# Patient Record
Sex: MALE | Race: WHITE | NOT HISPANIC OR LATINO | Employment: OTHER | URBAN - METROPOLITAN AREA
[De-identification: names, ages, dates, MRNs, and addresses within clinical notes are randomized per-mention and may not be internally consistent; named-entity substitution may affect disease eponyms.]

---

## 2017-04-17 ENCOUNTER — TRANSCRIBE ORDERS (OUTPATIENT)
Dept: ADMINISTRATIVE | Facility: HOSPITAL | Age: 82
End: 2017-04-17

## 2017-04-17 ENCOUNTER — APPOINTMENT (OUTPATIENT)
Dept: LAB | Facility: MEDICAL CENTER | Age: 82
End: 2017-04-17
Payer: COMMERCIAL

## 2017-04-17 DIAGNOSIS — D47.2 MONOCLONAL GAMMOPATHY: ICD-10-CM

## 2017-04-17 LAB
ALBUMIN SERPL BCP-MCNC: 3.4 G/DL (ref 3.5–5)
ALP SERPL-CCNC: 59 U/L (ref 46–116)
ALT SERPL W P-5'-P-CCNC: 16 U/L (ref 12–78)
ANION GAP SERPL CALCULATED.3IONS-SCNC: 5 MMOL/L (ref 4–13)
AST SERPL W P-5'-P-CCNC: 18 U/L (ref 5–45)
BASOPHILS # BLD AUTO: 0.01 THOUSANDS/ΜL (ref 0–0.1)
BASOPHILS NFR BLD AUTO: 0 % (ref 0–1)
BILIRUB SERPL-MCNC: 0.8 MG/DL (ref 0.2–1)
BUN SERPL-MCNC: 16 MG/DL (ref 5–25)
CALCIUM SERPL-MCNC: 8.8 MG/DL (ref 8.3–10.1)
CHLORIDE SERPL-SCNC: 100 MMOL/L (ref 100–108)
CO2 SERPL-SCNC: 32 MMOL/L (ref 21–32)
CREAT SERPL-MCNC: 0.79 MG/DL (ref 0.6–1.3)
EOSINOPHIL # BLD AUTO: 0.09 THOUSAND/ΜL (ref 0–0.61)
EOSINOPHIL NFR BLD AUTO: 2 % (ref 0–6)
ERYTHROCYTE [DISTWIDTH] IN BLOOD BY AUTOMATED COUNT: 13.7 % (ref 11.6–15.1)
GFR SERPL CREATININE-BSD FRML MDRD: >60 ML/MIN/1.73SQ M
GLUCOSE P FAST SERPL-MCNC: 102 MG/DL (ref 65–99)
HCT VFR BLD AUTO: 39.5 % (ref 36.5–49.3)
HGB BLD-MCNC: 13.2 G/DL (ref 12–17)
IGA SERPL-MCNC: 122 MG/DL (ref 70–400)
IGG SERPL-MCNC: 779 MG/DL (ref 700–1600)
IGM SERPL-MCNC: 35 MG/DL (ref 40–230)
LYMPHOCYTES # BLD AUTO: 0.75 THOUSANDS/ΜL (ref 0.6–4.47)
LYMPHOCYTES NFR BLD AUTO: 19 % (ref 14–44)
MCH RBC QN AUTO: 31.2 PG (ref 26.8–34.3)
MCHC RBC AUTO-ENTMCNC: 33.4 G/DL (ref 31.4–37.4)
MCV RBC AUTO: 93 FL (ref 82–98)
MONOCYTES # BLD AUTO: 0.42 THOUSAND/ΜL (ref 0.17–1.22)
MONOCYTES NFR BLD AUTO: 11 % (ref 4–12)
NEUTROPHILS # BLD AUTO: 2.7 THOUSANDS/ΜL (ref 1.85–7.62)
NEUTS SEG NFR BLD AUTO: 68 % (ref 43–75)
NRBC BLD AUTO-RTO: 0 /100 WBCS
PLATELET # BLD AUTO: 159 THOUSANDS/UL (ref 149–390)
PMV BLD AUTO: 10.4 FL (ref 8.9–12.7)
POTASSIUM SERPL-SCNC: 3.9 MMOL/L (ref 3.5–5.3)
PROT SERPL-MCNC: 6.5 G/DL (ref 6.4–8.2)
RBC # BLD AUTO: 4.23 MILLION/UL (ref 3.88–5.62)
SODIUM SERPL-SCNC: 137 MMOL/L (ref 136–145)
WBC # BLD AUTO: 3.99 THOUSAND/UL (ref 4.31–10.16)

## 2017-04-17 PROCEDURE — 82784 ASSAY IGA/IGD/IGG/IGM EACH: CPT

## 2017-04-17 PROCEDURE — 80053 COMPREHEN METABOLIC PANEL: CPT

## 2017-04-17 PROCEDURE — 84165 PROTEIN E-PHORESIS SERUM: CPT

## 2017-04-17 PROCEDURE — 88185 FLOWCYTOMETRY/TC ADD-ON: CPT

## 2017-04-17 PROCEDURE — 88184 FLOWCYTOMETRY/ TC 1 MARKER: CPT

## 2017-04-17 PROCEDURE — 83883 ASSAY NEPHELOMETRY NOT SPEC: CPT

## 2017-04-17 PROCEDURE — 85025 COMPLETE CBC W/AUTO DIFF WBC: CPT

## 2017-04-17 PROCEDURE — 36415 COLL VENOUS BLD VENIPUNCTURE: CPT

## 2017-04-18 LAB
KAPPA LC FREE SER-MCNC: 18.04 MG/L (ref 3.3–19.4)
KAPPA LC FREE/LAMBDA FREE SER: 1.28 {RATIO} (ref 0.26–1.65)
LAMBDA LC FREE SERPL-MCNC: 14.07 MG/L (ref 5.71–26.3)

## 2017-04-19 LAB
ALBUMIN SERPL ELPH-MCNC: 3.78 G/DL (ref 3.5–5)
ALBUMIN SERPL ELPH-MCNC: 60.9 % (ref 52–65)
ALPHA1 GLOB SERPL ELPH-MCNC: 0.33 G/DL (ref 0.1–0.4)
ALPHA1 GLOB SERPL ELPH-MCNC: 5.3 % (ref 2.5–5)
ALPHA2 GLOB SERPL ELPH-MCNC: 0.68 G/DL (ref 0.4–1.2)
ALPHA2 GLOB SERPL ELPH-MCNC: 11 % (ref 7–13)
BETA GLOB ABNORMAL SERPL ELPH-MCNC: 0.4 G/DL (ref 0.4–0.8)
BETA1 GLOB SERPL ELPH-MCNC: 6.5 % (ref 5–13)
BETA2 GLOB SERPL ELPH-MCNC: 4.2 % (ref 2–8)
BETA2+GAMMA GLOB SERPL ELPH-MCNC: 0.26 G/DL (ref 0.2–0.5)
GAMMA GLOB ABNORMAL SERPL ELPH-MCNC: 0.75 G/DL (ref 0.5–1.6)
GAMMA GLOB SERPL ELPH-MCNC: 12.1 % (ref 12–22)
IGG/ALB SER: 1.56 {RATIO} (ref 1.1–1.8)
PROT PATTERN SERPL ELPH-IMP: ABNORMAL
PROT SERPL-MCNC: 6.2 G/DL (ref 6.4–8.2)
SCAN RESULT: NORMAL

## 2017-04-27 ENCOUNTER — ALLSCRIPTS OFFICE VISIT (OUTPATIENT)
Dept: OTHER | Facility: OTHER | Age: 82
End: 2017-04-27

## 2017-09-18 ENCOUNTER — TRANSCRIBE ORDERS (OUTPATIENT)
Dept: ADMINISTRATIVE | Facility: HOSPITAL | Age: 82
End: 2017-09-18

## 2017-09-18 ENCOUNTER — APPOINTMENT (OUTPATIENT)
Dept: LAB | Facility: MEDICAL CENTER | Age: 82
End: 2017-09-18
Payer: COMMERCIAL

## 2017-09-18 DIAGNOSIS — I10 ESSENTIAL HYPERTENSION, MALIGNANT: Primary | ICD-10-CM

## 2017-09-18 DIAGNOSIS — I10 ESSENTIAL HYPERTENSION, MALIGNANT: ICD-10-CM

## 2017-09-18 DIAGNOSIS — E78.00 PURE HYPERCHOLESTEROLEMIA: ICD-10-CM

## 2017-09-18 DIAGNOSIS — E78.5 OTHER AND UNSPECIFIED HYPERLIPIDEMIA: Primary | ICD-10-CM

## 2017-09-18 DIAGNOSIS — E78.5 OTHER AND UNSPECIFIED HYPERLIPIDEMIA: ICD-10-CM

## 2017-09-18 DIAGNOSIS — R51.9 FACIAL PAIN: ICD-10-CM

## 2017-09-18 DIAGNOSIS — Z79.899 ENCOUNTER FOR LONG-TERM (CURRENT) USE OF OTHER MEDICATIONS: ICD-10-CM

## 2017-09-18 LAB
ALBUMIN SERPL BCP-MCNC: 3.7 G/DL (ref 3.5–5)
ALP SERPL-CCNC: 61 U/L (ref 46–116)
ALT SERPL W P-5'-P-CCNC: 16 U/L (ref 12–78)
ANION GAP SERPL CALCULATED.3IONS-SCNC: 9 MMOL/L (ref 4–13)
AST SERPL W P-5'-P-CCNC: 19 U/L (ref 5–45)
BASOPHILS # BLD AUTO: 0.02 THOUSANDS/ΜL (ref 0–0.1)
BASOPHILS NFR BLD AUTO: 1 % (ref 0–1)
BILIRUB SERPL-MCNC: 1 MG/DL (ref 0.2–1)
BILIRUB UR QL STRIP: NEGATIVE
BUN SERPL-MCNC: 16 MG/DL (ref 5–25)
CALCIUM SERPL-MCNC: 8.8 MG/DL (ref 8.3–10.1)
CHLORIDE SERPL-SCNC: 101 MMOL/L (ref 100–108)
CHOLEST SERPL-MCNC: 150 MG/DL (ref 50–200)
CLARITY UR: CLEAR
CO2 SERPL-SCNC: 29 MMOL/L (ref 21–32)
COLOR UR: YELLOW
CREAT SERPL-MCNC: 0.78 MG/DL (ref 0.6–1.3)
EOSINOPHIL # BLD AUTO: 0.12 THOUSAND/ΜL (ref 0–0.61)
EOSINOPHIL NFR BLD AUTO: 3 % (ref 0–6)
ERYTHROCYTE [DISTWIDTH] IN BLOOD BY AUTOMATED COUNT: 13.3 % (ref 11.6–15.1)
ERYTHROCYTE [SEDIMENTATION RATE] IN BLOOD: 5 MM/HOUR (ref 0–10)
GFR SERPL CREATININE-BSD FRML MDRD: 81 ML/MIN/1.73SQ M
GLUCOSE P FAST SERPL-MCNC: 93 MG/DL (ref 65–99)
GLUCOSE UR STRIP-MCNC: NEGATIVE MG/DL
HCT VFR BLD AUTO: 42.7 % (ref 36.5–49.3)
HDLC SERPL-MCNC: 78 MG/DL (ref 40–60)
HGB BLD-MCNC: 14.2 G/DL (ref 12–17)
HGB UR QL STRIP.AUTO: NEGATIVE
IRON SERPL-MCNC: 135 UG/DL (ref 65–175)
KETONES UR STRIP-MCNC: NEGATIVE MG/DL
LDH SERPL-CCNC: 221 U/L (ref 81–234)
LDLC SERPL CALC-MCNC: 60 MG/DL (ref 0–100)
LEUKOCYTE ESTERASE UR QL STRIP: NEGATIVE
LYMPHOCYTES # BLD AUTO: 0.61 THOUSANDS/ΜL (ref 0.6–4.47)
LYMPHOCYTES NFR BLD AUTO: 16 % (ref 14–44)
MCH RBC QN AUTO: 30.8 PG (ref 26.8–34.3)
MCHC RBC AUTO-ENTMCNC: 33.3 G/DL (ref 31.4–37.4)
MCV RBC AUTO: 93 FL (ref 82–98)
MONOCYTES # BLD AUTO: 0.37 THOUSAND/ΜL (ref 0.17–1.22)
MONOCYTES NFR BLD AUTO: 9 % (ref 4–12)
NEUTROPHILS # BLD AUTO: 2.79 THOUSANDS/ΜL (ref 1.85–7.62)
NEUTS SEG NFR BLD AUTO: 71 % (ref 43–75)
NITRITE UR QL STRIP: NEGATIVE
NRBC BLD AUTO-RTO: 0 /100 WBCS
PH UR STRIP.AUTO: 7 [PH] (ref 4.5–8)
PLATELET # BLD AUTO: 128 THOUSANDS/UL (ref 149–390)
PMV BLD AUTO: 10.5 FL (ref 8.9–12.7)
POTASSIUM SERPL-SCNC: 4 MMOL/L (ref 3.5–5.3)
PROT SERPL-MCNC: 6.7 G/DL (ref 6.4–8.2)
PROT UR STRIP-MCNC: NEGATIVE MG/DL
PSA SERPL-MCNC: 4.1 NG/ML (ref 0–4)
RBC # BLD AUTO: 4.61 MILLION/UL (ref 3.88–5.62)
SODIUM SERPL-SCNC: 139 MMOL/L (ref 136–145)
SP GR UR STRIP.AUTO: 1.02 (ref 1–1.03)
TRIGL SERPL-MCNC: 61 MG/DL
TSH SERPL DL<=0.05 MIU/L-ACNC: 2.38 UIU/ML (ref 0.36–3.74)
UROBILINOGEN UR QL STRIP.AUTO: 1 E.U./DL
VIT B12 SERPL-MCNC: 310 PG/ML (ref 100–900)
WBC # BLD AUTO: 3.94 THOUSAND/UL (ref 4.31–10.16)

## 2017-09-18 PROCEDURE — 80053 COMPREHEN METABOLIC PANEL: CPT

## 2017-09-18 PROCEDURE — 83615 LACTATE (LD) (LDH) ENZYME: CPT

## 2017-09-18 PROCEDURE — 81003 URINALYSIS AUTO W/O SCOPE: CPT | Performed by: INTERNAL MEDICINE

## 2017-09-18 PROCEDURE — 36415 COLL VENOUS BLD VENIPUNCTURE: CPT

## 2017-09-18 PROCEDURE — 83540 ASSAY OF IRON: CPT

## 2017-09-18 PROCEDURE — 85652 RBC SED RATE AUTOMATED: CPT

## 2017-09-18 PROCEDURE — 84443 ASSAY THYROID STIM HORMONE: CPT

## 2017-09-18 PROCEDURE — G0103 PSA SCREENING: HCPCS

## 2017-09-18 PROCEDURE — 82607 VITAMIN B-12: CPT

## 2017-09-18 PROCEDURE — 85025 COMPLETE CBC W/AUTO DIFF WBC: CPT

## 2017-09-18 PROCEDURE — 80061 LIPID PANEL: CPT

## 2017-09-26 ENCOUNTER — TRANSCRIBE ORDERS (OUTPATIENT)
Dept: ADMINISTRATIVE | Facility: HOSPITAL | Age: 82
End: 2017-09-26

## 2017-09-26 ENCOUNTER — HOSPITAL ENCOUNTER (OUTPATIENT)
Dept: RADIOLOGY | Facility: MEDICAL CENTER | Age: 82
Discharge: HOME | End: 2017-09-26
Payer: COMMERCIAL

## 2017-09-26 ENCOUNTER — APPOINTMENT (OUTPATIENT)
Dept: RADIOLOGY | Facility: MEDICAL CENTER | Age: 82
End: 2017-09-26
Payer: COMMERCIAL

## 2017-09-26 DIAGNOSIS — R51.9 FACIAL PAIN: ICD-10-CM

## 2017-09-26 DIAGNOSIS — M54.2 NECK PAIN: Primary | ICD-10-CM

## 2017-09-26 PROCEDURE — 72050 X-RAY EXAM NECK SPINE 4/5VWS: CPT

## 2017-09-26 PROCEDURE — 70450 CT HEAD/BRAIN W/O DYE: CPT

## 2017-10-26 ENCOUNTER — ALLSCRIPTS OFFICE VISIT (OUTPATIENT)
Dept: OTHER | Facility: OTHER | Age: 82
End: 2017-10-26

## 2017-10-27 DIAGNOSIS — D72.819 DECREASED WHITE BLOOD CELL COUNT: ICD-10-CM

## 2017-10-27 NOTE — PROGRESS NOTES
Assessment  1  MGUS (monoclonal gammopathy of unknown significance) (273 1) (D47 2)   2  Leukopenia (288 50) (D72 819)    Plan  Leukopenia    · (1) CBC/PLT/DIFF; Status:Active; Requested for:78Kjb3806; Perform:[facility]; OMF:[id number];AHDHCQR; For:Leukopenia; Ordered By:Ant Villeda;   · (1) COMPREHENSIVE METABOLIC PANEL; Status:Active; Requested for:66Uvl7480; Perform:[facility]; YTU:[id number];JWQDUIW; For:Leukopenia; Ordered By:Ant Villeda;  MGUS (monoclonal gammopathy of unknown significance)    · (1) FREE LIGHT CHAINS, SERUM; Status:Active; Requested for:52Eof5936; Perform:[facility]; [id number];QXREQDJ; For:MGUS (monoclonal gammopathy of unknown significance); Ordered By:Ant Villeda;   · (1) IgG,IgA,IgM QUANTITATIVE, BLOOD; Status:Active; Requested for:26Bbf5895; Perform:[facility]; HDV:[id number];RKALEUZ; For:MGUS (monoclonal gammopathy of unknown significance); Ordered By:Ant Villeda;   · (1) PROTEIN ELECTRO, SERUM; Status:Active; Requested for:75Gzv3465; Perform:[facility]; WHU:[id number];ZNMAQIR; For:MGUS (monoclonal gammopathy of unknown significance); Ordered By:Ant Villeda;   · (LC) Immunofixation, Serum; Status:Active; Requested for:39Bhc7168; Perform:[facility]; EYF:54GQH3152;UDWEUXS; For:MGUS (monoclonal gammopathy of unknown significance); Ordered By:Ant Villeda;   · Follow-Up visit in 9 months Evaluation and Treatment  Follow-up  Status: Hold For -  Scheduling  Requested for: 21GZD6033   Ordered; For: MGUS (monoclonal gammopathy of unknown significance); Ordered By: Elijah Fernandez Performed:  Due: 13KCC2063    Discussion/Summary  Discussion Summary:   Is a pleasant 41-year-old male with a past medical history of hypertension and hypothyroidism  He was found to have a few immature forms in his blood  His other cell lines were within normal limits  His platelets were also normal   I advised him I would just repeat his blood work   He did not have the blood work I had ordered performed  I do not have any new protein levels her SPEP with immunofixation  White count is slightly low at 3 94  Platelet count is 898 but this has fluctuated  He is at baseline  I'll see him back in 6 months with repeat blood work  He did have abnormal cells on his blood work previously so we will continue to monitor him because of the risk of a myeloproliferative disorder  Again so far as blood work does not show any evidence of this  Counseling Documentation With Imm: The patient was counseled regarding diagnostic results,-- instructions for management,-- prognosis,-- patient and family education  Goals and Barriers: The patient has the current Goals: Monitoring blood work for any abnormalities  The patent has the current Barriers: None  Medication SE Review and Pt Understands Tx: The treatment plan was reviewed with the patient/guardian  The patient/guardian understands and agrees with the treatment plan      Chief Complaint  Chief Complaint Free Text Note Form: When he last saw me he had a Fluctuating platelet count over the last year with a CBC recently which showed 2 metamyelocytes and one myelocyte along with a slightly low protein level      History of Present Illness  Previous Therapy:   Workup       Current Therapy: Workup       Interval History: Patient returns for followup visit  his hemoglobin is normal  White count is slightly low at 3 9 but this has been stable since April  Platelet count is slightly low but this has fluctuated in the past and is in the same range  As far as symptoms are concerned he is at baseline  Denies any nausea denies any vomiting diarrhea  The rest of his 14 point review of systems today was negative  Review of Systems  Complete-Male:  as stated in the history of present illness otherwise his 14 point review of systems today was negative  Active Problems  1  Abnormal blood chemistry (790 6) (R79 9)   2   Leukopenia (288 50) (D72 469)   3  MGUS (monoclonal gammopathy of unknown significance) (273 1) (D47 2)    Past Medical History  1  History of hypertension (V12 59) (Z86 79)   2  History of hypothyroidism (V12 29) (Z86 39)  Past Medical History Reviewed: The past medical history was reviewed and updated today  Positive for hypertension, allergies, hypercholesterolemia, hypothyroidism      Surgical History  1  History of Colon Surgery   2  History of Hip Replacement  Surgical History Reviewed: The surgical history was reviewed and updated today  Positive for hip replacement and a colon resection      Family History  Sister    1  Family history of Diabetes Mellitus (V18 0)  Brother    2  Family history of Lung Cancer (V16 1)  Family History Reviewed: The family history was reviewed and updated today  Other for lung cancer in his brother was a heavy smoker      Social History   · Denied: History of Alcohol Use (History)   · Former smoker (F88 06) (U54 957)  Social History Reviewed: The social history was reviewed and updated today  The patient used to smoke 2 packs a day for 7 years but quit when he was 25 i e  60 years ago  Current Meds   1  Aciphex 20 MG Oral Tablet Delayed Release; TAKE 1 TABLET DAILY; Therapy: (Recorded:03Apr2014) to Recorded   2  Atenolol-Chlorthalidone 50-25 MG Oral Tablet; Take 1 tablet daily; Therapy: (Recorded:03Apr2014) to Recorded   3  Benzonatate 100 MG Oral Capsule; Therapy: (Recorded:26Oct2017) to Recorded   4  Combigan 0 2-0 5 % Ophthalmic Solution; INSTILL 1 DROP Twice daily; Therapy: (Recorded:03Apr2014) to Recorded   5  Latanoprost 0 005 % Ophthalmic Solution; INSTILL 1 DROP  INTO AFFECTED EYE(S)   ONCE DAILY AS DIRECTED; Therapy: (Recorded:03Apr2014) to Recorded   6  Levothyroxine Sodium 50 MCG Oral Tablet; TAKE 1 TABLET DAILY; Therapy: (Recorded:03Apr2014) to Recorded   7  Montelukast Sodium 10 MG Oral Tablet; TAKE 1 TABLET BY MOUTH DAILY;    Therapy: (Recorded:03Apr2014) to Recorded   8  Potassium Chloride Jenny ER 20 MEQ Oral Tablet Extended Release; TAKE 1 TABLET   DAILY; Therapy: (Recorded:03Apr2014) to Recorded   9  Simvastatin 20 MG Oral Tablet; TAKE 1 TABLET DAILY IN THE EVENING; Therapy: (Recorded:03Apr2014) to Recorded   10  Terazosin HCl - 5 MG Oral Capsule; TAKE 1 CAPSULE AT BEDTIME NIGHTLY; Therapy: (Recorded:03Apr2014) to Recorded  Medication List Reviewed: The medication list was reviewed and updated today  Allergies  1  No Known Drug Allergies    Vitals  Vital Signs    Recorded: 26LPP1943 02:18PM   Temperature 95 7 F   Heart Rate 72   Respiration 16   Systolic 082   Diastolic 82   Height 5 ft 6 in   Weight 182 lb    BMI Calculated 29 38   BSA Calculated 1 92   O2 Saturation 94   Pain Scale 0     Physical Exam    Constitutional   General appearance: No acute distress, well appearing and well nourished  Eyes   Conjunctiva and lids: No swelling, erythema, or discharge  Pupils and irises: Equal, round and reactive to light  Ears, Nose, Mouth, and Throat   External inspection of ears and nose: Normal     Nasal mucosa, septum, and turbinates: Normal without edema or erythema  Oropharynx: Normal with no erythema, edema, exudate or lesions  Pulmonary   Respiratory effort: No increased work of breathing or signs of respiratory distress  Auscultation of lungs: Clear to auscultation, equal breath sounds bilaterally, no wheezes, no rales, no rhonci  Cardiovascular   Palpation of heart: Normal PMI, no thrills  Auscultation of heart: Normal rate and rhythm, normal S1 and S2, without murmurs  Examination of extremities for edema and/or varicosities: Normal     Carotid pulses: Normal     Abdomen   Abdomen: Non-tender, no masses  Liver and spleen: No hepatomegaly or splenomegaly  Lymphatic   Palpation of lymph nodes in neck: No lymphadenopathy      Musculoskeletal   Gait and station: Normal     Digits and nails: Normal without clubbing or cyanosis  Inspection/palpation of joints, bones, and muscles: Normal     Skin   Skin and subcutaneous tissue: Normal without rashes or lesions  Neurologic   Cranial nerves: Cranial nerves 2-12 intact  Sensation: No sensory loss  Psychiatric   Orientation to person, place and time: Normal     Mood and affect: Normal           ECOG Zero       Results/Data  * CT HEAD WO CONTRAST 26Sep2017 11:03AM EPIC, Provider   Test ordered by: Celeste Adkins     Test Name Result Flag Reference   CT HEAD WO CONTRAST (Report)     CT BRAIN - WITHOUT CONTRAST     INDICATION: R51: Headache  History taken directly from the electronic ordering system  Facial pain  COMPARISON: None  TECHNIQUE: CT examination of the brain was performed  In addition to axial images, coronal reformatted images were created and submitted for interpretation  Radiation dose length product (DLP) for this visit: 1027 mGy-cm   This examination, like all CT scans performed in the [facility], was performed utilizing techniques to minimize radiation dose exposure, including the use of iterative    reconstruction and automated exposure control  IMAGE QUALITY: Diagnostic  FINDINGS:      PARENCHYMA: No intracranial mass, mass effect or midline shift  No CT signs of acute infarction  There is no parenchymal hemorrhage  Atherosclerotic calcifications of the cavernous segment of the internal carotid artery are mild  VENTRICLES AND EXTRA-AXIAL SPACES: Normal for patient's age  VISUALIZED ORBITS AND PARANASAL SINUSES: Left lens implant noted  CALVARIUM AND EXTRACRANIAL SOFT TISSUES:  Normal        IMPRESSION:     No acute intracranial abnormality  Workstation performed: [id number]     Signed by:    Pierce Maxwell MD   9/26/17     (1) PSA (SCREEN) (Dx V76 44 Screen for Prostate Cancer) [id number] 10:30AM [facility], Provider   Test ordered by: Celeste Adkins     Test Name Result Flag Reference   PROSTATE SPECIFIC ANTIGEN 4 1 ng/mL H 0 0-4 0   American Urological Association Guidelines define biochemical recurrence of prostate cancer as a detectable or rising PSA value post-radical prostatectomy that is greater than or equal to 0 2 ng/mL with a second confirmatory level of greater than or equal to 0 2 ng/mL  This is a patient instruction: This test is non-fasting  Please drink two glasses of water morning of bloodwork  (1) CBC/PLT/DIFF [id number] 10:30AM EPIC, Provider   Test ordered by: Leila Sloan     Test Name Result Flag Reference   WBC COUNT 3 94 Thousand/uL L 4 31-10 16   RBC COUNT 4 61 Million/uL  3 88-5 62   HEMOGLOBIN 14 2 g/dL  12 0-17 0   HEMATOCRIT 42 7 %  36 5-49 3   MCV 93 fL  82-98   MCH 30 8 pg  26 8-34 3   MCHC 33 3 g/dL  31 4-37 4   RDW 13 3 %  11 6-15 1   MPV 10 5 fL  8 9-12 7   PLATELET COUNT 325 Thousands/uL L 149-390   nRBC AUTOMATED 0 /100 WBCs     NEUTROPHILS RELATIVE PERCENT 71 %  43-75   LYMPHOCYTES RELATIVE PERCENT 16 %  14-44   MONOCYTES RELATIVE PERCENT 9 %  4-12   EOSINOPHILS RELATIVE PERCENT 3 %  0-6   BASOPHILS RELATIVE PERCENT 1 %  0-1   NEUTROPHILS ABSOLUTE COUNT 2 79 Thousands/? ??L  1 85-7 62   LYMPHOCYTES ABSOLUTE COUNT 0 61 Thousands/? ??L  0 60-4 47   MONOCYTES ABSOLUTE COUNT 0 37 Thousand/? ??L  0 17-1 22   EOSINOPHILS ABSOLUTE COUNT 0 12 Thousand/? ??L  0 00-0 61   BASOPHILS ABSOLUTE COUNT 0 02 Thousands/? ??L  0 00-0 10   This is a patient instruction: This test is non-fasting  Please drink two glasses of water morning of bloodwork       (1) VITAMIN B12 [id number] 10:30AM EPIC, Provider   Test ordered by: Leila Sloan     Test Name Result Flag Reference   VITAMIN B12 310 pg/mL  100-900     (1) COMPREHENSIVE METABOLIC PANEL [id number] 19:20OP EPIC, Provider   Test ordered by: Leila Sloan     Test Name Result Flag Reference   SODIUM 139 mmol/L  136-145   POTASSIUM 4 0 mmol/L  3 5-5 3   CHLORIDE 101 mmol/L  100-108   CARBON DIOXIDE 29 mmol/L  21-32   ANION GAP (CALC) 9 mmol/L  4-13   BLOOD UREA NITROGEN 16 mg/dL 5-25   CREATININE 0 78 mg/dL  0 60-1 30   Standardized to IDMS reference method   CALCIUM 8 8 mg/dL  8 3-10 1   BILI, TOTAL 1 00 mg/dL  0 20-1 00   ALK PHOSPHATAS 61 U/L     ALT (SGPT) 16 U/L  12-78   Specimen collection should occur prior to Sulfasalazine and/or Sulfapyridine administration due to the potential for falsely depressed results  AST(SGOT) 19 U/L  5-45   Specimen collection should occur prior to Sulfasalazine administration due to the potential for falsely depressed results  ALBUMIN 3 7 g/dL  3 5-5 0   TOTAL PROTEIN 6 7 g/dL  6 4-8 2   eGFR 81 ml/min/1 73sq m     National Kidney Disease Education Program recommendations are as follows:  GFR calculation is accurate only with a steady state creatinine  Chronic Kidney disease less than 60 ml/min/1 73 sq  meters  Kidney failure less than 15 ml/min/1 73 sq  meters  GLUCOSE FASTING 93 mg/dL  65-99   Specimen collection should occur prior to Sulfasalazine administration due to the potential for falsely depressed results  Specimen collection should occur prior to Sulfapyridine administration due to the potential for falsely elevated results       Signatures   Electronically signed by : RYAN Fofana ; Oct 26 2017  2:54PM EST                       (Author)

## 2018-01-13 VITALS
HEART RATE: 63 BPM | RESPIRATION RATE: 16 BRPM | TEMPERATURE: 97.2 F | BODY MASS INDEX: 28.66 KG/M2 | OXYGEN SATURATION: 97 % | DIASTOLIC BLOOD PRESSURE: 78 MMHG | SYSTOLIC BLOOD PRESSURE: 124 MMHG | HEIGHT: 66 IN | WEIGHT: 178.31 LBS

## 2018-01-14 VITALS
HEART RATE: 72 BPM | SYSTOLIC BLOOD PRESSURE: 178 MMHG | BODY MASS INDEX: 29.25 KG/M2 | OXYGEN SATURATION: 94 % | RESPIRATION RATE: 16 BRPM | TEMPERATURE: 95.7 F | HEIGHT: 66 IN | WEIGHT: 182 LBS | DIASTOLIC BLOOD PRESSURE: 82 MMHG

## 2018-07-20 DIAGNOSIS — D72.819 DECREASED WHITE BLOOD CELL COUNT: ICD-10-CM

## 2018-07-20 DIAGNOSIS — D47.2 MONOCLONAL GAMMOPATHY: ICD-10-CM

## 2018-07-25 ENCOUNTER — TELEPHONE (OUTPATIENT)
Dept: HEMATOLOGY ONCOLOGY | Facility: HOSPITAL | Age: 83
End: 2018-07-25

## 2018-08-14 ENCOUNTER — TRANSCRIBE ORDERS (OUTPATIENT)
Dept: ADMINISTRATIVE | Facility: HOSPITAL | Age: 83
End: 2018-08-14

## 2018-08-14 ENCOUNTER — APPOINTMENT (OUTPATIENT)
Dept: LAB | Facility: MEDICAL CENTER | Age: 83
End: 2018-08-14
Payer: COMMERCIAL

## 2018-08-14 DIAGNOSIS — E78.00 PURE HYPERCHOLESTEROLEMIA: ICD-10-CM

## 2018-08-14 DIAGNOSIS — I10 ESSENTIAL HYPERTENSION, BENIGN: ICD-10-CM

## 2018-08-14 DIAGNOSIS — I10 ESSENTIAL HYPERTENSION, BENIGN: Primary | ICD-10-CM

## 2018-08-14 LAB
ALBUMIN SERPL BCP-MCNC: 3.4 G/DL (ref 3.5–5)
ALP SERPL-CCNC: 58 U/L (ref 46–116)
ALT SERPL W P-5'-P-CCNC: 15 U/L (ref 12–78)
ANION GAP SERPL CALCULATED.3IONS-SCNC: 3 MMOL/L (ref 4–13)
AST SERPL W P-5'-P-CCNC: 20 U/L (ref 5–45)
BASOPHILS # BLD AUTO: 0.03 THOUSANDS/ΜL (ref 0–0.1)
BASOPHILS NFR BLD AUTO: 1 % (ref 0–1)
BILIRUB SERPL-MCNC: 0.92 MG/DL (ref 0.2–1)
BILIRUB UR QL STRIP: NEGATIVE
BUN SERPL-MCNC: 19 MG/DL (ref 5–25)
CALCIUM SERPL-MCNC: 8.8 MG/DL (ref 8.3–10.1)
CHLORIDE SERPL-SCNC: 102 MMOL/L (ref 100–108)
CHOLEST SERPL-MCNC: 151 MG/DL (ref 50–200)
CLARITY UR: CLEAR
CO2 SERPL-SCNC: 33 MMOL/L (ref 21–32)
COLOR UR: NORMAL
CREAT SERPL-MCNC: 0.84 MG/DL (ref 0.6–1.3)
EOSINOPHIL # BLD AUTO: 0.07 THOUSAND/ΜL (ref 0–0.61)
EOSINOPHIL NFR BLD AUTO: 1 % (ref 0–6)
ERYTHROCYTE [DISTWIDTH] IN BLOOD BY AUTOMATED COUNT: 13.2 % (ref 11.6–15.1)
ERYTHROCYTE [SEDIMENTATION RATE] IN BLOOD: 7 MM/HOUR (ref 0–10)
GFR SERPL CREATININE-BSD FRML MDRD: 78 ML/MIN/1.73SQ M
GLUCOSE P FAST SERPL-MCNC: 94 MG/DL (ref 65–99)
GLUCOSE UR STRIP-MCNC: NEGATIVE MG/DL
HCT VFR BLD AUTO: 43.5 % (ref 36.5–49.3)
HDLC SERPL-MCNC: 67 MG/DL (ref 40–60)
HGB BLD-MCNC: 13.8 G/DL (ref 12–17)
HGB UR QL STRIP.AUTO: NEGATIVE
IMM GRANULOCYTES # BLD AUTO: 0.03 THOUSAND/UL (ref 0–0.2)
IMM GRANULOCYTES NFR BLD AUTO: 1 % (ref 0–2)
IRON SERPL-MCNC: 92 UG/DL (ref 65–175)
KETONES UR STRIP-MCNC: NEGATIVE MG/DL
LDH SERPL-CCNC: 222 U/L (ref 81–234)
LDLC SERPL CALC-MCNC: 73 MG/DL (ref 0–100)
LEUKOCYTE ESTERASE UR QL STRIP: NEGATIVE
LYMPHOCYTES # BLD AUTO: 0.62 THOUSANDS/ΜL (ref 0.6–4.47)
LYMPHOCYTES NFR BLD AUTO: 13 % (ref 14–44)
MCH RBC QN AUTO: 30.3 PG (ref 26.8–34.3)
MCHC RBC AUTO-ENTMCNC: 31.7 G/DL (ref 31.4–37.4)
MCV RBC AUTO: 96 FL (ref 82–98)
MONOCYTES # BLD AUTO: 0.4 THOUSAND/ΜL (ref 0.17–1.22)
MONOCYTES NFR BLD AUTO: 8 % (ref 4–12)
NEUTROPHILS # BLD AUTO: 3.7 THOUSANDS/ΜL (ref 1.85–7.62)
NEUTS SEG NFR BLD AUTO: 76 % (ref 43–75)
NITRITE UR QL STRIP: NEGATIVE
NONHDLC SERPL-MCNC: 84 MG/DL
NRBC BLD AUTO-RTO: 0 /100 WBCS
PH UR STRIP.AUTO: 6.5 [PH] (ref 4.5–8)
PLATELET # BLD AUTO: 152 THOUSANDS/UL (ref 149–390)
PMV BLD AUTO: 10.7 FL (ref 8.9–12.7)
POTASSIUM SERPL-SCNC: 4.1 MMOL/L (ref 3.5–5.3)
PROT SERPL-MCNC: 6.3 G/DL (ref 6.4–8.2)
PROT UR STRIP-MCNC: NEGATIVE MG/DL
PSA SERPL-MCNC: 3.7 NG/ML (ref 0–4)
RBC # BLD AUTO: 4.55 MILLION/UL (ref 3.88–5.62)
SODIUM SERPL-SCNC: 138 MMOL/L (ref 136–145)
SP GR UR STRIP.AUTO: 1.02 (ref 1–1.03)
TRIGL SERPL-MCNC: 54 MG/DL
TSH SERPL DL<=0.05 MIU/L-ACNC: 2.85 UIU/ML (ref 0.36–3.74)
UROBILINOGEN UR QL STRIP.AUTO: 1 E.U./DL
WBC # BLD AUTO: 4.85 THOUSAND/UL (ref 4.31–10.16)

## 2018-08-14 PROCEDURE — 81003 URINALYSIS AUTO W/O SCOPE: CPT | Performed by: INTERNAL MEDICINE

## 2018-08-14 PROCEDURE — G0103 PSA SCREENING: HCPCS

## 2018-08-14 PROCEDURE — 80053 COMPREHEN METABOLIC PANEL: CPT

## 2018-08-14 PROCEDURE — 84443 ASSAY THYROID STIM HORMONE: CPT

## 2018-08-14 PROCEDURE — 83540 ASSAY OF IRON: CPT

## 2018-08-14 PROCEDURE — 85652 RBC SED RATE AUTOMATED: CPT

## 2018-08-14 PROCEDURE — 83615 LACTATE (LD) (LDH) ENZYME: CPT

## 2018-08-14 PROCEDURE — 85025 COMPLETE CBC W/AUTO DIFF WBC: CPT

## 2018-08-14 PROCEDURE — 36415 COLL VENOUS BLD VENIPUNCTURE: CPT

## 2018-08-14 PROCEDURE — 80061 LIPID PANEL: CPT

## 2019-03-16 ENCOUNTER — APPOINTMENT (EMERGENCY)
Dept: CT IMAGING | Facility: HOSPITAL | Age: 84
DRG: 291 | End: 2019-03-16
Payer: COMMERCIAL

## 2019-03-16 ENCOUNTER — OFFICE VISIT (OUTPATIENT)
Dept: URGENT CARE | Facility: MEDICAL CENTER | Age: 84
End: 2019-03-16
Payer: COMMERCIAL

## 2019-03-16 ENCOUNTER — APPOINTMENT (EMERGENCY)
Dept: RADIOLOGY | Facility: HOSPITAL | Age: 84
DRG: 291 | End: 2019-03-16
Payer: COMMERCIAL

## 2019-03-16 ENCOUNTER — HOSPITAL ENCOUNTER (INPATIENT)
Facility: HOSPITAL | Age: 84
LOS: 3 days | Discharge: HOME HEALTH | DRG: 291 | End: 2019-03-19
Attending: EMERGENCY MEDICINE | Admitting: INTERNAL MEDICINE
Payer: COMMERCIAL

## 2019-03-16 VITALS
BODY MASS INDEX: 30.26 KG/M2 | SYSTOLIC BLOOD PRESSURE: 132 MMHG | WEIGHT: 192.8 LBS | HEART RATE: 66 BPM | DIASTOLIC BLOOD PRESSURE: 70 MMHG | OXYGEN SATURATION: 78 % | TEMPERATURE: 97.7 F | RESPIRATION RATE: 18 BRPM | HEIGHT: 67 IN

## 2019-03-16 DIAGNOSIS — R60.9 INTERSTITIAL EDEMA: ICD-10-CM

## 2019-03-16 DIAGNOSIS — R06.03 ACUTE RESPIRATORY DISTRESS: Primary | ICD-10-CM

## 2019-03-16 DIAGNOSIS — R09.02 HYPOXIA: Primary | ICD-10-CM

## 2019-03-16 DIAGNOSIS — J90 PLEURAL EFFUSION, BILATERAL: ICD-10-CM

## 2019-03-16 DIAGNOSIS — J96.02 ACUTE RESPIRATORY FAILURE WITH HYPOXIA AND HYPERCAPNIA (HCC): ICD-10-CM

## 2019-03-16 DIAGNOSIS — J18.9 PNEUMONIA: ICD-10-CM

## 2019-03-16 DIAGNOSIS — J96.01 ACUTE RESPIRATORY FAILURE WITH HYPOXIA AND HYPERCAPNIA (HCC): ICD-10-CM

## 2019-03-16 PROBLEM — G93.41 ACUTE METABOLIC ENCEPHALOPATHY: Status: ACTIVE | Noted: 2019-03-16

## 2019-03-16 PROBLEM — R60.0 BILATERAL LEG EDEMA: Status: ACTIVE | Noted: 2019-03-16

## 2019-03-16 PROBLEM — E87.1 HYPONATREMIA: Status: ACTIVE | Noted: 2019-03-16

## 2019-03-16 LAB
ALBUMIN SERPL BCP-MCNC: 3.4 G/DL (ref 3.5–5)
ALP SERPL-CCNC: 66 U/L (ref 46–116)
ALT SERPL W P-5'-P-CCNC: 19 U/L (ref 12–78)
ANION GAP SERPL CALCULATED.3IONS-SCNC: 4 MMOL/L (ref 4–13)
APTT PPP: 31 SECONDS (ref 26–38)
ARTERIAL PATENCY WRIST A: ABNORMAL
AST SERPL W P-5'-P-CCNC: 23 U/L (ref 5–45)
BASE EXCESS BLDA CALC-SCNC: 6 MMOL/L (ref -2–3)
BASOPHILS # BLD AUTO: 0.02 THOUSANDS/ΜL (ref 0–0.1)
BASOPHILS NFR BLD AUTO: 0 % (ref 0–1)
BILIRUB SERPL-MCNC: 0.8 MG/DL (ref 0.2–1)
BUN SERPL-MCNC: 22 MG/DL (ref 5–25)
CA-I BLD-SCNC: 1.17 MMOL/L (ref 1.12–1.32)
CALCIUM SERPL-MCNC: 8.5 MG/DL (ref 8.3–10.1)
CHLORIDE SERPL-SCNC: 92 MMOL/L (ref 100–108)
CO2 SERPL-SCNC: 36 MMOL/L (ref 21–32)
CREAT SERPL-MCNC: 0.73 MG/DL (ref 0.6–1.3)
EOSINOPHIL # BLD AUTO: 0.02 THOUSAND/ΜL (ref 0–0.61)
EOSINOPHIL NFR BLD AUTO: 0 % (ref 0–6)
ERYTHROCYTE [DISTWIDTH] IN BLOOD BY AUTOMATED COUNT: 13 % (ref 11.6–15.1)
FIO2 GAS DIL.REBREATH: 30 L
GFR SERPL CREATININE-BSD FRML MDRD: 82 ML/MIN/1.73SQ M
GLUCOSE SERPL-MCNC: 114 MG/DL (ref 65–140)
GLUCOSE SERPL-MCNC: 120 MG/DL (ref 65–140)
HCO3 BLDA-SCNC: 36 MMOL/L (ref 22–28)
HCT VFR BLD AUTO: 40.9 % (ref 36.5–49.3)
HCT VFR BLD CALC: 41 % (ref 36.5–49.3)
HGB BLD-MCNC: 13.4 G/DL (ref 12–17)
HGB BLDA-MCNC: 13.9 G/DL (ref 12–17)
IMM GRANULOCYTES # BLD AUTO: 0.04 THOUSAND/UL (ref 0–0.2)
IMM GRANULOCYTES NFR BLD AUTO: 1 % (ref 0–2)
INR PPP: 1.14 (ref 0.86–1.17)
LACTATE SERPL-SCNC: 0.9 MMOL/L (ref 0.5–2)
LYMPHOCYTES # BLD AUTO: 0.35 THOUSANDS/ΜL (ref 0.6–4.47)
LYMPHOCYTES NFR BLD AUTO: 6 % (ref 14–44)
MCH RBC QN AUTO: 30.7 PG (ref 26.8–34.3)
MCHC RBC AUTO-ENTMCNC: 32.8 G/DL (ref 31.4–37.4)
MCV RBC AUTO: 94 FL (ref 82–98)
MONOCYTES # BLD AUTO: 0.33 THOUSAND/ΜL (ref 0.17–1.22)
MONOCYTES NFR BLD AUTO: 6 % (ref 4–12)
NEUTROPHILS # BLD AUTO: 4.67 THOUSANDS/ΜL (ref 1.85–7.62)
NEUTS SEG NFR BLD AUTO: 87 % (ref 43–75)
NRBC BLD AUTO-RTO: 0 /100 WBCS
NT-PROBNP SERPL-MCNC: 707 PG/ML
PCO2 BLD: 38 MMOL/L (ref 21–32)
PCO2 BLD: 79.8 MM HG (ref 36–44)
PH BLD: 7.26 [PH] (ref 7.35–7.45)
PLATELET # BLD AUTO: 121 THOUSANDS/UL (ref 149–390)
PLATELET # BLD AUTO: 137 THOUSANDS/UL (ref 149–390)
PMV BLD AUTO: 10 FL (ref 8.9–12.7)
PMV BLD AUTO: 10.3 FL (ref 8.9–12.7)
PO2 BLD: 84 MM HG (ref 75–129)
POTASSIUM BLD-SCNC: 4.1 MMOL/L (ref 3.5–5.3)
POTASSIUM SERPL-SCNC: 4.2 MMOL/L (ref 3.5–5.3)
PROCALCITONIN SERPL-MCNC: <0.05 NG/ML
PROT SERPL-MCNC: 6.3 G/DL (ref 6.4–8.2)
PROTHROMBIN TIME: 14.3 SECONDS (ref 11.8–14.2)
RBC # BLD AUTO: 4.37 MILLION/UL (ref 3.88–5.62)
SAMPLE SITE: 4
SAO2 % BLD FROM PO2: 94 % (ref 95–98)
SODIUM BLD-SCNC: 129 MMOL/L (ref 136–145)
SODIUM SERPL-SCNC: 132 MMOL/L (ref 136–145)
SPECIMEN SOURCE: ABNORMAL
TROPONIN I SERPL-MCNC: 0.04 NG/ML
WBC # BLD AUTO: 5.43 THOUSAND/UL (ref 4.31–10.16)

## 2019-03-16 PROCEDURE — 85730 THROMBOPLASTIN TIME PARTIAL: CPT | Performed by: EMERGENCY MEDICINE

## 2019-03-16 PROCEDURE — 94660 CPAP INITIATION&MGMT: CPT

## 2019-03-16 PROCEDURE — 85610 PROTHROMBIN TIME: CPT | Performed by: EMERGENCY MEDICINE

## 2019-03-16 PROCEDURE — 85014 HEMATOCRIT: CPT

## 2019-03-16 PROCEDURE — 80053 COMPREHEN METABOLIC PANEL: CPT | Performed by: EMERGENCY MEDICINE

## 2019-03-16 PROCEDURE — 83605 ASSAY OF LACTIC ACID: CPT | Performed by: EMERGENCY MEDICINE

## 2019-03-16 PROCEDURE — 36600 WITHDRAWAL OF ARTERIAL BLOOD: CPT

## 2019-03-16 PROCEDURE — 85049 AUTOMATED PLATELET COUNT: CPT | Performed by: HOSPITALIST

## 2019-03-16 PROCEDURE — 84132 ASSAY OF SERUM POTASSIUM: CPT

## 2019-03-16 PROCEDURE — 84484 ASSAY OF TROPONIN QUANT: CPT | Performed by: EMERGENCY MEDICINE

## 2019-03-16 PROCEDURE — 84145 PROCALCITONIN (PCT): CPT | Performed by: HOSPITALIST

## 2019-03-16 PROCEDURE — 99213 OFFICE O/P EST LOW 20 MIN: CPT | Performed by: PHYSICIAN ASSISTANT

## 2019-03-16 PROCEDURE — 94760 N-INVAS EAR/PLS OXIMETRY 1: CPT

## 2019-03-16 PROCEDURE — 87040 BLOOD CULTURE FOR BACTERIA: CPT | Performed by: EMERGENCY MEDICINE

## 2019-03-16 PROCEDURE — 71250 CT THORAX DX C-: CPT

## 2019-03-16 PROCEDURE — 36415 COLL VENOUS BLD VENIPUNCTURE: CPT | Performed by: EMERGENCY MEDICINE

## 2019-03-16 PROCEDURE — 99223 1ST HOSP IP/OBS HIGH 75: CPT | Performed by: HOSPITALIST

## 2019-03-16 PROCEDURE — 82330 ASSAY OF CALCIUM: CPT

## 2019-03-16 PROCEDURE — 96374 THER/PROPH/DIAG INJ IV PUSH: CPT

## 2019-03-16 PROCEDURE — 93005 ELECTROCARDIOGRAM TRACING: CPT

## 2019-03-16 PROCEDURE — 71046 X-RAY EXAM CHEST 2 VIEWS: CPT

## 2019-03-16 PROCEDURE — 82803 BLOOD GASES ANY COMBINATION: CPT

## 2019-03-16 PROCEDURE — 82947 ASSAY GLUCOSE BLOOD QUANT: CPT

## 2019-03-16 PROCEDURE — 84295 ASSAY OF SERUM SODIUM: CPT

## 2019-03-16 PROCEDURE — 83880 ASSAY OF NATRIURETIC PEPTIDE: CPT | Performed by: HOSPITALIST

## 2019-03-16 PROCEDURE — 99285 EMERGENCY DEPT VISIT HI MDM: CPT

## 2019-03-16 PROCEDURE — 85025 COMPLETE CBC W/AUTO DIFF WBC: CPT | Performed by: EMERGENCY MEDICINE

## 2019-03-16 RX ORDER — HYDRALAZINE HYDROCHLORIDE 20 MG/ML
5 INJECTION INTRAMUSCULAR; INTRAVENOUS EVERY 6 HOURS PRN
Status: DISCONTINUED | OUTPATIENT
Start: 2019-03-16 | End: 2019-03-19 | Stop reason: HOSPADM

## 2019-03-16 RX ORDER — PANTOPRAZOLE SODIUM 20 MG/1
20 TABLET, DELAYED RELEASE ORAL
Status: DISCONTINUED | OUTPATIENT
Start: 2019-03-17 | End: 2019-03-19 | Stop reason: HOSPADM

## 2019-03-16 RX ORDER — MONTELUKAST SODIUM 10 MG/1
TABLET ORAL DAILY
COMMUNITY
Start: 2018-12-14

## 2019-03-16 RX ORDER — PRAVASTATIN SODIUM 40 MG
40 TABLET ORAL
Status: DISCONTINUED | OUTPATIENT
Start: 2019-03-17 | End: 2019-03-19 | Stop reason: HOSPADM

## 2019-03-16 RX ORDER — METHYLPREDNISOLONE SODIUM SUCCINATE 125 MG/2ML
125 INJECTION, POWDER, LYOPHILIZED, FOR SOLUTION INTRAMUSCULAR; INTRAVENOUS ONCE
Status: COMPLETED | OUTPATIENT
Start: 2019-03-16 | End: 2019-03-16

## 2019-03-16 RX ORDER — RABEPRAZOLE SODIUM 20 MG/1
TABLET, DELAYED RELEASE ORAL DAILY
COMMUNITY
Start: 2019-02-20 | End: 2020-07-29 | Stop reason: ALTCHOICE

## 2019-03-16 RX ORDER — SIMVASTATIN 20 MG
TABLET ORAL DAILY
COMMUNITY
Start: 2019-01-20

## 2019-03-16 RX ORDER — ATENOLOL 50 MG/1
50 TABLET ORAL DAILY
Status: DISCONTINUED | OUTPATIENT
Start: 2019-03-17 | End: 2019-03-19 | Stop reason: HOSPADM

## 2019-03-16 RX ORDER — POTASSIUM CHLORIDE 20 MEQ/1
TABLET, EXTENDED RELEASE ORAL
COMMUNITY
Start: 2019-01-18 | End: 2019-05-08 | Stop reason: SDUPTHER

## 2019-03-16 RX ORDER — ACETAMINOPHEN 325 MG/1
650 TABLET ORAL EVERY 6 HOURS PRN
Status: DISCONTINUED | OUTPATIENT
Start: 2019-03-16 | End: 2019-03-19 | Stop reason: HOSPADM

## 2019-03-16 RX ORDER — FUROSEMIDE 10 MG/ML
20 INJECTION INTRAMUSCULAR; INTRAVENOUS ONCE
Status: COMPLETED | OUTPATIENT
Start: 2019-03-16 | End: 2019-03-16

## 2019-03-16 RX ORDER — MONTELUKAST SODIUM 10 MG/1
10 TABLET ORAL DAILY
Status: DISCONTINUED | OUTPATIENT
Start: 2019-03-17 | End: 2019-03-19 | Stop reason: HOSPADM

## 2019-03-16 RX ORDER — LATANOPROST 50 UG/ML
1 SOLUTION/ DROPS OPHTHALMIC
Status: DISCONTINUED | OUTPATIENT
Start: 2019-03-16 | End: 2019-03-19 | Stop reason: HOSPADM

## 2019-03-16 RX ORDER — TERAZOSIN 5 MG/1
1 CAPSULE ORAL
COMMUNITY

## 2019-03-16 RX ORDER — LEVOTHYROXINE SODIUM 0.05 MG/1
50 TABLET ORAL
Status: DISCONTINUED | OUTPATIENT
Start: 2019-03-17 | End: 2019-03-19 | Stop reason: HOSPADM

## 2019-03-16 RX ORDER — ONDANSETRON 2 MG/ML
4 INJECTION INTRAMUSCULAR; INTRAVENOUS EVERY 6 HOURS PRN
Status: DISCONTINUED | OUTPATIENT
Start: 2019-03-16 | End: 2019-03-19 | Stop reason: HOSPADM

## 2019-03-16 RX ORDER — CHLORTHALIDONE 25 MG/1
25 TABLET ORAL DAILY
COMMUNITY
Start: 2019-02-19 | End: 2019-06-06

## 2019-03-16 RX ORDER — ATENOLOL 50 MG/1
TABLET ORAL DAILY
COMMUNITY
Start: 2019-02-19 | End: 2019-06-06

## 2019-03-16 RX ORDER — TERAZOSIN 5 MG/1
5 CAPSULE ORAL
Status: DISCONTINUED | OUTPATIENT
Start: 2019-03-16 | End: 2019-03-19 | Stop reason: HOSPADM

## 2019-03-16 RX ORDER — BRIMONIDINE TARTRATE, TIMOLOL MALEATE 2; 5 MG/ML; MG/ML
1 SOLUTION/ DROPS OPHTHALMIC 2 TIMES DAILY
COMMUNITY

## 2019-03-16 RX ORDER — LEVOTHYROXINE SODIUM 0.05 MG/1
1 TABLET ORAL DAILY
COMMUNITY

## 2019-03-16 RX ORDER — ALBUTEROL SULFATE 2.5 MG/3ML
5 SOLUTION RESPIRATORY (INHALATION) ONCE
Status: COMPLETED | OUTPATIENT
Start: 2019-03-16 | End: 2019-03-16

## 2019-03-16 RX ORDER — LATANOPROST 50 UG/ML
SOLUTION/ DROPS OPHTHALMIC
COMMUNITY
Start: 2019-02-19

## 2019-03-16 RX ADMIN — CEFTRIAXONE SODIUM 1000 MG: 10 INJECTION, POWDER, FOR SOLUTION INTRAVENOUS at 17:30

## 2019-03-16 RX ADMIN — IPRATROPIUM BROMIDE 0.5 MG: 0.5 SOLUTION RESPIRATORY (INHALATION) at 12:43

## 2019-03-16 RX ADMIN — METHYLPREDNISOLONE SODIUM SUCCINATE 125 MG: 125 INJECTION, POWDER, FOR SOLUTION INTRAMUSCULAR; INTRAVENOUS at 12:43

## 2019-03-16 RX ADMIN — LATANOPROST 1 DROP: 50 SOLUTION/ DROPS OPHTHALMIC at 22:59

## 2019-03-16 RX ADMIN — AZITHROMYCIN MONOHYDRATE 500 MG: 500 INJECTION, POWDER, LYOPHILIZED, FOR SOLUTION INTRAVENOUS at 18:01

## 2019-03-16 RX ADMIN — ALBUTEROL SULFATE 5 MG: 2.5 SOLUTION RESPIRATORY (INHALATION) at 12:43

## 2019-03-16 RX ADMIN — FUROSEMIDE 20 MG: 10 INJECTION, SOLUTION INTRAMUSCULAR; INTRAVENOUS at 22:57

## 2019-03-16 NOTE — PATIENT INSTRUCTIONS
1  Patient transported by EMS to [facility] for further evaluation and treatment

## 2019-03-16 NOTE — H&P
H&P- Mike Gee 3/24/1929, 80 y o  male MRN: [id number]    Unit/Bed#:  32A Encounter: [id number]    Primary Care Provider: Lisa Ma MD   Date and time admitted to hospital: 3/16/2019 11:55 AM      * Acute respiratory failure with hypoxia and hypercapnia (HCC)  Assessment & Plan  · P/w with progressive dyspnea; found to have O2 sats in 70s; improved with supplemental O2  · ABG in the ED with respiratory acidosis, so BiPAP applied   · Etiology is unclear and may be multifactorial, but primarily suspect CHF exacerbation,   · CT chest with possible pna; interstitial edema  · Repeat ABG in 6 hours; wean BIPAP as tolerated   · Check PCT; resume abx if elevated  · Check BNP  · No TTE on file on chart review, will check        Bilateral leg edema  Assessment & Plan  · May support CHF exacerbation as etiology   · Will check TTE  · Give lasix 20mg x1   · Check weights and chart I/Os     Acute metabolic encephalopathy  Assessment & Plan  · E/b increased lethargy and confusion in the ED   · ABG with hypercarbia   · Will monitor improvement with correction of CO2      Hyponatremia  Assessment & Plan  · Suspect hypervolemic   · Monitor with diuresis  · BMP daily      VTE Prophylaxis: Enoxaparin (Lovenox)  / sequential compression device   Code Status: DNR/DNI   POLST: POLST form is not discussed and not completed at this time  Anticipated Length of Stay:  Patient will be admitted on an Inpatient basis with an anticipated length of stay of  > 2 midnights  Justification for Hospital Stay: hypoxia, SOB     Total Time for Visit, including Counseling / Coordination of Care: 1 hour  Greater than 50% of this total time spent on direct patient counseling and coordination of care  Chief Complaint:   shortness of breath and cough     History of Present Illness:    Mike Gee is a 80 y o  male history of hypertension who presents with progressively worsening shortness of breath    History provided mainly by family as patient is lethargic and confused at time of assessment  He reports that over the last few weeks he has been complaining of worsening shortness of breath and of too much phlegm that he cannot clear  Usually pretty independent and still was going out to see friends  They report no fevers or excessive productive cough  They do not the end of February he had a hard time walking a short distance and had to sit down  They thought this was a change in his normal activity but mostly attributed to just old  Today he presented to urgent care with these complaints found to be hypoxic with O2 sats in the 70s  While being assessed in the ED, ED nurse noted a change in mental status and patient was more lethargic and difficult to arouse  ABG checked stat and showed new respiratory acidosis so BiPAP applied  Review of Systems:    Review of Systems   Constitutional: Positive for activity change and fatigue  Negative for chills and fever  HENT: Negative  Respiratory: Positive for cough and shortness of breath  Negative for wheezing  Cardiovascular: Positive for leg swelling  Gastrointestinal: Negative  Genitourinary: Negative  Musculoskeletal: Negative  Skin: Negative  Hematological: Negative  All other systems reviewed and are negative  Past Medical and Surgical History:     Past Medical History:   Diagnosis Date    Allergic rhinitis     Hypercholesteremia     Hypertension     Pneumonia        Past Surgical History:   Procedure Laterality Date    LAPAROSCOPIC COLON RESECTION      TOTAL HIP ARTHROPLASTY         Meds/Allergies:    Prior to Admission medications    Medication Sig Start Date End Date Taking?  Authorizing Provider   atenolol (TENORMIN) 50 mg tablet  2/19/19  Yes Historical Provider, MD   brimonidine-timolol (COMBIGAN) 0 2-0 5 % Apply 1 drop to eye 2 (two) times a day   Yes Historical Provider, MD   chlorthalidone 25 mg tablet  2/19/19  Yes Historical Provider, MD latanoprost (XALATAN) 0 005 % ophthalmic solution  2/19/19  Yes Historical Provider, MD   levothyroxine 50 mcg tablet Take 1 tablet by mouth daily   Yes Historical Provider, MD   montelukast (SINGULAIR) 10 mg tablet  12/14/18  Yes Historical Provider, MD   potassium chloride (K-DUR,KLOR-CON) 20 mEq tablet  1/18/19  Yes Historical Provider, MD   RABEprazole (ACIPHEX) 20 MG tablet  2/20/19  Yes Historical Provider, MD   simvastatin (ZOCOR) 20 mg tablet  1/20/19  Yes Historical Provider, MD   terazosin (HYTRIN) 5 mg capsule Take 1 capsule by mouth   Yes Historical Provider, MD     I have reviewed home medications with patient family member  Allergies: No Known Allergies    Social History:     Marital Status: /Civil Union   Occupation: retired    Patient Pre-hospital Living Situation: independent  Patient Pre-hospital Level of Mobility: independent  Patient Pre-hospital Diet Restrictions: none   Substance Use History:   Social History     Substance and Sexual Activity   Alcohol Use Yes    Frequency: Never    Comment: Socially     Social History     Tobacco Use   Smoking Status Former Smoker   Smokeless Tobacco Never Used     Social History     Substance and Sexual Activity   Drug Use Never       Family History:    History reviewed  No pertinent family history  Physical Exam:     Vitals:   Blood Pressure: (!) 184/87 (03/16/19 1740)  Pulse: 73 (03/16/19 1740)  Temperature: 98 8 °F (37 1 °C) (03/16/19 1219)  Temp Source: Oral (03/16/19 1219)  Respirations: 20 (03/16/19 1740)  Weight - Scale: 86 3 kg (190 lb 4 1 oz) (03/16/19 1219)  SpO2: 98 % (03/16/19 1742)    Physical Exam   Constitutional: No distress  HENT:   Head: Normocephalic and atraumatic  Cardiovascular: Regular rhythm  Bradycardia present  Exam reveals no gallop and no friction rub  No murmur heard  Pulmonary/Chest: Effort normal  No respiratory distress  He has no wheezes  He has rales  Abdominal: Soft   Bowel sounds are normal  He exhibits distension  There is no tenderness  There is no guarding  Musculoskeletal: Normal range of motion  He exhibits edema (2+ LE )  He exhibits no deformity  Neurological: He exhibits normal muscle tone  Lethargic and oriented to person    Skin: Skin is warm and dry  He is not diaphoretic  No erythema  Psychiatric: He has a normal mood and affect  His behavior is normal    Nursing note and vitals reviewed  Additional Data:     Lab Results: I have personally reviewed pertinent reports  Results from last 7 days   Lab Units 03/16/19  1731 03/16/19  1227   WBC Thousand/uL  --  5 43   HEMOGLOBIN g/dL  --  13 4   I STAT HEMOGLOBIN g/dl 13 9  --    HEMATOCRIT %  --  40 9   HEMATOCRIT, ISTAT % 41  --    PLATELETS Thousands/uL  --  137*   NEUTROS PCT %  --  87*   LYMPHS PCT %  --  6*   MONOS PCT %  --  6   EOS PCT %  --  0     Results from last 7 days   Lab Units 03/16/19  1731 03/16/19  1227   POTASSIUM mmol/L  --  4 2   CHLORIDE mmol/L  --  92*   CO2 mmol/L  --  36*   CO2, I-STAT mmol/L 38*  --    BUN mg/dL  --  22   CREATININE mg/dL  --  0 73   CALCIUM mg/dL  --  8 5   ALK PHOS U/L  --  66   ALT U/L  --  19   AST U/L  --  23   GLUCOSE, ISTAT mg/dl 120  --      Results from last 7 days   Lab Units 03/16/19  1227   INR  1 14       Imaging: I have personally reviewed pertinent reports  Xr Chest 2 Views    Result Date: 3/16/2019  Narrative: CHEST INDICATION:   SOB  COMPARISON:  None EXAM PERFORMED/VIEWS:  XR CHEST PA & LATERAL FINDINGS: Heart is enlarged  Small to moderate bilateral pleural effusions noted  Increased prominence of pulmonary interstitial markings noted  Atherosclerotic calcifications noted  Osseous structures appear within normal limits for patient age  Impression: Mild to moderate pulmonary interstitial edema with bibasilar effusions   Workstation performed: [id number]     Ct Chest Without Contrast    Result Date: 3/16/2019  Narrative: CT CHEST WITHOUT IV CONTRAST INDICATION:   Shortness of breath  COMPARISON:  None  TECHNIQUE: CT examination of the chest was performed without intravenous contrast   Axial, sagittal, and coronal 2D reformatted images were created from the source data and submitted for interpretation  Radiation dose length product (DLP) for this visit:  263 mGy-cm   This examination, like all CT scans performed in the [facility], was performed utilizing techniques to minimize radiation dose exposure, including the use of iterative reconstruction and automated exposure control  FINDINGS: LUNGS: Mild interstitial thickening and groundglass attenuation in the posterior aspect of the right lower lobe, right upper lobe and left upper lobe suspicious for mild interstitial edema  There are patchy airspace opacities in the dependent aspect of the lower lobes compatible with atelectasis or consolidation from pneumonia  7 mm ill-defined groundglass nodule in the right lung apex (3/26) and in the right lower lobe (3/58)  Subsegmental atelectasis versus scarring in the right middle lobe, lingular  region, and inferior right upper lobe  There is no tracheal or endobronchial lesion  PLEURA:  Moderate sized bilateral pleural effusions  HEART/GREAT VESSELS:  Atherosclerotic changes are noted in thoracic aorta and coronary arteries  Small pericardial effusion  MEDIASTINUM AND JESSICA:  Unremarkable  CHEST WALL AND LOWER NECK:   Unremarkable  VISUALIZED STRUCTURES IN THE UPPER ABDOMEN:  Unremarkable  OSSEOUS STRUCTURES:  No acute fracture or destructive osseous lesion  Impression: 1  Mild interstitial thickening and groundglass attenuation in the posterior aspect of the right lower lobe, right upper lobe and left upper lobe suspicious for mild interstitial edema  2   Patchy airspace opacities in the dependent lower lobes compatible with atelectasis or consolidation from pneumonia   3   7 mm ill-defined groundglass nodule in the right lung apex and right lower lobe  Based on current Fleischner Society 2017 Guidelines on incidental pulmonary nodule, followup noncontrast CT is recommended at 6-12 months from the initial examination to  confirm persistence; if stable at that time, additional followup CT is recommended for every 2 years until 5 years of stability is demonstrated  4   Small pericardial effusion  Workstation performed: [id number]       Allscripts / Epic Records Reviewed: Yes     ** Please Note: This note has been constructed using a voice recognition system   **

## 2019-03-16 NOTE — ED NOTES
Change in mental status noted, rapidly resolved once bipap initiated        Tsaia Shaw RN  03/16/19 [id number]

## 2019-03-16 NOTE — ASSESSMENT & PLAN NOTE
· E/b increased lethargy and confusion in the ED   · ABG with hypercarbia   · Will monitor improvement with correction of CO2

## 2019-03-16 NOTE — ED PROVIDER NOTES
History  Chief Complaint   Patient presents with    Shortness of Breath     Pt presents to the ED with severe SOB onset x 3 weeks, pt went to urgent care this morning, RA sat at 74%  Improved to 96% via 3L O2 NC via EMS  66-year-old male presents to the emergency department for evaluation of difficulty breathing  Patient is transferred from [facility] urgent care for evaluation  Patient went to the urgent care and was found have an oxygen saturation of 76%  Patient reports a 3 week history of worsening dyspnea with exertion  He denies fevers  He has been coughing and cough is mostly nonproductive  No hemoptysis  He denies chest pain  He is able to ambulate approximately 1/2 block before having to rest   Patient has a history of seasonal allergies and has wheezed in the past but denies a history of asthma or COPD  He is a nonsmoker  History provided by:  Patient, relative and medical records   used: No    Shortness of Breath   Severity:  Severe  Onset quality:  Gradual  Duration:  3 weeks  Timing:  Constant  Progression:  Worsening  Chronicity:  New  Context: activity    Relieved by:  Nothing  Worsened by: Activity, exertion and coughing  Ineffective treatments:  None tried  Associated symptoms: cough, sputum production and wheezing    Associated symptoms: no abdominal pain, no chest pain, no fever, no rash, no sore throat and no vomiting    Risk factors: no prolonged immobilization and no recent surgery        Prior to Admission Medications   Prescriptions Last Dose Informant Patient Reported? Taking?    RABEprazole (ACIPHEX) 20 MG tablet   Yes No   atenolol (TENORMIN) 50 mg tablet   Yes No   brimonidine-timolol (COMBIGAN) 0 2-0 5 %   Yes No   Sig: Apply 1 drop to eye 2 (two) times a day   chlorthalidone 25 mg tablet   Yes No   latanoprost (XALATAN) 0 005 % ophthalmic solution   Yes No   levothyroxine 50 mcg tablet   Yes No   Sig: Take 1 tablet by mouth daily   montelukast (SINGULAIR) 10 mg tablet   Yes No   potassium chloride (K-DUR,KLOR-CON) 20 mEq tablet   Yes No   simvastatin (ZOCOR) 20 mg tablet   Yes No   terazosin (HYTRIN) 5 mg capsule   Yes No   Sig: Take 1 capsule by mouth      Facility-Administered Medications: None       Past Medical History:   Diagnosis Date    Allergic rhinitis     Hypercholesteremia     Hypertension     Pneumonia        Past Surgical History:   Procedure Laterality Date    LAPAROSCOPIC COLON RESECTION      TOTAL HIP ARTHROPLASTY         History reviewed  No pertinent family history  I have reviewed and agree with the history as documented  Social History     Tobacco Use    Smoking status: Former Smoker    Smokeless tobacco: Never Used   Substance Use Topics    Alcohol use: Yes     Frequency: Never     Comment: Socially    Drug use: Never        Review of Systems   Constitutional: Negative for appetite change, fever and unexpected weight change  HENT: Negative for sore throat  Respiratory: Positive for cough, sputum production, shortness of breath and wheezing  Cardiovascular: Positive for leg swelling  Negative for chest pain and palpitations  Gastrointestinal: Negative for abdominal pain and vomiting  Genitourinary: Negative for dysuria  Skin: Negative for rash  Neurological: Negative for weakness  All other systems reviewed and are negative  Physical Exam  Physical Exam   Constitutional: He is oriented to person, place, and time  Vital signs are normal  He appears well-developed and well-nourished  Non-toxic appearance  He appears distressed (mild)  HENT:   Head: Normocephalic and atraumatic  Mouth/Throat: Oropharynx is clear and moist    Eyes: Pupils are equal, round, and reactive to light  Conjunctivae and EOM are normal    Neck: Normal range of motion  Neck supple  No JVD present  Cardiovascular: Normal rate, regular rhythm, normal heart sounds and intact distal pulses     Pulmonary/Chest: Effort normal  No accessory muscle usage or stridor  No tachypnea  No respiratory distress  He has decreased breath sounds in the right lower field and the left lower field  He has wheezes  He has no rales  He exhibits no tenderness  Abdominal: Soft  Normal appearance and bowel sounds are normal  He exhibits no distension  There is no tenderness  There is no rebound and no guarding  Musculoskeletal: Normal range of motion  He exhibits no tenderness or deformity  Right lower leg: He exhibits edema (1+)  Left lower leg: He exhibits edema (1+)  Lymphadenopathy:     He has no cervical adenopathy  Neurological: He is alert and oriented to person, place, and time  He has normal strength and normal reflexes  Coordination normal    Skin: Skin is warm, dry and intact  No rash noted  No cyanosis  Psychiatric: He has a normal mood and affect  His behavior is normal  Judgment and thought content normal    Vitals reviewed        Vital Signs  ED Triage Vitals [03/16/19 1219]   Temperature Pulse Respirations Blood Pressure SpO2   98 8 °F (37 1 °C) 60 (!) 24 145/67 (!) 82 %      Temp Source Heart Rate Source Patient Position - Orthostatic VS BP Location FiO2 (%)   Oral Monitor Sitting Right arm --      Pain Score       No Pain           Vitals:    03/16/19 1219 03/16/19 1335   BP: 145/67 148/67   Pulse: 60 62   Patient Position - Orthostatic VS: Sitting Sitting       qSOFA     Row Name 03/16/19 1335 03/16/19 1219 03/16/19 1047          Altered mental status GCS < 15            Respiratory Rate > / =22  0  1  0      Systolic BP < / =440  0  0  0      Q Sofa Score  0  1  0            Visual Acuity      ED Medications  Medications   ceftriaxone (ROCEPHIN) 1 g/50 mL in dextrose IVPB (has no administration in time range)   azithromycin (ZITHROMAX) 500 mg in sodium chloride 0 9% 250mL IVPB 500 mg (has no administration in time range)   albuterol inhalation solution 5 mg (5 mg Nebulization Given 3/16/19 1243)   ipratropium (ATROVENT) 0 02 % inhalation solution 0 5 mg (0 5 mg Nebulization Given 3/16/19 1243)   methylPREDNISolone sodium succinate (Solu-MEDROL) injection 125 mg (125 mg Intravenous Given 3/16/19 1243)       Diagnostic Studies  Results Reviewed     Procedure Component Value Units Date/Time    Blood culture #2 [[id number]] Collected:  03/16/19 1518    Lab Status: In process Specimen:  Blood from Hand, Left Updated:  03/16/19 1522    Protime-INR [[id number]]  (Abnormal) Collected:  03/16/19 1227    Lab Status:  Final result Specimen:  Blood from Arm, Right Updated:  03/16/19 1308     Protime 14 3 seconds      INR 1 14    APTT [[id number]]  (Normal) Collected:  03/16/19 1227    Lab Status:  Final result Specimen:  Blood from Arm, Right Updated:  03/16/19 1308     PTT 31 seconds     Comprehensive metabolic panel [[id number]]  (Abnormal) Collected:  03/16/19 1227    Lab Status:  Final result Specimen:  Blood from Arm, Right Updated:  03/16/19 1258     Sodium 132 mmol/L      Potassium 4 2 mmol/L      Chloride 92 mmol/L      CO2 36 mmol/L      ANION GAP 4 mmol/L      BUN 22 mg/dL      Creatinine 0 73 mg/dL      Glucose 114 mg/dL      Calcium 8 5 mg/dL      AST 23 U/L      ALT 19 U/L      Alkaline Phosphatase 66 U/L      Total Protein 6 3 g/dL      Albumin 3 4 g/dL      Total Bilirubin 0 80 mg/dL      eGFR 82 ml/min/1 73sq m     Narrative:       National Kidney Disease Education Program recommendations are as follows:  GFR calculation is accurate only with a steady state creatinine  Chronic Kidney disease less than 60 ml/min/1 73 sq  meters  Kidney failure less than 15 ml/min/1 73 sq  meters      Troponin I [[id number]]  (Normal) Collected:  03/16/19 1227    Lab Status:  Final result Specimen:  Blood from Arm, Right Updated:  03/16/19 1257     Troponin I 0 04 ng/mL     Lactic acid, plasma [[id number]]  (Normal) Collected:  03/16/19 1227    Lab Status:  Final result Specimen:  Blood from Arm, Right Updated:  03/16/19 1255     LACTIC ACID 0 9 mmol/L     Narrative:       Result may be elevated if tourniquet was used during collection  CBC and differential [[id number]]  (Abnormal) Collected:  03/16/19 1227    Lab Status:  Final result Specimen:  Blood from Arm, Right Updated:  03/16/19 1251     WBC 5 43 Thousand/uL      RBC 4 37 Million/uL      Hemoglobin 13 4 g/dL      Hematocrit 40 9 %      MCV 94 fL      MCH 30 7 pg      MCHC 32 8 g/dL      RDW 13 0 %      MPV 10 0 fL      Platelets 392 Thousands/uL      nRBC 0 /100 WBCs      Neutrophils Relative 87 %      Immat GRANS % 1 %      Lymphocytes Relative 6 %      Monocytes Relative 6 %      Eosinophils Relative 0 %      Basophils Relative 0 %      Neutrophils Absolute 4 67 Thousands/µL      Immature Grans Absolute 0 04 Thousand/uL      Lymphocytes Absolute 0 35 Thousands/µL      Monocytes Absolute 0 33 Thousand/µL      Eosinophils Absolute 0 02 Thousand/µL      Basophils Absolute 0 02 Thousands/µL     Blood culture #1 [[id number]] Collected:  03/16/19 1227    Lab Status: In process Specimen:  Blood from Arm, Right Updated:  03/16/19 1233                 CT chest without contrast   Final Result by Almaz Lobo MD (03/16 3413)      1  Mild interstitial thickening and groundglass attenuation in the posterior aspect of the right lower lobe, right upper lobe and left upper lobe suspicious for mild interstitial edema  2   Patchy airspace opacities in the dependent lower lobes compatible with atelectasis or consolidation from pneumonia  3   7 mm ill-defined groundglass nodule in the right lung apex and right lower lobe  Based on current Fleischner Society 2017 Guidelines on incidental pulmonary nodule, followup noncontrast CT is recommended at 6-12 months from the initial examination to    confirm persistence; if stable at that time, additional followup CT is recommended for every 2 years until 5 years of stability is demonstrated  4   Small pericardial effusion                 Workstation performed: [id number]         XR chest 2 views    (Results Pending)              Procedures  ECG 12 Lead Documentation  Date/Time: 3/16/2019 4:18 PM  Performed by: Celine Sequeira DO  Authorized by: Celine Sequeira DO     Indications / Diagnosis:  Hypoxia  ECG reviewed by me, the ED Provider: yes    Patient location:  ED  Previous ECG:     Previous ECG:  Unavailable  Interpretation:     Interpretation: abnormal    Quality:     Tracing quality:  Limited by artifact  Rate:     ECG rate:  55    ECG rate assessment: bradycardic    Rhythm:     Rhythm: sinus bradycardia    Ectopy:     Ectopy: none    QRS:     QRS axis:  Left  Conduction:     Conduction: abnormal      Abnormal conduction: incomplete RBBB    ST segments:     ST segments:  Non-specific           Phone Contacts  ED Phone Contact    ED Course                   Initial Sepsis Screening     [address] Name 03/16/19 4811                Is the patient's history suggestive of a new or worsening infection? Yes (Proceed)  (Abnormal)   -AW        Suspected source of infection  pneumonia  -AW        Are two or more of the following signs & symptoms of infection both present and new to the patient? No  -AW        Indicate SIRS criteria          If the answer is yes to both questions, suspicion of sepsis is present          If severe sepsis is present AND tissue hypoperfusion perists in the hour after fluid resuscitation or lactate > 4, the patient meets criteria for SEPTIC SHOCK          Are any of the following organ dysfunction criteria present within 6 hours of suspected infection and SIRS criteria that are NOT considered to be chronic conditions?           Organ dysfunction          Date of presentation of severe sepsis          Time of presentation of severe sepsis          Tissue hypoperfusion persists in the hour after crystalloid fluid administration, evidenced, by either:          Was hypotension present within one hour of the conclusion of crystalloid fluid administration?           Date of presentation of septic shock          Time of presentation of septic shock            User Key  (r) = Recorded By, (t) = Taken By, (c) = Cosigned By    Initials Name Provider Type    ARNIE Sequeira DO Physician                  MDM  Number of Diagnoses or Management Options  Hypoxia: new and requires workup  Interstitial edema: new and requires workup  Pleural effusion, bilateral: new and requires workup  Pneumonia: new and requires workup     Amount and/or Complexity of Data Reviewed  Clinical lab tests: ordered and reviewed  Tests in the radiology section of CPT®: ordered and reviewed  Tests in the medicine section of CPT®: ordered and reviewed  Decide to obtain previous medical records or to obtain history from someone other than the patient: yes  Obtain history from someone other than the patient: yes  Discuss the patient with other providers: yes  Independent visualization of images, tracings, or specimens: yes    Patient Progress  Patient progress: stable      Disposition  Final diagnoses:   Hypoxia   Interstitial edema   Pneumonia   Pleural effusion, bilateral     Time reflects when diagnosis was documented in both MDM as applicable and the Disposition within this note     Time User Action Codes Description Comment    3/16/2019  4:12 PM Ria Medina Add [J90] Chronic bilateral pleural effusions     3/16/2019  4:12 PM Ria Medina Add [R09 02] Hypoxia     3/16/2019  4:13 PM Ria Medina Add [R60 9] Interstitial edema     3/16/2019  4:13 PM Ria Medina Add [J18 9] Pneumonia     3/16/2019  4:13 PM Ria Medina Modify [R09 02] Hypoxia     3/16/2019  4:13 PM Ria Medina Remove [J90] Chronic bilateral pleural effusions     3/16/2019  4:13 PM Ria Medina Add [J90] Chronic bilateral pleural effusions     3/16/2019  4:13 PM Ria Medina Remove [J90] Chronic bilateral pleural effusions     3/16/2019  4:13 PM Ria Medina Add [J90] Pleural effusion, bilateral       ED Disposition     ED Disposition Condition Date/Time Comment    Admit Stable Sat Mar 16, 2019  4:12 PM Case was discussed with Dr Lito Pedroza and the patient's admission status was agreed to be Admission Status: inpatient status to the service of Dr Lito Pedroza   Follow-up Information    None         Patient's Medications   Discharge Prescriptions    No medications on file     No discharge procedures on file      ED Provider  Electronically Signed by           Angeal Stinson DO  03/16/19 1171

## 2019-03-16 NOTE — ASSESSMENT & PLAN NOTE
· P/w with progressive dyspnea; found to have O2 sats in 70s; improved with supplemental O2  · ABG in the ED with respiratory acidosis, so BiPAP applied   · Etiology is unclear and may be multifactorial, but primarily suspect CHF exacerbation,   · CT chest with possible pna; interstitial edema  · Repeat ABG in 6 hours; wean BIPAP as tolerated   · Check PCT; resume abx if elevated  · Check BNP  · No TTE on file on chart review, will check

## 2019-03-16 NOTE — ASSESSMENT & PLAN NOTE
· May support CHF exacerbation as etiology   · Will check TTE  · Give lasix 20mg x1   · Check weights and chart I/Os

## 2019-03-17 PROBLEM — J81.0 ACUTE PULMONARY EDEMA (HCC): Status: ACTIVE | Noted: 2019-03-17

## 2019-03-17 PROBLEM — R93.89 ABNORMAL CHEST CT: Status: ACTIVE | Noted: 2019-03-17

## 2019-03-17 LAB
ANION GAP SERPL CALCULATED.3IONS-SCNC: 6 MMOL/L (ref 4–13)
BASOPHILS # BLD AUTO: 0.01 THOUSANDS/ΜL (ref 0–0.1)
BASOPHILS NFR BLD AUTO: 0 % (ref 0–1)
BUN SERPL-MCNC: 20 MG/DL (ref 5–25)
CALCIUM SERPL-MCNC: 8.4 MG/DL (ref 8.3–10.1)
CHLORIDE SERPL-SCNC: 94 MMOL/L (ref 100–108)
CO2 SERPL-SCNC: 34 MMOL/L (ref 21–32)
CREAT SERPL-MCNC: 0.7 MG/DL (ref 0.6–1.3)
EOSINOPHIL # BLD AUTO: 0 THOUSAND/ΜL (ref 0–0.61)
EOSINOPHIL NFR BLD AUTO: 0 % (ref 0–6)
ERYTHROCYTE [DISTWIDTH] IN BLOOD BY AUTOMATED COUNT: 12.8 % (ref 11.6–15.1)
GFR SERPL CREATININE-BSD FRML MDRD: 84 ML/MIN/1.73SQ M
GLUCOSE SERPL-MCNC: 117 MG/DL (ref 65–140)
HCT VFR BLD AUTO: 40 % (ref 36.5–49.3)
HGB BLD-MCNC: 13.3 G/DL (ref 12–17)
IMM GRANULOCYTES # BLD AUTO: 0.09 THOUSAND/UL (ref 0–0.2)
IMM GRANULOCYTES NFR BLD AUTO: 2 % (ref 0–2)
LYMPHOCYTES # BLD AUTO: 0.26 THOUSANDS/ΜL (ref 0.6–4.47)
LYMPHOCYTES NFR BLD AUTO: 7 % (ref 14–44)
MAGNESIUM SERPL-MCNC: 1.8 MG/DL (ref 1.6–2.6)
MCH RBC QN AUTO: 31.4 PG (ref 26.8–34.3)
MCHC RBC AUTO-ENTMCNC: 33.3 G/DL (ref 31.4–37.4)
MCV RBC AUTO: 95 FL (ref 82–98)
MONOCYTES # BLD AUTO: 0.28 THOUSAND/ΜL (ref 0.17–1.22)
MONOCYTES NFR BLD AUTO: 7 % (ref 4–12)
NEUTROPHILS # BLD AUTO: 3.16 THOUSANDS/ΜL (ref 1.85–7.62)
NEUTS SEG NFR BLD AUTO: 84 % (ref 43–75)
NRBC BLD AUTO-RTO: 0 /100 WBCS
PLATELET # BLD AUTO: 122 THOUSANDS/UL (ref 149–390)
PMV BLD AUTO: 10.2 FL (ref 8.9–12.7)
POTASSIUM SERPL-SCNC: 3.7 MMOL/L (ref 3.5–5.3)
RBC # BLD AUTO: 4.23 MILLION/UL (ref 3.88–5.62)
SODIUM SERPL-SCNC: 134 MMOL/L (ref 136–145)
WBC # BLD AUTO: 3.8 THOUSAND/UL (ref 4.31–10.16)

## 2019-03-17 PROCEDURE — 94760 N-INVAS EAR/PLS OXIMETRY 1: CPT

## 2019-03-17 PROCEDURE — 83735 ASSAY OF MAGNESIUM: CPT | Performed by: HOSPITALIST

## 2019-03-17 PROCEDURE — 80048 BASIC METABOLIC PNL TOTAL CA: CPT | Performed by: HOSPITALIST

## 2019-03-17 PROCEDURE — 94660 CPAP INITIATION&MGMT: CPT

## 2019-03-17 PROCEDURE — 94762 N-INVAS EAR/PLS OXIMTRY CONT: CPT

## 2019-03-17 PROCEDURE — 99223 1ST HOSP IP/OBS HIGH 75: CPT | Performed by: INTERNAL MEDICINE

## 2019-03-17 PROCEDURE — 85025 COMPLETE CBC W/AUTO DIFF WBC: CPT | Performed by: HOSPITALIST

## 2019-03-17 PROCEDURE — 99232 SBSQ HOSP IP/OBS MODERATE 35: CPT | Performed by: INTERNAL MEDICINE

## 2019-03-17 RX ORDER — FUROSEMIDE 10 MG/ML
40 INJECTION INTRAMUSCULAR; INTRAVENOUS DAILY
Status: DISCONTINUED | OUTPATIENT
Start: 2019-03-17 | End: 2019-03-18

## 2019-03-17 RX ORDER — FLUTICASONE PROPIONATE 50 MCG
1 SPRAY, SUSPENSION (ML) NASAL DAILY
Status: DISCONTINUED | OUTPATIENT
Start: 2019-03-17 | End: 2019-03-19 | Stop reason: HOSPADM

## 2019-03-17 RX ORDER — METHYLPREDNISOLONE SODIUM SUCCINATE 40 MG/ML
40 INJECTION, POWDER, LYOPHILIZED, FOR SOLUTION INTRAMUSCULAR; INTRAVENOUS EVERY 12 HOURS SCHEDULED
Status: DISCONTINUED | OUTPATIENT
Start: 2019-03-17 | End: 2019-03-17

## 2019-03-17 RX ADMIN — FLUTICASONE PROPIONATE 1 SPRAY: 50 SPRAY, METERED NASAL at 17:00

## 2019-03-17 RX ADMIN — LEVOTHYROXINE SODIUM 50 MCG: 50 TABLET ORAL at 05:30

## 2019-03-17 RX ADMIN — MONTELUKAST SODIUM 10 MG: 10 TABLET, FILM COATED ORAL at 08:49

## 2019-03-17 RX ADMIN — ENOXAPARIN SODIUM 40 MG: 40 INJECTION SUBCUTANEOUS at 08:49

## 2019-03-17 RX ADMIN — TERAZOSIN HYDROCHLORIDE 5 MG: 5 CAPSULE ORAL at 00:19

## 2019-03-17 RX ADMIN — LATANOPROST 1 DROP: 50 SOLUTION/ DROPS OPHTHALMIC at 22:00

## 2019-03-17 RX ADMIN — METHYLPREDNISOLONE SODIUM SUCCINATE 40 MG: 40 INJECTION, POWDER, FOR SOLUTION INTRAMUSCULAR; INTRAVENOUS at 11:06

## 2019-03-17 RX ADMIN — FUROSEMIDE 40 MG: 10 INJECTION, SOLUTION INTRAMUSCULAR; INTRAVENOUS at 14:54

## 2019-03-17 RX ADMIN — PRAVASTATIN SODIUM 40 MG: 40 TABLET ORAL at 17:00

## 2019-03-17 RX ADMIN — ATENOLOL 50 MG: 50 TABLET ORAL at 08:49

## 2019-03-17 RX ADMIN — PANTOPRAZOLE SODIUM 20 MG: 20 TABLET, DELAYED RELEASE ORAL at 05:30

## 2019-03-17 NOTE — PLAN OF CARE
Problem: Potential for Falls  Goal: Patient will remain free of falls  Description  INTERVENTIONS:  - Assess patient frequently for physical needs  -  Identify cognitive and physical deficits and behaviors that affect risk of falls    -  Elora fall precautions as indicated by assessment   - Educate patient/family on patient safety including physical limitations  - Instruct patient to call for assistance with activity based on assessment  - Modify environment to reduce risk of injury  - Consider OT/PT consult to assist with strengthening/mobility  Outcome: Progressing     Problem: PAIN - ADULT  Goal: Verbalizes/displays adequate comfort level or baseline comfort level  Description  Interventions:  - Encourage patient to monitor pain and request assistance  - Assess pain using appropriate pain scale  - Administer analgesics based on type and severity of pain and evaluate response  - Implement non-pharmacological measures as appropriate and evaluate response  - Consider cultural and social influences on pain and pain management  - Notify physician/advanced practitioner if interventions unsuccessful or patient reports new pain  Outcome: Progressing     Problem: INFECTION - ADULT  Goal: Absence or prevention of progression during hospitalization  Description  INTERVENTIONS:  - Assess and monitor for signs and symptoms of infection  - Monitor lab/diagnostic results  - Monitor all insertion sites, i e  indwelling lines, tubes, and drains  - Monitor endotracheal (as able) and nasal secretions for changes in amount and color  - Elora appropriate cooling/warming therapies per order  - Administer medications as ordered  - Instruct and encourage patient and family to use good hand hygiene technique  - Identify and instruct in appropriate isolation precautions for identified infection/condition  Outcome: Progressing  Goal: Absence of fever/infection during neutropenic period  Description  INTERVENTIONS:  - Monitor WBC Outcome: Progressing     Problem: DISCHARGE PLANNING  Goal: Discharge to home or other facility with appropriate resources  Description  INTERVENTIONS:  - Identify barriers to discharge w/patient and caregiver  - Arrange for needed discharge resources and transportation as appropriate  - Identify discharge learning needs (meds, wound care, etc )  - Refer to Case Management Department for coordinating discharge planning if the patient needs post-hospital services based on physician/advanced practitioner order or complex needs related to functional status, cognitive ability, or social support system   Outcome: Progressing     Problem: RESPIRATORY - ADULT  Goal: Achieves optimal ventilation and oxygenation  Description  INTERVENTIONS:  - Assess for changes in respiratory status  - Assess for changes in mentation and behavior  - Position to facilitate oxygenation and minimize respiratory effort  - Oxygen administration by appropriate delivery method based on oxygen saturation (per order) or ABGs  - Initiate smoking cessation education as indicated  - Encourage broncho-pulmonary hygiene including cough, deep breathe, Incentive Spirometry  - Assess the need for suctioning and aspirate as needed  - Assess and instruct to report SOB or any respiratory difficulty  - Respiratory Therapy support as indicated  Outcome: Progressing

## 2019-03-17 NOTE — PROGRESS NOTES
Progress Note - Avril Halo 3/24/1929, 80 y o  male MRN: [id number]    Unit/Bed#: -01 Encounter: [id number]    Primary Care Provider: Hermelinda Real MD   Date and time admitted to hospital: 3/16/2019 11:55 AM    * Acute respiratory failure with hypoxia and hypercapnia (HCC)  Assessment & Plan  · P/w with progressive dyspnea; found to have O2 sats in 70s; improved with supplemental O2  · ABG in the ED with respiratory acidosis, placed on BiPAP with clinical improvement  · Etiology is unclear and may be multifactorial, but primarily suspect CHF exacerbation,   · CT chest with possible pna; interstitial edema  Procalcitonin levels negative  · Had overall decreased breath sounds on exam  ? Chronic lung disease although no significant history or risk factors  · Remained on BiPAP this morning however overall clinically improved and will wean to nasal cannula today  · Will recommend outpatient PFTs  Appreciate Pulmonary input      Acute pulmonary edema (HCC)  Assessment & Plan  · Presented with shortness of breath and cough  · CT of the chest showed findings compatible with interstitial edema    No known history of CHF  · Also presented with bilateral lower extremity edema  · Continue IV diuretics and follow up on echocardiogram  · Daily weights, I's and O's    Acute metabolic encephalopathy  Assessment & Plan  · Likely secondary to respiratory failure and is currently resolved    Hyponatremia  Assessment & Plan  · Likely secondary to hypervolemia  · Continue to monitor with diuretics  · Oral fluid restriction    Abnormal chest CT  Assessment & Plan  · CT chest results noted with incidental finding of 7 mm ill-defined ground-glass nodule in the right lung apex and right lower lobe  · Patient has no significant smoking history and is recommended for repeat CT chest in 1 year  · Discussed with Pulmonary      VTE Pharmacologic Prophylaxis:   Pharmacologic: Enoxaparin (Lovenox)  Mechanical VTE Prophylaxis in Place: Yes    Patient Centered Rounds: I have performed bedside rounds with nursing staff today  Discussions with Specialists or Other Care Team Provider: Nursing/Pulmonary    Education and Discussions with Family / Patient: Patient    Current Length of Stay: 1 day(s)    Current Patient Status: Inpatient   Certification Statement: The patient will continue to require additional inpatient hospital stay due to Above diagnosis and care plan    Discharge Plan:  Pending clinical improvement    Code Status: Level 3 - DNAR and DNI      Subjective:   Seen and evaluated  He reports overall improvement  He is able to speak in full sentences and is alert  Denies chest pain    Objective:     Vitals:   Temp (24hrs), Av 9 °F (36 6 °C), Min:97 6 °F (36 4 °C), Max:98 1 °F (36 7 °C)    Temp:  [97 6 °F (36 4 °C)-98 1 °F (36 7 °C)] 98 1 °F (36 7 °C)  HR:  [61-77] 66  Resp:  [18-24] 18  BP: (136-213)/(63-91) 153/72  SpO2:  [94 %-99 %] 95 %  Body mass index is 29 63 kg/m²  Input and Output Summary (last 24 hours): Intake/Output Summary (Last 24 hours) at 3/17/2019 1532  Last data filed at 3/17/2019 0330  Gross per 24 hour   Intake 530 ml   Output 1525 ml   Net -995 ml       Physical Exam:  General Appearance:    Alert, cooperative, no distress, appropriately responsive    Head:    Normocephalic, without obvious abnormality, atraumatic, mucous membranes moist    Eyes:    Conjunctiva/corneas clear, EOM's intact   Neck:   Supple   Lungs:      ++Decreased breath sounds bilaterally, few basal rales, no wheezes     Heart:    Regular rate and rhythm, S1 and S2    Abdomen:     Soft, non-tender, nondistended   Extremities:   1+ bilateral lower extremity edema   Neurologic:   Alert and oriented x3, appropriately responsive, follows commands, moves all extremities, no obvious facial droop or asymmetry         Additional Data:     Labs:    Results from last 7 days   Lab Units 19  0535   WBC Thousand/uL 3 80*   HEMOGLOBIN g/dL 13 3 HEMATOCRIT % 40 0   PLATELETS Thousands/uL 122*   NEUTROS PCT % 84*   LYMPHS PCT % 7*   MONOS PCT % 7   EOS PCT % 0     Results from last 7 days   Lab Units 03/17/19  0535 03/16/19  1731 03/16/19  1227   POTASSIUM mmol/L 3 7  --  4 2   CHLORIDE mmol/L 94*  --  92*   CO2 mmol/L 34*  --  36*   CO2, I-STAT mmol/L  --  38*  --    BUN mg/dL 20  --  22   CREATININE mg/dL 0 70  --  0 73   CALCIUM mg/dL 8 4  --  8 5   ALK PHOS U/L  --   --  66   ALT U/L  --   --  19   AST U/L  --   --  23   GLUCOSE, ISTAT mg/dl  --  120  --      Results from last 7 days   Lab Units 03/16/19  1227   INR  1 14       * I Have Reviewed All Lab Data Listed Above  * Additional Pertinent Lab Tests Reviewed: [facility] 66 Admission Reviewed    Cultures:   Blood Culture: No results found for: BLOODCX  Urine Culture: No results found for: URINECX  Sputum Culture: No components found for: SPUTUMCX  Wound Culture: No results found for: WOUNDCULT    Last 24 Hours Medication List:     Current Facility-Administered Medications:  acetaminophen 650 mg Oral Q6H PRN Santo Bamberger, MD   atenolol 50 mg Oral Daily Santo Bamberger, MD   enoxaparin 40 mg Subcutaneous Daily Santo Bamberger, MD   fluticasone 1 spray Each Nare Daily LOUISE Vasquez   furosemide 40 mg Intravenous Daily LOUISE Vasquez   hydrALAZINE 5 mg Intravenous Q6H PRN Santo Bamberger, MD   latanoprost 1 drop Both Eyes HS Santo Bamberger, MD   levothyroxine 50 mcg Oral Early Morning Santo Bamberger, MD   montelukast 10 mg Oral Daily Santo Bamberger, MD   ondansetron 4 mg Intravenous Q6H PRN Santo Bamberger, MD   pantoprazole 20 mg Oral Early Morning Santo Bamberger, MD   pravastatin 40 mg Oral Daily With George Bautista MD   terazosin 5 mg Oral HS Santo Bamberger, MD        Today, Patient Was Seen By: Farheen Fontana MD    ** Please Note: Dragon 360 Dictation voice to text software may have been used in the creation of this document   **

## 2019-03-17 NOTE — PLAN OF CARE
Problem: Potential for Falls  Goal: Patient will remain free of falls  Description  INTERVENTIONS:  - Assess patient frequently for physical needs  -  Identify cognitive and physical deficits and behaviors that affect risk of falls    -  [place] fall precautions as indicated by assessment   - Educate patient/family on patient safety including physical limitations  - Instruct patient to call for assistance with activity based on assessment  - Modify environment to reduce risk of injury  - Consider OT/PT consult to assist with strengthening/mobility  Outcome: Progressing     Problem: PAIN - ADULT  Goal: Verbalizes/displays adequate comfort level or baseline comfort level  Description  Interventions:  - Encourage patient to monitor pain and request assistance  - Assess pain using appropriate pain scale  - Administer analgesics based on type and severity of pain and evaluate response  - Implement non-pharmacological measures as appropriate and evaluate response  - Consider cultural and social influences on pain and pain management  - Notify physician/advanced practitioner if interventions unsuccessful or patient reports new pain  Outcome: Progressing     Problem: INFECTION - ADULT  Goal: Absence or prevention of progression during hospitalization  Description  INTERVENTIONS:  - Assess and monitor for signs and symptoms of infection  - Monitor lab/diagnostic results  - Monitor all insertion sites, i e  indwelling lines, tubes, and drains  - Monitor endotracheal (as able) and nasal secretions for changes in amount and color  - [place] appropriate cooling/warming therapies per order  - Administer medications as ordered  - Instruct and encourage patient and family to use good hand hygiene technique  - Identify and instruct in appropriate isolation precautions for identified infection/condition  Outcome: Progressing  Goal: Absence of fever/infection during neutropenic period  Description  INTERVENTIONS:  - Monitor WBC Outcome: Progressing     Problem: DISCHARGE PLANNING  Goal: Discharge to home or other facility with appropriate resources  Description  INTERVENTIONS:  - Identify barriers to discharge w/patient and caregiver  - Arrange for needed discharge resources and transportation as appropriate  - Identify discharge learning needs (meds, wound care, etc )  - Refer to Case Management Department for coordinating discharge planning if the patient needs post-hospital services based on physician/advanced practitioner order or complex needs related to functional status, cognitive ability, or social support system   Outcome: Progressing     Problem: RESPIRATORY - ADULT  Goal: Achieves optimal ventilation and oxygenation  Description  INTERVENTIONS:  - Assess for changes in respiratory status  - Assess for changes in mentation and behavior  - Position to facilitate oxygenation and minimize respiratory effort  - Oxygen administration by appropriate delivery method based on oxygen saturation (per order) or ABGs  - Initiate smoking cessation education as indicated  - Encourage broncho-pulmonary hygiene including cough, deep breathe, Incentive Spirometry  - Assess the need for suctioning and aspirate as needed  - Assess and instruct to report SOB or any respiratory difficulty  - Respiratory Therapy support as indicated  Outcome: Progressing

## 2019-03-17 NOTE — ASSESSMENT & PLAN NOTE
· Presented with shortness of breath and cough  · CT of the chest showed findings compatible with interstitial edema    No known history of CHF  · Also presented with bilateral lower extremity edema  · Continue IV diuretics and follow up on echocardiogram  · Daily weights, I's and O's

## 2019-03-17 NOTE — ASSESSMENT & PLAN NOTE
· P/w with progressive dyspnea; found to have O2 sats in 70s; improved with supplemental O2  · ABG in the ED with respiratory acidosis, placed on BiPAP with clinical improvement  · Etiology is unclear and may be multifactorial, but primarily suspect CHF exacerbation,   · CT chest with possible pna; interstitial edema  Procalcitonin levels negative  · Had overall decreased breath sounds on exam  ? Chronic lung disease although no significant history or risk factors  · Remained on BiPAP this morning however overall clinically improved and will wean to nasal cannula today  · Will recommend outpatient PFTs    Appreciate Pulmonary input

## 2019-03-17 NOTE — ASSESSMENT & PLAN NOTE
· CT chest results noted with incidental finding of 7 mm ill-defined ground-glass nodule in the right lung apex and right lower lobe  · Patient has no significant smoking history and is recommended for repeat CT chest in 1 year  · Discussed with Pulmonary

## 2019-03-18 ENCOUNTER — APPOINTMENT (INPATIENT)
Dept: NON INVASIVE DIAGNOSTICS | Facility: HOSPITAL | Age: 84
DRG: 291 | End: 2019-03-18
Payer: COMMERCIAL

## 2019-03-18 LAB
ANION GAP SERPL CALCULATED.3IONS-SCNC: 2 MMOL/L (ref 4–13)
ATRIAL RATE: 115 BPM
BASOPHILS # BLD AUTO: 0.01 THOUSANDS/ΜL (ref 0–0.1)
BASOPHILS NFR BLD AUTO: 0 % (ref 0–1)
BUN SERPL-MCNC: 28 MG/DL (ref 5–25)
CALCIUM SERPL-MCNC: 8.3 MG/DL (ref 8.3–10.1)
CHLORIDE SERPL-SCNC: 91 MMOL/L (ref 100–108)
CO2 SERPL-SCNC: 40 MMOL/L (ref 21–32)
CREAT SERPL-MCNC: 0.82 MG/DL (ref 0.6–1.3)
EOSINOPHIL # BLD AUTO: 0.01 THOUSAND/ΜL (ref 0–0.61)
EOSINOPHIL NFR BLD AUTO: 0 % (ref 0–6)
ERYTHROCYTE [DISTWIDTH] IN BLOOD BY AUTOMATED COUNT: 13 % (ref 11.6–15.1)
GFR SERPL CREATININE-BSD FRML MDRD: 78 ML/MIN/1.73SQ M
GLUCOSE SERPL-MCNC: 105 MG/DL (ref 65–140)
HCT VFR BLD AUTO: 40.4 % (ref 36.5–49.3)
HGB BLD-MCNC: 13.5 G/DL (ref 12–17)
IMM GRANULOCYTES # BLD AUTO: 0.05 THOUSAND/UL (ref 0–0.2)
IMM GRANULOCYTES NFR BLD AUTO: 1 % (ref 0–2)
LYMPHOCYTES # BLD AUTO: 0.26 THOUSANDS/ΜL (ref 0.6–4.47)
LYMPHOCYTES NFR BLD AUTO: 4 % (ref 14–44)
MCH RBC QN AUTO: 31.5 PG (ref 26.8–34.3)
MCHC RBC AUTO-ENTMCNC: 33.4 G/DL (ref 31.4–37.4)
MCV RBC AUTO: 94 FL (ref 82–98)
MONOCYTES # BLD AUTO: 0.55 THOUSAND/ΜL (ref 0.17–1.22)
MONOCYTES NFR BLD AUTO: 9 % (ref 4–12)
NEUTROPHILS # BLD AUTO: 5.05 THOUSANDS/ΜL (ref 1.85–7.62)
NEUTS SEG NFR BLD AUTO: 86 % (ref 43–75)
NRBC BLD AUTO-RTO: 0 /100 WBCS
PLATELET # BLD AUTO: 133 THOUSANDS/UL (ref 149–390)
PMV BLD AUTO: 10.1 FL (ref 8.9–12.7)
POTASSIUM SERPL-SCNC: 3.8 MMOL/L (ref 3.5–5.3)
QRS AXIS: -42 DEGREES
QRSD INTERVAL: 92 MS
QT INTERVAL: 432 MS
QTC INTERVAL: 413 MS
RBC # BLD AUTO: 4.29 MILLION/UL (ref 3.88–5.62)
SODIUM SERPL-SCNC: 133 MMOL/L (ref 136–145)
T WAVE AXIS: 34 DEGREES
VENTRICULAR RATE: 55 BPM
WBC # BLD AUTO: 5.93 THOUSAND/UL (ref 4.31–10.16)

## 2019-03-18 PROCEDURE — 94762 N-INVAS EAR/PLS OXIMTRY CONT: CPT

## 2019-03-18 PROCEDURE — 99232 SBSQ HOSP IP/OBS MODERATE 35: CPT | Performed by: NURSE PRACTITIONER

## 2019-03-18 PROCEDURE — 87631 RESP VIRUS 3-5 TARGETS: CPT | Performed by: NURSE PRACTITIONER

## 2019-03-18 PROCEDURE — 93306 TTE W/DOPPLER COMPLETE: CPT

## 2019-03-18 PROCEDURE — 93010 ELECTROCARDIOGRAM REPORT: CPT | Performed by: INTERNAL MEDICINE

## 2019-03-18 PROCEDURE — 94760 N-INVAS EAR/PLS OXIMETRY 1: CPT

## 2019-03-18 PROCEDURE — 93306 TTE W/DOPPLER COMPLETE: CPT | Performed by: INTERNAL MEDICINE

## 2019-03-18 PROCEDURE — 94640 AIRWAY INHALATION TREATMENT: CPT

## 2019-03-18 PROCEDURE — 80048 BASIC METABOLIC PNL TOTAL CA: CPT | Performed by: INTERNAL MEDICINE

## 2019-03-18 PROCEDURE — 85025 COMPLETE CBC W/AUTO DIFF WBC: CPT | Performed by: INTERNAL MEDICINE

## 2019-03-18 RX ORDER — FUROSEMIDE 40 MG/1
40 TABLET ORAL DAILY
Status: DISCONTINUED | OUTPATIENT
Start: 2019-03-19 | End: 2019-03-19 | Stop reason: HOSPADM

## 2019-03-18 RX ORDER — METHYLPREDNISOLONE SODIUM SUCCINATE 40 MG/ML
40 INJECTION, POWDER, LYOPHILIZED, FOR SOLUTION INTRAMUSCULAR; INTRAVENOUS EVERY 12 HOURS SCHEDULED
Status: DISCONTINUED | OUTPATIENT
Start: 2019-03-18 | End: 2019-03-19 | Stop reason: HOSPADM

## 2019-03-18 RX ORDER — GUAIFENESIN 600 MG
600 TABLET, EXTENDED RELEASE 12 HR ORAL EVERY 12 HOURS SCHEDULED
Status: DISCONTINUED | OUTPATIENT
Start: 2019-03-18 | End: 2019-03-19 | Stop reason: HOSPADM

## 2019-03-18 RX ORDER — ALBUTEROL SULFATE 2.5 MG/3ML
2.5 SOLUTION RESPIRATORY (INHALATION) EVERY 6 HOURS PRN
Status: DISCONTINUED | OUTPATIENT
Start: 2019-03-18 | End: 2019-03-18

## 2019-03-18 RX ORDER — LEVALBUTEROL 1.25 MG/.5ML
1.25 SOLUTION, CONCENTRATE RESPIRATORY (INHALATION)
Status: DISCONTINUED | OUTPATIENT
Start: 2019-03-18 | End: 2019-03-19 | Stop reason: HOSPADM

## 2019-03-18 RX ORDER — SODIUM CHLORIDE FOR INHALATION 0.9 %
3 VIAL, NEBULIZER (ML) INHALATION
Status: DISCONTINUED | OUTPATIENT
Start: 2019-03-18 | End: 2019-03-19 | Stop reason: HOSPADM

## 2019-03-18 RX ORDER — GUAIFENESIN/DEXTROMETHORPHAN 100-10MG/5
10 SYRUP ORAL EVERY 4 HOURS PRN
Status: DISCONTINUED | OUTPATIENT
Start: 2019-03-18 | End: 2019-03-19 | Stop reason: HOSPADM

## 2019-03-18 RX ORDER — IPRATROPIUM BROMIDE AND ALBUTEROL SULFATE 2.5; .5 MG/3ML; MG/3ML
3 SOLUTION RESPIRATORY (INHALATION)
Status: DISCONTINUED | OUTPATIENT
Start: 2019-03-18 | End: 2019-03-18

## 2019-03-18 RX ORDER — BENZONATATE 100 MG/1
200 CAPSULE ORAL 3 TIMES DAILY
Status: DISCONTINUED | OUTPATIENT
Start: 2019-03-18 | End: 2019-03-19 | Stop reason: HOSPADM

## 2019-03-18 RX ORDER — ALBUTEROL SULFATE 2.5 MG/3ML
SOLUTION RESPIRATORY (INHALATION)
Status: COMPLETED
Start: 2019-03-18 | End: 2019-03-18

## 2019-03-18 RX ORDER — ALBUTEROL SULFATE 2.5 MG/3ML
2.5 SOLUTION RESPIRATORY (INHALATION) EVERY 4 HOURS PRN
Status: DISCONTINUED | OUTPATIENT
Start: 2019-03-18 | End: 2019-03-19 | Stop reason: HOSPADM

## 2019-03-18 RX ADMIN — LEVALBUTEROL 1.25 MG: 1.25 SOLUTION, CONCENTRATE RESPIRATORY (INHALATION) at 20:38

## 2019-03-18 RX ADMIN — BENZONATATE 200 MG: 100 CAPSULE ORAL at 21:35

## 2019-03-18 RX ADMIN — ALBUTEROL SULFATE 2.5 MG: 2.5 SOLUTION RESPIRATORY (INHALATION) at 10:40

## 2019-03-18 RX ADMIN — GUAIFENESIN 600 MG: 600 TABLET, EXTENDED RELEASE ORAL at 21:25

## 2019-03-18 RX ADMIN — ATENOLOL 50 MG: 50 TABLET ORAL at 09:27

## 2019-03-18 RX ADMIN — ENOXAPARIN SODIUM 40 MG: 40 INJECTION SUBCUTANEOUS at 09:25

## 2019-03-18 RX ADMIN — BENZONATATE 200 MG: 100 CAPSULE ORAL at 12:28

## 2019-03-18 RX ADMIN — ISODIUM CHLORIDE 3 ML: 0.03 SOLUTION RESPIRATORY (INHALATION) at 20:38

## 2019-03-18 RX ADMIN — PRAVASTATIN SODIUM 40 MG: 40 TABLET ORAL at 16:32

## 2019-03-18 RX ADMIN — MONTELUKAST SODIUM 10 MG: 10 TABLET, FILM COATED ORAL at 09:27

## 2019-03-18 RX ADMIN — LEVOTHYROXINE SODIUM 50 MCG: 50 TABLET ORAL at 04:45

## 2019-03-18 RX ADMIN — FLUTICASONE PROPIONATE 1 SPRAY: 50 SPRAY, METERED NASAL at 09:27

## 2019-03-18 RX ADMIN — ALBUTEROL SULFATE 2.5 MG: 2.5 SOLUTION RESPIRATORY (INHALATION) at 18:14

## 2019-03-18 RX ADMIN — ALBUTEROL SULFATE 2.5 MG: 2.5 SOLUTION RESPIRATORY (INHALATION) at 03:49

## 2019-03-18 RX ADMIN — PANTOPRAZOLE SODIUM 20 MG: 20 TABLET, DELAYED RELEASE ORAL at 04:46

## 2019-03-18 RX ADMIN — TERAZOSIN HYDROCHLORIDE 5 MG: 5 CAPSULE ORAL at 21:25

## 2019-03-18 RX ADMIN — BENZONATATE 200 MG: 100 CAPSULE ORAL at 16:32

## 2019-03-18 RX ADMIN — METHYLPREDNISOLONE SODIUM SUCCINATE 40 MG: 40 INJECTION, POWDER, FOR SOLUTION INTRAMUSCULAR; INTRAVENOUS at 21:25

## 2019-03-18 RX ADMIN — FUROSEMIDE 40 MG: 10 INJECTION, SOLUTION INTRAMUSCULAR; INTRAVENOUS at 09:27

## 2019-03-18 RX ADMIN — LATANOPROST 1 DROP: 50 SOLUTION/ DROPS OPHTHALMIC at 21:29

## 2019-03-18 RX ADMIN — GUAIFENESIN 600 MG: 600 TABLET, EXTENDED RELEASE ORAL at 12:28

## 2019-03-18 NOTE — PLAN OF CARE
Problem: Potential for Falls  Goal: Patient will remain free of falls  Description  INTERVENTIONS:  - Assess patient frequently for physical needs  -  Identify cognitive and physical deficits and behaviors that affect risk of falls    -  [place] fall precautions as indicated by assessment   - Educate patient/family on patient safety including physical limitations  - Instruct patient to call for assistance with activity based on assessment  - Modify environment to reduce risk of injury  - Consider OT/PT consult to assist with strengthening/mobility  Outcome: Progressing     Problem: PAIN - ADULT  Goal: Verbalizes/displays adequate comfort level or baseline comfort level  Description  Interventions:  - Encourage patient to monitor pain and request assistance  - Assess pain using appropriate pain scale  - Administer analgesics based on type and severity of pain and evaluate response  - Implement non-pharmacological measures as appropriate and evaluate response  - Consider cultural and social influences on pain and pain management  - Notify physician/advanced practitioner if interventions unsuccessful or patient reports new pain  Outcome: Progressing     Problem: INFECTION - ADULT  Goal: Absence or prevention of progression during hospitalization  Description  INTERVENTIONS:  - Assess and monitor for signs and symptoms of infection  - Monitor lab/diagnostic results  - Monitor all insertion sites, i e  indwelling lines, tubes, and drains  - Monitor endotracheal (as able) and nasal secretions for changes in amount and color  - [place] appropriate cooling/warming therapies per order  - Administer medications as ordered  - Instruct and encourage patient and family to use good hand hygiene technique  - Identify and instruct in appropriate isolation precautions for identified infection/condition  Outcome: Progressing  Goal: Absence of fever/infection during neutropenic period  Description  INTERVENTIONS:  - Monitor WBC Outcome: Progressing     Problem: RESPIRATORY - ADULT  Goal: Achieves optimal ventilation and oxygenation  Description  INTERVENTIONS:  - Assess for changes in respiratory status  - Assess for changes in mentation and behavior  - Position to facilitate oxygenation and minimize respiratory effort  - Oxygen administration by appropriate delivery method based on oxygen saturation (per order) or ABGs  - Initiate smoking cessation education as indicated  - Encourage broncho-pulmonary hygiene including cough, deep breathe, Incentive Spirometry  - Assess the need for suctioning and aspirate as needed  - Assess and instruct to report SOB or any respiratory difficulty  - Respiratory Therapy support as indicated  Outcome: Progressing     Problem: Prexisting or High Potential for Compromised Skin Integrity  Goal: Skin integrity is maintained or improved  Description  INTERVENTIONS:  - Identify patients at risk for skin breakdown  - Assess and monitor skin integrity  - Assess and monitor nutrition and hydration status  - Monitor labs (i e  albumin)  - Assess for incontinence   - Turn and reposition patient  - Assist with mobility/ambulation  - Relieve pressure over bony prominences  - Avoid friction and shearing  - Provide appropriate hygiene as needed including keeping skin clean and dry  - Evaluate need for skin moisturizer/barrier cream  - Collaborate with interdisciplinary team (i e  Nutrition, Rehabilitation, etc )   - Patient/family teaching  Outcome: Progressing

## 2019-03-18 NOTE — PROGRESS NOTES
Progress Note - Leanne Melara 3/24/1929, 80 y o  male MRN: [id number]    Unit/Bed#: -01 Encounter: [id number]    Primary Care Provider: Na Celeste MD   Date and time admitted to hospital: 3/16/2019 11:55 AM        * Acute respiratory failure with hypoxia and hypercapnia (HCC)  Assessment & Plan  · P/w with progressive dyspnea; found to have O2 sats in 70s; improved with supplemental O2  · ABG in the ED with respiratory acidosis, placed on BiPAP with clinical improvement  · Etiology is unclear and may be multifactorial, but primarily suspect CHF exacerbation  · CT chest with possible pna; interstitial edema  Procalcitonin levels negative  · Had overall decreased breath sounds on exam  ? Chronic lung disease although no significant history or risk factors  · Currently on nasal cannula at 3 L  · Will recommend outpatient PFTs  Appreciate Pulmonary input  · Will need a home O2 eval      Acute pulmonary edema (HCC)  Assessment & Plan  · Presented with shortness of breath and cough  · CT of the chest showed findings compatible with interstitial edema    No known history of CHF  · Also presented with bilateral lower extremity edema  · Transition from IV diuretics to PO starting tomorrow and follow up on echocardiogram  · Daily weights, I's and O's    Abnormal chest CT  Assessment & Plan  · CT chest results noted with incidental finding of 7 mm ill-defined ground-glass nodule in the right lung apex and right lower lobe  · Patient has no significant smoking history and is recommended for repeat CT chest in 1 year  · Discussed with Pulmonary    Hyponatremia  Assessment & Plan  · Continue to monitor with diuretics  · Oral fluid restriction    Bilateral leg edema  Assessment & Plan  · May support CHF exacerbation as etiology   · Follow-up echocardiogram  · Transition Lasix 40 mg IV to 40 mg PO starting tomorrow  · Check weights and I/Os     Acute metabolic encephalopathy  Assessment & Plan  · Likely secondary to respiratory failure and is currently resolved        VTE Pharmacologic Prophylaxis:   Pharmacologic: Enoxaparin (Lovenox)  Mechanical VTE Prophylaxis in Place: Yes    Patient Centered Rounds: I have performed bedside rounds with nursing staff today  Discussions with Specialists or Other Care Team Provider:  Nursing, case management, pulmonology    Education and Discussions with Family / Patient:  I have answered all questions to the best of my ability  Time Spent for Care: 20 minutes  More than 50% of total time spent on counseling and coordination of care as described above  Current Length of Stay: 2 day(s)    Current Patient Status: Inpatient   Certification Statement: The patient will continue to require additional inpatient hospital stay due to Acute on chronic respiratory failure with hypoxia require nebulizer treatments and transition to oral diuretics    Discharge Plan:  Patient is not medically stable for discharge today, possibly tomorrow  Will need a home O2 eval     Code Status: Level 3 - DNAR and DNI      Subjective:   Resting comfortably in bed  Reports breathing is slowly improving however he is now wheezing which is new for him  Continues with a moist productive cough  Continues to require supplemental oxygen which she does not use at home  Able to speak full sentences  Limited mobility  Denies chest pain  Appetite is fair  Objective:     Vitals:   Temp (24hrs), Av 8 °F (36 6 °C), Min:97 6 °F (36 4 °C), Max:98 1 °F (36 7 °C)    Temp:  [97 6 °F (36 4 °C)-98 1 °F (36 7 °C)] 97 8 °F (36 6 °C)  HR:  [66-69] 69  Resp:  [18-20] 20  BP: (108-153)/(58-72) 151/68  SpO2:  [90 %-97 %] 90 %  Body mass index is 29 18 kg/m²  Input and Output Summary (last 24 hours):        Intake/Output Summary (Last 24 hours) at 3/18/2019 1450  Last data filed at 3/18/2019 1147  Gross per 24 hour   Intake    Output 2150 ml   Net -2150 ml       Physical Exam:     Physical Exam   Constitutional: He appears well-developed  No distress  HENT:   Head: Normocephalic  Neck: Normal range of motion  Cardiovascular: Normal rate, regular rhythm and intact distal pulses  Murmur heard  Pulmonary/Chest: Effort normal  No accessory muscle usage  Tachypnea noted  No respiratory distress  He has decreased breath sounds in the right upper field, the right lower field, the left upper field and the left lower field  He has wheezes in the right upper field, the right lower field, the left upper field and the left lower field  He has no rhonchi  He has no rales  Abdominal: Soft  Bowel sounds are normal  He exhibits no distension  There is no tenderness  Musculoskeletal: Normal range of motion  He exhibits no edema or tenderness  Neurological: He is alert  He has normal reflexes  Alert to person and place  Disoriented to time  Forgetful  Skin: Skin is warm and dry  No rash noted  He is not diaphoretic  There is pallor  Psychiatric: He has a normal mood and affect  His speech is normal and behavior is normal  Cognition and memory are impaired  Nursing note and vitals reviewed  Additional Data:     Labs:    Results from last 7 days   Lab Units 03/18/19  0446   WBC Thousand/uL 5 93   HEMOGLOBIN g/dL 13 5   HEMATOCRIT % 40 4   PLATELETS Thousands/uL 133*   NEUTROS PCT % 86*   LYMPHS PCT % 4*   MONOS PCT % 9   EOS PCT % 0     Results from last 7 days   Lab Units 03/18/19  0446  03/16/19  1731 03/16/19  1227   POTASSIUM mmol/L 3 8   < >  --  4 2   CHLORIDE mmol/L 91*   < >  --  92*   CO2 mmol/L 40*   < >  --  36*   CO2, I-STAT mmol/L  --   --  38*  --    BUN mg/dL 28*   < >  --  22   CREATININE mg/dL 0 82   < >  --  0 73   CALCIUM mg/dL 8 3   < >  --  8 5   ALK PHOS U/L  --   --   --  66   ALT U/L  --   --   --  19   AST U/L  --   --   --  23   GLUCOSE, ISTAT mg/dl  --   --  120  --     < > = values in this interval not displayed       Results from last 7 days   Lab Units 03/16/19  1227   INR  1 14       * I Have Reviewed All Lab Data Listed Above  * Additional Pertinent Lab Tests Reviewed: All Labs Within Last 24 Hours Reviewed    Imaging:    Imaging Reports Reviewed Today Include:  CT chest, CXR  Imaging Personally Reviewed by Myself Includes:  None    Recent Cultures (last 7 days):     Results from last 7 days   Lab Units 03/16/19  1518 03/16/19  1227   BLOOD CULTURE  No Growth at 24 hrs  No Growth at 24 hrs  Last 24 Hours Medication List:     Current Facility-Administered Medications:  acetaminophen 650 mg Oral Q6H PRN Cam Baeza MD   albuterol 2 5 mg Nebulization Q6H PRN Gely Zelaya MD   atenolol 50 mg Oral Daily Cam Baeza MD   benzonatate 200 mg Oral TID LOUISE Oden   enoxaparin 40 mg Subcutaneous Daily Cam Baeza MD   fluticasone 1 spray Each Nare Daily LOUISE Johnson   [START ON 3/19/2019] furosemide 40 mg Oral Daily LOUISE Oden   guaiFENesin 600 mg Oral Q12H [address] LOUISE Oden   hydrALAZINE 5 mg Intravenous Q6H PRN Cam Baeza MD   latanoprost 1 drop Both Eyes HS Cam Baeza MD   levothyroxine 50 mcg Oral Early Morning Cam Baeza MD   montelukast 10 mg Oral Daily Cam Baeza MD   ondansetron 4 mg Intravenous Q6H PRN Cam Baeza MD   pantoprazole 20 mg Oral Early Morning Cam Baeza MD   pravastatin 40 mg Oral Daily With Robinson Mendez MD   terazosin 5 mg Oral HS Cam Baeza MD        Today, Patient Was Seen By: LOUISE Oden    ** Please Note: Dictation voice to text software may have been used in the creation of this document   **

## 2019-03-18 NOTE — ASSESSMENT & PLAN NOTE
· May support CHF exacerbation as etiology   · Follow-up echocardiogram  · Transition Lasix 40 mg IV to 40 mg PO starting tomorrow  · Check weights and I/Os

## 2019-03-18 NOTE — ASSESSMENT & PLAN NOTE
· Presented with shortness of breath and cough  · CT of the chest showed findings compatible with interstitial edema    No known history of CHF  · Also presented with bilateral lower extremity edema  · Transition from IV diuretics to PO starting tomorrow and follow up on echocardiogram  · Daily weights, I's and O's

## 2019-03-18 NOTE — ASSESSMENT & PLAN NOTE
· P/w with progressive dyspnea; found to have O2 sats in 70s; improved with supplemental O2  · ABG in the ED with respiratory acidosis, placed on BiPAP with clinical improvement  · Etiology is unclear and may be multifactorial, but primarily suspect CHF exacerbation  · CT chest with possible pna; interstitial edema  Procalcitonin levels negative  · Had overall decreased breath sounds on exam  ? Chronic lung disease although no significant history or risk factors  · Currently on nasal cannula at 3 L  · Will recommend outpatient PFTs    Appreciate Pulmonary input  · Will need a home O2 eval

## 2019-03-18 NOTE — UTILIZATION REVIEW
Initial Clinical Review    Admission: Date/Time/Statement: 3/16/19 @ 1614 INPATIENT  Orders Placed This Encounter   Procedures    Inpatient Admission     Standing Status:   Standing     Number of Occurrences:   1     Order Specific Question:   Admitting Physician     Answer:   [place]     Order Specific Question:   Level of Care     Answer:   Med Surg [16]     Order Specific Question:   Estimated length of stay     Answer:   More than 2 Midnights     Order Specific Question:   Certification     Answer:   I certify that inpatient services are medically necessary for this patient for a duration of greater than two midnights  See H&P and MD Progress Notes for additional information about the patient's course of treatment  ED: Date/Time/Mode of Arrival:   ED Arrival Information     Expected Arrival Acuity Means of Arrival Escorted By Service Admission Type    - 3/16/2019 11:55 Emergent Ambulance [facility] [place] [facility]    Arrival Complaint    sob        Chief Complaint:   Chief Complaint   Patient presents with    Shortness of Breath     Pt presents to the ED with severe SOB onset x 3 weeks, pt went to urgent care this morning, RA sat at 74%  Improved to 96% via 3L O2 NC via EMS  Assessment/Plan:      Emmanuel Valadez is a 80 y o  male history of hypertension who presents with progressively worsening shortness of breath  History provided mainly by family as patient is lethargic and confused at time of assessment  He reports that over the last few weeks he has been complaining of worsening shortness of breath and of too much phlegm that he cannot clear  Usually pretty independent and still was going out to see friends  Today he presented to urgent care  found to be hypoxic with O2 sats in the 70s  While being assessed in the ED, ED nurse noted a change in mental status and patient was more lethargic and difficult to arouse    ABG checked stat and showed new respiratory acidosis so BiPAP applied  Physical exam: He has rales; he exhibits edema (2+ LE )  Lethargic and oriented to person  Admitted to [place] [address] with Acute respiratory failure with hypoxia and hypercapnia, etiology unclear, CT chest with possible pneumonia, primarily suspect CHF exacerbation, acute metabolic encephalopathy, hyponatremia, B/L leg edema  Plan: Wean BiPap; wean as tolerated, Lasix 20 mg x 1, continue IV diuretics and follow up on ECHO, weights and I&O, monitor hypercarbia with correction of CO2, monitor sodium, BMP daily, check procalcitonin, BNP        ED Vital Signs:   ED Triage Vitals   Temperature Pulse Respirations Blood Pressure SpO2   03/16/19 1219 03/16/19 1219 03/16/19 1219 03/16/19 1219 03/16/19 1219   98 8 °F (37 1 °C) 60 (!) 24 145/67 (!) 82 % Room Air   No Pain        Wt Readings from Last 1 Encounters:   03/18/19 84 5 kg (186 lb 4 6 oz)     Vital Signs (abnormal):     Date/Time Temp Pulse Resp BP SpO2 O2 Device   03/18/19 0938     90 % Nasal cannula 4L O2   03/18/19 0803     95 % Nasal cannula 3L O2   03/18/19 0700 97 8 °F  69 20 151/68 95 % Nasal cannula 3L O2   03/17/19 1726     97 % Nasal cannula 3L O2   03/17/19 1357      Nasal cannula 4L O2   03/17/19 0850     99 % BiPap   03/17/19 0347     94 % BiPap   03/17/19 0012  61 20 139/64 97 % BiPap   03/16/19 2202 98 1 °F  65 20 140/72 98 % BiPap   03/16/19 2100 97 6 °F 77 20 149/68 97 % BiPap   03/16/19 2043     97 % BiPap     03/16/19  1746      BiPap     Pertinent Labs/Diagnostic Test Results:   03/16/19 1731     pH, Art i-STAT 7 350 - 7 450 7 262         Low     pCO2, Art i-STAT 36 0 - 44 0 mm HG 79 8          High   pO2, ART i-STAT 75 0 - 129 0 mm HG 84 0    BE, i-STAT -2 - 3 mmol/L 6High     HCO3, Art i-STAT 22 0 - 28 0 mmol/L 36 0           High     CO2, i-STAT 21 - 32 mmol/L 38              High     O2 Sat, i-STAT 95 - 98 % 94Low     SODIUM, I-STAT 136 - 145 mmol/l 129Low     POC FIO2 L 30 Specimen Type  ARTERIAL       03/16/19 1227    Sodium 136 - 145 mmol/L 132       Low     Potassium 3 5 - 5 3 mmol/L 4 2    Chloride 100 - 108 mmol/L 92         Low     CO2 21 - 32 mmol/L 36        High     ANION GAP 4 - 13 mmol/L 4    BUN 5 - 25 mg/dL 22    Creatinine 0 60 - 1 30 mg/dL 0 73    Glucose 65 - 140 mg/dL 114    eGFR ml/min/1 73sq m 82       03/16/19 2136    NT-proBNP <450 pg/mL 707High        03/16/19 1227     WBC 4 31 - 10 16 Thousand/uL 5 43    RBC 3 88 - 5 62 Million/uL 4 37    Hemoglobin 12 0 - 17 0 g/dL 13 4    Hematocrit 36 5 - 49 3 % 40 9      03/16/19 1227         Platelets 738 - 640 Thousands/uL 137      Low        Chest x-ray:  Mild to moderate pulmonary interstitial edema with bibasilar effusions  CT chest:      Mild interstitial thickening and groundglass attenuation in the posterior aspect of the right lower lobe, right upper lobe and left upper lobe suspicious for mild interstitial edema  2   Patchy airspace opacities in the dependent lower lobes compatible with atelectasis or consolidation from pneumonia  3   7 mm ill-defined groundglass nodule in the right lung apex and right lower lobe  Based on current Fleischner Society 2017 Guidelines on incidental pulmonary nodule, followup noncontrast CT is recommended at 6-12 months from the initial examination to   confirm persistence; if stable at that time, additional followup CT is recommended for every 2 years until 5 years of stability is demonstrated  4  Small pericardial effusion        ED Treatment:   Medication Administration from 03/16/2019 1155 to 03/16/2019 2047       Date/Time Order Dose Route Action     03/16/2019 1243 albuterol inhalation solution 5 mg 5 mg Nebulization Given     03/16/2019 1243 ipratropium (ATROVENT) 0 02 % inhalation solution 0 5 mg 0 5 mg Nebulization Given     03/16/2019 1243 methylPREDNISolone sodium succinate (Solu-MEDROL) injection 125 mg 125 mg Intravenous Given     03/16/2019 1730 ceftriaxone (ROCEPHIN) 1 g/50 mL in dextrose IVPB 1,000 mg Intravenous New Bag     03/16/2019 1801 azithromycin (ZITHROMAX) 500 mg in sodium chloride 0 9% 250mL IVPB 500 mg 500 mg Intravenous New Bag        Past Medical/Surgical History:     Past Medical History:   Diagnosis Date    Allergic rhinitis     Hypercholesteremia     Hypertension     Pneumonia      Admitting Diagnosis: Pneumonia [J18 9]  Interstitial edema [R60 9]  Hypoxia [R09 02]  Pleural effusion, bilateral [J90]  Low O2 saturation [R79 81]     Age/Sex: 80 y o  male     Admission Orders: Pulmonology consult, ECHO, CBC, BMP and Mag in a m , up with assistance, daily weight and I&O, sequential compression device  Scheduled Meds:   Current Facility-Administered Medications:  acetaminophen 650 mg Oral Q6H PRN   albuterol 2 5 mg Nebulization Q6H PRN   atenolol 50 mg Oral Daily   enoxaparin 40 mg Subcutaneous Daily   fluticasone 1 spray Each Nare Daily   furosemide 40 mg Intravenous Daily   hydrALAZINE 5 mg Intravenous Q6H PRN   latanoprost 1 drop Both Eyes HS   levothyroxine 50 mcg Oral Early Morning   montelukast 10 mg Oral Daily   ondansetron 4 mg Intravenous Q6H PRN   pantoprazole 20 mg Oral Early Morning   pravastatin 40 mg Oral Daily With Dinner   terazosin 5 mg Oral HS           Network Utilization Review Department  Phone: [phone]; Fax [phone]  [email or web address]  ATTENTION: Please call with any questions or concerns to [phone]  and carefully listen to the prompts so that you are directed to the right person  Send all requests for admission clinical reviews, approved or denied determinations and any other requests to fax [phone]   All voicemails are confidential

## 2019-03-19 VITALS
DIASTOLIC BLOOD PRESSURE: 58 MMHG | OXYGEN SATURATION: 91 % | RESPIRATION RATE: 20 BRPM | BODY MASS INDEX: 29.17 KG/M2 | HEIGHT: 67 IN | SYSTOLIC BLOOD PRESSURE: 121 MMHG | HEART RATE: 67 BPM | WEIGHT: 185.85 LBS | TEMPERATURE: 98.4 F

## 2019-03-19 LAB
ANION GAP SERPL CALCULATED.3IONS-SCNC: 3 MMOL/L (ref 4–13)
BUN SERPL-MCNC: 28 MG/DL (ref 5–25)
CALCIUM SERPL-MCNC: 8.3 MG/DL (ref 8.3–10.1)
CHLORIDE SERPL-SCNC: 90 MMOL/L (ref 100–108)
CO2 SERPL-SCNC: 42 MMOL/L (ref 21–32)
CREAT SERPL-MCNC: 0.75 MG/DL (ref 0.6–1.3)
ERYTHROCYTE [DISTWIDTH] IN BLOOD BY AUTOMATED COUNT: 12.8 % (ref 11.6–15.1)
FLUAV AG SPEC QL: NOT DETECTED
FLUBV AG SPEC QL: NOT DETECTED
GFR SERPL CREATININE-BSD FRML MDRD: 81 ML/MIN/1.73SQ M
GLUCOSE SERPL-MCNC: 150 MG/DL (ref 65–140)
HCT VFR BLD AUTO: 41.7 % (ref 36.5–49.3)
HGB BLD-MCNC: 13.8 G/DL (ref 12–17)
MAGNESIUM SERPL-MCNC: 2 MG/DL (ref 1.6–2.6)
MCH RBC QN AUTO: 31 PG (ref 26.8–34.3)
MCHC RBC AUTO-ENTMCNC: 33.1 G/DL (ref 31.4–37.4)
MCV RBC AUTO: 94 FL (ref 82–98)
PHOSPHATE SERPL-MCNC: 3.5 MG/DL (ref 2.3–4.1)
PLATELET # BLD AUTO: 114 THOUSANDS/UL (ref 149–390)
PMV BLD AUTO: 10.4 FL (ref 8.9–12.7)
POTASSIUM SERPL-SCNC: 3.7 MMOL/L (ref 3.5–5.3)
PROCALCITONIN SERPL-MCNC: <0.05 NG/ML
RBC # BLD AUTO: 4.45 MILLION/UL (ref 3.88–5.62)
RSV B RNA SPEC QL NAA+PROBE: NOT DETECTED
SODIUM SERPL-SCNC: 135 MMOL/L (ref 136–145)
WBC # BLD AUTO: 4.99 THOUSAND/UL (ref 4.31–10.16)

## 2019-03-19 PROCEDURE — 84145 PROCALCITONIN (PCT): CPT | Performed by: NURSE PRACTITIONER

## 2019-03-19 PROCEDURE — 94760 N-INVAS EAR/PLS OXIMETRY 1: CPT

## 2019-03-19 PROCEDURE — 83735 ASSAY OF MAGNESIUM: CPT | Performed by: NURSE PRACTITIONER

## 2019-03-19 PROCEDURE — 85027 COMPLETE CBC AUTOMATED: CPT | Performed by: NURSE PRACTITIONER

## 2019-03-19 PROCEDURE — 94761 N-INVAS EAR/PLS OXIMETRY MLT: CPT

## 2019-03-19 PROCEDURE — 99239 HOSP IP/OBS DSCHRG MGMT >30: CPT | Performed by: HOSPITALIST

## 2019-03-19 PROCEDURE — 84100 ASSAY OF PHOSPHORUS: CPT | Performed by: NURSE PRACTITIONER

## 2019-03-19 PROCEDURE — 97530 THERAPEUTIC ACTIVITIES: CPT

## 2019-03-19 PROCEDURE — G8978 MOBILITY CURRENT STATUS: HCPCS

## 2019-03-19 PROCEDURE — 97163 PT EVAL HIGH COMPLEX 45 MIN: CPT

## 2019-03-19 PROCEDURE — G8979 MOBILITY GOAL STATUS: HCPCS

## 2019-03-19 PROCEDURE — 94640 AIRWAY INHALATION TREATMENT: CPT

## 2019-03-19 PROCEDURE — 80048 BASIC METABOLIC PNL TOTAL CA: CPT | Performed by: NURSE PRACTITIONER

## 2019-03-19 RX ORDER — BENZONATATE 200 MG/1
200 CAPSULE ORAL 3 TIMES DAILY
Qty: 20 CAPSULE | Refills: 0 | Status: SHIPPED | OUTPATIENT
Start: 2019-03-19 | End: 2019-03-28

## 2019-03-19 RX ORDER — PREDNISONE 20 MG/1
40 TABLET ORAL DAILY
Qty: 10 TABLET | Refills: 0 | Status: SHIPPED | OUTPATIENT
Start: 2019-03-19 | End: 2019-03-24

## 2019-03-19 RX ADMIN — LEVOTHYROXINE SODIUM 50 MCG: 50 TABLET ORAL at 05:22

## 2019-03-19 RX ADMIN — MONTELUKAST SODIUM 10 MG: 10 TABLET, FILM COATED ORAL at 09:17

## 2019-03-19 RX ADMIN — ISODIUM CHLORIDE 3 ML: 0.03 SOLUTION RESPIRATORY (INHALATION) at 07:50

## 2019-03-19 RX ADMIN — PANTOPRAZOLE SODIUM 20 MG: 20 TABLET, DELAYED RELEASE ORAL at 05:21

## 2019-03-19 RX ADMIN — ENOXAPARIN SODIUM 40 MG: 40 INJECTION SUBCUTANEOUS at 09:17

## 2019-03-19 RX ADMIN — LEVALBUTEROL 1.25 MG: 1.25 SOLUTION, CONCENTRATE RESPIRATORY (INHALATION) at 07:49

## 2019-03-19 RX ADMIN — BENZONATATE 200 MG: 100 CAPSULE ORAL at 16:11

## 2019-03-19 RX ADMIN — ISODIUM CHLORIDE 3 ML: 0.03 SOLUTION RESPIRATORY (INHALATION) at 13:41

## 2019-03-19 RX ADMIN — BENZONATATE 200 MG: 100 CAPSULE ORAL at 09:17

## 2019-03-19 RX ADMIN — GUAIFENESIN 600 MG: 600 TABLET, EXTENDED RELEASE ORAL at 09:16

## 2019-03-19 RX ADMIN — LEVALBUTEROL 1.25 MG: 1.25 SOLUTION, CONCENTRATE RESPIRATORY (INHALATION) at 13:43

## 2019-03-19 RX ADMIN — FLUTICASONE PROPIONATE 1 SPRAY: 50 SPRAY, METERED NASAL at 09:18

## 2019-03-19 RX ADMIN — FUROSEMIDE 40 MG: 40 TABLET ORAL at 09:17

## 2019-03-19 RX ADMIN — ATENOLOL 50 MG: 50 TABLET ORAL at 09:17

## 2019-03-19 RX ADMIN — METHYLPREDNISOLONE SODIUM SUCCINATE 40 MG: 40 INJECTION, POWDER, FOR SOLUTION INTRAMUSCULAR; INTRAVENOUS at 09:17

## 2019-03-19 RX ADMIN — PRAVASTATIN SODIUM 40 MG: 40 TABLET ORAL at 16:11

## 2019-03-19 NOTE — RESPIRATORY THERAPY NOTE
Home Oxygen Qualifying Test       Patient name: Francis Pina        : 3/24/1929   Date of Test:  2019  Diagnosis:      Home Oxygen Test:    **Medicare Guidelines require item(s) 1-5 on all ambulatory patients or 1 and 2 on non-ambulatory patients  1   Baseline SPO2 on Room Air at rest 85 %  2   SPO2 during exercise on Room Air N/A %  During exercise monitor SpO2  If SPO2 increases >=89% with ambulation do not add supplemental             oxygen  If <= 88% on room air add O2 via NC and titrate patient  Patient must be ambulated with O2 and titrated to > 88% with exertion  3   SPO2 on Oxygen at rest 933 % 3 lpm     4   SPO2 during exercise on Oxygen  90% a liter flow of 3 lpm     5   Exercise performed:          Walking, Distance 150 (ft), Duration 10 (min)          [x]  Supplemental Home Oxygen is indicated  []  Client does not qualify for home oxygen        Respiratory Additional Notes- Pt walked with PT and Respiratory     Nora Martinez, RT

## 2019-03-19 NOTE — PLAN OF CARE
Problem: DISCHARGE PLANNING - CARE MANAGEMENT  Goal: Discharge to post-acute care or home with appropriate resources  Description  INTERVENTIONS:  - Conduct assessment to determine patient/family and health care team treatment goals, and need for post-acute services based on payer coverage, community resources, and patient preferences, and barriers to discharge  - Address psychosocial, clinical, and financial barriers to discharge as identified in assessment in conjunction with the patient/family and health care team  - Arrange appropriate level of post-acute services according to patient?s   needs and preference and payer coverage in collaboration with the physician and health care team  - Communicate with and update the patient/family, physician, and health care team regarding progress on the discharge plan  - Arrange appropriate transportation to post-acute venues  Outcome: Completed  Note:   LOS 3 days  Pt is not a bundle or a readmission  Pt lives alone in a 2 story home but only utilizes the first floor  He uses a walker to ambulate but no other DME  Pt has no hx of STR or HHC  He uses [facility] in [place], has [employer] and no difficulty affording medication  His daughter is POA and a copy of the document was requested  Pt is self care for ADLs, is retired and drives himself  Daughter is at bedside and will transport patient home  Pt is d/cing with new home oxygen via [facility] and would like [facility] for SN  Referrals placed  CM reviewed discharge planning process including the following: identifying caregivers at home, preference for d/c planning needs, [facility] Meds to Bed program, availability of treatment team to discuss questions or concerns patient and/or family may have regarding diagnosis, plan of care, old or new medications and discharge planning   CM will continue to follow for care coordination and update assessment as necessary

## 2019-03-19 NOTE — DISCHARGE SUMMARY
Discharge- Gely Moat 3/24/1929, 80 y o  male MRN: [id number]    Unit/Bed#: -01 Encounter: [id number]    Primary Care Provider: Moris Rios MD   Date and time admitted to hospital: 3/16/2019 11:55 AM      Discharging Physician / Practitioner: Adriana Houser MD  PCP: Moris Rios MD  Admission Date:   Admission Orders (From admission, onward)    Ordered        03/16/19 1614  Inpatient Admission  Once     Order ID Start Status   [id number] 03/16/19 1614 Completed              Discharge Date: 03/19/19    Acute respiratory failure with hypoxia and hypercapnia (HCC)  Assessment & Plan  · P/w with progressive dyspnea; found to have O2 sats in 70s; improved with supplemental O2  · ABG in the ED with respiratory acidosis, placed on BiPAP with clinical improvement  · Etiology is unclear and may be multifactorial, but primarily suspect CHF exacerbation  · CT chest with possible pna; interstitial edema  Procalcitonin levels negative  · Had overall decreased breath sounds on exam  ? Chronic lung disease although no significant history or risk factors  · Currently on nasal cannula at 3 L  · Will recommend outpatient PFTs  Appreciate Pulmonary input  · Will need a home O2 eval - qualifies for Home O2     Acute pulmonary edema (HCC)  Assessment & Plan  · Presented with shortness of breath and cough  · CT of the chest showed findings compatible with interstitial edema    No known history of CHF  · Also presented with bilateral lower extremity edema  · Transition from IV diuretics to PO starting tomorrow and follow up on echocardiogram  · Daily weights, I's and O's  · Continue with diuretics     Abnormal chest CT  Assessment & Plan  · CT chest results noted with incidental finding of 7 mm ill-defined ground-glass nodule in the right lung apex and right lower lobe  · Patient has no significant smoking history and is recommended for repeat CT chest in 1 year  · Discussed with Pulmonary     Hyponatremia  Assessment & Plan  · Continue to monitor with diuretics  · Oral fluid restriction     Bilateral leg edema  Assessment & Plan  · May support CHF exacerbation as etiology   · Follow-up echocardiogram  · Transition Lasix 40 mg IV to 40 mg PO starting tomorrow  · Check weights and I/Os      Acute metabolic encephalopathy  Assessment & Plan  · Likely secondary to respiratory failure and is currently resolved     Consultations During Hospital Stay:  · Pulmonary    Hospital Course:     Vitor Lacy is a 80 y o  male who presented with acute respiratory failure with hypoxia and hypercapnia  He has a past medical history significant for:  Hypertension, hypercholesterolemia, chronic allergic rhinitis, hypothyroidism, Monoclonal gammopathy-followed outpatient by hematology without current treatment and distant smoking history quit date 60 years prior with a 2 pack year smoking history  He presented to the hospital 3/16/2019 with increased SOB, confusion and lethargy  He was noted to have respiratory acidosis and treated with BiPAP therapy  Chest CT was completed in noted mild interstitial thickening and ground-glass opacities likely mild interstitial edema dependent atelectasis  Procalcitonin negative and antibiotics were not continued  Pulmonary consulted       Prior to this admission he did not require daily inhalers and nebulizers and only has used in the past after developing pneumonia  He does not require oxygen at baseline  The family as well as patient does reports significant snoring and possible apneas but has not completed a sleep study in past   Denies any recent exposures or sick contacts although he is very active and spends significant time out in the community  No recent travel, no pets at home  He does report significant postnasal drip and allergic rhinitis without current treatment, but he does reports sometimes causes choking/ coughing    He has daily sputum production that is clear        Patient was administered IV steroids, nebs  Requiring supplemental O2 via NC with 3L - qualified for home O2  Will send home on Prednisone 40 mg PO qd x5 days  Instructed to f/u with PMD within 1 week  Please see above list of diagnoses and related plan for additional information  Condition at Discharge: stable     Discharge Day Visit / Exam:     Subjective:  Feels great  Improvement of symptoms  Able to ambulate with a steady gait  Vitals: Blood Pressure: 137/63 (03/19/19 0821)  Pulse: 78 (03/19/19 0821)  Temperature: (!) 97 2 °F (36 2 °C) (03/19/19 0821)  Temp Source: Oral (03/19/19 0821)  Respirations: 18 (03/19/19 0821)  Height: 5' 7" (170 2 cm) (03/16/19 2100)  Weight - Scale: 84 3 kg (185 lb 13 6 oz) (03/19/19 0530)  SpO2: 95 % (03/19/19 1343)  Exam:   Physical Exam  Gen -Patient comfortable   Neck- Supple  No thyromegaly or lymphadenopathy  Lungs-BLAE; no resp distress or accessory muscle usage  Heart S1-S2, regular rate and rhythm, no murmurs  Abdomen-soft nontender, no organomegaly  Bowel sounds present  Extremities-no cyanosis, clubbing or edema  Skin- no rash  Neuro-nonfocal     Discharge instructions/Information to patient and family:   See after visit summary for information provided to patient and family  Provisions for Follow-Up Care:  See after visit summary for information related to follow-up care and any pertinent home health orders  Disposition:     Home    Discharge Statement:  I spent 35 minutes discharging the patient  This time was spent on the day of discharge  I had direct contact with the patient on the day of discharge  Greater than 50% of the total time was spent examining patient, answering all patient questions, arranging and discussing plan of care with patient as well as directly providing post-discharge instructions  Additional time then spent on discharge activities      Discharge Medications:  See after visit summary for reconciled discharge medications provided to patient and family        ** Please Note: This note has been constructed using a voice recognition system **

## 2019-03-19 NOTE — PLAN OF CARE
Problem: PHYSICAL THERAPY ADULT  Goal: Performs mobility at highest level of function for planned discharge setting  See evaluation for individualized goals  Description  Treatment/Interventions: Functional transfer training, LE strengthening/ROM, Elevations, Therapeutic exercise, Endurance training, Patient/family training, Equipment eval/education, Bed mobility, Gait training, Spoke to nursing(respiratory therapist)  Equipment Recommended: Walker(cont use of RW for mobility)       See flowsheet documentation for full assessment, interventions and recommendations  Note:   Prognosis: Good  Problem List: Decreased strength, Decreased endurance, Impaired balance, Decreased mobility, Decreased cognition, Decreased skin integrity  Assessment: pt is a 79 y/o male admitted to [facility]* acute resp failure with hypoxia,pleural effusion,PNA,low SpO2 in ED  Pt lives alone in multilevel style home with use of 2nd floor for shower 1x/week and use of basement for laundry  Pt reports no recent falls,use of RW for mobility and reports beign (I) PTA,(+)Drive  Pt reports can stay with family upon D/C from hospital "until I feel better"  Pt currently is not at functional mobility baseline,needs Ax1 for mobility with use of RW,ataxic and unsteady gait pattern (need shoe lift footwear to ambulate),multiple lines,use of 3 L NC O2,reports minimal RODRIGUEZ during ambulation trial and ongoing medical care  Pt demonstrates limited mobility and gait including dec endurance,dec balance,dec BLE strength,ataxic and unsteady gait pattern and needs S for BM,transfers and gait with use of RW  Pt would cont to benefit from skilled inpt PT services to maximize functional independence  Barriers to Discharge: Inaccessible home environment((+)SHAMAR and 2 SH,lives alone)     Recommendation: Home with family support, Home PT(cont use of RW;stay with family upon D/C)          See flowsheet documentation for full assessment

## 2019-03-19 NOTE — SOCIAL WORK
LOS 3 days  Pt is not a bundle or a readmission  Pt lives alone in a 2 story home but only utilizes the first floor  He uses a walker to ambulate but no other DME  Pt has no hx of STR or HHC  He uses [facility] in [place], has [employer] and no difficulty affording medication  His daughter is POA and a copy of the document was requested  Pt is self care for ADLs, is retired and drives himself  Daughter is at bedside and will transport patient home  Pt is d/cing with new home oxygen via [facility] and would like [facility] for SN  Referrals placed  CM reviewed discharge planning process including the following: identifying caregivers at home, preference for d/c planning needs, [facility] Meds to Bed program, availability of treatment team to discuss questions or concerns patient and/or family may have regarding diagnosis, plan of care, old or new medications and discharge planning   CM will continue to follow for care coordination and update assessment as necessary

## 2019-03-19 NOTE — PLAN OF CARE
Problem: Potential for Falls  Goal: Patient will remain free of falls  Description  INTERVENTIONS:  - Assess patient frequently for physical needs  -  Identify cognitive and physical deficits and behaviors that affect risk of falls    -  [place] fall precautions as indicated by assessment   - Educate patient/family on patient safety including physical limitations  - Instruct patient to call for assistance with activity based on assessment  - Modify environment to reduce risk of injury  - Consider OT/PT consult to assist with strengthening/mobility  Outcome: Progressing     Problem: PAIN - ADULT  Goal: Verbalizes/displays adequate comfort level or baseline comfort level  Description  Interventions:  - Encourage patient to monitor pain and request assistance  - Assess pain using appropriate pain scale  - Administer analgesics based on type and severity of pain and evaluate response  - Implement non-pharmacological measures as appropriate and evaluate response  - Consider cultural and social influences on pain and pain management  - Notify physician/advanced practitioner if interventions unsuccessful or patient reports new pain  Outcome: Progressing     Problem: INFECTION - ADULT  Goal: Absence or prevention of progression during hospitalization  Description  INTERVENTIONS:  - Assess and monitor for signs and symptoms of infection  - Monitor lab/diagnostic results  - Monitor all insertion sites, i e  indwelling lines, tubes, and drains  - Monitor endotracheal (as able) and nasal secretions for changes in amount and color  - [place] appropriate cooling/warming therapies per order  - Administer medications as ordered  - Instruct and encourage patient and family to use good hand hygiene technique  - Identify and instruct in appropriate isolation precautions for identified infection/condition  Outcome: Progressing  Goal: Absence of fever/infection during neutropenic period  Description  INTERVENTIONS:  - Monitor WBC Outcome: Progressing     Problem: DISCHARGE PLANNING  Goal: Discharge to home or other facility with appropriate resources  Description  INTERVENTIONS:  - Identify barriers to discharge w/patient and caregiver  - Arrange for needed discharge resources and transportation as appropriate  - Identify discharge learning needs (meds, wound care, etc )  - Refer to Case Management Department for coordinating discharge planning if the patient needs post-hospital services based on physician/advanced practitioner order or complex needs related to functional status, cognitive ability, or social support system   Outcome: Progressing     Problem: RESPIRATORY - ADULT  Goal: Achieves optimal ventilation and oxygenation  Description  INTERVENTIONS:  - Assess for changes in respiratory status  - Assess for changes in mentation and behavior  - Position to facilitate oxygenation and minimize respiratory effort  - Oxygen administration by appropriate delivery method based on oxygen saturation (per order) or ABGs  - Initiate smoking cessation education as indicated  - Encourage broncho-pulmonary hygiene including cough, deep breathe, Incentive Spirometry  - Assess the need for suctioning and aspirate as needed  - Assess and instruct to report SOB or any respiratory difficulty  - Respiratory Therapy support as indicated  Outcome: Progressing     Problem: Prexisting or High Potential for Compromised Skin Integrity  Goal: Skin integrity is maintained or improved  Description  INTERVENTIONS:  - Identify patients at risk for skin breakdown  - Assess and monitor skin integrity  - Assess and monitor nutrition and hydration status  - Monitor labs (i e  albumin)  - Assess for incontinence   - Turn and reposition patient  - Assist with mobility/ambulation  - Relieve pressure over bony prominences  - Avoid friction and shearing  - Provide appropriate hygiene as needed including keeping skin clean and dry  - Evaluate need for skin moisturizer/barrier cream  - Collaborate with interdisciplinary team (i e  Nutrition, Rehabilitation, etc )   - Patient/family teaching  Outcome: Progressing

## 2019-03-19 NOTE — PHYSICAL THERAPY NOTE
Physical Therapy Evaluation:    2 forms of pt ID verified:name,birthdate and pt ID elaina    Patient's Name: Fara Moulton    Admitting Diagnosis  Pneumonia [J18 9]  Interstitial edema [R60 9]  Hypoxia [R09 02]  Pleural effusion, bilateral [J90]  Low O2 saturation [R79 81]    Problem List  Patient Active Problem List   Diagnosis    Acute respiratory failure with hypoxia and hypercapnia (HCC)    Bilateral leg edema    Acute metabolic encephalopathy    Hyponatremia    Abnormal chest CT    Acute pulmonary edema [facility])       Past Medical History  Past Medical History:   Diagnosis Date    Allergic rhinitis     Hypercholesteremia     Hypertension     Pneumonia        Past Surgical History  Past Surgical History:   Procedure Laterality Date    LAPAROSCOPIC COLON RESECTION      TOTAL HIP ARTHROPLASTY            03/19/19 0850   Note Type   Note type Eval/Treat   Pain Assessment   Pain Assessment No/denies pain   Pain Score No Pain   Home Living   Type of Home House   Home Layout Multi-level; Laundry in basement;Able to live on main level with bedroom/bathroom; Performs ADLs on one level;1/2 bath on main level;Stairs to enter with rails  (shower located on 2nd floor,use of basement for laundry)   [address] [facility],[address]  (per pt use of RW for mobility PTA)   Additional Comments pt reports being completely (I) PTA,use of RW for mobility,(+)Drive,A from family PTA as needed,multilevel style home with (+)SHAMAR;reports able to live with family upon D/C "if I had too"   Prior Function   Level of [place] Independent with ADLs and functional mobility  (per pt PTA)   Lives With Alone   Receives Help From Family  (as needed per pt PTA)   ADL Assistance Independent   IADLs Independent   Falls in the last 6 months 0   Restrictions/Precautions   Other Precautions Chair Alarm; Bed Alarm; Fall Risk;Multiple lines;O2  (3 L NC O2)   General   Additional Pertinent History PNA,hypoxia,acute resp failure,low SpO2 upon arrival to ED,pleural effusion   Family/Caregiver Present No   Cognition   Overall Cognitive Status Impaired   Arousal/Participation Cooperative   Orientation Level Oriented to person;Oriented to place;Oriented to situation;Disoriented to time  (reports March 20, 2020 and DOW Wednesday)   Following Commands Follows one step commands with increased time or repetition  (2* slow mobility and dec cognition)   RLE Assessment   RLE Assessment   (at least 4/5 grossly throughout)   LLE Assessment   LLE Assessment   (at least 4/5 grossly throughout)   Coordination   Movements are Fluid and Coordinated 0   Coordination and Movement Description forward flexed posture,dec BLE step length,reports LLD and wear of footwear which is not available at this time   [facility]   Light Touch   RLE Light Touch Grossly intact   LLE Light Touch Grossly intact   Bed Mobility   Supine to Sit 5  Supervision   Additional items Assist x 1;Bedrails;Verbal cues   Transfers   Sit to Stand 5  Supervision   Additional items Assist x 1;Bedrails;Verbal cues   Stand to Sit 5  Supervision   Additional items Assist x 1; Armrests; Verbal cues   Ambulation/Elevation   Gait pattern Narrow BILLY; Forward Flexion; Inconsistent niurka; Foward flexed; Short stride   Gait Assistance 5  Supervision   Additional items Assist x 1;Verbal cues   Assistive Device Rolling walker  (use of 3 L NC O2 and A from RT for walking SpO2 Prince Pereira))   Distance 120 feet with use of RW and 3 L NC O2 on tile and hardwood lpoez;SpO2:90-91% with use of 3 L NC O2 during and following mobility   Balance   Static Sitting Good  (in chair postmobility with chair alarm intact)   Dynamic Sitting Fair   Static Standing Fair   Dynamic Standing Fair   Ambulatory Fair   Endurance Deficit   Endurance Deficit Yes   Endurance Deficit Description use of 3 L NC O2,minimal RODRIGUEZ following mobility   Activity Tolerance   Activity Tolerance   (fair->good)   Medical Staff Made Aware Yaz Hair (RT)   Nurse Made Aware yes Antonio Silvestre)   Assessment   Prognosis Good   Problem List Decreased strength;Decreased endurance; Impaired balance;Decreased mobility; Decreased cognition;Decreased skin integrity   Assessment pt is a 79 y/o male admitted to T 2* acute resp failure with hypoxia,pleural effusion,PNA,low SpO2 in ED  Pt lives alone in multilevel style home with use of 2nd floor for shower 1x/week and use of basement for laundry  Pt reports no recent falls,use of RW for mobility and reports beign (I) PTA,(+)Drive  Pt reports can stay with family upon D/C from hospital "until I feel better"  Pt currently is not at functional mobility baseline,needs Ax1 for mobility with use of RW,ataxic and unsteady gait pattern (need shoe lift footwear to ambulate),multiple lines,use of 3 L NC O2,reports minimal RODRIGUEZ during ambulation trial and ongoing medical care  Pt demonstrates limited mobility and gait including dec endurance,dec balance,dec BLE strength,ataxic and unsteady gait pattern and needs S for BM,transfers and gait with use of RW  Pt would cont to benefit from skilled inpt PT services to maximize functional independence     Barriers to Discharge Inaccessible home environment  ((+)SHAMAR and 2 SH,lives alone)   Goals   Patient Goals to get better and be more active   STG Expiration Date 03/29/19   Short Term Goal #1 In 7-10 days: (1) Pt will be able to ambulate greater than 200 feet with use of RW on various surfaces needing s->mod (I) level of A without rest breaks and no LOB in order to A pt to return to PLOF, (2) activity tolerance:45 mins/45mins, (3) pt will be able to perform sit to stand transfers needing mod (I) level of A to and from various surfaces consistently in order to return to PLOF, (4) pt will be able to perform BM needing mod (I) level of A to A pt to return to PLOF, (5) (I) with BLE therapeutic ex HEP in various positions to A pt to inc balance,strength,mobility,endurance  (6) inc balance 1/2 grade in order to dec fall risk, (7) pt will be able to go up and down 1 flight of steps and 3 SHAMAR needing S level of A in order to navigate SHAMAR and 2 SH as able and as needed prior to D/C, (8) cont to provide pt and pt family education for safe D/C planning, (9) inc BLE strength 1/2 to 1 full grade in order to A pt to inc balance,strength,mobility,endurance   Treatment Day 1   Plan   Treatment/Interventions Functional transfer training;LE strengthening/ROM; Elevations; Therapeutic exercise; Endurance training;Patient/family training;Equipment eval/education; Bed mobility;Gait training;Spoke to nursing  (respiratory therapist)   PT Frequency Other (Comment)  (3-5x/week)   Recommendation   Recommendation Home with family support;Home PT  (cont use of RW;stay with family upon D/C)   Equipment Recommended Walker  (cont use of RW for mobility)   Barthel Index   Feeding 10   Bathing 0   Grooming Score 5   Dressing Score 10   Bladder Score 10   Bowels Score 10   Toilet Use Score 10   Transfers (Bed/Chair) Score 15   Mobility (Level Surface) Score 10   Stairs Score 0   Barthel Index Score 80     David Lopez, DPT    Time OW:[id number]  Time Out:0905  Total Time: 15 mins      S:  "I have to use the BR"  O:  Pt able to perform sit to stand transfer to and from neutral surface S level of A  Pt able to ambulate an additional 15 feet x2 with use of RW on hardwood lopez without LOB needing S level of A  Pt able to perform sit to stand transfers to and from low toilet needing minAx1 and use of GB  Pt reports minimal RODRIGUEZ and SOB following activity and use of 3 L NC O2 throughout  Pt would cont to benefit from skilled inpt PT services to maximize functional independence  A:  Pt cont to report minimal RODRIGUEZ and SOB during and following mobility with use of 3 L NC O2  Slow niurka with inc B lurching motion 2* unable to wear proper foot wear during mobility (shoe lift at home)   Pt would cont to benefit from skilled inpt PT services to maximize functional independence  P:  Cont skilled inpt PT services 3-5x/week for endurance,strength,balance,mobility and education      Isai Rahman, PT

## 2019-03-21 LAB — BACTERIA BLD CULT: NORMAL

## 2019-03-22 LAB — BACTERIA BLD CULT: NORMAL

## 2019-03-27 ENCOUNTER — APPOINTMENT (OUTPATIENT)
Dept: LAB | Facility: MEDICAL CENTER | Age: 84
End: 2019-03-27
Payer: COMMERCIAL

## 2019-03-27 ENCOUNTER — APPOINTMENT (OUTPATIENT)
Dept: RADIOLOGY | Facility: MEDICAL CENTER | Age: 84
End: 2019-03-27
Payer: COMMERCIAL

## 2019-03-27 ENCOUNTER — TRANSCRIBE ORDERS (OUTPATIENT)
Dept: ADMINISTRATIVE | Facility: HOSPITAL | Age: 84
End: 2019-03-27

## 2019-03-27 DIAGNOSIS — I50.9 HEART FAILURE, UNSPECIFIED HF CHRONICITY, UNSPECIFIED HEART FAILURE TYPE (HCC): Primary | ICD-10-CM

## 2019-03-27 DIAGNOSIS — I50.9 HEART FAILURE, UNSPECIFIED HF CHRONICITY, UNSPECIFIED HEART FAILURE TYPE (HCC): ICD-10-CM

## 2019-03-27 PROCEDURE — 80048 BASIC METABOLIC PNL TOTAL CA: CPT | Performed by: INTERNAL MEDICINE

## 2019-03-27 PROCEDURE — 36415 COLL VENOUS BLD VENIPUNCTURE: CPT | Performed by: INTERNAL MEDICINE

## 2019-03-27 PROCEDURE — 71046 X-RAY EXAM CHEST 2 VIEWS: CPT

## 2019-03-27 PROCEDURE — 83880 ASSAY OF NATRIURETIC PEPTIDE: CPT

## 2019-03-28 ENCOUNTER — OFFICE VISIT (OUTPATIENT)
Dept: CARDIOLOGY CLINIC | Facility: CLINIC | Age: 84
End: 2019-03-28
Payer: COMMERCIAL

## 2019-03-28 VITALS
HEIGHT: 67 IN | DIASTOLIC BLOOD PRESSURE: 60 MMHG | WEIGHT: 176.1 LBS | HEART RATE: 60 BPM | OXYGEN SATURATION: 90 % | BODY MASS INDEX: 27.64 KG/M2 | SYSTOLIC BLOOD PRESSURE: 128 MMHG

## 2019-03-28 DIAGNOSIS — E78.5 HYPERLIPIDEMIA, UNSPECIFIED HYPERLIPIDEMIA TYPE: ICD-10-CM

## 2019-03-28 DIAGNOSIS — J96.01 ACUTE RESPIRATORY FAILURE WITH HYPOXIA AND HYPERCAPNIA (HCC): ICD-10-CM

## 2019-03-28 DIAGNOSIS — J96.02 ACUTE RESPIRATORY FAILURE WITH HYPOXIA AND HYPERCAPNIA (HCC): ICD-10-CM

## 2019-03-28 DIAGNOSIS — I10 ESSENTIAL HYPERTENSION: ICD-10-CM

## 2019-03-28 DIAGNOSIS — I50.30 (HFPEF) HEART FAILURE WITH PRESERVED EJECTION FRACTION (HCC): Primary | ICD-10-CM

## 2019-03-28 LAB
BUN SERPL-MCNC: 18 MG/DL (ref 5–25)
CALCIUM SERPL-MCNC: 8.7 MG/DL (ref 8.3–10.1)
CHLORIDE SERPL-SCNC: 87 MMOL/L (ref 100–108)
CO2 SERPL-SCNC: >45 MMOL/L (ref 21–32)
CREAT SERPL-MCNC: 0.72 MG/DL (ref 0.6–1.3)
GFR SERPL CREATININE-BSD FRML MDRD: 82 ML/MIN/1.73SQ M
GLUCOSE SERPL-MCNC: 86 MG/DL (ref 65–140)
NT-PROBNP SERPL-MCNC: 293 PG/ML
POTASSIUM SERPL-SCNC: 3.8 MMOL/L (ref 3.5–5.3)
SODIUM SERPL-SCNC: 134 MMOL/L (ref 136–145)

## 2019-03-28 PROCEDURE — 99204 OFFICE O/P NEW MOD 45 MIN: CPT | Performed by: INTERNAL MEDICINE

## 2019-03-28 RX ORDER — FUROSEMIDE 20 MG/1
20 TABLET ORAL
COMMUNITY
End: 2019-10-07 | Stop reason: HOSPADM

## 2019-03-28 NOTE — ASSESSMENT & PLAN NOTE
Seems compensated at this time  He was started on lasix yesterday by PCP  Continue lasix 20mg daily, KCl replacement  He is also on chlorthalidone for HTN so will check BMP in 2 weeks for renal functions, lytes  Chlorthalidone may need to be changed to Aldactone or alternative first line agent in the future  Counseled on lifestyle modification, DASH diet, monitoring weight daily

## 2019-03-28 NOTE — ASSESSMENT & PLAN NOTE
Partly explained by HFpEF but not totally as patient is euvolemic and still reliant on 3L NC with concurrent worsening hypercapnia on recent repeat BMP  Referred to pulmonology for further evaluation  He is currently doing home sleep study

## 2019-03-28 NOTE — PROGRESS NOTES
[address] [facility] 80 y o  male MRN: [id number]  Encounter: [id number]      Reason for Referral: dyspnea, recent dx of CHF    (HFpEF) heart failure with preserved ejection fraction ([address] )  Seems compensated at this time  He was started on lasix yesterday by PCP  Continue lasix 20mg daily, KCl replacement  He is also on chlorthalidone for HTN so will check BMP in 2 weeks for renal functions, lytes  Chlorthalidone may need to be changed to Aldactone or alternative first line agent in the future  Counseled on lifestyle modification, DASH diet, monitoring weight daily  Essential hypertension  Well controlled on atenolol 50mg, Chlorthalidone 25mg, terazosin 5mg  Will plan to wean off atenolol at next appt in 3 months due to age, adverse effects, interactions  Acute respiratory failure with hypoxia and hypercapnia (HCC)  Partly explained by HFpEF but not totally as patient is euvolemic and still reliant on 3L NC with concurrent worsening hypercapnia on recent repeat BMP  Referred to pulmonology for further evaluation  He is currently doing home sleep study  HLD, continue Zocor          HPI: Francis Pina 80y o  year old male with a history of HTN, HLD, hypothyroidism, MGUS, recently diagnosed hupoxic respiratory failure, CHF who presents for evaluation  He was admitted 3/16 - 3/19 at [facility] for dyspnea, AMS, lethargy and was found to have combined hypercarbic and hypoxic respiratory failure  He was treated with BPAP, nebs, steroids and eventually weaned to 3L NC  An etiology for the patient's respiratory decompensation was not found  His imaging (CT, CXR) revealed pulmonary edema and bilateral effusions L>R  TTE 3/18/19 revealed preserved EF, grade 1 DD, no significant valvulopathy  He was recently seen by his PCP and lasix 20mg daily was started yesterday  He has taken 2 doses and states he has had significantly more UOP and has lost several pounds in 2-3 days   He does not think his breathing is any different  Denies palpitations, dizziness, syncope, orthopnea, PND, diaphoresis, NVD  2 pk yr hx of smoking, quit more than 60 years ago  No EtOH or drug use  He is not very active at baseline  Walks around the house, does have one flight of stairs in his home  Before 1-2 months ago he was able to do it without issue but now he has to rest afterwards due to dyspnea  Family History: non-contributory  Historical Information   Past Medical History:   Diagnosis Date    Allergic rhinitis     Hypercholesteremia     Hypertension     Pneumonia      Past Surgical History:   Procedure Laterality Date    LAPAROSCOPIC COLON RESECTION      TOTAL HIP ARTHROPLASTY       Social History   Social History     Substance and Sexual Activity   Alcohol Use Yes    Frequency: Never    Comment: Socially     Social History     Substance and Sexual Activity   Drug Use Never     Social History     Tobacco Use   Smoking Status Former Smoker    Packs/day: 0 20    Years: 10 00    Pack years: 2 00    Types: Cigarettes   Smokeless Tobacco Never Used     Family History: No family history on file  Review of Systems:  Review of Systems  Except as noted in the HPI and above, a comprehensive 14 point review of systems was negative  Current Medications: Reviewed    No Known Allergies    Objective   Vitals: There were no vitals taken for this visit  , There is no height or weight on file to calculate BMI ,       Physical Exam:  Physical Exam   Constitutional: He is oriented to person, place, and time  He appears well-developed and well-nourished  No distress  HENT:   Head: Normocephalic and atraumatic  Eyes: Pupils are equal, round, and reactive to light  EOM are normal    Neck: Normal range of motion  Neck supple  No JVD present  Cardiovascular: Normal rate, regular rhythm, normal heart sounds and intact distal pulses  Exam reveals no gallop and no friction rub  No murmur heard    Pulmonary/Chest: Effort normal  No respiratory distress  He has no wheezes  He has no rales  Diminished sounds at L base   Abdominal: Soft  He exhibits no distension and no mass  There is no tenderness  There is no guarding  Musculoskeletal: Normal range of motion  He exhibits edema (1-2+ bilateral, pitting)  Neurological: He is alert and oriented to person, place, and time  Skin: Skin is warm and dry  He is not diaphoretic  No erythema  No pallor  Psychiatric: He has a normal mood and affect  Lab Results: I have personally reviewed pertinent lab results  Imaging: I have personally reviewed pertinent reports  EKG: Personally reviewed    ECHO: 0/87/81: EF 29%, diastolic dysfunction grade 1, no significant valvulopathy   Previous Stress/Cath/PCI: Rodrick Muhammad MD  Cardiology Fellow

## 2019-03-29 ENCOUNTER — TELEPHONE (OUTPATIENT)
Dept: PULMONOLOGY | Facility: CLINIC | Age: 84
End: 2019-03-29

## 2019-04-01 PROBLEM — G47.33 OSA (OBSTRUCTIVE SLEEP APNEA): Status: ACTIVE | Noted: 2019-04-01

## 2019-04-01 PROBLEM — R91.1 PULMONARY NODULE: Status: ACTIVE | Noted: 2019-04-01

## 2019-04-02 ENCOUNTER — OFFICE VISIT (OUTPATIENT)
Dept: PULMONOLOGY | Facility: CLINIC | Age: 84
End: 2019-04-02
Payer: COMMERCIAL

## 2019-04-02 VITALS
TEMPERATURE: 96.8 F | HEIGHT: 67 IN | OXYGEN SATURATION: 98 % | SYSTOLIC BLOOD PRESSURE: 142 MMHG | WEIGHT: 170 LBS | BODY MASS INDEX: 26.68 KG/M2 | HEART RATE: 52 BPM | DIASTOLIC BLOOD PRESSURE: 60 MMHG

## 2019-04-02 DIAGNOSIS — R91.1 PULMONARY NODULE: ICD-10-CM

## 2019-04-02 DIAGNOSIS — J96.01 ACUTE RESPIRATORY FAILURE WITH HYPOXIA AND HYPERCAPNIA (HCC): ICD-10-CM

## 2019-04-02 DIAGNOSIS — G47.33 OSA (OBSTRUCTIVE SLEEP APNEA): ICD-10-CM

## 2019-04-02 DIAGNOSIS — I50.30 HEART FAILURE WITH PRESERVED EJECTION FRACTION, UNSPECIFIED HF CHRONICITY (HCC): Primary | ICD-10-CM

## 2019-04-02 DIAGNOSIS — J96.02 ACUTE RESPIRATORY FAILURE WITH HYPOXIA AND HYPERCAPNIA (HCC): ICD-10-CM

## 2019-04-02 PROCEDURE — 94618 PULMONARY STRESS TESTING: CPT | Performed by: NURSE PRACTITIONER

## 2019-04-02 PROCEDURE — 99214 OFFICE O/P EST MOD 30 MIN: CPT | Performed by: NURSE PRACTITIONER

## 2019-04-11 ENCOUNTER — DOCUMENTATION (OUTPATIENT)
Dept: NON INVASIVE DIAGNOSTICS | Facility: HOSPITAL | Age: 84
End: 2019-04-11

## 2019-04-11 ENCOUNTER — TRANSCRIBE ORDERS (OUTPATIENT)
Dept: ADMINISTRATIVE | Facility: HOSPITAL | Age: 84
End: 2019-04-11

## 2019-04-11 ENCOUNTER — APPOINTMENT (OUTPATIENT)
Dept: LAB | Facility: MEDICAL CENTER | Age: 84
End: 2019-04-11
Payer: COMMERCIAL

## 2019-04-11 DIAGNOSIS — J96.01 ACUTE RESPIRATORY FAILURE WITH HYPOXIA (HCC): ICD-10-CM

## 2019-04-11 DIAGNOSIS — I50.30 (HFPEF) HEART FAILURE WITH PRESERVED EJECTION FRACTION (HCC): ICD-10-CM

## 2019-04-11 DIAGNOSIS — J96.01 ACUTE RESPIRATORY FAILURE WITH HYPOXIA (HCC): Primary | ICD-10-CM

## 2019-04-11 LAB
ANION GAP SERPL CALCULATED.3IONS-SCNC: 5 MMOL/L (ref 4–13)
BUN SERPL-MCNC: 22 MG/DL (ref 5–25)
CALCIUM SERPL-MCNC: 8 MG/DL (ref 8.3–10.1)
CHLORIDE SERPL-SCNC: 97 MMOL/L (ref 100–108)
CO2 SERPL-SCNC: 34 MMOL/L (ref 21–32)
CREAT SERPL-MCNC: 0.96 MG/DL (ref 0.6–1.3)
GFR SERPL CREATININE-BSD FRML MDRD: 69 ML/MIN/1.73SQ M
GLUCOSE SERPL-MCNC: 101 MG/DL (ref 65–140)
POTASSIUM SERPL-SCNC: 2.5 MMOL/L (ref 3.5–5.3)
SODIUM SERPL-SCNC: 136 MMOL/L (ref 136–145)

## 2019-04-11 PROCEDURE — 80048 BASIC METABOLIC PNL TOTAL CA: CPT

## 2019-04-11 PROCEDURE — 36415 COLL VENOUS BLD VENIPUNCTURE: CPT

## 2019-04-12 ENCOUNTER — TELEPHONE (OUTPATIENT)
Dept: CARDIOLOGY CLINIC | Facility: CLINIC | Age: 84
End: 2019-04-12

## 2019-05-01 ENCOUNTER — TELEPHONE (OUTPATIENT)
Dept: NON INVASIVE DIAGNOSTICS | Facility: HOSPITAL | Age: 84
End: 2019-05-01

## 2019-05-01 DIAGNOSIS — E87.6 HYPOKALEMIA: Primary | ICD-10-CM

## 2019-05-02 ENCOUNTER — APPOINTMENT (OUTPATIENT)
Dept: LAB | Facility: MEDICAL CENTER | Age: 84
End: 2019-05-02
Payer: COMMERCIAL

## 2019-05-02 DIAGNOSIS — E87.6 HYPOKALEMIA: ICD-10-CM

## 2019-05-02 LAB
ANION GAP SERPL CALCULATED.3IONS-SCNC: 2 MMOL/L (ref 4–13)
BUN SERPL-MCNC: 25 MG/DL (ref 5–25)
CALCIUM SERPL-MCNC: 8.9 MG/DL (ref 8.3–10.1)
CHLORIDE SERPL-SCNC: 97 MMOL/L (ref 100–108)
CO2 SERPL-SCNC: 36 MMOL/L (ref 21–32)
CREAT SERPL-MCNC: 0.93 MG/DL (ref 0.6–1.3)
GFR SERPL CREATININE-BSD FRML MDRD: 72 ML/MIN/1.73SQ M
GLUCOSE P FAST SERPL-MCNC: 101 MG/DL (ref 65–99)
POTASSIUM SERPL-SCNC: 2.9 MMOL/L (ref 3.5–5.3)
SODIUM SERPL-SCNC: 135 MMOL/L (ref 136–145)

## 2019-05-02 PROCEDURE — 80048 BASIC METABOLIC PNL TOTAL CA: CPT

## 2019-05-02 PROCEDURE — 36415 COLL VENOUS BLD VENIPUNCTURE: CPT

## 2019-05-08 ENCOUNTER — TELEPHONE (OUTPATIENT)
Dept: NON INVASIVE DIAGNOSTICS | Facility: HOSPITAL | Age: 84
End: 2019-05-08

## 2019-05-08 DIAGNOSIS — E87.6 HYPOKALEMIA: Primary | ICD-10-CM

## 2019-05-08 RX ORDER — POTASSIUM CHLORIDE 20 MEQ/1
20 TABLET, EXTENDED RELEASE ORAL 2 TIMES DAILY
Qty: 60 TABLET | Refills: 6 | Status: SHIPPED | OUTPATIENT
Start: 2019-05-08 | End: 2020-03-04 | Stop reason: SDUPTHER

## 2019-06-06 ENCOUNTER — OFFICE VISIT (OUTPATIENT)
Dept: CARDIOLOGY CLINIC | Facility: CLINIC | Age: 84
End: 2019-06-06
Payer: COMMERCIAL

## 2019-06-06 VITALS
WEIGHT: 175.5 LBS | BODY MASS INDEX: 27.55 KG/M2 | OXYGEN SATURATION: 94 % | DIASTOLIC BLOOD PRESSURE: 60 MMHG | HEART RATE: 61 BPM | SYSTOLIC BLOOD PRESSURE: 130 MMHG | HEIGHT: 67 IN

## 2019-06-06 DIAGNOSIS — I10 ESSENTIAL HYPERTENSION: ICD-10-CM

## 2019-06-06 DIAGNOSIS — E78.5 HYPERLIPIDEMIA, UNSPECIFIED HYPERLIPIDEMIA TYPE: ICD-10-CM

## 2019-06-06 DIAGNOSIS — J96.02 ACUTE RESPIRATORY FAILURE WITH HYPOXIA AND HYPERCAPNIA (HCC): ICD-10-CM

## 2019-06-06 DIAGNOSIS — J96.01 ACUTE RESPIRATORY FAILURE WITH HYPOXIA AND HYPERCAPNIA (HCC): ICD-10-CM

## 2019-06-06 DIAGNOSIS — I50.30 HEART FAILURE WITH PRESERVED EJECTION FRACTION, UNSPECIFIED HF CHRONICITY (HCC): Primary | ICD-10-CM

## 2019-06-06 DIAGNOSIS — E87.6 HYPOKALEMIA: ICD-10-CM

## 2019-06-06 PROCEDURE — 99213 OFFICE O/P EST LOW 20 MIN: CPT | Performed by: INTERNAL MEDICINE

## 2019-06-06 RX ORDER — SPIRONOLACTONE 25 MG/1
25 TABLET ORAL DAILY
Qty: 90 TABLET | Refills: 3 | Status: SHIPPED | OUTPATIENT
Start: 2019-06-06 | End: 2019-12-19 | Stop reason: SDUPTHER

## 2019-06-06 RX ORDER — AMLODIPINE BESYLATE 10 MG/1
10 TABLET ORAL DAILY
Qty: 90 TABLET | Refills: 3 | Status: SHIPPED | OUTPATIENT
Start: 2019-06-06 | End: 2019-12-19 | Stop reason: SDUPTHER

## 2019-06-06 RX ORDER — MEDICAL SUPPLY, MISCELLANEOUS
EACH MISCELLANEOUS DAILY
Qty: 1 EACH | Refills: 0 | Status: SHIPPED | OUTPATIENT
Start: 2019-06-06

## 2019-07-01 ENCOUNTER — APPOINTMENT (OUTPATIENT)
Dept: LAB | Facility: MEDICAL CENTER | Age: 84
End: 2019-07-01
Payer: COMMERCIAL

## 2019-07-01 DIAGNOSIS — E87.6 HYPOKALEMIA: ICD-10-CM

## 2019-07-01 LAB
ANION GAP SERPL CALCULATED.3IONS-SCNC: 5 MMOL/L (ref 4–13)
BUN SERPL-MCNC: 24 MG/DL (ref 5–25)
CALCIUM SERPL-MCNC: 8.8 MG/DL (ref 8.3–10.1)
CHLORIDE SERPL-SCNC: 102 MMOL/L (ref 100–108)
CO2 SERPL-SCNC: 31 MMOL/L (ref 21–32)
CREAT SERPL-MCNC: 0.89 MG/DL (ref 0.6–1.3)
GFR SERPL CREATININE-BSD FRML MDRD: 75 ML/MIN/1.73SQ M
GLUCOSE P FAST SERPL-MCNC: 100 MG/DL (ref 65–99)
POTASSIUM SERPL-SCNC: 4.2 MMOL/L (ref 3.5–5.3)
SODIUM SERPL-SCNC: 138 MMOL/L (ref 136–145)

## 2019-07-01 PROCEDURE — 36415 COLL VENOUS BLD VENIPUNCTURE: CPT

## 2019-07-01 PROCEDURE — 80048 BASIC METABOLIC PNL TOTAL CA: CPT

## 2019-09-25 ENCOUNTER — APPOINTMENT (OUTPATIENT)
Dept: RADIOLOGY | Facility: MEDICAL CENTER | Age: 84
DRG: 291 | End: 2019-09-25
Payer: COMMERCIAL

## 2019-09-25 ENCOUNTER — APPOINTMENT (OUTPATIENT)
Dept: LAB | Facility: MEDICAL CENTER | Age: 84
DRG: 291 | End: 2019-09-25
Payer: COMMERCIAL

## 2019-09-25 ENCOUNTER — TRANSCRIBE ORDERS (OUTPATIENT)
Dept: ADMINISTRATIVE | Facility: HOSPITAL | Age: 84
End: 2019-09-25

## 2019-09-25 DIAGNOSIS — I10 ESSENTIAL HYPERTENSION, BENIGN: Primary | ICD-10-CM

## 2019-09-25 DIAGNOSIS — E78.5 HYPERLIPIDEMIA, UNSPECIFIED HYPERLIPIDEMIA TYPE: ICD-10-CM

## 2019-09-25 DIAGNOSIS — E78.2 MIXED HYPERLIPIDEMIA: ICD-10-CM

## 2019-09-25 DIAGNOSIS — I10 ESSENTIAL HYPERTENSION, MALIGNANT: Primary | ICD-10-CM

## 2019-09-25 DIAGNOSIS — R05.9 COUGH: ICD-10-CM

## 2019-09-25 LAB
BASOPHILS # BLD AUTO: 0.03 THOUSANDS/ΜL (ref 0–0.1)
BASOPHILS NFR BLD AUTO: 1 % (ref 0–1)
EOSINOPHIL # BLD AUTO: 0.03 THOUSAND/ΜL (ref 0–0.61)
EOSINOPHIL NFR BLD AUTO: 1 % (ref 0–6)
ERYTHROCYTE [DISTWIDTH] IN BLOOD BY AUTOMATED COUNT: 13.2 % (ref 11.6–15.1)
HCT VFR BLD AUTO: 46.7 % (ref 36.5–49.3)
HGB BLD-MCNC: 15 G/DL (ref 12–17)
IMM GRANULOCYTES # BLD AUTO: 0.04 THOUSAND/UL (ref 0–0.2)
IMM GRANULOCYTES NFR BLD AUTO: 1 % (ref 0–2)
LYMPHOCYTES # BLD AUTO: 0.37 THOUSANDS/ΜL (ref 0.6–4.47)
LYMPHOCYTES NFR BLD AUTO: 7 % (ref 14–44)
MCH RBC QN AUTO: 31.1 PG (ref 26.8–34.3)
MCHC RBC AUTO-ENTMCNC: 32.1 G/DL (ref 31.4–37.4)
MCV RBC AUTO: 97 FL (ref 82–98)
MONOCYTES # BLD AUTO: 0.44 THOUSAND/ΜL (ref 0.17–1.22)
MONOCYTES NFR BLD AUTO: 8 % (ref 4–12)
NEUTROPHILS # BLD AUTO: 4.3 THOUSANDS/ΜL (ref 1.85–7.62)
NEUTS SEG NFR BLD AUTO: 82 % (ref 43–75)
NRBC BLD AUTO-RTO: 0 /100 WBCS
PLATELET # BLD AUTO: 173 THOUSANDS/UL (ref 149–390)
PMV BLD AUTO: 11.1 FL (ref 8.9–12.7)
RBC # BLD AUTO: 4.83 MILLION/UL (ref 3.88–5.62)
WBC # BLD AUTO: 5.21 THOUSAND/UL (ref 4.31–10.16)

## 2019-09-25 PROCEDURE — 71046 X-RAY EXAM CHEST 2 VIEWS: CPT

## 2019-09-25 PROCEDURE — 80061 LIPID PANEL: CPT | Performed by: INTERNAL MEDICINE

## 2019-09-25 PROCEDURE — 85025 COMPLETE CBC W/AUTO DIFF WBC: CPT | Performed by: INTERNAL MEDICINE

## 2019-09-25 PROCEDURE — 83880 ASSAY OF NATRIURETIC PEPTIDE: CPT

## 2019-09-25 PROCEDURE — 84443 ASSAY THYROID STIM HORMONE: CPT | Performed by: INTERNAL MEDICINE

## 2019-09-25 PROCEDURE — 36415 COLL VENOUS BLD VENIPUNCTURE: CPT | Performed by: INTERNAL MEDICINE

## 2019-09-25 PROCEDURE — 80053 COMPREHEN METABOLIC PANEL: CPT | Performed by: INTERNAL MEDICINE

## 2019-09-26 ENCOUNTER — APPOINTMENT (EMERGENCY)
Dept: RADIOLOGY | Facility: HOSPITAL | Age: 84
DRG: 291 | End: 2019-09-26
Payer: COMMERCIAL

## 2019-09-26 ENCOUNTER — HOSPITAL ENCOUNTER (INPATIENT)
Facility: HOSPITAL | Age: 84
LOS: 11 days | Discharge: NURSING, REHAB OR LONG-TERM CARE | DRG: 291 | End: 2019-10-07
Attending: EMERGENCY MEDICINE | Admitting: HOSPITALIST
Payer: COMMERCIAL

## 2019-09-26 DIAGNOSIS — J96.01 ACUTE RESPIRATORY FAILURE WITH HYPOXIA AND HYPERCAPNIA (HCC): ICD-10-CM

## 2019-09-26 DIAGNOSIS — I50.33 ACUTE ON CHRONIC HEART FAILURE WITH PRESERVED EJECTION FRACTION (HCC): ICD-10-CM

## 2019-09-26 DIAGNOSIS — J96.02 ACUTE RESPIRATORY FAILURE WITH HYPOXIA AND HYPERCAPNIA (HCC): ICD-10-CM

## 2019-09-26 DIAGNOSIS — J90 PLEURAL EFFUSION: Primary | ICD-10-CM

## 2019-09-26 PROBLEM — R79.89 ELEVATED SERUM CREATININE: Status: ACTIVE | Noted: 2019-09-26

## 2019-09-26 LAB
ALBUMIN SERPL BCP-MCNC: 3.9 G/DL (ref 3.5–5)
ALBUMIN SERPL BCP-MCNC: 4.5 G/DL (ref 3.5–5)
ALP SERPL-CCNC: 67 U/L (ref 46–116)
ALP SERPL-CCNC: 67 U/L (ref 46–116)
ALT SERPL W P-5'-P-CCNC: 11 U/L (ref 12–78)
ALT SERPL W P-5'-P-CCNC: 15 U/L (ref 12–78)
ANION GAP SERPL CALCULATED.3IONS-SCNC: 6 MMOL/L (ref 4–13)
ANION GAP SERPL CALCULATED.3IONS-SCNC: 7 MMOL/L (ref 4–13)
AST SERPL W P-5'-P-CCNC: 17 U/L (ref 5–45)
AST SERPL W P-5'-P-CCNC: 18 U/L (ref 5–45)
BASOPHILS # BLD AUTO: 0.02 THOUSANDS/ΜL (ref 0–0.1)
BASOPHILS NFR BLD AUTO: 0 % (ref 0–1)
BILIRUB SERPL-MCNC: 0.7 MG/DL (ref 0.2–1)
BILIRUB SERPL-MCNC: 0.88 MG/DL (ref 0.2–1)
BUN SERPL-MCNC: 19 MG/DL (ref 5–25)
BUN SERPL-MCNC: 21 MG/DL (ref 5–25)
CALCIUM SERPL-MCNC: 8.5 MG/DL (ref 8.3–10.1)
CALCIUM SERPL-MCNC: 8.9 MG/DL (ref 8.3–10.1)
CHLORIDE SERPL-SCNC: 96 MMOL/L (ref 100–108)
CHLORIDE SERPL-SCNC: 97 MMOL/L (ref 100–108)
CHOLEST SERPL-MCNC: 142 MG/DL (ref 50–200)
CO2 SERPL-SCNC: 33 MMOL/L (ref 21–32)
CO2 SERPL-SCNC: 34 MMOL/L (ref 21–32)
CREAT SERPL-MCNC: 0.94 MG/DL (ref 0.6–1.3)
CREAT SERPL-MCNC: 1.11 MG/DL (ref 0.6–1.3)
EOSINOPHIL # BLD AUTO: 0.01 THOUSAND/ΜL (ref 0–0.61)
EOSINOPHIL NFR BLD AUTO: 0 % (ref 0–6)
ERYTHROCYTE [DISTWIDTH] IN BLOOD BY AUTOMATED COUNT: 13.3 % (ref 11.6–15.1)
GFR SERPL CREATININE-BSD FRML MDRD: 58 ML/MIN/1.73SQ M
GFR SERPL CREATININE-BSD FRML MDRD: 71 ML/MIN/1.73SQ M
GLUCOSE P FAST SERPL-MCNC: 102 MG/DL (ref 65–99)
GLUCOSE SERPL-MCNC: 125 MG/DL (ref 65–140)
HCT VFR BLD AUTO: 46.4 % (ref 36.5–49.3)
HDLC SERPL-MCNC: 80 MG/DL (ref 40–60)
HGB BLD-MCNC: 15 G/DL (ref 12–17)
IMM GRANULOCYTES # BLD AUTO: 0.03 THOUSAND/UL (ref 0–0.2)
IMM GRANULOCYTES NFR BLD AUTO: 1 % (ref 0–2)
INR PPP: 1.15 (ref 0.84–1.19)
LDLC SERPL CALC-MCNC: 55 MG/DL (ref 0–100)
LYMPHOCYTES # BLD AUTO: 0.27 THOUSANDS/ΜL (ref 0.6–4.47)
LYMPHOCYTES NFR BLD AUTO: 6 % (ref 14–44)
MAGNESIUM SERPL-MCNC: 2 MG/DL (ref 1.6–2.6)
MCH RBC QN AUTO: 31.6 PG (ref 26.8–34.3)
MCHC RBC AUTO-ENTMCNC: 32.3 G/DL (ref 31.4–37.4)
MCV RBC AUTO: 98 FL (ref 82–98)
MONOCYTES # BLD AUTO: 0.33 THOUSAND/ΜL (ref 0.17–1.22)
MONOCYTES NFR BLD AUTO: 7 % (ref 4–12)
NEUTROPHILS # BLD AUTO: 4.17 THOUSANDS/ΜL (ref 1.85–7.62)
NEUTS SEG NFR BLD AUTO: 86 % (ref 43–75)
NONHDLC SERPL-MCNC: 62 MG/DL
NRBC BLD AUTO-RTO: 0 /100 WBCS
NT-PROBNP SERPL-MCNC: 320 PG/ML
NT-PROBNP SERPL-MCNC: 866 PG/ML
PLATELET # BLD AUTO: 147 THOUSANDS/UL (ref 149–390)
PMV BLD AUTO: 10.8 FL (ref 8.9–12.7)
POTASSIUM SERPL-SCNC: 4.2 MMOL/L (ref 3.5–5.3)
POTASSIUM SERPL-SCNC: 4.2 MMOL/L (ref 3.5–5.3)
PROT SERPL-MCNC: 6.9 G/DL (ref 6.4–8.2)
PROT SERPL-MCNC: 7.1 G/DL (ref 6.4–8.2)
PROTHROMBIN TIME: 14.1 SECONDS (ref 11.6–14.5)
RBC # BLD AUTO: 4.75 MILLION/UL (ref 3.88–5.62)
SODIUM SERPL-SCNC: 136 MMOL/L (ref 136–145)
SODIUM SERPL-SCNC: 137 MMOL/L (ref 136–145)
TRIGL SERPL-MCNC: 37 MG/DL
TROPONIN I SERPL-MCNC: 0.03 NG/ML
TSH SERPL DL<=0.05 MIU/L-ACNC: 2.87 UIU/ML (ref 0.36–3.74)
WBC # BLD AUTO: 4.83 THOUSAND/UL (ref 4.31–10.16)

## 2019-09-26 PROCEDURE — 71045 X-RAY EXAM CHEST 1 VIEW: CPT

## 2019-09-26 PROCEDURE — 36415 COLL VENOUS BLD VENIPUNCTURE: CPT | Performed by: EMERGENCY MEDICINE

## 2019-09-26 PROCEDURE — 83735 ASSAY OF MAGNESIUM: CPT | Performed by: EMERGENCY MEDICINE

## 2019-09-26 PROCEDURE — 94660 CPAP INITIATION&MGMT: CPT

## 2019-09-26 PROCEDURE — 94760 N-INVAS EAR/PLS OXIMETRY 1: CPT

## 2019-09-26 PROCEDURE — 84484 ASSAY OF TROPONIN QUANT: CPT | Performed by: EMERGENCY MEDICINE

## 2019-09-26 PROCEDURE — 85025 COMPLETE CBC W/AUTO DIFF WBC: CPT | Performed by: EMERGENCY MEDICINE

## 2019-09-26 PROCEDURE — 99223 1ST HOSP IP/OBS HIGH 75: CPT | Performed by: HOSPITALIST

## 2019-09-26 PROCEDURE — 93005 ELECTROCARDIOGRAM TRACING: CPT

## 2019-09-26 PROCEDURE — 80053 COMPREHEN METABOLIC PANEL: CPT | Performed by: EMERGENCY MEDICINE

## 2019-09-26 PROCEDURE — 99291 CRITICAL CARE FIRST HOUR: CPT | Performed by: EMERGENCY MEDICINE

## 2019-09-26 PROCEDURE — 99285 EMERGENCY DEPT VISIT HI MDM: CPT

## 2019-09-26 PROCEDURE — 85610 PROTHROMBIN TIME: CPT | Performed by: EMERGENCY MEDICINE

## 2019-09-26 PROCEDURE — 83880 ASSAY OF NATRIURETIC PEPTIDE: CPT | Performed by: EMERGENCY MEDICINE

## 2019-09-26 PROCEDURE — 94640 AIRWAY INHALATION TREATMENT: CPT

## 2019-09-26 PROCEDURE — 94664 DEMO&/EVAL PT USE INHALER: CPT

## 2019-09-26 PROCEDURE — 96374 THER/PROPH/DIAG INJ IV PUSH: CPT

## 2019-09-26 RX ORDER — TERAZOSIN 5 MG/1
5 CAPSULE ORAL
Status: DISCONTINUED | OUTPATIENT
Start: 2019-09-26 | End: 2019-10-07 | Stop reason: HOSPADM

## 2019-09-26 RX ORDER — AMLODIPINE BESYLATE 10 MG/1
10 TABLET ORAL DAILY
Status: DISCONTINUED | OUTPATIENT
Start: 2019-09-27 | End: 2019-10-07 | Stop reason: HOSPADM

## 2019-09-26 RX ORDER — LEVOTHYROXINE SODIUM 0.05 MG/1
50 TABLET ORAL DAILY
Status: DISCONTINUED | OUTPATIENT
Start: 2019-09-27 | End: 2019-10-07 | Stop reason: HOSPADM

## 2019-09-26 RX ORDER — ONDANSETRON 2 MG/ML
4 INJECTION INTRAMUSCULAR; INTRAVENOUS EVERY 6 HOURS PRN
Status: DISCONTINUED | OUTPATIENT
Start: 2019-09-26 | End: 2019-10-07 | Stop reason: HOSPADM

## 2019-09-26 RX ORDER — BRIMONIDINE TARTRATE 0.15 %
1 DROPS OPHTHALMIC (EYE) EVERY 8 HOURS SCHEDULED
Status: DISCONTINUED | OUTPATIENT
Start: 2019-09-26 | End: 2019-10-07 | Stop reason: HOSPADM

## 2019-09-26 RX ORDER — ALBUTEROL SULFATE 2.5 MG/3ML
2 SOLUTION RESPIRATORY (INHALATION) ONCE
Status: COMPLETED | OUTPATIENT
Start: 2019-09-26 | End: 2019-09-26

## 2019-09-26 RX ORDER — PRAVASTATIN SODIUM 40 MG
40 TABLET ORAL
Status: DISCONTINUED | OUTPATIENT
Start: 2019-09-26 | End: 2019-10-07 | Stop reason: HOSPADM

## 2019-09-26 RX ORDER — MONTELUKAST SODIUM 10 MG/1
10 TABLET ORAL DAILY
Status: DISCONTINUED | OUTPATIENT
Start: 2019-09-27 | End: 2019-10-07 | Stop reason: HOSPADM

## 2019-09-26 RX ORDER — TIMOLOL MALEATE 5 MG/ML
1 SOLUTION/ DROPS OPHTHALMIC 2 TIMES DAILY
Status: DISCONTINUED | OUTPATIENT
Start: 2019-09-26 | End: 2019-10-07 | Stop reason: HOSPADM

## 2019-09-26 RX ORDER — ALBUTEROL SULFATE 2.5 MG/3ML
2.5 SOLUTION RESPIRATORY (INHALATION) EVERY 6 HOURS PRN
Status: DISCONTINUED | OUTPATIENT
Start: 2019-09-26 | End: 2019-10-07 | Stop reason: HOSPADM

## 2019-09-26 RX ORDER — METHYLPREDNISOLONE SOD SUCC 125 MG
1 VIAL (EA) INJECTION ONCE
Status: COMPLETED | OUTPATIENT
Start: 2019-09-26 | End: 2019-09-26

## 2019-09-26 RX ORDER — FUROSEMIDE 10 MG/ML
40 INJECTION INTRAMUSCULAR; INTRAVENOUS
Status: DISCONTINUED | OUTPATIENT
Start: 2019-09-27 | End: 2019-09-27

## 2019-09-26 RX ORDER — IPRATROPIUM BROMIDE AND ALBUTEROL SULFATE .5; 3 MG/3ML; MG/3ML
2 SOLUTION RESPIRATORY (INHALATION) ONCE
Status: COMPLETED | OUTPATIENT
Start: 2019-09-26 | End: 2019-09-26

## 2019-09-26 RX ORDER — FUROSEMIDE 10 MG/ML
40 INJECTION INTRAMUSCULAR; INTRAVENOUS ONCE
Status: COMPLETED | OUTPATIENT
Start: 2019-09-26 | End: 2019-09-26

## 2019-09-26 RX ADMIN — BRIMONIDINE TARTRATE 1 DROP: 1.5 SOLUTION OPHTHALMIC at 22:32

## 2019-09-26 RX ADMIN — FUROSEMIDE 40 MG: 10 INJECTION, SOLUTION INTRAMUSCULAR; INTRAVENOUS at 16:05

## 2019-09-26 RX ADMIN — TERAZOSIN HYDROCHLORIDE 5 MG: 5 CAPSULE ORAL at 22:28

## 2019-09-26 RX ADMIN — ALBUTEROL SULFATE 2.5 MG: 2.5 SOLUTION RESPIRATORY (INHALATION) at 20:05

## 2019-09-26 RX ADMIN — PRAVASTATIN SODIUM 40 MG: 40 TABLET ORAL at 22:28

## 2019-09-26 RX ADMIN — TIMOLOL MALEATE 1 DROP: 5 SOLUTION/ DROPS OPHTHALMIC at 22:00

## 2019-09-26 NOTE — ED PROVIDER NOTES
History  Chief Complaint   Patient presents with    Shortness of Breath     Pt reports to the ED with SOB  Pt was given 2 duo nebs per EMS, 125 mg solumedrol  History provided by:  Patient and relative   used: No    Shortness of Breath   Severity:  Severe  Onset quality:  Gradual  Timing:  Constant  Progression:  Worsening  Chronicity:  Recurrent  Relieved by:  Nothing  Worsened by:  Nothing  Ineffective treatments:  None tried  Associated symptoms: cough and sputum production    Associated symptoms: no abdominal pain, no chest pain, no fever, no neck pain, no rash, no sore throat and no wheezing        Prior to Admission Medications   Prescriptions Last Dose Informant Patient Reported? Taking?    Blood Pressure Monitoring (B-D ASSURE BPM/AUTO ARM CUFF) MISC   No No   Sig: by Does not apply route daily   RABEprazole (ACIPHEX) 20 MG tablet  Self Yes No   Sig: daily    amLODIPine (NORVASC) 10 mg tablet   No No   Sig: Take 1 tablet (10 mg total) by mouth daily   brimonidine-timolol (COMBIGAN) 0 2-0 5 %  Self Yes No   Sig: Apply 1 drop to eye 2 (two) times a day   furosemide (LASIX) 20 mg tablet  Self Yes No   Sig: Take 20 mg by mouth   latanoprost (XALATAN) 0 005 % ophthalmic solution  Self Yes No   levothyroxine 50 mcg tablet  Self Yes No   Sig: Take 1 tablet by mouth daily   montelukast (SINGULAIR) 10 mg tablet  Self Yes No   Sig: daily    potassium chloride (K-DUR,KLOR-CON) 20 mEq tablet  Self No No   Sig: Take 1 tablet (20 mEq total) by mouth 2 (two) times a day   simvastatin (ZOCOR) 20 mg tablet  Self Yes No   Sig: daily    spironolactone (ALDACTONE) 25 mg tablet   No No   Sig: Take 1 tablet (25 mg total) by mouth daily   terazosin (HYTRIN) 5 mg capsule  Self Yes No   Sig: Take 1 capsule by mouth      Facility-Administered Medications: None       Past Medical History:   Diagnosis Date    Allergic rhinitis     Hypercholesteremia     Hypertension     Pneumonia        Past Surgical History:   Procedure Laterality Date    LAPAROSCOPIC COLON RESECTION      TOTAL HIP ARTHROPLASTY         History reviewed  No pertinent family history  I have reviewed and agree with the history as documented  Social History     Tobacco Use    Smoking status: Former Smoker     Packs/day: 0 20     Years: 10 00     Pack years: 2 00     Types: Cigarettes    Smokeless tobacco: Never Used   Substance Use Topics    Alcohol use: Yes     Frequency: Never     Comment: Socially    Drug use: Never        Review of Systems   Constitutional: Negative for activity change, appetite change, chills, fatigue and fever  HENT: Positive for congestion  Negative for dental problem, facial swelling, sore throat, tinnitus and trouble swallowing  Eyes: Negative for pain, discharge and itching  Respiratory: Positive for cough, sputum production and shortness of breath  Negative for apnea, chest tightness and wheezing  Cardiovascular: Positive for leg swelling  Negative for chest pain and palpitations  Gastrointestinal: Negative for abdominal pain and nausea  Genitourinary: Negative for difficulty urinating, dysuria and flank pain  Musculoskeletal: Negative for arthralgias, back pain, gait problem, joint swelling and neck pain  Skin: Negative for color change, rash and wound  Neurological: Negative for dizziness and facial asymmetry  Psychiatric/Behavioral: Negative for agitation and behavioral problems  The patient is not nervous/anxious  All other systems reviewed and are negative  Physical Exam  Physical Exam   Constitutional: He is oriented to person, place, and time  He appears well-developed and well-nourished  He appears ill  No distress  HENT:   Head: Normocephalic and atraumatic  Right Ear: External ear normal    Left Ear: External ear normal    Eyes: Pupils are equal, round, and reactive to light  EOM are normal  Right eye exhibits no discharge  Left eye exhibits no discharge     Neck: Normal range of motion  Neck supple  No tracheal deviation present  No thyromegaly present  Cardiovascular: Normal rate and regular rhythm  No murmur heard  Pulmonary/Chest: Tachypnea noted  He is in respiratory distress  He has rales  Abdominal: Soft  Bowel sounds are normal  He exhibits no distension  There is no tenderness  Musculoskeletal: Normal range of motion  He exhibits no deformity  Right lower leg: He exhibits edema  Left lower leg: He exhibits edema  Neurological: He is alert and oriented to person, place, and time  No cranial nerve deficit  He exhibits normal muscle tone  Skin: Skin is warm  Capillary refill takes less than 2 seconds  He is not diaphoretic  Psychiatric: He has a normal mood and affect  His behavior is normal    Nursing note and vitals reviewed        Vital Signs  ED Triage Vitals   Temperature Pulse Respirations Blood Pressure SpO2   09/26/19 1516 09/26/19 1509 09/26/19 1512 09/26/19 1511 09/26/19 1509   98 7 °F (37 1 °C) 82 20 128/64 (!) 66 %      Temp Source Heart Rate Source Patient Position - Orthostatic VS BP Location FiO2 (%)   09/26/19 1508 09/26/19 1511 -- -- 09/26/19 1524   Oral Monitor   60      Pain Score       --                  Vitals:    09/26/19 1511 09/26/19 1600 09/26/19 1700 09/26/19 1808   BP: 128/64 121/62 136/63 131/62   Pulse: 85 74 72 79         Visual Acuity      ED Medications  Medications   methylPREDNISolone sodium succinate (FOR EMS ONLY) (Solu-MEDROL) 125 MG injection 125 mg (0 mg Does not apply Given to EMS 9/26/19 1518)   albuterol (FOR EMS ONLY) (2 5 mg/3 mL) 0 083 % inhalation solution 5 mg (0 mg Does not apply Given to EMS 9/26/19 1518)   ipratropium-albuterol (FOR EMS ONLY) (DUO-NEB) 0 5-2 5 mg/3 mL inhalation solution 6 mL (0 mL Does not apply Given to EMS 9/26/19 1518)   furosemide (LASIX) injection 40 mg (40 mg Intravenous Given 9/26/19 1605)       Diagnostic Studies  Results Reviewed     Procedure Component Value Units Date/Time    Magnesium [[id number]]  (Normal) Collected:  09/26/19 1526    Lab Status:  Final result Specimen:  Blood from Arm, Left Updated:  09/26/19 1638     Magnesium 2 0 mg/dL     B-type natriuretic peptide [[id number]]  (Abnormal) Collected:  09/26/19 1526    Lab Status:  Final result Specimen:  Blood from Arm, Left Updated:  09/26/19 1638     NT-proBNP 866 pg/mL     Troponin I [[id number]]  (Normal) Collected:  09/26/19 1526    Lab Status:  Final result Specimen:  Blood from Arm, Left Updated:  09/26/19 1635     Troponin I 0 03 ng/mL     Comprehensive metabolic panel [[id number]]  (Abnormal) Collected:  09/26/19 1526    Lab Status:  Final result Specimen:  Blood from Arm, Left Updated:  09/26/19 1632     Sodium 136 mmol/L      Potassium 4 2 mmol/L      Chloride 96 mmol/L      CO2 34 mmol/L      ANION GAP 6 mmol/L      BUN 21 mg/dL      Creatinine 1 11 mg/dL      Glucose 125 mg/dL      Calcium 8 5 mg/dL      AST 17 U/L      ALT 11 U/L      Alkaline Phosphatase 67 U/L      Total Protein 6 9 g/dL      Albumin 3 9 g/dL      Total Bilirubin 0 70 mg/dL      eGFR 58 ml/min/1 73sq m     Narrative:       [place] guidelines for Chronic Kidney Disease (CKD):     Stage 1 with normal or high GFR (GFR > 90 mL/min/1 73 square meters)    Stage 2 Mild CKD (GFR = 60-89 mL/min/1 73 square meters)    Stage 3A Moderate CKD (GFR = 45-59 mL/min/1 73 square meters)    Stage 3B Moderate CKD (GFR = 30-44 mL/min/1 73 square meters)    Stage 4 Severe CKD (GFR = 15-29 mL/min/1 73 square meters)    Stage 5 End Stage CKD (GFR <15 mL/min/1 73 square meters)  Note: GFR calculation is accurate only with a steady state creatinine    Protime-INR [[id number]]  (Normal) Collected:  09/26/19 1526    Lab Status:  Final result Specimen:  Blood from Arm, Left Updated:  09/26/19 1624     Protime 14 1 seconds      INR 1 15    CBC and differential [[id number]]  (Abnormal) Collected:  09/26/19 1526    Lab Status:  Final result Specimen:  Blood from Arm, Left Updated:  09/26/19 1615     WBC 4 83 Thousand/uL      RBC 4 75 Million/uL      Hemoglobin 15 0 g/dL      Hematocrit 46 4 %      MCV 98 fL      MCH 31 6 pg      MCHC 32 3 g/dL      RDW 13 3 %      MPV 10 8 fL      Platelets 917 Thousands/uL      nRBC 0 /100 WBCs      Neutrophils Relative 86 %      Immat GRANS % 1 %      Lymphocytes Relative 6 %      Monocytes Relative 7 %      Eosinophils Relative 0 %      Basophils Relative 0 %      Neutrophils Absolute 4 17 Thousands/µL      Immature Grans Absolute 0 03 Thousand/uL      Lymphocytes Absolute 0 27 Thousands/µL      Monocytes Absolute 0 33 Thousand/µL      Eosinophils Absolute 0 01 Thousand/µL      Basophils Absolute 0 02 Thousands/µL                  XR chest 1 view portable   ED Interpretation by Carmine Jain MD (09/26 1644)   Bilateral pleural effusions         Final Result by Kwame Teixeira MD (09/26 1651)      Bilateral pleural effusions, mildly increased in size since yesterday            Workstation performed: [id number]                    Procedures  ECG 12 Lead Documentation Only  Date/Time: 9/26/2019 3:31 PM  Performed by: Carmine Jain MD  Authorized by: Carmine Jain MD     Indications / Diagnosis:  SOB  ECG reviewed by me, the ED Provider: yes    Patient location:  ED  Previous ECG:     Previous ECG:  Compared to current    Similarity:  Changes noted  Rate:     ECG rate:  80    ECG rate assessment: normal    Rhythm:     Rhythm: sinus rhythm    Ectopy:     Ectopy: none    QRS:     QRS axis:  Indeterminate    QRS intervals:  Normal  Conduction:     Conduction: normal    ST segments:     ST segments:  Normal  T waves:     T waves: normal      CriticalCare Time  Performed by: Carmine Jain MD  Authorized by: Carmine Jain MD     Critical care provider statement:     Critical care time (minutes):  40    Critical care time was exclusive of:  Separately billable procedures and treating other patients and teaching time    Critical care was necessary to treat or prevent imminent or life-threatening deterioration of the following conditions:  Respiratory failure    Critical care was time spent personally by me on the following activities:  Examination of patient, ordering and review of laboratory studies, ordering and review of radiographic studies and re-evaluation of patient's condition    I assumed direction of critical care for this patient from another provider in my specialty: no             ED Course                               MDM  Number of Diagnoses or Management Options  Pleural effusion: new and requires workup  Diagnosis management comments: Shortness of breath  Initial saturation 66%  BiPAP place with improvement of work of breathing and oxygen saturation  X-ray with bilateral pleural effusion  BNP elevated over baseline  Suspect symptoms related to CHF exacerbation of bilateral pleural effusion  Patient given IV Lasix  Discussed with ICU team saw patient in the ER and recommended step down 2 admission  Serious but stable at time of admission          Amount and/or Complexity of Data Reviewed  Clinical lab tests: ordered and reviewed  Tests in the radiology section of CPT®: ordered and reviewed  Tests in the medicine section of CPT®: ordered and reviewed  Discuss the patient with other providers: yes  Independent visualization of images, tracings, or specimens: yes    Risk of Complications, Morbidity, and/or Mortality  Presenting problems: high  Diagnostic procedures: high  Management options: high    Patient Progress  Patient progress: improved      Disposition  Final diagnoses:   Pleural effusion     Time reflects when diagnosis was documented in both MDM as applicable and the Disposition within this note     Time User Action Codes Description Comment    9/26/2019  4:51 PM Yanet Pinto Add [J90] Pleural effusion       ED Disposition     ED Disposition Condition Date/Time Comment    Admit Stable Thu Sep 26, 2019  5:27 PM Case was discussed with Sander Zarate and the patient's admission status was agreed to be Admission Status: inpatient status to the service of Dr Linda Edwards   Follow-up Information    None         Patient's Medications   Discharge Prescriptions    No medications on file     No discharge procedures on file      ED Provider  Electronically Signed by           Abraham Navarrete MD  09/26/19 7650

## 2019-09-26 NOTE — ASSESSMENT & PLAN NOTE
Patient with history of hypertension  Blood pressure medication managed by cardiologist   Home medications include terazosin 5 mg, Aldactone 25 mg, amlodipine 10 mg   Blood pressure stable in the ED, 120/69  - continue terazosin 5 mg  - continue amlodipine 10 mg  - discontinued Aldactone as patient will be diuresed with Lasix 40 mg b i d   - monitor blood pressure

## 2019-09-26 NOTE — H&P
H&P - Rayne Gomez 3/24/1929, 80 y o  male MRN: [id number]    Unit/Bed#:  Encounter: [id number]    Primary Care Provider: Dary Bryant MD   Date and time admitted to hospital: 9/26/2019  3:06 PM        Acute respiratory failure with hypoxia and hypercapnia [facility])  Assessment & Plan  Patient recently was diagnosed with hypoxic respiratory failure  He was admitted in March to [address] [place], [place] [address] for dyspnea, altered mental status, lethargy and he was found to be in combined hypercarbic and hypoxic respiratory failure  At the time he was treated with BiPAP, nebulizer, steroids and eventually weaned to 3 L nasal cannula  Upon arrival to the ED today his oxygen saturation was found to be 66% on room air  Patient was placed on a BiPAP mask which was then removed as he was maintaining saturation in normal air   - continue diuresis with Lasix IV 40 mg b i d   - monitor oxygen saturation  - respiratory protocol ordered    (HFpEF) heart failure with preserved ejection fraction [facility])  Assessment & Plan  Wt Readings from Last 3 Encounters:   09/26/19 84 1 kg (185 lb 6 5 oz)   06/06/19 79 6 kg (175 lb 8 oz)   04/02/19 77 1 kg (170 lb)   Patient has a past medical history of heart failure with preserved ejection fraction  Last echocardiogram was 03/18/2019 which showed normal systolic function, ejection fraction of 65% and grade 1 diastolic dysfunction  Patient today presents volume overloaded  On physical exam JVD and bilateral lower extremity pitting edema was noted  CXR showed bilateral pleural effusions which have mildly increased since yesterday  ProBNP was elevated at 866  He was given 40 mg of IV Lasix in the ED  Home medications include Lasix 20 mg and Aldactone 25 mg   - continue diuresis with IV Lasix 40 mg b i d   - Aldactone was discontinued  - appreciate Cardiology consult  - monitor ins and outs  - baseline weight 175 lb   Daily weights on standing scale    Essential hypertension  Assessment & Plan  Patient with history of hypertension  Blood pressure medication managed by cardiologist   Home medications include terazosin 5 mg, Aldactone 25 mg, amlodipine 10 mg  Blood pressure stable in the ED, 120/69  - continue terazosin 5 mg  - continue amlodipine 10 mg  - discontinued Aldactone as patient will be diuresed with Lasix 40 mg b i d   - monitor blood pressure    Elevated serum creatinine  Assessment & Plan  Patients creatinine 1 1 from baseline 0 7-0 9  - Trend in case of possible ANILA       VTE Prophylaxis: Enoxaparin (Lovenox)  / sequential compression device   Code Status: DNAR/DNI  POLST: Not personally reviewed     Anticipated Length of Stay:  Patient will be admitted on an Inpatient basis with an anticipated length of stay of  2 midnights  Justification for Hospital Stay: CHF decompensation    Chief Complaint:   Shortness of breath    History of Present Illness:    Delfina Thomson is a 80 y o  male with past medical history of hypertension, hyperlipidemia, hypothyroidism, heart failure with preserved ejection fraction and hypoxic respiratory failure who was brought to the ED by EMS with complaints of shortness of breath  Throughout the past few days patient has been feeling short of breath and has had difficulty sleeping while lying down flat due to increased shortness of breath  Family members noted that patient was volume overloaded  He has had a 10 lb increase from baseline weight at 175 lb  Yesterday the patient was seen by primary care doctor who advised him to double his dose of Lasix 20 mg  Patient took a dose of 40 mg yesterday evening  Upon speaking to him on the phone, his son-in-law became concerned as he sounded short of breath and called 911  EMS gave him 2 duo nebs and 125 mg of Solu-Medrol  The patient was brought to the emergency department and his O2 Sat was 66% in room air    Chest x-ray showed bilateral pleural effusions with mild increase in size since chest x-ray yesterday  Patient was placed on a BiPAP mask in the ED which was later removed as he was maintaining saturation above 88%in normal air  Patient recently was diagnosed with hypoxic respiratory failure  He was admitted in March to [facility] for dyspnea, altered mental status, lethargy and he was found to be in combined hypercarbic and hypoxic respiratory failure  At the time he was treated with BiPAP, nebulizer, steroids and eventually weaned to 3 L nasal cannula  Patient is seen by Cardiology follow at [facility]  Review of Systems:    Review of Systems   Constitutional: Negative for activity change  Respiratory: Positive for shortness of breath  Negative for cough, chest tightness, wheezing and stridor  Cardiovascular: Positive for leg swelling  Negative for chest pain and palpitations  Psychiatric/Behavioral: Negative for confusion  Past Medical and Surgical History:     Past Medical History:   Diagnosis Date    Allergic rhinitis     Hypercholesteremia     Hypertension     Pneumonia        Past Surgical History:   Procedure Laterality Date    LAPAROSCOPIC COLON RESECTION      TOTAL HIP ARTHROPLASTY         Meds/Allergies:    Prior to Admission medications    Medication Sig Start Date End Date Taking?  Authorizing Provider   amLODIPine (NORVASC) 10 mg tablet Take 1 tablet (10 mg total) by mouth daily 6/6/19   Ilsa Chan MD   Blood Pressure Monitoring (B-D ASSURE BPM/AUTO ARM CUFF) MISC by Does not apply route daily 6/6/19   Ilsa Chan MD   brimonidine-timolol (COMBIGAN) 0 2-0 5 % Apply 1 drop to eye 2 (two) times a day    Historical Provider, MD   furosemide (LASIX) 20 mg tablet Take 20 mg by mouth    Historical Provider, MD   latanoprost (XALATAN) 0 005 % ophthalmic solution  2/19/19   Historical Provider, MD   levothyroxine 50 mcg tablet Take 1 tablet by mouth daily    Historical Provider, MD   montelukast (SINGULAIR) 10 mg tablet daily  12/14/18 Historical Provider, MD   potassium chloride (K-DUR,KLOR-CON) 20 mEq tablet Take 1 tablet (20 mEq total) by mouth 2 (two) times a day 5/8/19   Ilsa Chan MD   RABEprazole (ACIPHEX) 20 MG tablet daily  2/20/19   Historical Provider, MD   simvastatin (ZOCOR) 20 mg tablet daily  1/20/19   Historical Provider, MD   spironolactone (ALDACTONE) 25 mg tablet Take 1 tablet (25 mg total) by mouth daily 6/6/19   Ilsa Chan MD   terazosin (HYTRIN) 5 mg capsule Take 1 capsule by mouth    Historical Provider, MD     I have reviewed home medications with a medical source (PCP, Pharmacy, other)  Allergies: No Known Allergies    Social History:     Marital Status: /Civil Union   Patient Pre-hospital Living Situation:Home/self care  Patient Pre-hospital Level of Mobility: Able to carry out ADLs independently  Patient Pre-hospital Diet Restrictions: DASH diet  Substance Use History: Smoker  Social History     Substance and Sexual Activity   Alcohol Use Yes    Frequency: Never    Comment: Socially     Social History     Tobacco Use   Smoking Status Former Smoker    Packs/day: 0 20    Years: 10 00    Pack years: 2 00    Types: Cigarettes   Smokeless Tobacco Never Used     Social History     Substance and Sexual Activity   Drug Use Never       Family History:    non-contributory    Physical Exam:     Vitals:   Blood Pressure: 115/55 (09/26/19 1900)  Pulse: 81 (09/26/19 1949)  Temperature: 98 °F (36 7 °C) (09/26/19 1900)  Temp Source: Oral (09/26/19 1900)  Respirations: 22 (09/26/19 1949)  Weight - Scale: 81 6 kg (179 lb 14 3 oz) (09/26/19 1926)  SpO2: 91 % (09/26/19 1949)    Physical Exam   Constitutional: He is oriented to person, place, and time  He appears well-developed and well-nourished  Cardiovascular: Normal rate, regular rhythm and normal heart sounds  Pulmonary/Chest: Effort normal  He has rales  Abdominal: Soft  Bowel sounds are normal  There is no tenderness  Musculoskeletal: He exhibits edema  Bilateral lower extremity pitting edema   Neurological: He is alert and oriented to person, place, and time  Additional Data:     Lab Results: I have personally reviewed pertinent reports  Results from last 7 days   Lab Units 09/26/19  1526   WBC Thousand/uL 4 83   HEMOGLOBIN g/dL 15 0   HEMATOCRIT % 46 4   PLATELETS Thousands/uL 147*   NEUTROS PCT % 86*   LYMPHS PCT % 6*   MONOS PCT % 7   EOS PCT % 0     Results from last 7 days   Lab Units 09/26/19  1526   POTASSIUM mmol/L 4 2   CHLORIDE mmol/L 96*   CO2 mmol/L 34*   BUN mg/dL 21   CREATININE mg/dL 1 11   CALCIUM mg/dL 8 5   ALK PHOS U/L 67   ALT U/L 11*   AST U/L 17     Results from last 7 days   Lab Units 09/26/19  1526   INR  1 15       Imaging: I have personally reviewed pertinent reports  Xr Chest 1 View Portable    Result Date: 9/26/2019  Narrative: CHEST INDICATION:   Shortness of breath  COMPARISON:  9/25/2019 EXAM PERFORMED/VIEWS:  XR CHEST PORTABLE FINDINGS: Cardiac silhouette partially obscured by bilateral pleural effusions  The lung bases are also partially obscured  Underlying bibasilar airspace disease again suggested  The pleural effusions have mildly increased in size since the previous examination  There is no pneumothorax Osseous structures appear within normal limits for patient age  Impression: Bilateral pleural effusions, mildly increased in size since yesterday Workstation performed: [id number]     Xr Chest Pa & Lateral    Result Date: 9/25/2019  Narrative: CHEST INDICATION:   R05: Cough  COMPARISON:  3/27/2019 EXAM PERFORMED/VIEWS:  XR CHEST PA & LATERAL  The frontal view was performed utilizing dual energy radiographic technique  FINDINGS: The cardiac silhouette is without change from the prior study  There is a persistent left-sided pleural effusion, slightly larger than prior study  A right pleural effusion has increased in size    No evidence of pneumothorax Lung bases are partially obscured by the pleural fluid  However, bibasilar coarse linear densities identified, either representing compressive atelectasis or possibly infiltrate  No evidence of overt pulmonary edema   Impression: Bilateral effusions are noted  Bibasilar coarse linear changes, consistent with compressive atelectasis or small infiltrates  The examination demonstrates a significant  finding and was documented as such in [facility] for liaison and referring practitioner notification  Workstation performed: [id number]       ** Please Note: This note has been constructed using a voice recognition system   **

## 2019-09-26 NOTE — ASSESSMENT & PLAN NOTE
Patient recently was diagnosed with hypoxic respiratory failure  He was admitted in March to [facility] for dyspnea, altered mental status, lethargy and he was found to be in combined hypercarbic and hypoxic respiratory failure  At the time he was treated with BiPAP, nebulizer, steroids and eventually weaned to 3 L nasal cannula  Upon arrival to the ED today his oxygen saturation was found to be 66% on room air    Patient was placed on a BiPAP mask which was then removed as he was maintaining saturation in normal air   - continue diuresis with Lasix IV 40 mg b i d   - monitor oxygen saturation  - respiratory protocol ordered

## 2019-09-26 NOTE — ED NOTES
Attempted to call report to ICU at this time, will call back per ICU Charge PETEY Goins RN  09/26/19 1005

## 2019-09-26 NOTE — ED NOTES
Bi-pap D/C at this time will monitor o2 Saturation, If needed respiratory will be called if PT saturation doesn't stay up  Admitting Provider at bedside assessing        Drake Briceño  09/26/19 1107

## 2019-09-26 NOTE — ASSESSMENT & PLAN NOTE
Wt Readings from Last 3 Encounters:   09/26/19 84 1 kg (185 lb 6 5 oz)   06/06/19 79 6 kg (175 lb 8 oz)   04/02/19 77 1 kg (170 lb)   Patient has a past medical history of heart failure with preserved ejection fraction  Last echocardiogram was 03/18/2019 which showed normal systolic function, ejection fraction of 65% and grade 1 diastolic dysfunction  Patient today presents volume overloaded  On physical exam JVD and bilateral lower extremity pitting edema was noted  CXR showed bilateral pleural effusions which have mildly increased since yesterday  ProBNP was elevated at 866  He was given 40 mg of IV Lasix in the ED  Home medications include Lasix 20 mg and Aldactone 25 mg   - continue diuresis with IV Lasix 40 mg b i d   - Aldactone was discontinued  - appreciate Cardiology consult  - monitor ins and outs  - baseline weight 175 lb   Daily weights on standing scale

## 2019-09-26 NOTE — RESPIRATORY THERAPY NOTE
RT Protocol Note  Noemi Tena 80 y o  male MRN: [id number]  Unit/Bed#:  Encounter: [id number]    Assessment    Active Problems:    (HFpEF) heart failure with preserved ejection fraction (HCC)    Essential hypertension    Elevated serum creatinine      Home Pulmonary Medications:         Past Medical History:   Diagnosis Date    Allergic rhinitis     Hypercholesteremia     Hypertension     Pneumonia      Social History     Socioeconomic History    Marital status: /Civil Union     Spouse name: None    Number of children: None    Years of education: None    Highest education level: None   Occupational History    None   Social Needs    Financial resource strain: None    Food insecurity:     Worry: None     Inability: None    Transportation needs:     Medical: None     Non-medical: None   Tobacco Use    Smoking status: Former Smoker     Packs/day: 0 20     Years: 10 00     Pack years: 2 00     Types: Cigarettes    Smokeless tobacco: Never Used   Substance and Sexual Activity    Alcohol use: Yes     Frequency: Never     Comment: Socially    Drug use: Never    Sexual activity: None   Lifestyle    Physical activity:     Days per week: None     Minutes per session: None    Stress: None   Relationships    Social connections:     Talks on phone: None     Gets together: None     Attends Hinduism service: None     Active member of club or organization: None     Attends meetings of clubs or organizations: None     Relationship status: None    Intimate partner violence:     Fear of current or ex partner: None     Emotionally abused: None     Physically abused: None     Forced sexual activity: None   Other Topics Concern    None   Social History Narrative    None       Subjective         Objective    Physical Exam:   Assessment Type: Assess only  General Appearance: Alert, Awake  Respiratory Pattern: Dyspnea with exertion, Dyspnea at rest  Chest Assessment: Chest expansion symmetrical  Bilateral Breath Sounds: Diminished, Expiratory wheezes(slight expiratory wheeze)    Vitals:  Blood pressure 115/55, pulse 81, temperature 98 °F (36 7 °C), temperature source Oral, resp  rate 22, weight 81 6 kg (179 lb 14 3 oz), SpO2 91 %  Imaging and other studies: I have personally reviewed pertinent reports  Plan    Respiratory Plan: Mild Distress pathway        Resp Comments: Recieved pt from ED on 6 lpm NC and with little movement pt desaturated to SpO2 82%  Nonrebreather placed on pt with 15 lpm by RN and MD Cira Virgen at bedside  RRT called to bedside  Pt had little but not enough improvement with SpO2 87% and increase work of breathing  BIPAP continuous ordered and placed on pt  with settings as documented  Lasix given to pt by RN and as pt improves settings will be weaned  Pt tolerating well at this time on current settings  Will continue to monitor pt

## 2019-09-27 PROBLEM — N18.2 CHRONIC KIDNEY DISEASE, STAGE 2 (MILD): Status: ACTIVE | Noted: 2019-09-26

## 2019-09-27 LAB
ANION GAP SERPL CALCULATED.3IONS-SCNC: 4 MMOL/L (ref 4–13)
BASOPHILS # BLD AUTO: 0 THOUSANDS/ΜL (ref 0–0.1)
BASOPHILS NFR BLD AUTO: 0 % (ref 0–1)
BUN SERPL-MCNC: 21 MG/DL (ref 5–25)
CALCIUM SERPL-MCNC: 8.2 MG/DL (ref 8.3–10.1)
CHLORIDE SERPL-SCNC: 98 MMOL/L (ref 100–108)
CO2 SERPL-SCNC: 35 MMOL/L (ref 21–32)
CREAT SERPL-MCNC: 0.81 MG/DL (ref 0.6–1.3)
EOSINOPHIL # BLD AUTO: 0 THOUSAND/ΜL (ref 0–0.61)
EOSINOPHIL NFR BLD AUTO: 0 % (ref 0–6)
ERYTHROCYTE [DISTWIDTH] IN BLOOD BY AUTOMATED COUNT: 13.4 % (ref 11.6–15.1)
GFR SERPL CREATININE-BSD FRML MDRD: 78 ML/MIN/1.73SQ M
GLUCOSE SERPL-MCNC: 128 MG/DL (ref 65–140)
HCT VFR BLD AUTO: 43.6 % (ref 36.5–49.3)
HGB BLD-MCNC: 14.3 G/DL (ref 12–17)
IMM GRANULOCYTES # BLD AUTO: 0.01 THOUSAND/UL (ref 0–0.2)
IMM GRANULOCYTES NFR BLD AUTO: 0 % (ref 0–2)
LYMPHOCYTES # BLD AUTO: 0.16 THOUSANDS/ΜL (ref 0.6–4.47)
LYMPHOCYTES NFR BLD AUTO: 7 % (ref 14–44)
MAGNESIUM SERPL-MCNC: 1.9 MG/DL (ref 1.6–2.6)
MCH RBC QN AUTO: 31.4 PG (ref 26.8–34.3)
MCHC RBC AUTO-ENTMCNC: 32.8 G/DL (ref 31.4–37.4)
MCV RBC AUTO: 96 FL (ref 82–98)
MONOCYTES # BLD AUTO: 0.11 THOUSAND/ΜL (ref 0.17–1.22)
MONOCYTES NFR BLD AUTO: 5 % (ref 4–12)
NEUTROPHILS # BLD AUTO: 1.95 THOUSANDS/ΜL (ref 1.85–7.62)
NEUTS SEG NFR BLD AUTO: 88 % (ref 43–75)
NRBC BLD AUTO-RTO: 0 /100 WBCS
PLATELET # BLD AUTO: 126 THOUSANDS/UL (ref 149–390)
PMV BLD AUTO: 10.5 FL (ref 8.9–12.7)
POTASSIUM SERPL-SCNC: 4.2 MMOL/L (ref 3.5–5.3)
RBC # BLD AUTO: 4.55 MILLION/UL (ref 3.88–5.62)
SODIUM SERPL-SCNC: 137 MMOL/L (ref 136–145)
WBC # BLD AUTO: 2.23 THOUSAND/UL (ref 4.31–10.16)

## 2019-09-27 PROCEDURE — 99232 SBSQ HOSP IP/OBS MODERATE 35: CPT | Performed by: INTERNAL MEDICINE

## 2019-09-27 PROCEDURE — 85025 COMPLETE CBC W/AUTO DIFF WBC: CPT | Performed by: PHYSICIAN ASSISTANT

## 2019-09-27 PROCEDURE — 83735 ASSAY OF MAGNESIUM: CPT | Performed by: PHYSICIAN ASSISTANT

## 2019-09-27 PROCEDURE — 80048 BASIC METABOLIC PNL TOTAL CA: CPT | Performed by: PSYCHIATRY & NEUROLOGY

## 2019-09-27 PROCEDURE — 94760 N-INVAS EAR/PLS OXIMETRY 1: CPT

## 2019-09-27 PROCEDURE — 94660 CPAP INITIATION&MGMT: CPT

## 2019-09-27 PROCEDURE — 99222 1ST HOSP IP/OBS MODERATE 55: CPT | Performed by: INTERNAL MEDICINE

## 2019-09-27 RX ORDER — FUROSEMIDE 10 MG/ML
40 INJECTION INTRAMUSCULAR; INTRAVENOUS
Status: DISCONTINUED | OUTPATIENT
Start: 2019-09-27 | End: 2019-09-29

## 2019-09-27 RX ORDER — SPIRONOLACTONE 25 MG/1
25 TABLET ORAL DAILY
Status: DISCONTINUED | OUTPATIENT
Start: 2019-09-28 | End: 2019-10-07 | Stop reason: HOSPADM

## 2019-09-27 RX ADMIN — PRAVASTATIN SODIUM 40 MG: 40 TABLET ORAL at 17:35

## 2019-09-27 RX ADMIN — BRIMONIDINE TARTRATE 1 DROP: 1.5 SOLUTION OPHTHALMIC at 05:55

## 2019-09-27 RX ADMIN — FUROSEMIDE 40 MG: 10 INJECTION, SOLUTION INTRAMUSCULAR; INTRAVENOUS at 08:26

## 2019-09-27 RX ADMIN — TIMOLOL MALEATE 1 DROP: 5 SOLUTION/ DROPS OPHTHALMIC at 17:35

## 2019-09-27 RX ADMIN — FUROSEMIDE 40 MG: 10 INJECTION, SOLUTION INTRAMUSCULAR; INTRAVENOUS at 17:35

## 2019-09-27 RX ADMIN — ENOXAPARIN SODIUM 40 MG: 40 INJECTION SUBCUTANEOUS at 08:26

## 2019-09-27 RX ADMIN — TERAZOSIN HYDROCHLORIDE 5 MG: 5 CAPSULE ORAL at 21:47

## 2019-09-27 RX ADMIN — TIMOLOL MALEATE 1 DROP: 5 SOLUTION/ DROPS OPHTHALMIC at 08:26

## 2019-09-27 RX ADMIN — FUROSEMIDE 40 MG: 10 INJECTION, SOLUTION INTRAMUSCULAR; INTRAVENOUS at 13:14

## 2019-09-27 RX ADMIN — MONTELUKAST SODIUM 10 MG: 10 TABLET, FILM COATED ORAL at 08:25

## 2019-09-27 RX ADMIN — BRIMONIDINE TARTRATE 1 DROP: 1.5 SOLUTION OPHTHALMIC at 13:14

## 2019-09-27 RX ADMIN — BRIMONIDINE TARTRATE 1 DROP: 1.5 SOLUTION OPHTHALMIC at 21:45

## 2019-09-27 RX ADMIN — AMLODIPINE BESYLATE 10 MG: 10 TABLET ORAL at 08:26

## 2019-09-27 RX ADMIN — LEVOTHYROXINE SODIUM 50 MCG: 50 TABLET ORAL at 05:48

## 2019-09-27 NOTE — ASSESSMENT & PLAN NOTE
Wt Readings from Last 3 Encounters:   09/26/19 81 6 kg (179 lb 14 3 oz)   06/06/19 79 6 kg (175 lb 8 oz)   04/02/19 77 1 kg (170 lb)   Patient has a past medical history of heart failure with preserved ejection fraction  Last echocardiogram was 03/18/2019 which showed normal systolic function, ejection fraction of 65% and grade 1 diastolic dysfunction  Patient today presents volume overloaded  On physical exam JVD and bilateral lower extremity pitting edema was noted  CXR showed bilateral pleural effusions which have mildly increased since yesterday  ProBNP was elevated at 866  He was given 40 mg of IV Lasix in the ED  Home medications include Lasix 20 mg and Aldactone 25 mg   - continue diuresis with IV Lasix 40 mg b i d   - Aldactone was discontinued  - monitor ins and outs  Since admission patient is -1775  - baseline weight 175 lb   Daily weights on standing scale  -- appreciate Cardiology consult   - cleared to continue aldactone 25 mg    - No repeat echo necessary    - Volume status improving

## 2019-09-27 NOTE — ASSESSMENT & PLAN NOTE
Patient recently was diagnosed with hypoxic respiratory failure  He was admitted in March to [address] [facility] for dyspnea, altered mental status, lethargy and he was found to be in combined hypercarbic and hypoxic respiratory failure  At the time he was treated with BiPAP, nebulizer, steroids and eventually weaned to 3 L nasal cannula  Upon arrival to the ED today his oxygen saturation was found to be 66% on room air  Patient was placed on a BiPAP mask which was then removed as he was maintaining saturation in normal air   - continue diuresis with Lasix IV 40 mg b i d   - monitor oxygen saturation  - respiratory protocol ordered  - 09/27: Patient has been having difficulties maintaining hydration overnight  With little movement patient desaturated to 82% he was placed on a non-rebreather  Saturation improved to 87%  Continues BiPAP was ordered  At bedside patient on 6L nasal canula

## 2019-09-27 NOTE — CASE MANAGEMENT
THIS IS NOT A BUNDLE  THIS IS NOT A READMISSION  LOS : 1  CM spoke with pt at bedside  Pt states that he lives alone in a ground floor apartment  Pt states that he is independent with all ADLs  Pt owns a walker but does not need to use it regularly  Pt states his daughter and son in- law live 20 minutes away and check on him daily  His son lives in another state but calls regularly to check on him  Pt drives  PCP is Dr Saulo Arroyo  Pt would like CM to make [facility] appointment  Pt uses [facility]  No needs identified  DC plan: home no needs   Family will transport to home upon d/c

## 2019-09-27 NOTE — ASSESSMENT & PLAN NOTE
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

## 2019-09-27 NOTE — PLAN OF CARE
Problem: Potential for Falls  Goal: Patient will remain free of falls  Description  INTERVENTIONS:  - Assess patient frequently for physical needs  -  Identify cognitive and physical deficits and behaviors that affect risk of falls    -  [place] fall precautions as indicated by assessment   - Educate patient/family on patient safety including physical limitations  - Instruct patient to call for assistance with activity based on assessment  - Modify environment to reduce risk of injury  - Consider OT/PT consult to assist with strengthening/mobility  Outcome: Progressing     Problem: RESPIRATORY - ADULT  Goal: Achieves optimal ventilation and oxygenation  Description  INTERVENTIONS:  - Assess for changes in respiratory status  - Assess for changes in mentation and behavior  - Position to facilitate oxygenation and minimize respiratory effort  - Oxygen administered by appropriate delivery if ordered  - Initiate smoking cessation education as indicated  - Encourage broncho-pulmonary hygiene including cough, deep breathe, Incentive Spirometry  - Assess the need for suctioning and aspirate as needed  - Assess and instruct to report SOB or any respiratory difficulty  - Respiratory Therapy support as indicated  Outcome: Progressing     Problem: GENITOURINARY - ADULT  Goal: Maintains or returns to baseline urinary function  Description  INTERVENTIONS:  - Assess urinary function  - Encourage oral fluids to ensure adequate hydration if ordered  - Administer IV fluids as ordered to ensure adequate hydration  - Administer ordered medications as needed  - Offer frequent toileting  - Follow urinary retention protocol if ordered  Outcome: Progressing  Goal: Absence of urinary retention  Description  INTERVENTIONS:  - Assess patient's ability to void and empty bladder  - Monitor I/O  - Bladder scan as needed  - Discuss with physician/AP medications to alleviate retention as needed  - Discuss catheterization for long term situations as appropriate   Outcome: Progressing

## 2019-09-27 NOTE — UTILIZATION REVIEW
Initial Clinical Review    Admission: Date/Time/Statement: Inpatient Admission Orders (From admission, onward)     Ordered        09/26/19 1727  Inpatient Admission  Once                   Orders Placed This Encounter   Procedures    Inpatient Admission     Standing Status:   Standing     Number of Occurrences:   1     Order Specific Question:   Admitting Physician     Answer:   Puneet Lui     Order Specific Question:   Level of Care     Answer:   Level 2 Stepdown / HOT [14]     Order Specific Question:   Estimated length of stay     Answer:   More than 2 Midnights     Order Specific Question:   Certification     Answer:   I certify that inpatient services are medically necessary for this patient for a duration of greater than two midnights  See H&P and MD Progress Notes for additional information about the patient's course of treatment  ED Arrival Information     Expected Arrival Acuity Means of Arrival Escorted By Service Admission Type    - 9/26/2019 15:06 Emergent Ambulance [facility]    Arrival Complaint    sob        Chief Complaint   Patient presents with    Shortness of Breath     Pt reports to the ED with SOB  Pt was given 2 duo nebs per EMS, 125 mg solumedrol  Assessment/Plan:  80 y o  Male presents to ED by EMS admitted as Inpatient due to Acute on Chronic CHF exacerbation  He shares worsening shortness of breath in the past days with difficulty sleeping/lying down  Family members share patient was volume overloaded-10 lb increase of baseline of 175 LB, seen by PCP yesterday who advised double Lasix- he took 40 mg yesterday  Upon speaking to son on phone-he was short of breath & son called EMS  EMS gave 2 Duo nebs & 125 mg IV Solu Medrol  On arrival to ED saturation=66 in room air  Imaging reveals Pleural effusion with mild increase since day prior  In ED, he was placed on BiPAP   Exam reveals + JVD,  pitting edema in lower extremities with rales in lungs with tachypnea & respiratory distress  Continue with IV Lasix, BID  Monitor Oxygen saturations  Last GRJX=0/43/5405 normal systolic OGJPXTJK/QV=38% & grade 1 diastolic dysfunction-Consult cardiology  Follow renal function  9/27 Cardiology note  Principal Problem:    Acute on chronic heart failure with preserved ejection fraction (HCC)  Active Problems:    Essential hypertension    Elevated serum creatinine        Assessment:/Plan     1  Acute hypoxic respiratory failure- secondary to bilateral pleural effusions/diastolic heart failure  Patient was profoundly hypoxic with pulse ox of 66% on room air  BiPAP removed now on nasal cannula  Symptomatically improved     2  Acute on chronic diastolic heart failure  Bilateral pleural effusions  40 IV lasix BID- symptomatically improving, but not much diuresis  Documented output 850, may need to increase to 40 t i d  Echocardiogram 03/2019 EF 65% with grade 1 diastolic dysfunction  There is mild mild RV dilatation with mildly reduced systolic function    BMP stable     ED Triage Vitals   Temperature Pulse Respirations Blood Pressure SpO2   09/26/19 1516 09/26/19 1509 09/26/19 1512 09/26/19 1511 09/26/19 1509   98 7 °F (37 1 °C) 82 20 128/64 (!) 66 %      Temp Source Heart Rate Source Patient Position - Orthostatic VS BP Location FiO2 (%)   09/26/19 1508 09/26/19 1511 09/26/19 1900 09/26/19 1900 09/26/19 1524   Oral Monitor Sitting Right arm 60      Pain Score       09/26/19 1900       No Pain        Wt Readings from Last 1 Encounters:   09/26/19 81 6 kg (179 lb 14 3 oz)     Additional Vital Signs:   09/27/19 0700  97 7 °F (36 5 °C)  76  20  132/63  91  95 %  Other (comment)   Lying   O2 Device: bipap at 09/27/19 0700   09/27/19 0327  98 7 °F (37 1 °C)  70  17  135/61  88  98 %  Other (comment)   Lying   O2 Device: bipap at 09/27/19 0327   09/27/19 0316            98 %  Other (comment)      O2 Device: bipap at 09/27/19 0316   09/26/19 2319            96 %  Other (comment)      O2 Device: bipap at 09/26/19 2319   09/26/19 2300  98 1 °F (36 7 °C)  71  15  113/55  79  95 %  Other (comment)   Lying   O2 Device: bipap at 09/26/19 2300   09/26/19 2029            95 %       09/26/19 2008            96 %       09/26/19 1949    81  22      91 %  Other (comment)      O2 Device: BIPAP at 09/26/19 1949   09/26/19 1900  98 °F (36 7 °C)  74  22  115/55  79  93 %  Nasal cannula  Sitting   09/26/19 1830    82    124/69    91 %  Nasal cannula     09/26/19 1815    78        91 %       09/26/19 1808    79  20  131/62    92 %  Nasal cannula     09/26/19 1803            92 %  Nasal cannula     09/26/19 1700    72    136/63    94 %       09/26/19 1600    74    121/62    93 %       09/26/19 1524            92 %  Other (comment)      O2 Device: BIPAP at 09/26/19 1524   09/26/19 1517            95 %   Non-rebreather mask     SpO2: RESPIRATORY AND PROVIDER @ BEDSIDE at 09/26/19 1517   09/26/19 1516  98 7 °F (37 1 °C)                 09/26/19 1512      20             09/26/19 1511    85    128/64    86 %Abnormal   Non-rebreather mask     09/26/19 1509    82        66 %Abnormal   None (Room air)        Weights (last 14 days)     Date/Time  Weight  Weight Method   09/26/19 1926  81 6 kg (179 lb 14 3 oz)  Bed scale   09/26/19 1519  84 1 kg (185 lb 6 5 oz)  Bed scale       Pertinent Labs/Diagnostic Test Results:   9/26/2019 CXR- bilateral pleural effusions-increased in size since yesterday  9/25 cxr: Bilateral effusions are noted  Bibasilar coarse linear changes, consistent with compressive atelectasis or small infiltrates     9/26 normal sinus rhythm-QRS axis-indeterminate    Results from last 7 days   Lab Units 09/27/19  0555 09/26/19  1526 09/25/19  1406   WBC Thousand/uL 2 23* 4 83 5 21   HEMOGLOBIN g/dL 14 3 15 0 15 0   HEMATOCRIT % 43 6 46 4 46 7   PLATELETS Thousands/uL 126* 147* 173   NEUTROS ABS Thousands/µL 1 95 4  17 4 30         Results from last 7 days   Lab Units 09/27/19  0555 09/26/19  1526 09/25/19  1406   SODIUM mmol/L 137 136 137   POTASSIUM mmol/L 4 2 4 2 4 2   CHLORIDE mmol/L 98* 96* 97*   CO2 mmol/L 35* 34* 33*   ANION GAP mmol/L 4 6 7   BUN mg/dL 21 21 19   CREATININE mg/dL 0 81 1 11 0 94   EGFR ml/min/1 73sq m 78 58 71   CALCIUM mg/dL 8 2* 8 5 8 9   MAGNESIUM mg/dL 1 9 2 0  --      Results from last 7 days   Lab Units 09/26/19  1526 09/25/19  1406   AST U/L 17 18   ALT U/L 11* 15   ALK PHOS U/L 67 67   TOTAL PROTEIN g/dL 6 9 7 1   ALBUMIN g/dL 3 9 4 5   TOTAL BILIRUBIN mg/dL 0 70 0 88         Results from last 7 days   Lab Units 09/27/19  0555 09/26/19  1526   GLUCOSE RANDOM mg/dL 128 125       Results from last 7 days   Lab Units 09/26/19  1526   TROPONIN I ng/mL 0 03         Results from last 7 days   Lab Units 09/26/19  1526   PROTIME seconds 14 1   INR  1 15     Results from last 7 days   Lab Units 09/25/19  1406   TSH 3RD GENERATON uIU/mL 2 870                     Results from last 7 days   Lab Units 09/26/19  1526 09/25/19  1406   NT-PRO BNP pg/mL 866* 320         ED Treatment:   Medication Administration from 09/26/2019 1506 to 09/26/2019 1924       Date/Time Order Dose Route Action Comments     09/26/2019 1518 methylPREDNISolone sodium succinate (FOR EMS ONLY) (Solu-MEDROL) 125 MG injection 125 mg 0 mg Does not apply Given to EMS      09/26/2019 1518 albuterol (FOR EMS ONLY) (2 5 mg/3 mL) 0 083 % inhalation solution 5 mg 0 mg Does not apply Given to EMS      09/26/2019 1518 ipratropium-albuterol (FOR EMS ONLY) (DUO-NEB) 0 5-2 5 mg/3 mL inhalation solution 6 mL 0 mL Does not apply Given to EMS      09/26/2019 1605 furosemide (LASIX) injection 40 mg 40 mg Intravenous Given         Past Medical History:   Diagnosis Date    Allergic rhinitis     Hypercholesteremia     Hypertension     Pneumonia      Present on Admission:   Acute on chronic heart failure with preserved ejection fraction ([address] )   Essential hypertension   Elevated serum creatinine    Admitting Diagnosis: SOB (shortness of breath) [R06 02]  Pleural effusion [J90]  Acute on chronic heart failure with preserved ejection fraction (HCC) [I50 33]  Age/Sex: 80 y o  male  Admission Orders:    Current Facility-Administered Medications:  albuterol 2 5 mg Nebulization Q6H PRN   amLODIPine 10 mg Oral Daily   brimonidine 1 drop Both Eyes Q8H [facility]   enoxaparin 40 mg Subcutaneous Daily   furosemide 40 mg Intravenous BID (diuretic)   levothyroxine 50 mcg Oral Daily   montelukast 10 mg Oral Daily   ondansetron 4 mg Intravenous Q6H PRN   pravastatin 40 mg Oral Daily With Dinner   terazosin 5 mg Oral HS   timolol 1 drop Both Eyes BID     IP CONSULT TO CARDIOLOGY  Cardiopulmonary monitoring/continuous pulse oximetry  BiPAP  Daily wt  I&O  scd  Network Utilization Review Department  Phone: [phone]; Fax [phone]  [email or web address]  ATTENTION: Please call with any questions or concerns to [phone]  and carefully listen to the prompts so that you are directed to the right person  Send all requests for admission clinical reviews, approved or denied determinations and any other requests to fax [phone]   All voicemails are confidential

## 2019-09-27 NOTE — PROGRESS NOTES
Progress Note - Delfina Thomson 3/24/1929, 80 y o  male MRN: [id number]    Unit/Bed#:  Encounter: [id number]    Primary Care Provider: Avelino Clark MD   Date and time admitted to hospital: 9/26/2019  3:06 PM        * Acute on chronic heart failure with preserved ejection fraction [facility])  Assessment & Plan  Wt Readings from Last 3 Encounters:   09/26/19 81 6 kg (179 lb 14 3 oz)   06/06/19 79 6 kg (175 lb 8 oz)   04/02/19 77 1 kg (170 lb)   Patient has a past medical history of heart failure with preserved ejection fraction  Last echocardiogram was 03/18/2019 which showed normal systolic function, ejection fraction of 65% and grade 1 diastolic dysfunction  Patient today presents volume overloaded  On physical exam JVD and bilateral lower extremity pitting edema was noted  CXR showed bilateral pleural effusions which have mildly increased since yesterday  ProBNP was elevated at 866  He was given 40 mg of IV Lasix in the ED  Home medications include Lasix 20 mg and Aldactone 25 mg   - continue diuresis with IV Lasix 40 mg b i d   - Aldactone was discontinued  - monitor ins and outs  Since admission patient is -1775  - baseline weight 175 lb  Daily weights on standing scale  -- appreciate Cardiology consult   - cleared to continue aldactone 25 mg    - No repeat echo necessary    - Volume status improving     Acute respiratory failure with hypoxia and hypercapnia (HCC)  Assessment & Plan  Patient recently was diagnosed with hypoxic respiratory failure  He was admitted in March to [facility] for dyspnea, altered mental status, lethargy and he was found to be in combined hypercarbic and hypoxic respiratory failure  At the time he was treated with BiPAP, nebulizer, steroids and eventually weaned to 3 L nasal cannula  Upon arrival to the ED today his oxygen saturation was found to be 66% on room air    Patient was placed on a BiPAP mask which was then removed as he was maintaining saturation in normal air   - continue diuresis with Lasix IV 40 mg b i d   - monitor oxygen saturation  - respiratory protocol ordered  - 09/27: Patient has been having difficulties maintaining hydration overnight  With little movement patient desaturated to 82% he was placed on a non-rebreather  Saturation improved to 87%  Continues BiPAP was ordered  At bedside patient on 6L nasal canula  Essential hypertension  Assessment & Plan  Patient with history of hypertension  Blood pressure medication managed by cardiologist   Home medications include terazosin 5 mg, Aldactone 25 mg, amlodipine 10 mg  Blood pressure stable in the ED, 120/69  - continue terazosin 5 mg  - continue amlodipine 10 mg  - discontinued Aldactone as patient will be diuresed with Lasix 40 mg b i d   - monitor blood pressure    Chronic kidney disease, stage 2 (mild)  Assessment & Plan  Patients creatinine 1 1 from baseline 0 7-0 9  - Trend in case of possible ANILA   - Patient thought to have urinary retention however, bladder scan results were negative  - Patient currently voiding independently  Chronic kidney disease, stage 2 in the setting of HTN evidenced by Cr 1 11 > 0 81 with CR and GFR listed below, treated with careful diuresis, lab monitoring and renal assessments  7/1/19 Cr 0 89 GFR 75 5/2/19 Cr 0 93 GFR 72 3/19/19 Cr 0 75 GFR 81 CKD 2 GFR 60-89        Acute metabolic encephalopathy  Assessment & Plan  Metabolic encephalopathy in the setting of acute respiratory failure with hypoxia evidenced confusion at home for past few days   - Patient stable at baseline  - Continue oxygen, neuro checks and nursing safety measures        Pharmacologic: Enoxaparin (Lovenox)  Mechanical VTE Prophylaxis in Place: Yes    Discussions with Specialists or Other Care Team Provider: Cardiology    Education and Discussions with Family / Patient: PPlan of care     Current Length of Stay: 1 day(s)    Current Patient Status: Inpatient     Discharge Plan / Estimated Discharge Date: ,      Code Status: Level 3 - DNAR and DNI      Subjective:     Patient today is doing well  He feels as he is at his baseline  He is voiding urine independently  No complaints of SOB although he is using accessory muscles to breathe  Objective:     Vitals:   Temp (24hrs), Av 1 °F (36 7 °C), Min:97 4 °F (36 3 °C), Max:98 7 °F (37 1 °C)    Temp:  [97 4 °F (36 3 °C)-98 7 °F (37 1 °C)] 97 4 °F (36 3 °C)  HR:  [70-85] 80  Resp:  [15-22] 20  BP: (113-151)/(55-70) 151/70  SpO2:  [66 %-98 %] 95 %  Body mass index is 28 18 kg/m²  Input and Output Summary (last 24 hours): Intake/Output Summary (Last 24 hours) at 2019 1446  Last data filed at 2019 1401  Gross per 24 hour   Intake    Output 1775 ml   Net -1775 ml       Physical Exam:     Physical Exam   Constitutional: He appears well-developed and well-nourished  HENT:   Head: Normocephalic and atraumatic  Cardiovascular: Normal rate, regular rhythm and normal heart sounds  No murmur heard  Pulmonary/Chest: Effort normal  He has wheezes  Abdominal: Soft  Bowel sounds are normal        Additional Data:     Labs:    Results from last 7 days   Lab Units 19  0555   WBC Thousand/uL 2 23*   HEMOGLOBIN g/dL 14 3   HEMATOCRIT % 43 6   PLATELETS Thousands/uL 126*   NEUTROS PCT % 88*   LYMPHS PCT % 7*   MONOS PCT % 5   EOS PCT % 0     Results from last 7 days   Lab Units 19  0555 19  1526   POTASSIUM mmol/L 4 2 4 2   CHLORIDE mmol/L 98* 96*   CO2 mmol/L 35* 34*   BUN mg/dL 21 21   CREATININE mg/dL 0 81 1 11   CALCIUM mg/dL 8 2* 8 5   ALK PHOS U/L  --  67   ALT U/L  --  11*   AST U/L  --  17     Results from last 7 days   Lab Units 19  1526   INR  1 15       * I Have Reviewed All Lab Data Listed Above  * Additional Pertinent Lab Tests Reviewed:  All [place] Admission Reviewed        Last 24 Hours Medication List:     Current Facility-Administered Medications:  albuterol 2 5 mg Nebulization Q6H PRN Edgar Conner MD   amLODIPine 10 mg Oral Daily Isidoro Esposito MD   brimonidine 1 drop Both Eyes Q8H [facility] [place] Isidoro Esposito MD   enoxaparin 40 mg Subcutaneous Daily Isidoro Esposito, MD   furosemide 40 mg Intravenous TID (diuretic) LOUISE Galan   levothyroxine 50 mcg Oral Daily Isidoro Esposito, MD   montelukast 10 mg Oral Daily Isidoro Esposito MD   ondansetron 4 mg Intravenous Q6H PRN Isidoro Esposito MD   pravastatin 40 mg Oral Daily With Gumaro Tomlinson MD   [START ON 9/28/2019] spironolactone 25 mg Oral Daily Tiffany Bishop DO   terazosin 5 mg Oral HS Isidoro Esposito MD   timolol 1 drop Both Eyes BID Isidoro Esposito MD        Today, Patient Was Seen By: Dory Hicks MD    ** Please Note: This note has been constructed using a voice recognition system   **

## 2019-09-27 NOTE — CONSULTS
Consultation - Cardiology   Maura Layton 80 y o  male MRN: [id number]  Unit/Bed#:  Encounter: [id number]    Assessment/Plan     Principal Problem:    Acute on chronic heart failure with preserved ejection fraction (HCC)  Active Problems:    Essential hypertension    Elevated serum creatinine      Assessment:/Plan    1  Acute hypoxic respiratory failure- secondary to bilateral pleural effusions/diastolic heart failure  Patient was profoundly hypoxic with pulse ox of 66% on room air  BiPAP removed now on nasal cannula  Symptomatically improved    2  Acute on chronic diastolic heart failure  Bilateral pleural effusions  40 IV lasix BID- symptomatically improving, but not much diuresis  Documented output 850, may need to increase to 40 t i d  Echocardiogram 03/2019 EF 65% with grade 1 diastolic dysfunction  There is mild mild RV dilatation with mildly reduced systolic function  BMP stable    3  HTN- stable, monitor  Norvasc 10, aldactone ( being held)    4  HLD- statin    History of Present Illness   Physician Requesting Consult: Aaron Barnes MD  Reason for Consult / Principal Problem: heart failure    HPI: Maura Layton is a 80y o  year old male with chronic diastolic heart failure, HTN, MGUS , HLD who presents with dyspnea , edema , weight gain and profound hypoxia  Pulse ox 66%, placed on bipap overnight  He had received 2 duo nebs and 125 Solu-Medrol in the ambulance  He noted worsening sob over past few days with PND, weight gain and LE edema  His PCP on Wednesday increased his lasix from 20 to 40 daily  This am he is off bipap and symptomatically improved  He lives home alone with help from his family  He does own cooking and eats at the diner daily  He often eats hot dogs and prepared food from [employer]  Says been watching his pulse ox over last couple of days and his oxygen level was dropping into the 70s   He said the visiting nurse became concerned and called his PCP  ( Dr Kayla Dillard) which prompted the increased Lasix dose  He had an outpatient chest x-ray showing bilateral pleural effusions  ProBNP at that time was 320 and on presentation 866  He lives along, still drives  Local family checks on him frequently  He was seen by Dr Chemo Merchant  06/2019  At that point he was well compensated on Lasix 20 daily  He had been admitted March 2019 for acute combined hypercarbic and  hypoxic respiratory failure  Was treated with BiPAP nebulizer steroids and Lasix  Echocardiogram showed preserved LV function with grade 1 diastolic dysfunction  He has had follow-ups with pulmonary since that time who discontinued his oxygen  Inpatient consult to Cardiology  Consult performed by: LOUISE Rizo  Consult ordered by: Alfred Riley MD          Review of Systems   Constitutional: Positive for activity change, fever and unexpected weight change  HENT: Negative  Eyes: Negative  Respiratory: Positive for cough and shortness of breath  Cardiovascular: Positive for leg swelling  Negative for chest pain and palpitations  Gastrointestinal: Positive for abdominal distention  Genitourinary: Negative  Musculoskeletal: Negative  Skin: Negative  Neurological: Negative  Hematological: Bruises/bleeds easily  Psychiatric/Behavioral: Negative          Historical Information   Past Medical History:   Diagnosis Date    Allergic rhinitis     Hypercholesteremia     Hypertension     Pneumonia      Past Surgical History:   Procedure Laterality Date    LAPAROSCOPIC COLON RESECTION      TOTAL HIP ARTHROPLASTY       Social History     Substance and Sexual Activity   Alcohol Use Yes    Frequency: Never    Comment: Socially     Social History     Substance and Sexual Activity   Drug Use Never     Social History     Tobacco Use   Smoking Status Former Smoker    Packs/day: 0 20    Years: 10 00    Pack years: 2 00    Types: Cigarettes   Smokeless Tobacco Never Used     Family History: History reviewed  No pertinent family history  Meds/Allergies   current meds:   Current Facility-Administered Medications   Medication Dose Route Frequency    albuterol inhalation solution 2 5 mg  2 5 mg Nebulization Q6H PRN    amLODIPine (NORVASC) tablet 10 mg  10 mg Oral Daily    brimonidine (ALPHAGAN P) 0 15 % ophthalmic solution 1 drop  1 drop Both Eyes Q8H [address]    enoxaparin (LOVENOX) subcutaneous injection 40 mg  40 mg Subcutaneous Daily    furosemide (LASIX) injection 40 mg  40 mg Intravenous BID (diuretic)    levothyroxine tablet 50 mcg  50 mcg Oral Daily    montelukast (SINGULAIR) tablet 10 mg  10 mg Oral Daily    ondansetron (ZOFRAN) injection 4 mg  4 mg Intravenous Q6H PRN    pravastatin (PRAVACHOL) tablet 40 mg  40 mg Oral Daily With Dinner    terazosin (HYTRIN) capsule 5 mg  5 mg Oral HS    timolol (TIMOPTIC) 0 5 % ophthalmic solution 1 drop  1 drop Both Eyes BID    and PTA meds:    Medications Prior to Admission   Medication    amLODIPine (NORVASC) 10 mg tablet    Blood Pressure Monitoring (B-D ASSURE BPM/AUTO ARM CUFF) MISC    brimonidine-timolol (COMBIGAN) 0 2-0 5 %    furosemide (LASIX) 20 mg tablet    latanoprost (XALATAN) 0 005 % ophthalmic solution    levothyroxine 50 mcg tablet    montelukast (SINGULAIR) 10 mg tablet    potassium chloride (K-DUR,KLOR-CON) 20 mEq tablet    RABEprazole (ACIPHEX) 20 MG tablet    simvastatin (ZOCOR) 20 mg tablet    spironolactone (ALDACTONE) 25 mg tablet    terazosin (HYTRIN) 5 mg capsule     No Known Allergies    Objective   Vitals: Blood pressure 132/63, pulse 76, temperature 97 7 °F (36 5 °C), temperature source Axillary, resp  rate 20, weight 81 6 kg (179 lb 14 3 oz), SpO2 95 %    Orthostatic Blood Pressures      Most Recent Value   Blood Pressure  132/63 filed at 09/27/2019 0700   Patient Position - Orthostatic VS  Lying filed at 09/27/2019 0700            Intake/Output Summary (Last 24 hours) at 9/27/2019 0540  Last data filed at 9/27/2019 0801  Gross per 24 hour   Intake    Output 850 ml   Net -850 ml       Invasive Devices     Peripheral Intravenous Line            Peripheral IV 09/26/19 Left Antecubital less than 1 day                Physical Exam: /63 (BP Location: Right arm)   Pulse 76   Temp 97 7 °F (36 5 °C) (Axillary)   Resp 20   Wt 81 6 kg (179 lb 14 3 oz)   SpO2 95%   BMI 28 18 kg/m²   General Appearance:    Alert, cooperative, no distress, appears stated age   Head:    Normocephalic, no scleral icterus   Eyes:    PERRL   Nose:   Nares normal, septum midline, mucosa normal, no drainage    Throat:   Lips, mucosa, and tongue normal   Neck:   Supple, symmetrical, trachea midline     + JVD   Back:     Symmetric   Lungs:     Decreased bases to auscultation bilaterally, respirations unlabored   Chest Wall:    No tenderness or deformity    Heart:    Regular rate and rhythm, S1 and S2 normal, no murmur, rub   or gallop   Abdomen:     Soft, firm      Extremities:   Extremities normal, atraumatic, no cyanosis , 1+edema   Pulses:   2+ and symmetric all extremities   Skin:   Skin warm   Neurologic:   Alert and oriented to person place and time   No focal deficits       Lab Results:   Recent Results (from the past 72 hour(s))   Comprehensive metabolic panel    Collection Time: 09/25/19  2:06 PM   Result Value Ref Range    Sodium 137 136 - 145 mmol/L    Potassium 4 2 3 5 - 5 3 mmol/L    Chloride 97 (L) 100 - 108 mmol/L    CO2 33 (H) 21 - 32 mmol/L    ANION GAP 7 4 - 13 mmol/L    BUN 19 5 - 25 mg/dL    Creatinine 0 94 0 60 - 1 30 mg/dL    Glucose, Fasting 102 (H) 65 - 99 mg/dL    Calcium 8 9 8 3 - 10 1 mg/dL    AST 18 5 - 45 U/L    ALT 15 12 - 78 U/L    Alkaline Phosphatase 67 46 - 116 U/L    Total Protein 7 1 6 4 - 8 2 g/dL    Albumin 4 5 3 5 - 5 0 g/dL    Total Bilirubin 0 88 0 20 - 1 00 mg/dL    eGFR 71 ml/min/1 73sq m   Lipid panel    Collection Time: 09/25/19  2:06 PM   Result Value Ref Range    Cholesterol 142 50 - 200 mg/dL Triglycerides 37 <=150 mg/dL    HDL, Direct 80 (H) 40 - 60 mg/dL    LDL Calculated 55 0 - 100 mg/dL    Non-HDL-Chol (CHOL-HDL) 62 mg/dl   CBC and differential    Collection Time: 09/25/19  2:06 PM   Result Value Ref Range    WBC 5 21 4 31 - 10 16 Thousand/uL    RBC 4 83 3 88 - 5 62 Million/uL    Hemoglobin 15 0 12 0 - 17 0 g/dL    Hematocrit 46 7 36 5 - 49 3 %    MCV 97 82 - 98 fL    MCH 31 1 26 8 - 34 3 pg    MCHC 32 1 31 4 - 37 4 g/dL    RDW 13 2 11 6 - 15 1 %    MPV 11 1 8 9 - 12 7 fL    Platelets 170 532 - 625 Thousands/uL    nRBC 0 /100 WBCs    Neutrophils Relative 82 (H) 43 - 75 %    Immat GRANS % 1 0 - 2 %    Lymphocytes Relative 7 (L) 14 - 44 %    Monocytes Relative 8 4 - 12 %    Eosinophils Relative 1 0 - 6 %    Basophils Relative 1 0 - 1 %    Neutrophils Absolute 4 30 1 85 - 7 62 Thousands/µL    Immature Grans Absolute 0 04 0 00 - 0 20 Thousand/uL    Lymphocytes Absolute 0 37 (L) 0 60 - 4 47 Thousands/µL    Monocytes Absolute 0 44 0 17 - 1 22 Thousand/µL    Eosinophils Absolute 0 03 0 00 - 0 61 Thousand/µL    Basophils Absolute 0 03 0 00 - 0 10 Thousands/µL   TSH, 3rd generation with Free T4 reflex    Collection Time: 09/25/19  2:06 PM   Result Value Ref Range    TSH 3RD GENERATON 2 870 0 358 - 3 740 uIU/mL   NT-BNP PRO    Collection Time: 09/25/19  2:06 PM   Result Value Ref Range    NT-proBNP 320 <450 pg/mL   CBC and differential    Collection Time: 09/26/19  3:26 PM   Result Value Ref Range    WBC 4 83 4 31 - 10 16 Thousand/uL    RBC 4 75 3 88 - 5 62 Million/uL    Hemoglobin 15 0 12 0 - 17 0 g/dL    Hematocrit 46 4 36 5 - 49 3 %    MCV 98 82 - 98 fL    MCH 31 6 26 8 - 34 3 pg    MCHC 32 3 31 4 - 37 4 g/dL    RDW 13 3 11 6 - 15 1 %    MPV 10 8 8 9 - 12 7 fL    Platelets 049 (L) 562 - 390 Thousands/uL    nRBC 0 /100 WBCs    Neutrophils Relative 86 (H) 43 - 75 %    Immat GRANS % 1 0 - 2 %    Lymphocytes Relative 6 (L) 14 - 44 %    Monocytes Relative 7 4 - 12 %    Eosinophils Relative 0 0 - 6 % Basophils Relative 0 0 - 1 %    Neutrophils Absolute 4 17 1 85 - 7 62 Thousands/µL    Immature Grans Absolute 0 03 0 00 - 0 20 Thousand/uL    Lymphocytes Absolute 0 27 (L) 0 60 - 4 47 Thousands/µL    Monocytes Absolute 0 33 0 17 - 1 22 Thousand/µL    Eosinophils Absolute 0 01 0 00 - 0 61 Thousand/µL    Basophils Absolute 0 02 0 00 - 0 10 Thousands/µL   Comprehensive metabolic panel    Collection Time: 09/26/19  3:26 PM   Result Value Ref Range    Sodium 136 136 - 145 mmol/L    Potassium 4 2 3 5 - 5 3 mmol/L    Chloride 96 (L) 100 - 108 mmol/L    CO2 34 (H) 21 - 32 mmol/L    ANION GAP 6 4 - 13 mmol/L    BUN 21 5 - 25 mg/dL    Creatinine 1 11 0 60 - 1 30 mg/dL    Glucose 125 65 - 140 mg/dL    Calcium 8 5 8 3 - 10 1 mg/dL    AST 17 5 - 45 U/L    ALT 11 (L) 12 - 78 U/L    Alkaline Phosphatase 67 46 - 116 U/L    Total Protein 6 9 6 4 - 8 2 g/dL    Albumin 3 9 3 5 - 5 0 g/dL    Total Bilirubin 0 70 0 20 - 1 00 mg/dL    eGFR 58 ml/min/1 73sq m   Magnesium    Collection Time: 09/26/19  3:26 PM   Result Value Ref Range    Magnesium 2 0 1 6 - 2 6 mg/dL   Troponin I    Collection Time: 09/26/19  3:26 PM   Result Value Ref Range    Troponin I 0 03 <=0 04 ng/mL   B-type natriuretic peptide    Collection Time: 09/26/19  3:26 PM   Result Value Ref Range    NT-proBNP 866 (H) <450 pg/mL   Protime-INR    Collection Time: 09/26/19  3:26 PM   Result Value Ref Range    Protime 14 1 11 6 - 14 5 seconds    INR 1 15 0 84 - 4 24   Basic metabolic panel    Collection Time: 09/27/19  5:55 AM   Result Value Ref Range    Sodium 137 136 - 145 mmol/L    Potassium 4 2 3 5 - 5 3 mmol/L    Chloride 98 (L) 100 - 108 mmol/L    CO2 35 (H) 21 - 32 mmol/L    ANION GAP 4 4 - 13 mmol/L    BUN 21 5 - 25 mg/dL    Creatinine 0 81 0 60 - 1 30 mg/dL    Glucose 128 65 - 140 mg/dL    Calcium 8 2 (L) 8 3 - 10 1 mg/dL    eGFR 78 ml/min/1 73sq m   Magnesium    Collection Time: 09/27/19  5:55 AM   Result Value Ref Range    Magnesium 1 9 1 6 - 2 6 mg/dL   CBC and differential    Collection Time: 19  5:55 AM   Result Value Ref Range    WBC 2 23 (L) 4 31 - 10 16 Thousand/uL    RBC 4 55 3 88 - 5 62 Million/uL    Hemoglobin 14 3 12 0 - 17 0 g/dL    Hematocrit 43 6 36 5 - 49 3 %    MCV 96 82 - 98 fL    MCH 31 4 26 8 - 34 3 pg    MCHC 32 8 31 4 - 37 4 g/dL    RDW 13 4 11 6 - 15 1 %    MPV 10 5 8 9 - 12 7 fL    Platelets 864 (L) 475 - 390 Thousands/uL    nRBC 0 /100 WBCs    Neutrophils Relative 88 (H) 43 - 75 %    Immat GRANS % 0 0 - 2 %    Lymphocytes Relative 7 (L) 14 - 44 %    Monocytes Relative 5 4 - 12 %    Eosinophils Relative 0 0 - 6 %    Basophils Relative 0 0 - 1 %    Neutrophils Absolute 1 95 1 85 - 7 62 Thousands/µL    Immature Grans Absolute 0 01 0 00 - 0 20 Thousand/uL    Lymphocytes Absolute 0 16 (L) 0 60 - 4 47 Thousands/µL    Monocytes Absolute 0 11 (L) 0 17 - 1 22 Thousand/µL    Eosinophils Absolute 0 00 0 00 - 0 61 Thousand/µL    Basophils Absolute 0 00 0 00 - 0 10 Thousands/µL       [address]  [phone]     Transthoracic Echocardiogram  2D, M-mode, Doppler, and Color Doppler     Study date:  18-Mar-2019     Patient: Servando Cruz  MR number: [id number]  Account number: [de-identified]  : 24-Mar-1929  Age: 80 years  Gender: Male  Status: Inpatient  Location: Bedside  Height: 67 in  Weight: 185 7 lb  BP: 151/ 68 mmHg     Indications: Assess left ventricular function      Diagnoses: I50 9 - Heart failure, unspecified     Sonographer:  Aletta Hamman, RDCS  Primary Physician:  Billie Shah MD  Referring Physician:  Vincent Greene MD  Group:  [facility]  Interpreting Physician:  Nikunj Arroyo MD     SUMMARY     LEFT VENTRICLE:  Systolic function was normal  Ejection fraction was estimated to be 65 %  Although no diagnostic regional wall motion abnormality was identified, this possibility cannot be completely excluded on the basis of this study    Doppler parameters were consistent with abnormal left ventricular relaxation (grade 1 diastolic dysfunction)      RIGHT VENTRICLE:  The ventricle was dilated  Systolic function was mildly reduced      RIGHT ATRIUM:  The atrium was dilated      PROCEDURE: The procedure was performed at the bedside  This was a routine study  The transthoracic approach was used  The study included complete 2D imaging, M-mode, complete spectral Doppler, and color Doppler  The heart rate was 70 bpm,  at the start of the study  Images were obtained from the parasternal, apical, subcostal, and suprasternal notch acoustic windows  Image quality was adequate      LEFT VENTRICLE: Size was normal  Systolic function was normal  Ejection fraction was estimated to be 65 %  Although no diagnostic regional wall motion abnormality was identified, this possibility cannot be completely excluded on the basis  of this study  DOPPLER: There was an increased relative contribution of atrial contraction to ventricular filling  Doppler parameters were consistent with abnormal left ventricular relaxation (grade 1 diastolic dysfunction)      RIGHT VENTRICLE: The ventricle was dilated  Systolic function was mildly reduced      LEFT ATRIUM: Size was normal      RIGHT ATRIUM: The atrium was dilated      MITRAL VALVE: Valve structure was normal  There was normal leaflet separation  DOPPLER: There was no evidence for stenosis  There was no significant regurgitation      AORTIC VALVE: The valve was not well visualized  DOPPLER: There was no evidence for stenosis  There was no significant regurgitation      TRICUSPID VALVE: The valve structure was normal  There was normal leaflet separation  DOPPLER: There was no evidence for stenosis  There was no significant regurgitation      PULMONIC VALVE: Leaflets exhibited normal thickness, no calcification, and normal cuspal separation  DOPPLER: The transpulmonic velocity was within the normal range   There was no regurgitation      PERICARDIUM: There was no pericardial effusion  The pericardium was normal in appearance      AORTA: The root exhibited normal size  Not well visualized      SYSTEM MEASUREMENT TABLES    Imaging: I have personally reviewed pertinent reports  EKG: NSR  VTE Prophylaxis: Enoxaparin (Lovenox)    Code Status: Level 3 - DNAR and DNI  Advance Directive and Living Will:      Power of :    POLST:      Counseling / Coordination of Care  Total floor / unit time spent today 45 minutes  Greater than 50% of total time was spent with the patient and / or family counseling and / or coordination of care

## 2019-09-27 NOTE — ASSESSMENT & PLAN NOTE
Metabolic encephalopathy in the setting of acute respiratory failure with hypoxia evidenced confusion at home for past few days   - Patient stable at baseline  - Continue oxygen, neuro checks and nursing safety measures

## 2019-09-28 ENCOUNTER — APPOINTMENT (INPATIENT)
Dept: ULTRASOUND IMAGING | Facility: HOSPITAL | Age: 84
DRG: 291 | End: 2019-09-28
Payer: COMMERCIAL

## 2019-09-28 LAB
ALBUMIN SERPL BCP-MCNC: 3.4 G/DL (ref 3.5–5)
ALP SERPL-CCNC: 59 U/L (ref 46–116)
ALT SERPL W P-5'-P-CCNC: 13 U/L (ref 12–78)
ANION GAP SERPL CALCULATED.3IONS-SCNC: -2 MMOL/L (ref 4–13)
AST SERPL W P-5'-P-CCNC: 20 U/L (ref 5–45)
ATRIAL RATE: 416 BPM
BASOPHILS # BLD AUTO: 0 THOUSANDS/ΜL (ref 0–0.1)
BASOPHILS NFR BLD AUTO: 0 % (ref 0–1)
BILIRUB SERPL-MCNC: 0.6 MG/DL (ref 0.2–1)
BUN SERPL-MCNC: 30 MG/DL (ref 5–25)
CALCIUM SERPL-MCNC: 8.1 MG/DL (ref 8.3–10.1)
CHLORIDE SERPL-SCNC: 95 MMOL/L (ref 100–108)
CO2 SERPL-SCNC: 42 MMOL/L (ref 21–32)
CREAT SERPL-MCNC: 0.89 MG/DL (ref 0.6–1.3)
EOSINOPHIL # BLD AUTO: 0.01 THOUSAND/ΜL (ref 0–0.61)
EOSINOPHIL NFR BLD AUTO: 0 % (ref 0–6)
ERYTHROCYTE [DISTWIDTH] IN BLOOD BY AUTOMATED COUNT: 13.2 % (ref 11.6–15.1)
GFR SERPL CREATININE-BSD FRML MDRD: 75 ML/MIN/1.73SQ M
GLUCOSE SERPL-MCNC: 90 MG/DL (ref 65–140)
HCT VFR BLD AUTO: 44.8 % (ref 36.5–49.3)
HGB BLD-MCNC: 14.5 G/DL (ref 12–17)
IMM GRANULOCYTES # BLD AUTO: 0.03 THOUSAND/UL (ref 0–0.2)
IMM GRANULOCYTES NFR BLD AUTO: 1 % (ref 0–2)
LYMPHOCYTES # BLD AUTO: 0.36 THOUSANDS/ΜL (ref 0.6–4.47)
LYMPHOCYTES NFR BLD AUTO: 7 % (ref 14–44)
MCH RBC QN AUTO: 31.3 PG (ref 26.8–34.3)
MCHC RBC AUTO-ENTMCNC: 32.4 G/DL (ref 31.4–37.4)
MCV RBC AUTO: 97 FL (ref 82–98)
MONOCYTES # BLD AUTO: 0.5 THOUSAND/ΜL (ref 0.17–1.22)
MONOCYTES NFR BLD AUTO: 9 % (ref 4–12)
NEUTROPHILS # BLD AUTO: 4.57 THOUSANDS/ΜL (ref 1.85–7.62)
NEUTS SEG NFR BLD AUTO: 83 % (ref 43–75)
NRBC BLD AUTO-RTO: 0 /100 WBCS
PLATELET # BLD AUTO: 145 THOUSANDS/UL (ref 149–390)
PMV BLD AUTO: 10.3 FL (ref 8.9–12.7)
POTASSIUM SERPL-SCNC: 4.1 MMOL/L (ref 3.5–5.3)
PROT SERPL-MCNC: 6.1 G/DL (ref 6.4–8.2)
QRS AXIS: -59 DEGREES
QRSD INTERVAL: 72 MS
QT INTERVAL: 350 MS
QTC INTERVAL: 403 MS
RBC # BLD AUTO: 4.64 MILLION/UL (ref 3.88–5.62)
SODIUM SERPL-SCNC: 135 MMOL/L (ref 136–145)
T WAVE AXIS: 77 DEGREES
VENTRICULAR RATE: 80 BPM
WBC # BLD AUTO: 5.47 THOUSAND/UL (ref 4.31–10.16)

## 2019-09-28 PROCEDURE — 93970 EXTREMITY STUDY: CPT

## 2019-09-28 PROCEDURE — 99232 SBSQ HOSP IP/OBS MODERATE 35: CPT | Performed by: INTERNAL MEDICINE

## 2019-09-28 PROCEDURE — 94760 N-INVAS EAR/PLS OXIMETRY 1: CPT

## 2019-09-28 PROCEDURE — 80053 COMPREHEN METABOLIC PANEL: CPT | Performed by: PSYCHIATRY & NEUROLOGY

## 2019-09-28 PROCEDURE — 93010 ELECTROCARDIOGRAM REPORT: CPT | Performed by: INTERNAL MEDICINE

## 2019-09-28 PROCEDURE — 85025 COMPLETE CBC W/AUTO DIFF WBC: CPT | Performed by: PSYCHIATRY & NEUROLOGY

## 2019-09-28 PROCEDURE — 94660 CPAP INITIATION&MGMT: CPT

## 2019-09-28 RX ADMIN — TERAZOSIN HYDROCHLORIDE 5 MG: 5 CAPSULE ORAL at 21:44

## 2019-09-28 RX ADMIN — BRIMONIDINE TARTRATE 1 DROP: 1.5 SOLUTION OPHTHALMIC at 21:44

## 2019-09-28 RX ADMIN — TIMOLOL MALEATE 1 DROP: 5 SOLUTION/ DROPS OPHTHALMIC at 17:16

## 2019-09-28 RX ADMIN — FUROSEMIDE 40 MG: 10 INJECTION, SOLUTION INTRAMUSCULAR; INTRAVENOUS at 13:17

## 2019-09-28 RX ADMIN — FUROSEMIDE 40 MG: 10 INJECTION, SOLUTION INTRAMUSCULAR; INTRAVENOUS at 17:14

## 2019-09-28 RX ADMIN — TIMOLOL MALEATE 1 DROP: 5 SOLUTION/ DROPS OPHTHALMIC at 09:09

## 2019-09-28 RX ADMIN — BRIMONIDINE TARTRATE 1 DROP: 1.5 SOLUTION OPHTHALMIC at 13:17

## 2019-09-28 RX ADMIN — ENOXAPARIN SODIUM 40 MG: 40 INJECTION SUBCUTANEOUS at 09:09

## 2019-09-28 RX ADMIN — AMLODIPINE BESYLATE 10 MG: 10 TABLET ORAL at 09:09

## 2019-09-28 RX ADMIN — SPIRONOLACTONE 25 MG: 25 TABLET ORAL at 09:09

## 2019-09-28 RX ADMIN — PRAVASTATIN SODIUM 40 MG: 40 TABLET ORAL at 17:15

## 2019-09-28 RX ADMIN — FUROSEMIDE 40 MG: 10 INJECTION, SOLUTION INTRAMUSCULAR; INTRAVENOUS at 06:17

## 2019-09-28 RX ADMIN — LEVOTHYROXINE SODIUM 50 MCG: 50 TABLET ORAL at 06:17

## 2019-09-28 RX ADMIN — MONTELUKAST SODIUM 10 MG: 10 TABLET, FILM COATED ORAL at 09:09

## 2019-09-28 RX ADMIN — BRIMONIDINE TARTRATE 1 DROP: 1.5 SOLUTION OPHTHALMIC at 06:19

## 2019-09-28 NOTE — ASSESSMENT & PLAN NOTE
Wt Readings from Last 3 Encounters:   09/28/19 79 kg (174 lb 2 6 oz)   06/06/19 79 6 kg (175 lb 8 oz)   04/02/19 77 1 kg (170 lb)   Patient has a past medical history of heart failure with preserved ejection fraction  Last echocardiogram was 03/18/2019 which showed normal systolic function, ejection fraction of 65% and grade 1 diastolic dysfunction  Patient today presents volume overloaded  On physical exam JVD and bilateral lower extremity pitting edema was noted    CXR showed bilateral pleural effusions   - continue diuresis with IV Lasix   - On Aldactone  - monitor ins and outs /weight

## 2019-09-28 NOTE — PROGRESS NOTES
Cardiology Progress Note - Tristan Temple 80 y o  male MRN: [id number]    Unit/Bed#:  Encounter: [id number]      Assessment:  1  Acute on chronic diastolic CHF  2  Acute hypoxic respiratory failure secondary to #1  3  Benign essential hypertension  4  Dyslipidemia  5  Hyponatremia     Plan:  1  Volume status improving with negative fluid balance, decreasing weight  2  Continue IV diuretics at current dose   3  Renal function stable   4  Blood pressure controlled   5  Spoke to respiratory - trial off BiPap this AM     Subjective:   Patient seen and examined  No significant events overnight  Objective:     Vitals: Blood pressure 136/65, pulse 73, temperature 97 7 °F (36 5 °C), temperature source Axillary, resp  rate 19, weight 79 kg (174 lb 2 6 oz), SpO2 95 %  , Body mass index is 27 28 kg/m² ,   Orthostatic Blood Pressures      Most Recent Value   Blood Pressure  136/65 filed at 09/28/2019 0310   Patient Position - Orthostatic VS  Lying filed at 09/28/2019 0310            Intake/Output Summary (Last 24 hours) at 9/28/2019 0704  Last data filed at 9/28/2019 0701  Gross per 24 hour   Intake 160 ml   Output 2825 ml   Net -2665 ml         Physical Exam:    GEN: Tristan Temple appears well, alert and oriented x 3, pleasant and cooperative   HEENT: pupils equal, round, and reactive to light; extraocular muscles intact  NECK: supple, no carotid bruits   elevated JVP   HEART: regular rhythm, normal S1 and S2, no murmur  LUNGS: few bibasilar rales   ABDOMEN: normal bowel sounds, soft, no tenderness, no distention  EXTREMITIES: trace edema   NEURO: no focal findings   SKIN: normal without suspicious lesions on exposed skin    Medications:      Current Facility-Administered Medications:     albuterol inhalation solution 2 5 mg, 2 5 mg, Nebulization, Q6H PRN, Toro Moran MD, 2 5 mg at 09/26/19 2005    amLODIPine (NORVASC) tablet 10 mg, 10 mg, Oral, Daily, Janette Tran MD, 10 mg at 09/27/19 7155   brimonidine (ALPHAGAN P) 0 15 % ophthalmic solution 1 drop, 1 drop, Both Eyes, Q8H [address], Any Gaytan MD, 1 drop at 09/28/19 0619    enoxaparin (LOVENOX) subcutaneous injection 40 mg, 40 mg, Subcutaneous, Daily, Any Gaytan MD, 40 mg at 09/27/19 7010    furosemide (LASIX) injection 40 mg, 40 mg, Intravenous, TID (diuretic), LOUISE Chavez, 40 mg at 09/28/19 0783    levothyroxine tablet 50 mcg, 50 mcg, Oral, Daily, Any Gaytan MD, 50 mcg at 09/28/19 0617    montelukast (SINGULAIR) tablet 10 mg, 10 mg, Oral, Daily, Any Gaytan MD, 10 mg at 09/27/19 0825    ondansetron (ZOFRAN) injection 4 mg, 4 mg, Intravenous, Q6H PRN, Any Gaytan MD    pravastatin (PRAVACHOL) tablet 40 mg, 40 mg, Oral, Daily With Vel Parker MD, 40 mg at 09/27/19 1735    spironolactone (ALDACTONE) tablet 25 mg, 25 mg, Oral, Daily, [facility],     terazosin (HYTRIN) capsule 5 mg, 5 mg, Oral, HS, Any Gaytan MD, 5 mg at 09/27/19 2147    timolol (TIMOPTIC) 0 5 % ophthalmic solution 1 drop, 1 drop, Both Eyes, BID, Any Gaytan MD, 1 drop at 09/27/19 1735     Labs & Results:    Results from last 7 days   Lab Units 09/26/19  1526   TROPONIN I ng/mL 0 03     Results from last 7 days   Lab Units 09/28/19  0329 09/27/19  0555 09/26/19  1526   WBC Thousand/uL 5 47 2 23* 4 83   HEMOGLOBIN g/dL 14 5 14 3 15 0   HEMATOCRIT % 44 8 43 6 46 4   PLATELETS Thousands/uL 145* 126* 147*     Results from last 7 days   Lab Units 09/25/19  1406   TRIGLYCERIDES mg/dL 37   HDL mg/dL 80*     Results from last 7 days   Lab Units 09/28/19  0329 09/27/19  0555 09/26/19  1526 09/25/19  1406   POTASSIUM mmol/L 4 1 4 2 4 2 4 2   CHLORIDE mmol/L 95* 98* 96* 97*   CO2 mmol/L 42* 35* 34* 33*   BUN mg/dL 30* 21 21 19   CREATININE mg/dL 0 89 0 81 1 11 0 94   CALCIUM mg/dL 8 1* 8 2* 8 5 8 9   ALK PHOS U/L 59  --  67 67   ALT U/L 13  --  11* 15   AST U/L 20  --  17 18     Results from last 7 days   Lab Units 09/26/19  1526   INR  1 15     Results from last 7 days   Lab Units 09/27/19  0555 09/26/19  1526   MAGNESIUM mg/dL 1 9 2 0       Counseling / Coordination of Care  Total floor / unit time spent today 25 minutes  Greater than 50% of total time was spent with the patient and / or family counseling and / or coordination of care

## 2019-09-28 NOTE — PLAN OF CARE
Problem: Potential for Falls  Goal: Patient will remain free of falls  Description  INTERVENTIONS:  - Assess patient frequently for physical needs  -  Identify cognitive and physical deficits and behaviors that affect risk of falls    -  [place] fall precautions as indicated by assessment   - Educate patient/family on patient safety including physical limitations  - Instruct patient to call for assistance with activity based on assessment  - Modify environment to reduce risk of injury  - Consider OT/PT consult to assist with strengthening/mobility  Outcome: Progressing     Problem: RESPIRATORY - ADULT  Goal: Achieves optimal ventilation and oxygenation  Description  INTERVENTIONS:  - Assess for changes in respiratory status  - Assess for changes in mentation and behavior  - Position to facilitate oxygenation and minimize respiratory effort  - Oxygen administered by appropriate delivery if ordered  - Initiate smoking cessation education as indicated  - Encourage broncho-pulmonary hygiene including cough, deep breathe, Incentive Spirometry  - Assess the need for suctioning and aspirate as needed  - Assess and instruct to report SOB or any respiratory difficulty  - Respiratory Therapy support as indicated  Outcome: Progressing     Problem: GENITOURINARY - ADULT  Goal: Maintains or returns to baseline urinary function  Description  INTERVENTIONS:  - Assess urinary function  - Encourage oral fluids to ensure adequate hydration if ordered  - Administer IV fluids as ordered to ensure adequate hydration  - Administer ordered medications as needed  - Offer frequent toileting  - Follow urinary retention protocol if ordered  Outcome: Progressing  Goal: Absence of urinary retention  Description  INTERVENTIONS:  - Assess patient's ability to void and empty bladder  - Monitor I/O  - Bladder scan as needed  - Discuss with physician/AP medications to alleviate retention as needed  - Discuss catheterization for long term situations as appropriate   Outcome: Progressing

## 2019-09-28 NOTE — PROGRESS NOTES
Progress Note - Shiloh Ramon 3/24/1929, 80 y o  male MRN: [id number]    Unit/Bed#:  Encounter: [id number]    Primary Care Provider: Saulo Arroyo MD   Date and time admitted to hospital: 9/26/2019  3:06 PM        * Acute on chronic heart failure with preserved ejection fraction [facility])  Assessment & Plan  Wt Readings from Last 3 Encounters:   09/28/19 79 kg (174 lb 2 6 oz)   06/06/19 79 6 kg (175 lb 8 oz)   04/02/19 77 1 kg (170 lb)   Patient has a past medical history of heart failure with preserved ejection fraction  Last echocardiogram was 03/18/2019 which showed normal systolic function, ejection fraction of 65% and grade 1 diastolic dysfunction  Patient today presents volume overloaded  On physical exam JVD and bilateral lower extremity pitting edema was noted  CXR showed bilateral pleural effusions   - continue diuresis with IV Lasix   - On Aldactone  - monitor ins and outs /weight      Acute respiratory failure with hypoxia and hypercapnia (HCC)  Assessment & Plan  · Due to above  · Requiring intermittent BiPAP overnight  · IV diuresis  · H/O cigarette smoking in the past  May consider outpt PFT  · Titrate O2 as tolerated   On 5 liters current      Bilateral leg edema  Assessment & Plan  · Appears L>R LE edema  · Will obtain Venous doppler    Acute metabolic encephalopathy  Assessment & Plan  Metabolic encephalopathy in the setting of acute respiratory failure with hypoxia evidenced confusion at home for past few days   - Patient stable at baseline  - Continue oxygen, neuro checks and nursing safety measures      Chronic kidney disease, stage 2 (mild)  Assessment & Plan  Patients creatinine 1 1 from baseline 0 7-0 9  Monitor BMP with diuresis            VTE Pharmacologic Prophylaxis:   Pharmacologic: Enoxaparin (Lovenox)  Mechanical VTE Prophylaxis in Place: Yes    Patient Centered Rounds:     Discussions with Specialists or Other Care Team Provider:     Education and Discussions with Family / Patient: I attempted to call Pt's daughter for updates  Message left    Time Spent for Care: 20 minutes  More than 50% of total time spent on counseling and coordination of care as described above  Current Length of Stay: 2 day(s)    Current Patient Status: Inpatient   Certification Statement: The patient will continue to require additional inpatient hospital stay due to above    Discharge Plan / Estimated Discharge Date: PT eval    Code Status: Level 3 - DNAR and DNI      Subjective:     Events noted  Patient was placed back BiPAP last night respiratory distress  He appears improved and  was taken off BiPAP this morning currently O2 sat stable on 5 L  Objective:     Vitals:   Temp (24hrs), Av 7 °F (36 5 °C), Min:97 4 °F (36 3 °C), Max:98 1 °F (36 7 °C)    Temp:  [97 4 °F (36 3 °C)-98 1 °F (36 7 °C)] 97 4 °F (36 3 °C)  HR:  [72-85] 76  Resp:  [17-37] 29  BP: (126-172)/(65-77) 161/72  SpO2:  [90 %-95 %] 90 %  Body mass index is 27 28 kg/m²  Input and Output Summary (last 24 hours): Intake/Output Summary (Last 24 hours) at 2019 1118  Last data filed at 2019 0727  Gross per 24 hour   Intake 160 ml   Output 2125 ml   Net -1965 ml       Physical Exam:     Physical Exam   Constitutional: No distress  Cardiovascular: Normal rate and regular rhythm  Pulmonary/Chest: Effort normal  He has decreased breath sounds  He has no wheezes  Abdominal: Soft  Bowel sounds are normal  He exhibits no distension  Musculoskeletal: He exhibits edema  LLE>RLE   Neurological: He is alert  Skin: Skin is warm  He is not diaphoretic  Psychiatric: He has a normal mood and affect             Additional Data:     Labs:    Results from last 7 days   Lab Units 19  0329   WBC Thousand/uL 5 47   HEMOGLOBIN g/dL 14 5   HEMATOCRIT % 44 8   PLATELETS Thousands/uL 145*   NEUTROS PCT % 83*   LYMPHS PCT % 7*   MONOS PCT % 9   EOS PCT % 0     Results from last 7 days   Lab Units 19  0329   POTASSIUM mmol/L 4 1   CHLORIDE mmol/L 95*   CO2 mmol/L 42*   BUN mg/dL 30*   CREATININE mg/dL 0 89   CALCIUM mg/dL 8 1*   ALK PHOS U/L 59   ALT U/L 13   AST U/L 20     Results from last 7 days   Lab Units 09/26/19  1526   INR  1 15         Recent Cultures (last 7 days):           Last 24 Hours Medication List:     Current Facility-Administered Medications:  albuterol 2 5 mg Nebulization Q6H PRN Cali Becker MD   amLODIPine 10 mg Oral Daily Arch BleMD sim   brimonidine 1 drop Both Eyes Q8H [address] Cristóbal Norwood MD   enoxaparin 40 mg Subcutaneous Daily Arch Bless, MD   furosemide 40 mg Intravenous TID (diuretic) LOUISE Mix   levothyroxine 50 mcg Oral Daily Arch BleMD sim   montelukast 10 mg Oral Daily Arch BleMD sim   ondansetron 4 mg Intravenous Q6H PRN Arch MD Cuco   pravastatin 40 mg Oral Daily [address] Amanda Hernandez MD   spironolactone 25 mg Oral Daily Fulton Seats, DO   terazosin 5 mg Oral HS Cristóbal Norwood MD   timolol 1 drop Both Eyes BID Arch BleMD sim        Today, Patient Was Seen By: Dian Espana MD    ** Please Note: This note has been constructed using a voice recognition system   **

## 2019-09-28 NOTE — ASSESSMENT & PLAN NOTE
· Due to above  · Requiring intermittent BiPAP overnight  · IV diuresis  · H/O cigarette smoking in the past  May consider outpt PFT  · Titrate O2 as tolerated   On 5 liters current

## 2019-09-29 ENCOUNTER — APPOINTMENT (INPATIENT)
Dept: RADIOLOGY | Facility: HOSPITAL | Age: 84
DRG: 291 | End: 2019-09-29
Payer: COMMERCIAL

## 2019-09-29 LAB
ARTERIAL PATENCY WRIST A: YES
BASE EXCESS BLDA CALC-SCNC: 7.5 MMOL/L
BUN SERPL-MCNC: 31 MG/DL (ref 5–25)
CALCIUM SERPL-MCNC: 7.7 MG/DL (ref 8.3–10.1)
CHLORIDE SERPL-SCNC: 93 MMOL/L (ref 100–108)
CO2 SERPL-SCNC: >45 MMOL/L (ref 21–32)
CREAT SERPL-MCNC: 0.78 MG/DL (ref 0.6–1.3)
GFR SERPL CREATININE-BSD FRML MDRD: 79 ML/MIN/1.73SQ M
GLUCOSE SERPL-MCNC: 88 MG/DL (ref 65–140)
HCO3 BLDA-SCNC: 34 MMOL/L (ref 22–28)
IPAP: 18
MAGNESIUM SERPL-MCNC: 1.8 MG/DL (ref 1.6–2.6)
NON VENT- BIPAP: ABNORMAL
O2 CT BLDA-SCNC: 18.6 ML/DL (ref 16–23)
OXYHGB MFR BLDA: 90.5 % (ref 94–97)
PCO2 BLDA: 55.4 MM HG (ref 36–44)
PEEP MAX SETTING VENT: 8 CM[H2O]
PH BLDA: 7.41 [PH] (ref 7.35–7.45)
PO2 BLDA: 65.9 MM HG (ref 75–129)
POTASSIUM SERPL-SCNC: 3.4 MMOL/L (ref 3.5–5.3)
SODIUM SERPL-SCNC: 138 MMOL/L (ref 136–145)
SPECIMEN SOURCE: ABNORMAL
VENT BIPAP FIO2: 50 %

## 2019-09-29 PROCEDURE — 83735 ASSAY OF MAGNESIUM: CPT | Performed by: NURSE PRACTITIONER

## 2019-09-29 PROCEDURE — 36600 WITHDRAWAL OF ARTERIAL BLOOD: CPT

## 2019-09-29 PROCEDURE — 71045 X-RAY EXAM CHEST 1 VIEW: CPT

## 2019-09-29 PROCEDURE — 94760 N-INVAS EAR/PLS OXIMETRY 1: CPT

## 2019-09-29 PROCEDURE — 93970 EXTREMITY STUDY: CPT | Performed by: SURGERY

## 2019-09-29 PROCEDURE — 94660 CPAP INITIATION&MGMT: CPT

## 2019-09-29 PROCEDURE — 99231 SBSQ HOSP IP/OBS SF/LOW 25: CPT | Performed by: INTERNAL MEDICINE

## 2019-09-29 PROCEDURE — 99222 1ST HOSP IP/OBS MODERATE 55: CPT | Performed by: INTERNAL MEDICINE

## 2019-09-29 PROCEDURE — 99232 SBSQ HOSP IP/OBS MODERATE 35: CPT | Performed by: INTERNAL MEDICINE

## 2019-09-29 PROCEDURE — 82805 BLOOD GASES W/O2 SATURATION: CPT | Performed by: NURSE PRACTITIONER

## 2019-09-29 PROCEDURE — 94640 AIRWAY INHALATION TREATMENT: CPT

## 2019-09-29 PROCEDURE — 80048 BASIC METABOLIC PNL TOTAL CA: CPT | Performed by: INTERNAL MEDICINE

## 2019-09-29 PROCEDURE — C9113 INJ PANTOPRAZOLE SODIUM, VIA: HCPCS | Performed by: PSYCHIATRY & NEUROLOGY

## 2019-09-29 RX ORDER — ACETAZOLAMIDE 250 MG/1
250 TABLET ORAL EVERY 12 HOURS SCHEDULED
Status: DISCONTINUED | OUTPATIENT
Start: 2019-09-29 | End: 2019-09-29

## 2019-09-29 RX ORDER — FUROSEMIDE 10 MG/ML
40 INJECTION INTRAMUSCULAR; INTRAVENOUS
Status: COMPLETED | OUTPATIENT
Start: 2019-09-29 | End: 2019-09-29

## 2019-09-29 RX ORDER — POTASSIUM CHLORIDE 14.9 MG/ML
20 INJECTION INTRAVENOUS ONCE
Status: COMPLETED | OUTPATIENT
Start: 2019-09-29 | End: 2019-09-29

## 2019-09-29 RX ORDER — PANTOPRAZOLE SODIUM 40 MG/1
40 TABLET, DELAYED RELEASE ORAL
Status: DISCONTINUED | OUTPATIENT
Start: 2019-09-29 | End: 2019-09-30

## 2019-09-29 RX ORDER — PANTOPRAZOLE SODIUM 40 MG/1
40 INJECTION, POWDER, FOR SOLUTION INTRAVENOUS ONCE
Status: COMPLETED | OUTPATIENT
Start: 2019-09-29 | End: 2019-09-29

## 2019-09-29 RX ORDER — ACETAZOLAMIDE 250 MG/1
250 TABLET ORAL 2 TIMES DAILY
Status: COMPLETED | OUTPATIENT
Start: 2019-09-29 | End: 2019-09-29

## 2019-09-29 RX ORDER — LEVALBUTEROL INHALATION SOLUTION 0.63 MG/3ML
0.63 SOLUTION RESPIRATORY (INHALATION)
Status: DISCONTINUED | OUTPATIENT
Start: 2019-09-29 | End: 2019-09-29

## 2019-09-29 RX ORDER — LEVALBUTEROL INHALATION SOLUTION 0.63 MG/3ML
0.63 SOLUTION RESPIRATORY (INHALATION)
Status: DISCONTINUED | OUTPATIENT
Start: 2019-09-29 | End: 2019-10-07 | Stop reason: HOSPADM

## 2019-09-29 RX ORDER — METHYLPREDNISOLONE SODIUM SUCCINATE 40 MG/ML
40 INJECTION, POWDER, LYOPHILIZED, FOR SOLUTION INTRAMUSCULAR; INTRAVENOUS EVERY 12 HOURS SCHEDULED
Status: DISCONTINUED | OUTPATIENT
Start: 2019-09-29 | End: 2019-09-29

## 2019-09-29 RX ADMIN — LEVALBUTEROL HYDROCHLORIDE 0.63 MG: 0.63 SOLUTION RESPIRATORY (INHALATION) at 19:42

## 2019-09-29 RX ADMIN — SPIRONOLACTONE 25 MG: 25 TABLET ORAL at 10:00

## 2019-09-29 RX ADMIN — POTASSIUM CHLORIDE 20 MEQ: 14.9 INJECTION, SOLUTION INTRAVENOUS at 05:07

## 2019-09-29 RX ADMIN — ACETAZOLAMIDE 250 MG: 250 TABLET ORAL at 17:06

## 2019-09-29 RX ADMIN — FUROSEMIDE 40 MG: 10 INJECTION, SOLUTION INTRAMUSCULAR; INTRAVENOUS at 05:06

## 2019-09-29 RX ADMIN — FUROSEMIDE 40 MG: 10 INJECTION, SOLUTION INTRAMUSCULAR; INTRAVENOUS at 17:06

## 2019-09-29 RX ADMIN — ACETAZOLAMIDE 250 MG: 250 TABLET ORAL at 10:00

## 2019-09-29 RX ADMIN — BRIMONIDINE TARTRATE 1 DROP: 1.5 SOLUTION OPHTHALMIC at 21:02

## 2019-09-29 RX ADMIN — MONTELUKAST SODIUM 10 MG: 10 TABLET, FILM COATED ORAL at 10:00

## 2019-09-29 RX ADMIN — TERAZOSIN HYDROCHLORIDE 5 MG: 5 CAPSULE ORAL at 21:02

## 2019-09-29 RX ADMIN — IPRATROPIUM BROMIDE 0.5 MG: 0.5 SOLUTION RESPIRATORY (INHALATION) at 19:42

## 2019-09-29 RX ADMIN — AMLODIPINE BESYLATE 10 MG: 10 TABLET ORAL at 10:00

## 2019-09-29 RX ADMIN — ALBUTEROL SULFATE 2.5 MG: 2.5 SOLUTION RESPIRATORY (INHALATION) at 10:12

## 2019-09-29 RX ADMIN — TIMOLOL MALEATE 1 DROP: 5 SOLUTION/ DROPS OPHTHALMIC at 10:00

## 2019-09-29 RX ADMIN — BRIMONIDINE TARTRATE 1 DROP: 1.5 SOLUTION OPHTHALMIC at 05:06

## 2019-09-29 RX ADMIN — LEVOTHYROXINE SODIUM 50 MCG: 50 TABLET ORAL at 05:06

## 2019-09-29 RX ADMIN — PRAVASTATIN SODIUM 40 MG: 40 TABLET ORAL at 17:06

## 2019-09-29 RX ADMIN — TIMOLOL MALEATE 1 DROP: 5 SOLUTION/ DROPS OPHTHALMIC at 17:07

## 2019-09-29 RX ADMIN — POTASSIUM CHLORIDE 20 MEQ: 14.9 INJECTION, SOLUTION INTRAVENOUS at 07:24

## 2019-09-29 RX ADMIN — METHYLPREDNISOLONE SODIUM SUCCINATE 40 MG: 40 INJECTION, POWDER, FOR SOLUTION INTRAMUSCULAR; INTRAVENOUS at 10:20

## 2019-09-29 RX ADMIN — BRIMONIDINE TARTRATE 1 DROP: 1.5 SOLUTION OPHTHALMIC at 13:17

## 2019-09-29 RX ADMIN — FUROSEMIDE 40 MG: 10 INJECTION, SOLUTION INTRAMUSCULAR; INTRAVENOUS at 13:14

## 2019-09-29 RX ADMIN — PANTOPRAZOLE SODIUM 40 MG: 40 INJECTION, POWDER, FOR SOLUTION INTRAVENOUS at 10:20

## 2019-09-29 RX ADMIN — ENOXAPARIN SODIUM 40 MG: 40 INJECTION SUBCUTANEOUS at 10:00

## 2019-09-29 NOTE — PROGRESS NOTES
Cardiology Progress Note - Enoch Stevenson 80 y o  male MRN: [id number]    Unit/Bed#:  Encounter: [id number]      Assessment:  1  Acute on chronic diastolic CHF  2  Acute hypoxic respiratory failure secondary to #1  3  Benign essential hypertension  4  Dyslipidemia  5  Hyponatremia     Plan:  1  Volume status improving with negative fluid balance, decreasing weight  2  Continue IV diuretics today  - repeat CXR today   3  Renal function stable, ABG noted - add Diamox  4  Blood pressure controlled   5  Wean off BiPAP, oxygen needs seem out of proportion to volume, was on home O2 after last admission as well      Subjective:   Patient seen and examined  Back on BiPAP  Objective:     Vitals: Blood pressure 120/65, pulse 76, temperature 98 2 °F (36 8 °C), temperature source Axillary, resp  rate 18, weight 78 9 kg (173 lb 15 1 oz), SpO2 95 %  , Body mass index is 27 24 kg/m² ,   Orthostatic Blood Pressures      Most Recent Value   Blood Pressure  120/65 filed at 09/29/2019 0300   Patient Position - Orthostatic VS  Lying filed at 09/29/2019 0300            Intake/Output Summary (Last 24 hours) at 9/29/2019 0656  Last data filed at 9/29/2019 5288  Gross per 24 hour   Intake 900 ml   Output 3090 ml   Net -2190 ml         Physical Exam:    GEN: Enoch Stevenson appears well, alert and oriented x 3, pleasant and cooperative   HEENT: pupils equal, round, and reactive to light; extraocular muscles intact  NECK: supple, no carotid bruits   elevated JVP   HEART: regular rhythm, normal S1 and S2, no murmur  LUNGS: few bibasilar rales   ABDOMEN: normal bowel sounds, soft, no tenderness, no distention  EXTREMITIES: trace edema   NEURO: no focal findings   SKIN: normal without suspicious lesions on exposed skin    Medications:      Current Facility-Administered Medications:     albuterol inhalation solution 2 5 mg, 2 5 mg, Nebulization, Q6H PRN, Lynn Moore MD, 2 5 mg at 09/26/19 2005    amLODIPine (NORVASC) tablet 10 mg, 10 mg, Oral, Daily, Tom Wilson MD, 10 mg at 09/28/19 0909    brimonidine (ALPHAGAN P) 0 15 % ophthalmic solution 1 drop, 1 drop, Both Eyes, Q8H [address], Tom Wilson MD, 1 drop at 09/29/19 0506    enoxaparin (LOVENOX) subcutaneous injection 40 mg, 40 mg, Subcutaneous, Daily, Tom Wilson MD, 40 mg at 09/28/19 0909    furosemide (LASIX) injection 40 mg, 40 mg, Intravenous, TID (diuretic), LOUISE Hayes, 40 mg at 09/29/19 6112    levothyroxine tablet 50 mcg, 50 mcg, Oral, Daily, Tom Wilson MD, 50 mcg at 09/29/19 0506    montelukast (SINGULAIR) tablet 10 mg, 10 mg, Oral, Daily, Tom Wilson MD, 10 mg at 09/28/19 0909    ondansetron (ZOFRAN) injection 4 mg, 4 mg, Intravenous, Q6H PRN, Tom Wilson MD    potassium chloride 20 mEq IVPB (premix), 20 mEq, Intravenous, Once **FOLLOWED BY** potassium chloride 20 mEq IVPB (premix), 20 mEq, Intravenous, Once, LOUISE Alejandra, Last Rate: 50 mL/hr at 09/29/19 0507, 20 mEq at 09/29/19 0507    pravastatin (PRAVACHOL) tablet 40 mg, 40 mg, Oral, Daily With Alexa Min MD, 40 mg at 09/28/19 1715    spironolactone (ALDACTONE) tablet 25 mg, 25 mg, Oral, Daily, Phillip Huang DO, 25 mg at 09/28/19 0909    terazosin (HYTRIN) capsule 5 mg, 5 mg, Oral, HS, Tom Wilson MD, 5 mg at 09/28/19 2144    timolol (TIMOPTIC) 0 5 % ophthalmic solution 1 drop, 1 drop, Both Eyes, BID, Tom Wilson MD, 1 drop at 09/28/19 1716     Labs & Results:    Results from last 7 days   Lab Units 09/26/19  1526   TROPONIN I ng/mL 0 03     Results from last 7 days   Lab Units 09/28/19  0329 09/27/19  0555 09/26/19  1526   WBC Thousand/uL 5 47 2 23* 4 83   HEMOGLOBIN g/dL 14 5 14 3 15 0   HEMATOCRIT % 44 8 43 6 46 4   PLATELETS Thousands/uL 145* 126* 147*     Results from last 7 days   Lab Units 09/25/19  1406   TRIGLYCERIDES mg/dL 37   HDL mg/dL 80*     Results from last 7 days   Lab Units 09/29/19  0317 09/28/19  0329 09/27/19  0555 09/26/19  1526 09/25/19  1406   POTASSIUM mmol/L 3 4* 4 1 4 2 4 2 4 2   CHLORIDE mmol/L 93* 95* 98* 96* 97*   CO2 mmol/L >45* 42* 35* 34* 33*   BUN mg/dL 31* 30* 21 21 19   CREATININE mg/dL 0 78 0 89 0 81 1 11 0 94   CALCIUM mg/dL 7 7* 8 1* 8 2* 8 5 8 9   ALK PHOS U/L  --  59  --  67 67   ALT U/L  --  13  --  11* 15   AST U/L  --  20  --  17 18     Results from last 7 days   Lab Units 09/26/19  1526   INR  1 15     Results from last 7 days   Lab Units 09/27/19  0555 09/26/19  1526   MAGNESIUM mg/dL 1 9 2 0       Counseling / Coordination of Care  Total floor / unit time spent today 25 minutes  Greater than 50% of total time was spent with the patient and / or family counseling and / or coordination of care

## 2019-09-29 NOTE — ASSESSMENT & PLAN NOTE
Wt Readings from Last 3 Encounters:   09/29/19 78 9 kg (173 lb 15 1 oz)   06/06/19 79 6 kg (175 lb 8 oz)   04/02/19 77 1 kg (170 lb)   Patient has a past medical history of heart failure with preserved ejection fraction  Last echocardiogram was 03/18/2019 which showed normal systolic function, ejection fraction of 65% and grade 1 diastolic dysfunction  Patient today presents volume overloaded  On physical exam JVD and bilateral lower extremity pitting edema was noted    CXR showed bilateral pleural effusions   - continue diuresis with IV Lasix   - On Aldactone  - monitor ins and outs /weight

## 2019-09-29 NOTE — ASSESSMENT & PLAN NOTE
· Due to above  · Requiring intermittent BiPAP overnight  · IV diuresis  · H/O cigarette smoking in the past  May consider outpt PFT  · Titrate O2 as tolerated  On 5 liters current  · 09/29 Patient having difficulty maintaining adequate saturation despite improving volume status  ABG: pH 7 4, CO2 55 4, HCO3 34  Repeat CXR showed no improvement from CXR on admission  Currently on high flow nasal canula  Cannot rule out underlying lung disease  CHF vs COPD  · DC albuterol, started Xopinex     · Start IV trial of 40 mg IV methylprednisolone q12H  · Ordered protonix

## 2019-09-29 NOTE — PROGRESS NOTES
Progress Note Sharon Jennings 3/24/1929, 80 y o  male MRN: [id number]    Unit/Bed#:  Encounter: [id number]    Primary Care Provider: Saulo Arroyo MD   Date and time admitted to hospital: 9/26/2019  3:06 PM        * Acute respiratory failure with hypoxia and hypercapnia ([facility] [address] )  Assessment & Plan  · Due to above  · Requiring intermittent BiPAP overnight  · IV diuresis  · H/O cigarette smoking in the past  May consider outpt PFT  · Titrate O2 as tolerated  On 5 liters current  · 09/29 Patient having difficulty maintaining adequate saturation despite improving volume status  ABG: pH 7 4, CO2 55 4, HCO3 34  Repeat CXR showed no improvement from CXR on admission  Currently on high flow nasal canula  Cannot rule out underlying lung disease  CHF vs COPD  · DC albuterol, started Xopinex  · Start IV trial of 40 mg IV methylprednisolone q12H  · Ordered protonix      Acute on chronic heart failure with preserved ejection fraction [facility])  Assessment & Plan  Wt Readings from Last 3 Encounters:   09/29/19 78 9 kg (173 lb 15 1 oz)   06/06/19 79 6 kg (175 lb 8 oz)   04/02/19 77 1 kg (170 lb)   Patient has a past medical history of heart failure with preserved ejection fraction  Last echocardiogram was 03/18/2019 which showed normal systolic function, ejection fraction of 65% and grade 1 diastolic dysfunction  Patient today presents volume overloaded  On physical exam JVD and bilateral lower extremity pitting edema was noted  CXR showed bilateral pleural effusions   - continue diuresis with IV Lasix   - On Aldactone  - monitor ins and outs /weight      Essential hypertension  Assessment & Plan  Patient with history of hypertension  Blood pressure medication managed by cardiologist   Home medications include terazosin 5 mg, Aldactone 25 mg, amlodipine 10 mg   Blood pressure stable in the ED, 120/69  - continue terazosin 5 mg  - continue amlodipine 10 mg  - discontinued Aldactone as patient will be diuresed with Lasix 40 mg b i d   - monitor blood pressure    Chronic kidney disease, stage 2 (mild)  Assessment & Plan  Patients creatinine 1 1 from baseline 0 7-0 9  Monitor BMP with diuresis      Acute metabolic encephalopathy  Assessment & Plan  Metabolic encephalopathy in the setting of acute respiratory failure with hypoxia evidenced confusion at home for past few days   - Patient stable at baseline  - Continue oxygen, neuro checks and nursing safety measures      Bilateral leg edema  Assessment & Plan  · Appears L>R LE edema  · Venous doppler done  Awaiting results  Pharmacologic: Enoxaparin (Lovenox)  Mechanical VTE Prophylaxis in Place: Yes    Discussions with Specialists or Other Care Team Provider: Cardiology    Education and Discussions with Family / Patient: Change in plan of care    Current Length of Stay: 3 day(s)    Current Patient Status: Inpatient     Discharge Plan / Estimated Discharge Date: Pending    Code Status: Level 3 - DNAR and DNI      Subjective:   Patient today reports he is doing well despite difficulties maintaining adequate oxygenation  He denies difficulty breathing or chest pain  Patient has continued to eat well and void urine independently  He has been losing weight appropriate to diuresis  Objective:     Vitals:   Temp (24hrs), Av 9 °F (36 6 °C), Min:97 3 °F (36 3 °C), Max:98 6 °F (37 °C)    Temp:  [97 3 °F (36 3 °C)-98 6 °F (37 °C)] 98 6 °F (37 °C)  HR:  [72-76] 72  Resp:  [18-31] 20  BP: (120-161)/(65-78) 122/74  SpO2:  [90 %-96 %] 95 %  Body mass index is 27 24 kg/m²  Input and Output Summary (last 24 hours): Intake/Output Summary (Last 24 hours) at 2019 1012  Last data filed at 2019 8374  Gross per 24 hour   Intake 1336 ml   Output 2840 ml   Net -1504 ml       Physical Exam:     Physical Exam   Constitutional: He appears well-developed and well-nourished  HENT:   Head: Normocephalic and atraumatic     Cardiovascular: Normal rate, regular rhythm and normal heart sounds  Pulmonary/Chest: Effort normal  He has wheezes  Expiratory wheezes hear throughout all lung fields   Abdominal: Soft  Bowel sounds are normal    Skin: Skin is warm and dry  Additional Data:     Labs:    Results from last 7 days   Lab Units 09/28/19  0329   WBC Thousand/uL 5 47   HEMOGLOBIN g/dL 14 5   HEMATOCRIT % 44 8   PLATELETS Thousands/uL 145*   NEUTROS PCT % 83*   LYMPHS PCT % 7*   MONOS PCT % 9   EOS PCT % 0     Results from last 7 days   Lab Units 09/29/19  0317 09/28/19  0329   POTASSIUM mmol/L 3 4* 4 1   CHLORIDE mmol/L 93* 95*   CO2 mmol/L >45* 42*   BUN mg/dL 31* 30*   CREATININE mg/dL 0 78 0 89   CALCIUM mg/dL 7 7* 8 1*   ALK PHOS U/L  --  59   ALT U/L  --  13   AST U/L  --  20     Results from last 7 days   Lab Units 09/26/19  1526   INR  1 15       * I Have Reviewed All Lab Data Listed Above  * Additional Pertinent Lab Tests Reviewed:  [address] Admission Reviewed    Imaging:    Imaging Reports Reviewed Today Include: CXR  Imaging Personally Reviewed by Myself Includes: CXR          Last 24 Hours Medication List:     Current Facility-Administered Medications:  acetaZOLAMIDE 250 mg Oral BID Bella Donato DO   albuterol 2 5 mg Nebulization Q6H PRN Mike Torres MD   amLODIPine 10 mg Oral Daily Case Johnson MD   brimonidine 1 drop Both Eyes Q8H [facility] Case Johnson MD   enoxaparin 40 mg Subcutaneous Daily Case Johnson MD   furosemide 40 mg Intravenous TID (diuretic) Bella Donato DO   levothyroxine 50 mcg Oral Daily Case Johnson MD   methylPREDNISolone sodium succinate 40 mg Intravenous Q12H [address], MD   montelukast 10 mg Oral Daily Case Johnson MD   ondansetron 4 mg Intravenous Q6H PRN Case Johnson MD   pantoprazole 40 mg Intravenous Once Case Johnson MD   pravastatin 40 mg Oral Daily [address] Amanda Hernandez MD   spironolactone 25 mg Oral Daily Bella Donato DO   terazosin 5 mg Oral HS Case Johnson MD   timolol 1 drop Both Eyes BID Brittany Murillo MD        Today, Patient Was Seen By: Ivan Gallagher MD    ** Please Note: This note has been constructed using a voice recognition system   **

## 2019-09-29 NOTE — CONSULTS
Consultation - Pulmonary Medicine   Luis Mena 80 y o  male MRN: [id number]  Unit/Bed#:  Encounter: [id number]      Assessment/Plan:  1  Acute on chronic hypoxic hypercapnic respiratory failure, multifactorial  -continue with high-flow nasal cannula to maintain SpO2 greater than 88-92%  Taper as tolerated  Exercise oximetry prior to discharge  Apparently was discharged home on O2 after last exacerbation   -continue pulmonary toilet,  incentive spirometry, out of bed to chair    2  Acute on chronic decompensated CHF  -continue with Lasix 40 mg t i d   -continue with Diamox 250 mg b i d   -replete electrolytes p r n   -monitor ins and outs, daily weight  -cardiology following closely    3  Bilateral pleural effusion  -improving, likely secondary to heart failure    4  Atelectasis  -continue with incentive spirometry    3  Possible COPD, not in acute exacerbation  -do not feel that steroids are indicated at this time  -recommend Atrovent/xopenex nebulizer q 6 hours, albuterol prn  -recommend full pulmonary function test as outpatient    4  7 mm ill-defined ground-glass nodule in the right lung apex seen on CT from 03/16/2019  -repeat CT chest as outpatient      5  Post nasal drip  -continue with Singulair    6  GERD  -continue with PPI    7  B/L LE edema  Venous doppler negative for DVT  Continue with DVT prophylaxis  History of Present Illness   Physician Requesting Consult: Svitlana Selby MD  Reason for Consult / Principal Problem: Acute hypoxic hypercapnic respiratory failure  Hx and PE limited by: Not limited  HPI: Luis Mena is a 80y o  year old male with history of grade 1 diastolic function presented on 09/26/2019 with worsening shortness of breath and bilateral lower extremity edema  He was seen by his primary care physician who increased his oral Lasix however patient still had persistent symptoms  He presented to ER for further evaluation    Per family, patient was more confused than usual   Upon initial arrival to the ER he was quite tachypneic and tachycardic with any saturating in the low 80s  His pro BNP was 866  Initial chest x-ray showed bilateral pleural effusion with compressive atelectasis  He was given IV Lasix  Cardiology was consulted and has been managing his volume status  He remains in negative fluid balance with IV diuretics  Repeat chest x-ray shows improvement in bilateral pleural effusions  His renal function remains stable  Cardiology has added Diamox to his current regiment for increased CO2  He remains on high-flow nasal cannula 45 liters/minute at 60% and BiPAP IPAP 19 over 8, FIO2 50%  Consults    Review of Systems   Constitutional: Positive for activity change  HENT: Positive for congestion, postnasal drip and rhinorrhea  Eyes: Negative  Respiratory: Positive for cough and shortness of breath  Cardiovascular: Negative  Gastrointestinal: Negative  Endocrine: Negative  Genitourinary: Negative  Musculoskeletal: Negative  Skin: Negative  Allergic/Immunologic: Negative  Neurological: Negative  Hematological: Negative  Psychiatric/Behavioral: Negative  Historical Information   Past Medical History:   Diagnosis Date    Allergic rhinitis     Hypercholesteremia     Hypertension     Pneumonia      Past Surgical History:   Procedure Laterality Date    LAPAROSCOPIC COLON RESECTION      TOTAL HIP ARTHROPLASTY       Social History   Social History     Substance and Sexual Activity   Alcohol Use Yes    Frequency: Never    Comment: Socially     Social History     Substance and Sexual Activity   Drug Use Never     Social History     Tobacco Use   Smoking Status Former Smoker    Packs/day: 0 20    Years: 10 00    Pack years: 2 00    Types: Cigarettes   Smokeless Tobacco Never Used         Family History: History reviewed  No pertinent family history      Meds/Allergies   all current active meds have been reviewed, pertinent pulmonary meds have been reviewed, current meds:   Current Facility-Administered Medications   Medication Dose Route Frequency    acetaZOLAMIDE (DIAMOX) tablet 250 mg  250 mg Oral BID    albuterol inhalation solution 2 5 mg  2 5 mg Nebulization Q6H PRN    amLODIPine (NORVASC) tablet 10 mg  10 mg Oral Daily    brimonidine (ALPHAGAN P) 0 15 % ophthalmic solution 1 drop  1 drop Both Eyes Q8H [facility]    enoxaparin (LOVENOX) subcutaneous injection 40 mg  40 mg Subcutaneous Daily    furosemide (LASIX) injection 40 mg  40 mg Intravenous TID (diuretic)    levothyroxine tablet 50 mcg  50 mcg Oral Daily    methylPREDNISolone sodium succinate (Solu-MEDROL) injection 40 mg  40 mg Intravenous Q12H [facility]    montelukast (SINGULAIR) tablet 10 mg  10 mg Oral Daily    ondansetron (ZOFRAN) injection 4 mg  4 mg Intravenous Q6H PRN    pantoprazole (PROTONIX) EC tablet 40 mg  40 mg Oral Early Morning    pravastatin (PRAVACHOL) tablet 40 mg  40 mg Oral Daily With Dinner    spironolactone (ALDACTONE) tablet 25 mg  25 mg Oral Daily    terazosin (HYTRIN) capsule 5 mg  5 mg Oral HS    timolol (TIMOPTIC) 0 5 % ophthalmic solution 1 drop  1 drop Both Eyes BID    and PTA meds:   Prior to Admission Medications   Prescriptions Last Dose Informant Patient Reported? Taking?    Blood Pressure Monitoring (B-D ASSURE BPM/AUTO ARM CUFF) MISC   No No   Sig: by Does not apply route daily   RABEprazole (ACIPHEX) 20 MG tablet  Self Yes No   Sig: daily    amLODIPine (NORVASC) 10 mg tablet   No No   Sig: Take 1 tablet (10 mg total) by mouth daily   brimonidine-timolol (COMBIGAN) 0 2-0 5 %  Self Yes No   Sig: Apply 1 drop to eye 2 (two) times a day   furosemide (LASIX) 20 mg tablet  Self Yes No   Sig: Take 20 mg by mouth   latanoprost (XALATAN) 0 005 % ophthalmic solution  Self Yes No   levothyroxine 50 mcg tablet  Self Yes No   Sig: Take 1 tablet by mouth daily   montelukast (SINGULAIR) 10 mg tablet  Self Yes No   Sig: daily    potassium chloride (K-DUR,KLOR-CON) 20 mEq tablet  Self No No   Sig: Take 1 tablet (20 mEq total) by mouth 2 (two) times a day   simvastatin (ZOCOR) 20 mg tablet  Self Yes No   Sig: daily    spironolactone (ALDACTONE) 25 mg tablet   No No   Sig: Take 1 tablet (25 mg total) by mouth daily   terazosin (HYTRIN) 5 mg capsule  Self Yes No   Sig: Take 1 capsule by mouth      Facility-Administered Medications: None       No Known Allergies    Objective   Vitals: Blood pressure 139/67, pulse 76, temperature 98 1 °F (36 7 °C), temperature source Oral, resp  rate 18, weight 78 9 kg (173 lb 15 1 oz), SpO2 91 %  ,Body mass index is 27 24 kg/m²  Intake/Output Summary (Last 24 hours) at 9/29/2019 1413  Last data filed at 9/29/2019 6889  Gross per 24 hour   Intake 736 ml   Output 2540 ml   Net -1804 ml     Invasive Devices     Peripheral Intravenous Line            Peripheral IV 09/27/19 Right;Ventral (anterior) Forearm 1 day                Physical Exam   Constitutional: He is oriented to person, place, and time  He appears well-developed and well-nourished  No distress  HENT:   Head: Normocephalic and atraumatic  Right Ear: External ear normal    Left Ear: External ear normal    Nose: Nose normal    Mouth/Throat: Oropharynx is clear and moist    Eyes: Pupils are equal, round, and reactive to light  Conjunctivae and EOM are normal  Right eye exhibits no discharge  Left eye exhibits no discharge  Neck: Normal range of motion  Neck supple  Cardiovascular: Normal rate, regular rhythm and normal heart sounds  Pulmonary/Chest: Effort normal  He has no wheezes  He has rales  Wheezing clears with coughing   Abdominal: Soft  Bowel sounds are normal    Musculoskeletal: Normal range of motion  He exhibits edema  Neurological: He is alert and oriented to person, place, and time  He displays normal reflexes  No cranial nerve deficit or sensory deficit  He exhibits normal muscle tone  Coordination normal    Skin: Skin is warm and dry   Capillary refill takes less than 2 seconds  He is not diaphoretic  No erythema  No pallor  Psychiatric: He has a normal mood and affect  His behavior is normal  Judgment and thought content normal        Lab Results:   I have personally reviewed pertinent lab results  , ABG:   Lab Results   Component Value Date    PHART 7 406 09/29/2019    QRE9IUE 55 4 (HH) 09/29/2019    PO2ART 65 9 (L) 09/29/2019    SWZ5GPT 34 0 (H) 09/29/2019    BEART 7 5 09/29/2019    SOURCE Radial, Left 09/29/2019   , BNP: No results found for: BNP, CBC: No results found for: WBC, HGB, HCT, MCV, PLT, ADJUSTEDWBC, MCH, MCHC, RDW, MPV, NRBC, CMP:   Lab Results   Component Value Date    SODIUM 138 09/29/2019    K 3 4 (L) 09/29/2019    CL 93 (L) 09/29/2019    CO2 >45 (HH) 09/29/2019    BUN 31 (H) 09/29/2019    CREATININE 0 78 09/29/2019    CALCIUM 7 7 (L) 09/29/2019    EGFR 79 09/29/2019   , PT/INR: No results found for: PT, INR, Troponin: No results found for: TROPONINI  Imaging Studies: I have personally reviewed pertinent reports  and I have personally reviewed pertinent films in PACS  EKG, Pathology, and Other Studies: I have personally reviewed pertinent reports     and I have personally reviewed pertinent films in PACS  VTE Prophylaxis: Enoxaparin (Lovenox)    Code Status: Level 3 - DNAR and DNI  Advance Directive and Living Will:      Power of :    POLST:

## 2019-09-29 NOTE — RESPIRATORY THERAPY NOTE
ABG drawn from left radial  Iwlbur's test passed  Held site until bleeding stopped  Pt on BIPAP 18/8/50%  Sample sent to lab

## 2019-09-30 LAB
ANION GAP SERPL CALCULATED.3IONS-SCNC: 3 MMOL/L (ref 4–13)
BUN SERPL-MCNC: 36 MG/DL (ref 5–25)
CALCIUM SERPL-MCNC: 8.1 MG/DL (ref 8.3–10.1)
CHLORIDE SERPL-SCNC: 93 MMOL/L (ref 100–108)
CO2 SERPL-SCNC: 43 MMOL/L (ref 21–32)
CREAT SERPL-MCNC: 0.99 MG/DL (ref 0.6–1.3)
ERYTHROCYTE [DISTWIDTH] IN BLOOD BY AUTOMATED COUNT: 13 % (ref 11.6–15.1)
GFR SERPL CREATININE-BSD FRML MDRD: 67 ML/MIN/1.73SQ M
GLUCOSE SERPL-MCNC: 125 MG/DL (ref 65–140)
HCT VFR BLD AUTO: 43.3 % (ref 36.5–49.3)
HGB BLD-MCNC: 14.5 G/DL (ref 12–17)
MAGNESIUM SERPL-MCNC: 2 MG/DL (ref 1.6–2.6)
MCH RBC QN AUTO: 31.3 PG (ref 26.8–34.3)
MCHC RBC AUTO-ENTMCNC: 33.5 G/DL (ref 31.4–37.4)
MCV RBC AUTO: 93 FL (ref 82–98)
PLATELET # BLD AUTO: 154 THOUSANDS/UL (ref 149–390)
PMV BLD AUTO: 10.6 FL (ref 8.9–12.7)
POTASSIUM SERPL-SCNC: 3.2 MMOL/L (ref 3.5–5.3)
RBC # BLD AUTO: 4.64 MILLION/UL (ref 3.88–5.62)
SODIUM SERPL-SCNC: 139 MMOL/L (ref 136–145)
WBC # BLD AUTO: 4.72 THOUSAND/UL (ref 4.31–10.16)

## 2019-09-30 PROCEDURE — 85027 COMPLETE CBC AUTOMATED: CPT | Performed by: NURSE PRACTITIONER

## 2019-09-30 PROCEDURE — G8978 MOBILITY CURRENT STATUS: HCPCS

## 2019-09-30 PROCEDURE — 99233 SBSQ HOSP IP/OBS HIGH 50: CPT | Performed by: INTERNAL MEDICINE

## 2019-09-30 PROCEDURE — 94760 N-INVAS EAR/PLS OXIMETRY 1: CPT

## 2019-09-30 PROCEDURE — 99232 SBSQ HOSP IP/OBS MODERATE 35: CPT | Performed by: INTERNAL MEDICINE

## 2019-09-30 PROCEDURE — 94660 CPAP INITIATION&MGMT: CPT

## 2019-09-30 PROCEDURE — G8979 MOBILITY GOAL STATUS: HCPCS

## 2019-09-30 PROCEDURE — 97163 PT EVAL HIGH COMPLEX 45 MIN: CPT

## 2019-09-30 PROCEDURE — 94640 AIRWAY INHALATION TREATMENT: CPT

## 2019-09-30 PROCEDURE — 80048 BASIC METABOLIC PNL TOTAL CA: CPT | Performed by: NURSE PRACTITIONER

## 2019-09-30 PROCEDURE — 83735 ASSAY OF MAGNESIUM: CPT | Performed by: NURSE PRACTITIONER

## 2019-09-30 RX ORDER — PANTOPRAZOLE SODIUM 40 MG/1
40 TABLET, DELAYED RELEASE ORAL
Status: DISCONTINUED | OUTPATIENT
Start: 2019-10-01 | End: 2019-10-07 | Stop reason: HOSPADM

## 2019-09-30 RX ORDER — POTASSIUM CHLORIDE 14.9 MG/ML
20 INJECTION INTRAVENOUS ONCE
Status: COMPLETED | OUTPATIENT
Start: 2019-09-30 | End: 2019-09-30

## 2019-09-30 RX ORDER — PANTOPRAZOLE SODIUM 40 MG/1
40 INJECTION, POWDER, FOR SOLUTION INTRAVENOUS ONCE
Status: DISCONTINUED | OUTPATIENT
Start: 2019-09-30 | End: 2019-09-30

## 2019-09-30 RX ORDER — FUROSEMIDE 10 MG/ML
40 INJECTION INTRAMUSCULAR; INTRAVENOUS
Status: DISCONTINUED | OUTPATIENT
Start: 2019-09-30 | End: 2019-10-02

## 2019-09-30 RX ORDER — POTASSIUM CHLORIDE 20 MEQ/1
40 TABLET, EXTENDED RELEASE ORAL ONCE
Status: COMPLETED | OUTPATIENT
Start: 2019-09-30 | End: 2019-09-30

## 2019-09-30 RX ORDER — METHYLPREDNISOLONE SODIUM SUCCINATE 40 MG/ML
40 INJECTION, POWDER, LYOPHILIZED, FOR SOLUTION INTRAMUSCULAR; INTRAVENOUS EVERY 12 HOURS SCHEDULED
Status: DISCONTINUED | OUTPATIENT
Start: 2019-09-30 | End: 2019-10-04

## 2019-09-30 RX ADMIN — FUROSEMIDE 40 MG: 10 INJECTION, SOLUTION INTRAMUSCULAR; INTRAVENOUS at 15:58

## 2019-09-30 RX ADMIN — POTASSIUM CHLORIDE 20 MEQ: 14.9 INJECTION, SOLUTION INTRAVENOUS at 06:01

## 2019-09-30 RX ADMIN — AMLODIPINE BESYLATE 10 MG: 10 TABLET ORAL at 08:03

## 2019-09-30 RX ADMIN — ENOXAPARIN SODIUM 40 MG: 40 INJECTION SUBCUTANEOUS at 08:03

## 2019-09-30 RX ADMIN — TERAZOSIN HYDROCHLORIDE 5 MG: 5 CAPSULE ORAL at 21:09

## 2019-09-30 RX ADMIN — BRIMONIDINE TARTRATE 1 DROP: 1.5 SOLUTION OPHTHALMIC at 05:48

## 2019-09-30 RX ADMIN — BRIMONIDINE TARTRATE 1 DROP: 1.5 SOLUTION OPHTHALMIC at 21:09

## 2019-09-30 RX ADMIN — SPIRONOLACTONE 25 MG: 25 TABLET ORAL at 08:03

## 2019-09-30 RX ADMIN — IPRATROPIUM BROMIDE 0.5 MG: 0.5 SOLUTION RESPIRATORY (INHALATION) at 13:05

## 2019-09-30 RX ADMIN — LEVOTHYROXINE SODIUM 50 MCG: 50 TABLET ORAL at 05:48

## 2019-09-30 RX ADMIN — TIMOLOL MALEATE 1 DROP: 5 SOLUTION/ DROPS OPHTHALMIC at 17:00

## 2019-09-30 RX ADMIN — BRIMONIDINE TARTRATE 1 DROP: 1.5 SOLUTION OPHTHALMIC at 14:53

## 2019-09-30 RX ADMIN — MONTELUKAST SODIUM 10 MG: 10 TABLET, FILM COATED ORAL at 08:03

## 2019-09-30 RX ADMIN — METHYLPREDNISOLONE SODIUM SUCCINATE 40 MG: 40 INJECTION, POWDER, FOR SOLUTION INTRAMUSCULAR; INTRAVENOUS at 12:57

## 2019-09-30 RX ADMIN — TIMOLOL MALEATE 1 DROP: 5 SOLUTION/ DROPS OPHTHALMIC at 09:01

## 2019-09-30 RX ADMIN — IPRATROPIUM BROMIDE 0.5 MG: 0.5 SOLUTION RESPIRATORY (INHALATION) at 20:37

## 2019-09-30 RX ADMIN — PRAVASTATIN SODIUM 40 MG: 40 TABLET ORAL at 15:58

## 2019-09-30 RX ADMIN — POTASSIUM CHLORIDE 40 MEQ: 1500 TABLET, EXTENDED RELEASE ORAL at 08:03

## 2019-09-30 RX ADMIN — LEVALBUTEROL HYDROCHLORIDE 0.63 MG: 0.63 SOLUTION RESPIRATORY (INHALATION) at 07:26

## 2019-09-30 RX ADMIN — IPRATROPIUM BROMIDE 0.5 MG: 0.5 SOLUTION RESPIRATORY (INHALATION) at 07:26

## 2019-09-30 RX ADMIN — PANTOPRAZOLE SODIUM 40 MG: 40 TABLET, DELAYED RELEASE ORAL at 05:48

## 2019-09-30 RX ADMIN — METHYLPREDNISOLONE SODIUM SUCCINATE 40 MG: 40 INJECTION, POWDER, FOR SOLUTION INTRAMUSCULAR; INTRAVENOUS at 21:08

## 2019-09-30 RX ADMIN — POTASSIUM CHLORIDE 20 MEQ: 14.9 INJECTION, SOLUTION INTRAVENOUS at 04:04

## 2019-09-30 RX ADMIN — LEVALBUTEROL HYDROCHLORIDE 0.63 MG: 0.63 SOLUTION RESPIRATORY (INHALATION) at 13:05

## 2019-09-30 RX ADMIN — FUROSEMIDE 40 MG: 10 INJECTION, SOLUTION INTRAMUSCULAR; INTRAVENOUS at 09:03

## 2019-09-30 RX ADMIN — LEVALBUTEROL HYDROCHLORIDE 0.63 MG: 0.63 SOLUTION RESPIRATORY (INHALATION) at 20:37

## 2019-09-30 NOTE — PLAN OF CARE
Problem: PHYSICAL THERAPY ADULT  Goal: Performs mobility at highest level of function for planned discharge setting  See evaluation for individualized goals  Description  Treatment/Interventions: Functional transfer training, LE strengthening/ROM, Therapeutic exercise, Endurance training, Patient/family training, Equipment eval/education, Bed mobility, Gait training  Equipment Recommended: [place]       See flowsheet documentation for full assessment, interventions and recommendations  Note:   Prognosis: Fair  Problem List: Decreased strength, Decreased endurance, Impaired balance, Decreased mobility, Decreased safety awareness  Assessment: Pt is a 80 y o  male seen for PT evaluation s/p admit to [address] [place] [address] on 9/26/2019 w/ Acute respiratory failure with hypoxia and hypercapnia ([facility] [address] )  Order placed for PT  Comorbidities affecting pt's physical performance at time of assessment listed above  Personal factors affecting pt at time of IE include: multi-level environment, limited home support, advanced age, inability to perform IADLs, inability to perform ADLs, inability to ambulate household distances and limited insight into impairments  Prior to admission, pt was was independent w/ all functional mobility w/ RW, lived in multi-level home and lives alone  Pt was negotiating a full flight of stairs to basement laundry at baseline  Upon evaluation: Pt requires supervision for sit to stand and min A for ambulation with RW  Limited significantly by SpO2    (Please find full objective findings from PT assessment regarding body systems outlined above)  Impairments and limitations also listed above, especially due to  weakness, impaired balance, decreased endurance, gait deviations, decreased activity tolerance, decreased safety awareness, fall risk and SOB upon exertion  The following objective measures performed on IE also reveal limitations: Barthel Index 55/100   Pt's clinical presentation is currently unstable/unpredictable seen in pt's presentation of decreased safety awareness, fall risk and continuous monitoring in ICU  Pt to benefit from continued skilled PT tx while in hospital and upon DC to address deficits as defined above and maximize level of functional mobility  From PT/mobility standpoint, recommendation at time of d/c would be Home PT vs  STR pending progress and ability for pt to stay with daughter/YOVANI  Recommend progression of ambulation and initiation of HEP as appropriate  Recommendation: Home PT, Home with family support(vs  rehab pending ability to stay with daughter/YOVANI)          See flowsheet documentation for full assessment

## 2019-09-30 NOTE — ASSESSMENT & PLAN NOTE
· Due to above  · IV diuresis  · H/O cigarette smoking in the past  May consider outpt PFT  · Currently on high flow nasal canula, need to transition to nasal canula before DC  · 09/29 Patient having difficulty maintaining adequate saturation despite improving volume status  ABG: pH 7 4, CO2 55 4, HCO3 34  Repeat CXR showed no improvement from CXR on admission  Currently on high flow nasal canula  Cannot rule out underlying lung disease  CHF vs COPD  · DC albuterol, started Xopinex  · Appreciate pulmonology consult  · Did not find steroids necessary at this point  · Continue titrating high flow nasal canula   · Given two doses of diamox for bilateral pleural effusions  Would like to repeat imaging after more aggressive diuresis  · Incidental pulmonary nodule in RUL to be re evaluated by chest CT in April 2020

## 2019-09-30 NOTE — PHYSICAL THERAPY NOTE
PHYSICAL THERAPY EVALUATION  NAME: Aminah Erazo  AGE:   80 y o  MRN:  [id number]  ADMIT DX: SOB (shortness of breath) [R06 02]  Pleural effusion [J90]  Acute on chronic heart failure with preserved ejection fraction (HCC) [I50 33]    PMH:   Past Medical History:   Diagnosis Date    Allergic rhinitis     Hypercholesteremia     Hypertension     Pneumonia      LENGTH OF STAY: 4       19 0939   Pain Assessment   Pain Assessment No/denies pain   Pain Score No Pain   Home Living   Type of [address] [facility] One level; Laundry in basement  (0 SHAMAR, however must do full flight to basement for laundry)   Bathroom Shower/Tub   (pt reports he spongebathes at sink)   [address] [facility]   Additional Comments Ambulates with mod I and RW at baseline  Prior Function   Level of [place] Independent with ADLs and functional mobility   Lives With Alone   ADL Assistance Independent   IADLs Independent   Falls in the last 6 months 0   Comments (+)    Restrictions/Precautions   Weight Bearing Precautions Per Order No   Other Precautions Multiple lines;Telemetry; Fall Risk;Hard of hearing  (high flow O2 NC)   General   Family/Caregiver Present No   Cognition   Overall Cognitive Status WFL   Arousal/Participation Cooperative   Orientation Level Oriented X4   Memory Decreased recall of precautions   Following Commands Follows one step commands with increased time or repetition   Comments Pt identified by name and       RLE Assessment   RLE Assessment X   Strength RLE   RLE Overall Strength 4-/5  (functionally)   LLE Assessment   LLE Assessment X   Strength LLE   LLE Overall Strength 4-/5  (functionally)   Bed Mobility   Supine to Sit Unable to assess  (OOB in chair pre/post session )   Transfers   Sit to Stand 5  Supervision   Additional items Increased time required;Verbal cues   Stand to Sit 5  Supervision   Additional items Increased time required;Verbal cues   Stand pivot 4  Minimal assistance Additional items Assist x 1; Increased time required;Verbal cues  (with RW)   Ambulation/Elevation   Gait pattern Improper Weight shift; Forward Flexion;Decreased foot clearance; Short stride; Excessively slow   Gait Assistance 4  Minimal assist   Additional items Assist x 1;Verbal cues   Assistive Device Rolling walker   Distance 4` forward/backward x4  (limited due to high flow O2)   Balance   Static Sitting Fair +   Dynamic Sitting Fair   Static Standing Fair   Dynamic Standing Fair -   Ambulatory Fair -   Endurance Deficit   Endurance Deficit Yes   Endurance Deficit Description limited ambulation distance, SpO2 decreased to 87% with ambulation  (increased recovery time required)   Activity Tolerance   Activity Tolerance Patient limited by fatigue;Treatment limited secondary to medical complications (Comment)   Nurse Made Aware Per RN, pt appropriate to evaluate   Assessment   Prognosis Fair   Problem List Decreased strength;Decreased endurance; Impaired balance;Decreased mobility; Decreased safety awareness   Goals   Patient Goals Pt would like to go home  STG Expiration Date 10/09/19   Short Term Goal #1 Pt will be able to: (1) perform bed mobility with mod I (2) perform sit to stand with mod I (3) ambulate at least 200` with supervision and least restrictive AD (4) initiate HEP (5) PT to see for stair negotiation as appropriate   PT Treatment Day 0   Plan   Treatment/Interventions Functional transfer training;LE strengthening/ROM; Therapeutic exercise; Endurance training;Patient/family training;Equipment eval/education; Bed mobility;Gait training   PT Frequency   (3-5x/week)   Recommendation   Recommendation Home PT; Home with family support  (vs  rehab pending ability to stay with daughter/YOVANI)   Equipment Recommended Walker   Barthel Index   Feeding 10   Bathing 0   Grooming Score 5   Dressing Score 5   Bladder Score 10   Bowels Score 10   Toilet Use Score 5   Transfers (Bed/Chair) Score 10   Mobility (Level Surface) Score 0   Stairs Score 0   Barthel Index Score 55       Assessment: Pt is a 80 y o  male seen for PT evaluation s/p admit to [facility] on 9/26/2019 w/ Acute respiratory failure with hypoxia and hypercapnia ([address] )  Order placed for PT  Comorbidities affecting pt's physical performance at time of assessment listed above  Personal factors affecting pt at time of IE include: multi-level environment, limited home support, advanced age, inability to perform IADLs, inability to perform ADLs, inability to ambulate household distances and limited insight into impairments  Prior to admission, pt was was independent w/ all functional mobility w/ RW, lived in multi-level home and lives alone  Pt was negotiating a full flight of stairs to basement laundry at baseline  Upon evaluation: Pt requires supervision for sit to stand and min A for ambulation with RW  Limited significantly by SpO2    (Please find full objective findings from PT assessment regarding body systems outlined above)  Impairments and limitations also listed above, especially due to  weakness, impaired balance, decreased endurance, gait deviations, decreased activity tolerance, decreased safety awareness, fall risk and SOB upon exertion  The following objective measures performed on IE also reveal limitations: Barthel Index 55/100  Pt's clinical presentation is currently unstable/unpredictable seen in pt's presentation of decreased safety awareness, fall risk and continuous monitoring in ICU  Pt to benefit from continued skilled PT tx while in hospital and upon DC to address deficits as defined above and maximize level of functional mobility  From PT/mobility standpoint, recommendation at time of d/c would be Home PT vs  STR pending progress and ability for pt to stay with daughter/YOVANI  Recommend progression of ambulation and initiation of HEP as appropriate        Devika Rosales, PT,DPT

## 2019-09-30 NOTE — PROGRESS NOTES
Cardiology Progress Note - Sophia Garg 80 y o  male MRN: [id number]    Unit/Bed#:  Encounter: [id number]        Subjective:    No significant events overnight  Improving  Review of Systems   Cardiovascular: Negative for chest pain, leg swelling and palpitations  Respiratory: Positive for shortness of breath  Objective:   Vitals: Blood pressure 129/72, pulse 66, temperature 98 1 °F (36 7 °C), temperature source Axillary, resp  rate 20, weight 78 4 kg (172 lb 13 5 oz), SpO2 94 %  , Body mass index is 27 07 kg/m² , Orthostatic Blood Pressures      Most Recent Value   Blood Pressure  129/72 filed at 2019 0658   Patient Position - Orthostatic VS  Lying filed at 2019 4166         Systolic (52YIH), YGI:803 , Min:118 , HJS:035     Diastolic (60ASD), CF, Min:57, Max:72      Intake/Output Summary (Last 24 hours) at 2019 0851  Last data filed at 2019 0701  Gross per 24 hour   Intake 1476 ml   Output 1750 ml   Net -274 ml     Weight (last 2 days)     Date/Time   Weight    19 0556   78 4 (172 84)    19 0600   78 9 (173 94)    19 0600   79 (174 16)              Telemetry Review: NSR    Physical Exam   Cardiovascular: Normal rate, regular rhythm and normal heart sounds  Exam reveals no gallop and no friction rub  No murmur heard  Pulmonary/Chest: He has decreased breath sounds  He has no wheezes  He has no rales  Musculoskeletal: He exhibits no edema           Laboratory Results:  Results from last 7 days   Lab Units 19  1526   TROPONIN I ng/mL 0 03       CBC with diff: Results from last 7 days   Lab Units 19  0317 19  0329 19  0555 19  1526 19  1406   WBC Thousand/uL 4 72 5 47 2 23* 4 83 5 21   HEMOGLOBIN g/dL 14 5 14 5 14 3 15 0 15 0   HEMATOCRIT % 43 3 44 8 43 6 46 4 46 7   MCV fL 93 97 96 98 97   PLATELETS Thousands/uL 154 145* 126* 147* 173   MCH pg 31 3 31 3 31 4 31 6 31 1   MCHC g/dL 33 5 32 4 32 8 32 3 32 1   RDW % 13 0 13 2 13 4 13 3 13 2   MPV fL 10 6 10 3 10 5 10 8 11 1   NRBC AUTO /100 WBCs  --  0 0 0 0         CMP:  Results from last 7 days   Lab Units 199 19  0555 19  1526 19  1406   POTASSIUM mmol/L 3 2* 3 4* 4 1 4 2 4 2 4 2   CHLORIDE mmol/L 93* 93* 95* 98* 96* 97*   CO2 mmol/L 43* >45* 42* 35* 34* 33*   BUN mg/dL 36* 31* 30* 21 21 19   CREATININE mg/dL 0 99 0 78 0 89 0 81 1 11 0 94   CALCIUM mg/dL 8 1* 7 7* 8 1* 8 2* 8 5 8 9   AST U/L  --   --  20  --  17 18   ALT U/L  --   --  13  --  11* 15   ALK PHOS U/L  --   --  59  --  67 67   EGFR ml/min/1 73sq m 67 79 75 78 58 71         BMP:  Results from last 7 days   Lab Units 19  0555 19  1526 19  1406   POTASSIUM mmol/L 3 2* 3 4* 4 1 4 2 4 2 4 2   CHLORIDE mmol/L 93* 93* 95* 98* 96* 97*   CO2 mmol/L 43* >45* 42* 35* 34* 33*   BUN mg/dL 36* 31* 30* 21 21 19   CREATININE mg/dL 0 99 0 78 0 89 0 81 1 11 0 94   CALCIUM mg/dL 8 1* 7 7* 8 1* 8 2* 8 5 8 9       BNP:     Magnesium:   Results from last 7 days   Lab Units 19  0555 19  1526   MAGNESIUM mg/dL 2 0 1 8 1 9 2 0       Coags:   Results from last 7 days   Lab Units 19  1526   INR  1 15       TSH:       Lipid Profile:   Results from last 7 days   Lab Units 19  1406   TRIGLYCERIDES mg/dL 37   HDL mg/dL 80*           Cardiac testing:   Results for orders placed during the hospital encounter of 19   Echo complete with contrast if indicated    Narrative [facility] [address], [address] [facility]  [phone]    Transthoracic Echocardiogram  2D, M-mode, Doppler, and Color Doppler    Study date:  18-Mar-2019    Patient: Odilon Olsen  MR number: [id number]  Account number: [de-identified]  : 24-Mar-1929  Age: 80 years  Gender: Male  Status: Inpatient  Location: Bedside  Height: 67 in  Weight: 185 7 lb  BP: 151/ 68 mmHg    Indications: Assess left ventricular function  Diagnoses: I50 9 - Heart failure, unspecified    Sonographer:  Kenneth Schilder, RDCS  Primary Physician:  Yenny Helm MD  Referring Physician:  Toro Moran MD  Group:  [facility]  Interpreting Physician:  Caleb Cogan, MD    SUMMARY    LEFT VENTRICLE:  Systolic function was normal  Ejection fraction was estimated to be 65 %  Although no diagnostic regional wall motion abnormality was identified, this possibility cannot be completely excluded on the basis of this study  Doppler parameters were consistent with abnormal left ventricular relaxation (grade 1 diastolic dysfunction)  RIGHT VENTRICLE:  The ventricle was dilated  Systolic function was mildly reduced  RIGHT ATRIUM:  The atrium was dilated  PROCEDURE: The procedure was performed at the bedside  This was a routine study  The transthoracic approach was used  The study included complete 2D imaging, M-mode, complete spectral Doppler, and color Doppler  The heart rate was 70 bpm,  at the start of the study  Images were obtained from the parasternal, apical, subcostal, and suprasternal notch acoustic windows  Image quality was adequate  LEFT VENTRICLE: Size was normal  Systolic function was normal  Ejection fraction was estimated to be 65 %  Although no diagnostic regional wall motion abnormality was identified, this possibility cannot be completely excluded on the basis  of this study  DOPPLER: There was an increased relative contribution of atrial contraction to ventricular filling  Doppler parameters were consistent with abnormal left ventricular relaxation (grade 1 diastolic dysfunction)  RIGHT VENTRICLE: The ventricle was dilated  Systolic function was mildly reduced  LEFT ATRIUM: Size was normal     RIGHT ATRIUM: The atrium was dilated  MITRAL VALVE: Valve structure was normal  There was normal leaflet separation  DOPPLER: There was no evidence for stenosis  There was no significant regurgitation  AORTIC VALVE: The valve was not well visualized  DOPPLER: There was no evidence for stenosis  There was no significant regurgitation  TRICUSPID VALVE: The valve structure was normal  There was normal leaflet separation  DOPPLER: There was no evidence for stenosis  There was no significant regurgitation  PULMONIC VALVE: Leaflets exhibited normal thickness, no calcification, and normal cuspal separation  DOPPLER: The transpulmonic velocity was within the normal range  There was no regurgitation  PERICARDIUM: There was no pericardial effusion  The pericardium was normal in appearance  AORTA: The root exhibited normal size  Not well visualized      SYSTEM MEASUREMENT TABLES    2D  %FS: 34 11 %  AV Diam: 3 48 cm  EDV(Teich): 85 19 ml  EF(Cube): 71 4 %  EF(Teich): 63 34 %  ESV(Cube): 23 48 ml  ESV(Teich): 31 23 ml  IVSd: 1 21 cm  LA Area: 15 68 cm2  LA Diam: 3 47 cm  LVEDV MOD A4C: 78 44 ml  LVEF MOD A4C: 67 23 %  LVESV MOD A4C: 25 7 ml  LVIDd: 4 35 cm  LVIDs: 2 86 cm  LVLd A4C: 7 59 cm  LVLs A4C: 6 36 cm  LVPWd: 1 21 cm  RA Area: 17 77 cm2  RV Diam: 3 96 cm  SI(Cube): 29 91 ml/m2  SI(Teich): 27 53 ml/m2  SV MOD A4C: 52 74 ml  SV(Cube): 58 62 ml  SV(Teich): 53 96 ml    MM  TAPSE: 2 36 cm    PW  AVC: 334 79 ms  E': 0 05 m/s  E/E': 19 38  MV A Nickolas: 1 06 m/s  MV Dec McMullen: 4 85 m/s2  MV DecT: 207 36 ms  MV E Nickolas: 1 01 m/s  MV E/A Ratio: 0 95    [employer] Accredited Echocardiography Laboratory    Prepared and electronically signed by    Gabi Hidalgo MD  Signed 18-Mar-2019 15:04:21         Meds/Allergies     Current Facility-Administered Medications:  albuterol 2 5 mg Nebulization Q6H PRN Mike Torres MD   amLODIPine 10 mg Oral Daily Case Johnson MD   brimonidine 1 drop Both Eyes Q8H [address] Case Johnson MD   enoxaparin 40 mg Subcutaneous Daily Case Johnson MD   ipratropium 0 5 mg Nebulization TID Trent Jennings MD   levalbuterol 0 63 mg Nebulization TID Vero Frazier MD   levothyroxine 50 mcg Oral Daily Felicia Munoz MD   montelukast 10 mg Oral Daily Felicia Munoz MD   ondansetron 4 mg Intravenous Q6H PRN Felicia Munoz MD   pantoprazole 40 mg Oral Early Morning Vero Frazier MD   pravastatin 40 mg Oral Daily With Elizabeth Walters MD   spironolactone 25 mg Oral Daily Fredna Soulier, DO   terazosin 5 mg Oral HS Felicia Munoz MD   timolol 1 drop Both Eyes BID Felicia Munoz MD        Medications Prior to Admission   Medication    amLODIPine (NORVASC) 10 mg tablet    Blood Pressure Monitoring (B-D ASSURE BPM/AUTO ARM CUFF) MISC    brimonidine-timolol (COMBIGAN) 0 2-0 5 %    furosemide (LASIX) 20 mg tablet    latanoprost (XALATAN) 0 005 % ophthalmic solution    levothyroxine 50 mcg tablet    montelukast (SINGULAIR) 10 mg tablet    potassium chloride (K-DUR,KLOR-CON) 20 mEq tablet    RABEprazole (ACIPHEX) 20 MG tablet    simvastatin (ZOCOR) 20 mg tablet    spironolactone (ALDACTONE) 25 mg tablet    terazosin (HYTRIN) 5 mg capsule       Assessment:  Principal Problem:    Acute respiratory failure with hypoxia and hypercapnia (HCC)  Active Problems:    Bilateral leg edema    Acute metabolic encephalopathy    Acute on chronic heart failure with preserved ejection fraction (HCC)    Essential hypertension    Chronic kidney disease, stage 2 (mild)      Impression:  1  Acute on chronic diastolic heart failure - on IV diuretics  2  HTN - controlled  3  B pleural effusion     Plan:  1  Continue IV diuretics  2  Replete potassium

## 2019-09-30 NOTE — PROGRESS NOTES
Progress Note Arleene Face 3/24/1929, 80 y o  male MRN: [id number]    Unit/Bed#:  Encounter: [id number]    Primary Care Provider: Vincent Haynes MD   Date and time admitted to hospital: 9/26/2019  3:06 PM        * Acute respiratory failure with hypoxia and hypercapnia ([facility] [address] )  Assessment & Plan  · Due to above  · IV diuresis  · H/O cigarette smoking in the past  May consider outpt PFT  · Currently on high flow nasal canula, need to transition to nasal canula before DC  · 09/29 Patient having difficulty maintaining adequate saturation despite improving volume status  ABG: pH 7 4, CO2 55 4, HCO3 34  Repeat CXR showed no improvement from CXR on admission  Currently on high flow nasal canula  Cannot rule out underlying lung disease  CHF vs COPD  · DC albuterol, started Xopinex  · Appreciate pulmonology consult  · Did not find steroids necessary at this point  · Continue titrating high flow nasal canula   · Given two doses of diamox for bilateral pleural effusions  Would like to repeat imaging after more aggressive diuresis  · Incidental pulmonary nodule in RUL to be re evaluated by chest CT in April 2020   Acute on chronic heart failure with preserved ejection fraction [facility])  Assessment & Plan  Wt Readings from Last 3 Encounters:   09/30/19 78 4 kg (172 lb 13 5 oz)   06/06/19 79 6 kg (175 lb 8 oz)   04/02/19 77 1 kg (170 lb)   Patient has a past medical history of heart failure with preserved ejection fraction  Last echocardiogram was 03/18/2019 which showed normal systolic function, ejection fraction of 65% and grade 1 diastolic dysfunction  Patient today presents volume overloaded  On physical exam JVD and bilateral lower extremity pitting edema was noted    CXR showed bilateral pleural effusions   - continue diuresis with IV Lasix   - On Aldactone  - monitor ins and outs /weight  - Patient is - 4, 959  - Diuresing well       Essential hypertension  Assessment & Plan  Patient with history of hypertension  Blood pressure medication managed by cardiologist   Home medications include terazosin 5 mg, Aldactone 25 mg, amlodipine 10 mg  Blood pressure stable in the ED, 120/69  - continue terazosin 5 mg  - continue amlodipine 10 mg  - Aldactone continued  - monitor blood pressure    Chronic kidney disease, stage 2 (mild)  Assessment & Plan  Patients creatinine   99 from baseline 0 7-0 9  Monitor BMP with diuresis      Acute metabolic encephalopathy  Assessment & Plan  Metabolic encephalopathy in the setting of acute respiratory failure with hypoxia evidenced confusion at home for past few days   - Patient stable at baseline  - Continue oxygen, neuro checks and nursing safety measures      Bilateral leg edema  Assessment & Plan  · Appears L>R LE edema  · Venous doppler results were normal          Pharmacologic: Enoxaparin (Lovenox)  Mechanical VTE Prophylaxis in Place: Yes    Discussions with Specialists or Other Care Team Provider: Yes  Education and Discussions with Family / Patient: N/A    Current Length of Stay: 4 day(s)    Current Patient Status: Inpatient     Discharge Plan / Estimated Discharge Date: Unknown    Code Status: Level 3 - DNAR and DNI      Subjective:   Patient is pleasantly feeling and looking better  His SOB has been improving with diuresis  Patient using incentive spirometry successfully  He denies any SOB or chest pain  Patient has been up and ambulating with PT  Currently attempting to wean of high flow nasal canula and transition to nasal canula  Objective:     Vitals:   Temp (24hrs), Av 9 °F (36 6 °C), Min:97 7 °F (36 5 °C), Max:98 1 °F (36 7 °C)    Temp:  [97 7 °F (36 5 °C)-98 1 °F (36 7 °C)] 98 1 °F (36 7 °C)  HR:  [61-69] 69  Resp:  [14-20] 20  BP: (118-141)/(57-72) 141/65  SpO2:  [90 %-96 %] 92 %  Body mass index is 27 07 kg/m²  Input and Output Summary (last 24 hours):        Intake/Output Summary (Last 24 hours) at 2019 1600  Last data filed at 2019 1502  Gross per 24 hour   Intake 1530 ml   Output 2000 ml   Net -470 ml       Physical Exam:     Physical Exam   Constitutional: He is oriented to person, place, and time  He appears well-developed and well-nourished  Cardiovascular: Normal rate, regular rhythm and normal heart sounds  Pulmonary/Chest: Effort normal  He has wheezes  Expiratory wheezes heard in am  No longer using accessory muscles of respiration  Abdominal: Soft  Bowel sounds are normal    Neurological: He is alert and oriented to person, place, and time  Psychiatric: He has a normal mood and affect  His behavior is normal  Thought content normal          Additional Data:     Labs:    Results from last 7 days   Lab Units 09/30/19 0317 09/28/19  0329   WBC Thousand/uL 4 72 5 47   HEMOGLOBIN g/dL 14 5 14 5   HEMATOCRIT % 43 3 44 8   PLATELETS Thousands/uL 154 145*   NEUTROS PCT %  --  83*   LYMPHS PCT %  --  7*   MONOS PCT %  --  9   EOS PCT %  --  0     Results from last 7 days   Lab Units 09/30/19 0317 09/28/19  0329   POTASSIUM mmol/L 3 2*   < > 4 1   CHLORIDE mmol/L 93*   < > 95*   CO2 mmol/L 43*   < > 42*   BUN mg/dL 36*   < > 30*   CREATININE mg/dL 0 99   < > 0 89   CALCIUM mg/dL 8 1*   < > 8 1*   ALK PHOS U/L  --   --  59   ALT U/L  --   --  13   AST U/L  --   --  20    < > = values in this interval not displayed  Results from last 7 days   Lab Units 09/26/19  1526   INR  1 15       * I Have Reviewed All Lab Data Listed Above  * Additional Pertinent Lab Tests Reviewed:  [facility]              Last 24 Hours Medication List:     Current Facility-Administered Medications:  albuterol 2 5 mg Nebulization Q6H PRN Jose Clark MD   amLODIPine 10 mg Oral Daily Elsi De Jesus MD   brimonidine 1 drop Both Eyes Q8H [address] Elsi De Jesus MD   enoxaparin 40 mg Subcutaneous Daily Elsi De Jesus MD   furosemide 40 mg Intravenous BID (diuretic) Apurva Sapp MD   ipratropium 0 5 mg Nebulization TID Jadiel Anderson MD   levalbuterol 0 63 mg Nebulization TID Jadiel Anderson MD   levothyroxine 50 mcg Oral Daily Betha Goodell, MD   methylPREDNISolone sodium succinate 40 mg Intravenous Q12H [address] Ladan Gifford PA-C   montelukast 10 mg Oral Daily Betha Goodell, MD   ondansetron 4 mg Intravenous Q6H PRN Betha Goodell, MD   [START ON 10/1/2019] pantoprazole 40 mg Oral Early Morning Betha Goodell, MD   pravastatin 40 mg Oral Daily With [address] Amanda Hernandez MD   spironolactone 25 mg Oral Daily Rand Valente DO   terazosin 5 mg Oral HS Betha Goodell, MD   timolol 1 drop Both Eyes BID Betha Goodell, MD        Today, Patient Was Seen By: Eveline Huffman MD    ** Please Note: This note has been constructed using a voice recognition system   **

## 2019-09-30 NOTE — ASSESSMENT & PLAN NOTE
Patient with history of hypertension  Blood pressure medication managed by cardiologist   Home medications include terazosin 5 mg, Aldactone 25 mg, amlodipine 10 mg   Blood pressure stable in the ED, 120/69  - continue terazosin 5 mg  - continue amlodipine 10 mg  - Aldactone continued  - monitor blood pressure

## 2019-09-30 NOTE — ASSESSMENT & PLAN NOTE
Wt Readings from Last 3 Encounters:   09/30/19 78 4 kg (172 lb 13 5 oz)   06/06/19 79 6 kg (175 lb 8 oz)   04/02/19 77 1 kg (170 lb)   Patient has a past medical history of heart failure with preserved ejection fraction  Last echocardiogram was 03/18/2019 which showed normal systolic function, ejection fraction of 65% and grade 1 diastolic dysfunction  Patient today presents volume overloaded  On physical exam JVD and bilateral lower extremity pitting edema was noted    CXR showed bilateral pleural effusions   - continue diuresis with IV Lasix   - On Aldactone  - monitor ins and outs /weight  - Patient is - 4, 959  - Diuresing well

## 2019-09-30 NOTE — PROGRESS NOTES
Progress Note - Pulmonary   Guadalupe Slot 80 y o  male MRN: [id number]  Unit/Bed#:  Encounter: [id number]      Assessment/Plan:  1  Acute hypoxic & chronic hypercapnic respiratory failure        *  Multifactorial and related to #2 & #3        *  Continue to titrate HFNC back to regular nasal cannula  Keep saturations greater than or equal to 88%        *  Incentive spirometry Q1hr, OOB as able, increase activity as able        *  Has been on home oxygen in the past, but was discontinued at last office visit in April  Will need formal oxygen evaluation prior to               D/C        *  Continue BiPAP QHS --> Check AM ABG   2  Bilateral pleural effusions        *  Secondary to #3        *  Continue with IV lasix per cardiology        *  Completed 2 doses diamox        *  Follow up with repeat imaging after more aggressive diuresis  3  Acute on chronic diastolic HF        *  As above        *  Monitor I/Os, daily weights  4  RUL pulmonary nodule        *  Repeat CTChest due in April 2020        *  No formal diagnosis of COPD, not on any inhalers at home  Prior heavy tobacco history 2PPD for 15 years - quit 60 years ago        *  Could consider outpatient PFTs if pt agreeable vs in office spirometry         *  Restart steroids for presumed COPD exacerbation  5  COPD of unknown severity with acute exacerbation        *  Re add steroids        *  Continue Xopenex/Atrovent Nebs TID        *  Outpatient follow up as per D/C instructions    -Outpatient pulmonary follow up as per D/C instructions    Subjective:   Mr Georgette Loyola is seen sitting out of bed in the chair this morning eating breakfast   He is feeling better today and notes that his breathing is easier  He also has had improvement in his cough  Last night, while using his IS, he was able to produce some white sputum  His lower extremity edema also is better    He remains on HFNC and was not on oxygen prior to admission, but was sent home with oxygen after his admission this March  He does not use any inhalers at home  He denies chest pain, resting shortness of breath, fever or bronchospasm  Objective:     Vitals: Blood pressure 129/72, pulse 66, temperature 98 1 °F (36 7 °C), temperature source Axillary, resp  rate 20, weight 78 4 kg (172 lb 13 5 oz), SpO2 94 %  , 60%, 45L HFNC, Body mass index is 27 07 kg/m²  Intake/Output Summary (Last 24 hours) at 9/30/2019 0919  Last data filed at 9/30/2019 0910  Gross per 24 hour   Intake 1966 ml   Output 1750 ml   Net 216 ml         Physical Exam  Gen: Awake, alert, oriented x 3, no acute distress  HEENT: Mucous membranes moist, no oral lesions, no thrush, wearing HFNC  NECK: No accessory muscle use, JVP not elevated  Cardiac: Regular, single S1, single S2, no murmurs, no rubs, no gallops  Lungs: Decreased breath sounds at the bases bilaterally  No wheezes, rhonchi or rales noted  Abdomen: normoactive bowel sounds, soft nontender, nondistended, no rebound or rigidity, no guarding  Extremities: no cyanosis, no clubbing, no edema, wearing SCDs bilaterally    Labs: I have personally reviewed pertinent lab results  , ABG: No results found for: PHART, SFF4KIZ, PO2ART, IXS8UWT, D8EUIXVB, BEART, SOURCE, BNP: No results found for: BNP, CBC:   Lab Results   Component Value Date    WBC 4 72 09/30/2019    HGB 14 5 09/30/2019    HCT 43 3 09/30/2019    MCV 93 09/30/2019     09/30/2019    MCH 31 3 09/30/2019    MCHC 33 5 09/30/2019    RDW 13 0 09/30/2019    MPV 10 6 09/30/2019   , CMP:   Lab Results   Component Value Date    SODIUM 139 09/30/2019    K 3 2 (L) 09/30/2019    CL 93 (L) 09/30/2019    CO2 43 (H) 09/30/2019    BUN 36 (H) 09/30/2019    CREATININE 0 99 09/30/2019    CALCIUM 8 1 (L) 09/30/2019    EGFR 67 09/30/2019   , PT/INR: No results found for: PT, INR, Troponin: No results found for: TROPONINI     ABG 9/29/19 on BiPAP 18/8 - 7 406/55 4/65 9/34    Imaging and other studies: I have personally reviewed pertinent films in PACS    [facility] 9/29/19  Pulmonary edema with small bilateral pleural effusions    Echocardiogram 3/18/19  EF 65% with grade I diastolic dysfunction  RV/RA dilated    No significant valvular abnormalities    Lottie Garcia PA-C

## 2019-10-01 ENCOUNTER — APPOINTMENT (INPATIENT)
Dept: RADIOLOGY | Facility: HOSPITAL | Age: 84
DRG: 291 | End: 2019-10-01
Payer: COMMERCIAL

## 2019-10-01 LAB
ALBUMIN FLD-MCNC: 2.5 G/DL
ALBUMIN SERPL BCP-MCNC: 2.5 G/DL (ref 3.5–5)
ALP SERPL-CCNC: 41 U/L (ref 46–116)
ALT SERPL W P-5'-P-CCNC: 8 U/L (ref 12–78)
ANION GAP SERPL CALCULATED.3IONS-SCNC: 5 MMOL/L (ref 4–13)
APPEARANCE FLD: CLEAR
AST SERPL W P-5'-P-CCNC: 14 U/L (ref 5–45)
BASE EXCESS BLDA CALC-SCNC: 11.5 MMOL/L
BILIRUB DIRECT SERPL-MCNC: 0.22 MG/DL (ref 0–0.2)
BILIRUB SERPL-MCNC: 0.8 MG/DL (ref 0.2–1)
BUN SERPL-MCNC: 35 MG/DL (ref 5–25)
CALCIUM SERPL-MCNC: 7.2 MG/DL (ref 8.3–10.1)
CHLORIDE SERPL-SCNC: 102 MMOL/L (ref 100–108)
CO2 SERPL-SCNC: 34 MMOL/L (ref 21–32)
COLOR FLD: YELLOW
CREAT SERPL-MCNC: 0.81 MG/DL (ref 0.6–1.3)
GFR SERPL CREATININE-BSD FRML MDRD: 78 ML/MIN/1.73SQ M
GLUCOSE SERPL-MCNC: 121 MG/DL (ref 65–140)
HCO3 BLDA-SCNC: 37 MMOL/L (ref 22–28)
LDH FLD L TO P-CCNC: 92 U/L
LDH SERPL-CCNC: 172 U/L (ref 81–234)
LYMPHOCYTES NFR BLD AUTO: 95 %
MONONUC CELLS NFR FLD MANUAL: 4 %
NEUTS SEG NFR BLD AUTO: 1 %
NON VENT- BIPAP: ABNORMAL
O2 CT BLDA-SCNC: 19.6 ML/DL (ref 16–23)
OXYHGB MFR BLDA: 91.4 % (ref 94–97)
PCO2 BLDA: 50.2 MM HG (ref 36–44)
PH BLDA: 7.49 [PH] (ref 7.35–7.45)
PH BODY FLUID: 7.8
PO2 BLDA: 63.8 MM HG (ref 75–129)
POTASSIUM SERPL-SCNC: 2.7 MMOL/L (ref 3.5–5.3)
PROT FLD-MCNC: 3.2 G/DL
PROT SERPL-MCNC: 4.8 G/DL (ref 6.4–8.2)
SITE: NORMAL
SODIUM SERPL-SCNC: 141 MMOL/L (ref 136–145)
SPECIMEN SOURCE: ABNORMAL
TOTAL CELLS COUNTED SPEC: 100
WBC # FLD MANUAL: 686 /UL

## 2019-10-01 PROCEDURE — 82042 OTHER SOURCE ALBUMIN QUAN EA: CPT | Performed by: INTERNAL MEDICINE

## 2019-10-01 PROCEDURE — 32555 ASPIRATE PLEURA W/ IMAGING: CPT | Performed by: INTERNAL MEDICINE

## 2019-10-01 PROCEDURE — 80076 HEPATIC FUNCTION PANEL: CPT | Performed by: INTERNAL MEDICINE

## 2019-10-01 PROCEDURE — 87070 CULTURE OTHR SPECIMN AEROBIC: CPT | Performed by: INTERNAL MEDICINE

## 2019-10-01 PROCEDURE — 71045 X-RAY EXAM CHEST 1 VIEW: CPT

## 2019-10-01 PROCEDURE — 87205 SMEAR GRAM STAIN: CPT | Performed by: INTERNAL MEDICINE

## 2019-10-01 PROCEDURE — 83986 ASSAY PH BODY FLUID NOS: CPT | Performed by: INTERNAL MEDICINE

## 2019-10-01 PROCEDURE — 88112 CYTOPATH CELL ENHANCE TECH: CPT | Performed by: PATHOLOGY

## 2019-10-01 PROCEDURE — 94660 CPAP INITIATION&MGMT: CPT

## 2019-10-01 PROCEDURE — 94640 AIRWAY INHALATION TREATMENT: CPT

## 2019-10-01 PROCEDURE — 80048 BASIC METABOLIC PNL TOTAL CA: CPT | Performed by: INTERNAL MEDICINE

## 2019-10-01 PROCEDURE — 99232 SBSQ HOSP IP/OBS MODERATE 35: CPT | Performed by: INTERNAL MEDICINE

## 2019-10-01 PROCEDURE — 89051 BODY FLUID CELL COUNT: CPT | Performed by: INTERNAL MEDICINE

## 2019-10-01 PROCEDURE — 99233 SBSQ HOSP IP/OBS HIGH 50: CPT | Performed by: INTERNAL MEDICINE

## 2019-10-01 PROCEDURE — 83615 LACTATE (LD) (LDH) ENZYME: CPT | Performed by: INTERNAL MEDICINE

## 2019-10-01 PROCEDURE — 84157 ASSAY OF PROTEIN OTHER: CPT | Performed by: INTERNAL MEDICINE

## 2019-10-01 PROCEDURE — 36600 WITHDRAWAL OF ARTERIAL BLOOD: CPT

## 2019-10-01 PROCEDURE — 0W993ZX DRAINAGE OF RIGHT PLEURAL CAVITY, PERCUTANEOUS APPROACH, DIAGNOSTIC: ICD-10-PCS | Performed by: INTERNAL MEDICINE

## 2019-10-01 PROCEDURE — 93005 ELECTROCARDIOGRAM TRACING: CPT

## 2019-10-01 PROCEDURE — 94760 N-INVAS EAR/PLS OXIMETRY 1: CPT

## 2019-10-01 PROCEDURE — 82805 BLOOD GASES W/O2 SATURATION: CPT | Performed by: PHYSICIAN ASSISTANT

## 2019-10-01 RX ORDER — POTASSIUM CHLORIDE 20 MEQ/1
40 TABLET, EXTENDED RELEASE ORAL 2 TIMES DAILY
Status: DISCONTINUED | OUTPATIENT
Start: 2019-10-01 | End: 2019-10-05

## 2019-10-01 RX ORDER — POTASSIUM CHLORIDE 14.9 MG/ML
20 INJECTION INTRAVENOUS ONCE
Status: COMPLETED | OUTPATIENT
Start: 2019-10-01 | End: 2019-10-01

## 2019-10-01 RX ADMIN — LEVALBUTEROL HYDROCHLORIDE 0.63 MG: 0.63 SOLUTION RESPIRATORY (INHALATION) at 07:55

## 2019-10-01 RX ADMIN — ENOXAPARIN SODIUM 40 MG: 40 INJECTION SUBCUTANEOUS at 09:24

## 2019-10-01 RX ADMIN — PANTOPRAZOLE SODIUM 40 MG: 40 TABLET, DELAYED RELEASE ORAL at 05:20

## 2019-10-01 RX ADMIN — POTASSIUM CHLORIDE 40 MEQ: 1500 TABLET, EXTENDED RELEASE ORAL at 18:02

## 2019-10-01 RX ADMIN — METHYLPREDNISOLONE SODIUM SUCCINATE 40 MG: 40 INJECTION, POWDER, FOR SOLUTION INTRAMUSCULAR; INTRAVENOUS at 20:56

## 2019-10-01 RX ADMIN — TERAZOSIN HYDROCHLORIDE 5 MG: 5 CAPSULE ORAL at 21:00

## 2019-10-01 RX ADMIN — IPRATROPIUM BROMIDE 0.5 MG: 0.5 SOLUTION RESPIRATORY (INHALATION) at 19:44

## 2019-10-01 RX ADMIN — PRAVASTATIN SODIUM 40 MG: 40 TABLET ORAL at 15:33

## 2019-10-01 RX ADMIN — FUROSEMIDE 40 MG: 10 INJECTION, SOLUTION INTRAMUSCULAR; INTRAVENOUS at 08:16

## 2019-10-01 RX ADMIN — LEVALBUTEROL HYDROCHLORIDE 0.63 MG: 0.63 SOLUTION RESPIRATORY (INHALATION) at 19:44

## 2019-10-01 RX ADMIN — SPIRONOLACTONE 25 MG: 25 TABLET ORAL at 09:24

## 2019-10-01 RX ADMIN — POTASSIUM CHLORIDE 20 MEQ: 14.9 INJECTION, SOLUTION INTRAVENOUS at 09:25

## 2019-10-01 RX ADMIN — BRIMONIDINE TARTRATE 1 DROP: 1.5 SOLUTION OPHTHALMIC at 05:20

## 2019-10-01 RX ADMIN — BRIMONIDINE TARTRATE 1 DROP: 1.5 SOLUTION OPHTHALMIC at 15:38

## 2019-10-01 RX ADMIN — MONTELUKAST SODIUM 10 MG: 10 TABLET, FILM COATED ORAL at 09:24

## 2019-10-01 RX ADMIN — METHYLPREDNISOLONE SODIUM SUCCINATE 40 MG: 40 INJECTION, POWDER, FOR SOLUTION INTRAMUSCULAR; INTRAVENOUS at 09:24

## 2019-10-01 RX ADMIN — BRIMONIDINE TARTRATE 1 DROP: 1.5 SOLUTION OPHTHALMIC at 21:00

## 2019-10-01 RX ADMIN — LEVALBUTEROL HYDROCHLORIDE 0.63 MG: 0.63 SOLUTION RESPIRATORY (INHALATION) at 13:32

## 2019-10-01 RX ADMIN — TIMOLOL MALEATE 1 DROP: 5 SOLUTION/ DROPS OPHTHALMIC at 09:27

## 2019-10-01 RX ADMIN — TIMOLOL MALEATE 1 DROP: 5 SOLUTION/ DROPS OPHTHALMIC at 18:00

## 2019-10-01 RX ADMIN — FUROSEMIDE 40 MG: 10 INJECTION, SOLUTION INTRAMUSCULAR; INTRAVENOUS at 15:33

## 2019-10-01 RX ADMIN — IPRATROPIUM BROMIDE 0.5 MG: 0.5 SOLUTION RESPIRATORY (INHALATION) at 07:55

## 2019-10-01 RX ADMIN — LEVOTHYROXINE SODIUM 50 MCG: 50 TABLET ORAL at 05:20

## 2019-10-01 RX ADMIN — POTASSIUM CHLORIDE 40 MEQ: 1500 TABLET, EXTENDED RELEASE ORAL at 09:24

## 2019-10-01 RX ADMIN — IPRATROPIUM BROMIDE 0.5 MG: 0.5 SOLUTION RESPIRATORY (INHALATION) at 13:32

## 2019-10-01 RX ADMIN — AMLODIPINE BESYLATE 10 MG: 10 TABLET ORAL at 09:24

## 2019-10-01 NOTE — ASSESSMENT & PLAN NOTE
· Appears L>R LE edema  · Venous doppler results were normal    · Likely secondary to congestive heart failure  · Continue diuresis

## 2019-10-01 NOTE — ASSESSMENT & PLAN NOTE
· Right upper lung pulmonary nodule  · Need surveillance in the outpatient  Repeat CT of the chest due, April 2020

## 2019-10-01 NOTE — PROGRESS NOTES
Progress Note - Pulmonary   Jairo Bumps 80 y o  male MRN: [id number]  Unit/Bed#:  Encounter: [id number]      Assessment/Plan:  1  Acute hypoxic & chronic hypercapnic respiratory failure        *  Multifactorial and related to #2 & #3        *  Continue to titrate HFNC back to regular nasal cannula  Keep saturations greater than or equal to 88%        *  Incentive spirometry Q1hr, OOB as able, increase activity as able        *  Has been on home oxygen in the past, but was discontinued at last office visit in April  Will need formal oxygen evaluation prior to          D/C        *  Continue BiPAP QHS  Will discuss setting changes with attending--> Will need BiPAP QHS at D/C        *  Will need outpatient PSG as likely with ADAL  2  Bilateral pleural effusions        *  Secondary to #3        *  Continue with IV lasix per cardiology        *  Follow up with repeat imaging after more aggressive diuresis  3  Acute on chronic diastolic HF & right sided HF        *  As above        *  Monitor I/Os, daily weights  4  RUL pulmonary nodule        *  Repeat CTChest due in April 2020  5  COPD of unknown severity with acute exacerbation        *  Continue solumedrol at 40mg Q12hrs        *  No formal diagnosis of COPD, not on any inhalers at home  Prior heavy tobacco history 2PPD for 15 years - quit 60 years ago        *  Outpatient PFTs if pt agreeable vs in office spirometry         *  Continue Xopenex/Atrovent Nebs TID        *  Outpatient follow up as per D/C instructions     -Outpatient pulmonary follow up as per D/C instructions    Subjective:   Mr Chandler Maagllon was seen lying flat bed this morning  He continues to remain on high-flow nasal cannula  He feels as though his breathing and lower extremity edema are much better  He did note increasing cough this morning which is now improved  He occasionally is able to bring up some white sputum    He denies chest pain, resting shortness of breath, hemoptysis, or fever  He did not tolerate BiPAP as well last evening due to the mask hurting his face and what appears to be a significant air leak  Objective:     Vitals: Blood pressure 130/63, pulse 59, temperature (!) 97 3 °F (36 3 °C), resp  rate (!) 26, weight 78 4 kg (172 lb 13 5 oz), SpO2 92 %  , 60%, 45L HFNC, Body mass index is 27 07 kg/m²  Intake/Output Summary (Last 24 hours) at 10/1/2019 0858  Last data filed at 10/1/2019 0400  Gross per 24 hour   Intake 930 ml   Output 1900 ml   Net -970 ml         Physical Exam  Gen: Awake, alert, oriented x 3, no acute distress  HEENT: Mucous membranes moist, no oral lesions, no thrush, wearing HFNC  NECK: No accessory muscle use, JVP not elevated  Cardiac: Regular, single S1, single S2, no murmurs, no rubs, no gallops  Lungs:  Decreased breath sounds bilaterally at the bases with scattered expiratory wheezes throughout  There are no rales or rhonchi noted  Abdomen: normoactive bowel sounds, soft nontender, nondistended, no rebound or rigidity, no guarding  Extremities: no cyanosis, no clubbing, no edema, wearing SCDs bilaterally    Labs: I have personally reviewed pertinent lab results  , ABG:   Lab Results   Component Value Date    PHART 7 485 (H) 10/01/2019    BFW1TTG 50 2 (H) 10/01/2019    PO2ART 63 8 (L) 10/01/2019    WIH7AAV 37 0 (H) 10/01/2019    BEART 11 5 10/01/2019    SOURCE Radial, Left 10/01/2019   , BNP: No results found for: BNP, CBC: No results found for: WBC, HGB, HCT, MCV, PLT, ADJUSTEDWBC, MCH, MCHC, RDW, MPV, NRBC, CMP: No results found for: SODIUM, K, CL, CO2, ANIONGAP, BUN, CREATININE, GLUCOSE, CALCIUM, AST, ALT, ALKPHOS, PROT, BILITOT, EGFR, PT/INR: No results found for: PT, INR, Troponin: No results found for: TROPONINI     Imaging and other studies: I have personally reviewed pertinent films in PACS    No new pulmonary imaging since September 29, 2019    Sissy Goldmann, [place]

## 2019-10-01 NOTE — ASSESSMENT & PLAN NOTE
Resolved    Metabolic encephalopathy in the setting of acute respiratory failure with hypoxia evidenced confusion at home for past few days   - Patient stable at baseline  - Continue oxygen, neuro checks and nursing safety measures

## 2019-10-01 NOTE — PROGRESS NOTES
Progress Note Page Campo 3/24/1929, 80 y o  male MRN: [id number]    Unit/Bed#:  Encounter: [id number]    Primary Care Provider: Germania Barlow MD   Date and time admitted to hospital: 9/26/2019  3:06 PM        * Acute respiratory failure with hypoxia and hypercapnia (HCC)  Assessment & Plan  · Due to CHF exacerbation and bilateral pleural effusions and possible COPD of unknown severity with acute exacerbation  · IV diuresis  · Currently on high flow nasal canula, need to transition to nasal canula before DC  · 09/29 Patient having difficulty maintaining adequate saturation despite improving volume status  ABG: pH 7 4, CO2 55 4, HCO3 34  Repeat CXR showed no improvement from CXR on admission  Currently on high flow nasal canula  · Appreciate pulmonology consult  Possible COPD of unknown severity with acute exacerbation  · Continue Solu-Medrol at 40 mg every 12 hours  · Continue titrating high flow nasal canula   · Given two doses of diamox for bilateral pleural effusions  Would like to repeat imaging after more aggressive diuresis  · Incidental pulmonary nodule in RUL to be re evaluated by chest CT in April 2020   · Continue nebulizations with Xopenex and Atrovent  · No formal diagnosis yet of COPD  Patient has previous history of heavy tobacco smoking of 2 packs per day for 15 years  Patient quit 60 years ago  · Continue BiPAP q h s     Likely will need BiPAP q h s  At discharge  Based on previous problem list, patient has history of ADAL  · For outpatient PFT  Acute on chronic heart failure with preserved ejection fraction [facility])  Assessment & Plan  Wt Readings from Last 3 Encounters:   10/01/19 78 4 kg (172 lb 13 5 oz)   06/06/19 79 6 kg (175 lb 8 oz)   04/02/19 77 1 kg (170 lb)   Patient has a past medical history of heart failure with preserved ejection fraction    Last echocardiogram was 03/18/2019 which showed normal systolic function, ejection fraction of 65% and grade 1 diastolic dysfunction  Patient presented to the ED with volume overload  On physical exam at the time of the admission, revealed JVD and bilateral lower extremity pitting edema was noted  CXR showed bilateral pleural effusions   - continue diuresis with IV Lasix   - On Aldactone  - monitor ins and outs/weight  - Diuresing well   - cardiologist on board  - manage patient's hypertension  Acute metabolic encephalopathy  Assessment & Plan  Resolved  Metabolic encephalopathy in the setting of acute respiratory failure with hypoxia evidenced confusion at home for past few days   - Patient stable at baseline  - Continue oxygen, neuro checks and nursing safety measures      Chronic kidney disease, stage 2 (mild)  Assessment & Plan  Stable  Monitor BMP with diuresis  Monitor input and output  Avoid nephrotoxins  Avoid hypotension  Hypokalemia  Assessment & Plan  · Replace potassium  · Monitor electrolytes  Pulmonary nodule  Assessment & Plan  · Right upper lung pulmonary nodule  · Need surveillance in the outpatient  Repeat CT of the chest due, April 2020  Essential hypertension  Assessment & Plan  Stable  Patient with history of hypertension  Blood pressure medication managed by cardiologist   Home medications include terazosin 5 mg, Aldactone 25 mg, amlodipine 10 mg  Blood pressure stable in the ED, 120/69  - continue terazosin 5 mg  - continue amlodipine 10 mg  - Aldactone continued  - continue IV diuresis, per Cardiology  - monitor blood pressure    Bilateral leg edema  Assessment & Plan  · Appears L>R LE edema  · Venous doppler results were normal    · Likely secondary to congestive heart failure  · Continue diuresis  VTE Pharmacologic Prophylaxis:   Pharmacologic: Enoxaparin (Lovenox)  Mechanical VTE Prophylaxis in Place: Yes    Patient Centered Rounds: I have performed bedside rounds with nursing staff today      Discussions with Specialists or Other Care Team Provider:  Case management  Education and Discussions with Family / Patient:  Patient  I offered to call patient's family but patient declined  Time Spent for Care: 30 minutes  More than 50% of total time spent on counseling and coordination of care as described above  Current Length of Stay: 5 day(s)    Current Patient Status: Inpatient   Certification Statement: The patient will continue to require additional inpatient hospital stay due to Above findings and plans  Discharge Plan:  None yet  Code Status: Level 3 - DNAR and DNI      Subjective:   Patient feels fine and a lot better  Patient denies any shortness of breath or any pains  No complaints  However, patient still on high-flow nasal cannula oxygen  Objective:     Vitals:   Temp (24hrs), Av °F (36 7 °C), Min:97 3 °F (36 3 °C), Max:98 4 °F (36 9 °C)    Temp:  [97 3 °F (36 3 °C)-98 4 °F (36 9 °C)] 97 6 °F (36 4 °C)  HR:  [58-84] 78  Resp:  [18-26] 20  BP: ()/(56-66) 129/65  SpO2:  [90 %-96 %] 96 %  Body mass index is 27 07 kg/m²  Input and Output Summary (last 24 hours): Intake/Output Summary (Last 24 hours) at 10/1/2019 1242  Last data filed at 10/1/2019 1009  Gross per 24 hour   Intake 680 ml   Output 2300 ml   Net -1620 ml       Physical Exam:     Physical Exam   Constitutional: No distress  HENT:   Head: Normocephalic and atraumatic  Positive for high-flow nasal cannula oxygen  Eyes: Right eye exhibits no discharge  Left eye exhibits no discharge  No scleral icterus  Neck: No JVD present  No tracheal deviation present  No thyromegaly present  Cardiovascular: Normal rate, regular rhythm and normal heart sounds  Exam reveals no gallop and no friction rub  No murmur heard  Pulmonary/Chest: Effort normal  No stridor  No respiratory distress  He has wheezes  He has no rales  Positive for occasional wheezing on expiration bilaterally  Abdominal: Soft  Bowel sounds are normal  He exhibits no distension and no mass  There is no tenderness  There is no rebound and no guarding  Musculoskeletal: He exhibits edema  He exhibits no tenderness or deformity  Positive for +1 bilateral lower extremity edema  Neurological: He is alert  Skin: Skin is warm  No rash noted  He is not diaphoretic  No erythema  No pallor  Psychiatric: He has a normal mood and affect  His behavior is normal  Thought content normal    Patient was pleasant  Vitals reviewed  Additional Data:     Labs:    Results from last 7 days   Lab Units 09/30/19  0317 09/28/19  0329   WBC Thousand/uL 4 72 5 47   HEMOGLOBIN g/dL 14 5 14 5   HEMATOCRIT % 43 3 44 8   PLATELETS Thousands/uL 154 145*   NEUTROS PCT %  --  83*   LYMPHS PCT %  --  7*   MONOS PCT %  --  9   EOS PCT %  --  0     Results from last 7 days   Lab Units 10/01/19  0815  09/28/19  0329   POTASSIUM mmol/L 2 7*   < > 4 1   CHLORIDE mmol/L 102   < > 95*   CO2 mmol/L 34*   < > 42*   BUN mg/dL 35*   < > 30*   CREATININE mg/dL 0 81   < > 0 89   CALCIUM mg/dL 7 2*   < > 8 1*   ALK PHOS U/L  --   --  59   ALT U/L  --   --  13   AST U/L  --   --  20    < > = values in this interval not displayed  Results from last 7 days   Lab Units 09/26/19  1526   INR  1 15       * I Have Reviewed All Lab Data Listed Above  * Additional Pertinent Lab Tests Reviewed: [facility]    Imaging:    Imaging Reports Reviewed Today Include:  Diagnostic imaging studies that were done on this admission  Imaging Personally Reviewed by Myself Includes:  None      Recent Cultures (last 7 days):           Last 24 Hours Medication List:     Current Facility-Administered Medications:  albuterol 2 5 mg Nebulization Q6H PRN Roselia Hernandez MD   amLODIPine 10 mg Oral Daily Halley Ku MD   brimonidine 1 drop Both Eyes Q8H [address] Halley Ku MD   enoxaparin 40 mg Subcutaneous Daily Halley Ku MD   furosemide 40 mg Intravenous BID (diuretic) Janelle Cameron MD   ipratropium 0 5 mg Nebulization TID Serafin Hammonds MD   levalbuterol 0 63 mg Nebulization TID Serafin Hammonds MD   levothyroxine 50 mcg Oral Daily Jamel Cogan, MD   methylPREDNISolone sodium succinate 40 mg Intravenous Q12H [facility]iff[facility], GARY   montelukast 10 mg Oral Daily Jamel Cogan, MD   ondansetron 4 mg Intravenous Q6H PRN Jamel Cogan, MD   pantoprazole 40 mg Oral Early Morning Jamel Cogan, MD   potassium chloride 40 mEq Oral BID Carlos Lobato MD   pravastatin 40 mg Oral Daily With [address] Amanda Hernandez MD   spironolactone 25 mg Oral Daily Digna Green DO   terazosin 5 mg Oral HS Jamel Cogan, MD   timolol 1 drop Both Eyes BID Jamel Cogan, MD        Today, Patient Was Seen By: Cely Sapp MD    ** Please Note: Dragon 360 Dictation voice to text software may have been used in the creation of this document   **

## 2019-10-01 NOTE — ASSESSMENT & PLAN NOTE
Stable  Patient with history of hypertension  Blood pressure medication managed by cardiologist   Home medications include terazosin 5 mg, Aldactone 25 mg, amlodipine 10 mg  Blood pressure stable in the ED, 120/69  - continue terazosin 5 mg  - continue amlodipine 10 mg  - Aldactone continued  - continue IV diuresis, per Cardiology    - monitor blood pressure

## 2019-10-01 NOTE — RESPIRATORY THERAPY NOTE
ABG drawn per order from left radial artery  Positive modified allens test  Pressure held until bleeding stopped

## 2019-10-01 NOTE — ASSESSMENT & PLAN NOTE
· Due to CHF exacerbation and bilateral pleural effusions and possible COPD of unknown severity with acute exacerbation  · IV diuresis  · Currently on high flow nasal canula, need to transition to nasal canula before DC  · 09/29 Patient having difficulty maintaining adequate saturation despite improving volume status  ABG: pH 7 4, CO2 55 4, HCO3 34  Repeat CXR showed no improvement from CXR on admission  Currently on high flow nasal canula  · Appreciate pulmonology consult  Possible COPD of unknown severity with acute exacerbation  · Continue Solu-Medrol at 40 mg every 12 hours  · Continue titrating high flow nasal canula   · Given two doses of diamox for bilateral pleural effusions  Would like to repeat imaging after more aggressive diuresis  · Incidental pulmonary nodule in RUL to be re evaluated by chest CT in April 2020   · Continue nebulizations with Xopenex and Atrovent  · No formal diagnosis yet of COPD  Patient has previous history of heavy tobacco smoking of 2 packs per day for 15 years  Patient quit 60 years ago  · Continue BiPAP q h s     Likely will need BiPAP q h s  At discharge  Based on previous problem list, patient has history of ADAL  · For outpatient PFT

## 2019-10-01 NOTE — PROGRESS NOTES
Cardiology Progress Note - Tiago Lin 80 y o  male MRN: [id number]    Unit/Bed#:  Encounter: [id number]        Subjective:    No significant events overnight  Improving  Review of Systems   Cardiovascular: Negative for chest pain, leg swelling and palpitations  Respiratory: Positive for shortness of breath  Objective:   Vitals: Blood pressure 130/63, pulse 59, temperature (!) 97 3 °F (36 3 °C), resp  rate (!) 26, weight 78 4 kg (172 lb 13 5 oz), SpO2 92 %  , Body mass index is 27 07 kg/m² ,   Orthostatic Blood Pressures      Most Recent Value   Blood Pressure  130/63 filed at 10/01/2019 0705   Patient Position - Orthostatic VS  Lying filed at 10/01/2019 6022         Systolic (58HVL), NA , Min:99 , MRE:097     Diastolic (29XTK), YKZ:19, Min:56, Max:66      Intake/Output Summary (Last 24 hours) at 10/1/2019 0837  Last data filed at 10/1/2019 0400  Gross per 24 hour   Intake 930 ml   Output 1900 ml   Net -970 ml     Weight (last 2 days)     Date/Time   Weight    10/01/19 0533   78 4 (172 84)    19 0556   78 4 (172 84)    19 0600   78 9 (173 94)              Telemetry Review: NSR    Physical Exam   Cardiovascular: Normal rate, regular rhythm and normal heart sounds  Exam reveals no gallop and no friction rub  No murmur heard  Pulmonary/Chest: He has decreased breath sounds  He has no wheezes  He has no rales  Musculoskeletal: He exhibits no edema           Laboratory Results:  Results from last 7 days   Lab Units 19  1526   TROPONIN I ng/mL 0 03       CBC with diff:   Results from last 7 days   Lab Units 19  0317 19  0329 19  0555 19  1526 19  1406   WBC Thousand/uL 4 72 5 47 2 23* 4 83 5 21   HEMOGLOBIN g/dL 14 5 14 5 14 3 15 0 15 0   HEMATOCRIT % 43 3 44 8 43 6 46 4 46 7   MCV fL 93 97 96 98 97   PLATELETS Thousands/uL 154 145* 126* 147* 173   MCH pg 31 3 31 3 31 4 31 6 31 1   MCHC g/dL 33 5 32 4 32 8 32 3 32 1   RDW % 13 0 13 2 13 4 13 3 13 2   MPV fL 10 6 10 3 10 5 10 8 11 1   NRBC AUTO /100 WBCs  --  0 0 0 0         CMP:  Results from last 7 days   Lab Units 19  0329 19  0555 19  1526 19  1406   POTASSIUM mmol/L 3 2* 3 4* 4 1 4 2 4 2 4 2   CHLORIDE mmol/L 93* 93* 95* 98* 96* 97*   CO2 mmol/L 43* >45* 42* 35* 34* 33*   BUN mg/dL 36* 31* 30* 21 21 19   CREATININE mg/dL 0 99 0 78 0 89 0 81 1 11 0 94   CALCIUM mg/dL 8 1* 7 7* 8 1* 8 2* 8 5 8 9   AST U/L  --   --  20  --  17 18   ALT U/L  --   --  13  --  11* 15   ALK PHOS U/L  --   --  59  --  67 67   EGFR ml/min/1 73sq m 67 79 75 78 58 71         BMP:  Results from last 7 days   Lab Units 19  0555 19  1526 19  1406   POTASSIUM mmol/L 3 2* 3 4* 4 1 4 2 4 2 4 2   CHLORIDE mmol/L 93* 93* 95* 98* 96* 97*   CO2 mmol/L 43* >45* 42* 35* 34* 33*   BUN mg/dL 36* 31* 30* 21 21 19   CREATININE mg/dL 0 99 0 78 0 89 0 81 1 11 0 94   CALCIUM mg/dL 8 1* 7 7* 8 1* 8 2* 8 5 8 9       BNP:     Magnesium:   Results from last 7 days   Lab Units 19  0555 19  1526   MAGNESIUM mg/dL 2 0 1 8 1 9 2 0       Coags:   Results from last 7 days   Lab Units 19  1526   INR  1 15       TSH:       Lipid Profile:   Results from last 7 days   Lab Units 19  1406   TRIGLYCERIDES mg/dL 37   HDL mg/dL 80*           Cardiac testing:   Results for orders placed during the hospital encounter of 19   Echo complete with contrast if indicated    Narrative [facility], [address] [facility]  [phone]    Transthoracic Echocardiogram  2D, M-mode, Doppler, and Color Doppler    Study date:  18-Mar-2019    Patient: Elsa Fowler  MR number: [id number]  Account number: [de-identified]  : 24-Mar-1929  Age: 80 years  Gender: Male  Status: Inpatient  Location: Bedside  Height: 67 in  Weight: 185 7 lb  BP: 151/ 68 mmHg    Indications: Assess left ventricular function  Diagnoses: I50 9 - Heart failure, unspecified    Sonographer:  Gio Mata RDCS  Primary Physician:  uYmiko Rodriges MD  Referring Physician:  Angel Hair MD  Group:  [facility]  Interpreting Physician:  Diane Mcdonald MD    SUMMARY    LEFT VENTRICLE:  Systolic function was normal  Ejection fraction was estimated to be 65 %  Although no diagnostic regional wall motion abnormality was identified, this possibility cannot be completely excluded on the basis of this study  Doppler parameters were consistent with abnormal left ventricular relaxation (grade 1 diastolic dysfunction)  RIGHT VENTRICLE:  The ventricle was dilated  Systolic function was mildly reduced  RIGHT ATRIUM:  The atrium was dilated  PROCEDURE: The procedure was performed at the bedside  This was a routine study  The transthoracic approach was used  The study included complete 2D imaging, M-mode, complete spectral Doppler, and color Doppler  The heart rate was 70 bpm,  at the start of the study  Images were obtained from the parasternal, apical, subcostal, and suprasternal notch acoustic windows  Image quality was adequate  LEFT VENTRICLE: Size was normal  Systolic function was normal  Ejection fraction was estimated to be 65 %  Although no diagnostic regional wall motion abnormality was identified, this possibility cannot be completely excluded on the basis  of this study  DOPPLER: There was an increased relative contribution of atrial contraction to ventricular filling  Doppler parameters were consistent with abnormal left ventricular relaxation (grade 1 diastolic dysfunction)  RIGHT VENTRICLE: The ventricle was dilated  Systolic function was mildly reduced  LEFT ATRIUM: Size was normal     RIGHT ATRIUM: The atrium was dilated  MITRAL VALVE: Valve structure was normal  There was normal leaflet separation  DOPPLER: There was no evidence for stenosis   There was no significant regurgitation  AORTIC VALVE: The valve was not well visualized  DOPPLER: There was no evidence for stenosis  There was no significant regurgitation  TRICUSPID VALVE: The valve structure was normal  There was normal leaflet separation  DOPPLER: There was no evidence for stenosis  There was no significant regurgitation  PULMONIC VALVE: Leaflets exhibited normal thickness, no calcification, and normal cuspal separation  DOPPLER: The transpulmonic velocity was within the normal range  There was no regurgitation  PERICARDIUM: There was no pericardial effusion  The pericardium was normal in appearance  AORTA: The root exhibited normal size  Not well visualized      SYSTEM MEASUREMENT TABLES    2D  %FS: 34 11 %  AV Diam: 3 48 cm  EDV(Teich): 85 19 ml  EF(Cube): 71 4 %  EF(Teich): 63 34 %  ESV(Cube): 23 48 ml  ESV(Teich): 31 23 ml  IVSd: 1 21 cm  LA Area: 15 68 cm2  LA Diam: 3 47 cm  LVEDV MOD A4C: 78 44 ml  LVEF MOD A4C: 67 23 %  LVESV MOD A4C: 25 7 ml  LVIDd: 4 35 cm  LVIDs: 2 86 cm  LVLd A4C: 7 59 cm  LVLs A4C: 6 36 cm  LVPWd: 1 21 cm  RA Area: 17 77 cm2  RV Diam: 3 96 cm  SI(Cube): 29 91 ml/m2  SI(Teich): 27 53 ml/m2  SV MOD A4C: 52 74 ml  SV(Cube): 58 62 ml  SV(Teich): 53 96 ml    MM  TAPSE: 2 36 cm    PW  AVC: 334 79 ms  E': 0 05 m/s  E/E': 19 38  MV A Nickolas: 1 06 m/s  MV Dec Garza: 4 85 m/s2  MV DecT: 207 36 ms  MV E Nickolas: 1 01 m/s  MV E/A Ratio: 0 95    [facility] Accredited Echocardiography [facility]    Prepared and electronically signed by    Diane Mcdonald MD  Signed 18-Mar-2019 15:04:21         Meds/Allergies     Current Facility-Administered Medications:  albuterol 2 5 mg Nebulization Q6H PRN Angel Hair MD   amLODIPine 10 mg Oral Daily Ralph Campos MD   brimonidine 1 drop Both Eyes Q8H [address] Ralph Campso MD   enoxaparin 40 mg Subcutaneous Daily Ralph Campos MD   furosemide 40 mg Intravenous BID (diuretic) Johny Mock MD   ipratropium 0 5 mg Nebulization TID Luba Long Bel Blanchard MD   levalbuterol 0 63 mg Nebulization TID Svitlana Selby MD   levothyroxine 50 mcg Oral Daily Mary Jane Hassan MD   methylPREDNISolone sodium succinate 40 mg Intravenous Q12H [address] Ladan Gifford PA-C   montelukast 10 mg Oral Daily Mary Jane Hassan MD   ondansetron 4 mg Intravenous Q6H PRN Mary Jane Hassan MD   pantoprazole 40 mg Oral Early Morning Mary Jane Hassan MD   pravastatin 40 mg Oral Daily With Sharmin Malone MD   spironolactone 25 mg Oral Daily Forest Kianna,    terazosin 5 mg Oral HS Mary Jane Hassan MD   timolol 1 drop Both Eyes BID Mary Jane Hassan MD        Medications Prior to Admission   Medication    amLODIPine (NORVASC) 10 mg tablet    Blood Pressure Monitoring (B-D ASSURE BPM/AUTO ARM CUFF) MISC    brimonidine-timolol (COMBIGAN) 0 2-0 5 %    furosemide (LASIX) 20 mg tablet    latanoprost (XALATAN) 0 005 % ophthalmic solution    levothyroxine 50 mcg tablet    montelukast (SINGULAIR) 10 mg tablet    potassium chloride (K-DUR,KLOR-CON) 20 mEq tablet    RABEprazole (ACIPHEX) 20 MG tablet    simvastatin (ZOCOR) 20 mg tablet    spironolactone (ALDACTONE) 25 mg tablet    terazosin (HYTRIN) 5 mg capsule       Assessment:  Principal Problem:    Acute respiratory failure with hypoxia and hypercapnia (HCC)  Active Problems:    Bilateral leg edema    Acute metabolic encephalopathy    Acute on chronic heart failure with preserved ejection fraction (HCC)    Essential hypertension    Hypokalemia    Chronic kidney disease, stage 2 (mild)      Impression:  1  Acute on chronic diastolic heart failure - on IV diuretics  2  HTN - controlled  3  B pleural effusion     Plan:  1  Continue IV diuretics  2  Watch renal function

## 2019-10-01 NOTE — ASSESSMENT & PLAN NOTE
Wt Readings from Last 3 Encounters:   10/01/19 78 4 kg (172 lb 13 5 oz)   06/06/19 79 6 kg (175 lb 8 oz)   04/02/19 77 1 kg (170 lb)   Patient has a past medical history of heart failure with preserved ejection fraction  Last echocardiogram was 03/18/2019 which showed normal systolic function, ejection fraction of 65% and grade 1 diastolic dysfunction  Patient presented to the ED with volume overload  On physical exam at the time of the admission, revealed JVD and bilateral lower extremity pitting edema was noted  CXR showed bilateral pleural effusions   - continue diuresis with IV Lasix   - On Aldactone  - monitor ins and outs/weight  - Diuresing well   - cardiologist on board  - manage patient's hypertension

## 2019-10-01 NOTE — UTILIZATION REVIEW
Continued Stay Review    Date: 10/1                       Current Patient Class: Inpatient Current Level of Care: Stepdown    HPI:89 y o  male initially admitted on 9/26 presents with progressive lower extremity edema and shortness of breath    Assessment/Plan: Acute on chronic diastolic heart failure, Acute hypoxic & chronic hypercapnic respiratory failure  High Flow N/C FiO2 70% 50 lpm   Continue Iv diuretics and Iv steriods and monitor renal function  Continue to titrate HFNC back to regular nasal cannula   Keep saturations greater than or equal to 88%  Continue Xopenex/Atrovent Nebs TID  Incentive spirometry Q1h and OOB as able  Continue BiPAP QHS  F/u with repeat imaging after more aggressive diuresis  Monitor I/Os and daily wts     Decreased breath sounds bilaterally at the bases with scattered expiratory wheezes throughout    Pertinent Labs/Diagnostic Results:   Lab Units 09/30/19  0317 09/28/19  0329 09/27/19  0555   WBC Thousand/uL 4 72 5 47 2 23*   HEMOGLOBIN g/dL 14 5 14 5 14 3   HEMATOCRIT % 43 3 44 8 43 6   PLATELETS Thousands/uL 154 145* 126*   NEUTROS ABS Thousands/µL  --  4 57 1 95         Lab Units 10/01/19  0815 09/30/19  0317 09/29/19  0317 09/28/19  0329 09/27/19  0555   SODIUM mmol/L 141 139 138 135* 137   POTASSIUM mmol/L 2 7* 3 2* 3 4* 4 1 4 2   CHLORIDE mmol/L 102 93* 93* 95* 98*   CO2 mmol/L 34* 43* >45* 42* 35*   ANION GAP mmol/L 5 3*  --  -2* 4   BUN mg/dL 35* 36* 31* 30* 21   CREATININE mg/dL 0 81 0 99 0 78 0 89 0 81   EGFR ml/min/1 73sq m 78 67 79 75 78   CALCIUM mg/dL 7 2* 8 1* 7 7* 8 1* 8 2*   MAGNESIUM mg/dL  --  2 0 1 8  --  1 9     Lab Units 09/28/19  0329   AST U/L 20   ALT U/L 13   ALK PHOS U/L 59   TOTAL PROTEIN g/dL 6 1*   ALBUMIN g/dL 3 4*   TOTAL BILIRUBIN mg/dL 0 60         Results from last 7 days   GLUCOSE RANDOM mg/dL 121 125 88 90 128 125     Results from last 7 days   Lab Units 10/01/19  0507 09/29/19  0529   PH ART  7 485* 7 406   PCO2 ART mm Hg 50 2* 55 4*   PO2 ART mm Hg 63 8* 65 9*   HCO3 ART mmol/L 37 0* 34 0*   BASE EXC ART mmol/L 11 5 7 5   O2 CONTENT ART mL/dL 19 6 18 6   O2 HGB, ARTERIAL % 91 4* 90 5*   ABG SOURCE  Radial, Left Radial, Left       Vital Signs:   Date/Time  Temp  Pulse  Resp  BP  MAP (mmHg)  SpO2  O2 Device    10/01/19 0916            90 %  High flow nasal cannula    10/01/19 0755            92 %  High flow nasal cannula    10/01/19 0705  97 3 °F (36 3 °C)Abnormal   59  26  Abnormal   130/63  90  95 %  High flow nasal cannula      Medications:   Scheduled Meds:   Current Facility-Administered Medications:  albuterol 2 5 mg Nebulization Q6H PRN   amLODIPine 10 mg Oral Daily   brimonidine 1 drop Both Eyes Q8H MARCIANO   enoxaparin 40 mg Subcutaneous Daily   furosemide 40 mg Intravenous BID (diuretic)   ipratropium 0 5 mg Nebulization TID   levalbuterol 0 63 mg Nebulization TID   levothyroxine 50 mcg Oral Daily   methylPREDNISolone sodium succinate 40 mg Intravenous Q12H MARCIANO   montelukast 10 mg Oral Daily   ondansetron 4 mg Intravenous Q6H PRN   pantoprazole 40 mg Oral Early Morning   potassium chloride 40 mEq Oral BID   pravastatin 40 mg Oral Daily With Dinner   spironolactone 25 mg Oral Daily   terazosin 5 mg Oral HS   timolol 1 drop Both Eyes BID       Discharge Plan: TBD    Network Utilization Review Department  Phone: [phone]; Fax [phone]  [email or web address]  ATTENTION: Please call with any questions or concerns to [phone]  and carefully listen to the prompts so that you are directed to the right person  Send all requests for admission clinical reviews, approved or denied determinations and any other requests to fax [phone]   All voicemails are confidential

## 2019-10-01 NOTE — PROCEDURES
Thoracentesis Procedure Note    Indications: Left Pleural Effusion    Procedure performed: Left thoracentesis with catheter with ultrasound guidance    Procedure Details     Consent: Informed consent was obtained  Risks of the procedure were discussed including: infection, bleeding, pain, pneumothorax  Timeout was observed at 2:07pm     Pleural effusion identified with ultrasound immediately prior to procedure  Images saved in medical record under media  Site marked  Under sterile conditions the patient was positioned  Chlorhexidine swab and sterile drapes were utilized  2% lidocaine was used to anesthetize  A diagnostic and therapeutic thoracentesis with a catheter using a standard thoracentesis kit was used  Fluid was obtained without any difficulties and minimal blood loss  A dressing was applied to the wound and wound care instructions were provided  Findings  800 ml of straw colored pleural fluid was obtained  A sample was sent for analysis  Complications:  None; patient tolerated the procedure well  Condition: stable    Plan  A follow up chest x-ray was ordered        Yuly Bernstein DO

## 2019-10-02 ENCOUNTER — APPOINTMENT (INPATIENT)
Dept: NON INVASIVE DIAGNOSTICS | Facility: HOSPITAL | Age: 84
DRG: 291 | End: 2019-10-02
Payer: COMMERCIAL

## 2019-10-02 ENCOUNTER — APPOINTMENT (INPATIENT)
Dept: RADIOLOGY | Facility: HOSPITAL | Age: 84
DRG: 291 | End: 2019-10-02
Payer: COMMERCIAL

## 2019-10-02 LAB
ALBUMIN FLD-MCNC: 2.5 G/DL
ANION GAP SERPL CALCULATED.3IONS-SCNC: 4 MMOL/L (ref 4–13)
APPEARANCE FLD: CLEAR
ATRIAL RATE: 74 BPM
BUN SERPL-MCNC: 43 MG/DL (ref 5–25)
CALCIUM SERPL-MCNC: 8.7 MG/DL (ref 8.3–10.1)
CHLORIDE SERPL-SCNC: 97 MMOL/L (ref 100–108)
CO2 SERPL-SCNC: 38 MMOL/L (ref 21–32)
COLOR FLD: YELLOW
CREAT SERPL-MCNC: 0.91 MG/DL (ref 0.6–1.3)
GFR SERPL CREATININE-BSD FRML MDRD: 74 ML/MIN/1.73SQ M
GLUCOSE SERPL-MCNC: 146 MG/DL (ref 65–140)
LDH FLD L TO P-CCNC: 109 U/L
LYMPHOCYTES NFR BLD AUTO: 75 %
MONO+MESO NFR FLD MANUAL: 3 %
MONONUC CELLS NFR FLD MANUAL: 19 %
NEUTS SEG NFR BLD AUTO: 3 %
P AXIS: 66 DEGREES
PH BODY FLUID: 7.8
POTASSIUM SERPL-SCNC: 4.2 MMOL/L (ref 3.5–5.3)
PR INTERVAL: 132 MS
PROT FLD-MCNC: 3.1 G/DL
QRS AXIS: -69 DEGREES
QRSD INTERVAL: 90 MS
QT INTERVAL: 382 MS
QTC INTERVAL: 424 MS
SITE: NORMAL
SODIUM SERPL-SCNC: 139 MMOL/L (ref 136–145)
T WAVE AXIS: 63 DEGREES
TOTAL CELLS COUNTED SPEC: 100
TRIGL FLD-MCNC: <15 MG/DL
VENTRICULAR RATE: 74 BPM
WBC # FLD MANUAL: 698 /UL

## 2019-10-02 PROCEDURE — 99232 SBSQ HOSP IP/OBS MODERATE 35: CPT | Performed by: INTERNAL MEDICINE

## 2019-10-02 PROCEDURE — 93306 TTE W/DOPPLER COMPLETE: CPT | Performed by: INTERNAL MEDICINE

## 2019-10-02 PROCEDURE — 93306 TTE W/DOPPLER COMPLETE: CPT

## 2019-10-02 PROCEDURE — 88305 TISSUE EXAM BY PATHOLOGIST: CPT | Performed by: PATHOLOGY

## 2019-10-02 PROCEDURE — 86430 RHEUMATOID FACTOR TEST QUAL: CPT | Performed by: INTERNAL MEDICINE

## 2019-10-02 PROCEDURE — 88112 CYTOPATH CELL ENHANCE TECH: CPT | Performed by: PATHOLOGY

## 2019-10-02 PROCEDURE — 94660 CPAP INITIATION&MGMT: CPT

## 2019-10-02 PROCEDURE — 84478 ASSAY OF TRIGLYCERIDES: CPT | Performed by: INTERNAL MEDICINE

## 2019-10-02 PROCEDURE — 0W9B3ZX DRAINAGE OF LEFT PLEURAL CAVITY, PERCUTANEOUS APPROACH, DIAGNOSTIC: ICD-10-PCS | Performed by: INTERNAL MEDICINE

## 2019-10-02 PROCEDURE — 82042 OTHER SOURCE ALBUMIN QUAN EA: CPT | Performed by: INTERNAL MEDICINE

## 2019-10-02 PROCEDURE — 83615 LACTATE (LD) (LDH) ENZYME: CPT | Performed by: INTERNAL MEDICINE

## 2019-10-02 PROCEDURE — 80048 BASIC METABOLIC PNL TOTAL CA: CPT | Performed by: INTERNAL MEDICINE

## 2019-10-02 PROCEDURE — 93010 ELECTROCARDIOGRAM REPORT: CPT | Performed by: INTERNAL MEDICINE

## 2019-10-02 PROCEDURE — 89051 BODY FLUID CELL COUNT: CPT | Performed by: INTERNAL MEDICINE

## 2019-10-02 PROCEDURE — 32555 ASPIRATE PLEURA W/ IMAGING: CPT | Performed by: INTERNAL MEDICINE

## 2019-10-02 PROCEDURE — 71045 X-RAY EXAM CHEST 1 VIEW: CPT

## 2019-10-02 PROCEDURE — 97110 THERAPEUTIC EXERCISES: CPT

## 2019-10-02 PROCEDURE — 97116 GAIT TRAINING THERAPY: CPT

## 2019-10-02 PROCEDURE — 87070 CULTURE OTHR SPECIMN AEROBIC: CPT | Performed by: INTERNAL MEDICINE

## 2019-10-02 PROCEDURE — 97530 THERAPEUTIC ACTIVITIES: CPT

## 2019-10-02 PROCEDURE — 94760 N-INVAS EAR/PLS OXIMETRY 1: CPT

## 2019-10-02 PROCEDURE — 84157 ASSAY OF PROTEIN OTHER: CPT | Performed by: INTERNAL MEDICINE

## 2019-10-02 PROCEDURE — 94640 AIRWAY INHALATION TREATMENT: CPT

## 2019-10-02 PROCEDURE — 86235 NUCLEAR ANTIGEN ANTIBODY: CPT | Performed by: INTERNAL MEDICINE

## 2019-10-02 PROCEDURE — 86038 ANTINUCLEAR ANTIBODIES: CPT | Performed by: INTERNAL MEDICINE

## 2019-10-02 PROCEDURE — 83986 ASSAY PH BODY FLUID NOS: CPT | Performed by: INTERNAL MEDICINE

## 2019-10-02 PROCEDURE — 87205 SMEAR GRAM STAIN: CPT | Performed by: INTERNAL MEDICINE

## 2019-10-02 RX ORDER — LIDOCAINE HYDROCHLORIDE 10 MG/ML
INJECTION, SOLUTION EPIDURAL; INFILTRATION; INTRACAUDAL; PERINEURAL
Status: DISPENSED
Start: 2019-10-02 | End: 2019-10-02

## 2019-10-02 RX ORDER — FUROSEMIDE 10 MG/ML
40 INJECTION INTRAMUSCULAR; INTRAVENOUS DAILY
Status: DISCONTINUED | OUTPATIENT
Start: 2019-10-03 | End: 2019-10-06

## 2019-10-02 RX ADMIN — BRIMONIDINE TARTRATE 1 DROP: 1.5 SOLUTION OPHTHALMIC at 14:55

## 2019-10-02 RX ADMIN — LEVALBUTEROL HYDROCHLORIDE 0.63 MG: 0.63 SOLUTION RESPIRATORY (INHALATION) at 12:58

## 2019-10-02 RX ADMIN — POTASSIUM CHLORIDE 40 MEQ: 1500 TABLET, EXTENDED RELEASE ORAL at 08:50

## 2019-10-02 RX ADMIN — IPRATROPIUM BROMIDE 0.5 MG: 0.5 SOLUTION RESPIRATORY (INHALATION) at 12:58

## 2019-10-02 RX ADMIN — BRIMONIDINE TARTRATE 1 DROP: 1.5 SOLUTION OPHTHALMIC at 21:24

## 2019-10-02 RX ADMIN — BRIMONIDINE TARTRATE 1 DROP: 1.5 SOLUTION OPHTHALMIC at 05:44

## 2019-10-02 RX ADMIN — SPIRONOLACTONE 25 MG: 25 TABLET ORAL at 08:51

## 2019-10-02 RX ADMIN — TERAZOSIN HYDROCHLORIDE 5 MG: 5 CAPSULE ORAL at 21:15

## 2019-10-02 RX ADMIN — METHYLPREDNISOLONE SODIUM SUCCINATE 40 MG: 40 INJECTION, POWDER, FOR SOLUTION INTRAMUSCULAR; INTRAVENOUS at 08:50

## 2019-10-02 RX ADMIN — POTASSIUM CHLORIDE 40 MEQ: 1500 TABLET, EXTENDED RELEASE ORAL at 17:04

## 2019-10-02 RX ADMIN — PRAVASTATIN SODIUM 40 MG: 40 TABLET ORAL at 17:04

## 2019-10-02 RX ADMIN — FUROSEMIDE 40 MG: 10 INJECTION, SOLUTION INTRAMUSCULAR; INTRAVENOUS at 08:50

## 2019-10-02 RX ADMIN — LEVALBUTEROL HYDROCHLORIDE 0.63 MG: 0.63 SOLUTION RESPIRATORY (INHALATION) at 12:57

## 2019-10-02 RX ADMIN — METHYLPREDNISOLONE SODIUM SUCCINATE 40 MG: 40 INJECTION, POWDER, FOR SOLUTION INTRAMUSCULAR; INTRAVENOUS at 21:15

## 2019-10-02 RX ADMIN — PANTOPRAZOLE SODIUM 40 MG: 40 TABLET, DELAYED RELEASE ORAL at 05:43

## 2019-10-02 RX ADMIN — TIMOLOL MALEATE 1 DROP: 5 SOLUTION/ DROPS OPHTHALMIC at 08:51

## 2019-10-02 RX ADMIN — ENOXAPARIN SODIUM 40 MG: 40 INJECTION SUBCUTANEOUS at 08:50

## 2019-10-02 RX ADMIN — LEVALBUTEROL HYDROCHLORIDE 0.63 MG: 0.63 SOLUTION RESPIRATORY (INHALATION) at 07:47

## 2019-10-02 RX ADMIN — IPRATROPIUM BROMIDE 0.5 MG: 0.5 SOLUTION RESPIRATORY (INHALATION) at 07:47

## 2019-10-02 RX ADMIN — IPRATROPIUM BROMIDE 0.5 MG: 0.5 SOLUTION RESPIRATORY (INHALATION) at 19:26

## 2019-10-02 RX ADMIN — LEVOTHYROXINE SODIUM 50 MCG: 50 TABLET ORAL at 05:43

## 2019-10-02 RX ADMIN — LEVALBUTEROL HYDROCHLORIDE 0.63 MG: 0.63 SOLUTION RESPIRATORY (INHALATION) at 19:26

## 2019-10-02 RX ADMIN — TIMOLOL MALEATE 1 DROP: 5 SOLUTION/ DROPS OPHTHALMIC at 17:04

## 2019-10-02 RX ADMIN — AMLODIPINE BESYLATE 10 MG: 10 TABLET ORAL at 08:50

## 2019-10-02 RX ADMIN — MONTELUKAST SODIUM 10 MG: 10 TABLET, FILM COATED ORAL at 08:50

## 2019-10-02 NOTE — ASSESSMENT & PLAN NOTE
Wt Readings from Last 3 Encounters:   10/02/19 75 7 kg (166 lb 14 2 oz)   06/06/19 79 6 kg (175 lb 8 oz)   04/02/19 77 1 kg (170 lb)   Patient has a past medical history of heart failure with preserved ejection fraction  Last echocardiogram was 03/18/2019 which showed normal systolic function, ejection fraction of 65% and grade 1 diastolic dysfunction  Patient presented to the ED with volume overload  On physical exam at the time of the admission, revealed JVD and bilateral lower extremity pitting edema was noted  CXR showed bilateral pleural effusions   - continue diuresis with IV Lasix   - On Aldactone  - monitor ins and outs/weight  - Diuresing well   - cardiologist on board  - manage patient's hypertension

## 2019-10-02 NOTE — PHYSICAL THERAPY NOTE
PHYSICAL THERAPY TREATMENT NOTE    Patient Name: Enrique Mariee  HYLFV'E Date: 10/2/2019     10/02/19 1018   Pain Assessment   Pain Assessment No/denies pain   Pain Score No Pain   Restrictions/Precautions   Weight Bearing Precautions Per Order No   Other Precautions Multiple lines;Telemetry; Fall Risk;Hard of hearing  (high flow O2 NC)   General   Chart Reviewed Yes   Response to Previous Treatment Patient with no complaints from previous session  Family/Caregiver Present No   Cognition   Overall Cognitive Status WFL   Arousal/Participation Cooperative   Attention Within functional limits   Comments Pt identified by name and   Subjective   Subjective Agrees to PT treatment, pleasant and cooperative  Bed Mobility   Supine to Sit Unable to assess  (OOB in chair pre/post session)   Transfers   Sit to Stand 5  Supervision   Additional items Increased time required;Verbal cues   Stand to Sit 5  Supervision   Additional items Increased time required;Verbal cues  (hand placement)   Ambulation/Elevation   Gait pattern Improper Weight shift; Forward Flexion;Decreased foot clearance; Short stride; Excessively slow   Gait Assistance 4  Minimal assist   Additional items Assist x 1;Verbal cues   Assistive Device Rolling walker   Distance (4` forward/backward x4) x 3 ambulation trials  (limited due to high flow O2 NC and decrease in SpO2)   Balance   Static Sitting Fair +   Dynamic Sitting Fair   Static Standing Fair   Dynamic Standing Fair -   Ambulatory Fair -   Endurance Deficit   Endurance Deficit Yes   Endurance Deficit Description decrease in SpO2 to 88% during ambulation   Activity Tolerance   Activity Tolerance Patient limited by fatigue;Treatment limited secondary to medical complications (Comment)   Nurse Made Aware Per RN, pt appropriate to treat   Exercises   Hip Abduction Sitting;10 reps;AROM; Bilateral  (x2)   Hip Adduction Sitting;10 reps;AROM; Bilateral  (x2 with pillow between knees)   Knee AROM Long Arc Quad Sitting;20 reps;AROM; Bilateral  (x2)   Ankle Pumps Sitting;20 reps;AROM; Bilateral  (x2)   Squat Standing;10 reps;AROM; Bilateral  (x2)   Marching Sitting;20 reps;AROM; Bilateral  (x2)   Assessment   Prognosis Fair   Problem List Decreased strength;Decreased endurance; Impaired balance;Decreased mobility; Decreased safety awareness   Assessment Pt tolerated treatment well and is progressing with overall functional mobility  Able to perform increased overall ambulation trials, however distance continues to be limited due to decrease in SpO2  Requires significant rest breaks between ambulation trials with verbal cues for pursed lip breathing  Tolerates seated and standing LE exercises well with rest breaks between sets  Will continue to benefit from ongoing skilled PT to maximize his functional mobility and increase his level of independence  Anticipating pt will be able to go home with family support and home PT vs  Rehab pending continued progress and ability to go home with daughter and son in law  Goals   Patient Goals to go home   STG Expiration Date 10/09/19   PT Treatment Day 1   Plan   Treatment/Interventions Functional transfer training;LE strengthening/ROM; Therapeutic exercise; Endurance training;Patient/family training;Equipment eval/education; Bed mobility;Gait training   Progress Slow progress, decreased activity tolerance   PT Frequency   (3-5x/week)   Recommendation   Recommendation Home PT; Home with family support  (vs  rehab pending progress/ability to go home with daughter)   Equipment Recommended Mynor Cooper, PT,DPT

## 2019-10-02 NOTE — PLAN OF CARE
Problem: PHYSICAL THERAPY ADULT  Goal: Performs mobility at highest level of function for planned discharge setting  See evaluation for individualized goals  Description  Treatment/Interventions: Functional transfer training, LE strengthening/ROM, Therapeutic exercise, Endurance training, Patient/family training, Equipment eval/education, Bed mobility, Gait training  Equipment Recommended: Esther Salcido       See flowsheet documentation for full assessment, interventions and recommendations  Note:   Prognosis: Fair  Problem List: Decreased strength, Decreased endurance, Impaired balance, Decreased mobility, Decreased safety awareness  Assessment: Pt tolerated treatment well and is progressing with overall functional mobility  Able to perform increased overall ambulation trials, however distance continues to be limited due to decrease in SpO2  Requires significant rest breaks between ambulation trials with verbal cues for pursed lip breathing  Tolerates seated and standing LE exercises well with rest breaks between sets  Will continue to benefit from ongoing skilled PT to maximize his functional mobility and increase his level of independence  Anticipating pt will be able to go home with family support and home PT vs  Rehab pending continued progress and ability to go home with daughter and son in law  Recommendation: Home PT, Home with family support(vs  rehab pending progress/ability to go home with daughter)          See flowsheet documentation for full assessment

## 2019-10-02 NOTE — PROGRESS NOTES
Progress Note - Pulmonary   Bennett Apa 80 y o  male MRN: [id number]  Unit/Bed#:  Encounter: [id number]      Assessment:  1   Acute on chronic hypoxic and hypercapnic respiratory failure  2   Acute on chronic diastolic and R sided CHF  3   Bilateral pleural effusions s/p L thoracentesis  Demonstrating transudative effusion  4   Acute exacerbation of COPD  5   Likely ADAL   6   RUL pulmonary nodule  7   CKD stage 2  8   HTN     Plan  1  Successful L thoracentesis yesterday  Will perform R sided thoracentesis today  2  I am not sure that probe is reliable  Will switch probe today  Agressively wean oxygen today  3  Continue Xopenex and Atrovent TID  4  Continue BiPAP at 16/8 qHS  5  Would decrease diuretics as per primary team and cardiology  6  Repeat CT chest in April  7  Continue Solumedrol 40mg q12, wean to prednisone tomorrow   8  Continue PFTs  9  Ambulatory pulse ox prior to discharge  10  Would echo to assess RV function  11  Check RF, GENA, Scl- 70 to rule out RA     Subjective:   Patient seen and examined  He states that he is feeling well overall  Objective:   Vitals: Blood pressure 122/59, pulse 57, temperature 98 4 °F (36 9 °C), temperature source Oral, resp  rate 20, weight 75 7 kg (166 lb 14 2 oz), SpO2 95 % , HFNC 70% 50 L, Body mass index is 26 14 kg/m²  Intake/Output Summary (Last 24 hours) at 10/2/2019 0906  Last data filed at 10/2/2019 0856  Gross per 24 hour   Intake 840 ml   Output 1950 ml   Net -1110 ml         Physical Exam  Gen: Awake, alert, oriented x 3, no acute distress  HEENT: Mucous membranes moist, no oral lesions, no thrush  NECK: No accessory muscle use, JVP not elevated  Cardiac: Regular, single S1, single S2, no murmurs, no rubs, no gallops  Lungs: Decreased breath sounds in R lung base  No wheezing or rhonchi      Abdomen: normoactive bowel sounds, soft nontender, nondistended, no rebound or rigidity, no guarding  Extremities: no cyanosis, no clubbing, no edema    Labs: I have personally reviewed pertinent lab results  Results from last 7 days   Lab Units 09/30/19 0317 09/28/19  0329 09/27/19  0555 09/26/19  1526   WBC Thousand/uL 4 72 5 47 2 23* 4 83   HEMOGLOBIN g/dL 14 5 14 5 14 3 15 0   HEMATOCRIT % 43 3 44 8 43 6 46 4   PLATELETS Thousands/uL 154 145* 126* 147*   NEUTROS PCT %  --  83* 88* 86*   MONOS PCT %  --  9 5 7      Results from last 7 days   Lab Units 10/02/19  0500 10/01/19  1533 10/01/19  0815 09/30/19 0317  09/28/19  0329  09/26/19  1526   POTASSIUM mmol/L 4 2  --  2 7* 3 2*   < > 4 1   < > 4 2   CHLORIDE mmol/L 97*  --  102 93*   < > 95*   < > 96*   CO2 mmol/L 38*  --  34* 43*   < > 42*   < > 34*   BUN mg/dL 43*  --  35* 36*   < > 30*   < > 21   CREATININE mg/dL 0 91  --  0 81 0 99   < > 0 89   < > 1 11   CALCIUM mg/dL 8 7  --  7 2* 8 1*   < > 8 1*   < > 8 5   ALK PHOS U/L  --  41*  --   --   --  59  --  67   ALT U/L  --  8*  --   --   --  13  --  11*   AST U/L  --  14  --   --   --  20  --  17    < > = values in this interval not displayed       Results from last 7 days   Lab Units 09/30/19 0317 09/29/19 0317 09/27/19  0555   MAGNESIUM mg/dL 2 0 1 8 1 9          Results from last 7 days   Lab Units 09/26/19  1526   INR  1 15         0   Lab Value Date/Time    TROPONINI 0 03 09/26/2019 1526    TROPONINI 0 04 03/16/2019 1227         Meds/Allergies   Current Facility-Administered Medications   Medication Dose Route Frequency    albuterol inhalation solution 2 5 mg  2 5 mg Nebulization Q6H PRN    amLODIPine (NORVASC) tablet 10 mg  10 mg Oral Daily    brimonidine (ALPHAGAN P) 0 15 % ophthalmic solution 1 drop  1 drop Both Eyes Q8H [address]    enoxaparin (LOVENOX) subcutaneous injection 40 mg  40 mg Subcutaneous Daily    furosemide (LASIX) injection 40 mg  40 mg Intravenous BID (diuretic)    ipratropium (ATROVENT) 0 02 % inhalation solution 0 5 mg  0 5 mg Nebulization TID    levalbuterol (XOPENEX) inhalation solution 0 63 mg  0 63 mg Nebulization TID    levothyroxine tablet 50 mcg  50 mcg Oral Daily    methylPREDNISolone sodium succinate (Solu-MEDROL) injection 40 mg  40 mg Intravenous Q12H [facility]    montelukast (SINGULAIR) tablet 10 mg  10 mg Oral Daily    ondansetron (ZOFRAN) injection 4 mg  4 mg Intravenous Q6H PRN    pantoprazole (PROTONIX) EC tablet 40 mg  40 mg Oral Early Morning    potassium chloride (K-DUR,KLOR-CON) CR tablet 40 mEq  40 mEq Oral BID    pravastatin (PRAVACHOL) tablet 40 mg  40 mg Oral Daily With Dinner    spironolactone (ALDACTONE) tablet 25 mg  25 mg Oral Daily    terazosin (HYTRIN) capsule 5 mg  5 mg Oral HS    timolol (TIMOPTIC) 0 5 % ophthalmic solution 1 drop  1 drop Both Eyes BID     Medications Prior to Admission   Medication    amLODIPine (NORVASC) 10 mg tablet    Blood Pressure Monitoring (B-D ASSURE BPM/AUTO ARM CUFF) MISC    brimonidine-timolol (COMBIGAN) 0 2-0 5 %    furosemide (LASIX) 20 mg tablet    latanoprost (XALATAN) 0 005 % ophthalmic solution    levothyroxine 50 mcg tablet    montelukast (SINGULAIR) 10 mg tablet    potassium chloride (K-DUR,KLOR-CON) 20 mEq tablet    RABEprazole (ACIPHEX) 20 MG tablet    simvastatin (ZOCOR) 20 mg tablet    spironolactone (ALDACTONE) 25 mg tablet    terazosin (HYTRIN) 5 mg capsule         Microbiology:  Lab Results   Component Value Date    BLOODCX No Growth After 5 Days  03/16/2019    BLOODCX No Growth After 5 Days  03/16/2019       Imaging and other studies: I have personally reviewed pertinent reports  CXR - improvement in L effusion with only minimal remaining  Significant R sided effusion        DO Caitlyn Levi [facility]

## 2019-10-02 NOTE — PROGRESS NOTES
Cardiology Progress Note - Bel Weems 80 y o  male MRN: [id number]    Unit/Bed#:  Encounter: [id number]        Subjective:   Underwent L thoracentesis with removal of 800 ml  Breathing improving, but still on high flow O2  Review of Systems   Cardiovascular: Negative for chest pain, leg swelling and palpitations  Respiratory: Positive for shortness of breath  Objective:   Vitals: Blood pressure 122/59, pulse 57, temperature 98 4 °F (36 9 °C), temperature source Oral, resp  rate 20, weight 75 7 kg (166 lb 14 2 oz), SpO2 95 %  , Body mass index is 26 14 kg/m² ,   Orthostatic Blood Pressures      Most Recent Value   Blood Pressure  122/59 filed at 10/02/2019 0700   Patient Position - Orthostatic VS  Lying filed at 10/02/2019 1564         Systolic (13SYN), WLS:066 , Min:104 , UBE:004     Diastolic (29MOK), PJE:77, Min:55, Max:73      Intake/Output Summary (Last 24 hours) at 10/2/2019 0923  Last data filed at 10/2/2019 0907  Gross per 24 hour   Intake 840 ml   Output 2175 ml   Net -1335 ml     Weight (last 2 days)     Date/Time   Weight    10/02/19 0600   75 7 (166 89)    10/01/19 0533   78 4 (172 84)    09/30/19 0556   78 4 (172 84)              Telemetry Review: NSR    Physical Exam   Cardiovascular: Normal rate, regular rhythm and normal heart sounds  Exam reveals no gallop and no friction rub  No murmur heard  Pulmonary/Chest: He has decreased breath sounds  He has no wheezes  He has no rales  Musculoskeletal: He exhibits no edema           Laboratory Results:  Results from last 7 days   Lab Units 09/26/19  1526   TROPONIN I ng/mL 0 03       CBC with diff:   Results from last 7 days   Lab Units 09/30/19  0317 09/28/19  0329 09/27/19  0555 09/26/19  1526 09/25/19  1406   WBC Thousand/uL 4 72 5 47 2 23* 4 83 5 21   HEMOGLOBIN g/dL 14 5 14 5 14 3 15 0 15 0   HEMATOCRIT % 43 3 44 8 43 6 46 4 46 7   MCV fL 93 97 96 98 97   PLATELETS Thousands/uL 154 145* 126* 147* 173   MCH pg 31 3 31 3 31 4 31 6 31 1   MCHC g/dL 33 5 32 4 32 8 32 3 32 1   RDW % 13 0 13 2 13 4 13 3 13 2   MPV fL 10 6 10 3 10 5 10 8 11 1   NRBC AUTO /100 WBCs  --  0 0 0 0         CMP:  Results from last 7 days   Lab Units 10/02/19  0500 10/01/19  1533 10/01/19  0815 09/30/19  0317 09/29/19  0317 09/28/19  0329 09/27/19  0555 09/26/19  1526 09/25/19  1406   POTASSIUM mmol/L 4 2  --  2 7* 3 2* 3 4* 4 1 4 2 4 2 4 2   CHLORIDE mmol/L 97*  --  102 93* 93* 95* 98* 96* 97*   CO2 mmol/L 38*  --  34* 43* >45* 42* 35* 34* 33*   BUN mg/dL 43*  --  35* 36* 31* 30* 21 21 19   CREATININE mg/dL 0 91  --  0 81 0 99 0 78 0 89 0 81 1 11 0 94   CALCIUM mg/dL 8 7  --  7 2* 8 1* 7 7* 8 1* 8 2* 8 5 8 9   AST U/L  --  14  --   --   --  20  --  17 18   ALT U/L  --  8*  --   --   --  13  --  11* 15   ALK PHOS U/L  --  41*  --   --   --  59  --  67 67   EGFR ml/min/1 73sq m 74  --  78 67 79 75 78 58 71         BMP:  Results from last 7 days   Lab Units 10/02/19  0500 10/01/19  0815 09/30/19 0317 09/29/19 0317 09/28/19  0329 09/27/19  0555 09/26/19  1526   POTASSIUM mmol/L 4 2 2 7* 3 2* 3 4* 4 1 4 2 4 2   CHLORIDE mmol/L 97* 102 93* 93* 95* 98* 96*   CO2 mmol/L 38* 34* 43* >45* 42* 35* 34*   BUN mg/dL 43* 35* 36* 31* 30* 21 21   CREATININE mg/dL 0 91 0 81 0 99 0 78 0 89 0 81 1 11   CALCIUM mg/dL 8 7 7 2* 8 1* 7 7* 8 1* 8 2* 8 5       BNP:     Magnesium:   Results from last 7 days   Lab Units 09/30/19  0317 09/29/19  0317 09/27/19  0555 09/26/19  1526   MAGNESIUM mg/dL 2 0 1 8 1 9 2 0       Coags:   Results from last 7 days   Lab Units 09/26/19  1526   INR  1 15       TSH:       Lipid Profile:   Results from last 7 days   Lab Units 09/25/19  1406   TRIGLYCERIDES mg/dL 37   HDL mg/dL 80*           Cardiac testing:   Results for orders placed during the hospital encounter of 03/16/19   Echo complete with contrast if indicated    Narrative [facility] [address], [address] [facility]  [phone]    Transthoracic Echocardiogram  2D, M-mode, Doppler, and Color Doppler    Study date:  18-Mar-2019    Patient: Fausto Colbert  MR number: [id number]  Account number: [de-identified]  : 24-Mar-1929  Age: 80 years  Gender: Male  Status: Inpatient  Location: Bedside  Height: 67 in  Weight: 185 7 lb  BP: 151/ 68 mmHg    Indications: Assess left ventricular function  Diagnoses: I50 9 - Heart failure, unspecified    Sonographer:  Navi Corley RDCS  Primary Physician:  Fernando Bansal MD  Referring Physician:  Marco Corona MD  Group:  Kassie Hernandez [facility]  Interpreting Physician:  Isadora Landaverde MD    SUMMARY    LEFT VENTRICLE:  Systolic function was normal  Ejection fraction was estimated to be 65 %  Although no diagnostic regional wall motion abnormality was identified, this possibility cannot be completely excluded on the basis of this study  Doppler parameters were consistent with abnormal left ventricular relaxation (grade 1 diastolic dysfunction)  RIGHT VENTRICLE:  The ventricle was dilated  Systolic function was mildly reduced  RIGHT ATRIUM:  The atrium was dilated  PROCEDURE: The procedure was performed at the bedside  This was a routine study  The transthoracic approach was used  The study included complete 2D imaging, M-mode, complete spectral Doppler, and color Doppler  The heart rate was 70 bpm,  at the start of the study  Images were obtained from the parasternal, apical, subcostal, and suprasternal notch acoustic windows  Image quality was adequate  LEFT VENTRICLE: Size was normal  Systolic function was normal  Ejection fraction was estimated to be 65 %  Although no diagnostic regional wall motion abnormality was identified, this possibility cannot be completely excluded on the basis  of this study  DOPPLER: There was an increased relative contribution of atrial contraction to ventricular filling  Doppler parameters were consistent with abnormal left ventricular relaxation (grade 1 diastolic dysfunction)      RIGHT VENTRICLE: The ventricle was dilated  Systolic function was mildly reduced  LEFT ATRIUM: Size was normal     RIGHT ATRIUM: The atrium was dilated  MITRAL VALVE: Valve structure was normal  There was normal leaflet separation  DOPPLER: There was no evidence for stenosis  There was no significant regurgitation  AORTIC VALVE: The valve was not well visualized  DOPPLER: There was no evidence for stenosis  There was no significant regurgitation  TRICUSPID VALVE: The valve structure was normal  There was normal leaflet separation  DOPPLER: There was no evidence for stenosis  There was no significant regurgitation  PULMONIC VALVE: Leaflets exhibited normal thickness, no calcification, and normal cuspal separation  DOPPLER: The transpulmonic velocity was within the normal range  There was no regurgitation  PERICARDIUM: There was no pericardial effusion  The pericardium was normal in appearance  AORTA: The root exhibited normal size  Not well visualized      SYSTEM MEASUREMENT TABLES    2D  %FS: 34 11 %  AV Diam: 3 48 cm  EDV(Teich): 85 19 ml  EF(Cube): 71 4 %  EF(Teich): 63 34 %  ESV(Cube): 23 48 ml  ESV(Teich): 31 23 ml  IVSd: 1 21 cm  LA Area: 15 68 cm2  LA Diam: 3 47 cm  LVEDV MOD A4C: 78 44 ml  LVEF MOD A4C: 67 23 %  LVESV MOD A4C: 25 7 ml  LVIDd: 4 35 cm  LVIDs: 2 86 cm  LVLd A4C: 7 59 cm  LVLs A4C: 6 36 cm  LVPWd: 1 21 cm  RA Area: 17 77 cm2  RV Diam: 3 96 cm  SI(Cube): 29 91 ml/m2  SI(Teich): 27 53 ml/m2  SV MOD A4C: 52 74 ml  SV(Cube): 58 62 ml  SV(Teich): 53 96 ml    MM  TAPSE: 2 36 cm    PW  AVC: 334 79 ms  E': 0 05 m/s  E/E': 19 38  MV A Nickolas: 1 06 m/s  MV Dec Rich: 4 85 m/s2  MV DecT: 207 36 ms  MV E Nickolas: 1 01 m/s  MV E/A Ratio: 0 95    [employer] Accredited Echocardiography Laboratory    Prepared and electronically signed by    Leslie Mann MD  Signed 18-Mar-2019 15:04:21         Meds/Allergies     Current Facility-Administered Medications:  albuterol 2 5 mg Nebulization Q6H PRN Pat Mary MD   amLODIPine 10 mg Oral Daily Mitch Shafer MD   brimonidine 1 drop Both Eyes Q8H [address] Mitch Shafer MD   enoxaparin 40 mg Subcutaneous Daily Mitch Shafer MD   [START ON 10/3/2019] furosemide 40 mg Intravenous Daily Bryan Asa, DO   ipratropium 0 5 mg Nebulization TID Josephine Weems MD   levalbuterol 0 63 mg Nebulization TID Josephine Weems MD   levothyroxine 50 mcg Oral Daily Mitch Shafer MD   methylPREDNISolone sodium succinate 40 mg Intravenous Q12H [address] Ladan Gifford PA-C   montelukast 10 mg Oral Daily Mitch Shafer MD   ondansetron 4 mg Intravenous Q6H PRN Mitch Shafer MD   pantoprazole 40 mg Oral Early Morning Mitch Shafer MD   potassium chloride 40 mEq Oral BID Carlos Lobato MD   pravastatin 40 mg Oral Daily With Dinner Mitch Shafer MD   spironolactone 25 mg Oral Daily Nery Celis, DO   terazosin 5 mg Oral HS Mitch Shafer MD   timolol 1 drop Both Eyes BID Mitch Shafer MD        Medications Prior to Admission   Medication    amLODIPine (NORVASC) 10 mg tablet    Blood Pressure Monitoring (B-D ASSURE BPM/AUTO ARM CUFF) MISC    brimonidine-timolol (COMBIGAN) 0 2-0 5 %    furosemide (LASIX) 20 mg tablet    latanoprost (XALATAN) 0 005 % ophthalmic solution    levothyroxine 50 mcg tablet    montelukast (SINGULAIR) 10 mg tablet    potassium chloride (K-DUR,KLOR-CON) 20 mEq tablet    RABEprazole (ACIPHEX) 20 MG tablet    simvastatin (ZOCOR) 20 mg tablet    spironolactone (ALDACTONE) 25 mg tablet    terazosin (HYTRIN) 5 mg capsule       Assessment:  Principal Problem:    Acute respiratory failure with hypoxia and hypercapnia (HCC)  Active Problems:    Bilateral leg edema    Acute metabolic encephalopathy    Acute on chronic heart failure with preserved ejection fraction (HCC)    Essential hypertension    Pulmonary nodule    Hypokalemia    Chronic kidney disease, stage 2 (mild)      Impression:  1   Acute on chronic diastolic heart failure - on IV diuretics  2  HTN - controlled  3  B pleural effusion     Plan:  1  Continue IV diuretics  2  Watch renal function

## 2019-10-02 NOTE — PROGRESS NOTES
Progress Note Bella Porter 3/24/1929, 80 y o  male MRN: [id number]    Unit/Bed#:  Encounter: [id number]    Primary Care Provider: Michel Duenas MD   Date and time admitted to hospital: 9/26/2019  3:06 PM        * Acute respiratory failure with hypoxia and hypercapnia (HCC)  Assessment & Plan  · Due to CHF exacerbation and bilateral pleural effusions and possible COPD of unknown severity with acute exacerbation  · IV diuresis  · Currently on high flow nasal canula, need to transition to nasal canula before DC  · 09/29 Patient having difficulty maintaining adequate saturation despite improving volume status  ABG: pH 7 4, CO2 55 4, HCO3 34  Repeat CXR showed no improvement from CXR on admission  Currently on high flow nasal canula  · 10/02 settings of patient's high-flow nasal cannula have been increasing  · Appreciate pulmonology consult  Possible COPD of unknown severity with acute exacerbation  · Continue Solu-Medrol at 40 mg every 12 hours  · Continue titrating high flow nasal canula   · Given two doses of diamox for bilateral pleural effusions  Would like to repeat imaging after more aggressive diuresis  · Incidental pulmonary nodule in RUL to be re evaluated by chest CT in April 2020   · Continue nebulizations with Xopenex and Atrovent  · No formal diagnosis yet of COPD  Patient has previous history of heavy tobacco smoking of 2 packs per day for 15 years  Patient quit 60 years ago  · Continue BiPAP q h s     Likely will need BiPAP q h s  At discharge  Based on previous problem list, patient has history of ADAL  · For outpatient PFT    · 10/01 left-sided thoracocentesis was performed 800 mL of straw colored fluid recovered  · 10/02 patient to undergo right-sided thoracocentesis      Acute on chronic heart failure with preserved ejection fraction [facility])  Assessment & Plan  Wt Readings from Last 3 Encounters:   10/02/19 75 7 kg (166 lb 14 2 oz)   06/06/19 79 6 kg (175 lb 8 oz)   04/02/19 77 1 kg (170 lb)   Patient has a past medical history of heart failure with preserved ejection fraction  Last echocardiogram was 03/18/2019 which showed normal systolic function, ejection fraction of 65% and grade 1 diastolic dysfunction  Patient presented to the ED with volume overload  On physical exam at the time of the admission, revealed JVD and bilateral lower extremity pitting edema was noted  CXR showed bilateral pleural effusions   - continue diuresis with IV Lasix   - On Aldactone  - monitor ins and outs/weight  - Diuresing well   - cardiologist on board  - manage patient's hypertension  Essential hypertension  Assessment & Plan  Stable  Patient with history of hypertension  Blood pressure medication managed by cardiologist   Home medications include terazosin 5 mg, Aldactone 25 mg, amlodipine 10 mg  Blood pressure stable in the ED, 120/69  - continue terazosin 5 mg  - continue amlodipine 10 mg  - Aldactone continued  - continue IV diuresis, per Cardiology  - monitor blood pressure    Chronic kidney disease, stage 2 (mild)  Assessment & Plan  Stable  Monitor BMP with diuresis  Monitor input and output  Avoid nephrotoxins  Avoid hypotension  Hypokalemia  Assessment & Plan  · Replace potassium  · Monitor electrolytes  · Stable today of 4 2    Pulmonary nodule  Assessment & Plan  · Right upper lung pulmonary nodule  · Need surveillance in the outpatient  Repeat CT of the chest due, April 2020  Acute metabolic encephalopathy  Assessment & Plan  Resolved  Metabolic encephalopathy in the setting of acute respiratory failure with hypoxia evidenced confusion at home for past few days   - Patient stable at baseline  - Continue oxygen, neuro checks and nursing safety measures      Bilateral leg edema  Assessment & Plan  · Appears L>R LE edema  · Venous doppler results were normal    · Likely secondary to congestive heart failure  · Continue diuresis            Pharmacologic: Enoxaparin (Lovenox)  Mechanical VTE Prophylaxis in Place: Yes    Discussions with Specialists or Other Care Team Provider:  ICU    Education and Discussions with Family / Patient: N/A    Current Length of Stay: 6 day(s)    Current Patient Status: Inpatient     Discharge Plan / Estimated Discharge Date:  Unknown    Code Status: Level 3 - DNAR and DNI      Subjective:   Patient today is doing well and pleasant as always  He denies any shortness of breath  He mentions that yesterday he tolerated his thoracocentesis just fine  He mentions that his family will be visiting him today  Objective:     Vitals:   Temp (24hrs), Av 9 °F (36 6 °C), Min:97 5 °F (36 4 °C), Max:98 4 °F (36 9 °C)    Temp:  [97 5 °F (36 4 °C)-98 4 °F (36 9 °C)] 97 9 °F (36 6 °C)  HR:  [57-91] 91  Resp:  [16-29] 20  BP: (104-158)/(55-75) 158/75  SpO2:  [90 %-97 %] 92 %  Body mass index is 26 14 kg/m²  Input and Output Summary (last 24 hours): Intake/Output Summary (Last 24 hours) at 10/2/2019 1209  Last data filed at 10/2/2019 0907  Gross per 24 hour   Intake 600 ml   Output 1375 ml   Net -775 ml       Physical Exam:     Physical Exam   Constitutional: He appears well-developed and well-nourished  HENT:   Head: Normocephalic and atraumatic  Cardiovascular: Normal rate, regular rhythm and normal heart sounds  Pulmonary/Chest: Effort normal and breath sounds normal  He has no wheezes  Neurological: He is alert  Psychiatric: He has a normal mood and affect   His behavior is normal      Additional Data:     Labs:    Results from last 7 days   Lab Units 19  0317 19  0329   WBC Thousand/uL 4 72 5 47   HEMOGLOBIN g/dL 14 5 14 5   HEMATOCRIT % 43 3 44 8   PLATELETS Thousands/uL 154 145*   NEUTROS PCT %  --  83*   LYMPHS PCT %  --  7*   MONOS PCT %  --  9   EOS PCT %  --  0     Results from last 7 days   Lab Units 10/02/19  0500 10/01/19  1533   POTASSIUM mmol/L 4 2  --    CHLORIDE mmol/L 97*  --    CO2 mmol/L 38*  --    BUN mg/dL 43*  --    CREATININE mg/dL 0 91  --    CALCIUM mg/dL 8 7  --    ALK PHOS U/L  --  41*   ALT U/L  --  8*   AST U/L  --  14     Results from last 7 days   Lab Units 09/26/19  1526   INR  1 15       * I Have Reviewed All Lab Data Listed Above  * Additional Pertinent Lab Tests Reviewed: Cinthya 66 Admission Reviewed    Imaging:    Imaging Reports Reviewed Today Include: CXR  Imaging Personally Reviewed by Myself Includes:  CXR    Recent Cultures (last 7 days):     Results from last 7 days   Lab Units 10/01/19  1516   GRAM STAIN RESULT  Rare Polys  Rare Mononuclear Cells  No bacteria seen       Last 24 Hours Medication List:     Current Facility-Administered Medications:  albuterol 2 5 mg Nebulization Q6H PRN Nay Estrada MD   amLODIPine 10 mg Oral Daily Jamel Cogan, MD   brimonidine 1 drop Both Eyes Q8H [facility] Jamel Cogan, MD   enoxaparin 40 mg Subcutaneous Daily Jamel Cogan, MD   [START ON 10/3/2019] furosemide 40 mg Intravenous Daily Donna Santana DO   ipratropium 0 5 mg Nebulization TID Serafin Hammonds MD   levalbuterol 0 63 mg Nebulization TID Serafin Hammonds MD   levothyroxine 50 mcg Oral Daily Jamel Cogan, MD   lidocaine (PF)       lidocaine (PF)       methylPREDNISolone sodium succinate 40 mg Intravenous Q12H [facility] Ladan Gifford PA-C   montelukast 10 mg Oral Daily Jamel Cogan, MD   ondansetron 4 mg Intravenous Q6H PRN Jamel Cogan, MD   pantoprazole 40 mg Oral Early Morning Jamel Cogan, MD   potassium chloride 40 mEq Oral BID Carlos Lobato MD   pravastatin 40 mg Oral Daily With Dinner Jamel Cogan, MD   spironolactone 25 mg Oral Daily Digna Green DO   terazosin 5 mg Oral HS Jamel Cogan, MD   timolol 1 drop Both Eyes BID Jamel Cogan, MD        Today, Patient Was Seen By: Carlene Cheney MD    ** Please Note: This note has been constructed using a voice recognition system   **

## 2019-10-03 PROBLEM — G93.41 ACUTE METABOLIC ENCEPHALOPATHY: Status: RESOLVED | Noted: 2019-03-16 | Resolved: 2019-10-03

## 2019-10-03 LAB
ANION GAP SERPL CALCULATED.3IONS-SCNC: 3 MMOL/L (ref 4–13)
BUN SERPL-MCNC: 48 MG/DL (ref 5–25)
CALCIUM SERPL-MCNC: 8.7 MG/DL (ref 8.3–10.1)
CHLORIDE SERPL-SCNC: 100 MMOL/L (ref 100–108)
CO2 SERPL-SCNC: 36 MMOL/L (ref 21–32)
CREAT SERPL-MCNC: 0.92 MG/DL (ref 0.6–1.3)
ENA SCL70 AB SER-ACNC: <0.2 AI (ref 0–0.9)
GFR SERPL CREATININE-BSD FRML MDRD: 73 ML/MIN/1.73SQ M
GLUCOSE SERPL-MCNC: 150 MG/DL (ref 65–140)
POTASSIUM SERPL-SCNC: 5 MMOL/L (ref 3.5–5.3)
RHEUMATOID FACT SER QL LA: NEGATIVE
SODIUM SERPL-SCNC: 139 MMOL/L (ref 136–145)

## 2019-10-03 PROCEDURE — 99232 SBSQ HOSP IP/OBS MODERATE 35: CPT | Performed by: INTERNAL MEDICINE

## 2019-10-03 PROCEDURE — 80048 BASIC METABOLIC PNL TOTAL CA: CPT | Performed by: INTERNAL MEDICINE

## 2019-10-03 PROCEDURE — 94760 N-INVAS EAR/PLS OXIMETRY 1: CPT

## 2019-10-03 PROCEDURE — 94640 AIRWAY INHALATION TREATMENT: CPT

## 2019-10-03 PROCEDURE — 94660 CPAP INITIATION&MGMT: CPT

## 2019-10-03 RX ADMIN — IPRATROPIUM BROMIDE 0.5 MG: 0.5 SOLUTION RESPIRATORY (INHALATION) at 07:45

## 2019-10-03 RX ADMIN — PANTOPRAZOLE SODIUM 40 MG: 40 TABLET, DELAYED RELEASE ORAL at 05:27

## 2019-10-03 RX ADMIN — SPIRONOLACTONE 25 MG: 25 TABLET ORAL at 08:20

## 2019-10-03 RX ADMIN — LEVOTHYROXINE SODIUM 50 MCG: 50 TABLET ORAL at 05:27

## 2019-10-03 RX ADMIN — MONTELUKAST SODIUM 10 MG: 10 TABLET, FILM COATED ORAL at 08:20

## 2019-10-03 RX ADMIN — BRIMONIDINE TARTRATE 1 DROP: 1.5 SOLUTION OPHTHALMIC at 05:27

## 2019-10-03 RX ADMIN — TIMOLOL MALEATE 1 DROP: 5 SOLUTION/ DROPS OPHTHALMIC at 08:20

## 2019-10-03 RX ADMIN — TERAZOSIN HYDROCHLORIDE 5 MG: 5 CAPSULE ORAL at 21:47

## 2019-10-03 RX ADMIN — METHYLPREDNISOLONE SODIUM SUCCINATE 40 MG: 40 INJECTION, POWDER, FOR SOLUTION INTRAMUSCULAR; INTRAVENOUS at 21:47

## 2019-10-03 RX ADMIN — FUROSEMIDE 40 MG: 10 INJECTION, SOLUTION INTRAMUSCULAR; INTRAVENOUS at 08:20

## 2019-10-03 RX ADMIN — LEVALBUTEROL HYDROCHLORIDE 0.63 MG: 0.63 SOLUTION RESPIRATORY (INHALATION) at 07:44

## 2019-10-03 RX ADMIN — IPRATROPIUM BROMIDE 0.5 MG: 0.5 SOLUTION RESPIRATORY (INHALATION) at 13:07

## 2019-10-03 RX ADMIN — POTASSIUM CHLORIDE 40 MEQ: 1500 TABLET, EXTENDED RELEASE ORAL at 08:20

## 2019-10-03 RX ADMIN — ENOXAPARIN SODIUM 40 MG: 40 INJECTION SUBCUTANEOUS at 08:20

## 2019-10-03 RX ADMIN — TIMOLOL MALEATE 1 DROP: 5 SOLUTION/ DROPS OPHTHALMIC at 17:21

## 2019-10-03 RX ADMIN — BRIMONIDINE TARTRATE 1 DROP: 1.5 SOLUTION OPHTHALMIC at 14:27

## 2019-10-03 RX ADMIN — PRAVASTATIN SODIUM 40 MG: 40 TABLET ORAL at 16:53

## 2019-10-03 RX ADMIN — POTASSIUM CHLORIDE 40 MEQ: 1500 TABLET, EXTENDED RELEASE ORAL at 17:21

## 2019-10-03 RX ADMIN — AMLODIPINE BESYLATE 10 MG: 10 TABLET ORAL at 08:23

## 2019-10-03 RX ADMIN — LEVALBUTEROL HYDROCHLORIDE 0.63 MG: 0.63 SOLUTION RESPIRATORY (INHALATION) at 13:07

## 2019-10-03 RX ADMIN — LEVALBUTEROL HYDROCHLORIDE 0.63 MG: 0.63 SOLUTION RESPIRATORY (INHALATION) at 19:16

## 2019-10-03 RX ADMIN — METHYLPREDNISOLONE SODIUM SUCCINATE 40 MG: 40 INJECTION, POWDER, FOR SOLUTION INTRAMUSCULAR; INTRAVENOUS at 08:20

## 2019-10-03 RX ADMIN — IPRATROPIUM BROMIDE 0.5 MG: 0.5 SOLUTION RESPIRATORY (INHALATION) at 19:16

## 2019-10-03 RX ADMIN — BRIMONIDINE TARTRATE 1 DROP: 1.5 SOLUTION OPHTHALMIC at 21:48

## 2019-10-03 NOTE — ASSESSMENT & PLAN NOTE
· Stable  · Monitor BMP with diuresis  · Monitor input and output  · Avoid nephrotoxins  · Avoid hypotension

## 2019-10-03 NOTE — PROGRESS NOTES
Progress Note - Pulmonary   Hardik Lechuga 80 y o  male MRN: [id number]  Unit/Bed#:  Encounter: [id number]      Assessment:  1   Acute on chronic hypoxic and hypercapnic respiratory failure  2   Acute on chronic diastolic and R sided CHF  3   Bilateral pleural effusions s/p B/L thoracentesis  Demonstrating transudative effusion  4   Acute exacerbation of COPD  5   Likely ADAL   6   RUL pulmonary nodule  7   CKD stage 2  8   HTN     Plan  1  Successful B/L thoracentesis  Await cytology and remaining results    2  Wean off HFNC by this afternoon  Doing well on 40L and 40%  3  Continue Xopenex and Atrovent TID  4  Continue BiPAP at 16/8 qHS  5  Diuretics as per primary team and cardiology  6  Repeat CT chest in April 7  Wean to prednisone today    8  Continue PFTs  9  Ambulatory pulse ox prior to discharge  10  Check RF, GENA, Scl- 70 to rule out RA     Subjective:   Patient seen and examined  No new events overnight  He denies chest tightness, wheezing or cough  Objective:   Vitals: Blood pressure 135/64, pulse 68, temperature 98 3 °F (36 8 °C), temperature source Oral, resp  rate 18, weight 77 7 kg (171 lb 4 8 oz), SpO2 96 % , HFNC 40L and 40%, Body mass index is 26 83 kg/m²  Intake/Output Summary (Last 24 hours) at 10/3/2019 1143  Last data filed at 10/3/2019 1047  Gross per 24 hour   Intake 1050 ml   Output 1175 ml   Net -125 ml       Physical Exam  Gen: Awake, alert, oriented x 3, no acute distress  HEENT: Mucous membranes moist, no oral lesions, no thrush  NECK: No accessory muscle use, JVP not elevated  Cardiac: Regular, single S1, single S2, no murmurs, no rubs, no gallops  Lungs: Decreased breath sounds, resolution of wheezing or rhonchi  Abdomen: normoactive bowel sounds, soft nontender, nondistended, no rebound or rigidity, no guarding  Extremities: no cyanosis, no clubbing, no edema    Labs: I have personally reviewed pertinent lab results    Results from last 7 days   Lab Units 09/30/19  0317 09/28/19  0329 09/27/19  0555 09/26/19  1526   WBC Thousand/uL 4 72 5 47 2 23* 4 83   HEMOGLOBIN g/dL 14 5 14 5 14 3 15 0   HEMATOCRIT % 43 3 44 8 43 6 46 4   PLATELETS Thousands/uL 154 145* 126* 147*   NEUTROS PCT %  --  83* 88* 86*   MONOS PCT %  --  9 5 7      Results from last 7 days   Lab Units 10/03/19  0545 10/02/19  0500 10/01/19  1533 10/01/19  0815  09/28/19  0329  09/26/19  1526   POTASSIUM mmol/L 5 0 4 2  --  2 7*   < > 4 1   < > 4 2   CHLORIDE mmol/L 100 97*  --  102   < > 95*   < > 96*   CO2 mmol/L 36* 38*  --  34*   < > 42*   < > 34*   BUN mg/dL 48* 43*  --  35*   < > 30*   < > 21   CREATININE mg/dL 0 92 0 91  --  0 81   < > 0 89   < > 1 11   CALCIUM mg/dL 8 7 8 7  --  7 2*   < > 8 1*   < > 8 5   ALK PHOS U/L  --   --  41*  --   --  59  --  67   ALT U/L  --   --  8*  --   --  13  --  11*   AST U/L  --   --  14  --   --  20  --  17    < > = values in this interval not displayed       Results from last 7 days   Lab Units 09/30/19 0317 09/29/19 0317 09/27/19  0555   MAGNESIUM mg/dL 2 0 1 8 1 9          Results from last 7 days   Lab Units 09/26/19  1526   INR  1 15         0   Lab Value Date/Time    TROPONINI 0 03 09/26/2019 1526    TROPONINI 0 04 03/16/2019 1227         Meds/Allergies   Current Facility-Administered Medications   Medication Dose Route Frequency    albuterol inhalation solution 2 5 mg  2 5 mg Nebulization Q6H PRN    amLODIPine (NORVASC) tablet 10 mg  10 mg Oral Daily    brimonidine (ALPHAGAN P) 0 15 % ophthalmic solution 1 drop  1 drop Both Eyes Q8H [address]    enoxaparin (LOVENOX) subcutaneous injection 40 mg  40 mg Subcutaneous Daily    furosemide (LASIX) injection 40 mg  40 mg Intravenous Daily    ipratropium (ATROVENT) 0 02 % inhalation solution 0 5 mg  0 5 mg Nebulization TID    levalbuterol (XOPENEX) inhalation solution 0 63 mg  0 63 mg Nebulization TID    levothyroxine tablet 50 mcg  50 mcg Oral Daily    methylPREDNISolone sodium succinate (Solu-MEDROL) injection 40 mg  40 mg Intravenous Q12H [address]    montelukast (SINGULAIR) tablet 10 mg  10 mg Oral Daily    ondansetron (ZOFRAN) injection 4 mg  4 mg Intravenous Q6H PRN    pantoprazole (PROTONIX) EC tablet 40 mg  40 mg Oral Early Morning    potassium chloride (K-DUR,KLOR-CON) CR tablet 40 mEq  40 mEq Oral BID    pravastatin (PRAVACHOL) tablet 40 mg  40 mg Oral Daily With Dinner    spironolactone (ALDACTONE) tablet 25 mg  25 mg Oral Daily    terazosin (HYTRIN) capsule 5 mg  5 mg Oral HS    timolol (TIMOPTIC) 0 5 % ophthalmic solution 1 drop  1 drop Both Eyes BID     Medications Prior to Admission   Medication    amLODIPine (NORVASC) 10 mg tablet    Blood Pressure Monitoring (B-D ASSURE BPM/AUTO ARM CUFF) MISC    brimonidine-timolol (COMBIGAN) 0 2-0 5 %    furosemide (LASIX) 20 mg tablet    latanoprost (XALATAN) 0 005 % ophthalmic solution    levothyroxine 50 mcg tablet    montelukast (SINGULAIR) 10 mg tablet    potassium chloride (K-DUR,KLOR-CON) 20 mEq tablet    RABEprazole (ACIPHEX) 20 MG tablet    simvastatin (ZOCOR) 20 mg tablet    spironolactone (ALDACTONE) 25 mg tablet    terazosin (HYTRIN) 5 mg capsule         Microbiology:  Lab Results   Component Value Date    BLOODCX No Growth After 5 Days  03/16/2019    BLOODCX No Growth After 5 Days  03/16/2019     Imaging and other studies: I have personally reviewed pertinent reports  No new imaging today        DO Caitlyn Rubalcava [facility]

## 2019-10-03 NOTE — PROGRESS NOTES
Progress Note Dominique Polanco 3/24/1929, 80 y o  male MRN: [id number]    Unit/Bed#:  Encounter: [id number]    Primary Care Provider: Sierra Russo MD   Date and time admitted to hospital: 9/26/2019  3:06 PM        * Acute respiratory failure with hypoxia and hypercapnia (HCC)  Assessment & Plan  · Due to CHF exacerbation and bilateral pleural effusions and possible COPD of unknown severity with acute exacerbation  No formal diagnosis yet of COPD  Patient has previous history of heavy tobacco smoking of 2 packs per day for 15 years  Patient quit 60 years ago  · 09/29 Patient having difficulty maintaining adequate saturation despite improving volume status  ABG: pH 7 4, CO2 55 4, HCO3 34  Repeat CXR showed no improvement from CXR on admission  Currently on high flow nasal canula  · 09/29 Given two doses of diamox for bilateral pleural effusions  · 10/01 left-sided thoracocentesis was performed 800 mL of straw colored fluid recovered  · 10/02 right-sided thoracocentesis was performed 800 mL of straw colored fluid recovered  · 10/02 settings of patient's high-flow nasal cannula have been increasing    Plan:   · IV diuresis  · Outpatient PFT  · Currently on high flow nasal canula, need to transition to nasal canula before DC  · Appreciate pulmonology consult  Possible COPD of unknown severity with acute exacerbation  · Continue Solu-Medrol at 40 mg every 12 hours  · Continue titrating high flow nasal canula   · Continue nebulizations with Xopenex and Atrovent  · Continue BiPAP q h s     Likely will need BiPAP q h s  At discharge due to past history of ADAL      Acute on chronic heart failure with preserved ejection fraction [facility])  Assessment & Plan  · Patient has a past medical history of heart failure with preserved ejection fraction  · Last echocardiogram was 03/18/2019 which showed normal systolic function, ejection fraction of 65% and grade 1 diastolic dysfunction      · Patient presented to the ED with volume overload  On physical exam at the time of the admission, revealed JVD and bilateral lower extremity pitting edema was noted  CXR showed bilateral pleural effusions   · -8,209 ml and -14 lbs since admission     Plan:  - continue diuresis with IV Lasix   - On Aldactone  - monitor ins and outs/weight  - Appreciate cardiology consult  - manage patient's hypertension  Essential hypertension  Assessment & Plan  Patient with history of hypertension  Blood pressure medication managed by cardiologist   Home medications include terazosin 5 mg, Aldactone 25 mg, amlodipine 10 mg  · Blood pressure stable in the ED, 120/69  · continue terazosin 5 mg  · continue amlodipine 10 mg  · Aldactone continued  · continue IV diuresis, per Cardiology  · monitor blood pressure    Chronic kidney disease, stage 2 (mild)  Assessment & Plan  · Stable  · Monitor BMP with diuresis  · Monitor input and output  · Avoid nephrotoxins  · Avoid hypotension  Hypokalemia  Assessment & Plan  · Replace potassium  · Monitor electrolytes  · Stable today of 4 2    Pulmonary nodule  Assessment & Plan  · Right upper lung pulmonary nodule  · Need surveillance in the outpatient  Repeat CT of the chest due, April 2020  Acute metabolic encephalopathy  Assessment & Plan  Resolved  Metabolic encephalopathy in the setting of acute respiratory failure with hypoxia evidenced confusion at home for past few days   - Patient stable at baseline  - Continue oxygen, neuro checks and nursing safety measures      Bilateral leg edema  Assessment & Plan  · Appears L>R LE edema  · Venous doppler results were normal    · Likely secondary to congestive heart failure  · Continue diuresis        Pharmacologic: Enoxaparin (Lovenox)  Mechanical VTE Prophylaxis in Place: Yes    Discussions with Specialists or Other Care Team Provider: ICU    Education and Discussions with Family / Patient: Plan of care    Current Length of Stay: 7 day(s)    Current Patient Status: Inpatient     Discharge Plan / Estimated Discharge Date: 10/05/18    Code Status: Level 3 - DNAR and DNI      Subjective:   Patient today has no complaints of shortness of breath  He tolerated thoracocentesis from the right lung yesterday  He is still on high flow nasal canula  Objective:     Vitals:   Temp (24hrs), Av 9 °F (36 6 °C), Min:97 6 °F (36 4 °C), Max:98 3 °F (36 8 °C)    Temp:  [97 6 °F (36 4 °C)-98 3 °F (36 8 °C)] 98 3 °F (36 8 °C)  HR:  [57-78] 68  Resp:  [16-20] 18  BP: (115-155)/(61-75) 135/64  SpO2:  [92 %-97 %] 96 %  Body mass index is 26 83 kg/m²  Input and Output Summary (last 24 hours): Intake/Output Summary (Last 24 hours) at 10/3/2019 1143  Last data filed at 10/3/2019 1047  Gross per 24 hour   Intake 1050 ml   Output 1175 ml   Net -125 ml       Physical Exam:     Physical Exam   Constitutional: He is oriented to person, place, and time  He appears well-developed and well-nourished  HENT:   Head: Normocephalic and atraumatic  Cardiovascular: Normal rate, regular rhythm and normal heart sounds  Pulmonary/Chest: Effort normal  He has wheezes  Musculoskeletal: He exhibits no edema or tenderness  Neurological: He is alert and oriented to person, place, and time  Additional Data:     Labs:    Results from last 7 days   Lab Units 19  0317 19  0329   WBC Thousand/uL 4 72 5 47   HEMOGLOBIN g/dL 14 5 14 5   HEMATOCRIT % 43 3 44 8   PLATELETS Thousands/uL 154 145*   NEUTROS PCT %  --  83*   LYMPHS PCT %  --  7*   MONOS PCT %  --  9   EOS PCT %  --  0     Results from last 7 days   Lab Units 10/03/19  0545  10/01/19  1533   POTASSIUM mmol/L 5 0   < >  --    CHLORIDE mmol/L 100   < >  --    CO2 mmol/L 36*   < >  --    BUN mg/dL 48*   < >  --    CREATININE mg/dL 0 92   < >  --    CALCIUM mg/dL 8 7   < >  --    ALK PHOS U/L  --   --  41*   ALT U/L  --   --  8*   AST U/L  --   --  14    < > = values in this interval not displayed       Results from last 7 days   Lab Units 09/26/19  1526   INR  1 15       * I Have Reviewed All Lab Data Listed Above  * Additional Pertinent Lab Tests Reviewed: Irvingingyoselin 66 Admission Reviewed    Imaging:    Imaging Reports Reviewed Today Include: CXR   Imaging Personally Reviewed by Myself Includes:  CXR    Recent Cultures (last 7 days):     Results from last 7 days   Lab Units 10/03/19  1059 10/01/19  1516   GRAM STAIN RESULT  No Polys or Bacteria seen Rare Polys  Rare Mononuclear Cells  No bacteria seen   BODY FLUID CULTURE, STERILE  No growth No growth       Last 24 Hours Medication List:     Current Facility-Administered Medications:  albuterol 2 5 mg Nebulization Q6H PRN Yamileth Araujo MD   amLODIPine 10 mg Oral Daily Mary Jane Hassan MD   brimonidine 1 drop Both Eyes Q8H [facility] Mary Jane Hassan MD   enoxaparin 40 mg Subcutaneous Daily Mary Jane Hassan MD   furosemide 40 mg Intravenous Daily Lisa Wiseman DO   ipratropium 0 5 mg Nebulization TID Svitlana Selby MD   levalbuterol 0 63 mg Nebulization TID Svitlana Selby MD   levothyroxine 50 mcg Oral Daily Mary Jane Hassan MD   methylPREDNISolone sodium succinate 40 mg Intravenous Q12H [facility] Ladan Gifford PA-C   montelukast 10 mg Oral Daily Mary Jane Hassan MD   ondansetron 4 mg Intravenous Q6H PRN Mary Jane Hassan MD   pantoprazole 40 mg Oral Early Morning Mary Jane Hassan MD   potassium chloride 40 mEq Oral BID Carlos Lobato MD   pravastatin 40 mg Oral Daily With Sharmin Malone MD   spironolactone 25 mg Oral Daily Forest Macadam, DO   terazosin 5 mg Oral HS Mary Jane Hassan MD   timolol 1 drop Both Eyes BID Mary Jane Hassan MD        Today, Patient Was Seen By: Kofi Ma MD    ** Please Note: This note has been constructed using a voice recognition system   **

## 2019-10-03 NOTE — PROGRESS NOTES
Cardiology Progress Note - Noemi Tena 80 y o  male MRN: [id number]    Unit/Bed#:  Encounter: [id number]        Subjective:   Underwent R thoracentesis with removal of 800 ml  Breathing improving, but still on high flow O2  Review of Systems   Cardiovascular: Negative for chest pain, leg swelling and palpitations  Respiratory: Positive for shortness of breath  Objective:   Vitals: Blood pressure 145/75, pulse 57, temperature 98 1 °F (36 7 °C), temperature source Oral, resp  rate 16, weight 77 7 kg (171 lb 4 8 oz), SpO2 95 %  , Body mass index is 26 83 kg/m² ,   Orthostatic Blood Pressures      Most Recent Value   Blood Pressure  145/75 filed at 10/03/2019 0823   Patient Position - Orthostatic VS  Sitting filed at 10/03/2019 3444         Systolic (28WWE), XJP:056 , Min:115 , KWF:429     Diastolic (11REX), IHZ:67, Min:61, Max:75      Intake/Output Summary (Last 24 hours) at 10/3/2019 0834  Last data filed at 10/3/2019 0800  Gross per 24 hour   Intake 1080 ml   Output 1325 ml   Net -245 ml     Weight (last 2 days)     Date/Time   Weight    10/03/19 0600   77 7 (171 3)    10/02/19 0600   75 7 (166 89)    10/01/19 0533   78 4 (172 84)              Telemetry Review: NSR    Physical Exam   Cardiovascular: Normal rate, regular rhythm and normal heart sounds  Exam reveals no gallop and no friction rub  No murmur heard  Pulmonary/Chest: He has decreased breath sounds  He has no wheezes  He has no rales  Musculoskeletal: He exhibits no edema           Laboratory Results:  Results from last 7 days   Lab Units 09/26/19  1526   TROPONIN I ng/mL 0 03       CBC with diff:   Results from last 7 days   Lab Units 09/30/19  0317 09/28/19  0329 09/27/19  0555 09/26/19  1526   WBC Thousand/uL 4 72 5 47 2 23* 4 83   HEMOGLOBIN g/dL 14 5 14 5 14 3 15 0   HEMATOCRIT % 43 3 44 8 43 6 46 4   MCV fL 93 97 96 98   PLATELETS Thousands/uL 154 145* 126* 147*   MCH pg 31 3 31 3 31 4 31 6   MCHC g/dL 33 5 32 4 32 8 32 3   RDW % 13 0 13 2 13 4 13 3   MPV fL 10 6 10 3 10 5 10 8   NRBC AUTO /100 WBCs  --  0 0 0         CMP:  Results from last 7 days   Lab Units 10/03/19  0545 10/02/19  0500 10/01/19  1533 10/01/19  0815 19  0317 19  0317 19  0329 19  0555 19  1526   POTASSIUM mmol/L 5 0 4 2  --  2 7* 3 2* 3 4* 4 1 4 2 4 2   CHLORIDE mmol/L 100 97*  --  102 93* 93* 95* 98* 96*   CO2 mmol/L 36* 38*  --  34* 43* >45* 42* 35* 34*   BUN mg/dL 48* 43*  --  35* 36* 31* 30* 21 21   CREATININE mg/dL 0 92 0 91  --  0 81 0 99 0 78 0 89 0 81 1 11   CALCIUM mg/dL 8 7 8 7  --  7 2* 8 1* 7 7* 8 1* 8 2* 8 5   AST U/L  --   --  14  --   --   --  20  --  17   ALT U/L  --   --  8*  --   --   --  13  --  11*   ALK PHOS U/L  --   --  41*  --   --   --  59  --  67   EGFR ml/min/1 73sq m 73 74  --  78 67 79 75 78 58         BMP:  Results from last 7 days   Lab Units 10/03/19  0545 10/02/19  0500 10/01/19  0815 19  0317 19  0329 19  0555   POTASSIUM mmol/L 5 0 4 2 2 7* 3 2* 3 4* 4 1 4 2   CHLORIDE mmol/L 100 97* 102 93* 93* 95* 98*   CO2 mmol/L 36* 38* 34* 43* >45* 42* 35*   BUN mg/dL 48* 43* 35* 36* 31* 30* 21   CREATININE mg/dL 0 92 0 91 0 81 0 99 0 78 0 89 0 81   CALCIUM mg/dL 8 7 8 7 7 2* 8 1* 7 7* 8 1* 8 2*       BNP:     Magnesium:   Results from last 7 days   Lab Units 19  0317 19  0317 19  0555 19  1526   MAGNESIUM mg/dL 2 0 1 8 1 9 2 0       Coags:   Results from last 7 days   Lab Units 19  1526   INR  1 15           Cardiac testing:   Results for orders placed during the hospital encounter of 19   Echo complete with contrast if indicated    Narrative [place] [address], [address] [facility]  [phone]    Transthoracic Echocardiogram  2D, M-mode, Doppler, and Color Doppler    Study date:  18-Mar-2019    Patient: Patricia Garza  MR number: [id number]  Account number: [de-identified]  : 24-Mar-1929  Age: 80 years  Gender: Male  Status: Inpatient  Location: Bedside  Height: 67 in  Weight: 185 7 lb  BP: 151/ 68 mmHg    Indications: Assess left ventricular function  Diagnoses: I50 9 - Heart failure, unspecified    Sonographer:  Zenaida Coley RDCS  Primary Physician:  Merle Suero MD  Referring Physician:  Celia Valdovinos MD  Group:  [facility]  Interpreting Physician:  Leanna Rubinstein, MD    SUMMARY    LEFT VENTRICLE:  Systolic function was normal  Ejection fraction was estimated to be 65 %  Although no diagnostic regional wall motion abnormality was identified, this possibility cannot be completely excluded on the basis of this study  Doppler parameters were consistent with abnormal left ventricular relaxation (grade 1 diastolic dysfunction)  RIGHT VENTRICLE:  The ventricle was dilated  Systolic function was mildly reduced  RIGHT ATRIUM:  The atrium was dilated  PROCEDURE: The procedure was performed at the bedside  This was a routine study  The transthoracic approach was used  The study included complete 2D imaging, M-mode, complete spectral Doppler, and color Doppler  The heart rate was 70 bpm,  at the start of the study  Images were obtained from the parasternal, apical, subcostal, and suprasternal notch acoustic windows  Image quality was adequate  LEFT VENTRICLE: Size was normal  Systolic function was normal  Ejection fraction was estimated to be 65 %  Although no diagnostic regional wall motion abnormality was identified, this possibility cannot be completely excluded on the basis  of this study  DOPPLER: There was an increased relative contribution of atrial contraction to ventricular filling  Doppler parameters were consistent with abnormal left ventricular relaxation (grade 1 diastolic dysfunction)  RIGHT VENTRICLE: The ventricle was dilated  Systolic function was mildly reduced  LEFT ATRIUM: Size was normal     RIGHT ATRIUM: The atrium was dilated      MITRAL VALVE: Valve structure was normal  There was normal leaflet separation  DOPPLER: There was no evidence for stenosis  There was no significant regurgitation  AORTIC VALVE: The valve was not well visualized  DOPPLER: There was no evidence for stenosis  There was no significant regurgitation  TRICUSPID VALVE: The valve structure was normal  There was normal leaflet separation  DOPPLER: There was no evidence for stenosis  There was no significant regurgitation  PULMONIC VALVE: Leaflets exhibited normal thickness, no calcification, and normal cuspal separation  DOPPLER: The transpulmonic velocity was within the normal range  There was no regurgitation  PERICARDIUM: There was no pericardial effusion  The pericardium was normal in appearance  AORTA: The root exhibited normal size  Not well visualized      SYSTEM MEASUREMENT TABLES    2D  %FS: 34 11 %  AV Diam: 3 48 cm  EDV(Teich): 85 19 ml  EF(Cube): 71 4 %  EF(Teich): 63 34 %  ESV(Cube): 23 48 ml  ESV(Teich): 31 23 ml  IVSd: 1 21 cm  LA Area: 15 68 cm2  LA Diam: 3 47 cm  LVEDV MOD A4C: 78 44 ml  LVEF MOD A4C: 67 23 %  LVESV MOD A4C: 25 7 ml  LVIDd: 4 35 cm  LVIDs: 2 86 cm  LVLd A4C: 7 59 cm  LVLs A4C: 6 36 cm  LVPWd: 1 21 cm  RA Area: 17 77 cm2  RV Diam: 3 96 cm  SI(Cube): 29 91 ml/m2  SI(Teich): 27 53 ml/m2  SV MOD A4C: 52 74 ml  SV(Cube): 58 62 ml  SV(Teich): 53 96 ml    MM  TAPSE: 2 36 cm    PW  AVC: 334 79 ms  E': 0 05 m/s  E/E': 19 38  MV A Nickloas: 1 06 m/s  MV Dec Tucker: 4 85 m/s2  MV DecT: 207 36 ms  MV E Nickolas: 1 01 m/s  MV E/A Ratio: 0 95    [facility] Accredited Echocardiography Laboratory    Prepared and electronically signed by    Mari Rice MD  Signed 18-Mar-2019 15:04:21         Meds/Allergies     Current Facility-Administered Medications:  albuterol 2 5 mg Nebulization Q6H PRN Aliza Kelly MD   amLODIPine 10 mg Oral Daily Betha Goodell, MD   brimonidine 1 drop Both Eyes Q8H [address] Betha Goodell, MD   enoxaparin 40 mg Subcutaneous Daily Betha Goodell, MD   furosemide 40 mg Intravenous Daily Rubin Levi,    ipratropium 0 5 mg Nebulization TID Karime Wild MD   levalbuterol 0 63 mg Nebulization TID Karime Wild MD   levothyroxine 50 mcg Oral Daily Halley Ku MD   methylPREDNISolone sodium succinate 40 mg Intravenous Q12H [address] Ladan Gifford PA-C   montelukast 10 mg Oral Daily Halley Ku MD   ondansetron 4 mg Intravenous Q6H PRN Halley Ku MD   pantoprazole 40 mg Oral Early Morning Halley Ku MD   potassium chloride 40 mEq Oral BID Ciprianoiniaeric Lobaot MD   pravastatin 40 mg Oral Daily With Dinner Halley Ku MD   spironolactone 25 mg Oral Daily Jorden Parks DO   terazosin 5 mg Oral HS Halley Ku MD   timolol 1 drop Both Eyes BID Halley Ku MD        Medications Prior to Admission   Medication    amLODIPine (NORVASC) 10 mg tablet    Blood Pressure Monitoring (B-D ASSURE BPM/AUTO ARM CUFF) MISC    brimonidine-timolol (COMBIGAN) 0 2-0 5 %    furosemide (LASIX) 20 mg tablet    latanoprost (XALATAN) 0 005 % ophthalmic solution    levothyroxine 50 mcg tablet    montelukast (SINGULAIR) 10 mg tablet    potassium chloride (K-DUR,KLOR-CON) 20 mEq tablet    RABEprazole (ACIPHEX) 20 MG tablet    simvastatin (ZOCOR) 20 mg tablet    spironolactone (ALDACTONE) 25 mg tablet    terazosin (HYTRIN) 5 mg capsule       Assessment:  Principal Problem:    Acute respiratory failure with hypoxia and hypercapnia (HCC)  Active Problems:    Bilateral leg edema    Acute metabolic encephalopathy    Acute on chronic heart failure with preserved ejection fraction (HCC)    Essential hypertension    Pulmonary nodule    Hypokalemia    Chronic kidney disease, stage 2 (mild)      Impression:  1  Acute on chronic diastolic heart failure - on IV diuretics  2  HTN - controlled  3  B pleural effusion     Plan:  1  Continue IV diuretics  2  Watch renal function  3  Wean off high flow O2

## 2019-10-03 NOTE — PROCEDURES
Thoracentesis Procedure Note    Indications: Right Pleural Effusion    Procedure performed: Right thoracentesis with catheter with ultrasound guidance    Procedure Details     The entire procedure was performed under the supervision of attending physician who was present  Consent: Informed consent was obtained  Risks of the procedure were discussed including: infection, bleeding, pain, pneumothorax  Timeout was observed at approximately 11:30 am     Pleural effusion identified with ultrasound immediately prior to procedure  Images saved in medical record  Site marked  Under sterile conditions the patient was positioned  Chlorhexidine swab and sterile drapes were utilized  2% lidocaine was used to anesthetize  A diagnostic and therapeutic thoracentesis with a catheter using a standard thoracentesis kit was used  Fluid was obtained without any difficulties and minimal blood loss  A dressing was applied to the wound and wound care instructions were provided  Findings  800 ml of straw colored pleural fluid was obtained  A sample was sent for analysis  Complications:  None; patient tolerated the procedure well  Condition: stable    Plan  A follow up chest x-ray was ordered        Micah Cunningham DO

## 2019-10-03 NOTE — ASSESSMENT & PLAN NOTE
· Patient has a past medical history of heart failure with preserved ejection fraction  · Last echocardiogram was 03/18/2019 which showed normal systolic function, ejection fraction of 65% and grade 1 diastolic dysfunction  · Patient presented to the ED with volume overload  On physical exam at the time of the admission, revealed JVD and bilateral lower extremity pitting edema was noted  CXR showed bilateral pleural effusions   · -8,209 ml and -14 lbs since admission     Plan:  - continue diuresis with IV Lasix   - On Aldactone  - monitor ins and outs/weight  - Appreciate cardiology consult  - manage patient's hypertension  11

## 2019-10-03 NOTE — ASSESSMENT & PLAN NOTE
· Due to CHF exacerbation and bilateral pleural effusions and possible COPD of unknown severity with acute exacerbation  No formal diagnosis yet of COPD  Patient has previous history of heavy tobacco smoking of 2 packs per day for 15 years  Patient quit 60 years ago  · 09/29 Patient having difficulty maintaining adequate saturation despite improving volume status  ABG: pH 7 4, CO2 55 4, HCO3 34  Repeat CXR showed no improvement from CXR on admission  Currently on high flow nasal canula  · 09/29 Given two doses of diamox for bilateral pleural effusions  · 10/01 left-sided thoracocentesis was performed 800 mL of straw colored fluid recovered  · 10/02 right-sided thoracocentesis was performed 800 mL of straw colored fluid recovered  · 10/02 settings of patient's high-flow nasal cannula have been increasing    Plan:   · IV diuresis  · Outpatient PFT  · Currently on high flow nasal canula, need to transition to nasal canula before DC  · Appreciate pulmonology consult  Possible COPD of unknown severity with acute exacerbation  · Continue Solu-Medrol at 40 mg every 12 hours  · Continue titrating high flow nasal canula   · Continue nebulizations with Xopenex and Atrovent  · Continue BiPAP q h s     Likely will need BiPAP q h s   At discharge due to past history of ADAL

## 2019-10-03 NOTE — ASSESSMENT & PLAN NOTE
Patient with history of hypertension  Blood pressure medication managed by cardiologist   Home medications include terazosin 5 mg, Aldactone 25 mg, amlodipine 10 mg  · Blood pressure stable in the ED, 120/69  · continue terazosin 5 mg  · continue amlodipine 10 mg  · Aldactone continued  · continue IV diuresis, per Cardiology    · monitor blood pressure

## 2019-10-03 NOTE — PLAN OF CARE
Problem: Potential for Falls  Goal: Patient will remain free of falls  Description  INTERVENTIONS:  - Assess patient frequently for physical needs  -  Identify cognitive and physical deficits and behaviors that affect risk of falls    -  Purcell fall precautions as indicated by assessment   - Educate patient/family on patient safety including physical limitations  - Instruct patient to call for assistance with activity based on assessment  - Modify environment to reduce risk of injury  - Consider OT/PT consult to assist with strengthening/mobility  Outcome: Progressing     Problem: RESPIRATORY - ADULT  Goal: Achieves optimal ventilation and oxygenation  Description  INTERVENTIONS:  - Assess for changes in respiratory status  - Assess for changes in mentation and behavior  - Position to facilitate oxygenation and minimize respiratory effort  - Oxygen administered by appropriate delivery if ordered  - Initiate smoking cessation education as indicated  - Encourage broncho-pulmonary hygiene including cough, deep breathe, Incentive Spirometry  - Assess the need for suctioning and aspirate as needed  - Assess and instruct to report SOB or any respiratory difficulty  - Respiratory Therapy support as indicated  Outcome: Progressing     Problem: GENITOURINARY - ADULT  Goal: Maintains or returns to baseline urinary function  Description  INTERVENTIONS:  - Assess urinary function  - Encourage oral fluids to ensure adequate hydration if ordered  - Administer IV fluids as ordered to ensure adequate hydration  - Administer ordered medications as needed  - Offer frequent toileting  - Follow urinary retention protocol if ordered  Outcome: Progressing  Goal: Absence of urinary retention  Description  INTERVENTIONS:  - Assess patient's ability to void and empty bladder  - Monitor I/O  - Bladder scan as needed  - Discuss with physician/AP medications to alleviate retention as needed  - Discuss catheterization for long term situations as appropriate   Outcome: Progressing     Problem: Prexisting or High Potential for Compromised Skin Integrity  Goal: Skin integrity is maintained or improved  Description  INTERVENTIONS:  - Identify patients at risk for skin breakdown  - Assess and monitor skin integrity  - Assess and monitor nutrition and hydration status  - Monitor labs   - Assess for incontinence   - Turn and reposition patient  - Assist with mobility/ambulation  - Relieve pressure over bony prominences  - Avoid friction and shearing  - Provide appropriate hygiene as needed including keeping skin clean and dry  - Evaluate need for skin moisturizer/barrier cream  - Collaborate with interdisciplinary team   - Patient/family teaching  - Consider wound care consult   Outcome: Progressing

## 2019-10-04 PROBLEM — D69.6 THROMBOCYTOPENIA (HCC): Status: ACTIVE | Noted: 2019-10-04

## 2019-10-04 LAB
ANION GAP SERPL CALCULATED.3IONS-SCNC: 2 MMOL/L (ref 4–13)
BACTERIA SPEC BFLD CULT: NO GROWTH
BUN SERPL-MCNC: 47 MG/DL (ref 5–25)
CALCIUM SERPL-MCNC: 7.9 MG/DL (ref 8.3–10.1)
CHLORIDE SERPL-SCNC: 102 MMOL/L (ref 100–108)
CO2 SERPL-SCNC: 33 MMOL/L (ref 21–32)
CREAT SERPL-MCNC: 0.89 MG/DL (ref 0.6–1.3)
ERYTHROCYTE [DISTWIDTH] IN BLOOD BY AUTOMATED COUNT: 13.1 % (ref 11.6–15.1)
GFR SERPL CREATININE-BSD FRML MDRD: 75 ML/MIN/1.73SQ M
GLUCOSE SERPL-MCNC: 151 MG/DL (ref 65–140)
GRAM STN SPEC: NORMAL
HBV SURFACE AG SER QL: NORMAL
HCT VFR BLD AUTO: 45.2 % (ref 36.5–49.3)
HCV AB SER QL: NORMAL
HGB BLD-MCNC: 15 G/DL (ref 12–17)
HIV 1+2 AB+HIV1 P24 AG SERPL QL IA: NORMAL
HIV1 P24 AG SER QL: NORMAL
LDH SERPL-CCNC: 205 U/L (ref 81–234)
MCH RBC QN AUTO: 30.9 PG (ref 26.8–34.3)
MCHC RBC AUTO-ENTMCNC: 33.2 G/DL (ref 31.4–37.4)
MCV RBC AUTO: 93 FL (ref 82–98)
PLATELET # BLD AUTO: 100 THOUSANDS/UL (ref 149–390)
PMV BLD AUTO: 11.1 FL (ref 8.9–12.7)
POTASSIUM SERPL-SCNC: 5.2 MMOL/L (ref 3.5–5.3)
RBC # BLD AUTO: 4.86 MILLION/UL (ref 3.88–5.62)
RYE IGE QN: NEGATIVE
SODIUM SERPL-SCNC: 137 MMOL/L (ref 136–145)
WBC # BLD AUTO: 5.16 THOUSAND/UL (ref 4.31–10.16)

## 2019-10-04 PROCEDURE — 99232 SBSQ HOSP IP/OBS MODERATE 35: CPT | Performed by: INTERNAL MEDICINE

## 2019-10-04 PROCEDURE — 94660 CPAP INITIATION&MGMT: CPT

## 2019-10-04 PROCEDURE — 83615 LACTATE (LD) (LDH) ENZYME: CPT | Performed by: INTERNAL MEDICINE

## 2019-10-04 PROCEDURE — 87340 HEPATITIS B SURFACE AG IA: CPT | Performed by: NURSE PRACTITIONER

## 2019-10-04 PROCEDURE — 94640 AIRWAY INHALATION TREATMENT: CPT

## 2019-10-04 PROCEDURE — 80048 BASIC METABOLIC PNL TOTAL CA: CPT | Performed by: INTERNAL MEDICINE

## 2019-10-04 PROCEDURE — 94760 N-INVAS EAR/PLS OXIMETRY 1: CPT

## 2019-10-04 PROCEDURE — 87806 HIV AG W/HIV1&2 ANTB W/OPTIC: CPT | Performed by: NURSE PRACTITIONER

## 2019-10-04 PROCEDURE — 85027 COMPLETE CBC AUTOMATED: CPT | Performed by: INTERNAL MEDICINE

## 2019-10-04 PROCEDURE — 86803 HEPATITIS C AB TEST: CPT | Performed by: NURSE PRACTITIONER

## 2019-10-04 RX ORDER — PREDNISONE 20 MG/1
40 TABLET ORAL DAILY
Status: DISCONTINUED | OUTPATIENT
Start: 2019-10-05 | End: 2019-10-07 | Stop reason: HOSPADM

## 2019-10-04 RX ORDER — METHYLPREDNISOLONE SODIUM SUCCINATE 40 MG/ML
40 INJECTION, POWDER, LYOPHILIZED, FOR SOLUTION INTRAMUSCULAR; INTRAVENOUS EVERY 12 HOURS SCHEDULED
Status: DISCONTINUED | OUTPATIENT
Start: 2019-10-04 | End: 2019-10-04

## 2019-10-04 RX ADMIN — PANTOPRAZOLE SODIUM 40 MG: 40 TABLET, DELAYED RELEASE ORAL at 05:34

## 2019-10-04 RX ADMIN — TIMOLOL MALEATE 1 DROP: 5 SOLUTION/ DROPS OPHTHALMIC at 09:36

## 2019-10-04 RX ADMIN — LEVALBUTEROL HYDROCHLORIDE 0.63 MG: 0.63 SOLUTION RESPIRATORY (INHALATION) at 13:28

## 2019-10-04 RX ADMIN — TIMOLOL MALEATE 1 DROP: 5 SOLUTION/ DROPS OPHTHALMIC at 17:10

## 2019-10-04 RX ADMIN — LEVALBUTEROL HYDROCHLORIDE 0.63 MG: 0.63 SOLUTION RESPIRATORY (INHALATION) at 19:55

## 2019-10-04 RX ADMIN — POTASSIUM CHLORIDE 40 MEQ: 1500 TABLET, EXTENDED RELEASE ORAL at 09:36

## 2019-10-04 RX ADMIN — IPRATROPIUM BROMIDE 0.5 MG: 0.5 SOLUTION RESPIRATORY (INHALATION) at 19:55

## 2019-10-04 RX ADMIN — MONTELUKAST SODIUM 10 MG: 10 TABLET, FILM COATED ORAL at 09:36

## 2019-10-04 RX ADMIN — IPRATROPIUM BROMIDE 0.5 MG: 0.5 SOLUTION RESPIRATORY (INHALATION) at 13:28

## 2019-10-04 RX ADMIN — ENOXAPARIN SODIUM 40 MG: 40 INJECTION SUBCUTANEOUS at 09:36

## 2019-10-04 RX ADMIN — METHYLPREDNISOLONE SODIUM SUCCINATE 40 MG: 40 INJECTION, POWDER, FOR SOLUTION INTRAMUSCULAR; INTRAVENOUS at 09:36

## 2019-10-04 RX ADMIN — BRIMONIDINE TARTRATE 1 DROP: 1.5 SOLUTION OPHTHALMIC at 15:02

## 2019-10-04 RX ADMIN — TERAZOSIN HYDROCHLORIDE 5 MG: 5 CAPSULE ORAL at 21:32

## 2019-10-04 RX ADMIN — BRIMONIDINE TARTRATE 1 DROP: 1.5 SOLUTION OPHTHALMIC at 05:34

## 2019-10-04 RX ADMIN — PRAVASTATIN SODIUM 40 MG: 40 TABLET ORAL at 17:10

## 2019-10-04 RX ADMIN — SPIRONOLACTONE 25 MG: 25 TABLET ORAL at 09:36

## 2019-10-04 RX ADMIN — FUROSEMIDE 40 MG: 10 INJECTION, SOLUTION INTRAMUSCULAR; INTRAVENOUS at 09:36

## 2019-10-04 RX ADMIN — BRIMONIDINE TARTRATE 1 DROP: 1.5 SOLUTION OPHTHALMIC at 21:32

## 2019-10-04 RX ADMIN — AMLODIPINE BESYLATE 10 MG: 10 TABLET ORAL at 09:36

## 2019-10-04 RX ADMIN — IPRATROPIUM BROMIDE 0.5 MG: 0.5 SOLUTION RESPIRATORY (INHALATION) at 07:21

## 2019-10-04 RX ADMIN — LEVALBUTEROL HYDROCHLORIDE 0.63 MG: 0.63 SOLUTION RESPIRATORY (INHALATION) at 07:21

## 2019-10-04 RX ADMIN — LEVOTHYROXINE SODIUM 50 MCG: 50 TABLET ORAL at 05:34

## 2019-10-04 NOTE — ASSESSMENT & PLAN NOTE
Resolved  · On presentation appeared L>R LE edema  · Venous doppler results were normal    · Likely secondary to congestive heart failure  · Continue diuresis

## 2019-10-04 NOTE — UTILIZATION REVIEW
Continued Stay Review  [id number]  [facility] is 5 day DRG facility  Attending - [facility] internal medicine - Dr Felipe Parada    Date: 10/4/2019                        Current Patient Class: inpatient  Current Level of Care: med surg    HPI:89 y o  male initially admitted on 9/26/2019 inpatient due to acute on chronic diastolic heart failure  Acute hypoxic and chronic hypercapnic respiratory failure/acute copd exacerbation  Procedure 10/1/2019-  Left thoracentesis with catheter with ultrasound guidance - 800 ml of straw colored pleural fluid was obtained    Procedure 10/2/2019 Right thoracentesis with catheter with ultrasound guidance - 800 ml of straw colored pleural fluid was obtained    Assessment/Plan:  Patient with persistent dyspnea on exertion, oxygen weaned to 3 liters from HFNC  On exam lungs with diminished scattered crackles  Weight down 14 pounds since admission  Continue IV lasix  IV steroids continue  Pertinent Labs/Diagnostic Results:   10/2/2019 CxR - Improved aeration of the right lung compared to the prior examination  Persistent left basilar atelectasis and effusion    Results from last 7 days   Lab Units 10/04/19  0333 09/30/19  0317 09/28/19  0329   WBC Thousand/uL 5 16 4 72 5 47   HEMOGLOBIN g/dL 15 0 14 5 14 5   HEMATOCRIT % 45 2 43 3 44 8   PLATELETS Thousands/uL 100* 154 145*   NEUTROS ABS Thousands/µL  --   --  4 57     Results from last 7 days   Lab Units 10/04/19  0333 10/03/19  0545 10/02/19  0500 10/01/19  0815 09/30/19  0317 09/29/19  0317   SODIUM mmol/L 137 139 139 141 139 138   POTASSIUM mmol/L 5 2 5 0 4 2 2 7* 3 2* 3 4*   CHLORIDE mmol/L 102 100 97* 102 93* 93*   CO2 mmol/L 33* 36* 38* 34* 43* >45*   ANION GAP mmol/L 2* 3* 4 5 3*  --    BUN mg/dL 47* 48* 43* 35* 36* 31*   CREATININE mg/dL 0 89 0 92 0 91 0 81 0 99 0 78   EGFR ml/min/1 73sq m 75 73 74 78 67 79   CALCIUM mg/dL 7 9* 8 7 8 7 7 2* 8 1* 7 7*   MAGNESIUM mg/dL  --   --   --   --  2 0 1 8     Results from last 7 days   Lab Units 10/01/19  1533 09/28/19  0329   AST U/L 14 20   ALT U/L 8* 13   ALK PHOS U/L 41* 59   TOTAL PROTEIN g/dL 4 8* 6 1*   ALBUMIN g/dL 2 5* 3 4*   TOTAL BILIRUBIN mg/dL 0 80 0 60   BILIRUBIN DIRECT mg/dL 0 22*  --      Results from last 7 days   Lab Units 10/04/19  0333 10/03/19  0545 10/02/19  0500 10/01/19  0815 09/30/19  0317 09/29/19  0317 09/28/19  0329   GLUCOSE RANDOM mg/dL 151* 150* 146* 121 125 88 90     Results from last 7 days   Lab Units 10/01/19  0507 09/29/19  0529   PH ART  7 485* 7 406   PCO2 ART mm Hg 50 2* 55 4*   PO2 ART mm Hg 63 8* 65 9*   HCO3 ART mmol/L 37 0* 34 0*   BASE EXC ART mmol/L 11 5 7 5   O2 CONTENT ART mL/dL 19 6 18 6   O2 HGB, ARTERIAL % 91 4* 90 5*   ABG SOURCE  Radial, Left Radial, Left     Results from last 7 days   Lab Units 10/04/19  0447   HEP B S AG  Non-reactive   HEP C AB  Non-reactive     Results from last 7 days   Lab Units 10/04/19  1033 10/01/19  1516   GRAM STAIN RESULT  No Polys or Bacteria seen Rare Polys  Rare Mononuclear Cells  No bacteria seen   BODY FLUID CULTURE, STERILE  No growth No growth     Results from last 7 days   Lab Units 10/02/19  1231 10/01/19  1520   TOTAL COUNTED  100 100   WBC FLUID /ul 698 [id number]       Vital Signs: oxygen 3 liters   10/04/19 0936        124/63           10/04/19 0721            93 %  Nasal cannula     10/04/19 0032            96 %  Nasal cannula     10/03/19 2300            96 %       10/03/19 2200    81  28Abnormal   127/67  91  97 %  Other (comment)        10/04/19 0534  77 7 kg (171 lb 4 8 oz)  Bed scale   10/03/19 0600  77 7 kg (171 lb 4 8 oz)  Bed scale   10/02/19 0600  75 7 kg (166 lb 14 2 oz)  Bed scale   10/01/19 0533  78 4 kg (172 lb 13 5 oz)  Bed scale   09/30/19 0556  78 4 kg (172 lb 13 5 oz)  Bed scale   09/29/19 0600  78 9 kg (173 lb 15 1 oz)  Bed scale   09/28/19 0600  79 kg (174 lb 2 6 oz)  Bed scale   09/26/19 1926  81 6 kg (179 lb 14 3 oz)  Bed scale   09/26/19 1519  84 1 kg (185 lb 6 5 oz)           10/02 0701  10/03 0700 10/03 0701  10/04 0700 10/04 0701  10/05 0700   P  O  840 1370 120   Total Intake(mL/kg) 840 (10 8) 1370 (17 6) 120 (1 5)   Urine (mL/kg/hr) 1325 (0 7) 1225 (0 7) 700 (1 2)   Stool  0 0   Total Output 1325 1225 700   Net -485 +145 -580         Unmeasured Urine Occurrence  1 x 1 x   Unmeasured Stool Occurrence  1 x 1 x       Medications:   Scheduled Meds:   Current Facility-Administered Medications:  albuterol 2 5 mg Nebulization Q6H PRN   amLODIPine 10 mg Oral Daily   brimonidine 1 drop Both Eyes Q8H MARCIANO   enoxaparin 40 mg Subcutaneous Daily   furosemide 40 mg Intravenous Daily   ipratropium 0 5 mg Nebulization TID   levalbuterol 0 63 mg Nebulization TID   levothyroxine 50 mcg Oral Daily   methylPREDNISolone sodium succinate 40 mg Intravenous Q12H MARCIANO   montelukast 10 mg Oral Daily   ondansetron 4 mg Intravenous Q6H PRN   pantoprazole 40 mg Oral Early Morning   potassium chloride 40 mEq Oral BID   pravastatin 40 mg Oral Daily With Dinner   spironolactone 25 mg Oral Daily   terazosin 5 mg Oral HS   timolol 1 drop Both Eyes BID   No prn medication used thus far 10/4    Discharge Plan: to be determined  Network Utilization Review Department  Phone: [phone]; Fax [phone]  [email or web address]  ATTENTION: Please call with any questions or concerns to [phone]  and carefully listen to the prompts so that you are directed to the right person  Send all requests for admission clinical reviews, approved or denied determinations and any other requests to fax [phone]   All voicemails are confidential

## 2019-10-04 NOTE — ASSESSMENT & PLAN NOTE
· Patient has a past medical history of heart failure with preserved ejection fraction  · Last echocardiogram was 03/18/2019 which showed normal systolic function, ejection fraction of 65% and grade 1 diastolic dysfunction  · Patient presented to the ED with volume overload  · On physical exam at the time of the admission, revealed JVD and bilateral lower extremity pitting edema was noted  · CXR showed bilateral pleural effusions   · -8,669 ml and -14 lbs since admission     Plan:  · continue diuresis with IV Lasix   · May transition to PO diuretics tomorrow or Sunday as per cardiology  · On Aldactone  · monitor ins and outs/weight  · manage patient's hypertension    · AM BMP

## 2019-10-04 NOTE — PROGRESS NOTES
Progress Note - Pulmonary   Luis Mena 80 y o  male MRN: [id number]  Unit/Bed#: -01 Encounter: [id number]      Assessment:  1   Acute on chronic hypoxic and hypercapnic respiratory failure  2   Acute on chronic diastolic and R sided CHF  3   Bilateral pleural effusions s/p B/L thoracentesis   Demonstrating transudative effusion most likely due to #2  4   Acute exacerbation of COPD  5   Likely ADAL   6   RUL pulmonary nodule  7   CKD stage 2  8   HTN    Plan:  1  Successful B/L thoracentesis  Final cytology negative  2  Continue to reduce oxygen as tolerated  Check ambulatory pulse ox at discharge  3  Continue Xopenex and Atrovent TID  4  Can hold BiPAP qHS for now  Would consider following ABG prior to discharge to ensure he does nto require it long term  5  Diuretics as per primary team and cardiology  6  Repeat CT chest in April 7  Prednisone 40mg Dialy,  Wean by 10mg every 3 days  8  Needs outpatient PFTs  10  RF, GENA, Scl- 70 all negative    Will follow on Monday is still hospitalized  Otherwise, discharge has been set up for follow up  Subjective:   Patient seen and examined  No new events overnight  He states that he is feeling well  His breathing has improved overall  Objective:   Vitals: Blood pressure 121/64, pulse 70, temperature 98 1 °F (36 7 °C), temperature source Oral, resp  rate (!) 24, weight 77 7 kg (171 lb 4 8 oz), SpO2 95 %  , 4L NC, Body mass index is 26 83 kg/m²        Intake/Output Summary (Last 24 hours) at 10/4/2019 1637  Last data filed at 10/4/2019 1101  Gross per 24 hour   Intake 860 ml   Output 1150 ml   Net -290 ml         Physical Exam  Gen: Awake, alert, oriented x 3, no acute distress  HEENT: Mucous membranes moist, no oral lesions, no thrush  NECK: No accessory muscle use, JVP not elevated  Cardiac: Regular, single S1, single S2, no murmurs, no rubs, no gallops  Lungs: Mild crackles in lung bases, no wheezing or rhonchi   Abdomen: normoactive bowel sounds, soft nontender, nondistended, no rebound or rigidity, no guarding  Extremities: no cyanosis, no clubbing, no edema    Labs: I have personally reviewed pertinent lab results  Results from last 7 days   Lab Units 10/04/19  0333 09/30/19  0317 09/28/19  0329   WBC Thousand/uL 5 16 4 72 5 47   HEMOGLOBIN g/dL 15 0 14 5 14 5   HEMATOCRIT % 45 2 43 3 44 8   PLATELETS Thousands/uL 100* 154 145*   NEUTROS PCT %  --   --  83*   MONOS PCT %  --   --  9      Results from last 7 days   Lab Units 10/04/19  0333 10/03/19  0545 10/02/19  0500 10/01/19  1533  09/28/19  0329   POTASSIUM mmol/L 5 2 5 0 4 2  --    < > 4 1   CHLORIDE mmol/L 102 100 97*  --    < > 95*   CO2 mmol/L 33* 36* 38*  --    < > 42*   BUN mg/dL 47* 48* 43*  --    < > 30*   CREATININE mg/dL 0 89 0 92 0 91  --    < > 0 89   CALCIUM mg/dL 7 9* 8 7 8 7  --    < > 8 1*   ALK PHOS U/L  --   --   --  41*  --  59   ALT U/L  --   --   --  8*  --  13   AST U/L  --   --   --  14  --  20    < > = values in this interval not displayed       Results from last 7 days   Lab Units 09/30/19 0317 09/29/19  0317   MAGNESIUM mg/dL 2 0 1 8                  0   Lab Value Date/Time    TROPONINI 0 03 09/26/2019 1526    TROPONINI 0 04 03/16/2019 1227         Meds/Allergies   Current Facility-Administered Medications   Medication Dose Route Frequency    albuterol inhalation solution 2 5 mg  2 5 mg Nebulization Q6H PRN    amLODIPine (NORVASC) tablet 10 mg  10 mg Oral Daily    brimonidine (ALPHAGAN P) 0 15 % ophthalmic solution 1 drop  1 drop Both Eyes Q8H [facility] & intermediate    enoxaparin (LOVENOX) subcutaneous injection 40 mg  40 mg Subcutaneous Daily    furosemide (LASIX) injection 40 mg  40 mg Intravenous Daily    ipratropium (ATROVENT) 0 02 % inhalation solution 0 5 mg  0 5 mg Nebulization TID    levalbuterol (XOPENEX) inhalation solution 0 63 mg  0 63 mg Nebulization TID    levothyroxine tablet 50 mcg  50 mcg Oral Daily    methylPREDNISolone sodium succinate (Solu-MEDROL) injection 40 mg  40 mg Intravenous Q12H [address]    montelukast (SINGULAIR) tablet 10 mg  10 mg Oral Daily    ondansetron (ZOFRAN) injection 4 mg  4 mg Intravenous Q6H PRN    pantoprazole (PROTONIX) EC tablet 40 mg  40 mg Oral Early Morning    potassium chloride (K-DUR,KLOR-CON) CR tablet 40 mEq  40 mEq Oral BID    pravastatin (PRAVACHOL) tablet 40 mg  40 mg Oral Daily With Dinner    [START ON 10/5/2019] predniSONE tablet 40 mg  40 mg Oral Daily    spironolactone (ALDACTONE) tablet 25 mg  25 mg Oral Daily    terazosin (HYTRIN) capsule 5 mg  5 mg Oral HS    timolol (TIMOPTIC) 0 5 % ophthalmic solution 1 drop  1 drop Both Eyes BID     Medications Prior to Admission   Medication    amLODIPine (NORVASC) 10 mg tablet    Blood Pressure Monitoring (B-D ASSURE BPM/AUTO ARM CUFF) MISC    brimonidine-timolol (COMBIGAN) 0 2-0 5 %    furosemide (LASIX) 20 mg tablet    latanoprost (XALATAN) 0 005 % ophthalmic solution    levothyroxine 50 mcg tablet    montelukast (SINGULAIR) 10 mg tablet    potassium chloride (K-DUR,KLOR-CON) 20 mEq tablet    RABEprazole (ACIPHEX) 20 MG tablet    simvastatin (ZOCOR) 20 mg tablet    spironolactone (ALDACTONE) 25 mg tablet    terazosin (HYTRIN) 5 mg capsule         Microbiology:  Lab Results   Component Value Date    BLOODCX No Growth After 5 Days  03/16/2019    BLOODCX No Growth After 5 Days  03/16/2019       Imaging and other studies: I have personally reviewed pertinent reports  IMPRESSION:  Improved aeration of the right lung compared to the prior examination      Persistent left basilar atelectasis and effusion      DO Caitlyn Mauricio [facility]

## 2019-10-04 NOTE — PROGRESS NOTES
Progress Note Jerman Sanders 3/24/1929, 80 y o  male MRN: [id number]    Unit/Bed#: -01 Encounter: [id number]    Primary Care Provider: Nadine Allan MD   Date and time admitted to hospital: 9/26/2019  3:06 PM        * Acute respiratory failure with hypoxia and hypercapnia (HCC)  Assessment & Plan  · Due to CHF exacerbation and bilateral pleural effusions and possible COPD of unknown severity with acute exacerbation  No formal diagnosis yet of COPD  Patient has previous history of heavy tobacco smoking of 2 packs per day for 15 years  Patient quit 60 years ago  · 09/29 Patient having difficulty maintaining adequate saturation despite improving volume status  ABG: pH 7 4, CO2 55 4, HCO3 34  Repeat CXR showed no improvement from CXR on admission  Currently on high flow nasal canula  · 09/29 Given two doses of diamox for bilateral pleural effusions  · 10/01 left-sided thoracocentesis was performed 800 mL of straw colored fluid recovered  · 10/02 right-sided thoracocentesis was performed 800 mL of straw colored fluid recovered  · 10/02 settings of patient's high-flow nasal cannula have been increasing  · 10/03 patient successfully weaned off HFNC  · 10/04 patient overnight slept on 3LNC, transitioned to Med surg    Plan:   · IV diuresis- Switch to PO diuretics tomorrow or Sunday as per cardiology  · Outpatient PFT  · Continue 3LNC and monitor oxygen saturation  · Appreciate pulmonology consult  Possible COPD of unknown severity with acute exacerbation  · Continue Solu-Medrol at 40 mg every 12 hours  · Continue nebulizations with Xopenex and Atrovent  · BiPAP at night  · Patient ready for DC tomorrow or Sunday  · Discussed with Daughter over the phone  She would like to take Father home with him as he has a room in her house on the first floor  Will discuss PT recommendations with her   If refuses, will talk to case management for home health services  · Would need to do home O2 saturation evaluation prior to discharge        Acute on chronic heart failure with preserved ejection fraction [facility])  Assessment & Plan  · Patient has a past medical history of heart failure with preserved ejection fraction  · Last echocardiogram was 03/18/2019 which showed normal systolic function, ejection fraction of 65% and grade 1 diastolic dysfunction  · Patient presented to the ED with volume overload  · On physical exam at the time of the admission, revealed JVD and bilateral lower extremity pitting edema was noted  · CXR showed bilateral pleural effusions   · -8,669 ml and -14 lbs since admission     Plan:  · continue diuresis with IV Lasix   · May transition to PO diuretics tomorrow or Sunday as per cardiology  · On Aldactone  · monitor ins and outs/weight  · manage patient's hypertension  · AM BMP    Essential hypertension  Assessment & Plan  Patient with history of hypertension  Blood pressure medication managed by cardiologist   Home medications include terazosin 5 mg, Aldactone 25 mg, amlodipine 10 mg  · Blood pressure stable in the ED, 120/69  · continue terazosin 5 mg  · continue amlodipine 10 mg  · Aldactone continued  · continue IV diuresis, per Cardiology  · monitor blood pressure    Chronic kidney disease, stage 2 (mild)  Assessment & Plan  · Stable  · Monitor BMP with diuresis  · Monitor input and output  · Avoid nephrotoxins  · Avoid hypotension  Hypokalemia  Assessment & Plan  · Replace potassium  · Monitor electrolytes  · Stable today of 5 2    Pulmonary nodule  Assessment & Plan  · Right upper lung pulmonary nodule  · Need surveillance in the outpatient  Repeat CT of the chest due, April 2020  Bilateral leg edema  Assessment & Plan  Resolved  · On presentation appeared L>R LE edema  · Venous doppler results were normal    · Likely secondary to congestive heart failure  · Continue diuresis        Pharmacologic: Enoxaparin (Lovenox)  Mechanical VTE Prophylaxis in Place: Yes    Discussions with Specialists or Other Care Team Provider: ICU team    Education and Discussions with Family / Patient: Discuss discharge plan with daughter    Current Length of Stay: 8 day(s)    Current Patient Status: Inpatient     Discharge Plan / Estimated Discharge Date: 10/05-10/06    Code Status: Level 3 - DNAR and DNI      Subjective:   Patient today is doing much better than on admit day  Last night he did not have to use BiPAP to sleep, only 3L NC  Patient was transferred out of step down into [facility]  He denies CP, SOB or cough  He has been up and ambulating  He endorses having good bowel movements  Objective:     Vitals:   Temp (24hrs), Av 9 °F (36 6 °C), Min:97 6 °F (36 4 °C), Max:98 1 °F (36 7 °C)    Temp:  [97 6 °F (36 4 °C)-98 1 °F (36 7 °C)] 97 6 °F (36 4 °C)  HR:  [73-81] 81  Resp:  [16-28] 28  BP: (123-128)/(62-67) 124/63  SpO2:  [93 %-97 %] 94 %  Body mass index is 26 83 kg/m²  Input and Output Summary (last 24 hours): Intake/Output Summary (Last 24 hours) at 10/4/2019 1347  Last data filed at 10/4/2019 1101  Gross per 24 hour   Intake 860 ml   Output 1500 ml   Net -640 ml       Physical Exam:     Physical Exam   Constitutional: He appears well-developed and well-nourished  HENT:   Head: Normocephalic and atraumatic  Cardiovascular: Normal rate, regular rhythm and normal heart sounds  Pulmonary/Chest: Effort normal and breath sounds normal  He has no wheezes  Abdominal: Soft  Bowel sounds are normal    Neurological: He is alert  Psychiatric: He has a normal mood and affect   His behavior is normal  Thought content normal            Additional Data:     Labs:    Results from last 7 days   Lab Units 10/04/19  0333  19  0329   WBC Thousand/uL 5 16   < > 5 47   HEMOGLOBIN g/dL 15 0   < > 14 5   HEMATOCRIT % 45 2   < > 44 8   PLATELETS Thousands/uL 100*   < > 145*   NEUTROS PCT %  --   --  83*   LYMPHS PCT %  --   --  7*   MONOS PCT %  --   --  9   EOS PCT %  -- --  0    < > = values in this interval not displayed  Results from last 7 days   Lab Units 10/04/19  0333  10/01/19  1533   POTASSIUM mmol/L 5 2   < >  --    CHLORIDE mmol/L 102   < >  --    CO2 mmol/L 33*   < >  --    BUN mg/dL 47*   < >  --    CREATININE mg/dL 0 89   < >  --    CALCIUM mg/dL 7 9*   < >  --    ALK PHOS U/L  --   --  41*   ALT U/L  --   --  8*   AST U/L  --   --  14    < > = values in this interval not displayed  * I Have Reviewed All Lab Data Listed Above  * Additional Pertinent Lab Tests Reviewed:  Cinthya 66 Admission Reviewed    Recent Cultures (last 7 days):     Results from last 7 days   Lab Units 10/04/19  1033 10/01/19  1516   GRAM STAIN RESULT  No Polys or Bacteria seen Rare Polys  Rare Mononuclear Cells  No bacteria seen   BODY FLUID CULTURE, STERILE  No growth No growth       Last 24 Hours Medication List:     Current Facility-Administered Medications:  albuterol 2 5 mg Nebulization Q6H PRN Jeremy Orthodox, PA-C   amLODIPine 10 mg Oral Daily Jeremy Orthodox, PA-C   brimonidine 1 drop Both Eyes Q8H [address] Jeremy Orthodox, PA-C   enoxaparin 40 mg Subcutaneous Daily Jeremy Orthodox, PA-C   furosemide 40 mg Intravenous Daily Jeremy Orthodox, PA-C   ipratropium 0 5 mg Nebulization TID Jeremy Orthodox, PA-C   levalbuterol 0 63 mg Nebulization TID Jeremy Orthodox, PA-C   levothyroxine 50 mcg Oral Daily Jeremy Orthodox, PA-C   methylPREDNISolone sodium succinate 40 mg Intravenous Q12H [address] Jeremy Orthodox, PA-C   montelukast 10 mg Oral Daily Jeremy Orthodox, PA-C   ondansetron 4 mg Intravenous Q6H PRN Jeremy Orthodox, PA-C   pantoprazole 40 mg Oral Early Morning Jeremy Orthodox, PA-C   potassium chloride 40 mEq Oral BID Jeremy Orthodox, PA-C   pravastatin 40 mg Oral Daily With Randall, PA-C   spironolactone 25 mg Oral Daily Jeremy Orthodox, PA-C   terazosin 5 mg Oral HS Jeremy Orthodox, PA-C   timolol 1 drop Both Eyes BID Krishna Schwarz Tracy Lay PA-C        Today, Patient Was Seen By: Alix Chacon MD    ** Please Note: This note has been constructed using a voice recognition system   **

## 2019-10-04 NOTE — PLAN OF CARE
Problem: Potential for Falls  Goal: Patient will remain free of falls  Description  INTERVENTIONS:  - Assess patient frequently for physical needs  -  Identify cognitive and physical deficits and behaviors that affect risk of falls    -  [place] fall precautions as indicated by assessment   - Educate patient/family on patient safety including physical limitations  - Instruct patient to call for assistance with activity based on assessment  - Modify environment to reduce risk of injury  - Consider OT/PT consult to assist with strengthening/mobility  Outcome: Progressing     Problem: RESPIRATORY - ADULT  Goal: Achieves optimal ventilation and oxygenation  Description  INTERVENTIONS:  - Assess for changes in respiratory status  - Assess for changes in mentation and behavior  - Position to facilitate oxygenation and minimize respiratory effort  - Oxygen administered by appropriate delivery if ordered  - Initiate smoking cessation education as indicated  - Encourage broncho-pulmonary hygiene including cough, deep breathe, Incentive Spirometry  - Assess the need for suctioning and aspirate as needed  - Assess and instruct to report SOB or any respiratory difficulty  - Respiratory Therapy support as indicated  Outcome: Progressing     Problem: GENITOURINARY - ADULT  Goal: Maintains or returns to baseline urinary function  Description  INTERVENTIONS:  - Assess urinary function  - Encourage oral fluids to ensure adequate hydration if ordered  - Administer IV fluids as ordered to ensure adequate hydration  - Administer ordered medications as needed  - Offer frequent toileting  - Follow urinary retention protocol if ordered  Outcome: Progressing  Goal: Absence of urinary retention  Description  INTERVENTIONS:  - Assess patient's ability to void and empty bladder  - Monitor I/O  - Bladder scan as needed  - Discuss with physician/AP medications to alleviate retention as needed  - Discuss catheterization for long term situations as appropriate   Outcome: Progressing

## 2019-10-04 NOTE — PROGRESS NOTES
General Cardiology   Progress Note -  [facility]   Delfina Thomson 80 y o  male MRN: [id number]    Unit/Bed#:  Encounter: [id number]    Assessment/ Plan    1  Acute hypoxic and hypercapnic respiratory failure  Multifactorial- acute COPD exacerbation, acute on chronic CHF, b/l pleural effusions  Weaned off HFNC, on 3LNC  2  Acute on chronic diastolic CHF  In setting of dietary indiscretion  Declined diet education  1 2L UOP yesterday  Net fluid balance -8 L  Weight down 14 lb since admit  Renal function stable  On IV Lasix 40 mg daily (home dose is 20 mg daily)  Spironolactone 25 mg daily  Symptoms significantly improved, weaned off HFNC but still requiring 3LNC  Can likely transition back to oral diuretics tomorrow or Sunday  Continue to follow strict I/Os, daily weights, FR 1500 ML  AM BMP  3  Bilateral pleural effusions- s/p bilateral thoracentesis  4  HTN- stable, average /65  Continue Norvasc and aldactone  Subjective  Review of Systems   Constitution: Negative for chills and diaphoresis  Cardiovascular: Positive for dyspnea on exertion (minimal)  Negative for chest pain, leg swelling, orthopnea and palpitations  Respiratory: Negative for cough and shortness of breath  Gastrointestinal: Negative for bloating and nausea  Neurological: Negative for dizziness, light-headedness and weakness  All other systems reviewed and are negative  Objective:   Vitals: Blood pressure 124/63, pulse 81, temperature 97 6 °F (36 4 °C), temperature source Oral, resp  rate (!) 28, weight 77 7 kg (171 lb 4 8 oz), SpO2 93 %  ,     Body mass index is 26 83 kg/m²  ,     Systolic (97VNX), UOT:405 , Min:123 , VGJ:288     Diastolic (74GZV), QSL:90, Min:62, Max:67      Intake/Output Summary (Last 24 hours) at 10/4/2019 1033  Last data filed at 10/4/2019 0400  Gross per 24 hour   Intake 920 ml   Output 1225 ml   Net -305 ml     Weight (last 2 days)     Date/Time   Weight    10/04/19 0534   77 7 (171 3)    10/03/19 0600   77 7 (171 3)    10/02/19 0600   75 7 (166 89)            Telemetry Review: No significant arrhythmias seen on telemetry review  NSR  Physical Exam   Constitutional: He is oriented to person, place, and time  No distress  Neck: Neck supple  No JVD present  Cardiovascular: Normal rate, regular rhythm, normal heart sounds and intact distal pulses  Exam reveals no gallop and no friction rub  No murmur heard  Pulmonary/Chest: Effort normal  No respiratory distress  Diminished, scattered crackles  3LNC  Abdominal: Soft  Bowel sounds are normal  He exhibits no distension  Musculoskeletal: He exhibits no edema  Neurological: He is alert and oriented to person, place, and time  Skin: Skin is warm and dry  He is not diaphoretic       LABORATORY RESULTS      CBC with diff:   Results from last 7 days   Lab Units 10/04/19  0333 09/30/19  0317 09/28/19  0329   WBC Thousand/uL 5 16 4 72 5 47   HEMOGLOBIN g/dL 15 0 14 5 14 5   HEMATOCRIT % 45 2 43 3 44 8   MCV fL 93 93 97   PLATELETS Thousands/uL 100* 154 145*   MCH pg 30 9 31 3 31 3   MCHC g/dL 33 2 33 5 32 4   RDW % 13 1 13 0 13 2   MPV fL 11 1 10 6 10 3   NRBC AUTO /100 WBCs  --   --  0     CMP:  Results from last 7 days   Lab Units 10/04/19  0333 10/03/19  0545 10/02/19  0500 10/01/19  1533 10/01/19  0815 09/30/19  0317 09/29/19  0317 09/28/19  0329   POTASSIUM mmol/L 5 2 5 0 4 2  --  2 7* 3 2* 3 4* 4 1   CHLORIDE mmol/L 102 100 97*  --  102 93* 93* 95*   CO2 mmol/L 33* 36* 38*  --  34* 43* >45* 42*   BUN mg/dL 47* 48* 43*  --  35* 36* 31* 30*   CREATININE mg/dL 0 89 0 92 0 91  --  0 81 0 99 0 78 0 89   CALCIUM mg/dL 7 9* 8 7 8 7  --  7 2* 8 1* 7 7* 8 1*   AST U/L  --   --   --  14  --   --   --  20   ALT U/L  --   --   --  8*  --   --   --  13   ALK PHOS U/L  --   --   --  41*  --   --   --  59   EGFR ml/min/1 73sq m 75 73 74  --  78 67 79 75     BMP:  Results from last 7 days   Lab Units 10/04/19  0333 10/03/19  0545 10/02/19  0500 10/01/19  0815 19  0317 19  0317 19  0329   POTASSIUM mmol/L 5 2 5 0 4 2 2 7* 3 2* 3 4* 4 1   CHLORIDE mmol/L 102 100 97* 102 93* 93* 95*   CO2 mmol/L 33* 36* 38* 34* 43* >45* 42*   BUN mg/dL 47* 48* 43* 35* 36* 31* 30*   CREATININE mg/dL 0 89 0 92 0 91 0 81 0 99 0 78 0 89   CALCIUM mg/dL 7 9* 8 7 8 7 7 2* 8 1* 7 7* 8 1*     Lab Results   Component Value Date    NTBNP 866 (H) 2019    NTBNP 320 2019    NTBNP 293 2019      Results from last 7 days   Lab Units 197 19  0317   MAGNESIUM mg/dL 2 0 1 8     Lipid Profile:   No results found for: CHOL  Lab Results   Component Value Date    HDL 80 (H) 2019    HDL 67 (H) 2018    HDL 78 (H) 2017     Lab Results   Component Value Date    LDLCALC 55 2019    LDLCALC 73 2018    LDLCALC 60 2017     Lab Results   Component Value Date    TRIG 37 2019    TRIG 54 2018    TRIG 61 2017     Cardiac testing:   Results for orders placed during the hospital encounter of 19   Echo complete with contrast if indicated    Narrative [place] [address] [address] [place] [address]  [phone]    Transthoracic Echocardiogram  2D, M-mode, Doppler, and Color Doppler    Study date:  02-Oct-2019    Patient: Bryant Noel  MR number: [id number]  Account number: [de-identified]  : 24-Mar-1929  Age: 80 years  Gender: Male  Status: Inpatient  Location: Bedside  Height: 67 in  Weight: 165 7 lb  BP: 122/ 59 mmHg    Indications: Pulmonary hypertension, acute respiratory failure  Diagnoses: I27 9 - Pulmonary heart disease, unspecified    Sonographer:  IMANI Stout  Primary Physician:  Yariel Contreras MD  Referring Physician:  Wilbur Jones DO  Group:  [facility]  Interpreting Physician:  Tab Aragon MD    SUMMARY    PROCEDURE INFORMATION:  This was a technically difficult study      LEFT VENTRICLE:  Size was normal   Systolic function was normal  Ejection fraction was estimated to be 55 %  Wall thickness was normal   Doppler parameters were consistent with abnormal left ventricular relaxation (grade 1 diastolic dysfunction)  RIGHT VENTRICLE:  The size was normal   Systolic function was normal     AORTIC VALVE:  There was mild regurgitation  TRICUSPID VALVE:  There was trace regurgitation  PERICARDIUM:  A small pericardial effusion was identified circumferential to the heart  There was no evidence of hemodynamic compromise  COMPARISONS:  Comparison was made with the previous study of 18-Mar-2018  A new pericardial effusion has appeared  HISTORY: PRIOR HISTORY: HTN, HLD, pneumonia, former smoker, CKD2  PROCEDURE: The procedure was performed at the bedside  This was a routine study  The transthoracic approach was used  The study included complete 2D imaging, M-mode, complete spectral Doppler, and color Doppler  The heart rate was 58 bpm,  at the start of the study  Images were obtained from the parasternal, apical, subcostal, and suprasternal notch acoustic windows  Echocardiographic views were limited due to poor acoustic window availability  This was a technically  difficult study  LEFT VENTRICLE: Size was normal  Systolic function was normal  Ejection fraction was estimated to be 55 %  There were no regional wall motion abnormalities  Wall thickness was normal  DOPPLER: Doppler parameters were consistent with  abnormal left ventricular relaxation (grade 1 diastolic dysfunction)  RIGHT VENTRICLE: The size was normal  Systolic function was normal  Wall thickness was normal     LEFT ATRIUM: Size was normal     RIGHT ATRIUM: Size was normal     MITRAL VALVE: Valve structure was normal  There was normal leaflet separation  DOPPLER: The transmitral velocity was within the normal range  There was no evidence for stenosis  There was no regurgitation  AORTIC VALVE: The valve was trileaflet   Leaflets exhibited normal thickness and normal cuspal separation  DOPPLER: Transaortic velocity was within the normal range  There was no evidence for stenosis  There was mild regurgitation  TRICUSPID VALVE: The valve structure was normal  There was normal leaflet separation  DOPPLER: The transtricuspid velocity was within the normal range  There was no evidence for stenosis  There was trace regurgitation  The tricuspid jet  envelope definition was inadequate for estimation of RV systolic pressure  There are no indirect findings suggestive of moderate or severe pulmonary hypertension  PULMONIC VALVE: Leaflets exhibited normal thickness, no calcification, and normal cuspal separation  DOPPLER: The transpulmonic velocity was within the normal range  There was no regurgitation  PERICARDIUM: A small pericardial effusion was identified circumferential to the heart  There was no evidence of hemodynamic compromise  The pericardium was normal in appearance  AORTA: The root exhibited normal size  SYSTEMIC VEINS: IVC: The inferior vena cava was normal in size and course  Respirophasic changes were normal     SYSTEM MEASUREMENT TABLES    CW  AR Dec DeWitt: 1 76 m/s2  AR Dec Time: 1927 37 ms  AR PHT: 558 94 ms  AR Vmax: 3 4 m/s  AR maxP 26 mmHg  AV Env  Ti: 276 82 ms  AV MaxP 1 mmHg  AV VTI: 23 31 cm  AV Vmax: 1 42 m/s  AV Vmean: 0 84 m/s  AV meanPG: 3 37 mmHg    MM  TAPSE: 2 11 cm    PW  E': 0 08 m/s  E/E': 10 71  LVOT Env  Ti: 309 11 ms  LVOT VTI: 19 47 cm  LVOT Vmax: 1 04 m/s  LVOT Vmean: 0 63 m/s  LVOT maxP 35 mmHg  LVOT meanP 85 mmHg  MV A Nickolas: 1 17 m/s  MV Dec DeWitt: 3 59 m/s2  MV DecT: 235 49 ms  MV E Nickolas: 0 85 m/s  MV E/A Ratio: 0 72  MV PHT: 68 29 ms  MVA By PHT: 3 22 cm2    [employer] Accredited Echocardiography Laboratory    Prepared and electronically signed by    Eyad Ambrosio MD  Signed 02-Oct-2019 16:26:30       Meds/Allergies   all current active meds have been reviewed and current meds: Current Facility-Administered Medications   Medication Dose Route Frequency    albuterol inhalation solution 2 5 mg  2 5 mg Nebulization Q6H PRN    amLODIPine (NORVASC) tablet 10 mg  10 mg Oral Daily    brimonidine (ALPHAGAN P) 0 15 % ophthalmic solution 1 drop  1 drop Both Eyes Q8H [facility]    enoxaparin (LOVENOX) subcutaneous injection 40 mg  40 mg Subcutaneous Daily    furosemide (LASIX) injection 40 mg  40 mg Intravenous Daily    ipratropium (ATROVENT) 0 02 % inhalation solution 0 5 mg  0 5 mg Nebulization TID    levalbuterol (XOPENEX) inhalation solution 0 63 mg  0 63 mg Nebulization TID    levothyroxine tablet 50 mcg  50 mcg Oral Daily    methylPREDNISolone sodium succinate (Solu-MEDROL) injection 40 mg  40 mg Intravenous Q12H MARCIANO    montelukast (SINGULAIR) tablet 10 mg  10 mg Oral Daily    ondansetron (ZOFRAN) injection 4 mg  4 mg Intravenous Q6H PRN    pantoprazole (PROTONIX) EC tablet 40 mg  40 mg Oral Early Morning    potassium chloride (K-DUR,KLOR-CON) CR tablet 40 mEq  40 mEq Oral BID    pravastatin (PRAVACHOL) tablet 40 mg  40 mg Oral Daily With Dinner    spironolactone (ALDACTONE) tablet 25 mg  25 mg Oral Daily    terazosin (HYTRIN) capsule 5 mg  5 mg Oral HS    timolol (TIMOPTIC) 0 5 % ophthalmic solution 1 drop  1 drop Both Eyes BID     Medications Prior to Admission   Medication    amLODIPine (NORVASC) 10 mg tablet    Blood Pressure Monitoring (B-D ASSURE BPM/AUTO ARM CUFF) MISC    brimonidine-timolol (COMBIGAN) 0 2-0 5 %    furosemide (LASIX) 20 mg tablet    latanoprost (XALATAN) 0 005 % ophthalmic solution    levothyroxine 50 mcg tablet    montelukast (SINGULAIR) 10 mg tablet    potassium chloride (K-DUR,KLOR-CON) 20 mEq tablet    RABEprazole (ACIPHEX) 20 MG tablet    simvastatin (ZOCOR) 20 mg tablet    spironolactone (ALDACTONE) 25 mg tablet    terazosin (HYTRIN) 5 mg capsule     Counseling / Coordination of Care  Total floor / unit time spent today 20 minutes    Greater than 50% of total time was spent with the patient and / or family counseling and / or coordination of care  ** Please Note: Dragon 360 Dictation voice to text software may have been used in the creation of this document   **

## 2019-10-04 NOTE — ASSESSMENT & PLAN NOTE
· Due to CHF exacerbation and bilateral pleural effusions and possible COPD of unknown severity with acute exacerbation  No formal diagnosis yet of COPD  Patient has previous history of heavy tobacco smoking of 2 packs per day for 15 years  Patient quit 60 years ago  · 09/29 Patient having difficulty maintaining adequate saturation despite improving volume status  ABG: pH 7 4, CO2 55 4, HCO3 34  Repeat CXR showed no improvement from CXR on admission  Currently on high flow nasal canula  · 09/29 Given two doses of diamox for bilateral pleural effusions  · 10/01 left-sided thoracocentesis was performed 800 mL of straw colored fluid recovered  · 10/02 right-sided thoracocentesis was performed 800 mL of straw colored fluid recovered  · 10/02 settings of patient's high-flow nasal cannula have been increasing  · 10/03 patient successfully weaned off HFNC  · 10/04 patient overnight slept on 3LNC, transitioned to Med surg    Plan:   · IV diuresis- Switch to PO diuretics tomorrow or Sunday as per cardiology  · Outpatient PFT  · Continue 3LNC and monitor oxygen saturation  · Appreciate pulmonology consult  Possible COPD of unknown severity with acute exacerbation  · Continue Solu-Medrol at 40 mg every 12 hours  · Continue nebulizations with Xopenex and Atrovent  · BiPAP at night  · Patient ready for DC tomorrow or Sunday  · Discussed with Daughter over the phone  She would like to take Father home with her as he has a room in her house on the first floor  Will discuss PT recommendations with her   If refuses, will talk to case management for home health services  · Would need to do home O2 saturation evaluation prior to discharge

## 2019-10-05 LAB
ANION GAP SERPL CALCULATED.3IONS-SCNC: 3 MMOL/L (ref 4–13)
BACTERIA SPEC BFLD CULT: NO GROWTH
BUN SERPL-MCNC: 47 MG/DL (ref 5–25)
CALCIUM SERPL-MCNC: 7.8 MG/DL (ref 8.3–10.1)
CHLORIDE SERPL-SCNC: 100 MMOL/L (ref 100–108)
CO2 SERPL-SCNC: 34 MMOL/L (ref 21–32)
CREAT SERPL-MCNC: 0.79 MG/DL (ref 0.6–1.3)
ERYTHROCYTE [DISTWIDTH] IN BLOOD BY AUTOMATED COUNT: 13.1 % (ref 11.6–15.1)
GFR SERPL CREATININE-BSD FRML MDRD: 79 ML/MIN/1.73SQ M
GLUCOSE SERPL-MCNC: 122 MG/DL (ref 65–140)
GRAM STN SPEC: NORMAL
HCT VFR BLD AUTO: 44.6 % (ref 36.5–49.3)
HGB BLD-MCNC: 15 G/DL (ref 12–17)
MCH RBC QN AUTO: 31.3 PG (ref 26.8–34.3)
MCHC RBC AUTO-ENTMCNC: 33.6 G/DL (ref 31.4–37.4)
MCV RBC AUTO: 93 FL (ref 82–98)
PLATELET # BLD AUTO: 109 THOUSANDS/UL (ref 149–390)
PMV BLD AUTO: 11.3 FL (ref 8.9–12.7)
POTASSIUM SERPL-SCNC: 5.3 MMOL/L (ref 3.5–5.3)
RBC # BLD AUTO: 4.8 MILLION/UL (ref 3.88–5.62)
SODIUM SERPL-SCNC: 137 MMOL/L (ref 136–145)
WBC # BLD AUTO: 7.12 THOUSAND/UL (ref 4.31–10.16)

## 2019-10-05 PROCEDURE — 94640 AIRWAY INHALATION TREATMENT: CPT

## 2019-10-05 PROCEDURE — 85027 COMPLETE CBC AUTOMATED: CPT | Performed by: INTERNAL MEDICINE

## 2019-10-05 PROCEDURE — 99232 SBSQ HOSP IP/OBS MODERATE 35: CPT | Performed by: INTERNAL MEDICINE

## 2019-10-05 PROCEDURE — 94760 N-INVAS EAR/PLS OXIMETRY 1: CPT

## 2019-10-05 PROCEDURE — 80048 BASIC METABOLIC PNL TOTAL CA: CPT | Performed by: NURSE PRACTITIONER

## 2019-10-05 RX ADMIN — BRIMONIDINE TARTRATE 1 DROP: 1.5 SOLUTION OPHTHALMIC at 15:24

## 2019-10-05 RX ADMIN — TERAZOSIN HYDROCHLORIDE 5 MG: 5 CAPSULE ORAL at 21:24

## 2019-10-05 RX ADMIN — LEVALBUTEROL HYDROCHLORIDE 0.63 MG: 0.63 SOLUTION RESPIRATORY (INHALATION) at 13:14

## 2019-10-05 RX ADMIN — AMLODIPINE BESYLATE 10 MG: 10 TABLET ORAL at 09:07

## 2019-10-05 RX ADMIN — TIMOLOL MALEATE 1 DROP: 5 SOLUTION/ DROPS OPHTHALMIC at 17:08

## 2019-10-05 RX ADMIN — BRIMONIDINE TARTRATE 1 DROP: 1.5 SOLUTION OPHTHALMIC at 21:24

## 2019-10-05 RX ADMIN — PREDNISONE 40 MG: 20 TABLET ORAL at 09:07

## 2019-10-05 RX ADMIN — LEVALBUTEROL HYDROCHLORIDE 0.63 MG: 0.63 SOLUTION RESPIRATORY (INHALATION) at 20:07

## 2019-10-05 RX ADMIN — PRAVASTATIN SODIUM 40 MG: 40 TABLET ORAL at 17:08

## 2019-10-05 RX ADMIN — IPRATROPIUM BROMIDE 0.5 MG: 0.5 SOLUTION RESPIRATORY (INHALATION) at 13:14

## 2019-10-05 RX ADMIN — MONTELUKAST SODIUM 10 MG: 10 TABLET, FILM COATED ORAL at 09:06

## 2019-10-05 RX ADMIN — ENOXAPARIN SODIUM 40 MG: 40 INJECTION SUBCUTANEOUS at 09:06

## 2019-10-05 RX ADMIN — IPRATROPIUM BROMIDE 0.5 MG: 0.5 SOLUTION RESPIRATORY (INHALATION) at 20:07

## 2019-10-05 RX ADMIN — LEVOTHYROXINE SODIUM 50 MCG: 50 TABLET ORAL at 05:26

## 2019-10-05 RX ADMIN — PANTOPRAZOLE SODIUM 40 MG: 40 TABLET, DELAYED RELEASE ORAL at 05:26

## 2019-10-05 RX ADMIN — BRIMONIDINE TARTRATE 1 DROP: 1.5 SOLUTION OPHTHALMIC at 05:26

## 2019-10-05 RX ADMIN — SPIRONOLACTONE 25 MG: 25 TABLET ORAL at 09:07

## 2019-10-05 RX ADMIN — LEVALBUTEROL HYDROCHLORIDE 0.63 MG: 0.63 SOLUTION RESPIRATORY (INHALATION) at 07:10

## 2019-10-05 RX ADMIN — FUROSEMIDE 40 MG: 10 INJECTION, SOLUTION INTRAMUSCULAR; INTRAVENOUS at 09:06

## 2019-10-05 RX ADMIN — IPRATROPIUM BROMIDE 0.5 MG: 0.5 SOLUTION RESPIRATORY (INHALATION) at 07:10

## 2019-10-05 RX ADMIN — TIMOLOL MALEATE 1 DROP: 5 SOLUTION/ DROPS OPHTHALMIC at 09:07

## 2019-10-05 NOTE — PROGRESS NOTES
Progress Note - Pulmonary   Juve Cassette 80 y o  male MRN: [id number]  Unit/Bed#: -01 Encounter: [id number]    Assessment/Plan:    1  Acute on chronic hypoxic and hypercapnic respiratory failure likely secondary to multifaceted as listed below       *  currently on 3L-95%- patient does not wear home O2- please titrate O2 accordingly maintain saturations greater than 89%       *  continue pulmonary toileting- IS Q 1 hr, OOB as tolerated, deep breathing cough       *  will place order for ambulatory pulse ox prior to discharge    2  B/L pleural effusion s/p thoracentesis 10/2/19       *  800 ml transudative pleural fluid likely secondary to CHF       *  cytology unremarkable       *  repeat imaging showed improved right lung aeration    3  Acute on chronic grade 1 diastolic heart failure      *  EF 55%, AV/TV mild regurg      *  cardiology follow-up- IV Lasix 40 mg daily  -    home regimen 20 mg daily      *  Output- 700 mL in 24 hours,    Overall -8489    4  Suspected obstructive disease       *  patient quit smoking 60 years ago however does report occupational exposures to asbestos, dust, and mold        *  patient has refused PFT testing in the past        *  will continue prednisone 40 mg 5 day burst    5  Pulmonary nodule       *  7mm RUL nodule       *  will repeat chest CT April 2020    6  Suspected ADAL       *  serum bicarb has been maintained between 35-40 since September 2019       *  will continue to trend BMP       *  would benefit from outpatient sleep study    -outpatient follow-up per discharge instructions    Chief Complaint:    "I feel great"    Subjective:    Maribel Tompkins was comfortably sitting in his bed  He reports that his respiratory status has improved significantly  He states that he would like to be discharged either today or tomorrow  No significant overnight events reported    Patient currently denies any fever, chills, hemoptysis, headaches, frothy sputum, night sweats, palpitations, or pleuritic chest pain  Objective:    Vitals: Blood pressure 122/65, pulse 63, temperature 98 6 °F (37 °C), temperature source Oral, resp  rate 20, weight 77 3 kg (170 lb 5 6 oz), SpO2 95 %  3L,Body mass index is 26 68 kg/m²  Intake/Output Summary (Last 24 hours) at 10/5/2019 0754  Last data filed at 10/5/2019 0750  Gross per 24 hour   Intake 600 ml   Output 1000 ml   Net -400 ml       Invasive Devices     Peripheral Intravenous Line            Peripheral IV 10/05/19 Left;Ventral (anterior) Forearm less than 1 day                Physical Exam:   Physical Exam   Constitutional: He is oriented to person, place, and time  He appears well-developed and well-nourished  HENT:   Head: Normocephalic and atraumatic  Neck: Normal range of motion  Neck supple  No tracheal deviation present  No thyromegaly present  Cardiovascular: Normal rate, regular rhythm and normal heart sounds  Exam reveals no gallop and no friction rub  No murmur heard  Pulmonary/Chest: No accessory muscle usage or stridor  No tachypnea and no bradypnea  No respiratory distress  He has decreased breath sounds in the right middle field, the right lower field, the left middle field and the left lower field  He has no wheezes  He has no rhonchi  He has no rales  He exhibits no tenderness  Abdominal: Soft  Bowel sounds are normal  He exhibits no distension  There is no tenderness  Musculoskeletal: Normal range of motion  He exhibits no edema or deformity  Neurological: He is alert and oriented to person, place, and time  Skin: Skin is warm and dry  Psychiatric: He has a normal mood and affect  His behavior is normal        Labs:    I have personally reviewed pertinent lab results CBC:   Lab Results   Component Value Date    WBC 7 12 10/05/2019    HGB 15 0 10/05/2019    HCT 44 6 10/05/2019    MCV 93 10/05/2019     (L) 10/05/2019    MCH 31 3 10/05/2019    MCHC 33 6 10/05/2019    RDW 13 1 10/05/2019    MPV 11 3 10/05/2019   , CMP:   Lab Results   Component Value Date    SODIUM 137 10/05/2019    K 5 3 10/05/2019     10/05/2019    CO2 34 (H) 10/05/2019    BUN 47 (H) 10/05/2019    CREATININE 0 79 10/05/2019    CALCIUM 7 8 (L) 10/05/2019    EGFR 79 10/05/2019     Imaging and other studies: I have personally reviewed pertinent films in PACS     Chest x-ray 10/02/2019-improved aeration of right lung, persistent left basilar atelectasis and effusion

## 2019-10-05 NOTE — PLAN OF CARE
Problem: Potential for Falls  Goal: Patient will remain free of falls  Description  INTERVENTIONS:  - Assess patient frequently for physical needs  -  Identify cognitive and physical deficits and behaviors that affect risk of falls    -  [place] fall precautions as indicated by assessment   - Educate patient/family on patient safety including physical limitations  - Instruct patient to call for assistance with activity based on assessment  - Modify environment to reduce risk of injury  - Consider OT/PT consult to assist with strengthening/mobility  Outcome: Progressing     Problem: RESPIRATORY - ADULT  Goal: Achieves optimal ventilation and oxygenation  Description  INTERVENTIONS:  - Assess for changes in respiratory status  - Assess for changes in mentation and behavior  - Position to facilitate oxygenation and minimize respiratory effort  - Oxygen administered by appropriate delivery if ordered  - Initiate smoking cessation education as indicated  - Encourage broncho-pulmonary hygiene including cough, deep breathe, Incentive Spirometry  - Assess the need for suctioning and aspirate as needed  - Assess and instruct to report SOB or any respiratory difficulty  - Respiratory Therapy support as indicated  Outcome: Progressing     Problem: GENITOURINARY - ADULT  Goal: Maintains or returns to baseline urinary function  Description  INTERVENTIONS:  - Assess urinary function  - Encourage oral fluids to ensure adequate hydration if ordered  - Administer IV fluids as ordered to ensure adequate hydration  - Administer ordered medications as needed  - Offer frequent toileting  - Follow urinary retention protocol if ordered  Outcome: Progressing  Goal: Absence of urinary retention  Description  INTERVENTIONS:  - Assess patient's ability to void and empty bladder  - Monitor I/O  - Bladder scan as needed  - Discuss with physician/AP medications to alleviate retention as needed  - Discuss catheterization for long term situations as appropriate   Outcome: Progressing     Problem: Prexisting or High Potential for Compromised Skin Integrity  Goal: Skin integrity is maintained or improved  Description  INTERVENTIONS:  - Identify patients at risk for skin breakdown  - Assess and monitor skin integrity  - Assess and monitor nutrition and hydration status  - Monitor labs   - Assess for incontinence   - Turn and reposition patient  - Assist with mobility/ambulation  - Relieve pressure over bony prominences  - Avoid friction and shearing  - Provide appropriate hygiene as needed including keeping skin clean and dry  - Evaluate need for skin moisturizer/barrier cream  - Collaborate with interdisciplinary team   - Patient/family teaching  - Consider wound care consult   Outcome: Progressing

## 2019-10-05 NOTE — PROGRESS NOTES
Cardiology Progress Note - Mike Muñoz 80 y o  male MRN: [id number]    Unit/Bed#: -01 Encounter: [id number]        Subjective:   Doing well  Review of Systems   Cardiovascular: Negative for chest pain, leg swelling and palpitations  Respiratory: Negative for shortness of breath  Objective:   Vitals: Blood pressure 122/65, pulse 63, temperature 98 6 °F (37 °C), temperature source Oral, resp  rate 20, weight 77 3 kg (170 lb 5 6 oz), SpO2 95 %  , Body mass index is 26 68 kg/m² ,   Orthostatic Blood Pressures      Most Recent Value   Blood Pressure  122/65 filed at 10/05/2019 0711   Patient Position - Orthostatic VS  Lying filed at 10/05/2019 2281         Systolic (20NUE), ZCD:845 , Min:121 , GLB:242     Diastolic (83MNA), UWR:74, Min:63, Max:95      Intake/Output Summary (Last 24 hours) at 10/5/2019 0841  Last data filed at 10/5/2019 0750  Gross per 24 hour   Intake 600 ml   Output 1000 ml   Net -400 ml     Weight (last 2 days)     Date/Time   Weight    10/05/19 0600   77 3 (170 35)    10/04/19 0534   77 7 (171 3)    10/03/19 0600   77 7 (171 3)              Telemetry Review: NSR    Physical Exam   Cardiovascular: Normal rate, regular rhythm and normal heart sounds  Exam reveals no gallop and no friction rub  No murmur heard  Pulmonary/Chest: He has decreased breath sounds  He has no wheezes  He has no rales  Musculoskeletal: He exhibits no edema           Laboratory Results:        CBC with diff:   Results from last 7 days   Lab Units 10/05/19  0522 10/04/19  0333 09/30/19  0317   WBC Thousand/uL 7 12 5 16 4 72   HEMOGLOBIN g/dL 15 0 15 0 14 5   HEMATOCRIT % 44 6 45 2 43 3   MCV fL 93 93 93   PLATELETS Thousands/uL 109* 100* 154   MCH pg 31 3 30 9 31 3   MCHC g/dL 33 6 33 2 33 5   RDW % 13 1 13 1 13 0   MPV fL 11 3 11 1 10 6         CMP:  Results from last 7 days   Lab Units 10/05/19  0522 10/04/19  4765 10/03/19  0545 10/02/19  0500 10/01/19  1533 10/01/19  0815 09/30/19  0061 09/29/19  1992 POTASSIUM mmol/L 5 3 5 2 5 0 4 2  --  2 7* 3 2* 3 4*   CHLORIDE mmol/L 100 102 100 97*  --  102 93* 93*   CO2 mmol/L 34* 33* 36* 38*  --  34* 43* >45*   BUN mg/dL 47* 47* 48* 43*  --  35* 36* 31*   CREATININE mg/dL 0 79 0 89 0 92 0 91  --  0 81 0 99 0 78   CALCIUM mg/dL 7 8* 7 9* 8 7 8 7  --  7 2* 8 1* 7 7*   AST U/L  --   --   --   --  14  --   --   --    ALT U/L  --   --   --   --  8*  --   --   --    ALK PHOS U/L  --   --   --   --  41*  --   --   --    EGFR ml/min/1 73sq m 79 75 73 74  --  78 67 79         BMP:  Results from last 7 days   Lab Units 10/05/19  0522 10/04/19  0333 10/03/19  0545 10/02/19  0500 10/01/19  0815 197 19  0317   POTASSIUM mmol/L 5 3 5 2 5 0 4 2 2 7* 3 2* 3 4*   CHLORIDE mmol/L 100 102 100 97* 102 93* 93*   CO2 mmol/L 34* 33* 36* 38* 34* 43* >45*   BUN mg/dL 47* 47* 48* 43* 35* 36* 31*   CREATININE mg/dL 0 79 0 89 0 92 0 91 0 81 0 99 0 78   CALCIUM mg/dL 7 8* 7 9* 8 7 8 7 7 2* 8 1* 7 7*       BNP:     Magnesium:   Results from last 7 days   Lab Units 19  0317 19  0317   MAGNESIUM mg/dL 2 0 1 8       Coags:         TSH:       Lipid Profile:             Cardiac testing:   Results for orders placed during the hospital encounter of 19   Echo complete with contrast if indicated    [facility] [address] [facility]  [phone]    Transthoracic Echocardiogram  2D, M-mode, Doppler, and Color Doppler    Study date:  18-Mar-2019    Patient: Esha Cea  MR number: [id number]  Account number: [de-identified]  : 24-Mar-1929  Age: 80 years  Gender: Male  Status: Inpatient  Location: Bedside  Height: 67 in  Weight: 185 7 lb  BP: 151/ 68 mmHg    Indications: Assess left ventricular function      Diagnoses: I50 9 - Heart failure, unspecified    Sonographer:  Blanca Briggs RDCS  Primary Physician:  Cait Friedman MD  Referring Physician:  Chioma Vargas MD  Group:  [facility]  Interpreting Physician:  Luis Nielson Lynne Webb MD    SUMMARY    LEFT VENTRICLE:  Systolic function was normal  Ejection fraction was estimated to be 65 %  Although no diagnostic regional wall motion abnormality was identified, this possibility cannot be completely excluded on the basis of this study  Doppler parameters were consistent with abnormal left ventricular relaxation (grade 1 diastolic dysfunction)  RIGHT VENTRICLE:  The ventricle was dilated  Systolic function was mildly reduced  RIGHT ATRIUM:  The atrium was dilated  PROCEDURE: The procedure was performed at the bedside  This was a routine study  The transthoracic approach was used  The study included complete 2D imaging, M-mode, complete spectral Doppler, and color Doppler  The heart rate was 70 bpm,  at the start of the study  Images were obtained from the parasternal, apical, subcostal, and suprasternal notch acoustic windows  Image quality was adequate  LEFT VENTRICLE: Size was normal  Systolic function was normal  Ejection fraction was estimated to be 65 %  Although no diagnostic regional wall motion abnormality was identified, this possibility cannot be completely excluded on the basis  of this study  DOPPLER: There was an increased relative contribution of atrial contraction to ventricular filling  Doppler parameters were consistent with abnormal left ventricular relaxation (grade 1 diastolic dysfunction)  RIGHT VENTRICLE: The ventricle was dilated  Systolic function was mildly reduced  LEFT ATRIUM: Size was normal     RIGHT ATRIUM: The atrium was dilated  MITRAL VALVE: Valve structure was normal  There was normal leaflet separation  DOPPLER: There was no evidence for stenosis  There was no significant regurgitation  AORTIC VALVE: The valve was not well visualized  DOPPLER: There was no evidence for stenosis  There was no significant regurgitation  TRICUSPID VALVE: The valve structure was normal  There was normal leaflet separation   DOPPLER: There was no evidence for stenosis  There was no significant regurgitation  PULMONIC VALVE: Leaflets exhibited normal thickness, no calcification, and normal cuspal separation  DOPPLER: The transpulmonic velocity was within the normal range  There was no regurgitation  PERICARDIUM: There was no pericardial effusion  The pericardium was normal in appearance  AORTA: The root exhibited normal size  Not well visualized      SYSTEM MEASUREMENT TABLES    2D  %FS: 34 11 %  AV Diam: 3 48 cm  EDV(Teich): 85 19 ml  EF(Cube): 71 4 %  EF(Teich): 63 34 %  ESV(Cube): 23 48 ml  ESV(Teich): 31 23 ml  IVSd: 1 21 cm  LA Area: 15 68 cm2  LA Diam: 3 47 cm  LVEDV MOD A4C: 78 44 ml  LVEF MOD A4C: 67 23 %  LVESV MOD A4C: 25 7 ml  LVIDd: 4 35 cm  LVIDs: 2 86 cm  LVLd A4C: 7 59 cm  LVLs A4C: 6 36 cm  LVPWd: 1 21 cm  RA Area: 17 77 cm2  RV Diam: 3 96 cm  SI(Cube): 29 91 ml/m2  SI(Teich): 27 53 ml/m2  SV MOD A4C: 52 74 ml  SV(Cube): 58 62 ml  SV(Teich): 53 96 ml    MM  TAPSE: 2 36 cm    PW  AVC: 334 79 ms  E': 0 05 m/s  E/E': 19 38  MV A Nickolas: 1 06 m/s  MV Dec Weld: 4 85 m/s2  MV DecT: 207 36 ms  MV E Nickolas: 1 01 m/s  MV E/A Ratio: 0 95    [facility] Accredited [facility] Laboratory    Prepared and electronically signed by    Anup Nielson MD  Signed 18-Mar-2019 15:04:21         Meds/Allergies     Current Facility-Administered Medications:  albuterol 2 5 mg Nebulization Q6H PRN Josph Ripa, PA-C   amLODIPine 10 mg Oral Daily Josph Ripa, PA-C   brimonidine 1 drop Both Eyes Q8H [address] Josph Ripa, PA-C   enoxaparin 40 mg Subcutaneous Daily Josph Ripa, PA-C   furosemide 40 mg Intravenous Daily Josph Ripa, PA-C   ipratropium 0 5 mg Nebulization TID Josph Ripa, PA-C   levalbuterol 0 63 mg Nebulization TID Josph Ripa, PA-C   levothyroxine 50 mcg Oral Daily Mian Asif PA-C   montelukast 10 mg Oral Daily Mian Asif PA-C   ondansetron 4 mg Intravenous Q6H PRN Mian Asif PA-C pantoprazole 40 mg Oral Early Morning Joh Ripa, PA-C   potassium chloride 40 mEq Oral BID Joh Ripa, PA-C   pravastatin 40 mg Oral Daily With Laura Hampton PA-C   predniSONE 40 mg Oral Daily Carlos Lobato MD   spironolactone 25 mg Oral Daily Josph Ripa, PA-C   terazosin 5 mg Oral HS Josph Ripa, PA-C   timolol 1 drop Both Eyes BID [facility] Ripa, PA-C        Medications Prior to Admission   Medication    amLODIPine (NORVASC) 10 mg tablet    Blood Pressure Monitoring (B-D ASSURE BPM/AUTO ARM CUFF) MISC    brimonidine-timolol (COMBIGAN) 0 2-0 5 %    furosemide (LASIX) 20 mg tablet    latanoprost (XALATAN) 0 005 % ophthalmic solution    levothyroxine 50 mcg tablet    montelukast (SINGULAIR) 10 mg tablet    potassium chloride (K-DUR,KLOR-CON) 20 mEq tablet    RABEprazole (ACIPHEX) 20 MG tablet    simvastatin (ZOCOR) 20 mg tablet    spironolactone (ALDACTONE) 25 mg tablet    terazosin (HYTRIN) 5 mg capsule       Assessment:  Principal Problem:    Acute respiratory failure with hypoxia and hypercapnia (HCC)  Active Problems:    Bilateral leg edema    Acute on chronic heart failure with preserved ejection fraction (HCC)    Essential hypertension    Pulmonary nodule    Hypokalemia    Chronic kidney disease, stage 2 (mild)    Thrombocytopenia (HCC)      Impression:  1  Acute on chronic diastolic heart failure - on IV diuretics     2  HTN - controlled  3  B pleural effusion s/p B thoracentesis       Plan:  1  Switch to Furosemide 40mg PO daily  2  D/C potassium suppl  3  Watch renal function and potassium    4  Continue remainder of medications

## 2019-10-05 NOTE — PLAN OF CARE
Problem: Potential for Falls  Goal: Patient will remain free of falls  Description  INTERVENTIONS:  - Assess patient frequently for physical needs  -  Identify cognitive and physical deficits and behaviors that affect risk of falls    -  [place] fall precautions as indicated by assessment   - Educate patient/family on patient safety including physical limitations  - Instruct patient to call for assistance with activity based on assessment  - Modify environment to reduce risk of injury  - Consider OT/PT consult to assist with strengthening/mobility  Outcome: Progressing     Problem: RESPIRATORY - ADULT  Goal: Achieves optimal ventilation and oxygenation  Description  INTERVENTIONS:  - Assess for changes in respiratory status  - Assess for changes in mentation and behavior  - Position to facilitate oxygenation and minimize respiratory effort  - Oxygen administered by appropriate delivery if ordered  - Initiate smoking cessation education as indicated  - Encourage broncho-pulmonary hygiene including cough, deep breathe, Incentive Spirometry  - Assess the need for suctioning and aspirate as needed  - Assess and instruct to report SOB or any respiratory difficulty  - Respiratory Therapy support as indicated  Outcome: Progressing     Problem: GENITOURINARY - ADULT  Goal: Maintains or returns to baseline urinary function  Description  INTERVENTIONS:  - Assess urinary function  - Encourage oral fluids to ensure adequate hydration if ordered  - Administer IV fluids as ordered to ensure adequate hydration  - Administer ordered medications as needed  - Offer frequent toileting  - Follow urinary retention protocol if ordered  Outcome: Progressing  Goal: Absence of urinary retention  Description  INTERVENTIONS:  - Assess patient's ability to void and empty bladder  - Monitor I/O  - Bladder scan as needed  - Discuss with physician/AP medications to alleviate retention as needed  - Discuss catheterization for long term situations as appropriate   Outcome: Progressing     Problem: Prexisting or High Potential for Compromised Skin Integrity  Goal: Skin integrity is maintained or improved  Description  INTERVENTIONS:  - Identify patients at risk for skin breakdown  - Assess and monitor skin integrity  - Assess and monitor nutrition and hydration status  - Monitor labs   - Assess for incontinence   - Turn and reposition patient  - Assist with mobility/ambulation  - Relieve pressure over bony prominences  - Avoid friction and shearing  - Provide appropriate hygiene as needed including keeping skin clean and dry  - Evaluate need for skin moisturizer/barrier cream  - Collaborate with interdisciplinary team   - Patient/family teaching  - Consider wound care consult   Outcome: Progressing

## 2019-10-05 NOTE — PROGRESS NOTES
Progress Note Alyssa Hector 3/24/1929, 80 y o  male MRN: [id number]    Unit/Bed#: -01 Encounter: [id number]    Primary Care Provider: Giorgio Marie MD   Date and time admitted to hospital: 9/26/2019  3:06 PM        * Acute respiratory failure with hypoxia and hypercapnia (HCC)  Assessment & Plan  · Improving significantly  · Due to CHF exacerbation and bilateral pleural effusions and possible COPD of unknown severity with acute exacerbation  No formal diagnosis yet of COPD  Patient has previous history of heavy tobacco smoking of 2 packs per day for 15 years  Patient quit 60 years ago  · Status post high-flow nasal cannula oxygen  Patient now on regular nasal cannula oxygen  Continue titrating down and possibly wean off from the oxygen  Plan:   · Status post IV diuresis- Switched to PO diuretic today as per cardiology  · Outpatient PFT  · Oxygen, will titrate down and possibly wean off from the oxygen  · Appreciate pulmonology consult  Possible COPD of unknown severity with acute exacerbation  · Status post Solu-Medrol; now on prednisone  Will do tapering doses of prednisone as patient was on steroids for a while now  · Continue nebulizations  · Status post BiPAP at night  · Patient's family initially refused patient being discharged to a rehabilitation facility  However, today, from a discussion with patient's daughter and son and patient himself, they now want patient to be discharged to a rehabilitation facility  Thus case management met with them today  Acute on chronic heart failure with preserved ejection fraction (HCC)  Assessment & Plan  · Acute exacerbation has resolved  · Patient has a past medical history of heart failure with preserved ejection fraction  · Last echocardiogram was 03/18/2019 which showed normal systolic function, ejection fraction of 65% and grade 1 diastolic dysfunction  · Patient presented to the ED with volume overload      · On physical exam at the time of the admission, revealed JVD and bilateral lower extremity pitting edema was noted  · CXR showed bilateral pleural effusions     Plan:  · Cardiology on board  ·  IV Lasix; now switch to p o  Diuretic  · On Aldactone  · monitor ins and outs/weight  · manage patient's hypertension  · AM BMP    Thrombocytopenia (HCC)  Assessment & Plan  · Mild  · Improving spontaneously  · Monitor p r n  Dortha Plough Chronic kidney disease, stage 2 (mild)  Assessment & Plan  · Stable  · Monitor BMP with diuresis  · Monitor input and output  · Avoid nephrotoxins  · Avoid hypotension  Hypokalemia  Assessment & Plan  · Resolved  · Replace potassium p r n  Dortha Plough · Monitor electrolytes  Pulmonary nodule  Assessment & Plan  · Right upper lung pulmonary nodule  · Need surveillance in the outpatient  Repeat CT of the chest due, April 2020  Essential hypertension  Assessment & Plan  Patient with history of hypertension  Blood pressure medication managed by cardiologist   Home medications include terazosin 5 mg, Aldactone 25 mg, amlodipine 10 mg  · Blood pressure stable in the ED, 120/69  · continue terazosin 5 mg  · continue amlodipine 10 mg  · Aldactone continued  · continue diuresis, per Cardiology  Patient now will be on oral Lasix  · monitor blood pressure    Bilateral leg edema  Assessment & Plan  Resolved  · On presentation appeared L>R LE edema  · Venous doppler results were normal    · Likely secondary to congestive heart failure  · Continue diuresis  VTE Pharmacologic Prophylaxis:   Pharmacologic: Enoxaparin (Lovenox)  Mechanical VTE Prophylaxis in Place: Yes    Patient Centered Rounds: I have performed bedside rounds with nursing staff today  Discussions with Specialists or Other Care Team Provider:  Case management  Education and Discussions with Family / Patient:  Patient  Patient's children at bedside  I gave updates for today  I answered questions and concerns      Time Spent for Care: 30 minutes  More than 50% of total time spent on counseling and coordination of care as described above  Current Length of Stay: 9 day(s)    Current Patient Status: Inpatient   Certification Statement: The patient will continue to require additional inpatient hospital stay due to Above findings and plans  Discharge Plan:  According to case management, likely placement will happen on Monday  Code Status: Level 3 - DNAR and DNI      Subjective:   Patient is doing fine and feels a lot better  Patient denies any shortness of breath or any pains  Patient was able to walk in his room without any problems  No complaints  Objective:     Vitals:   Temp (24hrs), Av 5 °F (36 9 °C), Min:98 °F (36 7 °C), Max:99 2 °F (37 3 °C)    Temp:  [98 °F (36 7 °C)-99 2 °F (37 3 °C)] 99 2 °F (37 3 °C)  HR:  [63-77] 77  Resp:  [20-24] 20  BP: (119-123)/(57-95) 119/57  SpO2:  [90 %-97 %] 93 %  Body mass index is 26 68 kg/m²  Input and Output Summary (last 24 hours): Intake/Output Summary (Last 24 hours) at 10/5/2019 1541  Last data filed at 10/5/2019 1537  Gross per 24 hour   Intake 860 ml   Output 1075 ml   Net -215 ml       Physical Exam:     Physical Exam   Constitutional: No distress  HENT:   Head: Normocephalic and atraumatic  Eyes: Right eye exhibits no discharge  Left eye exhibits no discharge  No scleral icterus  Neck: No JVD present  No tracheal deviation present  No thyromegaly present  Cardiovascular: Normal rate, regular rhythm and normal heart sounds  Exam reveals no gallop and no friction rub  No murmur heard  Pulmonary/Chest: Effort normal and breath sounds normal  No stridor  No respiratory distress  He has no wheezes  He has no rales  Abdominal: Soft  Bowel sounds are normal  He exhibits no distension and no mass  There is no tenderness  There is no rebound and no guarding  Musculoskeletal: He exhibits no edema, tenderness or deformity  Neurological: He is alert     Skin: Skin is warm  No rash noted  He is not diaphoretic  No erythema  No pallor  Psychiatric: He has a normal mood and affect  His behavior is normal  Thought content normal    Patient is pleasant  Vitals reviewed  Additional Data:     Labs:    Results from last 7 days   Lab Units 10/05/19  0522   WBC Thousand/uL 7 12   HEMOGLOBIN g/dL 15 0   HEMATOCRIT % 44 6   PLATELETS Thousands/uL 109*     Results from last 7 days   Lab Units 10/05/19  0522  10/01/19  1533   POTASSIUM mmol/L 5 3   < >  --    CHLORIDE mmol/L 100   < >  --    CO2 mmol/L 34*   < >  --    BUN mg/dL 47*   < >  --    CREATININE mg/dL 0 79   < >  --    CALCIUM mg/dL 7 8*   < >  --    ALK PHOS U/L  --   --  41*   ALT U/L  --   --  8*   AST U/L  --   --  14    < > = values in this interval not displayed  * I Have Reviewed All Lab Data Listed Above  * Additional [facility]ertinent Lab Tests Reviewed: [facility]    Imaging:    Imaging Reports Reviewed Today Include:  Diagnostic imaging studies that were done on this admission  Imaging Personally Reviewed by Myself Includes:  None      Recent Cultures (last 7 days):     Results from last 7 days   Lab Units 10/05/19  0728 10/01/19  1516   GRAM STAIN RESULT  No Polys or Bacteria seen Rare Polys  Rare Mononuclear Cells  No bacteria seen   BODY FLUID CULTURE, STERILE  No growth No growth       Last 24 Hours Medication List:     Current Facility-Administered Medications:  albuterol 2 5 mg Nebulization Q6H PRN Ama Campos PA-C   amLODIPine 10 mg Oral Daily Ama Campos PA-C   brimonidine 1 drop Both Eyes Q8H [address] Ama Campos PA-C   enoxaparin 40 mg Subcutaneous Daily Ama Campos PA-C   furosemide 40 mg Intravenous Daily Ama Campos PA-C   ipratropium 0 5 mg Nebulization TID Ama Campos PA-C   levalbuterol 0 63 mg Nebulization TID Ama Campos PA-C   levothyroxine 50 mcg Oral Daily Ama Campos PA-C   montelukast 10 mg Oral Daily Prabhjot Azevedo PA-C   ondansetron 4 mg Intravenous Q6H PRN Prabhjot Azevedo PA-C   pantoprazole 40 mg Oral Early Morning Prabhjot Azevedo PA-C   pravastatin 40 mg Oral Daily With Carry GARY Noe   predniSONE 40 mg Oral Daily Carlos Lobato MD   spironolactone 25 mg Oral Daily Prabhjot Azevedo PA-C   terazosin 5 mg Oral HS Prabhjot Azevedo PA-C   timolol 1 drop Both Eyes BID Prabhjot Azevedo PA-C        Today, Patient Was Seen By: Javi Hester MD    ** Please Note: Dragon 360 Dictation voice to text software may have been used in the creation of this document   **

## 2019-10-05 NOTE — ASSESSMENT & PLAN NOTE
· Improving significantly  · Due to CHF exacerbation and bilateral pleural effusions and possible COPD of unknown severity with acute exacerbation  No formal diagnosis yet of COPD  Patient has previous history of heavy tobacco smoking of 2 packs per day for 15 years  Patient quit 60 years ago  · Status post high-flow nasal cannula oxygen  Patient now on regular nasal cannula oxygen  Continue titrating down and possibly wean off from the oxygen  Plan:   · Status post IV diuresis- Switched to PO diuretic today as per cardiology  · Outpatient PFT  · Oxygen, will titrate down and possibly wean off from the oxygen  · Appreciate pulmonology consult  Possible COPD of unknown severity with acute exacerbation  · Status post Solu-Medrol; now on prednisone  Will do tapering doses of prednisone as patient was on steroids for a while now  · Continue nebulizations  · Status post BiPAP at night  · Patient's family initially refused patient being discharged to a rehabilitation facility  However, today, from a discussion with patient's daughter and son and patient himself, they now want patient to be discharged to a rehabilitation facility  Thus case management met with them today

## 2019-10-05 NOTE — ASSESSMENT & PLAN NOTE
· Acute exacerbation has resolved  · Patient has a past medical history of heart failure with preserved ejection fraction  · Last echocardiogram was 03/18/2019 which showed normal systolic function, ejection fraction of 65% and grade 1 diastolic dysfunction  · Patient presented to the ED with volume overload  · On physical exam at the time of the admission, revealed JVD and bilateral lower extremity pitting edema was noted  · CXR showed bilateral pleural effusions     Plan:  · Cardiology on board  ·  IV Lasix; now switch to p o  Diuretic  · On Aldactone  · monitor ins and outs/weight  · manage patient's hypertension    · AM BMP

## 2019-10-05 NOTE — ASSESSMENT & PLAN NOTE
Patient with history of hypertension  Blood pressure medication managed by cardiologist   Home medications include terazosin 5 mg, Aldactone 25 mg, amlodipine 10 mg  · Blood pressure stable in the ED, 120/69  · continue terazosin 5 mg  · continue amlodipine 10 mg  · Aldactone continued  · continue diuresis, per Cardiology  Patient now will be on oral Lasix    · monitor blood pressure

## 2019-10-05 NOTE — SOCIAL WORK
JAMES was notified by SHANICE that patient and family would like to meet to discuss STR  CM met with patient and his children at bedside, a list of STR facilities provided  Patient's son and daughter report that they will review the list and contact CM when they have made selections

## 2019-10-06 LAB
ANION GAP SERPL CALCULATED.3IONS-SCNC: 3 MMOL/L (ref 4–13)
BUN SERPL-MCNC: 42 MG/DL (ref 5–25)
CALCIUM SERPL-MCNC: 8 MG/DL (ref 8.3–10.1)
CHLORIDE SERPL-SCNC: 101 MMOL/L (ref 100–108)
CO2 SERPL-SCNC: 34 MMOL/L (ref 21–32)
CREAT SERPL-MCNC: 0.78 MG/DL (ref 0.6–1.3)
GFR SERPL CREATININE-BSD FRML MDRD: 79 ML/MIN/1.73SQ M
GLUCOSE SERPL-MCNC: 108 MG/DL (ref 65–140)
POTASSIUM SERPL-SCNC: 4.1 MMOL/L (ref 3.5–5.3)
SODIUM SERPL-SCNC: 138 MMOL/L (ref 136–145)

## 2019-10-06 PROCEDURE — 99232 SBSQ HOSP IP/OBS MODERATE 35: CPT | Performed by: INTERNAL MEDICINE

## 2019-10-06 PROCEDURE — 94640 AIRWAY INHALATION TREATMENT: CPT

## 2019-10-06 PROCEDURE — 90662 IIV NO PRSV INCREASED AG IM: CPT | Performed by: INTERNAL MEDICINE

## 2019-10-06 PROCEDURE — 80048 BASIC METABOLIC PNL TOTAL CA: CPT | Performed by: INTERNAL MEDICINE

## 2019-10-06 PROCEDURE — 94760 N-INVAS EAR/PLS OXIMETRY 1: CPT

## 2019-10-06 PROCEDURE — G0008 ADMIN INFLUENZA VIRUS VAC: HCPCS | Performed by: INTERNAL MEDICINE

## 2019-10-06 RX ORDER — FUROSEMIDE 40 MG/1
40 TABLET ORAL DAILY
Status: DISCONTINUED | OUTPATIENT
Start: 2019-10-07 | End: 2019-10-07 | Stop reason: HOSPADM

## 2019-10-06 RX ADMIN — BRIMONIDINE TARTRATE 1 DROP: 1.5 SOLUTION OPHTHALMIC at 05:03

## 2019-10-06 RX ADMIN — LEVOTHYROXINE SODIUM 50 MCG: 50 TABLET ORAL at 05:02

## 2019-10-06 RX ADMIN — PRAVASTATIN SODIUM 40 MG: 40 TABLET ORAL at 17:07

## 2019-10-06 RX ADMIN — FUROSEMIDE 40 MG: 10 INJECTION, SOLUTION INTRAMUSCULAR; INTRAVENOUS at 08:29

## 2019-10-06 RX ADMIN — AMLODIPINE BESYLATE 10 MG: 10 TABLET ORAL at 08:29

## 2019-10-06 RX ADMIN — SPIRONOLACTONE 25 MG: 25 TABLET ORAL at 08:29

## 2019-10-06 RX ADMIN — MONTELUKAST SODIUM 10 MG: 10 TABLET, FILM COATED ORAL at 08:29

## 2019-10-06 RX ADMIN — LEVALBUTEROL HYDROCHLORIDE 0.63 MG: 0.63 SOLUTION RESPIRATORY (INHALATION) at 19:49

## 2019-10-06 RX ADMIN — LEVALBUTEROL HYDROCHLORIDE 0.63 MG: 0.63 SOLUTION RESPIRATORY (INHALATION) at 13:23

## 2019-10-06 RX ADMIN — PREDNISONE 40 MG: 20 TABLET ORAL at 08:29

## 2019-10-06 RX ADMIN — ENOXAPARIN SODIUM 40 MG: 40 INJECTION SUBCUTANEOUS at 08:29

## 2019-10-06 RX ADMIN — TIMOLOL MALEATE 1 DROP: 5 SOLUTION/ DROPS OPHTHALMIC at 17:07

## 2019-10-06 RX ADMIN — TERAZOSIN HYDROCHLORIDE 5 MG: 5 CAPSULE ORAL at 22:27

## 2019-10-06 RX ADMIN — LEVALBUTEROL HYDROCHLORIDE 0.63 MG: 0.63 SOLUTION RESPIRATORY (INHALATION) at 07:17

## 2019-10-06 RX ADMIN — IPRATROPIUM BROMIDE 0.5 MG: 0.5 SOLUTION RESPIRATORY (INHALATION) at 07:17

## 2019-10-06 RX ADMIN — PANTOPRAZOLE SODIUM 40 MG: 40 TABLET, DELAYED RELEASE ORAL at 05:02

## 2019-10-06 RX ADMIN — INFLUENZA A VIRUS A/MICHIGAN/45/2015 X-275 (H1N1) ANTIGEN (FORMALDEHYDE INACTIVATED), INFLUENZA A VIRUS A/SINGAPORE/INFIMH-16-0019/2016 IVR-186 (H3N2) ANTIGEN (FORMALDEHYDE INACTIVATED), AND INFLUENZA B VIRUS B/MARYLAND/15/2016 BX-69A (A B/COLORADO/6/2017-LIKE VIRUS) ANTIGEN (FORMALDEHYDE INACTIVATED) 0.5 ML: 60; 60; 60 INJECTION, SUSPENSION INTRAMUSCULAR at 17:07

## 2019-10-06 RX ADMIN — BRIMONIDINE TARTRATE 1 DROP: 1.5 SOLUTION OPHTHALMIC at 14:26

## 2019-10-06 RX ADMIN — BRIMONIDINE TARTRATE 1 DROP: 1.5 SOLUTION OPHTHALMIC at 22:27

## 2019-10-06 RX ADMIN — TIMOLOL MALEATE 1 DROP: 5 SOLUTION/ DROPS OPHTHALMIC at 08:29

## 2019-10-06 NOTE — PLAN OF CARE
Problem: Potential for Falls  Goal: Patient will remain free of falls  Description  INTERVENTIONS:  - Assess patient frequently for physical needs  -  Identify cognitive and physical deficits and behaviors that affect risk of falls    -  [place] fall precautions as indicated by assessment   - Educate patient/family on patient safety including physical limitations  - Instruct patient to call for assistance with activity based on assessment  - Modify environment to reduce risk of injury  - Consider OT/PT consult to assist with strengthening/mobility  Outcome: Progressing     Problem: RESPIRATORY - ADULT  Goal: Achieves optimal ventilation and oxygenation  Description  INTERVENTIONS:  - Assess for changes in respiratory status  - Assess for changes in mentation and behavior  - Position to facilitate oxygenation and minimize respiratory effort  - Oxygen administered by appropriate delivery if ordered  - Initiate smoking cessation education as indicated  - Encourage broncho-pulmonary hygiene including cough, deep breathe, Incentive Spirometry  - Assess the need for suctioning and aspirate as needed  - Assess and instruct to report SOB or any respiratory difficulty  - Respiratory Therapy support as indicated  Outcome: Progressing     Problem: GENITOURINARY - ADULT  Goal: Maintains or returns to baseline urinary function  Description  INTERVENTIONS:  - Assess urinary function  - Encourage oral fluids to ensure adequate hydration if ordered  - Administer IV fluids as ordered to ensure adequate hydration  - Administer ordered medications as needed  - Offer frequent toileting  - Follow urinary retention protocol if ordered  Outcome: Progressing  Goal: Absence of urinary retention  Description  INTERVENTIONS:  - Assess patient's ability to void and empty bladder  - Monitor I/O  - Bladder scan as needed  - Discuss with physician/AP medications to alleviate retention as needed  - Discuss catheterization for long term situations as appropriate   Outcome: Progressing

## 2019-10-06 NOTE — PROGRESS NOTES
Cardiology Progress Note - Noemi Tena 80 y o  male MRN: [id number]    Unit/Bed#: -01 Encounter: [id number]        Subjective:   Doing well  Review of Systems   Cardiovascular: Negative for chest pain, leg swelling and palpitations  Respiratory: Negative for shortness of breath  Objective:   Vitals: Blood pressure 127/61, pulse 70, temperature 97 9 °F (36 6 °C), temperature source Oral, resp  rate 18, weight 77 3 kg (170 lb 5 6 oz), SpO2 92 %  , Body mass index is 26 68 kg/m² ,   Orthostatic Blood Pressures      Most Recent Value   Blood Pressure  127/61 filed at 10/06/2019 0755   Patient Position - Orthostatic VS  Lying filed at 10/06/2019 3024         Systolic (92YCX), GCD:600 , Min:113 , PCZ:574     Diastolic (32HQB), FKB:50, Min:57, Max:61      Intake/Output Summary (Last 24 hours) at 10/6/2019 0831  Last data filed at 10/6/2019 0758  Gross per 24 hour   Intake 1340 ml   Output 1375 ml   Net -35 ml     Weight (last 2 days)     Date/Time   Weight    10/05/19 0600   77 3 (170 35)    10/04/19 0534   77 7 (171 3)              Telemetry Review: NSR    Physical Exam   Cardiovascular: Normal rate, regular rhythm and normal heart sounds  Exam reveals no gallop and no friction rub  No murmur heard  Pulmonary/Chest: He has decreased breath sounds  He has no wheezes  He has no rales  Musculoskeletal: He exhibits no edema           Laboratory Results:        CBC with diff:   Results from last 7 days   Lab Units 10/05/19  0522 10/04/19  0333 09/30/19  0317   WBC Thousand/uL 7 12 5 16 4 72   HEMOGLOBIN g/dL 15 0 15 0 14 5   HEMATOCRIT % 44 6 45 2 43 3   MCV fL 93 93 93   PLATELETS Thousands/uL 109* 100* 154   MCH pg 31 3 30 9 31 3   MCHC g/dL 33 6 33 2 33 5   RDW % 13 1 13 1 13 0   MPV fL 11 3 11 1 10 6         CMP:  Results from last 7 days   Lab Units 10/06/19  0502 10/05/19  0522 10/04/19  0333 10/03/19  0545 10/02/19  0500 10/01/19  1533 10/01/19  0815 09/30/19  0317   POTASSIUM mmol/L 4 1 5 3 5 2 5 0 4 2  --  2 7* 3 2*   CHLORIDE mmol/L 101 100 102 100 97*  --  102 93*   CO2 mmol/L 34* 34* 33* 36* 38*  --  34* 43*   BUN mg/dL 42* 47* 47* 48* 43*  --  35* 36*   CREATININE mg/dL 0 78 0 79 0 89 0 92 0 91  --  0 81 0 99   CALCIUM mg/dL 8 0* 7 8* 7 9* 8 7 8 7  --  7 2* 8 1*   AST U/L  --   --   --   --   --  14  --   --    ALT U/L  --   --   --   --   --  8*  --   --    ALK PHOS U/L  --   --   --   --   --  41*  --   --    EGFR ml/min/1 73sq m 79 79 75 73 74  --  78 67         BMP:  Results from last 7 days   Lab Units 10/06/19  0502 10/05/19  0522 10/04/19  0333 10/03/19  0545 10/02/19  0500 10/01/19  0815 19  0317   POTASSIUM mmol/L 4 1 5 3 5 2 5 0 4 2 2 7* 3 2*   CHLORIDE mmol/L 101 100 102 100 97* 102 93*   CO2 mmol/L 34* 34* 33* 36* 38* 34* 43*   BUN mg/dL 42* 47* 47* 48* 43* 35* 36*   CREATININE mg/dL 0 78 0 79 0 89 0 92 0 91 0 81 0 99   CALCIUM mg/dL 8 0* 7 8* 7 9* 8 7 8 7 7 2* 8 1*       BNP:     Magnesium:   Results from last 7 days   Lab Units 19  0317   MAGNESIUM mg/dL 2 0       Coags:         TSH:       Lipid Profile:             Cardiac testing:   Results for orders placed during the hospital encounter of 19   Echo complete with contrast if indicated    Narrative [facility], [address] [facility]  [phone]    Transthoracic Echocardiogram  2D, M-mode, Doppler, and Color Doppler    Study date:  18-Mar-2019    Patient: Peg Trevino  MR number: [id number]  Account number: [de-identified]  : 24-Mar-1929  Age: 80 years  Gender: Male  Status: Inpatient  Location: Bedside  Height: 67 in  Weight: 185 7 lb  BP: 151/ 68 mmHg    Indications: Assess left ventricular function      Diagnoses: I50 9 - Heart failure, unspecified    Sonographer:  Zenaida Coley RDCS  Primary Physician:  Merle Suero MD  Referring Physician:  Celia Valdovinos MD  Group:  [facility]  Interpreting Physician:  Leanna Rubinstein, MD    SUMMARY    LEFT VENTRICLE:  Systolic function was normal  Ejection fraction was estimated to be 65 %  Although no diagnostic regional wall motion abnormality was identified, this possibility cannot be completely excluded on the basis of this study  Doppler parameters were consistent with abnormal left ventricular relaxation (grade 1 diastolic dysfunction)  RIGHT VENTRICLE:  The ventricle was dilated  Systolic function was mildly reduced  RIGHT ATRIUM:  The atrium was dilated  PROCEDURE: The procedure was performed at the bedside  This was a routine study  The transthoracic approach was used  The study included complete 2D imaging, M-mode, complete spectral Doppler, and color Doppler  The heart rate was 70 bpm,  at the start of the study  Images were obtained from the parasternal, apical, subcostal, and suprasternal notch acoustic windows  Image quality was adequate  LEFT VENTRICLE: Size was normal  Systolic function was normal  Ejection fraction was estimated to be 65 %  Although no diagnostic regional wall motion abnormality was identified, this possibility cannot be completely excluded on the basis  of this study  DOPPLER: There was an increased relative contribution of atrial contraction to ventricular filling  Doppler parameters were consistent with abnormal left ventricular relaxation (grade 1 diastolic dysfunction)  RIGHT VENTRICLE: The ventricle was dilated  Systolic function was mildly reduced  LEFT ATRIUM: Size was normal     RIGHT ATRIUM: The atrium was dilated  MITRAL VALVE: Valve structure was normal  There was normal leaflet separation  DOPPLER: There was no evidence for stenosis  There was no significant regurgitation  AORTIC VALVE: The valve was not well visualized  DOPPLER: There was no evidence for stenosis  There was no significant regurgitation  TRICUSPID VALVE: The valve structure was normal  There was normal leaflet separation  DOPPLER: There was no evidence for stenosis   There was no significant regurgitation  PULMONIC VALVE: Leaflets exhibited normal thickness, no calcification, and normal cuspal separation  DOPPLER: The transpulmonic velocity was within the normal range  There was no regurgitation  PERICARDIUM: There was no pericardial effusion  The pericardium was normal in appearance  AORTA: The root exhibited normal size  Not well visualized      SYSTEM MEASUREMENT TABLES    2D  %FS: 34 11 %  AV Diam: 3 48 cm  EDV(Teich): 85 19 ml  EF(Cube): 71 4 %  EF(Teich): 63 34 %  ESV(Cube): 23 48 ml  ESV(Teich): 31 23 ml  IVSd: 1 21 cm  LA Area: 15 68 cm2  LA Diam: 3 47 cm  LVEDV MOD A4C: 78 44 ml  LVEF MOD A4C: 67 23 %  LVESV MOD A4C: 25 7 ml  LVIDd: 4 35 cm  LVIDs: 2 86 cm  LVLd A4C: 7 59 cm  LVLs A4C: 6 36 cm  LVPWd: 1 21 cm  RA Area: 17 77 cm2  RV Diam: 3 96 cm  SI(Cube): 29 91 ml/m2  SI(Teich): 27 53 ml/m2  SV MOD A4C: 52 74 ml  SV(Cube): 58 62 ml  SV(Teich): 53 96 ml    MM  TAPSE: 2 36 cm    PW  AVC: 334 79 ms  E': 0 05 m/s  E/E': 19 38  MV A Nickolas: 1 06 m/s  MV Dec [place]: 4 85 m/s2  MV DecT: 207 36 ms  MV E Nickolas: 1 01 m/s  MV E/A Ratio: 0 95    [facility]    Prepared and electronically signed by    Galen Robledo MD  Signed 18-Mar-2019 15:04:21         Meds/Allergies     Current Facility-Administered Medications:  albuterol 2 5 mg Nebulization Q6H PRN Willie Trevino PA-C   amLODIPine 10 mg Oral Daily Willie Trevino PA-C   brimonidine 1 drop Both Eyes Q8H [facility] Willie Trevino PA-C   enoxaparin 40 mg Subcutaneous Daily Willie Trevino PA-C   furosemide 40 mg Intravenous Daily Willie Trevino PA-C   ipratropium 0 5 mg Nebulization TID Willie Trevino PA-C   levalbuterol 0 63 mg Nebulization TID Willie Trevino PA-C   levothyroxine 50 mcg Oral Daily Willie Trevino PA-C   montelukast 10 mg Oral Daily Willie Trevino PA-C   ondansetron 4 mg Intravenous Q6H PRN Willie Trevino PA-C   pantoprazole 40 mg Oral Early Morning Willie Trevino, GARY   pravastatin 40 mg Oral Daily With Zoey Mitchell PA-C   predniSONE 40 mg Oral Daily Carlos Lobato MD   spironolactone 25 mg Oral Daily Erica Ahmadi PA-C   terazosin 5 mg Oral HS Erica Ahmadi PA-C   timolol 1 drop Both Eyes BID Erica Ahmadi PA-C        Medications Prior to Admission   Medication    amLODIPine (NORVASC) 10 mg tablet    Blood Pressure Monitoring (B-D ASSURE BPM/AUTO ARM CUFF) MISC    brimonidine-timolol (COMBIGAN) 0 2-0 5 %    furosemide (LASIX) 20 mg tablet    latanoprost (XALATAN) 0 005 % ophthalmic solution    levothyroxine 50 mcg tablet    montelukast (SINGULAIR) 10 mg tablet    potassium chloride (K-DUR,KLOR-CON) 20 mEq tablet    RABEprazole (ACIPHEX) 20 MG tablet    simvastatin (ZOCOR) 20 mg tablet    spironolactone (ALDACTONE) 25 mg tablet    terazosin (HYTRIN) 5 mg capsule       Assessment:  Principal Problem:    Acute respiratory failure with hypoxia and hypercapnia (HCC)  Active Problems:    Bilateral leg edema    Acute on chronic heart failure with preserved ejection fraction (HCC)    Essential hypertension    Pulmonary nodule    Hypokalemia    Chronic kidney disease, stage 2 (mild)    Thrombocytopenia (HCC)      Impression:  1  Acute on chronic diastolic heart failure - on IV diuretics     2  HTN - controlled  3  B pleural effusion s/p B thoracentesis       Plan:  1  Switch to Furosemide 40mg PO daily  2  Continue current medications  3  Will s/o for now  Please call with questions

## 2019-10-06 NOTE — ASSESSMENT & PLAN NOTE
· Resolved  · Replace potassium p r n  Unk Kamla · Monitor electrolytes  · K decreased from 5 3 yesterday to 4 1 today  Will monitor before DC

## 2019-10-06 NOTE — ASSESSMENT & PLAN NOTE
Patient with history of hypertension  Blood pressure medication managed by cardiologist   Home medications include terazosin 5 mg, Aldactone 25 mg, amlodipine 10 mg  · Blood pressure stable throughout stay in hospital   · continue terazosin 5 mg  · continue amlodipine 10 mg  · Continue aldactone  · continue diuresis, per Cardiology  Patient now will be on oral Lasix    · monitor blood pressure

## 2019-10-06 NOTE — ASSESSMENT & PLAN NOTE
· Acute exacerbation has resolved  · -8124 mL and -15 lbs since admission  · Patient has a past medical history of heart failure with preserved ejection fraction  · Last echocardiogram was 03/18/2019 which showed normal systolic function, ejection fraction of 65% and grade 1 diastolic dysfunction  · Patient presented to the ED with volume overload  · On physical exam at the time of the admission, revealed JVD and bilateral lower extremity pitting edema was noted  · CXR showed bilateral pleural effusions     Plan:  · Cardiology on board  ·  IV Lasix; now switch to p o  Lasix 40 mg  · On Aldactone  · monitor ins and outs/weight  · manage patient's hypertension    · AM BMP

## 2019-10-06 NOTE — PROGRESS NOTES
Progress Note Jerman Sanders 3/24/1929, 80 y o  male MRN: [id number]    Unit/Bed#: -01 Encounter: [id number]    Primary Care Provider: Nadine Allan MD   Date and time admitted to hospital: 9/26/2019  3:06 PM        * Acute respiratory failure with hypoxia and hypercapnia (HCC)  Assessment & Plan  · Improving significantly  · Due to CHF exacerbation and bilateral pleural effusions and possible COPD of unknown severity with acute exacerbation  No formal diagnosis yet of COPD  Patient has previous history of heavy tobacco smoking of 2 packs per day for 15 years  Patient quit 60 years ago  · Status post BiPAP and nasal canula at night  · Status post high-flow nasal cannula oxygen and nasal canula   · Status post IV Solu-medrol  · Patient currently 92% on room air    Plan:   · Status post IV diuresis, Continue 40 mg Lasix  · Outpatient PFT  · Wean off from the oxygen  · Appreciate pulmonology consult  Possible COPD of unknown severity with acute exacerbation  · Suspected obstructive disease  · Continue prednisone  · Will do tapering doses of prednisone as patient was on steroids for a while now  · Continue nebulizations  · Pulmonology cleared patient  · Discussed discharge with case management today  Patient will need evaluation from PT in order for insurance to cover rehabilitation facility  · Appreciate PT evaluation    Acute on chronic heart failure with preserved ejection fraction (HCC)  Assessment & Plan  · Acute exacerbation has resolved  · -8124 mL and -15 lbs since admission  · Patient has a past medical history of heart failure with preserved ejection fraction  · Last echocardiogram was 03/18/2019 which showed normal systolic function, ejection fraction of 65% and grade 1 diastolic dysfunction  · Patient presented to the ED with volume overload  · On physical exam at the time of the admission, revealed JVD and bilateral lower extremity pitting edema was noted      · CXR showed bilateral pleural effusions     Plan:  · Cardiology on board  ·  IV Lasix; now switch to p o  Lasix 40 mg  · On Aldactone  · monitor ins and outs/weight  · manage patient's hypertension  · AM BMP    Essential hypertension  Assessment & Plan  Patient with history of hypertension  Blood pressure medication managed by cardiologist   Home medications include terazosin 5 mg, Aldactone 25 mg, amlodipine 10 mg  · Blood pressure stable throughout stay in hospital   · continue terazosin 5 mg  · continue amlodipine 10 mg  · Continue aldactone  · continue diuresis, per Cardiology  Patient now will be on oral Lasix  · monitor blood pressure    Thrombocytopenia (HCC)  Assessment & Plan  · Mild  · Improving spontaneously  · Monitor p r n  Devika Diogo Chronic kidney disease, stage 2 (mild)  Assessment & Plan  · Stable  · Monitor BMP with diuresis  · Monitor input and output  · Avoid nephrotoxins  · Avoid hypotension  Hypokalemia  Assessment & Plan  · Resolved  · Replace potassium p r n  Devika Diogo · Monitor electrolytes  · K decreased from 5 3 yesterday to 4 1 today  Will monitor before DC  Pulmonary nodule  Assessment & Plan  · Right upper lung pulmonary nodule  · Need surveillance in the outpatient  Repeat CT of the chest due, April 2020  Bilateral leg edema  Assessment & Plan  Resolved  · On presentation appeared L>R LE edema  · Venous doppler results were normal    · Likely secondary to congestive heart failure  · Continue diuresis  Pharmacologic: Enoxaparin (Lovenox)  Mechanical VTE Prophylaxis in Place: Yes    Discussions with Specialists or Other Care Team Provider: Cardiology and pulmonology    Education and Discussions with Family / Patient: DC plan    Current Length of Stay: 10 day(s)    Current Patient Status: Inpatient     Discharge Plan / Estimated Discharge Date: 10/07/10    Code Status: Level 3 - DNAR and DNI      Subjective:   Patient today is doing well  He slept last night without nasal canula   He denies any shortness of breath  He has wheezing on physical exam      Objective:     Vitals:   Temp (24hrs), Av 6 °F (37 °C), Min:97 9 °F (36 6 °C), Max:99 2 °F (37 3 °C)    Temp:  [97 9 °F (36 6 °C)-99 2 °F (37 3 °C)] 97 9 °F (36 6 °C)  HR:  [70-77] 70  Resp:  [18-20] 18  BP: (113-127)/(57-61) 127/61  SpO2:  [90 %-93 %] 92 %  Body mass index is 26 68 kg/m²  Input and Output Summary (last 24 hours): Intake/Output Summary (Last 24 hours) at 10/6/2019 0903  Last data filed at 10/6/2019 0758  Gross per 24 hour   Intake 960 ml   Output 1375 ml   Net -415 ml       Physical Exam:     Physical Exam   Constitutional: He appears well-developed and well-nourished  HENT:   Head: Normocephalic and atraumatic  Cardiovascular: Normal rate, regular rhythm and normal heart sounds  Pulmonary/Chest: No respiratory distress  He has wheezes  Abdominal: Soft  Bowel sounds are normal    Neurological: He is alert  Psychiatric: He has a normal mood and affect  His behavior is normal  Thought content normal        Additional Data:     Labs:    Results from last 7 days   Lab Units 10/05/19  0522   WBC Thousand/uL 7 12   HEMOGLOBIN g/dL 15 0   HEMATOCRIT % 44 6   PLATELETS Thousands/uL 109*     Results from last 7 days   Lab Units 10/06/19  0502  10/01/19  1533   POTASSIUM mmol/L 4 1   < >  --    CHLORIDE mmol/L 101   < >  --    CO2 mmol/L 34*   < >  --    BUN mg/dL 42*   < >  --    CREATININE mg/dL 0 78   < >  --    CALCIUM mg/dL 8 0*   < >  --    ALK PHOS U/L  --   --  41*   ALT U/L  --   --  8*   AST U/L  --   --  14    < > = values in this interval not displayed  * I Have Reviewed All Lab Data Listed Above  * Additional Pertinent Lab Tests Reviewed:  Cinthya [facility] Admission Reviewed      Re    Last 24 Hours Medication List:     Current Facility-Administered Medications:  albuterol 2 5 mg Nebulization Q6H PRN Herchel Odor, PA-C   amLODIPine 10 mg Oral Daily Eyad Tello PA-C brimonidine 1 drop Both Eyes Q8H [address] John Mckenna, GARY   enoxaparin 40 mg Subcutaneous Daily Porter Schaffer   [START ON 10/7/2019] furosemide 40 mg Oral Daily Johny Mock MD   ipratropium 0 5 mg Nebulization TID John Mckenna PA-C   levalbuterol 0 63 mg Nebulization TID John Mckenna PA-C   levothyroxine 50 mcg Oral Daily John Mckenna PA-C   montelukast 10 mg Oral Daily John Mckenna PA-C   ondansetron 4 mg Intravenous Q6H PRN John Mckenna PA-C   pantoprazole 40 mg Oral Early Morning John Mckenna PA-C   pravastatin 40 mg Oral Daily With Bebeto Ponce PA-C   predniSONE 40 mg Oral Daily Carlos Lobato MD   spironolactone 25 mg Oral Daily John Mckenna PA-C   terazosin 5 mg Oral HS John Mckenna PA-C   timolol 1 drop Both Eyes BID John Mckenna PA-C        Today, Patient Was Seen By: Jace Duque MD    ** Please Note: This note has been constructed using a voice recognition system   **

## 2019-10-06 NOTE — SOCIAL WORK
CM spoke spoke to patient's daughter [place] [facility] [phone] who requested the following referrals:     [address] [address] [phone]    [address] [facility] [phone]    [facility] [phone]    [facility] [phone]    [facility] [phone]    [address] [facility] [phone]     CM submitted referrals via [facility] at request

## 2019-10-06 NOTE — ASSESSMENT & PLAN NOTE
· Improving significantly  · Due to CHF exacerbation and bilateral pleural effusions and possible COPD of unknown severity with acute exacerbation  No formal diagnosis yet of COPD  Patient has previous history of heavy tobacco smoking of 2 packs per day for 15 years  Patient quit 60 years ago  · Status post BiPAP and nasal canula at night  · Status post high-flow nasal cannula oxygen and nasal canula   · Status post IV Solu-medrol  · Patient currently 92% on room air    Plan:   · Status post IV diuresis, Continue 40 mg Lasix  · Outpatient PFT  · Wean off from the oxygen  · Appreciate pulmonology consult  Possible COPD of unknown severity with acute exacerbation  · Suspected obstructive disease  · Continue prednisone  · Will do tapering doses of prednisone as patient was on steroids for a while now  · Continue nebulizations  · Pulmonology cleared patient  · Discussed discharge with case management today  Patient will need evaluation from PT in order for insurance to cover rehabilitation facility     · Appreciate PT evaluation

## 2019-10-06 NOTE — PLAN OF CARE
Problem: Potential for Falls  Goal: Patient will remain free of falls  Description  INTERVENTIONS:  - Assess patient frequently for physical needs  -  Identify cognitive and physical deficits and behaviors that affect risk of falls    -  [place] fall precautions as indicated by assessment   - Educate patient/family on patient safety including physical limitations  - Instruct patient to call for assistance with activity based on assessment  - Modify environment to reduce risk of injury  - Consider OT/PT consult to assist with strengthening/mobility  Outcome: Progressing     Problem: RESPIRATORY - ADULT  Goal: Achieves optimal ventilation and oxygenation  Description  INTERVENTIONS:  - Assess for changes in respiratory status  - Assess for changes in mentation and behavior  - Position to facilitate oxygenation and minimize respiratory effort  - Oxygen administered by appropriate delivery if ordered  - Initiate smoking cessation education as indicated  - Encourage broncho-pulmonary hygiene including cough, deep breathe, Incentive Spirometry  - Assess the need for suctioning and aspirate as needed  - Assess and instruct to report SOB or any respiratory difficulty  - Respiratory Therapy support as indicated  Outcome: Progressing     Problem: GENITOURINARY - ADULT  Goal: Maintains or returns to baseline urinary function  Description  INTERVENTIONS:  - Assess urinary function  - Encourage oral fluids to ensure adequate hydration if ordered  - Administer IV fluids as ordered to ensure adequate hydration  - Administer ordered medications as needed  - Offer frequent toileting  - Follow urinary retention protocol if ordered  Outcome: Progressing  Goal: Absence of urinary retention  Description  INTERVENTIONS:  - Assess patient's ability to void and empty bladder  - Monitor I/O  - Bladder scan as needed  - Discuss with physician/AP medications to alleviate retention as needed  - Discuss catheterization for long term situations as appropriate   Outcome: Progressing     Problem: Prexisting or High Potential for Compromised Skin Integrity  Goal: Skin integrity is maintained or improved  Description  INTERVENTIONS:  - Identify patients at risk for skin breakdown  - Assess and monitor skin integrity  - Assess and monitor nutrition and hydration status  - Monitor labs   - Assess for incontinence   - Turn and reposition patient  - Assist with mobility/ambulation  - Relieve pressure over bony prominences  - Avoid friction and shearing  - Provide appropriate hygiene as needed including keeping skin clean and dry  - Evaluate need for skin moisturizer/barrier cream  - Collaborate with interdisciplinary team   - Patient/family teaching  - Consider wound care consult   Outcome: Progressing

## 2019-10-07 ENCOUNTER — TELEPHONE (OUTPATIENT)
Dept: OTHER | Facility: OTHER | Age: 84
End: 2019-10-07

## 2019-10-07 VITALS
WEIGHT: 170.86 LBS | BODY MASS INDEX: 26.76 KG/M2 | RESPIRATION RATE: 18 BRPM | DIASTOLIC BLOOD PRESSURE: 60 MMHG | HEART RATE: 80 BPM | SYSTOLIC BLOOD PRESSURE: 128 MMHG | OXYGEN SATURATION: 91 % | TEMPERATURE: 97.6 F

## 2019-10-07 LAB
ANION GAP SERPL CALCULATED.3IONS-SCNC: 4 MMOL/L (ref 4–13)
BUN SERPL-MCNC: 36 MG/DL (ref 5–25)
CALCIUM SERPL-MCNC: 7.9 MG/DL (ref 8.3–10.1)
CHLORIDE SERPL-SCNC: 101 MMOL/L (ref 100–108)
CO2 SERPL-SCNC: 32 MMOL/L (ref 21–32)
CREAT SERPL-MCNC: 0.8 MG/DL (ref 0.6–1.3)
GFR SERPL CREATININE-BSD FRML MDRD: 79 ML/MIN/1.73SQ M
GLUCOSE SERPL-MCNC: 94 MG/DL (ref 65–140)
POTASSIUM SERPL-SCNC: 4 MMOL/L (ref 3.5–5.3)
SODIUM SERPL-SCNC: 137 MMOL/L (ref 136–145)

## 2019-10-07 PROCEDURE — 80048 BASIC METABOLIC PNL TOTAL CA: CPT | Performed by: INTERNAL MEDICINE

## 2019-10-07 PROCEDURE — 90670 PCV13 VACCINE IM: CPT | Performed by: INTERNAL MEDICINE

## 2019-10-07 PROCEDURE — 94760 N-INVAS EAR/PLS OXIMETRY 1: CPT

## 2019-10-07 PROCEDURE — 99239 HOSP IP/OBS DSCHRG MGMT >30: CPT | Performed by: INTERNAL MEDICINE

## 2019-10-07 PROCEDURE — 94640 AIRWAY INHALATION TREATMENT: CPT

## 2019-10-07 PROCEDURE — 97116 GAIT TRAINING THERAPY: CPT

## 2019-10-07 PROCEDURE — G0009 ADMIN PNEUMOCOCCAL VACCINE: HCPCS | Performed by: INTERNAL MEDICINE

## 2019-10-07 PROCEDURE — 97110 THERAPEUTIC EXERCISES: CPT

## 2019-10-07 RX ORDER — LEVALBUTEROL INHALATION SOLUTION 0.63 MG/3ML
0.63 SOLUTION RESPIRATORY (INHALATION)
Qty: 90 VIAL | Refills: 3 | Status: ON HOLD | OUTPATIENT
Start: 2019-10-07 | End: 2020-07-25 | Stop reason: SDUPTHER

## 2019-10-07 RX ORDER — ALBUTEROL SULFATE 2.5 MG/3ML
2.5 SOLUTION RESPIRATORY (INHALATION) EVERY 6 HOURS PRN
Qty: 120 VIAL | Refills: 3 | Status: SHIPPED | OUTPATIENT
Start: 2019-10-07 | End: 2019-10-07 | Stop reason: HOSPADM

## 2019-10-07 RX ORDER — PREDNISONE 20 MG/1
40 TABLET ORAL DAILY
Qty: 2 TABLET | Refills: 0 | Status: SHIPPED | OUTPATIENT
Start: 2019-10-08 | End: 2019-10-07

## 2019-10-07 RX ORDER — FUROSEMIDE 40 MG/1
40 TABLET ORAL DAILY
Qty: 60 EACH | Refills: 0 | Status: SHIPPED | OUTPATIENT
Start: 2019-10-08 | End: 2020-07-25 | Stop reason: HOSPADM

## 2019-10-07 RX ORDER — PREDNISONE 20 MG/1
40 TABLET ORAL DAILY
Qty: 4 TABLET | Refills: 0 | Status: SHIPPED | OUTPATIENT
Start: 2019-10-08 | End: 2019-10-10

## 2019-10-07 RX ORDER — PREDNISONE 20 MG/1
40 TABLET ORAL DAILY
Qty: 4 TABLET | Refills: 0 | Status: SHIPPED | OUTPATIENT
Start: 2019-10-08 | End: 2019-10-07

## 2019-10-07 RX ADMIN — TIMOLOL MALEATE 1 DROP: 5 SOLUTION/ DROPS OPHTHALMIC at 09:17

## 2019-10-07 RX ADMIN — BRIMONIDINE TARTRATE 1 DROP: 1.5 SOLUTION OPHTHALMIC at 05:50

## 2019-10-07 RX ADMIN — BRIMONIDINE TARTRATE 1 DROP: 1.5 SOLUTION OPHTHALMIC at 15:20

## 2019-10-07 RX ADMIN — SPIRONOLACTONE 25 MG: 25 TABLET ORAL at 09:17

## 2019-10-07 RX ADMIN — PREDNISONE 40 MG: 20 TABLET ORAL at 09:17

## 2019-10-07 RX ADMIN — AMLODIPINE BESYLATE 10 MG: 10 TABLET ORAL at 09:17

## 2019-10-07 RX ADMIN — MONTELUKAST SODIUM 10 MG: 10 TABLET, FILM COATED ORAL at 09:17

## 2019-10-07 RX ADMIN — LEVALBUTEROL HYDROCHLORIDE 0.63 MG: 0.63 SOLUTION RESPIRATORY (INHALATION) at 14:28

## 2019-10-07 RX ADMIN — LEVOTHYROXINE SODIUM 50 MCG: 50 TABLET ORAL at 05:50

## 2019-10-07 RX ADMIN — ENOXAPARIN SODIUM 40 MG: 40 INJECTION SUBCUTANEOUS at 09:17

## 2019-10-07 RX ADMIN — FUROSEMIDE 40 MG: 40 TABLET ORAL at 09:16

## 2019-10-07 RX ADMIN — PANTOPRAZOLE SODIUM 40 MG: 40 TABLET, DELAYED RELEASE ORAL at 05:50

## 2019-10-07 RX ADMIN — LEVALBUTEROL HYDROCHLORIDE 0.63 MG: 0.63 SOLUTION RESPIRATORY (INHALATION) at 07:48

## 2019-10-07 RX ADMIN — PNEUMOCOCCAL 13-VALENT CONJUGATE VACCINE 0.5 ML: 2.2; 2.2; 2.2; 2.2; 2.2; 4.4; 2.2; 2.2; 2.2; 2.2; 2.2; 2.2; 2.2 INJECTION, SUSPENSION INTRAMUSCULAR at 15:31

## 2019-10-07 NOTE — PLAN OF CARE
Problem: PHYSICAL THERAPY ADULT  Goal: Performs mobility at highest level of function for planned discharge setting  See evaluation for individualized goals  Description  Treatment/Interventions: Functional transfer training, LE strengthening/ROM, Therapeutic exercise, Endurance training, Patient/family training, Equipment eval/education, Bed mobility, Gait training  Equipment Recommended: William Paget       See flowsheet documentation for full assessment, interventions and recommendations  Outcome: Progressing  Note:   Prognosis: Fair  Problem List: Decreased strength, Decreased endurance, Impaired balance, Decreased mobility, Decreased safety awareness  Assessment: pt shows progression of mobility status w/ increased ambulation distance  pt continues to need verbal and physical assistance to maintain mobility safety  pt is at risk for falling per results of 4 Item DGI (using 10/12 as fall risk cut score) and time to complete TUG (using 13 5 seconds as fall risk completion time)  pt continues to be to needs inpatient PT  dishcarge recommendation is for inpatient rehab to maximize level of functional independence  Recommendation: Short-term skilled PT          See flowsheet documentation for full assessment

## 2019-10-07 NOTE — ASSESSMENT & PLAN NOTE
Resolved  · On presentation appeared L>R LE edema  · Venous doppler results were normal    · Likely secondary to congestive heart failure  · Continue diuresis with oral diuretics

## 2019-10-07 NOTE — SOCIAL WORK
CM called patient's PCP and left message requesting a return call to reschedule patient's appointment that was scheduled for today

## 2019-10-07 NOTE — ASSESSMENT & PLAN NOTE
Patient with history of hypertension    Blood pressure medication managed by cardiologist   Home medications include terazosin 5 mg, Aldactone 25 mg, amlodipine 10 mg  · Blood pressure stable throughout stay in hospital   · continue terazosin 5 mg  · continue amlodipine 10 mg  · Continue aldactone  · Continue 40 mg Lasix PO

## 2019-10-07 NOTE — INCIDENTAL FINDINGS
The following findings require follow up:  Radiographic finding   Finding:  Pulmonary nodule   Follow up required:  Patient will need a repeat chest CT April 2020   Follow up should be done within 6 month(s)    Please notify the following clinician to assist with the follow up:   Dr Cassandra De La Torre, pulmonologist

## 2019-10-07 NOTE — DISCHARGE INSTRUCTIONS
Very important to follow a diet low in sodium  Follow up with Pulmonologist for outpatient pulmonary function tests  Follow up with cardiologist      Follow up with primary care physician

## 2019-10-07 NOTE — PLAN OF CARE
Problem: Potential for Falls  Goal: Patient will remain free of falls  Description  INTERVENTIONS:  - Assess patient frequently for physical needs  -  Identify cognitive and physical deficits and behaviors that affect risk of falls    -  [place] fall precautions as indicated by assessment   - Educate patient/family on patient safety including physical limitations  - Instruct patient to call for assistance with activity based on assessment  - Modify environment to reduce risk of injury  - Consider OT/PT consult to assist with strengthening/mobility  10/7/2019 1608 by Olive Powers RN  Outcome: Adequate for Discharge  10/7/2019 0824 by Olive Powers RN  Outcome: Progressing     Problem: RESPIRATORY - ADULT  Goal: Achieves optimal ventilation and oxygenation  Description  INTERVENTIONS:  - Assess for changes in respiratory status  - Assess for changes in mentation and behavior  - Position to facilitate oxygenation and minimize respiratory effort  - Oxygen administered by appropriate delivery if ordered  - Initiate smoking cessation education as indicated  - Encourage broncho-pulmonary hygiene including cough, deep breathe, Incentive Spirometry  - Assess the need for suctioning and aspirate as needed  - Assess and instruct to report SOB or any respiratory difficulty  - Respiratory Therapy support as indicated  10/7/2019 1608 by Olive Powers RN  Outcome: Adequate for Discharge  10/7/2019 0824 by Olive Powers RN  Outcome: Progressing     Problem: GENITOURINARY - ADULT  Goal: Maintains or returns to baseline urinary function  Description  INTERVENTIONS:  - Assess urinary function  - Encourage oral fluids to ensure adequate hydration if ordered  - Administer IV fluids as ordered to ensure adequate hydration  - Administer ordered medications as needed  - Offer frequent toileting  - Follow urinary retention protocol if ordered  10/7/2019 1608 by Olive Powers RN  Outcome: Adequate for Discharge  10/7/2019 8229 by Kristi Rachel, RN  Outcome: Progressing  Goal: Absence of urinary retention  Description  INTERVENTIONS:  - Assess patient's ability to void and empty bladder  - Monitor I/O  - Bladder scan as needed  - Discuss with physician/AP medications to alleviate retention as needed  - Discuss catheterization for long term situations as appropriate   10/7/2019 1608 by Kristi Ramos RN  Outcome: Adequate for Discharge  10/7/2019 0824 by Kristi Ramos RN  Outcome: Progressing     Problem: Prexisting or High Potential for Compromised Skin Integrity  Goal: Skin integrity is maintained or improved  Description  INTERVENTIONS:  - Identify patients at risk for skin breakdown  - Assess and monitor skin integrity  - Assess and monitor nutrition and hydration status  - Monitor labs   - Assess for incontinence   - Turn and reposition patient  - Assist with mobility/ambulation  - Relieve pressure over bony prominences  - Avoid friction and shearing  - Provide appropriate hygiene as needed including keeping skin clean and dry  - Evaluate need for skin moisturizer/barrier cream  - Collaborate with interdisciplinary team   - Patient/family teaching  - Consider wound care consult   10/7/2019 1608 by Kristi Ramos RN  Outcome: Adequate for Discharge  10/7/2019 0824 by Kristi Ramos RN  Outcome: Progressing     Problem: SAFETY ADULT  Goal: Maintain or return to baseline ADL function  Description  INTERVENTIONS:  -  Assess patient's ability to carry out ADLs; assess patient's baseline for ADL function and identify physical deficits which impact ability to perform ADLs (bathing, care of mouth/teeth, toileting, grooming, dressing, etc )  - Assess/evaluate cause of self-care deficits   - Assess range of motion  - Assess patient's mobility; develop plan if impaired  - Assess patient's need for assistive devices and provide as appropriate  - Encourage maximum independence but intervene and supervise when necessary  - Involve family in performance of ADLs  - Assess for home care needs following discharge   - Consider OT consult to assist with ADL evaluation and planning for discharge  - Provide patient education as appropriate  10/7/2019 1608 by Karla Dee RN  Outcome: Adequate for Discharge  10/7/2019 0824 by Karla Dee RN  Outcome: Progressing  Goal: Maintain or return mobility status to optimal level  Description  INTERVENTIONS:  - Assess patient's baseline mobility status (ambulation, transfers, stairs, etc )    - Identify cognitive and physical deficits and behaviors that affect mobility  - Identify mobility aids required to assist with transfers and/or ambulation (gait belt, sit-to-stand, lift, walker, cane, etc )  - [place] fall precautions as indicated by assessment  - Record patient progress and toleration of activity level on Mobility SBAR; progress patient to next Phase/Stage  - Instruct patient to call for assistance with activity based on assessment  - Consider rehabilitation consult to assist with strengthening/weightbearing, etc   10/7/2019 1608 by Karla Dee RN  Outcome: Adequate for Discharge  10/7/2019 0824 by Karla Dee RN  Outcome: Progressing     Problem: DISCHARGE PLANNING  Goal: Discharge to home or other facility with appropriate resources  Description  INTERVENTIONS:  - Identify barriers to discharge w/patient and caregiver  - Arrange for needed discharge resources and transportation as appropriate  - Identify discharge learning needs (meds, wound care, etc )  - Arrange for interpretive services to assist at discharge as needed  - Refer to Case Management Department for coordinating discharge planning if the patient needs post-hospital services based on physician/advanced practitioner order or complex needs related to functional status, cognitive ability, or social support system  10/7/2019 1608 by Karla Dee RN  Outcome: Adequate for Discharge  10/7/2019 0824 by Karla Dee RN  Outcome: Progressing     Problem: Knowledge Deficit  Goal: Patient/family/caregiver demonstrates understanding of disease process, treatment plan, medications, and discharge instructions  Description  Complete learning assessment and assess knowledge base    Interventions:  - Provide teaching at level of understanding  - Provide teaching via preferred learning methods  10/7/2019 1608 by Binh Varner RN  Outcome: Adequate for Discharge  10/7/2019 0824 by Binh Varner RN  Outcome: Progressing

## 2019-10-07 NOTE — SOCIAL WORK
CM met with patient at bedside and reviewed IMM  Patient called [address] and spoke to David Garcia who stated that they are ready to accept patient

## 2019-10-07 NOTE — PLAN OF CARE
Problem: Potential for Falls  Goal: Patient will remain free of falls  Description  INTERVENTIONS:  - Assess patient frequently for physical needs  -  Identify cognitive and physical deficits and behaviors that affect risk of falls    -  [place] fall precautions as indicated by assessment   - Educate patient/family on patient safety including physical limitations  - Instruct patient to call for assistance with activity based on assessment  - Modify environment to reduce risk of injury  - Consider OT/PT consult to assist with strengthening/mobility  Outcome: Progressing     Problem: RESPIRATORY - ADULT  Goal: Achieves optimal ventilation and oxygenation  Description  INTERVENTIONS:  - Assess for changes in respiratory status  - Assess for changes in mentation and behavior  - Position to facilitate oxygenation and minimize respiratory effort  - Oxygen administered by appropriate delivery if ordered  - Initiate smoking cessation education as indicated  - Encourage broncho-pulmonary hygiene including cough, deep breathe, Incentive Spirometry  - Assess the need for suctioning and aspirate as needed  - Assess and instruct to report SOB or any respiratory difficulty  - Respiratory Therapy support as indicated  Outcome: Progressing     Problem: GENITOURINARY - ADULT  Goal: Maintains or returns to baseline urinary function  Description  INTERVENTIONS:  - Assess urinary function  - Encourage oral fluids to ensure adequate hydration if ordered  - Administer IV fluids as ordered to ensure adequate hydration  - Administer ordered medications as needed  - Offer frequent toileting  - Follow urinary retention protocol if ordered  Outcome: Progressing  Goal: Absence of urinary retention  Description  INTERVENTIONS:  - Assess patient's ability to void and empty bladder  - Monitor I/O  - Bladder scan as needed  - Discuss with physician/AP medications to alleviate retention as needed  - Discuss catheterization for long term situations as appropriate   Outcome: Progressing     Problem: Prexisting or High Potential for Compromised Skin Integrity  Goal: Skin integrity is maintained or improved  Description  INTERVENTIONS:  - Identify patients at risk for skin breakdown  - Assess and monitor skin integrity  - Assess and monitor nutrition and hydration status  - Monitor labs   - Assess for incontinence   - Turn and reposition patient  - Assist with mobility/ambulation  - Relieve pressure over bony prominences  - Avoid friction and shearing  - Provide appropriate hygiene as needed including keeping skin clean and dry  - Evaluate need for skin moisturizer/barrier cream  - Collaborate with interdisciplinary team   - Patient/family teaching  - Consider wound care consult   Outcome: Progressing     Problem: SAFETY ADULT  Goal: Maintain or return to baseline ADL function  Description  INTERVENTIONS:  -  Assess patient's ability to carry out ADLs; assess patient's baseline for ADL function and identify physical deficits which impact ability to perform ADLs (bathing, care of mouth/teeth, toileting, grooming, dressing, etc )  - Assess/evaluate cause of self-care deficits   - Assess range of motion  - Assess patient's mobility; develop plan if impaired  - Assess patient's need for assistive devices and provide as appropriate  - Encourage maximum independence but intervene and supervise when necessary  - Involve family in performance of ADLs  - Assess for home care needs following discharge   - Consider OT consult to assist with ADL evaluation and planning for discharge  - Provide patient education as appropriate  Outcome: Progressing  Goal: Maintain or return mobility status to optimal level  Description  INTERVENTIONS:  - Assess patient's baseline mobility status (ambulation, transfers, stairs, etc )    - Identify cognitive and physical deficits and behaviors that affect mobility  - Identify mobility aids required to assist with transfers and/or ambulation (gait belt, sit-to-stand, lift, walker, cane, etc )  - [place] fall precautions as indicated by assessment  - Record patient progress and toleration of activity level on Mobility SBAR; progress patient to next Phase/Stage  - Instruct patient to call for assistance with activity based on assessment  - Consider rehabilitation consult to assist with strengthening/weightbearing, etc   Outcome: Progressing     Problem: DISCHARGE PLANNING  Goal: Discharge to home or other facility with appropriate resources  Description  INTERVENTIONS:  - Identify barriers to discharge w/patient and caregiver  - Arrange for needed discharge resources and transportation as appropriate  - Identify discharge learning needs (meds, wound care, etc )  - Arrange for interpretive services to assist at discharge as needed  - Refer to Case Management Department for coordinating discharge planning if the patient needs post-hospital services based on physician/advanced practitioner order or complex needs related to functional status, cognitive ability, or social support system  Outcome: Progressing     Problem: Knowledge Deficit  Goal: Patient/family/caregiver demonstrates understanding of disease process, treatment plan, medications, and discharge instructions  Description  Complete learning assessment and assess knowledge base    Interventions:  - Provide teaching at level of understanding  - Provide teaching via preferred learning methods  Outcome: Progressing

## 2019-10-07 NOTE — ASSESSMENT & PLAN NOTE
· Right upper lung pulmonary nodule  · Need surveillance in the outpatient  · Repeat CT of the chest due, April 2020

## 2019-10-07 NOTE — ASSESSMENT & PLAN NOTE
Due to CHF exacerbation and bilateral pleural effusions and possible COPD of unknown severity with acute exacerbation  No formal diagnosis yet of COPD  Patient has previous history of heavy tobacco smoking of 2 packs per day for 15 years  Patient quit 60 years ago  · Significant improvement  · Status post BiPAP and nasal canula at night  · Status post high-flow nasal cannula oxygen and nasal canula during the day  · Status post IV Solu-medrol  · Patient weaned off oxygen currently 93% on room air    Plan:   · Switch outpatient 20 mg Lasix to 40 mg daily  · Appreciate pulmonology consult  Possible COPD of unknown severity with acute exacerbation  · Suspected obstructive disease  · Outpatient PFT  · Continue oral prednisone for two days after discharge  · Continue nebulizations  · Pulmonology cleared patient  · Appreciate PT evaluation  · Patient discharged today to [facility] rehabilitation facility     · Patient advised to continue a low salt diet, nebulizer treatment and incentive spirometry at home

## 2019-10-07 NOTE — PLAN OF CARE
Problem: Potential for Falls  Goal: Patient will remain free of falls  Description  INTERVENTIONS:  - Assess patient frequently for physical needs  -  Identify cognitive and physical deficits and behaviors that affect risk of falls    -  [place] fall precautions as indicated by assessment   - Educate patient/family on patient safety including physical limitations  - Instruct patient to call for assistance with activity based on assessment  - Modify environment to reduce risk of injury  - Consider OT/PT consult to assist with strengthening/mobility  Outcome: Progressing     Problem: RESPIRATORY - ADULT  Goal: Achieves optimal ventilation and oxygenation  Description  INTERVENTIONS:  - Assess for changes in respiratory status  - Assess for changes in mentation and behavior  - Position to facilitate oxygenation and minimize respiratory effort  - Oxygen administered by appropriate delivery if ordered  - Initiate smoking cessation education as indicated  - Encourage broncho-pulmonary hygiene including cough, deep breathe, Incentive Spirometry  - Assess the need for suctioning and aspirate as needed  - Assess and instruct to report SOB or any respiratory difficulty  - Respiratory Therapy support as indicated  Outcome: Progressing     Problem: GENITOURINARY - ADULT  Goal: Maintains or returns to baseline urinary function  Description  INTERVENTIONS:  - Assess urinary function  - Encourage oral fluids to ensure adequate hydration if ordered  - Administer IV fluids as ordered to ensure adequate hydration  - Administer ordered medications as needed  - Offer frequent toileting  - Follow urinary retention protocol if ordered  Outcome: Progressing  Goal: Absence of urinary retention  Description  INTERVENTIONS:  - Assess patient's ability to void and empty bladder  - Monitor I/O  - Bladder scan as needed  - Discuss with physician/AP medications to alleviate retention as needed  - Discuss catheterization for long term situations as appropriate   Outcome: Progressing     Problem: Prexisting or High Potential for Compromised Skin Integrity  Goal: Skin integrity is maintained or improved  Description  INTERVENTIONS:  - Identify patients at risk for skin breakdown  - Assess and monitor skin integrity  - Assess and monitor nutrition and hydration status  - Monitor labs   - Assess for incontinence   - Turn and reposition patient  - Assist with mobility/ambulation  - Relieve pressure over bony prominences  - Avoid friction and shearing  - Provide appropriate hygiene as needed including keeping skin clean and dry  - Evaluate need for skin moisturizer/barrier cream  - Collaborate with interdisciplinary team   - Patient/family teaching  - Consider wound care consult   Outcome: Progressing

## 2019-10-07 NOTE — DISCHARGE SUMMARY
Discharge- Roselia Anchors 3/24/1929, 80 y o  male MRN: [id number]    Unit/Bed#: -01 Encounter: [id number]    Primary Care Provider: María Little MD   Date and time admitted to hospital: 9/26/2019  3:06 PM        * Acute respiratory failure with hypoxia and hypercapnia (HCC)  Assessment & Plan  Due to CHF exacerbation and bilateral pleural effusions and possible COPD of unknown severity with acute exacerbation  No formal diagnosis yet of COPD  Patient has previous history of heavy tobacco smoking of 2 packs per day for 15 years  Patient quit 60 years ago  · Significant improvement  · Status post BiPAP and nasal canula at night  · Status post high-flow nasal cannula oxygen and nasal canula during the day  · Status post IV Solu-medrol  · Patient weaned off oxygen currently 93% on room air    Plan:   · Switch outpatient 20 mg Lasix to 40 mg daily  · Appreciate pulmonology consult  Possible COPD of unknown severity with acute exacerbation  · Suspected obstructive disease  · Outpatient PFT  · Continue oral prednisone for two days after discharge  · Continue nebulizations  · Pulmonology cleared patient  · Appreciate PT evaluation  · Patient discharged today to [address] [place] [address]  · Patient advised to continue a low salt diet, nebulizer treatment and incentive spirometry at home     Acute on chronic heart failure with preserved ejection fraction [facility])  Assessment & Plan  Patient with past medical history with preserved EF presented to the ED with volume overload  On physical exam at the time of the admission, revealed JVD and bilateral lower extremity pitting edema was noted  CXR showed bilateral pleural effusions   · Last echocardiogram was 03/18/2019 which showed normal systolic function, ejection fraction of 65% and grade 1 diastolic dysfunction  · Echocardiogram during admission showed an EF of 55% and grade 1 diastolic dysfunction     · Acute exacerbation has since resolved  · -8804 mL and -15 lbs since admission    Plan:  · Appreciate cardiology consult  · Continue PO Lasix 40 mg at home  · Continue Aldactone  · Follow up with Dr Keshav Ho as outpatient       Essential hypertension  Assessment & Plan  Patient with history of hypertension  Blood pressure medication managed by cardiologist   Home medications include terazosin 5 mg, Aldactone 25 mg, amlodipine 10 mg  · Blood pressure stable throughout stay in hospital   · continue terazosin 5 mg  · continue amlodipine 10 mg  · Continue aldactone  · Continue 40 mg Lasix PO    Thrombocytopenia (HCC)  Assessment & Plan  · Mild  · Improving spontaneously  Chronic kidney disease, stage 2 (mild)  Assessment & Plan  · Stable  Creatinine   80 on DC  · Avoid nephrotoxins  · Avoid hypotension  Hypokalemia  Assessment & Plan  · Resolved  · Potassium 4 0 on discharge  · Continue home potassium supplementation       Pulmonary nodule  Assessment & Plan  · Right upper lung pulmonary nodule  · Need surveillance in the outpatient  · Repeat CT of the chest due, April 2020  Bilateral leg edema  Assessment & Plan  Resolved  · On presentation appeared L>R LE edema  · Venous doppler results were normal    · Likely secondary to congestive heart failure  · Continue diuresis with oral diuretics  Discharging Physician / Practitioner: Ekaterina Prieto MD  PCP: Karina Vega MD  Admission Date:   Admission Orders (From admission, onward)     Ordered        09/26/19 1727  Inpatient Admission  Once                   Discharge Date: 10/07/19    Resolved Problems  Date Reviewed: 10/7/2019          Resolved    Acute metabolic encephalopathy 96/1/2776     Resolved by  Rozell Hashimoto, [address] [place] [address] Stay:  · Pulmonology, Cardiology, respiratory therapy, ICU       Procedures Performed:   · Thorcaocentesis, echocardiogram     Significant Findings / Test Results:   · Echocardiogram showed grade 1 diastolic dysfunction and EF of 55%    Incidental Findings:   · Right upper lobe lung nodule     Test Results Pending at Discharge (will require follow up):   · N/A     Outpatient Tests Requested:  · Repeat chest imaging in April 2020 more monitoring of lung nodule     Complications:  None    Reason for Admission: Shortness of breath    Hospital Course:     Tiago Lin is a pleasant 80 y o  male patient with with past medical history of heart failure with preserved ejection fraction, hypoxic respiratory failure hyperlipidemia, hypothyroidism, CKD and hypertension who originally presented to the hospital on 9/26/2019 due to shortness of breath  Prior to admission patient had been experiencing shortness of breath, confusion and orthopnea  He presented with a 10 lb increase from baseline weight of 175 lb  He was given 2 duo nebs and 125 mg of Solu-Medrol by EMS and on arrival his O2 sat was 66% on room air  Patient presented with volume overload  Chest x-ray was done and it showed bilateral pleural effusions with mild increase in size since last chest x-ray 1 day prior  BNP was elevated over baseline at 866  Patient was then placed on BiPAP mask in the ED which was later removed as he was maintaining saturation above 88% in normal hair  Jessica Andre was admitted to ICU step-down with a diagnosis of acute respiratory failure with hypoxia and hypercapnia secondary to CHF exacerbation  Respiratory protocol as ordered and patient was started on Lasix IV 40 mg b i d and albuterol nebulizer  Oxygen saturation, mental status, weight and I/Os were monitored throughout stay  Home medications were reviewed and adjusted appropriately  Labs (especially creatinine and potassium) were monitored throughout his stay  Throughout the initial days Edson required used of BiPAP at night, which was slowly weaned off through trials with the respiratory team  09/27 Jessica Andre had difficulties maintaining saturation overnight   With little movement patient desaturated to 82%, he was placed on a non-rebreather and saturation improved to 87%  Continued BiPAP was ordered  Bilateral leg edema was noted by patient's children  Venous doppler was done to rule out DVT, results were unremarkable  09/29 Patient was having difficulty maintaining adequate saturation despite improving volume status, which raised suspicion of underlying pulmonary disease such as COPD  Patient at the time on high flow nasal canula  Repeat CXR showed no improvement from CXR on admission  Incidental pulmonary nodule in the right upper lung was found on CXR  ABG showed pH 7 4, CO2 55 4, HCO3 34  Patient started on Xopinex, atrovent and trial of IV steroids  More aggressive diuresis including 2 doses of diamox was started  10/01 patient received a L thoracocentesis and 10/02 a right thoracocentesis  800 mL were recovered at each attempt and sent for cytology which was negative  10/02  high flow nasal canula settings increased  Echocardiogram was repeated as well which showed an EF of 55% and grade 1 diastolic dysfunction  10/03 Edson was successfully weaned off HFNC and overnight tolerated 3LNC  10/04 Eddie Fontan  was transitioned to med surg from step down  Eddie Fontan continued diuresis and transition to oral diuretics 10/05  At this time he was able to saturate 92% on room air and has been continuing to saturate well on room air since then  Last CXR on 10/02 has significantly improved since admission  Edson successfully diuresed with -8804 mL and -15 lbs since admission  Case management involved and patient is to be discharged to [facility] rehabilitation today  Please see above list of diagnoses and related plan for additional information  Condition at Discharge: stable     Discharge Day Visit / Exam:     Subjective: Patient status today has greatly improved  Patient was seen at bedside with his godchild, Mr Georges Rodriguez  Patient continues to saturate well on room air   He has no current complaints  He is up and ambulating to the bathroom and with physical therapy  O2 sat after ambulation was taken and remained above 88%  Discharge instructions were discussed with patient, daughter and son in law  They are all agreeable with plan  Vitals: Blood Pressure: 121/58 (10/07/19 0750)  Pulse: 64 (10/07/19 0750)  Temperature: 98 6 °F (37 °C) (10/07/19 0750)  Temp Source: Temporal (10/07/19 0750)  Respirations: 18 (10/07/19 0750)  Weight - Scale: 77 5 kg (170 lb 13 7 oz) (10/07/19 0600)  SpO2: 93 % (10/07/19 1429)    Exam:   Physical Exam   Constitutional: He is oriented to person, place, and time  He appears well-developed and well-nourished  HENT:   Head: Normocephalic and atraumatic  Cardiovascular: Normal rate, regular rhythm and normal heart sounds  Pulmonary/Chest: Effort normal and breath sounds normal    Abdominal: Soft  Bowel sounds are normal    Neurological: He is alert and oriented to person, place, and time  Psychiatric: He has a normal mood and affect  His behavior is normal      Discussion with Family: Plan of discharge    Discharge instructions/Information to patient and family:   See after visit summary for information provided to patient and family  Provisions for Follow-Up Care:  See after visit summary for information related to follow-up care and any pertinent home health orders  Disposition:     Acute Rehab at [facility]:   · [facility] Dayne Gross at [facility] - Not Applicable to this Patient    Planned Readmission: N/A     Discharge Statement:  I spent 60 minutes discharging the patient  This time was spent on the day of discharge  I had direct contact with the patient on the day of discharge  Greater than 50% of the total time was spent examining patient, answering all patient questions, arranging and discussing plan of care with patient as well as directly providing post-discharge instructions    Additional time then spent on discharge activities  Discharge Medications:  See after visit summary for reconciled discharge medications provided to patient and family        ** Please Note: This note has been constructed using a voice recognition system **

## 2019-10-07 NOTE — PHYSICAL THERAPY NOTE
PHYSICAL THERAPY TREATMENT NOTE    Patient Name: Mary Peoples  RPDCK'F Date: 10/7/2019     10/07/19 1440   Pain Assessment   Pain Assessment No/denies pain   Restrictions/Precautions   Other Precautions Chair Alarm; Bed Alarm; Fall Risk   General   Chart Reviewed Yes   Additional Pertinent History room air resting pulse ox 91% and 94 BPM    Family/Caregiver Present Yes   Cognition   Arousal/Participation Cooperative   Attention Attends with cues to redirect   Orientation Level Oriented to person; Other (Comment)  (pt was identified w/ full name, birth date)   Following Commands Follows one step commands with increased time or repetition   Subjective   Subjective pt seen sitting in chair  agreed to PT session  denied pain or dizziness  Bed Mobility   Additional Comments timed up and go w/ roller walker: 16 97 seconds  4 Item DGI: 6/12  Transfers   Sit to Stand 5  Supervision   Additional items Increased time required;Verbal cues  (for hand placement)   Stand to Sit 5  Supervision   Additional items Impulsive;Verbal cues  (for body positioning, hand placement)   Additional Comments pt was unable to utilize steps  pt completed standing hip flexion 5x bilaterally w/ roller walker and modx1  Ambulation/Elevation   Gait pattern Antalgic;Decreased L stance; Short stride; Foward flexed; Excessively slow   Gait Assistance 4  Minimal assist   Additional items Assist x 1;Verbal cues; Tactile cues  (for walker positioning, posture, full step length, breathing)   Assistive Device Rolling walker  (w/ left shoe lift)   Distance 60 feet  timed up and go x2  80 feet  w/ seated rest breaks x 1 to 2 minutes each    (additional not possible due to fatigue)   Stair Management Assistance   (see additional comments above)   Balance   Static Sitting Good   Static Standing Fair -   Ambulatory Poor +  (w/ roller walker)   Activity Tolerance   Activity Tolerance Patient limited by fatigue   Nurse Made Aware spoke to [address] CM   Equipment Use   Comments ankle pumps 30  heel slides and hip abduction 10 each  straight leg raises 5 (w/ assist on left)  Assessment   Prognosis Fair   Problem List Decreased strength;Decreased endurance; Impaired balance;Decreased mobility; Decreased safety awareness   Assessment pt shows progression of mobility status w/ increased ambulation distance  pt continues to need verbal and physical assistance to maintain mobility safety  pt is at risk for falling per results of 4 Item DGI (using 10/12 as fall risk cut score) and time to complete TUG (using 13 5 seconds as fall risk completion time)  pt continues to be to needs inpatient PT  dishcarge recommendation is for inpatient rehab to maximize level of functional independence  Goals   Patient Goals go home   STG Expiration Date 10/09/19   Short Term Goal #1 Pt will be able to: (1) perform bed mobility with mod I (2) perform sit to stand with mod I (3) ambulate at least 200` with supervision and least restrictive AD (4) initiate HEP (5) PT to see for stair negotiation as appropriate   PT Treatment Day 2   Plan   Treatment/Interventions Functional transfer training;LE strengthening/ROM; Therapeutic exercise; Endurance training;Patient/family training;Equipment eval/education; Bed mobility;Gait training   Progress Progressing toward goals   PT Frequency Other (Comment)  (3 to 5x/week)   Recommendation   Recommendation Short-term skilled PT   Equipment Recommended Other (Comment)  (roller walker)     4 Item Dynamic Gait Index  2/3 Gait level surface  2/3 Change in gait speed  1/3 Gait with horizontal head turns  1/3 Gait with vertical head turns  6/12 total score (<10/12 indicates increased risk of fall)    Skilled inpatient PT recommended while in hospital to progress pt toward treatment goals      Charles Chavez, PT

## 2019-10-07 NOTE — ASSESSMENT & PLAN NOTE
Patient with past medical history with preserved EF presented to the ED with volume overload  On physical exam at the time of the admission, revealed JVD and bilateral lower extremity pitting edema was noted  CXR showed bilateral pleural effusions   · Last echocardiogram was 03/18/2019 which showed normal systolic function, ejection fraction of 65% and grade 1 diastolic dysfunction  · Echocardiogram during admission showed an EF of 55% and grade 1 diastolic dysfunction  · Acute exacerbation has since resolved    · -8804 mL and -15 lbs since admission    Plan:  · Appreciate cardiology consult  · Continue PO Lasix 40 mg at home  · Continue Aldactone  · Follow up with Dr Dayami Cruz as outpatient

## 2019-10-08 ENCOUNTER — NURSING HOME VISIT (OUTPATIENT)
Dept: GERIATRICS | Facility: OTHER | Age: 84
End: 2019-10-08
Payer: COMMERCIAL

## 2019-10-08 DIAGNOSIS — J96.02 ACUTE RESPIRATORY FAILURE WITH HYPOXIA AND HYPERCAPNIA (HCC): ICD-10-CM

## 2019-10-08 DIAGNOSIS — J96.01 ACUTE RESPIRATORY FAILURE WITH HYPOXIA AND HYPERCAPNIA (HCC): ICD-10-CM

## 2019-10-08 DIAGNOSIS — I50.33 ACUTE ON CHRONIC HEART FAILURE WITH PRESERVED EJECTION FRACTION (HCC): Primary | ICD-10-CM

## 2019-10-08 DIAGNOSIS — R60.0 BILATERAL LEG EDEMA: ICD-10-CM

## 2019-10-08 DIAGNOSIS — R26.2 AMBULATORY DYSFUNCTION: ICD-10-CM

## 2019-10-08 DIAGNOSIS — R53.1 GENERALIZED WEAKNESS: ICD-10-CM

## 2019-10-08 DIAGNOSIS — J81.0 ACUTE PULMONARY EDEMA (HCC): ICD-10-CM

## 2019-10-08 DIAGNOSIS — I10 ESSENTIAL HYPERTENSION: ICD-10-CM

## 2019-10-08 PROCEDURE — 99306 1ST NF CARE HIGH MDM 50: CPT | Performed by: FAMILY MEDICINE

## 2019-10-08 NOTE — PROGRESS NOTES
[place], [place] [address]   [place]  History and Physical    NAME: Tiago Lin  AGE: 80 y o  SEX: male [id number]    DATE OF ENCOUNTER: 10/8/2019    Code status:  CPR    Assessment and Plan     1  CHF exacerbation:  Improved,  Continue daily weights  - continue amlodipine 10 mg daily     -   Continue furosemide 40 mg daily     -  Continue spironolactone 25 mg p o  Daily     -  Continue Terazosin 5 mg p o  At bedtime  2  Pulmonary edema/ respiratory failure:  Stable,      - continue  Prednisone 40 mg daily for 2 more days     -  Continue duo nebs as needed     -  Instructed to use incentive spirometry  3  Gen weakness/Amb dysfunction:  PT/ OT ordered  4  Bilateral leg edema:  Improving, leg elevation  5  Hypothyroidism: Continue levothyroxine  6    Glaucoma: Continue latanoprost, Combigan drops    Disposition: may get discharged to home with services after completion of therapy  All medications and routine orders were reviewed and updated as needed  Plan discussed with: Patient    Chief Complaint     Seen for admission at [address] [place] [address]    History of Present Illness     Suman Easton,  a 72-year-old male admitted to the hospital with CHF exacerbation, seen by Cardiology, treated with IV diuretics  He has bilateral lower extremity edema and fluid in the lungs on chest x-ray, underwent thoracentesis  His saturations improved  Echocardiogram showed EF of 55% and grade 1 diastolic dysfunction  He weaned off from BiPAP  His overall functional status improved, got discharged to [address] for subacute rehab  He was seen and examined at bedside, stable  Says he is feeling well  He is not using oxygen  He is tolerating PT well      HISTORY:  Past Medical History:   Diagnosis Date    Allergic rhinitis     Hypercholesteremia     Hypertension     Pneumonia      Family History   Family history unknown: Yes     Social History     Socioeconomic History    Marital status: /Civil Union Spouse name: None    Number of children: None    Years of education: None    Highest education level: None   Occupational History    None   Social Needs    Financial resource strain: None    Food insecurity:     Worry: None     Inability: None    Transportation needs:     Medical: None     Non-medical: None   Tobacco Use    Smoking status: Former Smoker     Packs/day: 0 20     Years: 10 00     Pack years: 2 00     Types: Cigarettes    Smokeless tobacco: Never Used   Substance and Sexual Activity    Alcohol use: Yes     Frequency: Never     Comment: Socially    Drug use: Never    Sexual activity: None   Lifestyle    Physical activity:     Days per week: None     Minutes per session: None    Stress: None   Relationships    Social connections:     Talks on phone: None     Gets together: None     Attends Zoroastrian service: None     Active member of club or organization: None     Attends meetings of clubs or organizations: None     Relationship status: None    Intimate partner violence:     Fear of current or ex partner: None     Emotionally abused: None     Physically abused: None     Forced sexual activity: None   Other Topics Concern    None   Social History Narrative    None       Allergies:  No Known Allergies    Review of Systems     Review of Systems   Constitutional: Positive for fatigue  Cardiovascular: Positive for leg swelling  All other systems reviewed and are negative  Medications and orders     All medications reviewed and updated in Nursing Home EMR  Objective     Vitals:   Temp 97 8, pulse 80, resp 18, /78, 93% on room air, weight 174 8 lb (baseline)    Physical Exam   Constitutional: He is oriented to person, place, and time  He appears well-developed and well-nourished  No distress  HENT:   Head: Normocephalic  Mouth/Throat: Oropharynx is clear and moist    Eyes: Pupils are equal, round, and reactive to light   Conjunctivae and EOM are normal  Right eye exhibits no discharge  Left eye exhibits no discharge  No scleral icterus  Wears glasses   Neck: Normal range of motion  Neck supple  Cardiovascular: Normal rate, regular rhythm and normal heart sounds  No murmur heard  Pulmonary/Chest: Effort normal and breath sounds normal  No respiratory distress  He has no wheezes  Abdominal: Soft  Bowel sounds are normal  He exhibits no distension  There is no tenderness  Musculoskeletal: Normal range of motion  He exhibits edema  He exhibits no tenderness or deformity  1+  edema bilateral ankles  Neurological: He is alert and oriented to person, place, and time  No cranial nerve deficit  Skin: Skin is warm and dry  Psychiatric: He has a normal mood and affect  His behavior is normal  Judgment and thought content normal    Nursing note and vitals reviewed  Pertinent Laboratory/Diagnostic Studies: The following labs/studies were reviewed please see chart or hospital paperwork for details    Ref Range & Units 10/7/19 0556 10/6/19 0502 10/5/19 0522 10/4/19 7778 10/3/19 0545 10/2/19 0500 10/1/19 0815    Sodium 136 - 145 mmol/L 137  138  137  137  139  139  141    Potassium 3 5 - 5 3 mmol/L 4 0  4 1  5 3 CM 5 2  5 0  4 2 CM 2 7Low Panic     Chloride 100 - 108 mmol/L 101  101  100  102  100  97Low   102    CO2 21 - 32 mmol/L 32  34High   34High   33High   36High   38High   34High  CM   ANION GAP 4 - 13 mmol/L 4  3Low   3Low   2Low   3Low   4  5    BUN 5 - 25 mg/dL 36High   42High   47High   47High   48High   43High   35High     Creatinine 0 60 - 1 30 mg/dL 0 80  0 78 CM 0 79 CM 0 89 CM 0 92 CM 0 91 CM 0 81 CM   Comment: Standardized to IDMS reference method   Glucose 65 - 140 mg/dL 94  108  CM 151High  CM 150High  CM 146High   CM      Calcium 8 3 - 10 1 mg/dL 7 9Low   8 0Low   7 8Low   7 9Low   8 7  8 7 CM 7 2Low     eGFR ml/min/1 73sq m 79  79  79  75         Ref Range & Units 10/5/19 0522    WBC 4 31 - 10 16 Thousand/uL 7 12    RBC 3 88 - 5 62 Million/uL 4 80 Hemoglobin 12 0 - 17 0 g/dL 15 0    Hematocrit 36 5 - 49 3 % 44 6    MCV 82 - 98 fL 93    MCH 26 8 - 34 3 pg 31 3    MCHC 31 4 - 37 4 g/dL 33 6    RDW 11 6 - 15 1 % 13 1    Platelets 288 - 188 Thousands/uL 109Low     MPV 8 9 - 12 7 fL 11 3      - Medication Side Effects: Adverse side effects of medications were reviewed with the patient/guardian today  DOS: 10/8/2019  Facility: [facility] SNF List: [facility]  BILLING CODE: [id number] of [address] place of service: POS 31 Skilled Care-Part A Coverage  Diagnoses:   Diagnosis ICD-10-CM Associated Orders   1  Acute on chronic heart failure with preserved ejection fraction (HCC) I50 33    2  Acute respiratory failure with hypoxia and hypercapnia (HCC) J96 01     J96 02    3  Acute pulmonary edema (HCC) J81 0    4  Bilateral leg edema R60 0    5  Essential hypertension I10    6  Generalized weakness R53 1    7   Ambulatory dysfunction R26 2

## 2019-10-14 ENCOUNTER — OFFICE VISIT (OUTPATIENT)
Dept: CARDIOLOGY CLINIC | Facility: CLINIC | Age: 84
End: 2019-10-14
Payer: COMMERCIAL

## 2019-10-14 VITALS
WEIGHT: 170.7 LBS | DIASTOLIC BLOOD PRESSURE: 46 MMHG | BODY MASS INDEX: 26.79 KG/M2 | SYSTOLIC BLOOD PRESSURE: 100 MMHG | HEART RATE: 80 BPM | OXYGEN SATURATION: 96 % | HEIGHT: 67 IN

## 2019-10-14 DIAGNOSIS — I50.32 CHRONIC HEART FAILURE WITH PRESERVED EJECTION FRACTION (HCC): Primary | ICD-10-CM

## 2019-10-14 DIAGNOSIS — R60.0 BILATERAL LEG EDEMA: ICD-10-CM

## 2019-10-14 PROCEDURE — 99215 OFFICE O/P EST HI 40 MIN: CPT | Performed by: PHYSICIAN ASSISTANT

## 2019-10-14 NOTE — PROGRESS NOTES
General Cardiology Outpatient Progress Note    Shiloh Ramon 80 y o  male   MRN: [id number]  Encounter: [id number]    Assessment/Plan:  Patient Active Problem List    Diagnosis Date Noted    Generalized weakness 10/08/2019    Ambulatory dysfunction 10/08/2019    Thrombocytopenia ([facility] ) 10/04/2019    Chronic kidney disease, stage 2 (mild) 09/26/2019    Hypokalemia 06/06/2019    Pulmonary nodule 04/01/2019    ADAL (obstructive sleep apnea) 04/01/2019    Acute on chronic heart failure with preserved ejection fraction ([facility] ) 03/28/2019    Essential hypertension 03/28/2019    Hyperlipidemia 03/28/2019    Abnormal chest CT 03/17/2019    Acute pulmonary edema ([facility] ) 03/17/2019    Acute respiratory failure with hypoxia and hypercapnia (HCC) 03/16/2019    Bilateral leg edema 03/16/2019    Hyponatremia 03/16/2019     Chronic HFpEF, LVEF 55%; NYHA II/III; ACC/AHA Stage B   Reviewed importance of low sodium diet and fluid restriction  Weight of 170 lbs on 10/07/2019 (day of discharge)  Today weighs 170 7 lbs  Has been getting weighed [facility] at [facility]  Will hold off on uptitrating meds as BP of 100/46 mmHg today  1-2+ LE edema, hitting below mid calf  Plan to continue current diuretic regimen; encouraged elevating legs/compression stockings  No BMP post-hospitalization  Will order BMP to be drawn before next appointment  Neurohormonal Blockade:   --Beta-Blocker: N/A    --ACEi, ARB or ARNi: N/A  --Aldosterone Receptor Blocker: spironolactone 25 mg daily  --Diuretic: Lasix 40 mg daily with potassium 20 mEq BID  Sudden Cardiac Death Risk Reduction:   --LVEF>35%  Electrical Resynchronization:   --Candidacy for BiV device: Narrow QRS  Advanced Therapies: Will continue to monitor  Suspected obstructive lung disease   Known occupational exposures to asbestos, dust, and mold  Refused PFTs previously, per pulmonology notes      Has follow-up appointment with Dr Lulu Yeh on 11/11/2019  Chronic kidney disease, stage 2   Baseline creatinine of 0 7-0 9  Hypertension: continue amlodipine 10 mg daily and terazosin 5 mg daily  Hyperlipidemia: continue statin  Hypothyroidism  Goals of care: Level 3; DNAR and DNI  HPI:   Hardik Lechuga is a 12-year-old male with a PMH as above who presents to the office for hospital follow-up appointment  From office visit with cardiology fellow Dr Leonard Garcia on 06/06/2019: "Hardik Lechuga is a 80year old male with a history of HTN, HLD, hypothyroidism, MGUS, recently diagnosed hypoxic respiratory failure, and CHF who presents for follow up  He was admitted 3/16-3/19 at [facility] for dyspnea, AMS, lethargy and was found to have combined hypercarbic and hypoxic respiratory failure  He was treated with BPAP, nebs, steroids and eventually weaned to 3L NC  An etiology for the patient's respiratory decompensation was not found  His imaging (CT, CXR) revealed pulmonary edema and bilateral effusions L>R  TTE 3/18/19 revealed preserved EF, grade 1 DD, no significant valvulopathy  Lasix 20mg daily was started  He developed hypokalemia and KCl was increased to 20 mEq BID in May      His dyspnea has completely resolved  LE edema has significantly improved  Denies palpitations, dizziness, syncope, orthopnea, PND, diaphoresis, NVD      He does not have a BP cuff or scale at home  He believes he has lost weight after lasix initiation "    Was admitted to [facility] from 09/26 to 10/07/2019 after presenting with SOB and orthopnea with 10 lbs weight gain  Prior to arrival to ED, patient received 2 Duo-nebs and 125 mg of Solu-Medrol by EMS, and on arrival his O2 sat was 66% on room air  CXR revealed bilateral pleural effusions with mild increase in size since CXR from one day prior  Patient was then placed on BiPAP mask initially and was then removed after he maintained saturation above 88% on room air   Was admitted with acute respiratory failure with hypoxia and hypercapnia secondary to CHF exacerbation and was started on Lasix IV 40 mg BID and also  albuterol nebulizers  During admission, patient had difficulty maintaining normal oxygen saturation overnight, often requiring BiPAP/non-rebreather despite improving volume status  Patient started on Xopinex, atrovent and trial of IV steroids and diuretic dosing was increased  Underwent right thoracentesis on 10/01 and left thoracentesis on 10/02, removing 800 mL from each side  Patient transition to oral diuretics and room air  Lost 15 lbs during admission and was discharged to [address] [place] [address]      10/14/2019: Patient presents with his son-in-law for hospital follow-up  To "graduate" rehab tomorrow and stay at [address] [place] [address] for a few days  Continues to live in his own home and drive  Does not drive at night or in bad weather  Has been getting daily weights at rehab facility  Daughter has Excel spreadsheet for weights and BP  Has been watching his salt intake, does admit to liking alexis and hot dogs  Normally eats about 2 meals a day due to waking up later in the AM  Able to walk with walker about 50 feet without SOB  No other complaints  RTC in 1 month with Dr Daisy Patel  Past Medical History:   Diagnosis Date    Allergic rhinitis     Hypercholesteremia     Hypertension     Pneumonia      Review of Systems   Constitutional: Negative for activity change, appetite change, fatigue, fever and unexpected weight change  HENT: Negative for congestion, postnasal drip, rhinorrhea, sneezing and sore throat  Eyes: Negative  Respiratory: Negative for cough and shortness of breath  Cardiovascular: Negative for chest pain, palpitations and leg swelling  Gastrointestinal: Negative for abdominal distention, diarrhea, nausea and vomiting  Endocrine: Negative  Genitourinary: Negative for decreased urine volume, difficulty urinating, hematuria and urgency     Musculoskeletal: Negative for arthralgias and myalgias  Skin: Negative  Neurological: Negative for dizziness, syncope, weakness and numbness  Psychiatric/Behavioral: Negative for agitation, confusion and hallucinations  The patient is not nervous/anxious  12-point ROS completed and negative except as stated above and/or in the HPI      No Known Allergies    Current Outpatient Medications:     amLODIPine (NORVASC) 10 mg tablet, Take 1 tablet (10 mg total) by mouth daily, Disp: 90 tablet, Rfl: 3    Blood Pressure Monitoring (B-D ASSURE BPM/AUTO ARM CUFF) MISC, by Does not apply route daily, Disp: 1 each, Rfl: 0    brimonidine-timolol (COMBIGAN) 0 2-0 5 %, Apply 1 drop to eye 2 (two) times a day, Disp: , Rfl:     furosemide (LASIX) 40 mg tablet, Take 1 tablet (40 mg total) by mouth daily, Disp: 60 each, Rfl: 0    latanoprost (XALATAN) 0 005 % ophthalmic solution, , Disp: , Rfl:     levothyroxine 50 mcg tablet, Take 1 tablet by mouth daily, Disp: , Rfl:     montelukast (SINGULAIR) 10 mg tablet, daily , Disp: , Rfl:     potassium chloride (K-DUR,KLOR-CON) 20 mEq tablet, Take 1 tablet (20 mEq total) by mouth 2 (two) times a day, Disp: 60 tablet, Rfl: 6    simvastatin (ZOCOR) 20 mg tablet, daily , Disp: , Rfl:     spironolactone (ALDACTONE) 25 mg tablet, Take 1 tablet (25 mg total) by mouth daily, Disp: 90 tablet, Rfl: 3    terazosin (HYTRIN) 5 mg capsule, Take 1 capsule by mouth, Disp: , Rfl:     levalbuterol (XOPENEX) 0 63 mg/3 mL nebulizer solution, Take 1 vial (0 63 mg total) by nebulization 3 (three) times a day (Patient not taking: Reported on 10/14/2019), Disp: 90 vial, Rfl: 3    RABEprazole (ACIPHEX) 20 MG tablet, daily , Disp: , Rfl:      Social History     Socioeconomic History    Marital status: /Civil Union     Spouse name: Not on file    Number of children: Not on file    Years of education: Not on file    Highest education level: Not on file   Occupational History    Not on file   Social Needs    Financial resource strain: Not on file    Food insecurity:     Worry: Not on file     Inability: Not on file    Transportation needs:     Medical: Not on file     Non-medical: Not on file   Tobacco Use    Smoking status: Former Smoker     Packs/day: 0 20     Years: 10 00     Pack years: 2 00     Types: Cigarettes    Smokeless tobacco: Never Used   Substance and Sexual Activity    Alcohol use: Yes     Frequency: Never     Comment: Socially    Drug use: Never    Sexual activity: Not on file   Lifestyle    Physical activity:     Days per week: Not on file     Minutes per session: Not on file    Stress: Not on file   Relationships    Social connections:     Talks on phone: Not on file     Gets together: Not on file     Attends Congregation service: Not on file     Active member of club or organization: Not on file     Attends meetings of clubs or organizations: Not on file     Relationship status: Not on file    Intimate partner violence:     Fear of current or ex partner: Not on file     Emotionally abused: Not on file     Physically abused: Not on file     Forced sexual activity: Not on file   Other Topics Concern    Not on file   Social History Narrative    Not on file     Family History   Family history unknown: Yes     Vitals:   Blood pressure (!) 100/46, pulse 80, height 5' 7" (1 702 m), weight 77 4 kg (170 lb 11 2 oz), SpO2 96 %  Body mass index is 26 74 kg/m²  Wt Readings from Last 3 Encounters:   10/14/19 77 4 kg (170 lb 11 2 oz)   10/07/19 77 5 kg (170 lb 13 7 oz)   06/06/19 79 6 kg (175 lb 8 oz)     Vitals:    10/14/19 0940   BP: (!) 100/46   BP Location: Right arm   Patient Position: Sitting   Cuff Size: Standard   Pulse: 80   SpO2: 96%   Weight: 77 4 kg (170 lb 11 2 oz)   Height: 5' 7" (1 702 m)     Physical Exam   Constitutional: He is oriented to person, place, and time  He appears well-developed and well-nourished  HENT:   Head: Normocephalic and atraumatic     Eyes: Pupils are equal, round, and reactive to light  EOM are normal    Neck: Neck supple  No JVD present  No tracheal deviation present  Cardiovascular: Normal rate, regular rhythm and intact distal pulses  No murmur heard  Pulmonary/Chest: Effort normal and breath sounds normal  No respiratory distress  He has no wheezes  He has no rales  Abdominal: Soft  Bowel sounds are normal  He exhibits no distension  Musculoskeletal: He exhibits edema (1-2+ LE; hits below mid calf)  He exhibits no tenderness  Left limb shorter than right   Neurological: He is alert and oriented to person, place, and time  Skin: Skin is warm and dry  Psychiatric: He has a normal mood and affect  His behavior is normal      Diagnostic Testing:  Echocardiogram from 10/02/2019:  EQWS:54%; grade 1 diastolic dysfunction  LVIDd:  RV: Normal size and RVSF  MR: None  PASP:  RVOT:   Other: Small pericardial effusion without hemodynamic compromise  Labs & Results:  Lab Results   Component Value Date    WBC 7 12 10/05/2019    HGB 15 0 10/05/2019    HCT 44 6 10/05/2019    MCV 93 10/05/2019     (L) 10/05/2019     Lab Results   Component Value Date    SODIUM 137 10/07/2019    K 4 0 10/07/2019     10/07/2019    CO2 32 10/07/2019    BUN 36 (H) 10/07/2019    CREATININE 0 80 10/07/2019    GLUC 94 10/07/2019    CALCIUM 7 9 (L) 10/07/2019     Lab Results   Component Value Date    INR 1 15 09/26/2019    INR 1 14 03/16/2019    PROTIME 14 1 09/26/2019    PROTIME 14 3 (H) 03/16/2019     Lab Results   Component Value Date    NTBNP 866 (H) 09/26/2019      Counseling / Coordination of Care: Total floor / unit time spent today 40 minutes  Greater than 50% of total time was spent with the patient and / or family counseling and / or coordination of care  A description of the counseling / coordination of care: 20  Thank you for the opportunity to participate in the care of this patient      Kenneth Adkins PA-C

## 2019-10-14 NOTE — PATIENT INSTRUCTIONS
Have blood work completed prior next appointment on 11/15/2019  Please check daily weights and contact the heart failure program at [phone] if you gain 3 lbs in one day or 5 lbs in 5-7 days  Limit daily sodium intake to 2000 mg daily  Limit daily fluid intake to 2000 mL (about 60 ounces) daily  Bring complete list of medications to your follow up appointment

## 2019-10-15 ENCOUNTER — NURSING HOME VISIT (OUTPATIENT)
Dept: GERIATRICS | Facility: OTHER | Age: 84
End: 2019-10-15
Payer: COMMERCIAL

## 2019-10-15 DIAGNOSIS — N18.2 CHRONIC KIDNEY DISEASE, STAGE 2 (MILD): ICD-10-CM

## 2019-10-15 DIAGNOSIS — G47.33 OSA (OBSTRUCTIVE SLEEP APNEA): ICD-10-CM

## 2019-10-15 DIAGNOSIS — R60.0 BILATERAL LEG EDEMA: ICD-10-CM

## 2019-10-15 DIAGNOSIS — J96.02 ACUTE RESPIRATORY FAILURE WITH HYPOXIA AND HYPERCAPNIA (HCC): ICD-10-CM

## 2019-10-15 DIAGNOSIS — R53.1 GENERALIZED WEAKNESS: ICD-10-CM

## 2019-10-15 DIAGNOSIS — I10 ESSENTIAL HYPERTENSION: ICD-10-CM

## 2019-10-15 DIAGNOSIS — E78.5 HYPERLIPIDEMIA, UNSPECIFIED HYPERLIPIDEMIA TYPE: ICD-10-CM

## 2019-10-15 DIAGNOSIS — J96.01 ACUTE RESPIRATORY FAILURE WITH HYPOXIA AND HYPERCAPNIA (HCC): ICD-10-CM

## 2019-10-15 DIAGNOSIS — I50.33 ACUTE ON CHRONIC HEART FAILURE WITH PRESERVED EJECTION FRACTION (HCC): ICD-10-CM

## 2019-10-15 DIAGNOSIS — R26.2 AMBULATORY DYSFUNCTION: ICD-10-CM

## 2019-10-15 DIAGNOSIS — J81.0 ACUTE PULMONARY EDEMA (HCC): Primary | ICD-10-CM

## 2019-10-15 PROCEDURE — 99316 NF DSCHRG MGMT 30 MIN+: CPT | Performed by: NURSE PRACTITIONER

## 2019-10-15 NOTE — PROGRESS NOTES
[address] [facility]  [address]  [phone]  [place]   POS: 31: SNF/Short Term Rehab        NAME: Shiloh Ramon  AGE: 80 y o  SEX: male    DATE OF ENCOUNTER: 10/15/2019    Assessment and Plan     1  Acute pulmonary edema    2  Acute respiratory failure with hypoxia and hypercapnia    3  Acute on chronic heart failure with preserved ejection fraction    4  Essential hypertension     5  Chronic kidney disease, stage II    6  Generalized weakness/ambulatory dysfunction    7  Bilateral lower extremity edema    8  Hyperlipidemia        1  Discharge home with services    2  Follow up PCP within 1 week    3  Follow-up specialist    4  Prescription/paperwork completed    5  Discharge discussed with patient    6  A prescription will be given to patient for Gallo lopez  Chief Complaint     Discharge note    History of Present Illness     Patient to be discharged home with services  He will be living with his daughter  He has a walker at home that he will be using for ambulation  Examined at bedside  He denies chest pain, shortness of breath, abdominal pain, nausea, vomiting, diarrhea, constipation, headache, dizziness, pain  Total encounter time: 40 minutes      Review of Systems     ROS as per noted in HPI  Objective     Vitals:  Blood pressure 123/76, pulse 83, respirations 18, temperature 97 3°  O2 sat 95% on room air    Physical Exam   Constitutional: He is oriented to person, place, and time  He appears well-developed and well-nourished  Eyes: EOM are normal    Neck: Neck supple  Cardiovascular: Normal rate and normal heart sounds  Exam reveals no gallop and no friction rub  No murmur heard  Pulmonary/Chest: Effort normal and breath sounds normal  He has no wheezes  He has no rales  Abdominal: Soft  Bowel sounds are normal  He exhibits no distension  There is no tenderness  There is no rebound and no guarding  Musculoskeletal: He exhibits edema     +2 to 3 bilateral lower extremity edema  Patient is not wearing Gallo stockings or Ace wraps  Neurological: He is alert and oriented to person, place, and time  Skin: Skin is warm and dry  Nursing note and vitals reviewed  Current Medications   Medications were reviewed and updated in facility chart       LOUISE Guzmán  10/15/2019 4:05 PM

## 2019-11-13 ENCOUNTER — APPOINTMENT (OUTPATIENT)
Dept: LAB | Facility: MEDICAL CENTER | Age: 84
End: 2019-11-13
Payer: COMMERCIAL

## 2019-11-13 ENCOUNTER — TRANSCRIBE ORDERS (OUTPATIENT)
Dept: ADMINISTRATIVE | Facility: HOSPITAL | Age: 84
End: 2019-11-13

## 2019-11-13 ENCOUNTER — APPOINTMENT (OUTPATIENT)
Dept: RADIOLOGY | Facility: MEDICAL CENTER | Age: 84
End: 2019-11-13
Payer: COMMERCIAL

## 2019-11-13 DIAGNOSIS — J90 PLEURAL EFFUSION: ICD-10-CM

## 2019-11-13 DIAGNOSIS — J90 PLEURAL EFFUSION: Primary | ICD-10-CM

## 2019-11-13 DIAGNOSIS — I50.32 CHRONIC HEART FAILURE WITH PRESERVED EJECTION FRACTION (HCC): ICD-10-CM

## 2019-11-13 LAB
ANION GAP SERPL CALCULATED.3IONS-SCNC: 5 MMOL/L (ref 4–13)
BUN SERPL-MCNC: 24 MG/DL (ref 5–25)
CALCIUM SERPL-MCNC: 8.8 MG/DL (ref 8.3–10.1)
CHLORIDE SERPL-SCNC: 99 MMOL/L (ref 100–108)
CO2 SERPL-SCNC: 32 MMOL/L (ref 21–32)
CREAT SERPL-MCNC: 0.86 MG/DL (ref 0.6–1.3)
GFR SERPL CREATININE-BSD FRML MDRD: 76 ML/MIN/1.73SQ M
GLUCOSE P FAST SERPL-MCNC: 105 MG/DL (ref 65–99)
POTASSIUM SERPL-SCNC: 4.4 MMOL/L (ref 3.5–5.3)
SODIUM SERPL-SCNC: 136 MMOL/L (ref 136–145)

## 2019-11-13 PROCEDURE — 36415 COLL VENOUS BLD VENIPUNCTURE: CPT

## 2019-11-13 PROCEDURE — 71046 X-RAY EXAM CHEST 2 VIEWS: CPT

## 2019-11-13 PROCEDURE — 80048 BASIC METABOLIC PNL TOTAL CA: CPT

## 2019-11-15 ENCOUNTER — OFFICE VISIT (OUTPATIENT)
Dept: CARDIOLOGY CLINIC | Facility: CLINIC | Age: 84
End: 2019-11-15
Payer: COMMERCIAL

## 2019-11-15 VITALS
SYSTOLIC BLOOD PRESSURE: 124 MMHG | BODY MASS INDEX: 26.84 KG/M2 | DIASTOLIC BLOOD PRESSURE: 52 MMHG | HEIGHT: 67 IN | WEIGHT: 171 LBS | HEART RATE: 72 BPM

## 2019-11-15 DIAGNOSIS — I50.32 CHRONIC HEART FAILURE WITH PRESERVED EJECTION FRACTION (HCC): Primary | ICD-10-CM

## 2019-11-15 DIAGNOSIS — E87.6 HYPOKALEMIA: ICD-10-CM

## 2019-11-15 DIAGNOSIS — N18.2 CHRONIC KIDNEY DISEASE, STAGE 2 (MILD): ICD-10-CM

## 2019-11-15 DIAGNOSIS — R60.0 BILATERAL LEG EDEMA: ICD-10-CM

## 2019-11-15 DIAGNOSIS — J96.11 CHRONIC RESPIRATORY FAILURE WITH HYPOXIA (HCC): ICD-10-CM

## 2019-11-15 DIAGNOSIS — I10 ESSENTIAL HYPERTENSION: ICD-10-CM

## 2019-11-15 PROCEDURE — 99213 OFFICE O/P EST LOW 20 MIN: CPT | Performed by: INTERNAL MEDICINE

## 2019-11-15 RX ORDER — ALBUTEROL SULFATE 90 UG/1
2 AEROSOL, METERED RESPIRATORY (INHALATION) EVERY 6 HOURS PRN
COMMUNITY
End: 2020-07-25 | Stop reason: HOSPADM

## 2019-11-15 RX ORDER — PREDNISONE 10 MG/1
TABLET ORAL DAILY
Status: ON HOLD | COMMUNITY
End: 2020-07-12

## 2019-11-15 NOTE — PROGRESS NOTES
[facility] 80 y o  male MRN: [id number]  Encounter: [id number]       No problem-specific Assessment & Plan notes found for this encounter  Christina Severino #HFpEF 55%, NYHA-II, Stage B  -well compensated symptomatically and on exam  -last exacerbation 09/2019; required bilateral thoracentesis with 1 8L removed  -continue lasix 40mg daily, Aldactone 25mg, KCl replacement  -counseled on importance of low Na diet, medication compliance, daily weights and when to call office    #Essential hypertension; controlled  -continue Aldactone, amlodipine 10mg, terazosin 5mg daily  #Hypokalemia  -BMP 11/13 showed a K of 4 4  -Continue KCl 20 mEq BID  #HLD: Lipid Panel 09/2019: total 142, TG 37, HDL 80, NHDL 62, LDL 55; continue Zocor    #Hypoxic respiratory failure on 3L noc O2  -likely component of chronic lung dz with prior occupational exposures  -advised to make appt with Pulmonology; he will need PFTs, possible PSG    # Chronic steroid use >1 month after hospitalization  -at first used as empiric therapy for undiagnosed COPD exacerbation?  - this is likely to worsen his volume management for HF  -advised to taper off prednisone from 20mg daily by 5mg decrease per week  -f/u with PCP, advised to make appt with Pulmonology            HPI: Jerry Steele 80y o  year old male with a history of HTN, HLD, hypothyroidism, MGUS, recently diagnosed hypoxic respiratory failure, CHF who presents for follow up  He was admitted 3/16 - 3/19 at [facility] for dyspnea, AMS, lethargy and was found to have combined hypercarbic and hypoxic respiratory failure  He was treated with BPAP, nebs, steroids and eventually weaned to 3L NC  An etiology for the patient's respiratory decompensation was not found  His imaging (CT, CXR) revealed pulmonary edema and bilateral effusions L>R  TTE 3/18/19 revealed preserved EF, grade 1 DD, no significant valvulopathy  Lasix 20mg daily was started   He developed hypokalemia and KCl was increased to 20 mEq BID in May  At the last office visit 06/2019 he was weaned off chlorthalidone and atenolol and converted to amlodipine, Aldactone  He was admitted 9/26-10/7 at [facility] for CHF exacerbation and received IV diuresis and was discharged at a higher dose of 40mg PO daily  He had bilateral effusions that required bilateral thoracentesis with ~1 8L total removed  He also required brief BPAP use  He was discharged to rehab and was seen in the office 10/14 for f/u during which time he was feeling well and was euvolemic  No rx changes were made at that time  He states that he has done well since then  He had an episode 1 week ago where he gained 3lb in a day and was advised by PCP to increase lasix to BID and his weight improved to dry baseline  He has been taking 40mg PO daily since  Reports good compliance with rx, low Na diet, daily weights  Denies palpitations, dizziness, syncope, dyspnea, orthopnea, PND, diaphoresis, NVD, worsening LE edema  He is staying active with 3-4x/week home exercises, currently graduated from PT      Family History: non-contributory  Historical Information   Past Medical History:   Diagnosis Date    Allergic rhinitis     Hypercholesteremia     Hypertension     Pneumonia      Past Surgical History:   Procedure Laterality Date    LAPAROSCOPIC COLON RESECTION      TOTAL HIP ARTHROPLASTY       Social History   Social History     Substance and Sexual Activity   Alcohol Use Yes    Frequency: Never    Comment: Socially     Social History     Substance and Sexual Activity   Drug Use Never     Social History     Tobacco Use   Smoking Status Former Smoker    Packs/day: 0 20    Years: 10 00    Pack years: 2 00    Types: Cigarettes   Smokeless Tobacco Never Used     Family History:   Family History   Family history unknown: Yes       Review of Systems:  Review of Systems  Except as noted in the HPI and above, a comprehensive 14 point review of systems was negative  Current Medications: Reviewed    No Known Allergies    Objective   Vitals: There were no vitals taken for this visit  , There is no height or weight on file to calculate BMI ,       Physical Exam:  Physical Exam   Constitutional: He is oriented to person, place, and time  He appears well-developed and well-nourished  No distress  HENT:   Head: Normocephalic and atraumatic  Eyes: Pupils are equal, round, and reactive to light  EOM are normal    Neck: Normal range of motion  Neck supple  No JVD present  Cardiovascular: Normal rate, regular rhythm, normal heart sounds and intact distal pulses  Exam reveals no gallop and no friction rub  No murmur heard  Pulmonary/Chest: Effort normal  No respiratory distress  He has no wheezes  He has no rales  Abdominal: Soft  He exhibits no distension and no mass  There is no tenderness  There is no guarding  Musculoskeletal: Normal range of motion  He exhibits edema (1-2+ pitting edema, bilat)  Neurological: He is alert and oriented to person, place, and time  Skin: Skin is warm and dry  Capillary refill takes less than 2 seconds  He is not diaphoretic  No erythema  No pallor  Psychiatric: He has a normal mood and affect  Lab Results: I have personally reviewed pertinent lab results  Imaging: I have personally reviewed pertinent reports  EKG: Personally reviewed    ECHO:   10/2/19 TTE: EF 55%, DD1, RV size and function wnl, mild AI  0/28/93: EF 28%, diastolic dysfunction grade 1, no significant valvulopathy   Previous Stress/Cath/PCI: Gordon Deutsch MD  Cardiology Fellow

## 2019-12-17 ENCOUNTER — OFFICE VISIT (OUTPATIENT)
Dept: PULMONOLOGY | Facility: CLINIC | Age: 84
End: 2019-12-17
Payer: COMMERCIAL

## 2019-12-17 VITALS
BODY MASS INDEX: 26.74 KG/M2 | SYSTOLIC BLOOD PRESSURE: 134 MMHG | OXYGEN SATURATION: 92 % | HEIGHT: 67 IN | TEMPERATURE: 97 F | HEART RATE: 92 BPM | DIASTOLIC BLOOD PRESSURE: 64 MMHG | WEIGHT: 170.4 LBS

## 2019-12-17 DIAGNOSIS — I50.32 CHRONIC HEART FAILURE WITH PRESERVED EJECTION FRACTION (HCC): ICD-10-CM

## 2019-12-17 DIAGNOSIS — J96.11 CHRONIC RESPIRATORY FAILURE WITH HYPOXIA (HCC): ICD-10-CM

## 2019-12-17 DIAGNOSIS — R93.89 ABNORMAL CHEST CT: Primary | ICD-10-CM

## 2019-12-17 PROBLEM — R53.1 GENERALIZED WEAKNESS: Status: RESOLVED | Noted: 2019-10-08 | Resolved: 2019-12-17

## 2019-12-17 PROBLEM — J81.0 ACUTE PULMONARY EDEMA (HCC): Status: RESOLVED | Noted: 2019-03-17 | Resolved: 2019-12-17

## 2019-12-17 PROCEDURE — 99213 OFFICE O/P EST LOW 20 MIN: CPT | Performed by: INTERNAL MEDICINE

## 2019-12-17 NOTE — ASSESSMENT & PLAN NOTE
Note made of consideration of pulmonary component to exertional hypoxemia  I think he is unlikely to have COPD given his degree of exposures, but to be sure, will check PFTs  I told him to stop xopenex and albuterol and observe for changes  He does not feel he's benefitting from these at present

## 2019-12-17 NOTE — ASSESSMENT & PLAN NOTE
Given exudative (and low albumin gradient) pleural effusions in past, and 6-12 month range for follow up based on last CT, probably best to get the follow up CT scan sooner rather than later to ensure all his findings have resolved

## 2019-12-17 NOTE — PROGRESS NOTES
Pulmonary Follow Up Note   Mike Muñoz 80 y o  male MRN: [id number]  12/17/2019      Assessment:    Abnormal chest CT  Given exudative (and low albumin gradient) pleural effusions in past, and 6-12 month range for follow up based on last CT, probably best to get the follow up CT scan sooner rather than later to ensure all his findings have resolved  Chronic respiratory failure with hypoxia (HCC)  Note made of consideration of pulmonary component to exertional hypoxemia  I think he is unlikely to have COPD given his degree of exposures, but to be sure, will check PFTs  I told him to stop xopenex and albuterol and observe for changes  He does not feel he's benefitting from these at present  Plan:    Diagnoses and all orders for this visit:    Abnormal chest CT  -     CT chest without contrast; Future    Chronic respiratory failure with hypoxia ([address] )    Chronic heart failure with preserved ejection fraction (HCC)  -     Complete PFT with post bronchodilator; Future      Return in about 3 months (around 3/17/2020)  History of Present Illness   HPI:  Mike Muñoz is a 80 y o  male who presents for follow-up  Since discharge from the hospital, he has been in a stable state of health  However he continues to do with a chronic cough productive of thick, clear sputum  This can happen any time during the day, but is more common when he lays down at night  He has trouble bringing up the sputum with his cough  He is taking Xopenex 3 times per day via nebulizer, and albuterol as needed  He is not feel like either of these medications helped him  He does note that he is able to occasionally bring up mucus more easily after using the nebulizer  He denies fevers  He has had no blood tinge to sputum  His weight has stayed stable, and he weighs himself every day given his history of congestive heart failure      Review of his chart shows back in March that he had a CT scan with bilateral pleural effusions, ground-glass opacities, and a follow-up was recommended in 6-12 months  He underwent bilateral thoracenteses in October of this year, with pleural fluid analysis being exudative by light's criteria via protein ratios, negative via LDH, but overall consistent with exudate of physiology  He had an albumin gradient that was actually 0, suggesting this was not a pseudo exudate associated with diuresis  Review of Systems   Constitutional: Negative for fever and unexpected weight change  Respiratory: Positive for cough  Negative for shortness of breath and wheezing  Cardiovascular: Positive for leg swelling  All other systems reviewed and are negative        Historical Information   Past Medical History:   Diagnosis Date    Allergic rhinitis     Hypercholesteremia     Hypertension     Pneumonia      Past Surgical History:   Procedure Laterality Date    LAPAROSCOPIC COLON RESECTION      TOTAL HIP ARTHROPLASTY       Family History   Family history unknown: Yes         Meds/Allergies     Current Outpatient Medications:     albuterol (PROVENTIL HFA,VENTOLIN HFA) 90 mcg/act inhaler, Inhale 2 puffs every 6 (six) hours as needed for wheezing, Disp: , Rfl:     amLODIPine (NORVASC) 10 mg tablet, Take 1 tablet (10 mg total) by mouth daily, Disp: 90 tablet, Rfl: 3    Blood Pressure Monitoring (B-D ASSURE BPM/AUTO ARM CUFF) MISC, by Does not apply route daily, Disp: 1 each, Rfl: 0    brimonidine-timolol (COMBIGAN) 0 2-0 5 %, Apply 1 drop to eye 2 (two) times a day, Disp: , Rfl:     furosemide (LASIX) 40 mg tablet, Take 1 tablet (40 mg total) by mouth daily, Disp: 60 each, Rfl: 0    latanoprost (XALATAN) 0 005 % ophthalmic solution, , Disp: , Rfl:     levalbuterol (XOPENEX) 0 63 mg/3 mL nebulizer solution, Take 1 vial (0 63 mg total) by nebulization 3 (three) times a day, Disp: 90 vial, Rfl: 3    levothyroxine 50 mcg tablet, Take 1 tablet by mouth daily, Disp: , Rfl:     montelukast (SINGULAIR) 10 mg tablet, daily , Disp: , Rfl:     potassium chloride (K-DUR,KLOR-CON) 20 mEq tablet, Take 1 tablet (20 mEq total) by mouth 2 (two) times a day, Disp: 60 tablet, Rfl: 6    predniSONE 10 mg tablet, Take by mouth daily 2 pills daily, Disp: , Rfl:     RABEprazole (ACIPHEX) 20 MG tablet, daily , Disp: , Rfl:     simvastatin (ZOCOR) 20 mg tablet, daily , Disp: , Rfl:     spironolactone (ALDACTONE) 25 mg tablet, Take 1 tablet (25 mg total) by mouth daily, Disp: 90 tablet, Rfl: 3    terazosin (HYTRIN) 5 mg capsule, Take 1 capsule by mouth, Disp: , Rfl:   No Known Allergies    Vitals: Blood pressure 134/64, pulse 92, temperature (!) 97 °F (36 1 °C), temperature source Tympanic, height 5' 7" (1 702 m), weight 77 3 kg (170 lb 6 4 oz), SpO2 92 %  Body mass index is 26 69 kg/m²  Oxygen Therapy  SpO2: 92 %  Oxygen Therapy: None (Room air)      Physical Exam  Physical Exam   Constitutional: He appears well-developed and well-nourished  No distress  HENT:   Head: Normocephalic and atraumatic  Mouth/Throat: No oropharyngeal exudate  Neck: Neck supple  No JVD present  Cardiovascular: Normal rate and regular rhythm  Exam reveals no gallop and no friction rub  No murmur heard  Pulmonary/Chest:   Decreased breath sounds at left base  Right base mostly clear  Bilateral apices with decreased breath sounds  Vitals reviewed  Labs: I have personally reviewed pertinent lab results    Lab Results   Component Value Date    WBC 7 12 10/05/2019    HGB 15 0 10/05/2019    HCT 44 6 10/05/2019    MCV 93 10/05/2019     (L) 10/05/2019     Lab Results   Component Value Date    GLUCOSE 120 03/16/2019    CALCIUM 8 8 11/13/2019     08/03/2015    K 4 4 11/13/2019    CO2 32 11/13/2019    CL 99 (L) 11/13/2019    BUN 24 11/13/2019    CREATININE 0 86 11/13/2019     No results found for: IGE  Lab Results   Component Value Date    ALT 8 (L) 10/01/2019    AST 14 10/01/2019    ALKPHOS 41 (L) 10/01/2019    BILITOT 0 93 08/03/2015 Imaging and other studies: I have personally reviewed pertinent films in PACS    [facility]

## 2019-12-19 DIAGNOSIS — I10 ESSENTIAL HYPERTENSION: ICD-10-CM

## 2019-12-19 DIAGNOSIS — I50.30 HEART FAILURE WITH PRESERVED EJECTION FRACTION, UNSPECIFIED HF CHRONICITY (HCC): ICD-10-CM

## 2019-12-19 RX ORDER — AMLODIPINE BESYLATE 10 MG/1
10 TABLET ORAL DAILY
Qty: 90 TABLET | Refills: 1 | Status: ON HOLD | OUTPATIENT
Start: 2019-12-19 | End: 2020-07-12 | Stop reason: SDUPTHER

## 2019-12-24 RX ORDER — SPIRONOLACTONE 25 MG/1
25 TABLET ORAL DAILY
Qty: 90 TABLET | Refills: 0 | Status: SHIPPED | OUTPATIENT
Start: 2019-12-24 | End: 2020-04-17 | Stop reason: SDUPTHER

## 2019-12-27 ENCOUNTER — HOSPITAL ENCOUNTER (OUTPATIENT)
Dept: PULMONOLOGY | Facility: HOSPITAL | Age: 84
Discharge: HOME | End: 2019-12-27
Attending: INTERNAL MEDICINE
Payer: COMMERCIAL

## 2019-12-27 DIAGNOSIS — I50.32 CHRONIC HEART FAILURE WITH PRESERVED EJECTION FRACTION (HCC): ICD-10-CM

## 2019-12-27 PROCEDURE — 94060 EVALUATION OF WHEEZING: CPT

## 2019-12-27 PROCEDURE — 94729 DIFFUSING CAPACITY: CPT | Performed by: INTERNAL MEDICINE

## 2019-12-27 PROCEDURE — 94726 PLETHYSMOGRAPHY LUNG VOLUMES: CPT

## 2019-12-27 PROCEDURE — 94060 EVALUATION OF WHEEZING: CPT | Performed by: INTERNAL MEDICINE

## 2019-12-27 PROCEDURE — 94760 N-INVAS EAR/PLS OXIMETRY 1: CPT

## 2019-12-27 PROCEDURE — 94726 PLETHYSMOGRAPHY LUNG VOLUMES: CPT | Performed by: INTERNAL MEDICINE

## 2019-12-27 PROCEDURE — 94729 DIFFUSING CAPACITY: CPT

## 2019-12-27 RX ORDER — ALBUTEROL SULFATE 2.5 MG/3ML
2.5 SOLUTION RESPIRATORY (INHALATION) ONCE
Status: COMPLETED | OUTPATIENT
Start: 2019-12-27 | End: 2019-12-27

## 2019-12-27 RX ADMIN — ALBUTEROL SULFATE 2.5 MG: 2.5 SOLUTION RESPIRATORY (INHALATION) at 14:07

## 2019-12-30 ENCOUNTER — HOSPITAL ENCOUNTER (OUTPATIENT)
Dept: RADIOLOGY | Facility: MEDICAL CENTER | Age: 84
Discharge: HOME | End: 2019-12-30
Payer: COMMERCIAL

## 2019-12-30 DIAGNOSIS — R93.89 ABNORMAL CHEST CT: ICD-10-CM

## 2019-12-30 PROCEDURE — 71250 CT THORAX DX C-: CPT

## 2020-02-14 ENCOUNTER — OFFICE VISIT (OUTPATIENT)
Dept: CARDIOLOGY CLINIC | Facility: CLINIC | Age: 85
End: 2020-02-14
Payer: COMMERCIAL

## 2020-02-14 VITALS
SYSTOLIC BLOOD PRESSURE: 122 MMHG | DIASTOLIC BLOOD PRESSURE: 62 MMHG | WEIGHT: 165.1 LBS | OXYGEN SATURATION: 93 % | HEART RATE: 82 BPM | BODY MASS INDEX: 25.91 KG/M2 | HEIGHT: 67 IN

## 2020-02-14 DIAGNOSIS — I50.32 CHRONIC HEART FAILURE WITH PRESERVED EJECTION FRACTION (HCC): Primary | ICD-10-CM

## 2020-02-14 DIAGNOSIS — G47.33 OSA (OBSTRUCTIVE SLEEP APNEA): ICD-10-CM

## 2020-02-14 DIAGNOSIS — I31.3 PERICARDIAL EFFUSION: ICD-10-CM

## 2020-02-14 DIAGNOSIS — I10 ESSENTIAL HYPERTENSION: ICD-10-CM

## 2020-02-14 DIAGNOSIS — E78.5 HYPERLIPIDEMIA, UNSPECIFIED HYPERLIPIDEMIA TYPE: ICD-10-CM

## 2020-02-14 DIAGNOSIS — J96.11 CHRONIC RESPIRATORY FAILURE WITH HYPOXIA (HCC): ICD-10-CM

## 2020-02-14 DIAGNOSIS — E87.6 HYPOKALEMIA: ICD-10-CM

## 2020-02-14 PROCEDURE — 99213 OFFICE O/P EST LOW 20 MIN: CPT | Performed by: INTERNAL MEDICINE

## 2020-02-14 RX ORDER — ATENOLOL AND CHLORTHALIDONE TABLET 50; 25 MG/1; MG/1
1 TABLET ORAL DAILY
COMMUNITY
End: 2020-07-25 | Stop reason: HOSPADM

## 2020-02-14 NOTE — PROGRESS NOTES
[address] [facility] 80 y o  male MRN: [id number]  Encounter: [id number]       Assessment/Plan: #HFpEF 55%, NYHA-II, Stage B  -well compensated  -last exacerbation 09/2019; required bilateral thoracentesis with 1 8L removed  -continue lasix 40mg daily, Aldactone 25mg, KCl replacement 20 mEq BID  -counseled on importance of low Na diet, medication compliance, daily weights and when to call office    #Small pericardial effusion on TTE 10/2/19  -noted again on CT chest 12/2019  -will obtain perez TTE to confirm stability/improvement; if effusion has improved/resolved then there will not be a need for serial TTEs    #Essential hypertension; controlled  -continue Lasix, Aldactone, amlodipine 10mg, terazosin 5mg daily  #Hypokalemia; resolved with supplementation   -BMP 11/13 showed a K of 4 4  -Continue KCl 20 mEq BID  -check BMP    #HLD: Lipid Panel 09/2019: total 142, TG 37, HDL 80, NHDL 62, LDL 55; continue Zocor  # Severe COPD  #Hypoxic respiratory failure; improved, now on nocturnal O2  -primarily due to severe COPD with prior worsening in the setting of acute CHF          HPI: Percmj Kacey 80y o  year old male with a history of chronic HFpEF, HTN, HLD, hypothyroidism, MGUS, hypoxic respiratory failure who presents for follow up  He was admitted 3/16 - 3/19 at [facility] for dyspnea, AMS, lethargy and was found to have combined hypercarbic and hypoxic respiratory failure  He was treated with BPAP, nebs, steroids and eventually weaned to 3L NC  An etiology for the patient's respiratory decompensation was not found  His imaging (CT, CXR) revealed pulmonary edema and bilateral effusions L>R  TTE 3/18/19 revealed preserved EF, grade 1 DD, no significant valvulopathy  Lasix 20mg daily was started  He developed hypokalemia and KCl was increased to 20 mEq BID in May  At the the 06/2019 office visit he was weaned off chlorthalidone and atenolol and converted to amlodipine, Aldactone     He was admitted 9/26/19-10/7/19 at [facility] for CHF exacerbation and received IV diuresis and was discharged at a higher dose of 40mg PO daily  He had bilateral effusions that required bilateral thoracentesis with ~1 8L total removed  He also required brief BPAP use  He was discharged to rehab and was seen in the office 10/14 for f/u during which time he was feeling well and was euvolemic  No rx changes were made at that time  In the interim he established care with pulmonology and PFTs were completed which demonstrated severe obstructive disease  He has been started on inhalers  He was previously on 3L continuous O2 which now has been weaned off during the day and he just uses oxygen at night  Reports good compliance with rx, low Na diet, daily weights (stable at ~160lb on his home scale)  Denies palpitations, dizziness, syncope, dyspnea, orthopnea, PND, diaphoresis, NVD, worsening LE edema  He is staying active, works upper extremities with light weights, still drives  Family History: non-contributory  Historical Information   Past Medical History:   Diagnosis Date    Allergic rhinitis     Hypercholesteremia     Hypertension     Pneumonia      Past Surgical History:   Procedure Laterality Date    LAPAROSCOPIC COLON RESECTION      TOTAL HIP ARTHROPLASTY       Social History   Social History     Substance and Sexual Activity   Alcohol Use Yes    Frequency: Monthly or less    Comment: Socially     Social History     Substance and Sexual Activity   Drug Use Never     Social History     Tobacco Use   Smoking Status Former Smoker    Packs/day: 0 20    Years: 10 00    Pack years: 2 00    Types: Cigarettes   Smokeless Tobacco Never Used   Tobacco Comment    Quit 60 years ago      Family History:   Family History   Family history unknown: Yes       Review of Systems:  Review of Systems  Except as noted in the HPI and above, a comprehensive 14 point review of systems was negative      Current Medications: Reviewed    No Known Allergies    Objective   Vitals: Blood pressure 122/62, pulse 82, height 5' 7" (1 702 m), weight 74 9 kg (165 lb 1 6 oz), SpO2 93 %  , Body mass index is 25 86 kg/m²  ,       Physical Exam:  Physical Exam   Constitutional: He is oriented to person, place, and time  He appears well-developed and well-nourished  No distress  HENT:   Head: Normocephalic and atraumatic  Eyes: Pupils are equal, round, and reactive to light  EOM are normal    Neck: Normal range of motion  Neck supple  No JVD present  Cardiovascular: Normal rate, regular rhythm and intact distal pulses  Exam reveals no gallop and no friction rub  Murmur (2/6 systolic radiating to axilla) heard  Pulmonary/Chest: Effort normal  No respiratory distress  He has no wheezes  He has no rales  Abdominal: Soft  He exhibits no distension and no mass  There is no tenderness  There is no guarding  Musculoskeletal: Normal range of motion  He exhibits edema (1-2+ pitting edema)  Neurological: He is alert and oriented to person, place, and time  Skin: Skin is warm and dry  Capillary refill takes less than 2 seconds  He is not diaphoretic  No erythema  No pallor  Psychiatric: He has a normal mood and affect  Lab Results: I have personally reviewed pertinent lab results  Imaging: I have personally reviewed pertinent reports  EKG: Personally reviewed    ECHO:   10/2/19 TTE: EF 55%, DD1, RV size and function wnl, mild AI, small pericardial effusion  9/82/84: EF 13%, diastolic dysfunction grade 1, no significant valvulopathy   Previous Stress/Cath/PCI: Trice Wood MD  Cardiology Fellow

## 2020-03-04 DIAGNOSIS — E87.6 HYPOKALEMIA: ICD-10-CM

## 2020-03-05 RX ORDER — POTASSIUM CHLORIDE 20 MEQ/1
20 TABLET, EXTENDED RELEASE ORAL 2 TIMES DAILY
Qty: 60 TABLET | Refills: 11 | Status: SHIPPED | OUTPATIENT
Start: 2020-03-05 | End: 2020-07-25 | Stop reason: HOSPADM

## 2020-04-15 DIAGNOSIS — I10 ESSENTIAL HYPERTENSION: ICD-10-CM

## 2020-04-15 DIAGNOSIS — I50.30 HEART FAILURE WITH PRESERVED EJECTION FRACTION, UNSPECIFIED HF CHRONICITY (HCC): ICD-10-CM

## 2020-04-15 RX ORDER — SPIRONOLACTONE 25 MG/1
25 TABLET ORAL DAILY
Qty: 90 TABLET | Refills: 0 | Status: CANCELLED | OUTPATIENT
Start: 2020-04-15

## 2020-04-17 DIAGNOSIS — I10 ESSENTIAL HYPERTENSION: ICD-10-CM

## 2020-04-17 DIAGNOSIS — I50.30 HEART FAILURE WITH PRESERVED EJECTION FRACTION, UNSPECIFIED HF CHRONICITY (HCC): ICD-10-CM

## 2020-04-17 RX ORDER — SPIRONOLACTONE 25 MG/1
25 TABLET ORAL DAILY
Qty: 90 TABLET | Refills: 3 | Status: SHIPPED | OUTPATIENT
Start: 2020-04-17

## 2020-07-12 ENCOUNTER — APPOINTMENT (EMERGENCY)
Dept: RADIOLOGY | Facility: HOSPITAL | Age: 85
DRG: 291 | End: 2020-07-12
Payer: COMMERCIAL

## 2020-07-12 ENCOUNTER — HOSPITAL ENCOUNTER (INPATIENT)
Facility: HOSPITAL | Age: 85
LOS: 13 days | Discharge: NURSING, REHAB OR LONG-TERM CARE | DRG: 291 | End: 2020-07-25
Attending: EMERGENCY MEDICINE | Admitting: HOSPITALIST
Payer: COMMERCIAL

## 2020-07-12 DIAGNOSIS — J44.9 CHRONIC OBSTRUCTIVE PULMONARY DISEASE, UNSPECIFIED COPD TYPE (HCC): Chronic | ICD-10-CM

## 2020-07-12 DIAGNOSIS — I10 ESSENTIAL HYPERTENSION: ICD-10-CM

## 2020-07-12 DIAGNOSIS — E87.6 HYPOKALEMIA: ICD-10-CM

## 2020-07-12 DIAGNOSIS — I50.33 ACUTE ON CHRONIC DIASTOLIC CHF (CONGESTIVE HEART FAILURE) (HCC): ICD-10-CM

## 2020-07-12 DIAGNOSIS — J96.21 ACUTE ON CHRONIC RESPIRATORY FAILURE WITH HYPOXIA AND HYPERCAPNIA (HCC): ICD-10-CM

## 2020-07-12 DIAGNOSIS — J96.22 ACUTE ON CHRONIC RESPIRATORY FAILURE WITH HYPOXIA AND HYPERCAPNIA (HCC): ICD-10-CM

## 2020-07-12 DIAGNOSIS — R53.1 GENERALIZED WEAKNESS: Primary | ICD-10-CM

## 2020-07-12 DIAGNOSIS — J94.8 HYDROPNEUMOTHORAX: ICD-10-CM

## 2020-07-12 DIAGNOSIS — J90 BILATERAL PLEURAL EFFUSION: ICD-10-CM

## 2020-07-12 DIAGNOSIS — J96.01 ACUTE RESPIRATORY FAILURE WITH HYPOXIA AND HYPERCAPNIA (HCC): ICD-10-CM

## 2020-07-12 DIAGNOSIS — J96.02 ACUTE RESPIRATORY FAILURE WITH HYPOXIA AND HYPERCAPNIA (HCC): ICD-10-CM

## 2020-07-12 DIAGNOSIS — R09.02 HYPOXIA: ICD-10-CM

## 2020-07-12 PROBLEM — R62.7 FAILURE TO THRIVE IN ADULT: Status: ACTIVE | Noted: 2020-07-12

## 2020-07-12 PROBLEM — E03.9 HYPOTHYROIDISM: Status: ACTIVE | Noted: 2020-07-12

## 2020-07-12 PROBLEM — E43 SEVERE PROTEIN-CALORIE MALNUTRITION (HCC): Status: ACTIVE | Noted: 2020-07-12

## 2020-07-12 LAB
ALBUMIN SERPL BCP-MCNC: 3.5 G/DL (ref 3.5–5)
ALP SERPL-CCNC: 66 U/L (ref 46–116)
ALT SERPL W P-5'-P-CCNC: 14 U/L (ref 12–78)
ANION GAP SERPL CALCULATED.3IONS-SCNC: 1 MMOL/L (ref 4–13)
APTT PPP: 28 SECONDS (ref 23–37)
AST SERPL W P-5'-P-CCNC: 26 U/L (ref 5–45)
BASE EX.OXY STD BLDV CALC-SCNC: 63 % (ref 60–80)
BASE EXCESS BLDV CALC-SCNC: 16.3 MMOL/L
BASOPHILS # BLD AUTO: 0.02 THOUSANDS/ΜL (ref 0–0.1)
BASOPHILS NFR BLD AUTO: 0 % (ref 0–1)
BILIRUB SERPL-MCNC: 0.98 MG/DL (ref 0.2–1)
BUN SERPL-MCNC: 17 MG/DL (ref 5–25)
CALCIUM SERPL-MCNC: 8.7 MG/DL (ref 8.3–10.1)
CHLORIDE SERPL-SCNC: 95 MMOL/L (ref 100–108)
CO2 SERPL-SCNC: 44 MMOL/L (ref 21–32)
CREAT SERPL-MCNC: 0.64 MG/DL (ref 0.6–1.3)
EOSINOPHIL # BLD AUTO: 0.15 THOUSAND/ΜL (ref 0–0.61)
EOSINOPHIL NFR BLD AUTO: 3 % (ref 0–6)
ERYTHROCYTE [DISTWIDTH] IN BLOOD BY AUTOMATED COUNT: 13.6 % (ref 11.6–15.1)
GFR SERPL CREATININE-BSD FRML MDRD: 86 ML/MIN/1.73SQ M
GLUCOSE SERPL-MCNC: 111 MG/DL (ref 65–140)
HCO3 BLDV-SCNC: 48 MMOL/L (ref 24–30)
HCT VFR BLD AUTO: 46.2 % (ref 36.5–49.3)
HGB BLD-MCNC: 14.7 G/DL (ref 12–17)
IMM GRANULOCYTES # BLD AUTO: 0.03 THOUSAND/UL (ref 0–0.2)
IMM GRANULOCYTES NFR BLD AUTO: 1 % (ref 0–2)
INR PPP: 1.14 (ref 0.84–1.19)
LYMPHOCYTES # BLD AUTO: 0.35 THOUSANDS/ΜL (ref 0.6–4.47)
LYMPHOCYTES NFR BLD AUTO: 6 % (ref 14–44)
MAGNESIUM SERPL-MCNC: 1.9 MG/DL (ref 1.6–2.6)
MCH RBC QN AUTO: 32 PG (ref 26.8–34.3)
MCHC RBC AUTO-ENTMCNC: 31.8 G/DL (ref 31.4–37.4)
MCV RBC AUTO: 101 FL (ref 82–98)
MONOCYTES # BLD AUTO: 0.54 THOUSAND/ΜL (ref 0.17–1.22)
MONOCYTES NFR BLD AUTO: 10 % (ref 4–12)
NEUTROPHILS # BLD AUTO: 4.47 THOUSANDS/ΜL (ref 1.85–7.62)
NEUTS SEG NFR BLD AUTO: 80 % (ref 43–75)
NRBC BLD AUTO-RTO: 0 /100 WBCS
NT-PROBNP SERPL-MCNC: 556 PG/ML
O2 CT BLDV-SCNC: 13.1 ML/DL
PCO2 BLDV: 97 MM HG (ref 42–50)
PH BLDV: 7.31 [PH] (ref 7.3–7.4)
PLATELET # BLD AUTO: 115 THOUSANDS/UL (ref 149–390)
PMV BLD AUTO: 10.6 FL (ref 8.9–12.7)
PO2 BLDV: 31.5 MM HG (ref 35–45)
POTASSIUM SERPL-SCNC: 4.2 MMOL/L (ref 3.5–5.3)
PROT SERPL-MCNC: 6.5 G/DL (ref 6.4–8.2)
PROTHROMBIN TIME: 14 SECONDS (ref 11.6–14.5)
RBC # BLD AUTO: 4.59 MILLION/UL (ref 3.88–5.62)
SARS-COV-2 RNA RESP QL NAA+PROBE: NEGATIVE
SODIUM SERPL-SCNC: 140 MMOL/L (ref 136–145)
T4 FREE SERPL-MCNC: 1.12 NG/DL (ref 0.76–1.46)
TROPONIN I SERPL-MCNC: 0.02 NG/ML
TSH SERPL DL<=0.05 MIU/L-ACNC: 4.54 UIU/ML (ref 0.36–3.74)
WBC # BLD AUTO: 5.56 THOUSAND/UL (ref 4.31–10.16)

## 2020-07-12 PROCEDURE — 80053 COMPREHEN METABOLIC PANEL: CPT | Performed by: EMERGENCY MEDICINE

## 2020-07-12 PROCEDURE — 85730 THROMBOPLASTIN TIME PARTIAL: CPT | Performed by: EMERGENCY MEDICINE

## 2020-07-12 PROCEDURE — 71045 X-RAY EXAM CHEST 1 VIEW: CPT

## 2020-07-12 PROCEDURE — 99223 1ST HOSP IP/OBS HIGH 75: CPT | Performed by: INTERNAL MEDICINE

## 2020-07-12 PROCEDURE — 83735 ASSAY OF MAGNESIUM: CPT | Performed by: EMERGENCY MEDICINE

## 2020-07-12 PROCEDURE — 84484 ASSAY OF TROPONIN QUANT: CPT | Performed by: EMERGENCY MEDICINE

## 2020-07-12 PROCEDURE — 84443 ASSAY THYROID STIM HORMONE: CPT | Performed by: EMERGENCY MEDICINE

## 2020-07-12 PROCEDURE — 94664 DEMO&/EVAL PT USE INHALER: CPT

## 2020-07-12 PROCEDURE — 94762 N-INVAS EAR/PLS OXIMTRY CONT: CPT

## 2020-07-12 PROCEDURE — 82805 BLOOD GASES W/O2 SATURATION: CPT | Performed by: NURSE PRACTITIONER

## 2020-07-12 PROCEDURE — 83880 ASSAY OF NATRIURETIC PEPTIDE: CPT | Performed by: EMERGENCY MEDICINE

## 2020-07-12 PROCEDURE — 85025 COMPLETE CBC W/AUTO DIFF WBC: CPT | Performed by: EMERGENCY MEDICINE

## 2020-07-12 PROCEDURE — 85610 PROTHROMBIN TIME: CPT | Performed by: EMERGENCY MEDICINE

## 2020-07-12 PROCEDURE — 87635 SARS-COV-2 COVID-19 AMP PRB: CPT | Performed by: EMERGENCY MEDICINE

## 2020-07-12 PROCEDURE — 99285 EMERGENCY DEPT VISIT HI MDM: CPT | Performed by: EMERGENCY MEDICINE

## 2020-07-12 PROCEDURE — 5A09357 ASSISTANCE WITH RESPIRATORY VENTILATION, LESS THAN 24 CONSECUTIVE HOURS, CONTINUOUS POSITIVE AIRWAY PRESSURE: ICD-10-PCS | Performed by: HOSPITALIST

## 2020-07-12 PROCEDURE — 94760 N-INVAS EAR/PLS OXIMETRY 1: CPT

## 2020-07-12 PROCEDURE — 84439 ASSAY OF FREE THYROXINE: CPT | Performed by: INTERNAL MEDICINE

## 2020-07-12 PROCEDURE — 93005 ELECTROCARDIOGRAM TRACING: CPT

## 2020-07-12 PROCEDURE — 36415 COLL VENOUS BLD VENIPUNCTURE: CPT | Performed by: EMERGENCY MEDICINE

## 2020-07-12 PROCEDURE — 99291 CRITICAL CARE FIRST HOUR: CPT

## 2020-07-12 PROCEDURE — 94002 VENT MGMT INPAT INIT DAY: CPT

## 2020-07-12 PROCEDURE — 94640 AIRWAY INHALATION TREATMENT: CPT

## 2020-07-12 RX ORDER — AMLODIPINE BESYLATE 10 MG/1
10 TABLET ORAL DAILY
Status: DISCONTINUED | OUTPATIENT
Start: 2020-07-13 | End: 2020-07-13

## 2020-07-12 RX ORDER — SPIRONOLACTONE 25 MG/1
25 TABLET ORAL DAILY
Status: DISCONTINUED | OUTPATIENT
Start: 2020-07-13 | End: 2020-07-14

## 2020-07-12 RX ORDER — TERAZOSIN 5 MG/1
5 CAPSULE ORAL
Status: DISCONTINUED | OUTPATIENT
Start: 2020-07-12 | End: 2020-07-25 | Stop reason: HOSPADM

## 2020-07-12 RX ORDER — PRAVASTATIN SODIUM 40 MG
40 TABLET ORAL
Status: DISCONTINUED | OUTPATIENT
Start: 2020-07-12 | End: 2020-07-25 | Stop reason: HOSPADM

## 2020-07-12 RX ORDER — ALBUTEROL SULFATE 90 UG/1
2 AEROSOL, METERED RESPIRATORY (INHALATION) EVERY 6 HOURS PRN
Status: DISCONTINUED | OUTPATIENT
Start: 2020-07-12 | End: 2020-07-25 | Stop reason: HOSPADM

## 2020-07-12 RX ORDER — FUROSEMIDE 10 MG/ML
40 INJECTION INTRAMUSCULAR; INTRAVENOUS ONCE
Status: COMPLETED | OUTPATIENT
Start: 2020-07-12 | End: 2020-07-12

## 2020-07-12 RX ORDER — MONTELUKAST SODIUM 10 MG/1
10 TABLET ORAL
Status: DISCONTINUED | OUTPATIENT
Start: 2020-07-12 | End: 2020-07-25 | Stop reason: HOSPADM

## 2020-07-12 RX ORDER — PANTOPRAZOLE SODIUM 40 MG/1
40 TABLET, DELAYED RELEASE ORAL
Status: DISCONTINUED | OUTPATIENT
Start: 2020-07-13 | End: 2020-07-25 | Stop reason: HOSPADM

## 2020-07-12 RX ORDER — POTASSIUM CHLORIDE 20 MEQ/1
20 TABLET, EXTENDED RELEASE ORAL 2 TIMES DAILY
Status: DISCONTINUED | OUTPATIENT
Start: 2020-07-12 | End: 2020-07-14

## 2020-07-12 RX ORDER — LEVOTHYROXINE SODIUM 0.05 MG/1
50 TABLET ORAL DAILY
Status: DISCONTINUED | OUTPATIENT
Start: 2020-07-13 | End: 2020-07-25 | Stop reason: HOSPADM

## 2020-07-12 RX ORDER — TIMOLOL MALEATE 5 MG/ML
1 SOLUTION/ DROPS OPHTHALMIC 2 TIMES DAILY
Status: DISCONTINUED | OUTPATIENT
Start: 2020-07-12 | End: 2020-07-25 | Stop reason: HOSPADM

## 2020-07-12 RX ORDER — LEVALBUTEROL INHALATION SOLUTION 0.63 MG/3ML
0.63 SOLUTION RESPIRATORY (INHALATION)
Status: DISCONTINUED | OUTPATIENT
Start: 2020-07-12 | End: 2020-07-25 | Stop reason: HOSPADM

## 2020-07-12 RX ADMIN — LEVALBUTEROL HYDROCHLORIDE 0.63 MG: 0.63 SOLUTION RESPIRATORY (INHALATION) at 19:46

## 2020-07-12 RX ADMIN — FUROSEMIDE 40 MG: 10 INJECTION, SOLUTION INTRAMUSCULAR; INTRAVENOUS at 19:18

## 2020-07-12 RX ADMIN — MONTELUKAST SODIUM 10 MG: 10 TABLET, FILM COATED ORAL at 21:01

## 2020-07-12 RX ADMIN — POTASSIUM CHLORIDE 20 MEQ: 1500 TABLET, EXTENDED RELEASE ORAL at 19:18

## 2020-07-12 RX ADMIN — PRAVASTATIN SODIUM 40 MG: 40 TABLET ORAL at 19:21

## 2020-07-12 RX ADMIN — TERAZOSIN HYDROCHLORIDE 5 MG: 5 CAPSULE ORAL at 21:01

## 2020-07-12 NOTE — ASSESSMENT & PLAN NOTE
Patient currently takes amlodipine, furosemide p o , and terazosin    Plan:  · Continue amlodipine and terazosin  · Hold p o   Furosemide

## 2020-07-12 NOTE — ASSESSMENT & PLAN NOTE
Patient currently requires increased supplemental oxygen as compared to baseline (3 L nasal cannula at home)  Condition likely secondary to acute exacerbation of CHF and pleural effusions noted on chest x-ray  Patient regularly follows Dr Minh Ayala from pulmonology  Low suspicion for infection given patient's negative COVID-19 testing, no elevated white count, and no fever  Plan:  · Maintain SpO2 between 89% to 94% given patient's history of COPD  · See below for management of CHF  · See below management of COPD  · Pulmonary consulted to determine if thoracocentesis or further imaging needed  · Hold fever workup; consider workup if patient becomes acutely febrile or has found have an elevated white count or worsening respiratory status    · Repeat BMP to monitor hypercapnia

## 2020-07-12 NOTE — ASSESSMENT & PLAN NOTE
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

## 2020-07-12 NOTE — ASSESSMENT & PLAN NOTE
PFT performed on December of 2019 showed severe obstructive pulmonary disease      Plan:  · Pulmonary consult in place  · Continue patient's home albuterol, levalbuterol, and montelukast

## 2020-07-12 NOTE — ASSESSMENT & PLAN NOTE
Patient is currently on simvastatin 20 mg    Plan:  · Convert patient to formulary pravastatin 40 mg p o   Daily

## 2020-07-12 NOTE — ASSESSMENT & PLAN NOTE
TSH on admission was slightly elevated at 4 54      Plan:  · Continue patient's home levothyroxine 50 mcg daily

## 2020-07-12 NOTE — ASSESSMENT & PLAN NOTE
Likely secondary to advanced age, multiple medical conditions, hypothyroidism, and malnutrition       Plan:  · Consult PT/OT to determine disposition  · See malnutrition plan

## 2020-07-12 NOTE — ASSESSMENT & PLAN NOTE
Unclear of effusions on chest x-ray are loculated  Patient also noted to have wheezing on exam as well as increased supplemental oxygen needs  Patient responded well to last thoracocentesis on October of 2019 with resolution of symptoms noted      Plan:  · See acute on chronic CHF plan  · Pulmonary consulted  · Can consider therapeutic thoracocentesis versus catheter placement

## 2020-07-12 NOTE — ASSESSMENT & PLAN NOTE
Wt Readings from Last 3 Encounters:   07/12/20 59 8 kg (131 lb 13 4 oz)   02/14/20 74 9 kg (165 lb 1 6 oz)   12/17/19 77 3 kg (170 lb 6 4 oz)   No intake/output data recorded  No intake/output data recorded  Patient's last echo completed in October of 2019 showed ejection fraction of 50%  Upon assessment patient appears to be fluid overloaded given lower extremity edema and pleural effusions seen on chest x-ray and slightly elevated BNP of 556  Patient is scheduled to have a repeat echo on 07/31/2020  Patient regularly follows Dr Batista Crew:  · See above for acute on chronic hypoxemic and hypercapnic respiratory failure plan  · Hold patient's p o   Lasix  · Continue patient's home spironolactone  · Will provide patient 1 time dose of IV Lasix 40 mg and monitor response  · Will order echocardiogram given that patient is due for one at the end of the month  · Will consult cardiology given patient's recent exacerbation

## 2020-07-12 NOTE — ED PROVIDER NOTES
History  Chief Complaint   Patient presents with    Weakness - Generalized     Pt family states that pt has been getting weaker over the last week with changes in eating  History provided by:  Patient and relative   used: No    24-year-old male brought for evaluation of generalized weakness, diminished oral intake over the last week or so  He has no complaints  Currently lives alone  Daughter does to the house and make sure he has food but notes that he has not been eating appropriately  He does have falls from time to time and has an abrasion on his right scapular area but denies any specific injury  He has no signs of head trauma  He reports no pain  He is very thin, with little fat and muscle mass  History of COPD  Hypoxic on arrival on room air at 60%  Typically uses 3 L supplemental O2 at home  Requiring 5 L here  Chronic cough  No changes, fever, chills, chest pain  Denies shortness of breath  Plan EKG, labs, chest x-ray, COVID test and will re-evaluate  Prior to Admission Medications   Prescriptions Last Dose Informant Patient Reported? Taking?    Blood Pressure Monitoring (B-D ASSURE BPM/AUTO ARM CUFF) MISC  Self No No   Sig: by Does not apply route daily   RABEprazole (ACIPHEX) 20 MG tablet  Self Yes No   Sig: daily    albuterol (PROVENTIL HFA,VENTOLIN HFA) 90 mcg/act inhaler  Self Yes No   Sig: Inhale 2 puffs every 6 (six) hours as needed for wheezing   amLODIPine (NORVASC) 10 mg tablet   No No   Sig: Take 1 tablet (10 mg total) by mouth daily   atenolol-chlorthalidone (TENORETIC) 50-25 mg per tablet  Self Yes No   Sig: Take 1 tablet by mouth daily   brimonidine-timolol (COMBIGAN) 0 2-0 5 %  Self Yes No   Sig: Apply 1 drop to eye 2 (two) times a day   furosemide (LASIX) 40 mg tablet  Self No No   Sig: Take 1 tablet (40 mg total) by mouth daily   latanoprost (XALATAN) 0 005 % ophthalmic solution  Self Yes No   levalbuterol (XOPENEX) 0 63 mg/3 mL nebulizer solution  Self No No   Sig: Take 1 vial (0 63 mg total) by nebulization 3 (three) times a day   Patient not taking: Reported on 2/14/2020   levothyroxine 50 mcg tablet  Self Yes No   Sig: Take 1 tablet by mouth daily   montelukast (SINGULAIR) 10 mg tablet  Self Yes No   Sig: daily    potassium chloride (K-DUR,KLOR-CON) 20 mEq tablet   No No   Sig: Take 1 tablet (20 mEq total) by mouth 2 (two) times a day   predniSONE 10 mg tablet  Self Yes No   Sig: Take by mouth daily 2 pills daily   simvastatin (ZOCOR) 20 mg tablet  Self Yes No   Sig: daily    spironolactone (ALDACTONE) 25 mg tablet   No No   Sig: Take 1 tablet (25 mg total) by mouth daily   terazosin (HYTRIN) 5 mg capsule  Self Yes No   Sig: Take 1 capsule by mouth      Facility-Administered Medications: None       Past Medical History:   Diagnosis Date    Allergic rhinitis     Hypercholesteremia     Hypertension     Pneumonia        Past Surgical History:   Procedure Laterality Date    LAPAROSCOPIC COLON RESECTION      TOTAL HIP ARTHROPLASTY         Family History   Family history unknown: Yes     I have reviewed and agree with the history as documented  E-Cigarette/Vaping    E-Cigarette Use Never User      E-Cigarette/Vaping Substances     Social History     Tobacco Use    Smoking status: Former Smoker     Packs/day: 0 20     Years: 10 00     Pack years: 2 00     Types: Cigarettes    Smokeless tobacco: Never Used    Tobacco comment: Quit 60 years ago    Substance Use Topics    Alcohol use: Not Currently     Frequency: Monthly or less     Comment: Socially    Drug use: Never       Review of Systems   Constitutional: Positive for activity change and appetite change  Negative for fatigue and fever  Respiratory: Positive for cough  Negative for chest tightness and shortness of breath  Cardiovascular: Negative for chest pain  Gastrointestinal: Negative for abdominal pain, nausea and vomiting     Musculoskeletal: Negative for back pain and neck pain    Skin: Negative for color change and rash  Neurological: Negative for dizziness and weakness  All other systems reviewed and are negative  Physical Exam  Physical Exam   Constitutional: He appears well-developed  Thin, little muscle and adipose tissue  HENT:   Head: Normocephalic  Neck: Normal range of motion  Neck supple  No JVD present  Cardiovascular: Normal rate, regular rhythm and normal heart sounds  Pulmonary/Chest: Effort normal    Diminished breath sounds at the bases  Abdominal: Soft  He exhibits no distension  There is no tenderness  Musculoskeletal: Normal range of motion  1+ pitting edema of the legs  Neurological: He is alert  Skin: Skin is warm and dry  No rash noted  Psychiatric: He has a normal mood and affect  His behavior is normal    Nursing note and vitals reviewed  Vital Signs  ED Triage Vitals [07/12/20 1455]   Temperature Pulse Respirations Blood Pressure SpO2   97 7 °F (36 5 °C) 81 18 114/53 (!) 60 %      Temp Source Heart Rate Source Patient Position - Orthostatic VS BP Location FiO2 (%)   Oral Monitor Lying Right arm --      Pain Score       --           Vitals:    07/12/20 1455 07/12/20 1522   BP: 114/53    Pulse: 81 76   Patient Position - Orthostatic VS: Lying          Visual Acuity      ED Medications  Medications - No data to display    Diagnostic Studies  Results Reviewed     Procedure Component Value Units Date/Time    [facility] [place] [facility] [[id number]]  (Normal) Collected:  07/12/20 1526    Lab Status:  Final result Specimen:  Nares from Nose Updated:  07/12/20 1630     SARS-CoV-2 Negative    Narrative: The specimen collection materials, transport medium, and/or testing methodology utilized in the production of these test results have been proven to be reliable in a limited validation with an abbreviated program under the Emergency Utilization Authorization provided by the FDA    Testing reported as "Presumptive positive" will be confirmed with secondary testing with a reference laboratory to ensure result accuracy  Clinical caution and judgement should be used with the interpretation of these results with consideration of the clinical impression and other laboratory testing  Testing reported as "Positive" or "Negative" has been proven to be accurate according to standard laboratory validation requirements  All testing is performed with control materials showing appropriate reactivity at standard intervals        Comprehensive metabolic panel [[id number]]  (Abnormal) Collected:  07/12/20 1526    Lab Status:  Final result Specimen:  Blood from Arm, Right Updated:  07/12/20 1557     Sodium 140 mmol/L      Potassium 4 2 mmol/L      Chloride 95 mmol/L      CO2 44 mmol/L      ANION GAP 1 mmol/L      BUN 17 mg/dL      Creatinine 0 64 mg/dL      Glucose 111 mg/dL      Calcium 8 7 mg/dL      AST 26 U/L      ALT 14 U/L      Alkaline Phosphatase 66 U/L      Total Protein 6 5 g/dL      Albumin 3 5 g/dL      Total Bilirubin 0 98 mg/dL      eGFR 86 ml/min/1 73sq m     Narrative:       [place] guidelines for Chronic Kidney Disease (CKD):     Stage 1 with normal or high GFR (GFR > 90 mL/min/1 73 square meters)    Stage 2 Mild CKD (GFR = 60-89 mL/min/1 73 square meters)    Stage 3A Moderate CKD (GFR = 45-59 mL/min/1 73 square meters)    Stage 3B Moderate CKD (GFR = 30-44 mL/min/1 73 square meters)    Stage 4 Severe CKD (GFR = 15-29 mL/min/1 73 square meters)    Stage 5 End Stage CKD (GFR <15 mL/min/1 73 square meters)  Note: GFR calculation is accurate only with a steady state creatinine    Protime-INR [[id number]]  (Normal) Collected:  07/12/20 1526    Lab Status:  Final result Specimen:  Blood from Arm, Right Updated:  07/12/20 1557     Protime 14 0 seconds      INR 1 14    APTT [[id number]]  (Normal) Collected:  07/12/20 1526    Lab Status:  Final result Specimen:  Blood from Arm, Right Updated:  07/12/20 1557     PTT 28 seconds     Magnesium [[id number]]  (Normal) Collected:  07/12/20 1526    Lab Status:  Final result Specimen:  Blood from Arm, Right Updated:  07/12/20 1554     Magnesium 1 9 mg/dL     NT-BNP PRO [[id number]]  (Abnormal) Collected:  07/12/20 1526    Lab Status:  Final result Specimen:  Blood from Arm, Right Updated:  07/12/20 1554     NT-proBNP 556 pg/mL     TSH [[id number]]  (Abnormal) Collected:  07/12/20 1526    Lab Status:  Final result Specimen:  Blood from Arm, Right Updated:  07/12/20 1554     TSH 3RD GENERATON 4 540 uIU/mL     Narrative:       Patients undergoing fluorescein dye angiography may retain small amounts of fluorescein in the body for 48-72 hours post procedure  Samples containing fluorescein can produce falsely depressed TSH values  If the patient had this procedure,a specimen should be resubmitted post fluorescein clearance        Troponin I [[id number]]  (Normal) Collected:  07/12/20 1526    Lab Status:  Final result Specimen:  Blood from Arm, Right Updated:  07/12/20 1547     Troponin I 0 02 ng/mL     CBC and differential [[id number]]  (Abnormal) Collected:  07/12/20 1526    Lab Status:  Final result Specimen:  Blood from Arm, Right Updated:  07/12/20 1536     WBC 5 56 Thousand/uL      RBC 4 59 Million/uL      Hemoglobin 14 7 g/dL      Hematocrit 46 2 %       fL      MCH 32 0 pg      MCHC 31 8 g/dL      RDW 13 6 %      MPV 10 6 fL      Platelets 559 Thousands/uL      nRBC 0 /100 WBCs      Neutrophils Relative 80 %      Immat GRANS % 1 %      Lymphocytes Relative 6 %      Monocytes Relative 10 %      Eosinophils Relative 3 %      Basophils Relative 0 %      Neutrophils Absolute 4 47 Thousands/µL      Immature Grans Absolute 0 03 Thousand/uL      Lymphocytes Absolute 0 35 Thousands/µL      Monocytes Absolute 0 54 Thousand/µL      Eosinophils Absolute 0 15 Thousand/µL      Basophils Absolute 0 02 Thousands/µL                  XR chest 1 view portable   ED Interpretation by Froylan Wong MD (07/12 4526)   Bilateral pleural effusions                 Procedures  ECG 12 Lead Documentation Only  Date/Time: 7/12/2020 3:59 PM  Performed by: Froylan Wong MD  Authorized by: Froylan Wong MD     Indications / Diagnosis:  Weakness  Patient location:  ED  Previous ECG:     Previous ECG:  Compared to current    Comparison ECG info:  10/1/19    Similarity:  No change  Rate:     ECG rate:  74  Rhythm:     Rhythm: sinus rhythm    Ectopy:     Ectopy: none    QRS:     QRS axis:  Left  Conduction:     Conduction: normal    ST segments:     ST segments:  Normal  T waves:     T waves: non-specific               ED Course  ED Course as of Jul 12 1636   Sun Jul 12, 2020   1623 Discussed labs, chest x-ray findings with the patient and daughter  Will likely need thoracentesis  Will plan admission  US AUDIT      Most Recent Value   Initial Alcohol Screen: US AUDIT-C    1  How often do you have a drink containing alcohol?  0 Filed at: 07/12/2020 1557   2  How many drinks containing alcohol do you have on a typical day you are drinking? 0 Filed at: 07/12/2020 1557   3a  Male UNDER 65: How often do you have five or more drinks on one occasion? 0 Filed at: 07/12/2020 1557   3b  FEMALE Any Age, or MALE 65+: How often do you have 4 or more drinks on one occassion? 0 Filed at: 07/12/2020 1557   Audit-C Score  0 Filed at: 07/12/2020 1557                  RACHEL/DAST-10      Most Recent Value   How many times in the past year have you    Used an illegal drug or used a prescription medication for non-medical reasons?   Never Filed at: 07/12/2020 1556                                MDM  Number of Diagnoses or Management Options  Bilateral pleural effusion: new and requires workup  Generalized weakness: new and requires workup  Hypoxia: new and requires workup  Diagnosis management comments: 22-year-old male with history of COPD on 3 L oxygen at home brought for evaluation of generalized weakness, failure to thrive over the last week  Patient hypoxic on arrival despite his usual supplemental oxygen  Requiring 5 L to maintain oxygenation  Diminished breath sounds at the bases  Found have bilateral pleural effusion  He does have history of this and had thoracentesis last year  COVID negative  No signs of acute infection  EKG unchanged  Troponin negative  Amount and/or Complexity of Data Reviewed  Clinical lab tests: ordered and reviewed  Tests in the radiology section of CPT®: ordered and reviewed  Discuss the patient with other providers: yes  Independent visualization of images, tracings, or specimens: yes    Patient Progress  Patient progress: stable        Disposition  Final diagnoses:   Generalized weakness   Bilateral pleural effusion   Hypoxia     Time reflects when diagnosis was documented in both MDM as applicable and the Disposition within this note     Time User Action Codes Description Comment    7/12/2020  4:07 PM Nate GERARD Add [R53 1] Generalized weakness     7/12/2020  4:08 PM Nate GERRAD Add [J90] Bilateral pleural effusion     7/12/2020  4:08 PM Bobbycathryn Schaefer Add [R09 02] Hypoxia       ED Disposition     ED Disposition Condition Date/Time Comment    Admit Stable Sun Jul 12, 2020  4:33 PM Case was discussed with Dr Jose Manuel Mai and the patient's admission status was agreed to be Admission Status: inpatient status to the service of Dr Jose Manuel Mai   Follow-up Information    None         Patient's Medications   Discharge Prescriptions    No medications on file     No discharge procedures on file      PDMP Review     None          ED Provider  Electronically Signed by           Neymar Fowler MD  07/12/20 5360

## 2020-07-12 NOTE — ASSESSMENT & PLAN NOTE
Malnutrition Findings:        patient is noted to be thin with minimal muscle and adipose tissue  Unclear on patient's current nutritional status  BMI Findings: Body mass index is 20 65 kg/m²       Plan:  · Consult nutrition to determine appropriate diet  · Patient currently on regular diet with high-calorie and high-protein

## 2020-07-12 NOTE — H&P
H&P- [place] 3/24/1929, 80 y o  male MRN: [id number]    Unit/Bed#: S -01 Encounter: [id number]    Primary Care Provider: Nikhil Hdez MD   Date and time admitted to hospital: 7/12/2020  2:58 PM        Acute on chronic respiratory failure with hypoxia and hypercapnia ([facility] [address] )  Assessment & Plan  Patient currently requires increased supplemental oxygen as compared to baseline (3 L nasal cannula at home)  Condition likely secondary to acute exacerbation of CHF and pleural effusions noted on chest x-ray  Patient regularly follows Dr Jorge Casey from pulmonology  Low suspicion for infection given patient's negative COVID-19 testing, no elevated white count, and no fever  Plan:  · Maintain SpO2 between 89% to 94% given patient's history of COPD  · See below for management of CHF  · See below management of COPD  · Pulmonary consulted to determine if thoracocentesis or further imaging needed  · Hold fever workup; consider workup if patient becomes acutely febrile or has found have an elevated white count or worsening respiratory status  · Repeat BMP to monitor hypercapnia    * Generalized weakness  Assessment & Plan  Likely secondary to advanced age, multiple medical conditions, hypothyroidism, and malnutrition  Plan:  · Consult PT/OT to determine disposition  · See malnutrition plan     Pleural effusion  Assessment & Plan  Unclear of effusions on chest x-ray are loculated  Patient also noted to have wheezing on exam as well as increased supplemental oxygen needs  Patient responded well to last thoracocentesis on October of 2019 with resolution of symptoms noted  Plan:  · See acute on chronic CHF plan  · Pulmonary consulted  · Can consider therapeutic thoracocentesis versus catheter placement    Severe protein-calorie malnutrition ([facility] [address] )  Assessment & Plan  Malnutrition Findings:        patient is noted to be thin with minimal muscle and adipose tissue    Unclear on patient's current nutritional status  BMI Findings: Body mass index is 20 65 kg/m²  Plan:  · Consult nutrition to determine appropriate diet  · Patient currently on regular diet with high-calorie and high-protein    Acute on chronic diastolic heart failure [facility])  Assessment & Plan  Wt Readings from Last 3 Encounters:   07/12/20 59 8 kg (131 lb 13 4 oz)   02/14/20 74 9 kg (165 lb 1 6 oz)   12/17/19 77 3 kg (170 lb 6 4 oz)   No intake/output data recorded  No intake/output data recorded  Patient's last echo completed in October of 2019 showed ejection fraction of 50%  Upon assessment patient appears to be fluid overloaded given lower extremity edema and pleural effusions seen on chest x-ray and slightly elevated BNP of 556  Patient is scheduled to have a repeat echo on 07/31/2020  Patient regularly follows Dr Steven Pantoja:  · See above for acute on chronic hypoxemic and hypercapnic respiratory failure plan  · Hold patient's p o  Lasix  · Continue patient's home spironolactone  · Will provide patient 1 time dose of IV Lasix 40 mg and monitor response  · Will order echocardiogram given that patient is due for one at the end of the month  · Will consult cardiology given patient's recent exacerbation          Hypothyroidism  Assessment & Plan  TSH on admission was slightly elevated at 4 54  Plan:  · Continue patient's home levothyroxine 50 mcg daily    COPD (chronic obstructive pulmonary disease) ([facility] [address] )  Assessment & Plan  PFT performed on December of 2019 showed severe obstructive pulmonary disease  Plan:  · Pulmonary consult in place  · Continue patient's home albuterol, levalbuterol, and montelukast     Thrombocytopenia ([facility] [address] )  Assessment & Plan  Patient is currently asymptomatic  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 myelodysplastic syndromes      Plan:  · Monitor with daily CBC  · Will continue with pharmacologic VTE prophylaxis; may consider discontinuation if platelet count worsens    Hyperlipidemia  Assessment & Plan  Patient is currently on simvastatin 20 mg    Plan:  · Convert patient to formulary pravastatin 40 mg p o  Daily    Essential hypertension  Assessment & Plan  Patient currently takes amlodipine, furosemide p o , and terazosin    Plan:  · Continue amlodipine and terazosin  · Hold p o  Furosemide    VTE Prophylaxis: Enoxaparin (Lovenox)  / sequential compression device   Code Status:  DNR/DNI  POLST: POLST form is not discussed and not completed at this time  Anticipated Length of Stay:  Patient will be admitted on an Inpatient basis with an anticipated length of stay of  greater than 2 midnights  Justification for Hospital Stay:  Generalized weakness, severe malnutrition, acute respiratory failure, acute CHF exacerbation    Chief Complaint:   Weakness and confusion in the past week    History of Present Illness:    Keena Quintanilla is a 80 y o  male who presents with generalized weakness and increased confusion over the past week  [de-identified] of history was obtained from patient's daughter  Per the daughter, patient was noted to be increasingly confused on Wednesday 07/08/2020  Patient lives by himself and is usually self-sufficient, but the daughter noted that patient needed more frequent reminders about medications and doctor visits  Of note, daughter states that whenever patient is fluid overloaded, patient becomes transiently confused  Daughter also noted that patient was sleeping on his recliner more often in the past week  Patient was brought to the daughter's home on Saturday 07/11/2020 out of concern for his increased confusion and weakness  On 07/12/2020 daughter noted more confusion and had a suspected fall out of bed  Daughter called patient's primary care provider, who recommended patient go to the emergency room for further evaluation of potential infection or CHF exacerbation  Patient's last echo completed on October 2019 showed an EF of 50%      In the ED, patient needed increase of his supplemental O2 (baseline 3 L nasal cannula)  Labs of note include a low chloride of 95, an elevated bicarb of 44, BNP elevation of 556, and a low platelet count of 253  TSH was also noted to be elevated at 4 54  COVID-19 negative  Review of Systems:    Review of Systems   Constitutional: Positive for activity change, appetite change and unexpected weight change (lost weight)  Negative for chills, diaphoresis, fatigue and fever  HENT: Negative  Eyes: Negative  Respiratory: Positive for shortness of breath and wheezing  Negative for cough and chest tightness  Cardiovascular: Positive for leg swelling  Negative for chest pain and palpitations  Gastrointestinal: Negative for abdominal distention, abdominal pain, blood in stool, constipation, diarrhea, nausea and vomiting  Endocrine: Negative  Genitourinary: Negative  Negative for difficulty urinating, dysuria, frequency, hematuria and urgency  Musculoskeletal: Negative for back pain, joint swelling, neck pain and neck stiffness  Skin: Negative for pallor and rash  Neurological: Positive for weakness (generalized)  Negative for dizziness, facial asymmetry, light-headedness, numbness and headaches  Hematological: Negative  Psychiatric/Behavioral: Positive for confusion (mild)  Negative for agitation and hallucinations  All other systems reviewed and are negative  Past Medical and Surgical History:     Past Medical History:   Diagnosis Date    Allergic rhinitis     Hypercholesteremia     Hypertension     Pneumonia        Past Surgical History:   Procedure Laterality Date    LAPAROSCOPIC COLON RESECTION      TOTAL HIP ARTHROPLASTY         Meds/Allergies:    Prior to Admission medications    Medication Sig Start Date End Date Taking?  Authorizing Provider   amLODIPine (NORVASC) 10 mg tablet Take 1 tablet (10 mg total) by mouth daily 12/19/19  Yes Roger Yanez MD   atenolol-chlorthalidone (TENORETIC) 50-25 mg per tablet Take 1 tablet by mouth daily   Yes Historical Provider, MD   brimonidine-timolol (COMBIGAN) 0 2-0 5 % Apply 1 drop to eye 2 (two) times a day   Yes Historical Provider, MD   furosemide (LASIX) 40 mg tablet Take 1 tablet (40 mg total) by mouth daily 10/8/19  Yes Kirby Wilson MD   latanoprost (XALATAN) 0 005 % ophthalmic solution  2/19/19  Yes Historical Provider, MD   levothyroxine 50 mcg tablet Take 1 tablet by mouth daily   Yes Historical Provider, MD   montelukast (SINGULAIR) 10 mg tablet daily  12/14/18  Yes Historical Provider, MD   potassium chloride (K-DUR,KLOR-CON) 20 mEq tablet Take 1 tablet (20 mEq total) by mouth 2 (two) times a day 3/5/20  Yes Nikita Kirk MD   RABEprazole (ACIPHEX) 20 MG tablet daily  2/20/19  Yes Historical Provider, MD   simvastatin (ZOCOR) 20 mg tablet daily  1/20/19  Yes Historical Provider, MD   spironolactone (ALDACTONE) 25 mg tablet Take 1 tablet (25 mg total) by mouth daily 4/17/20  Yes Sharon Pa MD   terazosin (HYTRIN) 5 mg capsule Take 1 capsule by mouth   Yes Historical Provider, MD   albuterol (PROVENTIL HFA,VENTOLIN HFA) 90 mcg/act inhaler Inhale 2 puffs every 6 (six) hours as needed for wheezing    Historical Provider, MD   Blood Pressure Monitoring (B-D ASSURE BPM/AUTO ARM CUFF) MISC by Does not apply route daily 6/6/19   Sharon Pa MD   levalbuterol (XOPENEX) 0 63 mg/3 mL nebulizer solution Take 1 vial (0 63 mg total) by nebulization 3 (three) times a day  Patient not taking: Reported on 2/14/2020 10/7/19   Kirby Wilson MD   predniSONE 10 mg tablet Take by mouth daily 2 pills daily  7/12/20  Historical Provider, MD     I have reviewed home medications with patient family member      Allergies: No Known Allergies    Social History:     Marital Status: /Civil Union   Occupation:  Retired  Patient Pre-hospital Living Situation:  Independent with peripheral support from family  Patient Pre-hospital Level of Mobility:  Uses walker to ambulate  Patient Pre-hospital Diet Restrictions:  None of note  Substance Use History:   Social History     Substance and Sexual Activity   Alcohol Use Not Currently    Frequency: Monthly or less    Comment: Socially     Social History     Tobacco Use   Smoking Status Former Smoker    Packs/day: 0 20    Years: 10 00    Pack years: 2 00    Types: Cigarettes   Smokeless Tobacco Never Used   Tobacco Comment    Quit 60 years ago      Social History     Substance and Sexual Activity   Drug Use Never       Family History:    non-contributory    Physical Exam:     Vitals:   Blood Pressure: 121/59 (07/12/20 1751)  Pulse: 79 (07/12/20 1751)  Temperature: (!) 96 9 °F (36 1 °C) (07/12/20 1751)  Temp Source: Axillary (07/12/20 1751)  Respirations: 20 (07/12/20 1751)  Height: 5' 7" (170 2 cm) (07/12/20 1455)  Weight - Scale: 59 8 kg (131 lb 13 4 oz) (07/12/20 1751)  SpO2: 94 % (07/12/20 1751)    Physical Exam   Constitutional: He is oriented to person, place, and time  He appears lethargic  No distress  Nasal cannula in place  Severely malnourished with minimal muscle and adipose tissue noted  Patient is drowsy   HENT:   Head: Normocephalic and atraumatic  Eyes: Pupils are equal, round, and reactive to light  Conjunctivae and EOM are normal  No scleral icterus  Neck: Neck supple  No JVD present  No thyromegaly present  Cardiovascular: Normal rate, regular rhythm, normal heart sounds and intact distal pulses  Exam reveals no gallop and no friction rub  No murmur heard  Pulmonary/Chest: Effort normal  Respiratory distress: mild  He has wheezes (bases of lungs)  He exhibits no tenderness  Abdominal: Soft  Bowel sounds are normal  He exhibits no distension  There is no tenderness  Musculoskeletal: Normal range of motion  He exhibits edema (2+ pitting lower extremities b/l up to pretibial)  Lymphadenopathy:     He has no cervical adenopathy     Neurological: He is oriented to person, place, and time  He appears lethargic  No cranial nerve deficit or sensory deficit  He exhibits normal muscle tone  Skin: Skin is warm and dry  Capillary refill takes less than 2 seconds  He is not diaphoretic  No erythema  No pallor  Psychiatric: He has a normal mood and affect  Nursing note and vitals reviewed  Additional Data:     Lab Results: I have personally reviewed pertinent reports  Results from last 7 days   Lab Units 07/12/20  1526   WBC Thousand/uL 5 56   HEMOGLOBIN g/dL 14 7   HEMATOCRIT % 46 2   PLATELETS Thousands/uL 115*   NEUTROS PCT % 80*   LYMPHS PCT % 6*   MONOS PCT % 10   EOS PCT % 3     Results from last 7 days   Lab Units 07/12/20  1526   POTASSIUM mmol/L 4 2   CHLORIDE mmol/L 95*   CO2 mmol/L 44*   BUN mg/dL 17   CREATININE mg/dL 0 64   CALCIUM mg/dL 8 7   ALK PHOS U/L 66   ALT U/L 14   AST U/L 26     Results from last 7 days   Lab Units 07/12/20  1526   INR  1 14       Imaging: I have personally reviewed pertinent films in PACS    No results found  EKG, Pathology, and Other Studies Reviewed on Admission:   · EKG:  Regular rate sinus rhythm left axis deviation    Epic / Care Everywhere Records Reviewed: Yes     ** Please Note: This note has been constructed using a voice recognition system   **

## 2020-07-12 NOTE — PLAN OF CARE
Problem: Potential for Falls  Goal: Patient will remain free of falls  Description  INTERVENTIONS:  - Assess patient frequently for physical needs  -  Identify cognitive and physical deficits and behaviors that affect risk of falls  -  [place] fall precautions as indicated by assessment   - Educate patient/family on patient safety including physical limitations  - Instruct patient to call for assistance with activity based on assessment  - Modify environment to reduce risk of injury  - Consider OT/PT consult to assist with strengthening/mobility  Outcome: Progressing     Problem: Prexisting or High Potential for Compromised Skin Integrity  Goal: Skin integrity is maintained or improved  Description  INTERVENTIONS:  - Identify patients at risk for skin breakdown  - Assess and monitor skin integrity  - Assess and monitor nutrition and hydration status  - Monitor labs   - Assess for incontinence   - Turn and reposition patient  - Assist with mobility/ambulation  - Relieve pressure over bony prominences  - Avoid friction and shearing  - Provide appropriate hygiene as needed including keeping skin clean and dry  - Evaluate need for skin moisturizer/barrier cream  - Collaborate with interdisciplinary team   - Patient/family teaching  - Consider wound care consult   Outcome: Progressing     Problem: Nutrition/Hydration-ADULT  Goal: Nutrient/Hydration intake appropriate for improving, restoring or maintaining nutritional needs  Description  Monitor and assess patient's nutrition/hydration status for malnutrition  Collaborate with interdisciplinary team and initiate plan and interventions as ordered  Monitor patient's weight and dietary intake as ordered or per policy  Utilize nutrition screening tool and intervene as necessary  Determine patient's food preferences and provide high-protein, high-caloric foods as appropriate       INTERVENTIONS:  - Monitor oral intake, urinary output, labs, and treatment plans  - Assess nutrition and hydration status and recommend course of action  - Evaluate amount of meals eaten  - Assist patient with eating if necessary   - Allow adequate time for meals  - Recommend/ encourage appropriate diets, oral nutritional supplements, and vitamin/mineral supplements  - Order, calculate, and assess calorie counts as needed  - Recommend, monitor, and adjust tube feedings and TPN/PPN based on assessed needs  - Assess need for intravenous fluids  - Provide specific nutrition/hydration education as appropriate  - Include patient/family/caregiver in decisions related to nutrition  Outcome: Progressing     Problem: PAIN - ADULT  Goal: Verbalizes/displays adequate comfort level or baseline comfort level  Description  Interventions:  - Encourage patient to monitor pain and request assistance  - Assess pain using appropriate pain scale  - Administer analgesics based on type and severity of pain and evaluate response  - Implement non-pharmacological measures as appropriate and evaluate response  - Consider cultural and social influences on pain and pain management  - Notify physician/advanced practitioner if interventions unsuccessful or patient reports new pain  Outcome: Progressing     Problem: INFECTION - ADULT  Goal: Absence or prevention of progression during hospitalization  Description  INTERVENTIONS:  - Assess and monitor for signs and symptoms of infection  - Monitor lab/diagnostic results  - Monitor all insertion sites, i e  indwelling lines, tubes, and drains  - Monitor endotracheal if appropriate and nasal secretions for changes in amount and color  - [place] appropriate cooling/warming therapies per order  - Administer medications as ordered  - Instruct and encourage patient and family to use good hand hygiene technique  - Identify and instruct in appropriate isolation precautions for identified infection/condition  Outcome: Progressing  Goal: Absence of fever/infection during neutropenic period  Description  INTERVENTIONS:  - Monitor WBC    Outcome: Progressing     Problem: SAFETY ADULT  Goal: Maintain or return to baseline ADL function  Description  INTERVENTIONS:  -  Assess patient's ability to carry out ADLs; assess patient's baseline for ADL function and identify physical deficits which impact ability to perform ADLs (bathing, care of mouth/teeth, toileting, grooming, dressing, etc )  - Assess/evaluate cause of self-care deficits   - Assess range of motion  - Assess patient's mobility; develop plan if impaired  - Assess patient's need for assistive devices and provide as appropriate  - Encourage maximum independence but intervene and supervise when necessary  - Involve family in performance of ADLs  - Assess for home care needs following discharge   - Consider OT consult to assist with ADL evaluation and planning for discharge  - Provide patient education as appropriate  Outcome: Progressing  Goal: Maintain or return mobility status to optimal level  Description  INTERVENTIONS:  - Assess patient's baseline mobility status (ambulation, transfers, stairs, etc )    - Identify cognitive and physical deficits and behaviors that affect mobility  - Identify mobility aids required to assist with transfers and/or ambulation (gait belt, sit-to-stand, lift, walker, cane, etc )  - [place] fall precautions as indicated by assessment  - Record patient progress and toleration of activity level on Mobility SBAR; progress patient to next Phase/Stage  - Instruct patient to call for assistance with activity based on assessment  - Consider rehabilitation consult to assist with strengthening/weightbearing, etc   Outcome: Progressing     Problem: DISCHARGE PLANNING  Goal: Discharge to home or other facility with appropriate resources  Description  INTERVENTIONS:  - Identify barriers to discharge w/patient and caregiver  - Arrange for needed discharge resources and transportation as appropriate  - Identify discharge learning needs (meds, wound care, etc )  - Arrange for interpretive services to assist at discharge as needed  - Refer to Case Management Department for coordinating discharge planning if the patient needs post-hospital services based on physician/advanced practitioner order or complex needs related to functional status, cognitive ability, or social support system  Outcome: Progressing     Problem: Knowledge Deficit  Goal: Patient/family/caregiver demonstrates understanding of disease process, treatment plan, medications, and discharge instructions  Description  Complete learning assessment and assess knowledge base    Interventions:  - Provide teaching at level of understanding  - Provide teaching via preferred learning methods  Outcome: Progressing     Problem: RESPIRATORY - ADULT  Goal: Achieves optimal ventilation and oxygenation  Description  INTERVENTIONS:  - Assess for changes in respiratory status  - Assess for changes in mentation and behavior  - Position to facilitate oxygenation and minimize respiratory effort  - Oxygen administered by appropriate delivery if ordered  - Initiate smoking cessation education as indicated  - Encourage broncho-pulmonary hygiene including cough, deep breathe, Incentive Spirometry  - Assess the need for suctioning and aspirate as needed  - Assess and instruct to report SOB or any respiratory difficulty  - Respiratory Therapy support as indicated  Outcome: Progressing     Problem: GENITOURINARY - ADULT  Goal: Maintains or returns to baseline urinary function  Description  INTERVENTIONS:  - Assess urinary function  - Encourage oral fluids to ensure adequate hydration if ordered  - Administer IV fluids as ordered to ensure adequate hydration  - Administer ordered medications as needed  - Offer frequent toileting  - Follow urinary retention protocol if ordered  Outcome: Progressing  Goal: Absence of urinary retention  Description  INTERVENTIONS:  - Assess patients ability to void and empty bladder  - Monitor I/O  - Bladder scan as needed  - Discuss with physician/AP medications to alleviate retention as needed  - Discuss catheterization for long term situations as appropriate  Outcome: Progressing  Goal: Urinary catheter remains patent  Description  INTERVENTIONS:  - Assess patency of urinary catheter  - If patient has a chronic can, consider changing catheter if non-functioning  - Follow guidelines for intermittent irrigation of non-functioning urinary catheter  Outcome: Progressing     Problem: METABOLIC, FLUID AND ELECTROLYTES - ADULT  Goal: Electrolytes maintained within normal limits  Description  INTERVENTIONS:  - Monitor labs and assess patient for signs and symptoms of electrolyte imbalances  - Administer electrolyte replacement as ordered  - Monitor response to electrolyte replacements, including repeat lab results as appropriate  - Instruct patient on fluid and nutrition as appropriate  Outcome: Progressing  Goal: Fluid balance maintained  Description  INTERVENTIONS:  - Monitor labs   - Monitor I/O and WT  - Instruct patient on fluid and nutrition as appropriate  - Assess for signs & symptoms of volume excess or deficit  Outcome: Progressing  Goal: Glucose maintained within target range  Description  INTERVENTIONS:  - Monitor Blood Glucose as ordered  - Assess for signs and symptoms of hyperglycemia and hypoglycemia  - Administer ordered medications to maintain glucose within target range  - Assess nutritional intake and initiate nutrition service referral as needed  Outcome: Progressing     Problem: SKIN/TISSUE INTEGRITY - ADULT  Goal: Skin integrity remains intact  Description  INTERVENTIONS  - Identify patients at risk for skin breakdown  - Assess and monitor skin integrity  - Assess and monitor nutrition and hydration status  - Monitor labs (i e  albumin)  - Assess for incontinence   - Turn and reposition patient  - Assist with mobility/ambulation  - Relieve pressure over bony prominences  - Avoid friction and shearing  - Provide appropriate hygiene as needed including keeping skin clean and dry  - Evaluate need for skin moisturizer/barrier cream  - Collaborate with interdisciplinary team (i e  Nutrition, Rehabilitation, etc )   - Patient/family teaching  Outcome: Progressing  Goal: Incision(s), wounds(s) or drain site(s) healing without S/S of infection  Description  INTERVENTIONS  - Assess and document risk factors for skin impairment   - Assess and document dressing, incision, wound bed, drain sites and surrounding tissue  - Consider nutrition services referral as needed  - Oral mucous membranes remain intact  - Provide patient/ family education  Outcome: Progressing  Goal: Oral mucous membranes remain intact  Description  INTERVENTIONS  - Assess oral mucosa and hygiene practices  - Implement preventative oral hygiene regimen  - Implement oral medicated treatments as ordered  - Initiate Nutrition services referral as needed  Outcome: Progressing     Problem: HEMATOLOGIC - ADULT  Goal: Maintains hematologic stability  Description  INTERVENTIONS  - Assess for signs and symptoms of bleeding or hemorrhage  - Monitor labs  - Administer supportive blood products/factors as ordered and appropriate  Outcome: Progressing     Problem: MUSCULOSKELETAL - ADULT  Goal: Maintain or return mobility to safest level of function  Description  INTERVENTIONS:  - Assess patient's ability to carry out ADLs; assess patient's baseline for ADL function and identify physical deficits which impact ability to perform ADLs (bathing, care of mouth/teeth, toileting, grooming, dressing, etc )  - Assess/evaluate cause of self-care deficits   - Assess range of motion  - Assess patient's mobility  - Assess patient's need for assistive devices and provide as appropriate  - Encourage maximum independence but intervene and supervise when necessary  - Involve family in performance of ADLs  - Assess for home care needs following discharge   - Consider OT consult to assist with ADL evaluation and planning for discharge  - Provide patient education as appropriate  Outcome: Progressing  Goal: Maintain proper alignment of affected body part  Description  INTERVENTIONS:  - Support, maintain and protect limb and body alignment  - Provide patient/ family with appropriate education  Outcome: Progressing

## 2020-07-13 ENCOUNTER — APPOINTMENT (INPATIENT)
Dept: RADIOLOGY | Facility: HOSPITAL | Age: 85
DRG: 291 | End: 2020-07-13
Payer: COMMERCIAL

## 2020-07-13 ENCOUNTER — APPOINTMENT (INPATIENT)
Dept: CT IMAGING | Facility: HOSPITAL | Age: 85
DRG: 291 | End: 2020-07-13
Payer: COMMERCIAL

## 2020-07-13 ENCOUNTER — APPOINTMENT (INPATIENT)
Dept: NON INVASIVE DIAGNOSTICS | Facility: HOSPITAL | Age: 85
DRG: 291 | End: 2020-07-13
Payer: COMMERCIAL

## 2020-07-13 PROBLEM — I10 ESSENTIAL HYPERTENSION: Chronic | Status: ACTIVE | Noted: 2019-03-28

## 2020-07-13 PROBLEM — I50.32 CHRONIC HEART FAILURE WITH PRESERVED EJECTION FRACTION (HCC): Chronic | Status: ACTIVE | Noted: 2019-03-28

## 2020-07-13 PROBLEM — E03.9 HYPOTHYROIDISM: Chronic | Status: ACTIVE | Noted: 2020-07-12

## 2020-07-13 PROBLEM — J94.8 HYDROPNEUMOTHORAX: Status: ACTIVE | Noted: 2020-07-12

## 2020-07-13 PROBLEM — D69.6 THROMBOCYTOPENIA (HCC): Chronic | Status: ACTIVE | Noted: 2019-10-04

## 2020-07-13 PROBLEM — K21.9 GERD (GASTROESOPHAGEAL REFLUX DISEASE): Chronic | Status: ACTIVE | Noted: 2020-07-13

## 2020-07-13 PROBLEM — J44.9 COPD (CHRONIC OBSTRUCTIVE PULMONARY DISEASE) (HCC): Chronic | Status: ACTIVE | Noted: 2020-07-12

## 2020-07-13 PROBLEM — E78.5 HYPERLIPIDEMIA: Chronic | Status: ACTIVE | Noted: 2019-03-28

## 2020-07-13 LAB
ANION GAP SERPL CALCULATED.3IONS-SCNC: -1 MMOL/L (ref 4–13)
APPEARANCE FLD: CLEAR
APPEARANCE FLD: CLEAR
ATRIAL RATE: 340 BPM
BASE EX.OXY STD BLDV CALC-SCNC: 71.6 % (ref 60–80)
BASE EX.OXY STD BLDV CALC-SCNC: 90.1 % (ref 60–80)
BASE EXCESS BLDV CALC-SCNC: 16.4 MMOL/L
BASE EXCESS BLDV CALC-SCNC: 16.4 MMOL/L
BASOPHILS NFR FLD MANUAL: 1 %
BUN SERPL-MCNC: 14 MG/DL (ref 5–25)
BUN SERPL-MCNC: 15 MG/DL (ref 5–25)
CALCIUM SERPL-MCNC: 8.4 MG/DL (ref 8.3–10.1)
CALCIUM SERPL-MCNC: 8.4 MG/DL (ref 8.3–10.1)
CHLORIDE SERPL-SCNC: 94 MMOL/L (ref 100–108)
CHLORIDE SERPL-SCNC: 95 MMOL/L (ref 100–108)
CO2 SERPL-SCNC: 44 MMOL/L (ref 21–32)
CO2 SERPL-SCNC: >45 MMOL/L (ref 21–32)
COLOR FLD: YELLOW
COLOR FLD: YELLOW
CREAT SERPL-MCNC: 0.54 MG/DL (ref 0.6–1.3)
CREAT SERPL-MCNC: 0.67 MG/DL (ref 0.6–1.3)
EOSINOPHIL NFR FLD MANUAL: 11 %
ERYTHROCYTE [DISTWIDTH] IN BLOOD BY AUTOMATED COUNT: 13.6 % (ref 11.6–15.1)
GFR SERPL CREATININE-BSD FRML MDRD: 84 ML/MIN/1.73SQ M
GFR SERPL CREATININE-BSD FRML MDRD: 92 ML/MIN/1.73SQ M
GLUCOSE SERPL-MCNC: 110 MG/DL (ref 65–140)
GLUCOSE SERPL-MCNC: 80 MG/DL (ref 65–140)
HCO3 BLDV-SCNC: 46.4 MMOL/L (ref 24–30)
HCO3 BLDV-SCNC: 47.8 MMOL/L (ref 24–30)
HCT VFR BLD AUTO: 40.9 % (ref 36.5–49.3)
HGB BLD-MCNC: 13 G/DL (ref 12–17)
HISTIOCYTES NFR FLD: 3 %
HISTIOCYTES NFR FLD: 6 %
LACTATE SERPL-SCNC: 1 MMOL/L (ref 0.5–2)
LDH FLD L TO P-CCNC: 169 U/L
LDH SERPL-CCNC: 188 U/L (ref 81–234)
LYMPHOCYTES NFR BLD AUTO: 30 %
LYMPHOCYTES NFR BLD AUTO: 40 %
MAGNESIUM SERPL-MCNC: 1.7 MG/DL (ref 1.6–2.6)
MCH RBC QN AUTO: 31.6 PG (ref 26.8–34.3)
MCHC RBC AUTO-ENTMCNC: 31.8 G/DL (ref 31.4–37.4)
MCV RBC AUTO: 100 FL (ref 82–98)
MONOCYTES NFR BLD AUTO: 4 %
MONOCYTES NFR BLD AUTO: 5 %
NEUTS SEG NFR BLD AUTO: 50 %
NEUTS SEG NFR BLD AUTO: 50 %
O2 CT BLDV-SCNC: 14.2 ML/DL
O2 CT BLDV-SCNC: 17 ML/DL
PCO2 BLDV: 86.8 MM HG (ref 42–50)
PCO2 BLDV: 97.6 MM HG (ref 42–50)
PH BLDV: 7.31 [PH] (ref 7.3–7.4)
PH BLDV: 7.35 [PH] (ref 7.3–7.4)
PH BODY FLUID: 8.4
PLATELET # BLD AUTO: 90 THOUSANDS/UL (ref 149–390)
PMV BLD AUTO: 10 FL (ref 8.9–12.7)
PO2 BLDV: 37.4 MM HG (ref 35–45)
PO2 BLDV: 62.7 MM HG (ref 35–45)
POTASSIUM SERPL-SCNC: 3.9 MMOL/L (ref 3.5–5.3)
POTASSIUM SERPL-SCNC: 4.3 MMOL/L (ref 3.5–5.3)
PROT FLD-MCNC: 3.3 G/DL
PROT SERPL-MCNC: 5.8 G/DL (ref 6.4–8.2)
QRS AXIS: -79 DEGREES
QRSD INTERVAL: 84 MS
QT INTERVAL: 366 MS
QTC INTERVAL: 406 MS
RBC # BLD AUTO: 4.11 MILLION/UL (ref 3.88–5.62)
SITE: NORMAL
SITE: NORMAL
SODIUM SERPL-SCNC: 137 MMOL/L (ref 136–145)
SODIUM SERPL-SCNC: 140 MMOL/L (ref 136–145)
T WAVE AXIS: 87 DEGREES
TOTAL CELLS COUNTED SPEC: 100
TOTAL CELLS COUNTED SPEC: 100
VENTRICULAR RATE: 74 BPM
WBC # BLD AUTO: 3.51 THOUSAND/UL (ref 4.31–10.16)
WBC # FLD MANUAL: 1156 /UL
WBC # FLD MANUAL: 543 /UL

## 2020-07-13 PROCEDURE — 99233 SBSQ HOSP IP/OBS HIGH 50: CPT | Performed by: INTERNAL MEDICINE

## 2020-07-13 PROCEDURE — 84145 PROCALCITONIN (PCT): CPT | Performed by: PHYSICIAN ASSISTANT

## 2020-07-13 PROCEDURE — 87070 CULTURE OTHR SPECIMN AEROBIC: CPT | Performed by: INTERNAL MEDICINE

## 2020-07-13 PROCEDURE — 32557 INSERT CATH PLEURA W/ IMAGE: CPT

## 2020-07-13 PROCEDURE — 94003 VENT MGMT INPAT SUBQ DAY: CPT

## 2020-07-13 PROCEDURE — 88112 CYTOPATH CELL ENHANCE TECH: CPT | Performed by: PATHOLOGY

## 2020-07-13 PROCEDURE — 71045 X-RAY EXAM CHEST 1 VIEW: CPT

## 2020-07-13 PROCEDURE — 89051 BODY FLUID CELL COUNT: CPT | Performed by: INTERNAL MEDICINE

## 2020-07-13 PROCEDURE — 87205 SMEAR GRAM STAIN: CPT | Performed by: INTERNAL MEDICINE

## 2020-07-13 PROCEDURE — 82805 BLOOD GASES W/O2 SATURATION: CPT | Performed by: PHYSICIAN ASSISTANT

## 2020-07-13 PROCEDURE — NC001 PR NO CHARGE: Performed by: RADIOLOGY

## 2020-07-13 PROCEDURE — 83986 ASSAY PH BODY FLUID NOS: CPT | Performed by: INTERNAL MEDICINE

## 2020-07-13 PROCEDURE — 85027 COMPLETE CBC AUTOMATED: CPT | Performed by: INTERNAL MEDICINE

## 2020-07-13 PROCEDURE — 0W9930Z DRAINAGE OF RIGHT PLEURAL CAVITY WITH DRAINAGE DEVICE, PERCUTANEOUS APPROACH: ICD-10-PCS | Performed by: RADIOLOGY

## 2020-07-13 PROCEDURE — 99291 CRITICAL CARE FIRST HOUR: CPT | Performed by: PHYSICIAN ASSISTANT

## 2020-07-13 PROCEDURE — 84155 ASSAY OF PROTEIN SERUM: CPT | Performed by: PHYSICIAN ASSISTANT

## 2020-07-13 PROCEDURE — 94760 N-INVAS EAR/PLS OXIMETRY 1: CPT

## 2020-07-13 PROCEDURE — C1769 GUIDE WIRE: HCPCS

## 2020-07-13 PROCEDURE — 84157 ASSAY OF PROTEIN OTHER: CPT | Performed by: INTERNAL MEDICINE

## 2020-07-13 PROCEDURE — 88305 TISSUE EXAM BY PATHOLOGIST: CPT | Performed by: PATHOLOGY

## 2020-07-13 PROCEDURE — 94762 N-INVAS EAR/PLS OXIMTRY CONT: CPT

## 2020-07-13 PROCEDURE — 82805 BLOOD GASES W/O2 SATURATION: CPT | Performed by: NURSE PRACTITIONER

## 2020-07-13 PROCEDURE — 83615 LACTATE (LD) (LDH) ENZYME: CPT | Performed by: INTERNAL MEDICINE

## 2020-07-13 PROCEDURE — 93306 TTE W/DOPPLER COMPLETE: CPT | Performed by: INTERNAL MEDICINE

## 2020-07-13 PROCEDURE — 71275 CT ANGIOGRAPHY CHEST: CPT

## 2020-07-13 PROCEDURE — 80048 BASIC METABOLIC PNL TOTAL CA: CPT | Performed by: INTERNAL MEDICINE

## 2020-07-13 PROCEDURE — 80048 BASIC METABOLIC PNL TOTAL CA: CPT | Performed by: FAMILY MEDICINE

## 2020-07-13 PROCEDURE — C1729 CATH, DRAINAGE: HCPCS

## 2020-07-13 PROCEDURE — 99233 SBSQ HOSP IP/OBS HIGH 50: CPT | Performed by: HOSPITALIST

## 2020-07-13 PROCEDURE — 83605 ASSAY OF LACTIC ACID: CPT | Performed by: PHYSICIAN ASSISTANT

## 2020-07-13 PROCEDURE — 32557 INSERT CATH PLEURA W/ IMAGE: CPT | Performed by: RADIOLOGY

## 2020-07-13 PROCEDURE — 94640 AIRWAY INHALATION TREATMENT: CPT

## 2020-07-13 PROCEDURE — 93306 TTE W/DOPPLER COMPLETE: CPT

## 2020-07-13 PROCEDURE — 83735 ASSAY OF MAGNESIUM: CPT | Performed by: NURSE PRACTITIONER

## 2020-07-13 PROCEDURE — 83615 LACTATE (LD) (LDH) ENZYME: CPT | Performed by: PHYSICIAN ASSISTANT

## 2020-07-13 PROCEDURE — 0W9B30Z DRAINAGE OF LEFT PLEURAL CAVITY WITH DRAINAGE DEVICE, PERCUTANEOUS APPROACH: ICD-10-PCS | Performed by: RADIOLOGY

## 2020-07-13 PROCEDURE — 99223 1ST HOSP IP/OBS HIGH 75: CPT | Performed by: INTERNAL MEDICINE

## 2020-07-13 PROCEDURE — 93010 ELECTROCARDIOGRAM REPORT: CPT | Performed by: INTERNAL MEDICINE

## 2020-07-13 RX ORDER — MAGNESIUM SULFATE HEPTAHYDRATE 40 MG/ML
4 INJECTION, SOLUTION INTRAVENOUS ONCE
Status: DISCONTINUED | OUTPATIENT
Start: 2020-07-13 | End: 2020-07-13

## 2020-07-13 RX ORDER — FUROSEMIDE 10 MG/ML
40 INJECTION INTRAMUSCULAR; INTRAVENOUS
Status: DISCONTINUED | OUTPATIENT
Start: 2020-07-13 | End: 2020-07-14

## 2020-07-13 RX ORDER — ATENOLOL 25 MG/1
25 TABLET ORAL DAILY
Status: DISCONTINUED | OUTPATIENT
Start: 2020-07-14 | End: 2020-07-14

## 2020-07-13 RX ORDER — MAGNESIUM SULFATE HEPTAHYDRATE 40 MG/ML
4 INJECTION, SOLUTION INTRAVENOUS ONCE
Status: COMPLETED | OUTPATIENT
Start: 2020-07-13 | End: 2020-07-14

## 2020-07-13 RX ORDER — GUAIFENESIN 600 MG
600 TABLET, EXTENDED RELEASE 12 HR ORAL EVERY 12 HOURS SCHEDULED
Status: DISCONTINUED | OUTPATIENT
Start: 2020-07-13 | End: 2020-07-25 | Stop reason: HOSPADM

## 2020-07-13 RX ORDER — CHLORHEXIDINE GLUCONATE 0.12 MG/ML
15 RINSE ORAL EVERY 12 HOURS SCHEDULED
Status: DISCONTINUED | OUTPATIENT
Start: 2020-07-13 | End: 2020-07-14

## 2020-07-13 RX ORDER — LIDOCAINE WITH 8.4% SOD BICARB 0.9%(10ML)
SYRINGE (ML) INJECTION CODE/TRAUMA/SEDATION MEDICATION
Status: COMPLETED | OUTPATIENT
Start: 2020-07-13 | End: 2020-07-13

## 2020-07-13 RX ORDER — FUROSEMIDE 10 MG/ML
40 INJECTION INTRAMUSCULAR; INTRAVENOUS
Status: DISCONTINUED | OUTPATIENT
Start: 2020-07-13 | End: 2020-07-13

## 2020-07-13 RX ORDER — AMLODIPINE BESYLATE 10 MG/1
10 TABLET ORAL DAILY
Qty: 90 TABLET | Refills: 3 | Status: SHIPPED | OUTPATIENT
Start: 2020-07-13 | End: 2020-08-03 | Stop reason: ALTCHOICE

## 2020-07-13 RX ORDER — AMLODIPINE BESYLATE 5 MG/1
5 TABLET ORAL DAILY
Status: DISCONTINUED | OUTPATIENT
Start: 2020-07-14 | End: 2020-07-14

## 2020-07-13 RX ADMIN — ENOXAPARIN SODIUM 40 MG: 40 INJECTION SUBCUTANEOUS at 08:50

## 2020-07-13 RX ADMIN — POTASSIUM CHLORIDE 20 MEQ: 1500 TABLET, EXTENDED RELEASE ORAL at 18:04

## 2020-07-13 RX ADMIN — LEVOTHYROXINE SODIUM 50 MCG: 50 TABLET ORAL at 05:04

## 2020-07-13 RX ADMIN — CHLORHEXIDINE GLUCONATE 0.12% ORAL RINSE 15 ML: 1.2 LIQUID ORAL at 22:33

## 2020-07-13 RX ADMIN — IOHEXOL 85 ML: 350 INJECTION, SOLUTION INTRAVENOUS at 14:47

## 2020-07-13 RX ADMIN — GUAIFENESIN 600 MG: 600 TABLET, EXTENDED RELEASE ORAL at 22:35

## 2020-07-13 RX ADMIN — POTASSIUM CHLORIDE 20 MEQ: 1500 TABLET, EXTENDED RELEASE ORAL at 08:50

## 2020-07-13 RX ADMIN — LEVALBUTEROL HYDROCHLORIDE 0.63 MG: 0.63 SOLUTION RESPIRATORY (INHALATION) at 07:07

## 2020-07-13 RX ADMIN — MONTELUKAST SODIUM 10 MG: 10 TABLET, FILM COATED ORAL at 22:35

## 2020-07-13 RX ADMIN — Medication 10 ML: at 17:02

## 2020-07-13 RX ADMIN — MAGNESIUM SULFATE IN WATER 4 G: 40 INJECTION, SOLUTION INTRAVENOUS at 22:36

## 2020-07-13 RX ADMIN — PANTOPRAZOLE SODIUM 40 MG: 40 TABLET, DELAYED RELEASE ORAL at 05:04

## 2020-07-13 RX ADMIN — Medication 10 ML: at 16:47

## 2020-07-13 RX ADMIN — GUAIFENESIN 600 MG: 600 TABLET, EXTENDED RELEASE ORAL at 15:30

## 2020-07-13 RX ADMIN — PRAVASTATIN SODIUM 40 MG: 40 TABLET ORAL at 18:04

## 2020-07-13 RX ADMIN — FUROSEMIDE 40 MG: 10 INJECTION, SOLUTION INTRAMUSCULAR; INTRAVENOUS at 15:30

## 2020-07-13 RX ADMIN — TIMOLOL MALEATE 1 DROP: 5 SOLUTION/ DROPS OPHTHALMIC at 08:52

## 2020-07-13 RX ADMIN — SPIRONOLACTONE 25 MG: 25 TABLET ORAL at 08:50

## 2020-07-13 RX ADMIN — LEVALBUTEROL HYDROCHLORIDE 0.63 MG: 0.63 SOLUTION RESPIRATORY (INHALATION) at 13:05

## 2020-07-13 RX ADMIN — ATENOLOL: 50 TABLET ORAL at 09:16

## 2020-07-13 RX ADMIN — LEVALBUTEROL HYDROCHLORIDE 0.63 MG: 0.63 SOLUTION RESPIRATORY (INHALATION) at 19:14

## 2020-07-13 RX ADMIN — AMLODIPINE BESYLATE 10 MG: 10 TABLET ORAL at 08:50

## 2020-07-13 NOTE — MALNUTRITION/BMI
This medical record reflects one or more clinical indicators suggestive of malnutrition and/or morbid obesity  Malnutrition Findings:   Malnutrition type: Chronic illness  Degree of Malnutrition: Other severe protein calorie malnutrition(related to inadequate intake/medical condition/advanced age as evidenced by significant depletion of fat/muscle (clavicles, shoulders, triceps, temples), 26% wt loss x 5 months (165 lb 2/14/20), +4 b/l LE edema  Treatment includes supplementation )  Malnutrition Characteristics: Fat loss, Muscle loss, Weight loss, Fluid accumulation     See Nutrition note dated 7/13/20 for additional details  Completed nutrition assessment is viewable in the nutrition documentation

## 2020-07-13 NOTE — ASSESSMENT & PLAN NOTE
Echocardiogram: 7/13/2020- small to moderate, free-flowing pericardial effusion was identified circumferential to the heart   There was no evidence of hemodynamic compromise  Likely secondary to CHF exacerbation   Continue with diuresis

## 2020-07-13 NOTE — CONSULTS
Critical Care Consult- Chantal Agent 3/24/1929, 80 y o  male MRN: [id number]    Unit/Bed#: ICU 09 Encounter: [id number]    Primary Care Provider: Nuvia Chaney MD   Date and time admitted to hospital: 7/12/2020  2:58 PM      Acute on chronic diastolic heart failure [facility])  Assessment & Plan  Wt Readings from Last 3 Encounters:   07/13/20 65 4 kg (144 lb 2 9 oz)   02/14/20 74 9 kg (165 lb 1 6 oz)   12/17/19 77 3 kg (170 lb 6 4 oz)       Echocardiogram: 8/89/2685- LV systolic function was normal  Ejection fraction was estimated to be 60 %   RV systolic function normal     Cardiology following  Continue with diuresis per cardiology   Trend I/O, Weights       Hypothyroidism  Assessment & Plan  TSH 4 5  Free T4 1 12  Continue home synthroid     COPD (chronic obstructive pulmonary disease) ([facility] [address] )  Assessment & Plan  Patient has history of severe COPD  PFT 12/17/19:  FEV1/FVC Ratio: 58 %  Forced Vital Capacity: 1 46 L    47 % predicted  FEV1: 0 85 L     40 % predicted    He uses albuterol, xopenex and singulair at home   Continue scheduled nebulizers   Guaifenesin added for secretions     Acute on chronic respiratory failure with hypoxia and hypercapnia (HCC)  Assessment & Plan  Based on previous labs and recent ABG, there appears to be an acute on chronic respiratory acidosis likely secondary to history of COPD, ADAL, and new exacerbation of CHF with resultant BL pleural effusions  Continue with nebulizers  No need for steroids at this time- unlikely an acute COPD exacerbation   May trial Bipap as tolerated  Continue with diuresis per cardiology recommendations   Continue 02 as needed for acute on chronic hypoxic component- patient wears 2 5-3L NC at home   Wean HFNC as tolerated   CTA Chest: 7/13/2020- Negative for acute PE but concerning for BL hydropneumothoraces   Patient s/p BL pigtail catheters by IR today with large amount straw colored fluid output     Severe protein-calorie malnutrition ([facility] [address] )  Assessment & Plan  Malnutrition Findings:   Malnutrition type: Chronic illness  Degree of Malnutrition: Other severe protein calorie malnutrition(related to inadequate intake/medical condition/advanced age as evidenced by significant depletion of fat/muscle (clavicles, shoulders, triceps, temples), 26% wt loss x 5 months (165 lb 2/14/20), +4 b/l LE edema  Treatment includes supplementation )    BMI Findings: Body mass index is 22 58 kg/m²  Nutrition consulted   Continue dietary supplements    Pericardial effusion  Assessment & Plan  Echocardiogram: 7/13/2020- small to moderate, free-flowing pericardial effusion was identified circumferential to the heart   There was no evidence of hemodynamic compromise  Likely secondary to CHF exacerbation   Continue with diuresis     Thrombocytopenia (HCC)  Assessment & Plan  Chronic thrombocytopenia   PLTS 90 on most recent CBC  Continue to trend    Hyperlipidemia  Assessment & Plan  Continue statin     Essential hypertension  Assessment & Plan  Patient takes 10mg of Norvasc, Atenolol-chlorthalddone, lasix and aldactone daily  Cardiology following  Norvasc decreased to 5mg daily  Continue aldactone   Lasix changed to 40mg BID secondary to acute CHF exacerbation/ volume overload  Continue Atenolol  Holding Chlorthalidone     * Bilateral hydropneumothorax  Assessment & Plan  CT chest showed large BL hydropneumothoraces  S/p BL 10 2F thoracostomy tubes placed by IR  Post procedural CXR shows near resolution of BL effusions but BL pneumothoraces  BL chest tubes were placed to suction following post procedure CXR  Will plan to recheck CXR in 4 hours and again in AM       -------------------------------------------------------------------------------------------------------------  Chief Complaint: SOB, BL pleural effusions    History of Present Illness     Chantal Randall is a 80 y o  male with a PMH of COPD, dHF with prEF, HTN, HLD that presented to the ED yesterday with complaints of generalized weakness, SOB and decreased 02 saturations at home  He was found to have acute respiratory failure likely secondary to acute CHF exacerbation  CXR showed BL pleural effusions  He went for a CT scan of his chest today that showed large BL pleural effusions and pneumothoraces  He went to IR for BL percutaneous thoracostomy tubes  Following IR, he had increasing 02 requirements and was placed on HFNC  He presented to the ICU with HFNC and NRB in placed  Currently, his HFNC is being weaned down  He denies SOB, chest pain  He has a mild non productive cough since going to IR  History obtained from chart review and the patient   -------------------------------------------------------------------------------------------------------------  Dispo: Transfer to Stepdown Level 1     Code Status: Level 3 - DNAR and DNI  --------------------------------------------------------------------------------------------------------------  Review of Systems    A 12-point, complete review of systems was reviewed and negative except as stated above     Physical Exam    --------------------------------------------------------------------------------------------------------------  Vitals:   Vitals:    07/13/20 1715 07/13/20 1731 07/13/20 1745 07/13/20 1828   BP: 118/55      BP Location:       Pulse: 62      Resp: (!) 27      Temp:  (!) 97 4 °F (36 3 °C)     TempSrc:  Axillary     SpO2: (!) 84% (!) 87% 97% 100%   Weight:  65 4 kg (144 lb 2 9 oz)     Height:  5' 7" (1 702 m)       Temp  Min: 96 9 °F (36 1 °C)  Max: 97 7 °F (36 5 °C)  IBW: 66 1 kg  Height: 5' 7" (170 2 cm)  Body mass index is 22 58 kg/m²      Laboratory and Diagnostics:  Results from last 7 days   Lab Units 07/13/20  0440 07/12/20  1526   WBC Thousand/uL 3 51* 5 56   HEMOGLOBIN g/dL 13 0 14 7   HEMATOCRIT % 40 9 46 2   PLATELETS Thousands/uL 90* 115*   NEUTROS PCT %  --  80*   MONOS PCT %  --  10     Results from last 7 days   Lab Units 07/13/20  1748 07/13/20  0440 07/12/20  1526   SODIUM mmol/L 137 140 140   POTASSIUM mmol/L 4 3 3 9 4 2   CHLORIDE mmol/L 94* 95* 95*   CO2 mmol/L 44* >45* 44*   ANION GAP mmol/L -1*  --  1*   BUN mg/dL 15 14 17   CREATININE mg/dL 0 67 0 54* 0 64   CALCIUM mg/dL 8 4 8 4 8 7   GLUCOSE RANDOM mg/dL 110 80 111   ALT U/L  --   --  14   AST U/L  --   --  26   ALK PHOS U/L  --   --  66   ALBUMIN g/dL  --   --  3 5   TOTAL BILIRUBIN mg/dL  --   --  0 98     Results from last 7 days   Lab Units 07/13/20  0440 07/12/20  1526   MAGNESIUM mg/dL 1 7 1 9      Results from last 7 days   Lab Units 07/12/20  1526   INR  1 14   PTT seconds 28      Results from last 7 days   Lab Units 07/12/20  1526   TROPONIN I ng/mL 0 02     Results from last 7 days   Lab Units 07/13/20  1741   LACTIC ACID mmol/L 1 0     ABG:    VBG:  Results from last 7 days   Lab Units 07/13/20  1741   PH KANCHAN  7 308   PCO2 KANCHAN mm Hg 97 6*   PO2 KANCHAN mm Hg 37 4   HCO3 KANCHAN mmol/L 47 8*   BASE EXC KANCHAN mmol/L 16 4           Micro:        EKG: SR  Imaging: I have personally reviewed pertinent reports   , I have personally reviewed pertinent films in PACS and the post procedure CXR shows BL pneumothoraces and nearly resolved effusions      Historical Information   Past Medical History:   Diagnosis Date    Allergic rhinitis     Hypercholesteremia     Hypertension     Pneumonia      Past Surgical History:   Procedure Laterality Date    IR CHEST TUBE  7/13/2020    LAPAROSCOPIC COLON RESECTION      TOTAL HIP ARTHROPLASTY       Social History   Social History     Substance and Sexual Activity   Alcohol Use Not Currently    Frequency: Monthly or less    Comment: Socially     Social History     Substance and Sexual Activity   Drug Use Never     Social History     Tobacco Use   Smoking Status Former Smoker    Packs/day: 0 20    Years: 10 00    Pack years: 2 00    Types: Cigarettes   Smokeless Tobacco Never Used   Tobacco Comment    Quit 60 years ago      Exercise History: Patient lives independently and is ambulatory   Family History:   Family History   Family history unknown: Yes     I have reviewed this patient's family history and commented on sigificant items within the HPI      Medications:  Current Facility-Administered Medications   Medication Dose Route Frequency    albuterol (PROVENTIL HFA,VENTOLIN HFA) inhaler 2 puff  2 puff Inhalation Q6H PRN    [START ON 7/14/2020] amLODIPine (NORVASC) tablet 5 mg  5 mg Oral Daily    [START ON 7/14/2020] atenolol (TENORMIN) tablet 25 mg  25 mg Oral Daily    chlorhexidine (PERIDEX) 0 12 % oral rinse 15 mL  15 mL Swish & Spit Q12H [address]    enoxaparin (LOVENOX) subcutaneous injection 40 mg  40 mg Subcutaneous Daily    furosemide (LASIX) injection 40 mg  40 mg Intravenous BID (diuretic)    guaiFENesin (MUCINEX) 12 hr tablet 600 mg  600 mg Oral Q12H MARCIANO    levalbuterol (XOPENEX) inhalation solution 0 63 mg  0 63 mg Nebulization TID    levothyroxine tablet 50 mcg  50 mcg Oral Daily    magnesium sulfate 4 g/100 mL IVPB (premix) 4 g  4 g Intravenous Once    montelukast (SINGULAIR) tablet 10 mg  10 mg Oral HS    pantoprazole (PROTONIX) EC tablet 40 mg  40 mg Oral Early Morning    potassium chloride (K-DUR,KLOR-CON) CR tablet 20 mEq  20 mEq Oral BID    pravastatin (PRAVACHOL) tablet 40 mg  40 mg Oral Daily With Dinner    spironolactone (ALDACTONE) tablet 25 mg  25 mg Oral Daily    terazosin (HYTRIN) capsule 5 mg  5 mg Oral HS    timolol (TIMOPTIC) 0 5 % ophthalmic solution 1 drop  1 drop Both Eyes BID     Home medications:  Prior to Admission Medications   Prescriptions Last Dose Informant Patient Reported? Taking?    Blood Pressure Monitoring (B-D ASSURE BPM/AUTO ARM CUFF) MISC  Self No No   Sig: by Does not apply route daily   RABEprazole (ACIPHEX) 20 MG tablet  Self Yes Yes   Sig: daily    albuterol (PROVENTIL HFA,VENTOLIN HFA) 90 mcg/act inhaler  Self Yes No   Sig: Inhale 2 puffs every 6 (six) hours as needed for wheezing   amLODIPine (NORVASC) 10 mg tablet   No No   Sig: Take 1 tablet (10 mg total) by mouth daily   atenolol-chlorthalidone (TENORETIC) 50-25 mg per tablet  Self Yes Yes   Sig: Take 1 tablet by mouth daily   brimonidine-timolol (COMBIGAN) 0 2-0 5 %  Self Yes Yes   Sig: Apply 1 drop to eye 2 (two) times a day   furosemide (LASIX) 40 mg tablet  Self No Yes   Sig: Take 1 tablet (40 mg total) by mouth daily   latanoprost (XALATAN) 0 005 % ophthalmic solution  Self Yes Yes   levalbuterol (XOPENEX) 0 63 mg/3 mL nebulizer solution  Self No No   Sig: Take 1 vial (0 63 mg total) by nebulization 3 (three) times a day   Patient not taking: Reported on 2/14/2020   levothyroxine 50 mcg tablet  Self Yes Yes   Sig: Take 1 tablet by mouth daily   montelukast (SINGULAIR) 10 mg tablet  Self Yes Yes   Sig: daily    potassium chloride (K-DUR,KLOR-CON) 20 mEq tablet   No Yes   Sig: Take 1 tablet (20 mEq total) by mouth 2 (two) times a day   simvastatin (ZOCOR) 20 mg tablet  Self Yes Yes   Sig: daily    spironolactone (ALDACTONE) 25 mg tablet   No Yes   Sig: Take 1 tablet (25 mg total) by mouth daily   terazosin (HYTRIN) 5 mg capsule  Self Yes Yes   Sig: Take 1 capsule by mouth      Facility-Administered Medications: None     Allergies:  No Known Allergies  ------------------------------------------------------------------------------------------------------------  Advance Directive and Living Will:      Power of :    POLST:    ------------------------------------------------------------------------------------------------------------  Care Time Delivered:   Upon my evaluation, this patient had a high probability of imminent or life-threatening deterioration due to acute hypoxic respiratory failure , which required my direct attention, intervention, and personal management    I have personally provided 38 minutes (1830 to 1908) of critical care time, exclusive of procedures, teaching, family meetings, and any prior time recorded by providers other than myself  Anita Marmolejo PA-C        Portions of the record may have been created with voice recognition software  Occasional wrong word or "sound a like" substitutions may have occurred due to the inherent limitations of voice recognition software    Read the chart carefully and recognize, using context, where substitutions have occurred

## 2020-07-13 NOTE — BRIEF OP NOTE (RAD/CATH)
IR CHEST TUBE Procedure Note    PATIENT NAME: Vignesh Danielle  : 3/24/1929  MRN: [id number]    Pre-op Diagnosis:   1  Generalized weakness    2  Bilateral pleural effusion    3  Hypoxia    4  Acute on chronic diastolic CHF (congestive heart failure) ([facility] [address] )    5  Acute on chronic respiratory failure with hypoxia and hypercapnia (HCC)      Post-op Diagnosis:   1  Generalized weakness    2  Bilateral pleural effusion    3  Hypoxia    4  [facility]    5  Acute on chronic respiratory failure with hypoxia and hypercapnia [facility])        Surgeon:   Saira Leon MD    Estimated Blood Loss: None    Findings:   1  Successful left chest tube placement using 10 2 Fr catheter  2  Successful right chest tube placement using 10 2 Fr catheter  Specimens: 30 mL clear yellow pleural fluid from each side sent to lab      Complications:  None    Anesthesia: Local    Saira Leon MD     Date: 2020  Time: 5:09 PM

## 2020-07-13 NOTE — ASSESSMENT & PLAN NOTE
TSH on admission was slightly elevated at 4 54    Free T4 was within normal limits    Plan:  · Continue patient's home levothyroxine 50 mcg daily

## 2020-07-13 NOTE — ASSESSMENT & PLAN NOTE
Patient currently requires increased supplemental oxygen as compared to baseline (3 L nasal cannula at home)  Currently on 10 L high-flow nasal cannula  Condition likely secondary to acute exacerbation of CHF and pleural effusions noted on chest x-ray  Patient regularly follows Dr Nicolas Mendoza from pulmonology  Low suspicion for infection given patient's negative COVID-19 testing, no elevated white count, and no fever but a viral etiology is still a possibility  Given patient's continued acute hypoxemia, cannot completely rule out PE  Per pulmonology, effusions on chest x-ray does not completely explain patient's hypoxia  Patient appears to be severely ill      Plan:  · CTA chest to rule out PE  · Maintain SpO2 between 89% to 94% given patient's history of COPD  · See below for management of CHF  · See below management of COPD  · Pulmonary consulted to determine if thoracocentesis or further imaging needed  · Order procalcitonin to assist in ruling out infectious causes respiratory failure  · Repeat BMP to monitor hypercapnia  · Consult nephrology given patient's mixed metabolic abnormalities  · Nephrology advises against starting Diamox as patient may not have the proper respiratory reserve  · Repeat VBG

## 2020-07-13 NOTE — ASSESSMENT & PLAN NOTE
Based on previous labs and recent ABG, there appears to be an acute on chronic respiratory acidosis likely secondary to history of COPD, ADAL, and new exacerbation of CHF with resultant BL pleural effusions  Continue with nebulizers  No need for steroids at this time- unlikely an acute COPD exacerbation   May trial Bipap as tolerated  Continue with diuresis per cardiology recommendations   Continue 02 as needed for acute on chronic hypoxic component- patient wears 2 5-3L NC at home   Wean HFNC as tolerated   CTA Chest: 7/13/2020- Negative for acute PE but concerning for BL hydropneumothoraces   Patient s/p BL pigtail catheters by IR today with large amount straw colored fluid output

## 2020-07-13 NOTE — CONSULTS
Cardiology   Aries Luca 80 y o  male MRN: [id number]  Unit/Bed#: S -01 Encounter: [id number]      Reason for Consult / Principal Problem:  CHF    Physician Requesting Consult:  Osmany Fischer MD    Cardiologist: Dr Dwain Watson    PCP: Dr Radha Verduzco  1  Acute on chronic diastolic HF--possibly related to medication noncompliance verses ineffective diuretic dosing--patient states he is compliant with dietary restrictions and medication compliance (however does not appear to be a reliable historian)  -On exam volume overloaded, JVD, lower lung fields diminished with fine crackles, mild abdominal distension, +3 pitting bilateral lower extremity edema  -Symptomatically denies feeling SOB at rest, does complain of orthopnea  -ProBNP 556 (previously 866--9/26/2019)  -Chest x-ray: Moderate size pleural effusions with bibasilar atelectasis  -2D echocardiogram 10/2019:  LVEF 52%, grade 1 diastolic dysfunction, RV normal size and systolic function, mild AI, trace TR  -Previously on furosemide 40 mg daily (on hold), atenolol-chlorthalidone 50-25 mg daily (on hold), and spironolactone 25 mg daily  -Currently on IV furosemide 40 mg b i d , plus spironolactone 25 mg daily  -24 hour I&O balance: -1 1 L  Weights:  Office weight 165 lb--2/2020  --7/12:  131 lb--bed scale  --7/13:  131 lb--bed scale    2  Acute on chronic hypercapnic/hypoxic respiratory failure  3  Severe COPD--does not appear to be acutely exacerbated no wheezing  -Currently on HF NC  50L, 70% FiO2) (baseline 3 L nasal cannula)  -Chest x-ray moderate-size pleural effusions with bibasilar atelectasis  -SARS-CoV-2 (negative)    4   Hypertension  -BP stable, last recorded 131/61, HR 85  -Current BP regimen:  Amlodipine 10 mg daily, spironolactone 25 mg daily, and IV furosemide 40 mg b i d     5  Hyperlipidemia  -Lipid profile 9/25/2019:  Cholesterol 142, triglycerides 37, HDL 80, LDL 55  -Previously on simvastatin 20 mg daily (non formulary) switched to pravastatin 40 mg daily this admission    6  Hypothyroidism  -TSH 4 5/free T4 1 1  -On levothyroxine 50 mcg daily    Plan:  1  Follow-up 2D echocardiogram  2  Continue IV furosemide 40 mg b i d + K-Dur or 20 mEq b i d, continue Aldactone 25 mg daily, hold chlorthalidone  3  Recommend bilateral thoracentesis given size of effusions w/respiratory compromise  4  Decrease amlodipine to 5 mg daily, and decrease atenolol to 25 mg daily--to allow for more room in BP for IV diuresis if need to increase dose/frequency  5  Strict I&Os, daily weights, 2 g NA +diet 1800 mL FR  6  Monitor renal function and electrolytes closely  7  Continue to monitor on telemetry    HPI: Mike Gee 80y o  year old male with a medical history of chronic diastolic HF small pericardial effusion (10/2019), essential hypertension, hyperlipidemia, chronic hypercapnic/hypoxic respiratory failure, severe COPD, hypothyroidism    He routinely follows cardiology fellow Dr Lachelle Urbina last seen as an outpatient back in February of 2020  Reported been well compensated from a HF standpoint  Current outpatient cardiac medications include:  Amlodipine 10 mg daily, atenolol-chlorthalidone 50-25 mg daily, furosemide 40 mg daily, K-Dur or 20 mEq b i d, simvastatin 20 mg daily, and spironolactone 25 mg daily    He presented to the [facility] ED on 7/12/2020 with increasing confusion and generalized weakness  Further workup in the ED  Hemodynamics on admission  Temp 97 7° F, HR 81, RR 18, /63, sat 60% on RA (93% on 5 L)  Laboratory data on admission  NA +140, K +4 2, chloride 95, CO2 44, anion gap 1, BUN 17, creatinine 0 64, glucose 111, calcium 8 7, normal LFTs, magnesium 1 9, WBC 5 5, HGB 14 7, platelet count 114  Troponin x1 0 02; proBNP 556  Imaging  Chest x-ray:   Moderate size pleural effusions with bibasilar atelectasis    He was initiated on a course of IV diuresis regards to acute on chronic CHF exacerbation and acute on chronic hypoxic/hypercapnic respiratory failure  Cardiology consulted for further treatment recommendations/management    Family History:   Family History   Family history unknown: Yes     Historical Information   Past Medical History:   Diagnosis Date    Allergic rhinitis     Hypercholesteremia     Hypertension     Pneumonia      Past Surgical History:   Procedure Laterality Date    LAPAROSCOPIC COLON RESECTION      TOTAL HIP ARTHROPLASTY       Social History   Social History     Substance and Sexual Activity   Alcohol Use Not Currently    Frequency: Monthly or less    Comment: Socially     Social History     Substance and Sexual Activity   Drug Use Never     Social History     Tobacco Use   Smoking Status Former Smoker    Packs/day: 0 20    Years: 10 00    Pack years: 2 00    Types: Cigarettes   Smokeless Tobacco Never Used   Tobacco Comment    Quit 60 years ago      Family History:   Family History   Family history unknown: Yes       Review of Systems:  Review of Systems   Constitutional: Positive for fatigue  Negative for chills and fever  HENT: Negative for congestion  Eyes: Negative for visual disturbance  Respiratory: Negative for cough, chest tightness and shortness of breath  Cardiovascular: Positive for leg swelling  Negative for chest pain and palpitations  Gastrointestinal: Positive for abdominal distention  Negative for abdominal pain  Genitourinary: Negative for difficulty urinating and dysuria  Musculoskeletal: Negative for arthralgias and myalgias  Neurological: Negative for dizziness, light-headedness and headaches  All other systems reviewed and are negative            Scheduled Meds:  Current Facility-Administered Medications:  albuterol 2 puff Inhalation Q6H PRN Carmen Elizabeth MD   amLODIPine 10 mg Oral Daily Carmen Elizabeth MD   atenolol-chlorthalidone (TENORETIC 50/25) combination  Oral Daily Carmen Elizabeth MD   enoxaparin 40 mg Subcutaneous Daily Carmen Elizabeth MD furosemide 40 mg Intravenous BID (diuretic) Norman Sandhoff, MD   levalbuterol 0 63 mg Nebulization TID Nataliya Grimes MD   levothyroxine 50 mcg Oral Daily Nataliya Grimes MD   montelukast 10 mg Oral HS Nataliya Grimes MD   pantoprazole 40 mg Oral Early Morning Nataliya Grimes MD   potassium chloride 20 mEq Oral BID Nataliya Grimes MD   pravastatin 40 mg Oral Daily With Car Perez MD   spironolactone 25 mg Oral Daily Natlaiya Grimes MD   terazosin 5 mg Oral HS Nataliya Grimes MD   timolol 1 drop Both Eyes BID Nataliya Grimes MD     Continuous Infusions:   PRN Meds:   albuterol  all current active meds have been reviewed, current meds:   Current Facility-Administered Medications   Medication Dose Route Frequency    albuterol (PROVENTIL HFA,VENTOLIN HFA) inhaler 2 puff  2 puff Inhalation Q6H PRN    amLODIPine (NORVASC) tablet 10 mg  10 mg Oral Daily    atenolol-chlorthalidone (TENORETIC 50/25) combination   Oral Daily    enoxaparin (LOVENOX) subcutaneous injection 40 mg  40 mg Subcutaneous Daily    furosemide (LASIX) injection 40 mg  40 mg Intravenous BID (diuretic)    levalbuterol (XOPENEX) inhalation solution 0 63 mg  0 63 mg Nebulization TID    levothyroxine tablet 50 mcg  50 mcg Oral Daily    montelukast (SINGULAIR) tablet 10 mg  10 mg Oral HS    pantoprazole (PROTONIX) EC tablet 40 mg  40 mg Oral Early Morning    potassium chloride (K-DUR,KLOR-CON) CR tablet 20 mEq  20 mEq Oral BID    pravastatin (PRAVACHOL) tablet 40 mg  40 mg Oral Daily With Dinner    spironolactone (ALDACTONE) tablet 25 mg  25 mg Oral Daily    terazosin (HYTRIN) capsule 5 mg  5 mg Oral HS    timolol (TIMOPTIC) 0 5 % ophthalmic solution 1 drop  1 drop Both Eyes BID    and PTA meds:   Prior to Admission Medications   Prescriptions Last Dose Informant Patient Reported? Taking?    Blood Pressure Monitoring (B-D ASSURE BPM/AUTO ARM CUFF) MISC  Self No No   Sig: by Does not apply route daily   RABEprazole (ACIPHEX) 20 MG tablet  Self Yes Yes Sig: daily    albuterol (PROVENTIL HFA,VENTOLIN HFA) 90 mcg/act inhaler  Self Yes No   Sig: Inhale 2 puffs every 6 (six) hours as needed for wheezing   amLODIPine (NORVASC) 10 mg tablet   No Yes   Sig: Take 1 tablet (10 mg total) by mouth daily   atenolol-chlorthalidone (TENORETIC) 50-25 mg per tablet  Self Yes Yes   Sig: Take 1 tablet by mouth daily   brimonidine-timolol (COMBIGAN) 0 2-0 5 %  Self Yes Yes   Sig: Apply 1 drop to eye 2 (two) times a day   furosemide (LASIX) 40 mg tablet  Self No Yes   Sig: Take 1 tablet (40 mg total) by mouth daily   latanoprost (XALATAN) 0 005 % ophthalmic solution  Self Yes Yes   levalbuterol (XOPENEX) 0 63 mg/3 mL nebulizer solution  Self No No   Sig: Take 1 vial (0 63 mg total) by nebulization 3 (three) times a day   Patient not taking: Reported on 2/14/2020   levothyroxine 50 mcg tablet  Self Yes Yes   Sig: Take 1 tablet by mouth daily   montelukast (SINGULAIR) 10 mg tablet  Self Yes Yes   Sig: daily    potassium chloride (K-DUR,KLOR-CON) 20 mEq tablet   No Yes   Sig: Take 1 tablet (20 mEq total) by mouth 2 (two) times a day   simvastatin (ZOCOR) 20 mg tablet  Self Yes Yes   Sig: daily    spironolactone (ALDACTONE) 25 mg tablet   No Yes   Sig: Take 1 tablet (25 mg total) by mouth daily   terazosin (HYTRIN) 5 mg capsule  Self Yes Yes   Sig: Take 1 capsule by mouth      Facility-Administered Medications: None       No Known Allergies    Objective   Vitals: Blood pressure 131/61, pulse 85, temperature 97 5 °F (36 4 °C), temperature source Oral, resp  rate 18, height 5' 7" (1 702 m), weight 59 7 kg (131 lb 9 8 oz), SpO2 90 %  , Body mass index is 20 61 kg/m² , Orthostatic Blood Pressures      Most Recent Value   Blood Pressure  131/61 filed at 07/13/2020 1727   Patient Position - Orthostatic VS  Lying filed at 07/13/2020 0707            Intake/Output Summary (Last 24 hours) at 7/13/2020 1030  Last data filed at 7/13/2020 0444  Gross per 24 hour   Intake 0 ml   Output 1125 ml   Net -1125 ml       Invasive Devices     Peripheral Intravenous Line            Peripheral IV 07/12/20 Right Antecubital less than 1 day              Physical Exam:  Physical Exam   Constitutional: He is oriented to person, place, and time  He appears well-developed and well-nourished  No distress  HENT:   Head: Normocephalic and atraumatic  Mouth/Throat: Oropharynx is clear and moist    Eyes: No scleral icterus  Neck: JVD present  Cardiovascular: Normal rate, regular rhythm and intact distal pulses  Murmur heard  +3 bilateral pedal edema   Pulmonary/Chest: Effort normal  He has no wheezes  He has rales  Upper lung fields clear, very diminished with fine crackles at the bases   Abdominal: Soft  He exhibits distension  There is no tenderness  Musculoskeletal: He exhibits edema  Neurological: He is alert and oriented to person, place, and time  Skin: Skin is warm and dry  He is not diaphoretic  There is pallor  Psychiatric: He has a normal mood and affect  Nursing note and vitals reviewed        Lab Results:   Recent Results (from the past 24 hour(s))   CBC and differential    Collection Time: 07/12/20  3:26 PM   Result Value Ref Range    WBC 5 56 4 31 - 10 16 Thousand/uL    RBC 4 59 3 88 - 5 62 Million/uL    Hemoglobin 14 7 12 0 - 17 0 g/dL    Hematocrit 46 2 36 5 - 49 3 %     (H) 82 - 98 fL    MCH 32 0 26 8 - 34 3 pg    MCHC 31 8 31 4 - 37 4 g/dL    RDW 13 6 11 6 - 15 1 %    MPV 10 6 8 9 - 12 7 fL    Platelets 730 (L) 920 - 390 Thousands/uL    nRBC 0 /100 WBCs    Neutrophils Relative 80 (H) 43 - 75 %    Immat GRANS % 1 0 - 2 %    Lymphocytes Relative 6 (L) 14 - 44 %    Monocytes Relative 10 4 - 12 %    Eosinophils Relative 3 0 - 6 %    Basophils Relative 0 0 - 1 %    Neutrophils Absolute 4 47 1 85 - 7 62 Thousands/µL    Immature Grans Absolute 0 03 0 00 - 0 20 Thousand/uL    Lymphocytes Absolute 0 35 (L) 0 60 - 4 47 Thousands/µL    Monocytes Absolute 0 54 0 17 - 1 22 Thousand/µL Eosinophils Absolute 0 15 0 00 - 0 61 Thousand/µL    Basophils Absolute 0 02 0 00 - 0 10 Thousands/µL   Protime-INR    Collection Time: 07/12/20  3:26 PM   Result Value Ref Range    Protime 14 0 11 6 - 14 5 seconds    INR 1 14 0 84 - 1 19   APTT    Collection Time: 07/12/20  3:26 PM   Result Value Ref Range    PTT 28 23 - 37 seconds   Comprehensive metabolic panel    Collection Time: 07/12/20  3:26 PM   Result Value Ref Range    Sodium 140 136 - 145 mmol/L    Potassium 4 2 3 5 - 5 3 mmol/L    Chloride 95 (L) 100 - 108 mmol/L    CO2 44 (H) 21 - 32 mmol/L    ANION GAP 1 (L) 4 - 13 mmol/L    BUN 17 5 - 25 mg/dL    Creatinine 0 64 0 60 - 1 30 mg/dL    Glucose 111 65 - 140 mg/dL    Calcium 8 7 8 3 - 10 1 mg/dL    AST 26 5 - 45 U/L    ALT 14 12 - 78 U/L    Alkaline Phosphatase 66 46 - 116 U/L    Total Protein 6 5 6 4 - 8 2 g/dL    Albumin 3 5 3 5 - 5 0 g/dL    Total Bilirubin 0 98 0 20 - 1 00 mg/dL    eGFR 86 ml/min/1 73sq m   Troponin I    Collection Time: 07/12/20  3:26 PM   Result Value Ref Range    Troponin I 0 02 <=0 04 ng/mL   Magnesium    Collection Time: 07/12/20  3:26 PM   Result Value Ref Range    Magnesium 1 9 1 6 - 2 6 mg/dL   NT-BNP PRO    Collection Time: 07/12/20  3:26 PM   Result Value Ref Range    NT-proBNP 556 (H) <450 pg/mL   Novel Coronavirus (Covid-19),PCR [facility]    Collection Time: 07/12/20  3:26 PM   Result Value Ref Range    SARS-CoV-2 Negative Negative   TSH    Collection Time: 07/12/20  3:26 PM   Result Value Ref Range    TSH 3RD GENERATON 4 540 (H) 0 358 - 3 740 uIU/mL   T4, free    Collection Time: 07/12/20  3:26 PM   Result Value Ref Range    Free T4 1 12 0 76 - 1 46 ng/dL   Blood gas, venous    Collection Time: 07/12/20  8:46 PM   Result Value Ref Range    pH, Joss 7 312 7 300 - 7 400    pCO2, Joss 97 0 (H) 42 0 - 50 0 mm Hg    pO2, Joss 31 5 (L) 35 0 - 45 0 mm Hg    HCO3, Joss 48 0 (H) 24 - 30 mmol/L    Base Excess, Joss 16 3 mmol/L    O2 Content, Joss 13 1 ml/dL    O2 HGB, VENOUS 63 0 60 0 - 80 0 %   Basic metabolic panel    Collection Time: 07/13/20  4:40 AM   Result Value Ref Range    Sodium 140 136 - 145 mmol/L    Potassium 3 9 3 5 - 5 3 mmol/L    Chloride 95 (L) 100 - 108 mmol/L    CO2 >45 (HH) 21 - 32 mmol/L    ANION GAP      BUN 14 5 - 25 mg/dL    Creatinine 0 54 (L) 0 60 - 1 30 mg/dL    Glucose 80 65 - 140 mg/dL    Calcium 8 4 8 3 - 10 1 mg/dL    eGFR 92 ml/min/1 73sq m   CBC (With Platelets)    Collection Time: 07/13/20  4:40 AM   Result Value Ref Range    WBC 3 51 (L) 4 31 - 10 16 Thousand/uL    RBC 4 11 3 88 - 5 62 Million/uL    Hemoglobin 13 0 12 0 - 17 0 g/dL    Hematocrit 40 9 36 5 - 49 3 %     (H) 82 - 98 fL    MCH 31 6 26 8 - 34 3 pg    MCHC 31 8 31 4 - 37 4 g/dL    RDW 13 6 11 6 - 15 1 %    Platelets 90 (L) 682 - 390 Thousands/uL    MPV 10 0 8 9 - 12 7 fL   Blood gas, venous    Collection Time: 07/13/20  4:40 AM   Result Value Ref Range    pH, Joss 7 346 7 300 - 7 400    pCO2, Joss 86 8 (H) 42 0 - 50 0 mm Hg    pO2, Joss 62 7 (H) 35 0 - 45 0 mm Hg    HCO3, Joss 46 4 (H) 24 - 30 mmol/L    Base Excess, Joss 16 4 mmol/L    O2 Content, Joss 17 0 ml/dL    O2 HGB, VENOUS 90 1 (H) 60 0 - 80 0 %   Magnesium    Collection Time: 07/13/20  4:40 AM   Result Value Ref Range    Magnesium 1 7 1 6 - 2 6 mg/dL     Imaging: I have personally reviewed pertinent reports  and I have personally reviewed pertinent films in PACS  Code Status:  Level 3 DNR DNI  Epic/ Allscripts/Care Everywhere records reviewed:  Yes    * Please Note: Fluency DirectDictation voice to text software may have been used in the creation of this document   **

## 2020-07-13 NOTE — PROGRESS NOTES
Progress Note - Maria G Phillips 3/24/1929, 80 y o  male MRN: [id number]    Unit/Bed#: S -01 Encounter: [id number]    Primary Care Provider: Jorden Dominguez MD   Date and time admitted to hospital: 7/12/2020  2:58 PM        Acute on chronic respiratory failure with hypoxia and hypercapnia ([address] )  Assessment & Plan  Patient currently requires increased supplemental oxygen as compared to baseline (3 L nasal cannula at home)  Currently on 10 L high-flow nasal cannula  Condition likely secondary to acute exacerbation of CHF and pleural effusions noted on chest x-ray  Patient regularly follows Dr Dontae Stewart from pulmonology  Low suspicion for infection given patient's negative COVID-19 testing, no elevated white count, and no fever  Given patient's continued acute hypoxemia, cannot completely rule out PE  Per pulmonology, effusions on chest x-ray does not completely explain patient's hypoxia  Patient appears to be severely ill  Plan:  · CTA chest to rule out PE  · Maintain SpO2 between 89% to 94% given patient's history of COPD  · See below for management of CHF  · See below management of COPD  · Pulmonary consulted to determine if thoracocentesis or further imaging needed  · Hold fever workup; consider workup if patient becomes acutely febrile or has found have an elevated white count or worsening respiratory status  · Repeat BMP to monitor hypercapnia  · Consult nephrology given patient's mixed metabolic abnormalities  · Nephrology advises against starting Diamox as patient may not have the proper respiratory reserve  · Repeat VBG    * Generalized weakness  Assessment & Plan  Likely secondary to advanced age, multiple medical conditions, hypothyroidism, and malnutrition  Concern for recent fall history  Plan:  · Consult PT/OT to determine disposition  · See malnutrition plan     Pleural effusion  Assessment & Plan  Unclear of effusions on chest x-ray are loculated    Patient also noted to have wheezing on exam as well as increased supplemental oxygen needs  Patient responded well to last thoracocentesis on October of 2019 with resolution of symptoms noted  Plan:  · See acute on chronic CHF plan  · Pulmonary consulted  · Can consider therapeutic thoracocentesis versus catheter placement    Severe protein-calorie malnutrition ([address] )  Assessment & Plan  Malnutrition Findings:   Malnutrition type: Chronic illness  Degree of Malnutrition: Other severe protein calorie malnutrition(related to inadequate intake/medical condition/advanced age as evidenced by significant depletion of fat/muscle (clavicles, shoulders, triceps, temples), 26% wt loss x 5 months (165 lb 2/14/20), +4 b/l LE edema  Treatment includes supplementation ) patient is noted to be thin with minimal muscle and adipose tissue  Unclear on patient's current nutritional status  BMI Findings: Body mass index is 20 61 kg/m²  Plan:  · Consult nutrition to determine appropriate diet  · Patient currently on regular diet with high-calorie and high-protein    Acute on chronic diastolic heart failure [facility])  Assessment & Plan  Wt Readings from Last 3 Encounters:   07/13/20 59 7 kg (131 lb 9 8 oz)   02/14/20 74 9 kg (165 lb 1 6 oz)   12/17/19 77 3 kg (170 lb 6 4 oz)   I/O last 3 completed shifts: In: 0   Out: 1125 [LDEAB:3120]  I/O this shift:  In: 120 [P O :120]  Out: 400 [Urine:400]    Patient's last echo completed in October of 2019 showed ejection fraction of 50%  Upon assessment patient appears to be fluid overloaded given lower extremity edema and pleural effusions seen on chest x-ray and slightly elevated BNP of 556  Patient is scheduled to have a repeat echo on 07/31/2020  Patient regularly follows Dr Kite Ambrocio  Echocardiogram on 7/13/2020 shows large pleural effusions and slightly enlarged pericardium  Plan:  · See above for acute on chronic hypoxemic and hypercapnic respiratory failure plan  · Hold patient's p o   Lasix  · Continue patient's home spironolactone  · IV Lasix 40 mg b i d  and monitor response          Hypothyroidism  Assessment & Plan  TSH on admission was slightly elevated at 4 54  Free T4 was within normal limits    Plan:  · Continue patient's home levothyroxine 50 mcg daily    COPD (chronic obstructive pulmonary disease) ([facility] [address] )  Assessment & Plan  PFT performed on 2019 showed severe obstructive pulmonary disease  Plan:  · Pulmonary consult in place  · Continue patient's home albuterol, levalbuterol, and montelukast     Thrombocytopenia ([facility] [address] )  Assessment & Plan  Patient is currently asymptomatic  This has been chronic based on previous CBCs  Plan:  · Monitor with daily CBC  · Will continue with pharmacologic VTE prophylaxis; may consider discontinuation if platelet count worsens    Hyperlipidemia  Assessment & Plan  Patient is currently on simvastatin 20 mg    Plan:  · Convert patient to formulary pravastatin 40 mg p o  Daily    Essential hypertension  Assessment & Plan  Patient currently takes amlodipine, furosemide p o , and terazosin    Plan:  · Continue amlodipine and terazosin  · Hold p o  Furosemide      VTE Pharmacologic Prophylaxis:   Pharmacologic: Enoxaparin (Lovenox)  Mechanical VTE Prophylaxis in Place: Yes    Discussions with Specialists or Other Care Team Provider:  Pulmonary, Cardiology, nutrition    Education and Discussions with Family / Patient:  Discussed plan with patient and patient's son-in-law    Current Length of Stay: 1 day(s)    Current Patient Status: Inpatient     Discharge Plan / Estimated Discharge Date:  Greater than 72 hours    Code Status: Level 3 - DNAR and DNI      Subjective:   Overnight patient required BiPAP      Objective:     Vitals:   Temp (24hrs), Av 5 °F (36 4 °C), Min:96 9 °F (36 1 °C), Max:97 7 °F (36 5 °C)    Temp:  [96 9 °F (36 1 °C)-97 7 °F (36 5 °C)] 97 5 °F (36 4 °C)  HR:  [72-85] 85  Resp:  [18-22] 18  BP: (112-140)/(53-63) 131/61  SpO2:  [60 %-98 %] 90 %  Body mass index is 20 61 kg/m²  Input and Output Summary (last 24 hours): Intake/Output Summary (Last 24 hours) at 7/13/2020 1428  Last data filed at 7/13/2020 1341  Gross per 24 hour   Intake 120 ml   Output 1525 ml   Net -1405 ml       Physical Exam:     Physical Exam   Constitutional: He is oriented to person, place, and time  He appears lethargic  No distress  Nasal cannula in place  Severely malnourished with minimal muscle and adipose tissue noted  Patient is drowsy   HENT:   Head: Normocephalic and atraumatic  Eyes: Pupils are equal, round, and reactive to light  Conjunctivae and EOM are normal  No scleral icterus  Neck: Neck supple  No JVD present  No thyromegaly present  Cardiovascular: Normal rate, regular rhythm, normal heart sounds and intact distal pulses  Exam reveals no gallop and no friction rub  No murmur heard  Pulmonary/Chest: He is in respiratory distress (moderate)  Wheezes: bases of lungs  He exhibits no tenderness  Abdominal: Soft  Bowel sounds are normal  He exhibits no distension  There is no tenderness  Musculoskeletal: Normal range of motion  He exhibits edema (2+ pitting lower extremities b/l up to pretibial)  Lymphadenopathy:     He has no cervical adenopathy  Neurological: He is oriented to person, place, and time  He appears lethargic  No cranial nerve deficit or sensory deficit  He exhibits normal muscle tone  Skin: Skin is warm and dry  Capillary refill takes less than 2 seconds  He is not diaphoretic  No erythema  No pallor  Psychiatric: He has a normal mood and affect  Nursing note and vitals reviewed          Additional Data:     Labs:    Results from last 7 days   Lab Units 07/13/20  0440 07/12/20  1526   WBC Thousand/uL 3 51* 5 56   HEMOGLOBIN g/dL 13 0 14 7   HEMATOCRIT % 40 9 46 2   PLATELETS Thousands/uL 90* 115*   NEUTROS PCT %  --  80*   LYMPHS PCT %  --  6*   MONOS PCT %  --  10   EOS PCT %  --  3     Results from last 7 days   Lab Units 07/13/20  0440 07/12/20  1526   POTASSIUM mmol/L 3 9 4 2   CHLORIDE mmol/L 95* 95*   CO2 mmol/L >45* 44*   BUN mg/dL 14 17   CREATININE mg/dL 0 54* 0 64   CALCIUM mg/dL 8 4 8 7   ALK PHOS U/L  --  66   ALT U/L  --  14   AST U/L  --  26     Results from last 7 days   Lab Units 07/12/20  1526   INR  1 14       * I Have Reviewed All Lab Data Listed Above  * Additional Pertinent Lab Tests Reviewed: All Labs Within Last 24 Hours Reviewed    Imaging:    Imaging Reports Reviewed Today Include:  Chest x-ray completed on 07/12/2020 which shows bilateral pleural effusions with atelectasis  Imaging Personally Reviewed by Myself Includes:  Chest x-ray    Recent Cultures (last 7 days):           Last 24 Hours Medication List:     Current Facility-Administered Medications:  albuterol 2 puff Inhalation Q6H PRN Andrés Hope MD   [START ON 7/14/2020] amLODIPine 5 mg Oral Daily LOUISE Newman   [START ON 7/14/2020] atenolol 25 mg Oral Daily LOUISE Newman   enoxaparin 40 mg Subcutaneous Daily Andrés Hope MD   furosemide 40 mg Intravenous BID (diuretic) LOUISE Francisco   guaiFENesin 600 mg Oral Q12H Sheron Francis MD   levalbuterol 0 63 mg Nebulization TID Andrés Hope MD   levothyroxine 50 mcg Oral Daily Andrés Hope MD   montelukast 10 mg Oral HS Andrés Hope MD   pantoprazole 40 mg Oral Early Morning Andrés Hope MD   potassium chloride 20 mEq Oral BID Andrés Hope MD   pravastatin 40 mg Oral Daily With Young Somers MD   spironolactone 25 mg Oral Daily Andrés Hope MD   terazosin 5 mg Oral HS Andrés Hope MD   timolol 1 drop Both Eyes BID Andrés Hope MD        Today, Patient Was Seen By: Andrés Hope MD    ** Please Note: This note has been constructed using a voice recognition system   **

## 2020-07-13 NOTE — ASSESSMENT & PLAN NOTE
CT chest showed large BL hydropneumothoraces  S/p BL 10 2F thoracostomy tubes placed by IR  Post procedural CXR shows near resolution of BL effusions but BL pneumothoraces  BL chest tubes were placed to suction following post procedure CXR  Will plan to recheck CXR in 4 hours and again in AM

## 2020-07-13 NOTE — ASSESSMENT & PLAN NOTE
Patient is currently asymptomatic  This has been chronic based on previous CBCs    Plan:  · Monitor with daily CBC  · Will continue with pharmacologic VTE prophylaxis; may consider discontinuation if platelet count worsens

## 2020-07-13 NOTE — ASSESSMENT & PLAN NOTE
· Patient currently requires increased supplemental oxygen as compared to baseline (3 L nasal cannula at home)  Currently on 10 L high-flow nasal cannula  Condition likely secondary to acute exacerbation of CHF and pleural effusions noted on chest x-ray  Patient regularly follows Dr Barbara Freeman from pulmonology  Low suspicion for infection given patient's negative COVID-19 testing, no elevated white count, and no fever but a viral etiology is still a possibility  Given patient's continued acute hypoxemia, cannot completely rule out PE  Per pulmonology, effusions on chest x-ray does not completely explain patient's hypoxia  Patient appears to be severely ill  · Patients CT showed Bilateral Hydropneumothorax/ pleural effusions  · Acccording with above, Interventional radiology was consulted considering the placement of bilateral chest tube     · Bilateral chest tube was successfully place 7 13 2020    Plan:    · Maintain SpO2 between 89% to 94% given patient's history of COPD  · Repeat BMP to monitor hypercapnia daily  ·  Continue Consulations with nephrology given patient's mixed metabolic abnormalities  ·  Repeat VBG daily

## 2020-07-13 NOTE — QUICK NOTE
Patient was taken for CTA of the chest on 07/13/2020, which did not show a PE but did show bilateral hydropneumothorax with large pleural effusions  IR was then consulted for bilateral chest tube placement  Daughter was updated on patient's status  All of her questions were answered  Patient's goals of care were discussed  Patient was not in the room during discussion  Patient's daughter expressed that she would not want him in a long-term care facility  Patient is still DNR/DNI  Daughter is patient's power-of-  Patient's level of care was updated to step-down  Please reference progress note from 07/13/2020 for full details of plan

## 2020-07-13 NOTE — PROGRESS NOTES
Patient pulse ox currently 83% on 6L  Respiratory and slim notified  New orders received at this time and completed as per protocol  Patient currently placed on BiPap  Tolerating well  Call bell within reach  Will continue to monitor

## 2020-07-13 NOTE — ASSESSMENT & PLAN NOTE
Malnutrition Findings:   Malnutrition type: Chronic illness  Degree of Malnutrition: Other severe protein calorie malnutrition(related to inadequate intake/medical condition/advanced age as evidenced by significant depletion of fat/muscle (clavicles, shoulders, triceps, temples), 26% wt loss x 5 months (165 lb 2/14/20), +4 b/l LE edema  Treatment includes supplementation ) patient is noted to be thin with minimal muscle and adipose tissue  Unclear on patient's current nutritional status  BMI Findings: Body mass index is 20 61 kg/m²       Plan:  · Consult nutrition to determine appropriate diet  · Patient currently on regular diet with high-calorie and high-protein

## 2020-07-13 NOTE — CONSULTS
Consultation - Nephrology   Caro Judd 80 y o  male MRN: [id number]  Unit/Bed#: S -01 Encounter: [id number]      Assessment/Plan   Pertinent labs and imaging studies:  Blood gas shows pH of 7 34 pCO2 is 86 PO2 is 62  Last night pH is 7 31 pCO2 97 PO2 is 31  Sodium 140 potassium 3 9 chloride 95 CO2 45  Anion gap is 1  Assessment:  1  Acute on chronic respiratory acidosis and metabolic alkalosis:Primary process is respiratory acidosis  Assuming a likely aspect of a chronic respiratory acidosis in reviewing prior labs his baseline bicarbonate level seems to be anywhere from 32-34  Assuming if there was a chronic respiratory acidosis with compensation the bicarbonate level would be 40 assuming a normal bicarb level of 24  He the patient likely has an acute respiratory acidosis superimposed on chronic respiratory acidosis with an additional metabolic alkalosis  With a pH is 7 3 would not utilize Diamox as this could worsen his acidosis at this point  Supportive care  I spoke with the primary team and the question is what is the additional process contributing to the acute respiratory acidosis  Questionable deconditioning verses other underlying pulmonary process  Patient has not been giving any sedating medications  Patient does not have any evidence of any significant neuromuscular disease  At this point would look for an underlying cause, patient is to go for CT scan of the chest to further evaluate for any intrinsic lung process  Hold off on aggressive diuretics right now which would only worsen metabolic alkalosis and compensation would be a worsening respiratory acidosis  Patient is currently hemodynamically stable and is compensated      Plan:  As above        History of Present Illness   Physician Requesting Consult: Grazyna Graham MD  Reason for Consult / Principal Problem:  Respiratory acidosis and metabolic alkalosis  Hx and PE limited by:   HPI: Caro Judd is a 80 y o  year old male who presents with increased confusion weakness  The patient has a history of being oxygen dependent 2 and half to 3 L at home presented with worsening shortness of breath and confusion  The the patient was admitted for acute on chronic hypoxemia hypercapnic respiratory failure  The patient at home was noted to be on chlorthalidone daily as well as Lasix daily and Aldactone  Patient has a history of chronic diastolic heart failure  An echocardiogram 2019 showed ejection fraction 50%  We are asked to see the patient with regards abnormal labs specifically labs demonstrating pH is 7 34 pCO2 of 86 PO2 was 46 and his CO2 greater than 45  I had the opportunity to speak with the primary team and I also spoke with the patient when he came back from CT scan  The patient was on high-flow nasal cannula is able to answer questions for me coherently  He states he normally takes 2 5 L at home  He states he has been feeling more weak at home more short of breath at home  He states he has been eating any feels at his baseline weight at home is approximately 168 lb  General:  Increased weakness confusion or shortness of breath  Cardiovascular:  No chest pressure reported  Respiratory:  No cough hemoptysis epistaxis reported  Gastrointestinal:  No nausea vomiting diarrhea constipation  Genitourinary:  No urgency frequency hematuria    Outside of the above review of systems, all others are essentially negative    Historical Information   Past Medical History:   Diagnosis Date    Allergic rhinitis     Hypercholesteremia     Hypertension     Pneumonia      Past Surgical History:   Procedure Laterality Date    LAPAROSCOPIC COLON RESECTION      TOTAL HIP ARTHROPLASTY       Social History   Social History     Substance and Sexual Activity   Alcohol Use Not Currently    Frequency: Monthly or less    Comment: Socially     Social History     Substance and Sexual Activity   Drug Use Never     Social History Tobacco Use   Smoking Status Former Smoker    Packs/day: 0 20    Years: 10 00    Pack years: 2 00    Types: Cigarettes   Smokeless Tobacco Never Used   Tobacco Comment    Quit 60 years ago      Family History   Family history unknown: Yes       Meds/Allergies   all current active meds have been reviewed, current meds:   Current Facility-Administered Medications   Medication Dose Route Frequency    albuterol (PROVENTIL HFA,VENTOLIN HFA) inhaler 2 puff  2 puff Inhalation Q6H PRN    [START ON 7/14/2020] amLODIPine (NORVASC) tablet 5 mg  5 mg Oral Daily    [START ON 7/14/2020] atenolol (TENORMIN) tablet 25 mg  25 mg Oral Daily    enoxaparin (LOVENOX) subcutaneous injection 40 mg  40 mg Subcutaneous Daily    furosemide (LASIX) injection 40 mg  40 mg Intravenous BID (diuretic)    guaiFENesin (MUCINEX) 12 hr tablet 600 mg  600 mg Oral Q12H MARCIANO    levalbuterol (XOPENEX) inhalation solution 0 63 mg  0 63 mg Nebulization TID    levothyroxine tablet 50 mcg  50 mcg Oral Daily    montelukast (SINGULAIR) tablet 10 mg  10 mg Oral HS    pantoprazole (PROTONIX) EC tablet 40 mg  40 mg Oral Early Morning    potassium chloride (K-DUR,KLOR-CON) CR tablet 20 mEq  20 mEq Oral BID    pravastatin (PRAVACHOL) tablet 40 mg  40 mg Oral Daily With Dinner    spironolactone (ALDACTONE) tablet 25 mg  25 mg Oral Daily    terazosin (HYTRIN) capsule 5 mg  5 mg Oral HS    timolol (TIMOPTIC) 0 5 % ophthalmic solution 1 drop  1 drop Both Eyes BID    and PTA meds:    Medications Prior to Admission   Medication    atenolol-chlorthalidone (TENORETIC) 50-25 mg per tablet    brimonidine-timolol (COMBIGAN) 0 2-0 5 %    furosemide (LASIX) 40 mg tablet    latanoprost (XALATAN) 0 005 % ophthalmic solution    levothyroxine 50 mcg tablet    montelukast (SINGULAIR) 10 mg tablet    potassium chloride (K-DUR,KLOR-CON) 20 mEq tablet    RABEprazole (ACIPHEX) 20 MG tablet    simvastatin (ZOCOR) 20 mg tablet    spironolactone (ALDACTONE) 25 mg tablet    terazosin (HYTRIN) 5 mg capsule    albuterol (PROVENTIL HFA,VENTOLIN HFA) 90 mcg/act inhaler    Blood Pressure Monitoring (B-D ASSURE BPM/AUTO ARM CUFF) MISC    levalbuterol (XOPENEX) 0 63 mg/3 mL nebulizer solution       No Known Allergies    Objective     Intake/Output Summary (Last 24 hours) at 7/13/2020 1537  Last data filed at 7/13/2020 1341  Gross per 24 hour   Intake 120 ml   Output 1525 ml   Net -1405 ml       Invasive Devices:        General: patient in NAD; mild cachexia  Skin:  No new rash noted  Eyes:  No scleral icterus  ENT:  Mildly dry mucous membranes  Neck:  Supple no adenopathy  Chest:  Coarse breath sounds  CVS:  S1-S2  Abdomen:  Positive bowel sounds  Extremities:  No significant edema  Neuro:  Nonfocal      Current Weight: Weight - Scale: 59 7 kg (131 lb 9 8 oz)(bedrest)  First Weight: Weight - Scale: 68 3 kg (150 lb 9 6 oz)    Lab Results:  I have personally reviewed pertinent labs    CBC:   Lab Results   Component Value Date    WBC 3 51 (L) 07/13/2020    WBC 4 64 08/03/2015    RBC 4 11 07/13/2020    RBC 4 84 08/03/2015     CMP:   Lab Results   Component Value Date     08/03/2015    CL 95 (L) 07/13/2020     08/03/2015    CO2 >45 (HH) 07/13/2020    CO2 38 (H) 03/16/2019    ANIONGAP 6 08/03/2015    BUN 14 07/13/2020    BUN 16 08/03/2015    CREATININE 0 54 (L) 07/13/2020    CREATININE 0 86 08/03/2015    GLUCOSE 120 03/16/2019    GLUCOSE 97 08/03/2015    CALCIUM 8 4 07/13/2020    CALCIUM 9 1 08/03/2015    AST 26 07/12/2020    AST 16 08/03/2015    ALT 14 07/12/2020    ALT 19 08/03/2015    ALKPHOS 66 07/12/2020    ALKPHOS 68 08/03/2015    PROT 6 6 08/03/2015    PROT 6 9 08/03/2015    BILITOT 0 93 08/03/2015    EGFR 92 07/13/2020     Phosphorus:   Lab Results   Component Value Date    PHOS 3 5 03/19/2019     Magnesium:   Lab Results   Component Value Date    MG 1 7 07/13/2020     Urinalysis:   Lab Results   Component Value Date    COLORU Dk Yellow 08/14/2018    CLARITYU Clear 08/14/2018    SPECGRAV 1 024 08/14/2018    PHUR 6 5 08/14/2018    LEUKOCYTESUR Negative 08/14/2018    NITRITE Negative 08/14/2018    GLUCOSEU Negative 08/14/2018    KETONESU Negative 08/14/2018    BILIRUBINUR Negative 08/14/2018    BLOODU Negative 08/14/2018     BMP:   Lab Results   Component Value Date    GLUCOSE 120 03/16/2019    GLUCOSE 97 08/03/2015    SODIUM 140 07/13/2020    CO2 >45 (HH) 07/13/2020    CO2 38 (H) 03/16/2019    BUN 14 07/13/2020    BUN 16 08/03/2015    CREATININE 0 54 (L) 07/13/2020    CREATININE 0 86 08/03/2015    CALCIUM 8 4 07/13/2020    CALCIUM 9 1 08/03/2015     ABGs:   Lab Results   Component Value Date    PH 7 262 (L) 03/16/2019

## 2020-07-13 NOTE — ASSESSMENT & PLAN NOTE
Malnutrition Findings:   Malnutrition type: Chronic illness  Degree of Malnutrition: Other severe protein calorie malnutrition(related to inadequate intake/medical condition/advanced age as evidenced by significant depletion of fat/muscle (clavicles, shoulders, triceps, temples), 26% wt loss x 5 months (165 lb 2/14/20), +4 b/l LE edema  Treatment includes supplementation )    BMI Findings: Body mass index is 22 58 kg/m²       Nutrition consulted   Continue dietary supplements

## 2020-07-13 NOTE — PHYSICAL THERAPY NOTE
PHYSICAL THERAPY CANCELLATION NOTE    Patient Name: Corina FITZPATRICK Date: 7/13/2020     PT orders received, chart review performed  Spoke to [employer], contacted Dr Genesis Shah (IM Resident) via TT  Pt is currently pending CTA PE study and asked to hold off on PT evaluation until results are received  Will continue to follow as appropriate      Rita Loving, PT

## 2020-07-13 NOTE — ASSESSMENT & PLAN NOTE
Likely secondary to advanced age, multiple medical conditions, hypothyroidism, and malnutrition  Concern for recent fall history      Plan:  · Consult PT/OT to determine disposition  · See malnutrition plan

## 2020-07-13 NOTE — OCCUPATIONAL THERAPY NOTE
Occupational Therapy Cancellation Note        Patient Name: Vitor Lacy  [id number] Date: 7/13/2020    OT orders received  Chart review completed  Spoke with PT Clara  Will hold OT eval pending PE study results  Will continue to follow and evaluate as appropriate      Zheng Samayoa, OTR/L

## 2020-07-13 NOTE — ASSESSMENT & PLAN NOTE
Patient takes 10mg of Norvasc, Atenolol-chlorthalddone, lasix and aldactone daily  Cardiology following  Norvasc decreased to 5mg daily  Continue aldactone   Lasix changed to 40mg BID secondary to acute CHF exacerbation/ volume overload  Continue Atenolol  Holding Chlorthalidone

## 2020-07-13 NOTE — CONSULTS
Interventional Radiology  Consultation 7/13/2020     History of Present Illness:  80year old male with bilateral hydropneumothorax is referred for bilateral chest tube placement      Past Medical History:   Diagnosis Date    Allergic rhinitis     Hypercholesteremia     Hypertension     Pneumonia         Past Surgical History:   Procedure Laterality Date    LAPAROSCOPIC COLON RESECTION      TOTAL HIP ARTHROPLASTY          Social History     Tobacco Use   Smoking Status Former Smoker    Packs/day: 0 20    Years: 10 00    Pack years: 2 00    Types: Cigarettes   Smokeless Tobacco Never Used   Tobacco Comment    Quit 60 years ago         Social History     Substance and Sexual Activity   Alcohol Use Not Currently    Frequency: Monthly or less    Comment: Socially        Social History     Substance and Sexual Activity   Drug Use Never        No Known Allergies    Current Facility-Administered Medications   Medication Dose Route Frequency Provider Last Rate Last Dose    albuterol (PROVENTIL HFA,VENTOLIN HFA) inhaler 2 puff  2 puff Inhalation Q6H PRN Yue Valle MD        [START ON 7/14/2020] amLODIPine (NORVASC) tablet 5 mg  5 mg Oral Daily LOUISE Morrison        [START ON 7/14/2020] atenolol (TENORMIN) tablet 25 mg  25 mg Oral Daily LOUISE Morrison        enoxaparin (LOVENOX) subcutaneous injection 40 mg  40 mg Subcutaneous Daily Yue Valle MD   40 mg at 07/13/20 0850    furosemide (LASIX) injection 40 mg  40 mg Intravenous BID (diuretic) LOUISE To   40 mg at 07/13/20 1530    guaiFENesin (MUCINEX) 12 hr tablet 600 mg  600 mg Oral Q12H Shalom Eldridge MD   600 mg at 07/13/20 1530    levalbuterol (XOPENEX) inhalation solution 0 63 mg  0 63 mg Nebulization TID Yue Valle MD   0 63 mg at 07/13/20 1305    levothyroxine tablet 50 mcg  50 mcg Oral Daily Yue Valle MD   50 mcg at 07/13/20 0504    montelukast (SINGULAIR) tablet 10 mg  10 mg Oral HS Yue Valle MD 10 mg at 07/12/20 2101    pantoprazole (PROTONIX) EC tablet 40 mg  40 mg Oral Early Morning Jesusita Brittle, MD   40 mg at 07/13/20 0504    potassium chloride (K-DUR,KLOR-CON) CR tablet 20 mEq  20 mEq Oral BID Jesusita Brittle, MD   20 mEq at 07/13/20 0850    pravastatin (PRAVACHOL) tablet 40 mg  40 mg Oral Daily With Luis Winters MD   40 mg at 07/12/20 1921    spironolactone (ALDACTONE) tablet 25 mg  25 mg Oral Daily Jesusita Brittle, MD   25 mg at 07/13/20 0850    terazosin (HYTRIN) capsule 5 mg  5 mg Oral HS Jesusita Brittle, MD   5 mg at 07/12/20 2101    timolol (TIMOPTIC) 0 5 % ophthalmic solution 1 drop  1 drop Both Eyes BID Jesusita Brittle, MD   1 drop at 07/13/20 0852          Objective:    Vitals:    07/13/20 1130 07/13/20 1305 07/13/20 1500 07/13/20 1537   BP:   117/56    BP Location:   Left arm    Pulse:   58    Resp:   18    Temp:   97 5 °F (36 4 °C)    TempSrc:   Oral    SpO2:  90% 90% (!) 88%   Weight:       Height: 5' 7" (1 702 m)           Physical Exam    Const: NAD  Pulm: On O2 mask    Lab Results   Component Value Date    WBC 3 51 (L) 07/13/2020    HGB 13 0 07/13/2020    HCT 40 9 07/13/2020     (H) 07/13/2020    PLT 90 (L) 07/13/2020     Lab Results   Component Value Date    INR 1 14 07/12/2020    INR 1 15 09/26/2019    INR 1 14 03/16/2019    PROTIME 14 0 07/12/2020    PROTIME 14 1 09/26/2019    PROTIME 14 3 (H) 03/16/2019     Lab Results   Component Value Date    PTT 28 07/12/2020     I have personally reviewed pertinent imaging and laboratory results  Assessment/Plan:  - Bilateral chest tube placement  This procedure has been fully reviewed with the patient and written informed consent has been obtained

## 2020-07-13 NOTE — ASSESSMENT & PLAN NOTE
Patient has history of severe COPD  PFT 12/17/19:  FEV1/FVC Ratio: 58 %  Forced Vital Capacity: 1 46 L    47 % predicted  FEV1: 0 85 L     40 % predicted    He uses albuterol, xopenex and singulair at home   Continue scheduled nebulizers   Guaifenesin added for secretions

## 2020-07-13 NOTE — CONSULTS
Consult Note - Pulmonary and Critical Care Medicine  Aries Luca 80 y o  male MRN: [id number]  Unit/Bed#: S -01 Encounter: [id number]    Consult requested location: Unit/Bed#: S -01  Physician Requesting Consult: Osmany Fischer MD   Reason for Consult: Hypoxia without SOB    Assessment:  1  Hypoxic Respiratory failure  2  Respiratory acidosis  3  Metabolic Alkalosis  4  BL pleural effusions  5  CHF  6  COPD on 3LNC at home      Plan:   Check ECHO with bubble study   CTA PE study pending   Cont  Diuresis per primary team and cardiology   Given somewhat interesting acid base disorder, I recommend a nephrology consult   Consider administration of diamox given markedly elvated HCO3 and resp acidosis   Recheck ABG later today given previous abnl on ABG    Plan discussed with primary team    HPI:    89M pmhx of CHF, COPD on 3L NC present to hospital reportedly for generalized weakness and confusion  Initially started on IV diuresis, however remians markedly hypoxic  He appears to be a very poor historina repeatedly saying "I'm here to get my meds fixed" when asked about reason for admission  ROS:  Twelve point review of systems was negative except for what is otherwise mentioned        Past Medical Hx  COPD on 3L supp O2  CHF  Hypothyroid      Past Surgical Hx  Colon resection      Family Hx  Due to mental status and being poor historian, his hx is likely unreiable, however he states "no family lung problems" when asked      Occupational History:     No known previous inhalation injuries       Medications    Current Facility-Administered Medications:     albuterol (PROVENTIL HFA,VENTOLIN HFA) inhaler 2 puff, 2 puff, Inhalation, Q6H PRN, Mir Lopez MD    [START ON 7/14/2020] amLODIPine (NORVASC) tablet 5 mg, 5 mg, Oral, Daily, LOUISE Guevara    [START ON 7/14/2020] atenolol (TENORMIN) tablet 25 mg, 25 mg, Oral, Daily, LOUISE Kovacs    enoxaparin (LOVENOX) subcutaneous injection 40 mg, 40 mg, Subcutaneous, Daily, Juliette Eason MD, 40 mg at 07/13/20 0850    furosemide (LASIX) injection 40 mg, 40 mg, Intravenous, BID (diuretic), LOUISE Richard    levalbuterol [facility]) inhalation solution 0 63 mg, 0 63 mg, Nebulization, TID, Juliette Eason MD, 0 63 mg at 07/13/20 0707    levothyroxine tablet 50 mcg, 50 mcg, Oral, Daily, Juliette Eason MD, 50 mcg at 07/13/20 0504    montelukast (SINGULAIR) tablet 10 mg, 10 mg, Oral, HS, Juliette Eason MD, 10 mg at 07/12/20 2101    pantoprazole (PROTONIX) EC tablet 40 mg, 40 mg, Oral, Early Morning, Juliette Eason MD, 40 mg at 07/13/20 0504    potassium chloride (K-DUR,KLOR-CON) CR tablet 20 mEq, 20 mEq, Oral, BID, Juliette Eason MD, 20 mEq at 07/13/20 0850    pravastatin (PRAVACHOL) tablet 40 mg, 40 mg, Oral, Daily With Monica Watson MD, 40 mg at 07/12/20 1921    spironolactone (ALDACTONE) tablet 25 mg, 25 mg, Oral, Daily, Juliette Eason MD, 25 mg at 07/13/20 0850    terazosin (HYTRIN) capsule 5 mg, 5 mg, Oral, HS, Juliette Eason MD, 5 mg at 07/12/20 2101    timolol (TIMOPTIC) 0 5 % ophthalmic solution 1 drop, 1 drop, Both Eyes, BID, Juliette Eason MD, 1 drop at 07/13/20 4835       Social History:   Reportedly smoked 2ppd x 10 years and quit 60 years ago      Vitals: Blood pressure 131/61, pulse 85, temperature 97 5 °F (36 4 °C), temperature source Oral, resp  rate 18, height 5' 7" (1 702 m), weight 59 7 kg (131 lb 9 8 oz), SpO2 91 % , Body mass index is 20 61 kg/m²  Physical Exam:  GEN  NAD  HEENT  ncat, non icteric, MM moist  NECK  supple, no JVD, no LAD  CV  +s1s2, no mrg, RRR  PULM  Markedly diminshed BS diffusely, no WRR  ABD  soft, ntnd, + BS  EXT 2+ LE edema, no cyanosis, no clubbing  NEURO  Aox1 (self), no focal weakness      Labs:   I personally viewed and interpreted but not limited to the following laboratory studies:        Imaging:  I personally viewed and interpreted the following imaging studies:  CXR 7/12/2020 shows BL pleural effusion and atelectasis vs consolidation in both bases  CTA PE protocol P      [facility]

## 2020-07-13 NOTE — UTILIZATION REVIEW
Initial Clinical Review    Admission: Date/Time/Statement: Admission Orders (From admission, onward)     Ordered        07/12/20 1634  Inpatient Admission (expected length of stay for this patient Order details is greater than two midnights)  Once                   Orders Placed This Encounter   Procedures    Inpatient Admission (expected length of stay for this patient Order details is greater than two midnights)     Standing Status:   Standing     Number of Occurrences:   1     Order Specific Question:   Admitting Physician     Answer:   Leonora Moss [[id number]]     Order Specific Question:   Level of Care     Answer:   Med Surg [[id number]]     Order Specific Question:   Estimated length of stay     Answer:   More than 2 Midnights     Order Specific Question:   Certification     Answer:   I certify that inpatient services are medically necessary for this patient for a duration of greater than two midnights  See H&P and MD Progress Notes for additional information about the patient's course of treatment  ED Arrival Information     Expected Arrival Acuity Means of Arrival Escorted By Service Admission Type    - 7/12/2020 14:34 Urgent Wheelchair Family Member Hospitalist Urgent    Arrival Complaint    Weakness, Disoriented        Chief Complaint   Patient presents with    Weakness - Generalized     Pt family states that pt has been getting weaker over the last week with changes in eating  Assessment/Plan:  81 yo male presents to ED from home with weakness, decreased po intake over the last week  PMH: chronic diastolic HF small pericardial effusion (10/2019), HTN, HLD,  chronic hypercapnic/hypoxic respiratory failure, severe COPD, hypothyroidism  Pt Hypoxic on arrival despite his usual supplemental O2 @ 3 L - requiring 5 L here  Thin, little muscle and adipose tissue  + pitting edema LE's  CO2 = 44, , CXR: Moderate size bilateral pleural effusions with bibasilar atelectasis  + wheezes   Admitted to IP with  Acute on chronic respiratory failure with hypoxia and hypercapnia, likely multifactorial with congestive heart failure exacerbation; COPD; bilateral pleural effusions  Generalized weakness, 2/2multiple medical conditions  Severe protein calorie malnutrition  Plan: Consult Pulm & Cardio, trial IV Lasix, check ECHO, repeat BMP, PT/OT, Nutrition consult  Sats dropped to 83% on 6L and pt was placed on Bipap    7/13 Per Cardio:  + JVD,  3+ bilateral pedal edema, + rales,very diminished with fine crackles at the bases   Acute on chronic diastolic HF  F/U on echo, Continue IV furosemide 40 mg b i d + K-Dur continue Aldactone 25 mg daily, hold chlorthalidone  May need to consider bilateral thoracentesis not improving from a respiratory standpoint with IV diuretics   Decrease amlodipine to 5 mg daily, and decrease atenolol to 25 mg daily--to allow for more room in BP for IV diuresis, Monitor renal function and electrolytes closely, continue Tele      ED Triage Vitals   Temperature Pulse Respirations Blood Pressure SpO2   07/12/20 1455 07/12/20 1455 07/12/20 1455 07/12/20 1455 07/12/20 1455   97 7 °F (36 5 °C) 81 18 114/53 (!) 60 %      Temp Source Heart Rate Source Patient Position - Orthostatic VS BP Location FiO2 (%)   07/12/20 1455 07/12/20 1455 07/12/20 1455 07/12/20 1455 07/12/20 2105   Oral Monitor Lying Right arm 100      Pain Score       07/12/20 1802       No Pain        Wt Readings from Last 1 Encounters:   07/13/20 59 7 kg (131 lb 9 8 oz)     Additional Vital Signs:    07/13/20 1043     91 %   High flow nasal cannula    07/13/20 0917     90 % 60 10 L/min High flow nasal cannula     07/13/20 0707 97 5 °F (36 4 °C) 85 18 131/61 90 % 44 6 L/min Nasal cannula Lying   07/13/20 0421     92 % 44 6 L/min Nasal cannula    07/13/20 0320     95 % 40% Bipap Other (comment)     FiO2 (%): Decreased to 40 at 07/13/20 0320   O2 Device: BIPAP at 07/13/20 0320   07/12/20 2345     95 %   Other (comment)     FiO2 (%): decreased to 60 at 07/12/20 2345   07/12/20 2227 97 7 °F (36 5 °C) 74 18 112/54 97 %  Bipap Other (comment)  Lying   O2 Device: bipap at 07/12/20 2227   07/12/20 2157     98 %  Bipap Other (comment)     07/12/20 2105     90 %  Bipap Other (comment)     O2 Device: BIPAP at 07/12/20 2105   07/12/20 2011  82 22 140/63        07/12/20 1946     94 % 40 5 L/min Nasal cannula    07/12/20 1751 96 9 °F (36 1 °C) 79 20 121/59 94 % 44 6 L/min Nasal cannula Lying   07/12/20 1726  72 18 135/63 97 % 40 5 L/min Nasal cannula Lying   07/12/20 1630  76   97 % 40 5 L/min Nasal cannula    07/12/20 1545  74   94 % 40 5 L/min Nasal cannula    07/12/20 1522  76 18  93 % 40 5 L/min Nasal cannula        Pertinent Labs/Diagnostic Test Results:   Results from last 7 days   Lab Units 07/12/20  1526   SARS-COV-2  Negative     Results from last 7 days   Lab Units 07/13/20  0440 07/12/20  1526   WBC Thousand/uL 3 51* 5 56   HEMOGLOBIN g/dL 13 0 14 7   HEMATOCRIT % 40 9 46 2   PLATELETS Thousands/uL 90* 115*   NEUTROS ABS Thousands/µL  --  4 47     Results from last 7 days   Lab Units 07/13/20  0440 07/12/20  1526   SODIUM mmol/L 140 140   POTASSIUM mmol/L 3 9 4 2   CHLORIDE mmol/L 95* 95*   CO2 mmol/L >45* 44*   ANION GAP mmol/L  --  1*   BUN mg/dL 14 17   CREATININE mg/dL 0 54* 0 64   EGFR ml/min/1 73sq m 92 86   CALCIUM mg/dL 8 4 8 7   MAGNESIUM mg/dL 1 7 1 9     Results from last 7 days   Lab Units 07/12/20  1526   AST U/L 26   ALT U/L 14   ALK PHOS U/L 66   TOTAL PROTEIN g/dL 6 5   ALBUMIN g/dL 3 5   TOTAL BILIRUBIN mg/dL 0 98     Results from last 7 days   Lab Units 07/13/20  0440 07/12/20  1526   GLUCOSE RANDOM mg/dL 80 111     Results from last 7 days   Lab Units 07/13/20  0440 07/12/20  2046   PH KANCHAN  7 346 7 312   PCO2 KANCHAN mm Hg 86 8* 97 0*   PO2 KANCHAN mm Hg 62 7* 31 5*   HCO3 KANCHAN mmol/L 46 4* 48 0*   BASE EXC KANCHAN mmol/L 16 4 16 3   O2 CONTENT KANCHAN ml/dL 17 0 13 1   O2 HGB, VENOUS % 90 1* 63 0     Results from last 7 days   Lab Units 07/12/20  1526   TROPONIN I ng/mL 0 02     Results from last 7 days   Lab Units 07/12/20  1526   PROTIME seconds 14 0   INR  1 14   PTT seconds 28     Results from last 7 days   Lab Units 07/12/20  1526   TSH 3RD GENERATON uIU/mL 4 540*     Results from last 7 days   Lab Units 07/12/20  1526   NT-PRO BNP pg/mL 556*     7/12 CXR: Moderate size bilateral pleural effusions with bibasilar atelectasis  7/12 EKG:   ECHO    Past Medical History:   Diagnosis Date    Allergic rhinitis     Hypercholesteremia     Hypertension     Pneumonia      Present on Admission:   Severe protein-calorie malnutrition ([address] )   Generalized weakness   Acute on chronic respiratory failure with hypoxia and hypercapnia (HCC)   Essential hypertension   COPD (chronic obstructive pulmonary disease) (HCC)   Pleural effusion   Thrombocytopenia (HCC)   Hyperlipidemia      Admitting Diagnosis: Weakness [R53 1]  Hypoxia [R09 02]  Bilateral pleural effusion [J90]  Generalized weakness [R53 1]  Age/Sex: 80 y o  male  Admission Orders:  Scheduled Medications:  Medications:  [START ON 7/14/2020] amLODIPine 5 mg Oral Daily   enoxaparin 40 mg Subcutaneous Daily   furosemide 40 mg Intravenous BID (diuretic)   levalbuterol 0 63 mg Nebulization TID   levothyroxine 50 mcg Oral Daily   montelukast 10 mg Oral HS   pantoprazole 40 mg Oral Early Morning   potassium chloride 20 mEq Oral BID   pravastatin 40 mg Oral Daily With Dinner   spironolactone 25 mg Oral Daily   terazosin 5 mg Oral HS   timolol 1 drop Both Eyes BID     Continuous IV Infusions:     PRN Meds:    albuterol 2 puff Inhalation Q6H PRN     TELE  Continuous pulse Ox  Neuro checks q4h  SCDs  IP CONSULT TO CARDIOLOGY  IP CONSULT TO PULMONOLOGY  IP CONSULT TO NUTRITION SERVICES    Network Utilization Review Department  [email or web address]  ATTENTION: Please call with any questions or concerns to [phone] and carefully listen to the prompts so that you are directed to the right person  All voicemails are confidential   Jason Dempsey all requests for admission clinical reviews, approved or denied determinations and any other requests to dedicated fax number below belonging to the campus where the patient is receiving treatment   List of dedicated fax numbers for the Facilities:  [address] DENIALS (Administrative/Medical Necessity) [phone]   [address] (Maternity/NICU/Pediatrics) [phone]   Jeff Parker [phone]     [address] [phone]   Jose Luis Perea [phone]   [facility] [phone]   [address] [facility] [address] [facility] [phone]   [facility]  [phone]   [address] [facility] [address]  [address] [facility] [address] [facility] [phone]

## 2020-07-13 NOTE — ASSESSMENT & PLAN NOTE
Wt Readings from Last 3 Encounters:   07/13/20 59 7 kg (131 lb 9 8 oz)   02/14/20 74 9 kg (165 lb 1 6 oz)   12/17/19 77 3 kg (170 lb 6 4 oz)   I/O last 3 completed shifts: In: 0   Out: 1125 [[id number]]  I/O this shift:  In: 120 [P O :120]  Out: 400 [Urine:400]    Patient's last echo completed in October of 2019 showed ejection fraction of 50%  Upon assessment patient appears to be fluid overloaded given lower extremity edema and pleural effusions seen on chest x-ray and slightly elevated BNP of 556  Patient is scheduled to have a repeat echo on 07/31/2020  Patient regularly follows Dr Mckinnon Poster  Echocardiogram on 7/13/2020 shows large pleural effusions and slightly enlarged pericardium  Plan:  · See above for acute on chronic hypoxemic and hypercapnic respiratory failure plan  · Hold patient's p o   Lasix  · Continue patient's home spironolactone  · IV Lasix 40 mg b i d  and monitor response

## 2020-07-13 NOTE — ASSESSMENT & PLAN NOTE
Wt Readings from Last 3 Encounters:   07/13/20 65 4 kg (144 lb 2 9 oz)   02/14/20 74 9 kg (165 lb 1 6 oz)   12/17/19 77 3 kg (170 lb 6 4 oz)       Echocardiogram: 8/81/7516- LV systolic function was normal  Ejection fraction was estimated to be 60 %   RV systolic function normal     Cardiology following  Continue with diuresis per cardiology   Trend I/O, Weights

## 2020-07-13 NOTE — PLAN OF CARE
Problem: Potential for Falls  Goal: Patient will remain free of falls  Description  INTERVENTIONS:  - Assess patient frequently for physical needs  -  Identify cognitive and physical deficits and behaviors that affect risk of falls  -  [place] fall precautions as indicated by assessment   - Educate patient/family on patient safety including physical limitations  - Instruct patient to call for assistance with activity based on assessment  - Modify environment to reduce risk of injury  - Consider OT/PT consult to assist with strengthening/mobility  Outcome: Progressing     Problem: Prexisting or High Potential for Compromised Skin Integrity  Goal: Skin integrity is maintained or improved  Description  INTERVENTIONS:  - Identify patients at risk for skin breakdown  - Assess and monitor skin integrity  - Assess and monitor nutrition and hydration status  - Monitor labs   - Assess for incontinence   - Turn and reposition patient  - Assist with mobility/ambulation  - Relieve pressure over bony prominences  - Avoid friction and shearing  - Provide appropriate hygiene as needed including keeping skin clean and dry  - Evaluate need for skin moisturizer/barrier cream  - Collaborate with interdisciplinary team   - Patient/family teaching  - Consider wound care consult   Outcome: Progressing     Problem: Nutrition/Hydration-ADULT  Goal: Nutrient/Hydration intake appropriate for improving, restoring or maintaining nutritional needs  Description  Monitor and assess patient's nutrition/hydration status for malnutrition  Collaborate with interdisciplinary team and initiate plan and interventions as ordered  Monitor patient's weight and dietary intake as ordered or per policy  Utilize nutrition screening tool and intervene as necessary  Determine patient's food preferences and provide high-protein, high-caloric foods as appropriate       INTERVENTIONS:  - Monitor oral intake, urinary output, labs, and treatment plans  - Assess nutrition and hydration status and recommend course of action  - Evaluate amount of meals eaten  - Assist patient with eating if necessary   - Allow adequate time for meals  - Recommend/ encourage appropriate diets, oral nutritional supplements, and vitamin/mineral supplements  - Order, calculate, and assess calorie counts as needed  - Recommend, monitor, and adjust tube feedings and TPN/PPN based on assessed needs  - Assess need for intravenous fluids  - Provide specific nutrition/hydration education as appropriate  - Include patient/family/caregiver in decisions related to nutrition  Outcome: Progressing     Problem: PAIN - ADULT  Goal: Verbalizes/displays adequate comfort level or baseline comfort level  Description  Interventions:  - Encourage patient to monitor pain and request assistance  - Assess pain using appropriate pain scale  - Administer analgesics based on type and severity of pain and evaluate response  - Implement non-pharmacological measures as appropriate and evaluate response  - Consider cultural and social influences on pain and pain management  - Notify physician/advanced practitioner if interventions unsuccessful or patient reports new pain  Outcome: Progressing     Problem: INFECTION - ADULT  Goal: Absence or prevention of progression during hospitalization  Description  INTERVENTIONS:  - Assess and monitor for signs and symptoms of infection  - Monitor lab/diagnostic results  - Monitor all insertion sites, i e  indwelling lines, tubes, and drains  - Monitor endotracheal if appropriate and nasal secretions for changes in amount and color  - [place] appropriate cooling/warming therapies per order  - Administer medications as ordered  - Instruct and encourage patient and family to use good hand hygiene technique  - Identify and instruct in appropriate isolation precautions for identified infection/condition  Outcome: Progressing  Goal: Absence of fever/infection during neutropenic period  Description  INTERVENTIONS:  - Monitor WBC    Outcome: Progressing     Problem: SAFETY ADULT  Goal: Maintain or return to baseline ADL function  Description  INTERVENTIONS:  -  Assess patient's ability to carry out ADLs; assess patient's baseline for ADL function and identify physical deficits which impact ability to perform ADLs (bathing, care of mouth/teeth, toileting, grooming, dressing, etc )  - Assess/evaluate cause of self-care deficits   - Assess range of motion  - Assess patient's mobility; develop plan if impaired  - Assess patient's need for assistive devices and provide as appropriate  - Encourage maximum independence but intervene and supervise when necessary  - Involve family in performance of ADLs  - Assess for home care needs following discharge   - Consider OT consult to assist with ADL evaluation and planning for discharge  - Provide patient education as appropriate  Outcome: Progressing  Goal: Maintain or return mobility status to optimal level  Description  INTERVENTIONS:  - Assess patient's baseline mobility status (ambulation, transfers, stairs, etc )    - Identify cognitive and physical deficits and behaviors that affect mobility  - Identify mobility aids required to assist with transfers and/or ambulation (gait belt, sit-to-stand, lift, walker, cane, etc )  - [place] fall precautions as indicated by assessment  - Record patient progress and toleration of activity level on Mobility SBAR; progress patient to next Phase/Stage  - Instruct patient to call for assistance with activity based on assessment  - Consider rehabilitation consult to assist with strengthening/weightbearing, etc   Outcome: Progressing     Problem: DISCHARGE PLANNING  Goal: Discharge to home or other facility with appropriate resources  Description  INTERVENTIONS:  - Identify barriers to discharge w/patient and caregiver  - Arrange for needed discharge resources and transportation as appropriate  - Identify discharge learning needs (meds, wound care, etc )  - Arrange for interpretive services to assist at discharge as needed  - Refer to Case Management Department for coordinating discharge planning if the patient needs post-hospital services based on physician/advanced practitioner order or complex needs related to functional status, cognitive ability, or social support system  Outcome: Progressing     Problem: Knowledge Deficit  Goal: Patient/family/caregiver demonstrates understanding of disease process, treatment plan, medications, and discharge instructions  Description  Complete learning assessment and assess knowledge base    Interventions:  - Provide teaching at level of understanding  - Provide teaching via preferred learning methods  Outcome: Progressing     Problem: RESPIRATORY - ADULT  Goal: Achieves optimal ventilation and oxygenation  Description  INTERVENTIONS:  - Assess for changes in respiratory status  - Assess for changes in mentation and behavior  - Position to facilitate oxygenation and minimize respiratory effort  - Oxygen administered by appropriate delivery if ordered  - Initiate smoking cessation education as indicated  - Encourage broncho-pulmonary hygiene including cough, deep breathe, Incentive Spirometry  - Assess the need for suctioning and aspirate as needed  - Assess and instruct to report SOB or any respiratory difficulty  - Respiratory Therapy support as indicated  Outcome: Progressing     Problem: GENITOURINARY - ADULT  Goal: Maintains or returns to baseline urinary function  Description  INTERVENTIONS:  - Assess urinary function  - Encourage oral fluids to ensure adequate hydration if ordered  - Administer IV fluids as ordered to ensure adequate hydration  - Administer ordered medications as needed  - Offer frequent toileting  - Follow urinary retention protocol if ordered  Outcome: Progressing  Goal: Absence of urinary retention  Description  INTERVENTIONS:  - Assess patients ability to void and empty bladder  - Monitor I/O  - Bladder scan as needed  - Discuss with physician/AP medications to alleviate retention as needed  - Discuss catheterization for long term situations as appropriate  Outcome: Progressing  Goal: Urinary catheter remains patent  Description  INTERVENTIONS:  - Assess patency of urinary catheter  - If patient has a chronic can, consider changing catheter if non-functioning  - Follow guidelines for intermittent irrigation of non-functioning urinary catheter  Outcome: Progressing     Problem: METABOLIC, FLUID AND ELECTROLYTES - ADULT  Goal: Electrolytes maintained within normal limits  Description  INTERVENTIONS:  - Monitor labs and assess patient for signs and symptoms of electrolyte imbalances  - Administer electrolyte replacement as ordered  - Monitor response to electrolyte replacements, including repeat lab results as appropriate  - Instruct patient on fluid and nutrition as appropriate  Outcome: Progressing  Goal: Fluid balance maintained  Description  INTERVENTIONS:  - Monitor labs   - Monitor I/O and WT  - Instruct patient on fluid and nutrition as appropriate  - Assess for signs & symptoms of volume excess or deficit  Outcome: Progressing  Goal: Glucose maintained within target range  Description  INTERVENTIONS:  - Monitor Blood Glucose as ordered  - Assess for signs and symptoms of hyperglycemia and hypoglycemia  - Administer ordered medications to maintain glucose within target range  - Assess nutritional intake and initiate nutrition service referral as needed  Outcome: Progressing     Problem: SKIN/TISSUE INTEGRITY - ADULT  Goal: Skin integrity remains intact  Description  INTERVENTIONS  - Identify patients at risk for skin breakdown  - Assess and monitor skin integrity  - Assess and monitor nutrition and hydration status  - Monitor labs (i e  albumin)  - Assess for incontinence   - Turn and reposition patient  - Assist with mobility/ambulation  - Relieve pressure over bony prominences  - Avoid friction and shearing  - Provide appropriate hygiene as needed including keeping skin clean and dry  - Evaluate need for skin moisturizer/barrier cream  - Collaborate with interdisciplinary team (i e  Nutrition, Rehabilitation, etc )   - Patient/family teaching  Outcome: Progressing  Goal: Incision(s), wounds(s) or drain site(s) healing without S/S of infection  Description  INTERVENTIONS  - Assess and document risk factors for skin impairment   - Assess and document dressing, incision, wound bed, drain sites and surrounding tissue  - Consider nutrition services referral as needed  - Oral mucous membranes remain intact  - Provide patient/ family education  Outcome: Progressing  Goal: Oral mucous membranes remain intact  Description  INTERVENTIONS  - Assess oral mucosa and hygiene practices  - Implement preventative oral hygiene regimen  - Implement oral medicated treatments as ordered  - Initiate Nutrition services referral as needed  Outcome: Progressing     Problem: HEMATOLOGIC - ADULT  Goal: Maintains hematologic stability  Description  INTERVENTIONS  - Assess for signs and symptoms of bleeding or hemorrhage  - Monitor labs  - Administer supportive blood products/factors as ordered and appropriate  Outcome: Progressing     Problem: MUSCULOSKELETAL - ADULT  Goal: Maintain or return mobility to safest level of function  Description  INTERVENTIONS:  - Assess patient's ability to carry out ADLs; assess patient's baseline for ADL function and identify physical deficits which impact ability to perform ADLs (bathing, care of mouth/teeth, toileting, grooming, dressing, etc )  - Assess/evaluate cause of self-care deficits   - Assess range of motion  - Assess patient's mobility  - Assess patient's need for assistive devices and provide as appropriate  - Encourage maximum independence but intervene and supervise when necessary  - Involve family in performance of ADLs  - Assess for home care needs following discharge   - Consider OT consult to assist with ADL evaluation and planning for discharge  - Provide patient education as appropriate  Outcome: Progressing  Goal: Maintain proper alignment of affected body part  Description  INTERVENTIONS:  - Support, maintain and protect limb and body alignment  - Provide patient/ family with appropriate education  Outcome: Progressing

## 2020-07-14 ENCOUNTER — APPOINTMENT (INPATIENT)
Dept: CT IMAGING | Facility: HOSPITAL | Age: 85
DRG: 291 | End: 2020-07-14
Payer: COMMERCIAL

## 2020-07-14 ENCOUNTER — APPOINTMENT (INPATIENT)
Dept: RADIOLOGY | Facility: HOSPITAL | Age: 85
DRG: 291 | End: 2020-07-14
Payer: COMMERCIAL

## 2020-07-14 LAB
ALBUMIN SERPL BCP-MCNC: 2.9 G/DL (ref 3.5–5)
ALP SERPL-CCNC: 44 U/L (ref 46–116)
ALT SERPL W P-5'-P-CCNC: 7 U/L (ref 12–78)
ANION GAP SERPL CALCULATED.3IONS-SCNC: -1 MMOL/L (ref 4–13)
ANION GAP SERPL CALCULATED.3IONS-SCNC: -4 MMOL/L (ref 4–13)
AST SERPL W P-5'-P-CCNC: 21 U/L (ref 5–45)
BASE EX.OXY STD BLDV CALC-SCNC: 85.6 % (ref 60–80)
BASE EX.OXY STD BLDV CALC-SCNC: 90.4 % (ref 60–80)
BASE EXCESS BLDA CALC-SCNC: 20 MMOL/L (ref -2–3)
BASE EXCESS BLDV CALC-SCNC: 16.6 MMOL/L
BASE EXCESS BLDV CALC-SCNC: 17.6 MMOL/L
BILIRUB DIRECT SERPL-MCNC: 0.32 MG/DL (ref 0–0.2)
BILIRUB SERPL-MCNC: 1.03 MG/DL (ref 0.2–1)
BUN SERPL-MCNC: 15 MG/DL (ref 5–25)
BUN SERPL-MCNC: 16 MG/DL (ref 5–25)
BUN SERPL-MCNC: 19 MG/DL (ref 5–25)
CA-I BLD-SCNC: 1.16 MMOL/L (ref 1.12–1.32)
CALCIUM SERPL-MCNC: 7.3 MG/DL (ref 8.3–10.1)
CALCIUM SERPL-MCNC: 7.9 MG/DL (ref 8.3–10.1)
CALCIUM SERPL-MCNC: 8.3 MG/DL (ref 8.3–10.1)
CHLORIDE SERPL-SCNC: 91 MMOL/L (ref 100–108)
CHLORIDE SERPL-SCNC: 94 MMOL/L (ref 100–108)
CHLORIDE SERPL-SCNC: 96 MMOL/L (ref 100–108)
CO2 SERPL-SCNC: 43 MMOL/L (ref 21–32)
CO2 SERPL-SCNC: 45 MMOL/L (ref 21–32)
CO2 SERPL-SCNC: >45 MMOL/L (ref 21–32)
CREAT SERPL-MCNC: 0.61 MG/DL (ref 0.6–1.3)
CREAT SERPL-MCNC: 0.7 MG/DL (ref 0.6–1.3)
CREAT SERPL-MCNC: 0.71 MG/DL (ref 0.6–1.3)
ERYTHROCYTE [DISTWIDTH] IN BLOOD BY AUTOMATED COUNT: 13.7 % (ref 11.6–15.1)
GFR SERPL CREATININE-BSD FRML MDRD: 82 ML/MIN/1.73SQ M
GFR SERPL CREATININE-BSD FRML MDRD: 83 ML/MIN/1.73SQ M
GFR SERPL CREATININE-BSD FRML MDRD: 87 ML/MIN/1.73SQ M
GLUCOSE SERPL-MCNC: 101 MG/DL (ref 65–140)
GLUCOSE SERPL-MCNC: 108 MG/DL (ref 65–140)
GLUCOSE SERPL-MCNC: 161 MG/DL (ref 65–140)
GLUCOSE SERPL-MCNC: 84 MG/DL (ref 65–140)
HCO3 BLDA-SCNC: 49 MMOL/L (ref 22–28)
HCO3 BLDV-SCNC: 44 MMOL/L (ref 24–30)
HCO3 BLDV-SCNC: 46.5 MMOL/L (ref 24–30)
HCT VFR BLD AUTO: 41.4 % (ref 36.5–49.3)
HCT VFR BLD CALC: 39 % (ref 36.5–49.3)
HGB BLD-MCNC: 13.2 G/DL (ref 12–17)
HGB BLDA-MCNC: 13.3 G/DL (ref 12–17)
MAGNESIUM SERPL-MCNC: 2.7 MG/DL (ref 1.6–2.6)
MCH RBC QN AUTO: 32.2 PG (ref 26.8–34.3)
MCHC RBC AUTO-ENTMCNC: 31.9 G/DL (ref 31.4–37.4)
MCV RBC AUTO: 101 FL (ref 82–98)
O2 CT BLDV-SCNC: 15.6 ML/DL
O2 CT BLDV-SCNC: 16.9 ML/DL
PCO2 BLD: 78.3 MM HG (ref 36–44)
PCO2 BLD: >45 MMOL/L (ref 21–32)
PCO2 BLDV: 66.1 MM HG (ref 42–50)
PCO2 BLDV: 77.5 MM HG (ref 42–50)
PH BLD: 7.41 [PH] (ref 7.35–7.45)
PH BLDV: 7.4 [PH] (ref 7.3–7.4)
PH BLDV: 7.44 [PH] (ref 7.3–7.4)
PHOSPHATE SERPL-MCNC: 3.5 MG/DL (ref 2.3–4.1)
PLATELET # BLD AUTO: 94 THOUSANDS/UL (ref 149–390)
PMV BLD AUTO: 10.3 FL (ref 8.9–12.7)
PO2 BLD: 97 MM HG (ref 75–129)
PO2 BLDV: 49 MM HG (ref 35–45)
PO2 BLDV: 66.7 MM HG (ref 35–45)
POTASSIUM BLD-SCNC: 3.9 MMOL/L (ref 3.5–5.3)
POTASSIUM SERPL-SCNC: 3.6 MMOL/L (ref 3.5–5.3)
POTASSIUM SERPL-SCNC: 4 MMOL/L (ref 3.5–5.3)
POTASSIUM SERPL-SCNC: 4.2 MMOL/L (ref 3.5–5.3)
PROCALCITONIN SERPL-MCNC: <0.05 NG/ML
PROT SERPL-MCNC: 5 G/DL (ref 6.4–8.2)
RBC # BLD AUTO: 4.1 MILLION/UL (ref 3.88–5.62)
SAO2 % BLD FROM PO2: 97 % (ref 60–85)
SODIUM BLD-SCNC: 133 MMOL/L (ref 136–145)
SODIUM SERPL-SCNC: 132 MMOL/L (ref 136–145)
SODIUM SERPL-SCNC: 137 MMOL/L (ref 136–145)
SODIUM SERPL-SCNC: 138 MMOL/L (ref 136–145)
SPECIMEN SOURCE: ABNORMAL
WBC # BLD AUTO: 5.3 THOUSAND/UL (ref 4.31–10.16)

## 2020-07-14 PROCEDURE — 97129 THER IVNTJ 1ST 15 MIN: CPT

## 2020-07-14 PROCEDURE — 36600 WITHDRAWAL OF ARTERIAL BLOOD: CPT

## 2020-07-14 PROCEDURE — 84295 ASSAY OF SERUM SODIUM: CPT

## 2020-07-14 PROCEDURE — 80048 BASIC METABOLIC PNL TOTAL CA: CPT | Performed by: PHYSICIAN ASSISTANT

## 2020-07-14 PROCEDURE — 71045 X-RAY EXAM CHEST 1 VIEW: CPT

## 2020-07-14 PROCEDURE — 97163 PT EVAL HIGH COMPLEX 45 MIN: CPT

## 2020-07-14 PROCEDURE — 99233 SBSQ HOSP IP/OBS HIGH 50: CPT | Performed by: INTERNAL MEDICINE

## 2020-07-14 PROCEDURE — 84132 ASSAY OF SERUM POTASSIUM: CPT

## 2020-07-14 PROCEDURE — 97167 OT EVAL HIGH COMPLEX 60 MIN: CPT

## 2020-07-14 PROCEDURE — 99232 SBSQ HOSP IP/OBS MODERATE 35: CPT | Performed by: INTERNAL MEDICINE

## 2020-07-14 PROCEDURE — 85014 HEMATOCRIT: CPT

## 2020-07-14 PROCEDURE — 85027 COMPLETE CBC AUTOMATED: CPT | Performed by: PHYSICIAN ASSISTANT

## 2020-07-14 PROCEDURE — 94640 AIRWAY INHALATION TREATMENT: CPT

## 2020-07-14 PROCEDURE — 94760 N-INVAS EAR/PLS OXIMETRY 1: CPT

## 2020-07-14 PROCEDURE — 82805 BLOOD GASES W/O2 SATURATION: CPT | Performed by: PHYSICIAN ASSISTANT

## 2020-07-14 PROCEDURE — 71250 CT THORAX DX C-: CPT

## 2020-07-14 PROCEDURE — 82947 ASSAY GLUCOSE BLOOD QUANT: CPT

## 2020-07-14 PROCEDURE — 84100 ASSAY OF PHOSPHORUS: CPT | Performed by: PHYSICIAN ASSISTANT

## 2020-07-14 PROCEDURE — 82330 ASSAY OF CALCIUM: CPT

## 2020-07-14 PROCEDURE — 82803 BLOOD GASES ANY COMBINATION: CPT

## 2020-07-14 PROCEDURE — 80076 HEPATIC FUNCTION PANEL: CPT | Performed by: PHYSICIAN ASSISTANT

## 2020-07-14 PROCEDURE — 94003 VENT MGMT INPAT SUBQ DAY: CPT

## 2020-07-14 PROCEDURE — 83735 ASSAY OF MAGNESIUM: CPT | Performed by: PHYSICIAN ASSISTANT

## 2020-07-14 RX ORDER — POTASSIUM CHLORIDE 20 MEQ/1
40 TABLET, EXTENDED RELEASE ORAL ONCE
Status: DISCONTINUED | OUTPATIENT
Start: 2020-07-14 | End: 2020-07-14

## 2020-07-14 RX ORDER — ALBUMIN, HUMAN INJ 5% 5 %
12.5 SOLUTION INTRAVENOUS EVERY 6 HOURS
Status: COMPLETED | OUTPATIENT
Start: 2020-07-14 | End: 2020-07-15

## 2020-07-14 RX ORDER — ALBUMIN, HUMAN INJ 5% 5 %
25 SOLUTION INTRAVENOUS ONCE
Status: COMPLETED | OUTPATIENT
Start: 2020-07-14 | End: 2020-07-14

## 2020-07-14 RX ORDER — POTASSIUM CHLORIDE 20 MEQ/1
20 TABLET, EXTENDED RELEASE ORAL ONCE
Status: COMPLETED | OUTPATIENT
Start: 2020-07-14 | End: 2020-07-14

## 2020-07-14 RX ADMIN — MONTELUKAST SODIUM 10 MG: 10 TABLET, FILM COATED ORAL at 21:35

## 2020-07-14 RX ADMIN — LEVALBUTEROL HYDROCHLORIDE 0.63 MG: 0.63 SOLUTION RESPIRATORY (INHALATION) at 19:50

## 2020-07-14 RX ADMIN — ALBUMIN (HUMAN) 12.5 G: 12.5 INJECTION, SOLUTION INTRAVENOUS at 10:54

## 2020-07-14 RX ADMIN — POTASSIUM CHLORIDE 20 MEQ: 1500 TABLET, EXTENDED RELEASE ORAL at 21:35

## 2020-07-14 RX ADMIN — LEVALBUTEROL HYDROCHLORIDE 0.63 MG: 0.63 SOLUTION RESPIRATORY (INHALATION) at 13:25

## 2020-07-14 RX ADMIN — ENOXAPARIN SODIUM 40 MG: 40 INJECTION SUBCUTANEOUS at 08:09

## 2020-07-14 RX ADMIN — ALBUMIN (HUMAN) 12.5 G: 12.5 INJECTION, SOLUTION INTRAVENOUS at 17:59

## 2020-07-14 RX ADMIN — GUAIFENESIN 600 MG: 600 TABLET, EXTENDED RELEASE ORAL at 08:09

## 2020-07-14 RX ADMIN — TIMOLOL MALEATE 1 DROP: 5 SOLUTION/ DROPS OPHTHALMIC at 17:59

## 2020-07-14 RX ADMIN — GUAIFENESIN 600 MG: 600 TABLET, EXTENDED RELEASE ORAL at 21:35

## 2020-07-14 RX ADMIN — LEVALBUTEROL HYDROCHLORIDE 0.63 MG: 0.63 SOLUTION RESPIRATORY (INHALATION) at 07:00

## 2020-07-14 RX ADMIN — TERAZOSIN HYDROCHLORIDE 5 MG: 5 CAPSULE ORAL at 21:35

## 2020-07-14 RX ADMIN — PRAVASTATIN SODIUM 40 MG: 40 TABLET ORAL at 17:59

## 2020-07-14 RX ADMIN — LEVOTHYROXINE SODIUM 50 MCG: 50 TABLET ORAL at 06:01

## 2020-07-14 RX ADMIN — ALBUMIN (HUMAN) 25 G: 12.5 INJECTION, SOLUTION INTRAVENOUS at 07:28

## 2020-07-14 RX ADMIN — PANTOPRAZOLE SODIUM 40 MG: 40 TABLET, DELAYED RELEASE ORAL at 08:09

## 2020-07-14 NOTE — OCCUPATIONAL THERAPY NOTE
Occupational Therapy Evaluation     Patient Name: Manuel Agee  XQLEE'A Date: 7/14/2020  Problem List  Principal Problem:    Bilateral hydropneumothorax  Active Problems:    Essential hypertension    Hyperlipidemia    Thrombocytopenia (HCC)    Generalized weakness    Pericardial effusion    Severe protein-calorie malnutrition (HCC)    Acute on chronic respiratory failure with hypoxia and hypercapnia (HCC)    COPD (chronic obstructive pulmonary disease) (HCC)    Hypothyroidism    Acute on chronic diastolic heart failure (HCC)    GERD (gastroesophageal reflux disease)    Past Medical History  Past Medical History:   Diagnosis Date    Allergic rhinitis     Hypercholesteremia     Hypertension     Pneumonia      Past Surgical History  Past Surgical History:   Procedure Laterality Date    IR CHEST TUBE  7/13/2020    LAPAROSCOPIC COLON RESECTION      TOTAL HIP ARTHROPLASTY           07/14/20 1153   Note Type   Note type Eval/Treat   Restrictions/Precautions   Weight Bearing Precautions Per Order No   Other Precautions Chair Alarm; Bed Alarm;Cognitive;Multiple lines;Telemetry;O2;Fall Risk;Hard of hearing  (b/l chest tubes; can cath)   Pain Assessment   Pain Assessment Tool Pain Assessment not indicated - pt denies pain   Pain Score No Pain   Home Living   Type of [address] One level   Additional Comments Pt was staying with his daughter PTA  Pt is questionable historian due to cognitive deficits  Per medical chart: Patient lives by himself and is usually self-sufficient, but the daughter noted that patient needed more frequent reminders about medications and doctor visits  Of note, daughter states that whenever patient is fluid overloaded, patient becomes transiently confused  Daughter also noted that patient was sleeping on his recliner more often in the past week  Patient was brought to the daughter's home on Saturday 07/11/2020 out of concern for his increased confusion and weakness    On 07/12/2020 daughter noted more confusion and had a suspected fall out of bed  Prior Function   Level of [place] Independent with ADLs and functional mobility; Needs assistance with IADLs   Lives With Daughter  (currently)   Receives Help From Family   ADL Assistance Independent   IADLs Needs assistance  (immediately PTA -from daughter with med mgt)   Falls in the last 6 months 1 to 4  (suspected fall OOB at dtr's home)   Vocational Retired   Comments Pt drives   Lifestyle   Autonomy Pt previously [address] with ADLs, requires assist with IADLs, and used RW Mod I for functional mobility   Reciprocal Relationships Pt has supportive dtr   Service to Others Pt is retired   Psychosocial   Psychosocial (WDL) WDL   Subjective   Subjective Pt is very pleasant and cooperative   ADL   Eating Assistance 5  Supervision/Setup   Grooming Assistance 4  Minimal Assistance   [address] 3  Moderate Assistance   LB [address] 2  Maximal Assistance   [address] 3  Moderate Assistance    [facility] [address] 2  Maximal [address]  2  Maximal Assistance   Additional Comments Assist levels as outlined above are based on functional assessment of performance skills and deficits  Bed Mobility   Supine to Sit 4  Minimal assistance   Additional items Assist x 1; Increased time required; Bedrails   Additional Comments Pt supine in bed at start of session and agreeable to OT  Pt seen with PT due to decreased activity tolerance  Pt seated in b/s chair at end of session with all needs met, call bell within reach, and chair alarm active  Transfers   Sit to Stand 4  Minimal assistance   Additional items Assist x 1; Increased time required;Verbal cues   Stand to Sit 4  Minimal assistance   Additional items Assist x 1; Increased time required;Verbal cues;Armrests   Stand pivot 3  Moderate assistance   Additional items Assist x 1; Increased time required;Verbal cues;Armrests   Additional Comments hand held assist provided to pt due to needing to manage multiple lines   Functional Mobility   Additional Comments will continue to assess as appropriate   Balance   Static Sitting Fair   Static Standing Poor +   Dynamic Standing Poor   Activity Tolerance   Activity Tolerance Patient limited by fatigue   Medical Staff Made Aware care coordination with PT Clara   Nurse Made Aware PT spoke with RN   RUE Assessment   RUE Assessment X   RUE Overall AROM   R Shoulder Flexion WFL   R Elbow Flexion WFL   R Wrist Flexion WFL   R Wrist Extension unable to extend past neutral   R Mass Grasp WFL   RUE Strength   RUE Overall Strength Deficits   R Shoulder Flexion 3+/5   R Elbow Extension 3+/5   LUE Assessment   LUE Assessment X   LUE Overall AROM   L Shoulder Flexion WFL   L Elbow Flexion WFL   L Wrist Flexion WFL   L Wrist Extension AROM > R wrist however slightly limited   L Mass Grasp WFL   LUE Strength   LUE Overall Strength Deficits   L Shoulder Flexion 3+/5   L Elbow Extension 3+/5   Hand Function   Gross Motor Coordination Functional   Fine Motor Coordination Impaired   Vision-Basic Assessment   Current Vision Wears glasses all the time   Patient Visual Report   (denies acute visual changes)   Vision - Complex Assessment   Acuity   (difficulty reading clock (likely d/t cog deficits))   Cognition   Overall Cognitive Status Impaired   Arousal/Participation Cooperative   Attention Attends with cues to redirect   Orientation Level Oriented to person;Oriented to place; Disoriented to time  (?oriented to situation)   Memory Decreased short term memory   Following Commands Follows one step commands with increased time or repetition   Comments Pt able to identify self by full name and birthdate  Performed short blessed test to screen for memory and attention deficits  Pt scored 26/28 indicating impairment consistent with dementia  Pt unable to state month accurately- states it is june  Pt stated that it is 2021   Pt requried 3 trials for shor term recall of a name and address  Pt estimated time of day to be 4pm  When asked to count backwards from 20 to 1, pt stated "2023, 2024    [place]" ([place] was in the address that he was asked to recall at start of assessment)  Directions for counting backwards were repeated multiple times  Pt eventually started to count backwards however stopped at 18 and again stated "[place]"  When asked to state months of the year backwards, pt able to identify the last month of the year is December, however then with prompting stated "December, October, November    [place]"  When asked to restate the name and address from the start of assessment, pt only able to state "[place]" (last part of the name/address provided to him)  Assessment   Limitation Decreased ADL status; Decreased UE ROM; Decreased UE strength;Decreased Safe judgement during ADL;Decreased cognition;Decreased endurance;Decreased self-care trans;Decreased high-level ADLs; Decreased fine motor control   Assessment Pt is a 80 y o  male seen for OT evaluation (time 7511-1325) s/p [facility] on 7/12/2020 w/ Hydropneumothorax  Comorbidities affecting pt's functional performance at time of assessment include: HTN, HLD, thrombocytopenia, generalized weakness, pericardial effusion, malnutrition, acute on chronic respiratory failure, COPD, hypothyroidism ,acute on chronic diastolic heart failure, GERD  Evaluation required a review of medical and/ or therapy records and extensive additional review of physical, cognitive, or psychosocial history related to current functional performance    Personal factors affecting pt at time of IE include:behavioral pattern, difficulty performing ADLS, difficulty performing IADLS , limited insight into deficits, decreased initiation and engagement , health management  and environment   Prior to admission, Pt previously [place] with ADLs, requires some assist with IADLs (pt typically drives but recently has not), and used RW Mod I for functional mobility  Upon evaluation: Based on functional assessment, pt requires supervision for feeding, Min A for grooming, Mod A for UB ADLs, Max A for LB ADLs and toileting, Min A x 1 for supine>sit, Min A x 1 for sit<>stand transfer with hand held assist, Mod A x 1 for SPT from EOB to b/s chair with hand held assist 2* the following deficits impacting occupational performance: weakness, decreased ROM, decreased strength, decreased balance, decreased tolerance, impaired [address], impaired attention, impaired memory, impaired sequencing, impaired problem solving, decreased safety awareness and decreased coping skills  Cognition appears to be impaired and vision is [facility] - please refer to flowsheet above for details  Pt to benefit from continued skilled OT tx while in the hospital to address deficits as defined above and maximize level of functional independence w ADL's and functional mobility  Occupational Performance areas to address include: grooming, bathing/shower, toilet hygiene, dressing, medication management, socialization, health maintenance, functional mobility and social participation  From OT standpoint, recommendation at time of d/c would be post acute rehab  Goals   Patient Goals none stated   Plan   Treatment Interventions ADL retraining;Functional transfer training;UE strengthening/ROM; Endurance training;Cognitive reorientation;Patient/family training;Equipment evaluation/education; Fine motor coordination activities; Compensatory technique education;Continued evaluation; Energy conservation; Activityengagement   Goal Expiration Date 07/28/20   OT Frequency 3-5x/wk   Additional Treatment Session   Start Time 2336   End Time 1153   Treatment Assessment Patient participated in Skilled OT session (time 2321-2557) this date with interventions consisting of continued cognitive assessment  Pt able to identify self by full name and birthdate   Performed short blessed test to screen for memory and attention deficits  Pt scored 26/28 indicating impairment consistent with dementia  Pt unable to state month accurately- states it is june  Pt stated that it is 2021  Pt requried 3 trials for shor term recall of a name and address  Pt estimated time of day to be 4pm  When asked to count backwards from 20 to 1, pt stated "2023, 2024    [place]" ([place] was in the address that he was asked to recall at start of assessment)  Directions for counting backwards were repeated multiple times  Pt eventually started to count backwards however stopped at 18 and again stated "[place]"  When asked to state months of the year backwards, pt able to identify the last month of the year is December, however then with prompting stated "December, October, November    [place]"  When asked to restate the name and address from the start of assessment, pt only able to state "[address]" (last part of the name/address provided to him)  Patient continues to be functioning below baseline level, occupational performance remains limited secondary to factors listed above and increased risk for falls and injury  From OT standpoint, recommendation at time of d/c would be post acute rehab  Patient to benefit from continued Occupational Therapy treatment while in the hospital to address deficits as defined above and maximize level of functional independence with ADLs and functional mobility      Recommendation   OT Discharge Recommendation Post-Acute Rehabilitation Services   OT - OK to Discharge   (once medically cleared)   Barthel Index   Feeding 5   Bathing 0   Grooming Score 0   Dressing Score 0   Bladder Score 0   Bowels Score 10   Toilet Use Score 0   Transfers (Bed/Chair) Score 5   Mobility (Level Surface) Score 0   Stairs Score 0   Barthel Index Score 20     Goals to be met within 14 days:    Pt will complete grooming/oral hygiene tasks Mod I while seated at sink    Pt will complete UB bathing/dressing Mod I while seated    Pt will complete LB bathing/dressing with supervision while seated     Pt will improve functional activity tolerance while standing to 10+ minutes in order to increase safety and independence during functional transfers and ADL tasks  Pt will improve dynamic sitting/standing balance to good to increase safety and independence during functional transfers and ADL and decrease risk for falls  Pt will increase independence during STS transfers with least restrictive AD supervision  Pt will complete transfer to bedside commode with supervision after set-up     Pt will complete toileting tasks (clothing management up/down, hygiene) with supervision after set-up     Pt will participate in continued assessment of functional mobility to determine most appropriate POC  Pt will attend to continued cognitive assessment 100% of the time in order to provide most appropriate recommendations for d/c plans  Pt will identify and implement at least 3 healthy coping strategies to increase participation in meaningful activities and decrease risk for readmission      Willie Wood, OTR/L

## 2020-07-14 NOTE — ASSESSMENT & PLAN NOTE
CT chest showed large BL hydropneumothoraces  S/p BL 10 2F thoracostomy tubes placed by IR  Post procedural CXR shows near resolution of BL effusions but BL pneumothoraces  BL chest tubes were placed to suction following post procedure CXR  CXR shows small BL pneumothoraces- Continue chest tubes to suction today

## 2020-07-14 NOTE — PHYSICAL THERAPY NOTE
Physical Therapy Evaluation      Patient Active Problem List   Diagnosis    Chronic respiratory failure with hypoxia (HCC)    Bilateral leg edema    Hyponatremia    Abnormal chest CT    Chronic heart failure with preserved ejection fraction (HCC)    Essential hypertension    Hyperlipidemia    Pulmonary nodule    ADAL (obstructive sleep apnea)    Hypokalemia    Chronic kidney disease, stage 2 (mild)    Thrombocytopenia (HCC)    Generalized weakness    Ambulatory dysfunction    Pericardial effusion    Severe protein-calorie malnutrition (HCC)    Acute on chronic respiratory failure with hypoxia and hypercapnia (HCC)    COPD (chronic obstructive pulmonary disease) (HCC)    Bilateral hydropneumothorax    Hypothyroidism    Acute on chronic diastolic heart failure (HCC)    GERD (gastroesophageal reflux disease)       Past Medical History:   Diagnosis Date    Allergic rhinitis     Hypercholesteremia     Hypertension     Pneumonia        Past Surgical History:   Procedure Laterality Date    IR CHEST TUBE  7/13/2020    LAPAROSCOPIC COLON RESECTION      TOTAL HIP ARTHROPLASTY          07/14/20 1145   Note Type   Note type Eval only   Pain Assessment   Pain Assessment Tool 0-10   Pain Score No Pain   Home Living   Type of [address]   Additional Comments Immediately prior to admit pt was living with his daughter as she felt he was  becoming less reliable with his appointments and medications    Prior Function   Level of [place] Independent with ADLs and functional mobility  (daughter assist )   Lives With Alone   Receives Help From Family   ADL Assistance Independent   IADLs Needs assistance   Falls in the last 6 months 1 to 4   Vocational Retired   Comments +   Restrictions/Precautions   Reading Araceli Bearing Precautions Per Order No   Other Precautions Cognitive; Chair Alarm; Bed Alarm;Multiple lines;Telemetry;O2;Fall Risk;Hard of hearing  (B/L chest tubes to water seal, +pamella)   General   Family/Caregiver Present No   Cognition   Overall Cognitive Status Impaired   Arousal/Participation Alert   Attention Attends with cues to redirect   Orientation Level Oriented to person;Oriented to place; Disoriented to time;Oriented to situation   Memory Decreased short term memory   Following Commands Follows one step commands with increased time or repetition   Comments Pt pleasent and cooperative t/o session - please see OT note for further cog assessment   RLE Assessment   RLE Assessment WFL   LLE Assessment   LLE Assessment WFL   Bed Mobility   Supine to Sit 4  Minimal assistance   Additional items Assist x 2; Increased time required;Verbal cues   Additional Comments At end of session pt positioned for comofort OOB in bedside chair, chair alarm activated, all needs in reach, RN aware, no signs of distress   Transfers   Sit to Stand 4  Minimal assistance   Additional items Assist x 1   Stand to Sit 4  Minimal assistance   Additional items Assist x 1   Stand pivot 3  Moderate assistance   Additional items Assist x 2; Increased time required;Verbal cues   Ambulation/Elevation   Gait pattern Improper Weight shift; Forward Flexion;Decreased foot clearance   Gait Assistance 3  Moderate assist   Additional items Assist x 1   Assistive Device   (HHA)   Distance 5 ft (bed to chair)   Balance   Static Sitting Fair   Static Standing Poor +   Dynamic Standing Poor   Endurance Deficit   Endurance Deficit Yes   Endurance Deficit Description early muscular fatigue   Activity Tolerance   Activity Tolerance Patient tolerated treatment well   Medical Staff Made Aware OT, Simon Gene   Nurse Made Aware RN agreeable to session   Assessment   Prognosis Fair   Problem List Decreased strength;Decreased endurance; Impaired balance;Decreased mobility; Decreased cognition; Impaired judgement;Decreased safety awareness   Assessment Pt is 80 y o  male seen for PT evaluation s/p admit to [address] [place] [address] on 7/12/2020 w/ Hydropneumothorax  PT consulted to assess pt's functional mobility and d/c needs  Order placed for PT eval and tx, w/ up w/ A order  Comorbidities affecting pt's physical performance at time of assessment include those as noted above  PTA, pt was ambulates community distances and elevations, ambulates household distances and lives in one floor home with laundry in basement (daughter does laundry)    Personal factors affecting pt at time of IE include: inability to navigate community distances, inability to navigate level surfaces w/o external assistance, unable to perform dynamic tasks in community, decreased cognition and limited home support  Please find objective findings from PT assessment regarding body systems outlined above with impairments and limitations including weakness, decreased ROM, impaired balance, decreased endurance, impaired coordination, gait deviations, decreased activity tolerance, decreased functional mobility tolerance, decreased safety awareness, impaired judgement, fall risk and decreased cognition  Pt's clinical presentation is currently unstable/unpredictable seen in pt's presentation  Pt to benefit from continued PT tx to address deficits as defined above and maximize level of functional independent mobility and consistency  From PT/mobility standpoint, recommendation at time of d/c would be IP rehab pending progress in order to facilitate return to PLOF  Barriers to Discharge Decreased caregiver support   Goals   Patient Goals None stated   STG Expiration Date 07/24/20   Short Term Goal #1 In 7-10 days pt will amb with RW 500ft mod I, pt will transfer and perform bed mobility mod I   Plan   Treatment/Interventions LE strengthening/ROM; Functional transfer training; Therapeutic exercise; Endurance training;Cognitive reorientation;Patient/family training;Equipment eval/education; Bed mobility;Gait training;Spoke to nursing;Spoke to case management;OT   PT Frequency Other (Comment)  (3-5x/week)   Recommendation   PT Discharge Recommendation Post-Acute Rehabilitation Services         David العراقي

## 2020-07-14 NOTE — ASSESSMENT & PLAN NOTE
Patient takes 10mg of Norvasc, Atenolol-chlorthalddone, lasix and aldactone daily  Cardiology following  Norvasc decreased to 5mg daily  Continue aldactone   Lasix changed to 40mg BID secondary to acute CHF exacerbation/ volume overload  He is 4 7L negative x 24- Most of which is from his chest tubes and 1L urine output x 24h  Would hold AM lasix dose- Bicarb on chemistry this AM >45 which is likely a metabolic alkalosis secondary to contraction   Continue Atenolol  Holding Chlorthalidone

## 2020-07-14 NOTE — PROGRESS NOTES
ICU Progress Note - Ariesrafal Segovia 3/24/1929, 80 y o  male MRN: [id number]    Unit/Bed#: ICU 09 Encounter: [id number]    Primary Care Provider: Madeline Farris MD   Date and time admitted to hospital: 7/12/2020  2:58 PM        GERD (gastroesophageal reflux disease)  Assessment & Plan  Continue PPI     Acute on chronic diastolic heart failure [facility])  Assessment & Plan  Wt Readings from Last 3 Encounters:   07/13/20 65 4 kg (144 lb 2 9 oz)   02/14/20 74 9 kg (165 lb 1 6 oz)   12/17/19 77 3 kg (170 lb 6 4 oz)       Echocardiogram: 2/34/9118- LV systolic function was normal  Ejection fraction was estimated to be 60 %   RV systolic function normal     Cardiology following  Would hold on further diuresis at this time and consider giving small bolus of albumin    Trend I/O, Weights       Hypothyroidism  Assessment & Plan  TSH 4 5  Free T4 1 12  Continue home synthroid     COPD (chronic obstructive pulmonary disease) (HCC)  Assessment & Plan  Patient has history of severe COPD  PFT 12/17/19:  FEV1/FVC Ratio: 58 %  Forced Vital Capacity: 1 46 L    47 % predicted  FEV1: 0 85 L     40 % predicted    He uses albuterol, xopenex and singulair at home   Continue scheduled nebulizers   Guaifenesin added for secretions     Acute on chronic respiratory failure with hypoxia and hypercapnia (HCC)  Assessment & Plan  Based on previous labs and recent ABG, there appears to be an acute on chronic respiratory acidosis likely secondary to history of COPD, ADAL, and new exacerbation of CHF with resultant BL pleural effusions  Continue with nebulizers  No need for steroids at this time- unlikely an acute COPD exacerbation   Continue with diuresis per cardiology recommendations   Continue 02 as needed for acute on chronic hypoxic component- patient wears 2 5-3L NC at home   Wean off HFNC- patient tolerated 40/40 overnight   CTA Chest: 7/13/2020- Negative for acute PE but concerning for BL hydropneumothoraces   Patient s/p BL pigtail catheters by IR yesterday with large amount straw colored fluid output     Severe protein-calorie malnutrition (HCC)  Assessment & Plan  Malnutrition Findings:   Malnutrition type: Chronic illness  Degree of Malnutrition: Other severe protein calorie malnutrition(related to inadequate intake/medical condition/advanced age as evidenced by significant depletion of fat/muscle (clavicles, shoulders, triceps, temples), 26% wt loss x 5 months (165 lb 2/14/20), +4 b/l LE edema  Treatment includes supplementation )    BMI Findings: Body mass index is 22 58 kg/m²  Nutrition consulted   Continue cardiac diet  Continue dietary supplements    Pericardial effusion  Assessment & Plan  Echocardiogram: 7/13/2020- small to moderate, free-flowing pericardial effusion was identified circumferential to the heart   There was no evidence of hemodynamic compromise  Likely secondary to CHF exacerbation     Thrombocytopenia (HCC)  Assessment & Plan  Chronic thrombocytopenia   PLTS 94 on most recent CBC  Continue to trend    Hyperlipidemia  Assessment & Plan  Continue statin     Essential hypertension  Assessment & Plan  Patient takes 10mg of Norvasc, Atenolol-chlorthalddone, lasix and aldactone daily  Cardiology following  Norvasc decreased to 5mg daily  Continue aldactone   Lasix changed to 40mg BID secondary to acute CHF exacerbation/ volume overload  He is 4 7L negative x 24- Most of which is from his chest tubes and 1L urine output x 24h  Would hold AM lasix dose- Bicarb on chemistry this AM >45 which is likely a metabolic alkalosis secondary to contraction   Continue Atenolol  Holding Chlorthalidone     * Bilateral hydropneumothorax  Assessment & Plan  CT chest showed large BL hydropneumothoraces  S/p BL 10 2F thoracostomy tubes placed by IR  Post procedural CXR shows near resolution of BL effusions but BL pneumothoraces  BL chest tubes were placed to suction following post procedure CXR  CXR shows small BL pneumothoraces- Continue chest tubes to suction today       ----------------------------------------------------------------------------------------  HPI/24hr events: Patient had CT imaging that showed large BL hydropneumothoraces- he went to IR for BL 10 2F thoracostomy tube placement  Chest tubes put out 3 9L x 24h  Following chest tube placement, he had increasing 02 requirements and was placed on HFNC  HFNC was weaned to 40/40 overnight     Cardiology was consulted and recommended lasix BID for acute CHF exacerbation and volume overload    Disposition: Transfer to Stepdown Level 2  Code Status: Level 3 - DNAR and DNI  ---------------------------------------------------------------------------------------  SUBJECTIVE  Patient denies SOB, pain, chest pain    Review of Systems  Review of systems was reviewed and negative unless stated above in HPI/24-hour events   ---------------------------------------------------------------------------------------  OBJECTIVE    Vitals   Vitals:    20 0130 20 0230 20 0300 20 0330   BP: (!) 95/49 (!) 98/49  (!) 94/48   BP Location: Right arm Right arm  Right arm   Pulse: (!) 50 (!) 52  (!) 53   Resp:    Temp:       TempSrc:       SpO2: 99% 100% 99% 100%   Weight:       Height:         Temp (24hrs), Av 7 °F (36 5 °C), Min:97 4 °F (36 3 °C), Max:98 1 °F (36 7 °C)  Current: Temperature: 98 1 °F (36 7 °C)          Respiratory:  SpO2: SpO2: 100 %, SpO2 Activity: SpO2 Activity: At Rest, SpO2 Device: O2 Device: High flow nasal cannula  Nasal Cannula O2 Flow Rate (L/min): 10 L/min    Invasive/non-invasive ventilation settings   Respiratory    Lab Data (Last 4 hours)    None         O2/Vent Data (Last 4 hours)       0300          Non-Invasive Ventilation Mode HFNC                   Physical Exam   Constitutional: He appears cachectic  No distress  HENT:   Head: Normocephalic  Eyes: Pupils are equal, round, and reactive to light  Neck: Neck supple     Cardiovascular: Intact distal pulses  Bradycardia present  Pulmonary/Chest: Effort normal and breath sounds normal  No respiratory distress  BL 10 2F thoracostomy tubes in place  R chest tube with 2 1L of straw colored drainage since placement  +air leak  L chest tube with 1 8L straw colored drainage since placement  No air leak    Abdominal: Soft  He exhibits no distension  There is no tenderness  Musculoskeletal: Normal range of motion  Neurological:   Patient easily arouses   Oriented x 3   Skin: Skin is warm and dry  Vitals reviewed        Laboratory and Diagnostics:  Results from last 7 days   Lab Units 07/14/20 0533 07/14/20 0429 07/13/20 0440 07/12/20  1526   WBC Thousand/uL  --  5 30 3 51* 5 56   HEMOGLOBIN g/dL  --  13 2 13 0 14 7   I STAT HEMOGLOBIN g/dl 13 3  --   --   --    HEMATOCRIT %  --  41 4 40 9 46 2   HEMATOCRIT, ISTAT % 39  --   --   --    PLATELETS Thousands/uL  --  94* 90* 115*   NEUTROS PCT %  --   --   --  80*   MONOS PCT %  --   --   --  10     Results from last 7 days   Lab Units 07/14/20 0533 07/14/20 0429 07/13/20  1748 07/13/20 0440 07/12/20  1526   SODIUM mmol/L  --  137 137 140 140   POTASSIUM mmol/L  --  4 0 4 3 3 9 4 2   CHLORIDE mmol/L  --  94* 94* 95* 95*   CO2 mmol/L  --  >45* 44* >45* 44*   CO2, I-STAT mmol/L >45*  --   --   --   --    ANION GAP mmol/L  --   --  -1*  --  1*   BUN mg/dL  --  16 15 14 17   CREATININE mg/dL  --  0 70 0 67 0 54* 0 64   CALCIUM mg/dL  --  8 3 8 4 8 4 8 7   GLUCOSE RANDOM mg/dL  --  108 110 80 111   ALT U/L  --   --   --   --  14   AST U/L  --   --   --   --  26   ALK PHOS U/L  --   --   --   --  66   ALBUMIN g/dL  --   --   --   --  3 5   TOTAL BILIRUBIN mg/dL  --   --   --   --  0 98     Results from last 7 days   Lab Units 07/14/20 0429 07/13/20 0440 07/12/20  1526   MAGNESIUM mg/dL 2 7* 1 7 1 9   PHOSPHORUS mg/dL 3 5  --   --       Results from last 7 days   Lab Units 07/12/20  1526   INR  1 14   PTT seconds 28      Results from last 7 days   Lab Units 07/12/20  1526   TROPONIN I ng/mL 0 02     Results from last 7 days   Lab Units 07/13/20  1741   LACTIC ACID mmol/L 1 0     ABG:    VBG:  Results from last 7 days   Lab Units 07/13/20  1741   PH KANCHAN  7 308   PCO2 KANCHAN mm Hg 97 6*   PO2 KANCHAN mm Hg 37 4   HCO3 KANCHAN mmol/L 47 8*   BASE EXC KANCHAN mmol/L 16 4     Results from last 7 days   Lab Units 07/13/20  1741   PROCALCITONIN ng/ml <0 05       Micro  Results from last 7 days   Lab Units 07/13/20  1652   GRAM STAIN RESULT  No Polys or Bacteria seen  No Polys or Bacteria seen       EKG: Sinus gallito  Imaging: I have personally reviewed pertinent reports   , I have personally reviewed pertinent films in PACS and CXR shows some mild pulmonary vascular congestion  There are small BL pneumothoraces  Near complete resolution of BL effusions    Intake and Output  I/O       07/12 0701 - 07/13 0700 07/13 0701 - 07/14 0700    P  O  0 120    Total Intake(mL/kg) 0 (0) 120 (1 8)    Urine (mL/kg/hr) 1125 825 (0 5)    Chest Tube  3825    Total Output 1125 5210    Net -1124 -3143                Height and Weights   Height: 5' 7" (170 2 cm)  IBW: 66 1 kg  Body mass index is 22 58 kg/m²  Weight (last 2 days)     Date/Time   Weight    07/13/20 1731   65 4 (144 18)    07/13/20 0600   59 7 (131 61) bedrest    Weight: bedrest at 07/13/20 0600    07/13/20 0444   59 7 (131 61)    07/12/20 1751   59 8 (131 84)    07/12/20 1455   68 3 (150 6)                Nutrition       Diet Orders   (From admission, onward)             Start     Ordered    07/13/20 1924  Diet Cardiovascular; Sodium 2 GM; Fluid Restriction 1800 ML  Diet effective now     Question Answer Comment   Diet Type Cardiovascular    Cardiac Sodium 2 GM    Other Restriction(s): Fluid Restriction 1800 ML    RD to adjust diet per protocol?  Yes        07/13/20 1924    07/13/20 1140  Dietary nutrition supplements  Once     Question Answer Comment   Select Supplement: Ensure [employer], Lunch, Dinner        07/13/20 1350 Alek Flor     07/12/20 1953  Room Service  Once     Question:  Type of Service  Answer:  Room Service - Appropriate with Assistance    07/12/20 1952                  Active Medications  Scheduled Meds:    Current Facility-Administered Medications:  albuterol 2 puff Inhalation Q6H PRN Dwayne De Luna PA-C   amLODIPine 5 mg Oral Daily Sammy Sena PA-C   atenolol 25 mg Oral Daily Dwayne De Luna PA-C   chlorhexidine 15 mL Swish & Spit Q12H [address] [place] PA-C   enoxaparin 40 mg Subcutaneous Daily Dwayne De Luna PA-C   furosemide 40 mg Intravenous BID (diuretic) Dwayne De Luna PA-C   guaiFENesin 600 mg Oral Q12H [address] Dwayne De Luna PA-C   levalbuterol 0 63 mg Nebulization TID Sammy Sena PA-C   levothyroxine 50 mcg Oral Daily wDayne De Luna PA-C   montelukast 10 mg Oral HS Katie R GARY De Luna   pantoprazole 40 mg Oral Early Morning Katie R GARY De Luna   potassium chloride 20 mEq Oral BID Dwayne De Luna PA-C   pravastatin 40 mg Oral Daily With [place] [address] [place]   spironolactone 25 mg Oral Daily Katie R GARY De Luna   terazosin 5 mg Oral HS Katie R GARY De Luna   timolol 1 drop Both Eyes BID Dwayne De Luna PA-C     Continuous Infusions:     PRN Meds:     albuterol 2 puff Q6H PRN       Invasive Devices Review  Invasive Devices     Peripheral Intravenous Line            Peripheral IV 07/12/20 Right Antecubital 1 day          Drain            External Urinary Catheter Medium 1 day    Chest Tube 1 Left Pleural 10 2 Fr  less than 1 day    Chest Tube 2 Right Pleural 10 2 Fr  less than 1 day                Rationale for remaining devices: BL chest tubes to remain  ---------------------------------------------------------------------------------------  Advance Directive and Living Will:      Power of :    POLST:    ---------------------------------------------------------------------------------------  Care Time Delivered:   No Critical Care time spent       Paz Sandwich, PA-C      Portions of the record may have been created with voice recognition software  Occasional wrong word or "sound a like" substitutions may have occurred due to the inherent limitations of voice recognition software    Read the chart carefully and recognize, using context, where substitutions have occurred

## 2020-07-14 NOTE — QUICK NOTE
Discussed patient's status with his nurse  VBG this afternoon was worse than morning  Patient did have bilateral chest tubes placed this afternoon  He has a current O2 sat of 100% on high flow  He is resting comfortably, talking, with no increased work of breathing  Oriented x3  Respiratory Team will follow  Repeat VBG in the morning

## 2020-07-14 NOTE — ASSESSMENT & PLAN NOTE
Wt Readings from Last 3 Encounters:   07/13/20 65 4 kg (144 lb 2 9 oz)   02/14/20 74 9 kg (165 lb 1 6 oz)   12/17/19 77 3 kg (170 lb 6 4 oz)       Echocardiogram: 2/25/8306- LV systolic function was normal  Ejection fraction was estimated to be 60 %   RV systolic function normal     Cardiology following  Would hold on further diuresis at this time and consider giving small bolus of albumin    Trend I/O, Weights

## 2020-07-14 NOTE — PROGRESS NOTES
[address] [facility] NOTE   Jesus Acosta 80 y o  male MRN: [id number]  Unit/Bed#: ICU 09 Encounter: [id number]  Reason for Consult: Metabolic alkalosis    ASSESSMENT and PLAN:  1  Mixed acid base disorder:  · Has a metabolic alkalosis and a respiratory acidosis  · Respiratory acidosis improved and CO2 has gone from 97 6 to 77 5  · Metabolic alkalosis is slightly better as CO2 is down to 43  · Unclear what the initiating event is for the metabolic alkalosis but is is clearly being maintained by hypokalemia  · Will treat with Shira Luan and aim for K of >4 0    · May use Diamox if no improvement and K is 4 5      2  Acute on chronic CHF  3  Respiratory failure  4  Bilateral hydropneumothorax/pleural effusions s/p bilateral chest tube placement    DISPOSITION:  · KDur 40 meq PO x 1    · KDur 20 meq PO at 10 pm      SUBJECTIVE / INTERVAL HISTORY:  Feeling better  OBJECTIVE:  Current Weight: Weight - Scale: 62 2 kg (137 lb 2 oz)  Vitals:    07/14/20 0715 07/14/20 1100 07/14/20 1325 07/14/20 1500   BP: (!) 100/48 (!) 98/49  114/56   BP Location: Left arm Left arm  Right arm   Pulse: 56 58  60   Resp: 22 22  20   Temp: 98 °F (36 7 °C) 97 9 °F (36 6 °C)  97 6 °F (36 4 °C)   TempSrc: Oral Oral  Oral   SpO2: 100% 99% 99% 96%   Weight:       Height:           Intake/Output Summary (Last 24 hours) at 7/14/2020 1807  Last data filed at 7/14/2020 1101  Gross per 24 hour   Intake 493 ml   Output 1822 ml   Net -1329 ml     General: conscious, coherent, cooperative, no distress  Skin: dry, no rash  Eyes: pink conjunctivae, no scleral icterus  ENT: moist mucous membranes  Chest/Lungs: equal chest expansion, decreased breath sounds  (+) chest tubes in place  CVS: distinct heart sounds, normal rate, regular rhythm, no rub  Abdomen: soft, non tender, non distended, normal bowel sounds  Extremities: (+) LE edema  Neuro: awake, alert     Psych: appropriate affect    Medications:    Current Facility-Administered Medications:     albumin human (FLEXBUMIN) 5 % injection 12 5 g, 12 5 g, Intravenous, Q6H, Katie De Luna PA-C, 12 5 g at 07/14/20 1759    albuterol (PROVENTIL HFA,VENTOLIN HFA) inhaler 2 puff, 2 puff, Inhalation, Q6H PRN, Cecil De Luna PA-C    enoxaparin (LOVENOX) subcutaneous injection 40 mg, 40 mg, Subcutaneous, Daily, Cecil De Luna PA-C, 40 mg at 07/14/20 0809    guaiFENesin (MUCINEX) 12 hr tablet 600 mg, 600 mg, Oral, Q12H [facility], Katie De Luna PA-C, 600 mg at 07/14/20 0809    levalbuterol (Mari Maser) inhalation solution 0 63 mg, 0 63 mg, Nebulization, TID, Chris De Luna PA-C, 0 63 mg at 07/14/20 1325    levothyroxine tablet 50 mcg, 50 mcg, Oral, Daily, Cecil De Luna PA-C, 50 mcg at 07/14/20 0601    montelukast (SINGULAIR) tablet 10 mg, 10 mg, Oral, HS, Katie De Luna PA-C, 10 mg at 07/13/20 2235    pantoprazole (PROTONIX) EC tablet 40 mg, 40 mg, Oral, Early Morning, Cecil De Luna PA-C, 40 mg at 07/14/20 0809    pravastatin (PRAVACHOL) tablet 40 mg, 40 mg, Oral, Daily With Justice Shaw PA-C, 40 mg at 07/14/20 1759    terazosin (HYTRIN) capsule 5 mg, 5 mg, Oral, HS, Katie De Luna PA-C, 5 mg at 07/12/20 2101    timolol (TIMOPTIC) 0 5 % ophthalmic solution 1 drop, 1 drop, Both Eyes, BID, Cecil De Luna PA-C, 1 drop at 07/14/20 1759    Laboratory Results:  Results from last 7 days   Lab Units 07/14/20  1156 07/14/20  0533 07/14/20  0429 07/13/20  1748 07/13/20  0440 07/12/20  1526   WBC Thousand/uL  --   --  5 30  --  3 51* 5 56   HEMOGLOBIN g/dL  --   --  13 2  --  13 0 14 7   I STAT HEMOGLOBIN g/dl  --  13 3  --   --   --   --    HEMATOCRIT %  --   --  41 4  --  40 9 46 2   HEMATOCRIT, ISTAT %  --  39  --   --   --   --    PLATELETS Thousands/uL  --   --  94*  --  90* 115*   POTASSIUM mmol/L 3 6  --  4 0 4 3 3 9 4 2   CHLORIDE mmol/L 96*  --  94* 94* 95* 95*   CO2 mmol/L 43*  --  >45* 44* >45* 44*   CO2, I-STAT mmol/L  --  >45*  --   -- --   --    BUN mg/dL 15  --  16 15 14 17   CREATININE mg/dL 0 61  --  0 70 0 67 0 54* 0 64   CALCIUM mg/dL 7 3*  --  8 3 8 4 8 4 8 7   MAGNESIUM mg/dL  --   --  2 7*  --  1 7 1 9   PHOSPHORUS mg/dL  --   --  3 5  --   --   --    GLUCOSE, ISTAT mg/dl  --  101  --   --   --   --

## 2020-07-14 NOTE — ASSESSMENT & PLAN NOTE
Based on previous labs and recent ABG, there appears to be an acute on chronic respiratory acidosis likely secondary to history of COPD, ADAL, and new exacerbation of CHF with resultant BL pleural effusions  Continue with nebulizers  No need for steroids at this time- unlikely an acute COPD exacerbation   Continue with diuresis per cardiology recommendations   Continue 02 as needed for acute on chronic hypoxic component- patient wears 2 5-3L NC at home   Wean off HFNC- patient tolerated 40/40 overnight   CTA Chest: 7/13/2020- Negative for acute PE but concerning for BL hydropneumothoraces   Patient s/p BL pigtail catheters by IR yesterday with large amount straw colored fluid output

## 2020-07-14 NOTE — ASSESSMENT & PLAN NOTE
Malnutrition Findings:   Malnutrition type: Chronic illness  Degree of Malnutrition: Other severe protein calorie malnutrition(related to inadequate intake/medical condition/advanced age as evidenced by significant depletion of fat/muscle (clavicles, shoulders, triceps, temples), 26% wt loss x 5 months (165 lb 2/14/20), +4 b/l LE edema  Treatment includes supplementation )    BMI Findings: Body mass index is 22 58 kg/m²       Nutrition consulted   Continue cardiac diet  Continue dietary supplements

## 2020-07-14 NOTE — PLAN OF CARE
Problem: OCCUPATIONAL THERAPY ADULT  Goal: Performs self-care activities at highest level of function for planned discharge setting  See evaluation for individualized goals  Description  Treatment Interventions: ADL retraining, Functional transfer training, UE strengthening/ROM, Endurance training, Cognitive reorientation, Patient/family training, Equipment evaluation/education, Fine motor coordination activities, Compensatory technique education, Continued evaluation, Energy conservation, Activityengagement          See flowsheet documentation for full assessment, interventions and recommendations  Note:   Limitation: Decreased ADL status, Decreased UE ROM, Decreased UE strength, Decreased Safe judgement during ADL, Decreased cognition, Decreased endurance, Decreased self-care trans, Decreased high-level ADLs, Decreased fine motor control     Assessment: Pt is a 80 y o  male seen for OT evaluation (time 7083-7219) s/p [facility] 7/12/2020 w/ Hydropneumothorax  Comorbidities affecting pt's functional performance at time of assessment include: HTN, HLD, thrombocytopenia, generalized weakness, pericardial effusion, malnutrition, acute on chronic respiratory failure, COPD, hypothyroidism ,acute on chronic diastolic heart failure, GERD  Evaluation required a review of medical and/ or therapy records and extensive additional review of physical, cognitive, or psychosocial history related to current functional performance    Personal factors affecting pt at time of IE include:behavioral pattern, difficulty performing ADLS, difficulty performing IADLS , limited insight into deficits, decreased initiation and engagement , health management  and environment  Prior to admission, Pt previously INDEP with ADLs, requires some assist with IADLs (pt typically drives but recently has not), and used RW Mod I for functional mobility   Upon evaluation: Based on functional assessment, pt requires supervision for feeding, Min A for grooming, Mod A for UB ADLs, Max A for LB ADLs and toileting, Min A x 1 for supine>sit, Min A x 1 for sit<>stand transfer with hand held assist, Mod A x 1 for SPT from EOB to b/s chair with hand held assist 2* the following deficits impacting occupational performance: weakness, decreased ROM, decreased strength, decreased balance, decreased tolerance, impaired [address], impaired attention, impaired memory, impaired sequencing, impaired problem solving, decreased safety awareness and decreased coping skills  Cognition appears to be impaired and vision is [facility] - please refer to flowsheet above for details  Pt to benefit from continued skilled OT tx while in the hospital to address deficits as defined above and maximize level of functional independence w ADL's and functional mobility  Occupational Performance areas to address include: grooming, bathing/shower, toilet hygiene, dressing, medication management, socialization, health maintenance, functional mobility and social participation  From OT standpoint, recommendation at time of d/c would be post acute rehab  Tx assessment: Patient participated in Skilled OT session (time 2778-1694) this date with interventions consisting of continued cognitive assessment  Pt able to identify self by full name and birthdate  Performed short blessed test to screen for memory and attention deficits  Pt scored 26/28 indicating impairment consistent with dementia  Pt unable to state month accurately- states it is june  Pt stated that it is 2021  Pt requried 3 trials for shor term recall of a name and address  Pt estimated time of day to be 4pm  When asked to count backwards from 20 to 1, pt stated "[birth year]23, 2024    [place]" (Kelley Harris was in the address that he was asked to recall at start of assessment)  Directions for counting backwards were repeated multiple times  Pt eventually started to count backwards however stopped at 18 and again stated "[place]"   When asked to state months of the year backwards, pt able to identify the last month of the year is December, however then with prompting stated "December, October, November    [place]"  When asked to restate the name and address from the start of assessment, pt only able to state "Aniket Lehman" (last part of the name/address provided to him)  Patient continues to be functioning below baseline level, occupational performance remains limited secondary to factors listed above and increased risk for falls and injury  From OT standpoint, recommendation at time of d/c would be post acute rehab  Patient to benefit from continued Occupational Therapy treatment while in the hospital to address deficits as defined above and maximize level of functional independence with ADLs and functional mobility        OT Discharge Recommendation: Post-Acute Rehabilitation Services  OT - OK to Discharge: (once medically cleared)

## 2020-07-14 NOTE — PROGRESS NOTES
General Cardiology   Progress Note -  Team One   Michela Duncan 80 y o  male MRN: [id number]    Unit/Bed#: ICU 09 Encounter: [id number]    Assessment  1  Acute on chronic diastolic HF--possibly related to medication noncompliance vs ineffective diuretic dosing--patient states he is compliant with dietary restrictions and medication compliance (however does not appear to be a reliable historian)  -On exam   -Symptomatically  -ProBNP 556 (previously 866--9/26/2019)  -CTA PE study 7/13: B/L hydropneumothorax w/large pleural effusions and moderate pneumothoraces, no acute PE  -2D echocardiogram  7/13/2020: LVEF 60%, no regional wall motion abnormality, RV normal size and systolic function, trace MR, mild AI, trace TR; small to moderate pericardial effusion, no evidence of hemodynamic compromise  -Previously on furosemide 40 mg daily (on hold), atenolol-chlorthalidone 50-25 mg daily (on hold), and spironolactone 25 mg daily  -IV diuretics on hold  -24 hour I&O balance: -4 8 L (UO 1 1L; CT outpt 4 L); overall -5 9 L  Weights:  Office weight 165 lb--2/2020  --7/12:  131 lb--bed scale  --7/13:  131 lb--bed scale  --7/14:  137 lb--bed scale     2  Acute on chronic hypercapnic/hypoxic respiratory failure in the setting of #1 and #3  3  Bilateral hydropneumothorax/large bilateral pleural effusions  -Currently on 2L NC (baseline 2-3 L nasal cannula)  -Chest x-ray moderate-size pleural effusions with bibasilar atelectasis  -CTA PE study 7/13: B/L hydropneumothorax w/large pleural effusions and moderate pneumothoraces, no acute PE  -CXR 7/13: Bilateral pneumothoraces status post bilateral chest tube placement with resolution of previously seen pleural effusions  -S/p IR guided B/L chest tube placement  -SARS-CoV-2 (negative)     4  Hypertension  -Avg /52, last recorded at 100/48, HR 56  -Previously on Amlodipine 10 mg daily (on hold), and spironolactone 25 mg daily (on hold)     5   Hyperlipidemia  -Lipid profile 9/25/2019: Cholesterol 142, triglycerides 37, HDL 80, LDL 55  -Previously on simvastatin 20 mg daily (non formulary) switched to pravastatin 40 mg daily this admission     6  Hypothyroidism  -TSH 4 5/free T4 1 1  -On levothyroxine 50 mcg daily     Plan:  -Hold diuretics today re-eval need in the a m  -Hold antihypertensive agents today  -May need standing albumin dosing in regards to hypotension   -B/L chest tubes to suction  -Strict I&Os, daily weights, 2 g NA +diet 1800 mL FR  -Monitor renal function and electrolytes closely  -Continue to monitor on telemetry    Subjective  Review of Systems   Constitution: Negative for chills, fever and malaise/fatigue  HENT: Negative for congestion  Eyes: Negative for visual disturbance  Cardiovascular: Negative for chest pain, dyspnea on exertion, irregular heartbeat, leg swelling, near-syncope, orthopnea and palpitations  Respiratory: Negative for cough and shortness of breath  Gastrointestinal: Negative for abdominal pain  Genitourinary: Negative for dysuria  Neurological: Negative for dizziness, focal weakness, headaches, light-headedness and weakness  All other systems reviewed and are negative  Objective:   Physical Exam   Constitutional: He is oriented to person, place, and time  He appears well-developed and well-nourished  No distress  HENT:   Head: Normocephalic and atraumatic  Mouth/Throat: Oropharynx is clear and moist    Eyes: No scleral icterus  Neck: No JVD present  Cardiovascular: Intact distal pulses  Exam reveals no friction rub  No murmur heard  Sinus bradycardia, rate 50's  +1 pitting edema B/L LE   Pulmonary/Chest: He has no wheezes  Right lung fields absent  Left lung fields clear upper lung fields/ very diminished at bases   Abdominal: Soft  Bowel sounds are normal  There is no tenderness  Musculoskeletal: He exhibits edema  He exhibits no deformity  Neurological: He is alert and oriented to person, place, and time     Skin: Skin is warm and dry  Capillary refill takes less than 2 seconds  He is not diaphoretic  There is pallor  Psychiatric: He has a normal mood and affect  Nursing note and vitals reviewed  Vitals: Blood pressure (!) 100/48, pulse 56, temperature 98 °F (36 7 °C), temperature source Oral, resp  rate 22, height 5' 7" (1 702 m), weight 62 2 kg (137 lb 2 oz), SpO2 100 %  ,     Body mass index is 21 48 kg/m²  ,   Systolic (64BSR), LNN:636 , Min:88 , CJD:171     Diastolic (41SSQ), YODIT:56, Min:48, Max:59      Intake/Output Summary (Last 24 hours) at 7/14/2020 0939  Last data filed at 7/14/2020 0532  Gross per 24 hour   Intake 250 ml   Output 5007 ml   Net -4757 ml     Weight (last 2 days)     Date/Time   Weight    07/14/20 0600   62 2 (137 13)    07/13/20 1731   65 4 (144 18)    07/13/20 0600   59 7 (131 61) bedrest    Weight: bedrest at 07/13/20 0600    07/13/20 0444   59 7 (131 61)    07/12/20 1751   59 8 (131 84)    07/12/20 1455   68 3 (150 6)              LABORATORY RESULTS  Results from last 7 days   Lab Units 07/12/20  1526   TROPONIN I ng/mL 0 02     CBC with diff:   Results from last 7 days   Lab Units 07/14/20  0533 07/14/20  0429 07/13/20  0440 07/12/20  1526   WBC Thousand/uL  --  5 30 3 51* 5 56   HEMOGLOBIN g/dL  --  13 2 13 0 14 7   I STAT HEMOGLOBIN g/dl 13 3  --   --   --    HEMATOCRIT %  --  41 4 40 9 46 2   HEMATOCRIT, ISTAT % 39  --   --   --    MCV fL  --  101* 100* 101*   PLATELETS Thousands/uL  --  94* 90* 115*   MCH pg  --  32 2 31 6 32 0   MCHC g/dL  --  31 9 31 8 31 8   RDW %  --  13 7 13 6 13 6   MPV fL  --  10 3 10 0 10 6   NRBC AUTO /100 WBCs  --   --   --  0       CMP:  Results from last 7 days   Lab Units 07/14/20  0533 07/14/20  0429 07/13/20  1748 07/13/20  0440 07/12/20  1526   POTASSIUM mmol/L  --  4 0 4 3 3 9 4 2   CHLORIDE mmol/L  --  94* 94* 95* 95*   CO2 mmol/L  --  >45* 44* >45* 44*   CO2, I-STAT mmol/L >45*  --   --   --   --    BUN mg/dL  --  16 15 14 17   CREATININE mg/dL  -- 0  70 0 67 0 54* 0 64   GLUCOSE, ISTAT mg/dl 101  --   --   --   --    CALCIUM mg/dL  --  8 3 8 4 8 4 8 7   AST U/L  --   --   --   --  26   ALT U/L  --   --   --   --  14   ALK PHOS U/L  --   --   --   --  66   EGFR ml/min/1 73sq m  --  83 84 92 86       BMP:  Results from last 7 days   Lab Units 20  0533 20  0429 20  1748 20  0440 20  1526   POTASSIUM mmol/L  --  4 0 4 3 3 9 4 2   CHLORIDE mmol/L  --  94* 94* 95* 95*   CO2 mmol/L  --  >45* 44* >45* 44*   CO2, I-STAT mmol/L >45*  --   --   --   --    BUN mg/dL  --  16 15 14 17   CREATININE mg/dL  --  0 70 0 67 0 54* 0 64   GLUCOSE, ISTAT mg/dl 101  --   --   --   --    CALCIUM mg/dL  --  8 3 8 4 8 4 8 7       Lab Results   Component Value Date    NTBNP 556 (H) 2020    NTBNP 866 (H) 2019    NTBNP 320 2019        Results from last 7 days   Lab Units 20  0429 20  0440 20  1526   MAGNESIUM mg/dL 2 7* 1 7 1 9             Results from last 7 days   Lab Units 20  1526   TSH 3RD GENERATON uIU/mL 4 540*   FREE T4 ng/dL 1 12       Results from last 7 days   Lab Units 20  1526   INR  1 14       Lipid Profile:   No results found for: CHOL  Lab Results   Component Value Date    HDL 80 (H) 2019    HDL 67 (H) 2018    HDL 78 (H) 2017     Lab Results   Component Value Date    LDLCALC 55 2019    LDLCALC 73 2018    LDLCALC 60 2017     Lab Results   Component Value Date    TRIG 37 2019    TRIG 54 2018    TRIG 61 2017       Cardiac testing:   Results for orders placed during the hospital encounter of 20   Echo complete with contrast if indicated    Narrative [facility], [address] [facility]  [phone]    Transthoracic Echocardiogram  2D, M-mode, Doppler, and Color Doppler    Study date:  2020    Patient: Guy Connors  MR number: [id number]  Account number: [de-identified]  : 24-Mar-1929  Age: 80 years  Gender: Male  Status: Inpatient  Location: Bedside  Height: 67 in  Weight: 131 lb  BP: 131/ 61 mmHg    Indications: Heart Failure  Diagnoses: I50 9 - Heart failure, unspecified    Sonographer:  IMANI Lira  Primary Physician:  Arlene Llanos MD  Referring Physician:  Abhilash Casarez MD  Group:  [facility]  Interpreting Physician:  Christopher Pollock MD    SUMMARY    LEFT VENTRICLE:  Systolic function was normal  Ejection fraction was estimated to be 60 %  There were no regional wall motion abnormalities  Wall thickness was mildly increased  RIGHT VENTRICLE:  The size was normal   Systolic function was normal     MITRAL VALVE:  There was trace regurgitation  AORTIC VALVE:  There was mild regurgitation  TRICUSPID VALVE:  There was trace regurgitation  Estimated peak PA pressure was 50 mmHg  PERICARDIUM:  A small to moderate, free-flowing pericardial effusion was identified circumferential to the heart  There was no evidence of hemodynamic compromise  There was a large right pleural effusion  There was a large left pleural effusion  HISTORY: PRIOR HISTORY: COPD, pericardial effusion, CKD II, Respitory failure, Hyperlipidemia, and HTN  Congestive heart failure  Risk factors: hypertension  PROCEDURE: The procedure was performed at the bedside  This was a routine study  The transthoracic approach was used  The study included complete 2D imaging, M-mode, complete spectral Doppler, and color Doppler  The heart rate was 82 bpm,  at the start of the study  Images were obtained from the parasternal, apical, subcostal, and suprasternal notch acoustic windows  Image quality was adequate  LEFT VENTRICLE: Size was normal  Systolic function was normal  Ejection fraction was estimated to be 60 %  There were no regional wall motion abnormalities  Wall thickness was mildly increased  DOPPLER: The study was not technically  sufficient to allow evaluation of LV diastolic function      RIGHT VENTRICLE: The size was normal  Systolic function was normal  Wall thickness was normal     LEFT ATRIUM: Size was normal     RIGHT ATRIUM: Size was normal     MITRAL VALVE: Valve structure was normal  There was normal leaflet separation  DOPPLER: The transmitral velocity was within the normal range  There was no evidence for stenosis  There was trace regurgitation  AORTIC VALVE: The valve was trileaflet  Leaflets exhibited normal thickness and normal cuspal separation  DOPPLER: Transaortic velocity was within the normal range  There was no evidence for stenosis  There was mild regurgitation  TRICUSPID VALVE: The valve structure was normal  There was normal leaflet separation  DOPPLER: The transtricuspid velocity was within the normal range  There was no evidence for stenosis  There was trace regurgitation  Estimated peak PA  pressure was 50 mmHg  PULMONIC VALVE: Leaflets exhibited normal thickness, no calcification, and normal cuspal separation  DOPPLER: The transpulmonic velocity was within the normal range  There was no significant regurgitation  PERICARDIUM: A small to moderate, free-flowing pericardial effusion was identified circumferential to the heart  There was no evidence of hemodynamic compromise  There was a large right pleural effusion  There was a large left pleural  effusion  AORTA: The root exhibited normal size  SYSTEMIC VEINS: IVC: The inferior vena cava was not well visualized  The inferior vena cava was grossly normal in size      SYSTEM MEASUREMENT TABLES    2D  %FS: 37 92 %  Ao Diam: 3 53 cm  EDV(Teich): 53 54 ml  EF(Teich): 69 06 %  ESV(Teich): 16 57 ml  IVSd: 1 18 cm  LA Area: 16 16 cm2  LA Diam: 3 48 cm  LVEDV MOD A4C: 64 17 ml  LVEF MOD A4C: 75 23 %  LVESV MOD A4C: 15 89 ml  LVIDd: 3 58 cm  LVIDs: 2 22 cm  LVLd A4C: 6 94 cm  LVLs A4C: 5 64 cm  LVPWd: 1 1 cm  RA Area: 12 12 cm2  RVIDd: 3 95 cm  SV MOD A4C: 48 27 ml  SV(Teich): 36 98 ml    CW  AV MaxP 4 mmHg  AV Vmax: 1 36 m/s  MV PHT: 80 87 ms  MVA By PHT: 2 72 cm2  TR MaxP 85 mmHg  TR Vmax: 3 53 m/s    PW  LVOT Vmax: 1 24 m/s  LVOT maxP 12 mmHg  Lateral E': 0 04 m/s  MV A Nickolas: 1 11 m/s  MV Dec LoÃ­za: 2 58 m/s2  MV DecT: 321 49 ms  MV E Nickolas: 0 83 m/s  MV E/A Ratio: 0 75    [facility] Accredited Echocardiography Laboratory    Prepared and electronically signed by    Jyothi Grey MD  Signed 2020 17:47:19       No results found for this or any previous visit  No results found for this or any previous visit  No procedure found  No results found for this or any previous visit  Meds/Allergies   all current active meds have been reviewed, current meds:   Current Facility-Administered Medications   Medication Dose Route Frequency    albuterol (PROVENTIL HFA,VENTOLIN HFA) inhaler 2 puff  2 puff Inhalation Q6H PRN    enoxaparin (LOVENOX) subcutaneous injection 40 mg  40 mg Subcutaneous Daily    guaiFENesin (MUCINEX) 12 hr tablet 600 mg  600 mg Oral Q12H MARCIANO    levalbuterol (XOPENEX) inhalation solution 0 63 mg  0 63 mg Nebulization TID    levothyroxine tablet 50 mcg  50 mcg Oral Daily    montelukast (SINGULAIR) tablet 10 mg  10 mg Oral HS    pantoprazole (PROTONIX) EC tablet 40 mg  40 mg Oral Early Morning    pravastatin (PRAVACHOL) tablet 40 mg  40 mg Oral Daily With Dinner    terazosin (HYTRIN) capsule 5 mg  5 mg Oral HS    timolol (TIMOPTIC) 0 5 % ophthalmic solution 1 drop  1 drop Both Eyes BID    and PTA meds:   Prior to Admission Medications   Prescriptions Last Dose Informant Patient Reported? Taking?    Blood Pressure Monitoring (B-D ASSURE BPM/AUTO ARM CUFF) MISC  Self No No   Sig: by Does not apply route daily   RABEprazole (ACIPHEX) 20 MG tablet  Self Yes Yes   Sig: daily    albuterol (PROVENTIL HFA,VENTOLIN HFA) 90 mcg/act inhaler  Self Yes No   Sig: Inhale 2 puffs every 6 (six) hours as needed for wheezing   amLODIPine (NORVASC) 10 mg tablet   No No   Sig: Take 1 tablet (10 mg total) by mouth daily   atenolol-chlorthalidone (TENORETIC) 50-25 mg per tablet  Self Yes Yes   Sig: Take 1 tablet by mouth daily   brimonidine-timolol (COMBIGAN) 0 2-0 5 %  Self Yes Yes   Sig: Apply 1 drop to eye 2 (two) times a day   furosemide (LASIX) 40 mg tablet  Self No Yes   Sig: Take 1 tablet (40 mg total) by mouth daily   latanoprost (XALATAN) 0 005 % ophthalmic solution  Self Yes Yes   levalbuterol (XOPENEX) 0 63 mg/3 mL nebulizer solution  Self No No   Sig: Take 1 vial (0 63 mg total) by nebulization 3 (three) times a day   Patient not taking: Reported on 2/14/2020   levothyroxine 50 mcg tablet  Self Yes Yes   Sig: Take 1 tablet by mouth daily   montelukast (SINGULAIR) 10 mg tablet  Self Yes Yes   Sig: daily    potassium chloride (K-DUR,KLOR-CON) 20 mEq tablet   No Yes   Sig: Take 1 tablet (20 mEq total) by mouth 2 (two) times a day   simvastatin (ZOCOR) 20 mg tablet  Self Yes Yes   Sig: daily    spironolactone (ALDACTONE) 25 mg tablet   No Yes   Sig: Take 1 tablet (25 mg total) by mouth daily   terazosin (HYTRIN) 5 mg capsule  Self Yes Yes   Sig: Take 1 capsule by mouth      Facility-Administered Medications: None        Telemetry Review: No significant arrhythmias seen on telemetry review    EKG personally reviewed by LOUISE Ricks    Assessment:  Principal Problem:    Bilateral hydropneumothorax  Active Problems:    Essential hypertension    Hyperlipidemia    Thrombocytopenia (HCC)    Generalized weakness    Pericardial effusion    Severe protein-calorie malnutrition (HCC)    Acute on chronic respiratory failure with hypoxia and hypercapnia (HCC)    COPD (chronic obstructive pulmonary disease) (HCC)    Hypothyroidism    Acute on chronic diastolic heart failure (HCC)    GERD (gastroesophageal reflux disease)    Counseling / Coordination of Care  Total floor / unit time spent today 20 minutes    Greater than 50% of total time was spent with the patient and / or family counseling and / or coordination of care  ** Please Note: Dragon 360 Dictation voice to text software may have been used in the creation of this document   **

## 2020-07-14 NOTE — ASSESSMENT & PLAN NOTE
Echocardiogram: 7/13/2020- small to moderate, free-flowing pericardial effusion was identified circumferential to the heart   There was no evidence of hemodynamic compromise  Likely secondary to CHF exacerbation

## 2020-07-15 ENCOUNTER — APPOINTMENT (INPATIENT)
Dept: RADIOLOGY | Facility: HOSPITAL | Age: 85
DRG: 291 | End: 2020-07-15
Payer: COMMERCIAL

## 2020-07-15 PROBLEM — E87.4 MIXED ACID BASE BALANCE DISORDER: Status: ACTIVE | Noted: 2020-07-15

## 2020-07-15 LAB
ANION GAP SERPL CALCULATED.3IONS-SCNC: -3 MMOL/L (ref 4–13)
ANION GAP SERPL CALCULATED.3IONS-SCNC: -3 MMOL/L (ref 4–13)
ARTERIAL PATENCY WRIST A: YES
BASE EX.OXY STD BLDV CALC-SCNC: 88.7 % (ref 60–80)
BASE EX.OXY STD BLDV CALC-SCNC: 93.2 % (ref 60–80)
BASE EXCESS BLDA CALC-SCNC: 16.8 MMOL/L
BASE EXCESS BLDV CALC-SCNC: 15 MMOL/L
BASE EXCESS BLDV CALC-SCNC: 15.3 MMOL/L
BUN SERPL-MCNC: 17 MG/DL (ref 5–25)
BUN SERPL-MCNC: 18 MG/DL (ref 5–25)
CA-I BLD-SCNC: 1.03 MMOL/L (ref 1.12–1.32)
CALCIUM SERPL-MCNC: 7.7 MG/DL (ref 8.3–10.1)
CALCIUM SERPL-MCNC: 7.8 MG/DL (ref 8.3–10.1)
CHLORIDE SERPL-SCNC: 93 MMOL/L (ref 100–108)
CHLORIDE SERPL-SCNC: 93 MMOL/L (ref 100–108)
CO2 SERPL-SCNC: 43 MMOL/L (ref 21–32)
CO2 SERPL-SCNC: 44 MMOL/L (ref 21–32)
CREAT SERPL-MCNC: 0.53 MG/DL (ref 0.6–1.3)
CREAT SERPL-MCNC: 0.6 MG/DL (ref 0.6–1.3)
ERYTHROCYTE [DISTWIDTH] IN BLOOD BY AUTOMATED COUNT: 13.6 % (ref 11.6–15.1)
GFR SERPL CREATININE-BSD FRML MDRD: 88 ML/MIN/1.73SQ M
GFR SERPL CREATININE-BSD FRML MDRD: 92 ML/MIN/1.73SQ M
GLUCOSE SERPL-MCNC: 84 MG/DL (ref 65–140)
GLUCOSE SERPL-MCNC: 86 MG/DL (ref 65–140)
HCO3 BLDA-SCNC: 44 MMOL/L (ref 22–28)
HCO3 BLDV-SCNC: 40.8 MMOL/L (ref 24–30)
HCO3 BLDV-SCNC: 41.8 MMOL/L (ref 24–30)
HCT VFR BLD AUTO: 37.3 % (ref 36.5–49.3)
HGB BLD-MCNC: 12.2 G/DL (ref 12–17)
INR PPP: 1.24 (ref 0.84–1.19)
MAGNESIUM SERPL-MCNC: 2.1 MG/DL (ref 1.6–2.6)
MCH RBC QN AUTO: 32.4 PG (ref 26.8–34.3)
MCHC RBC AUTO-ENTMCNC: 32.7 G/DL (ref 31.4–37.4)
MCV RBC AUTO: 99 FL (ref 82–98)
NASAL CANNULA: 3
O2 CT BLDA-SCNC: 16.5 ML/DL (ref 16–23)
O2 CT BLDV-SCNC: 16.1 ML/DL
O2 CT BLDV-SCNC: 16.8 ML/DL
OXYHGB MFR BLDA: 91.1 % (ref 94–97)
PCO2 BLDA: 64.7 MM HG (ref 36–44)
PCO2 BLDV: 55.1 MM HG (ref 42–50)
PCO2 BLDV: 59.6 MM HG (ref 42–50)
PH BLDA: 7.45 [PH] (ref 7.35–7.45)
PH BLDV: 7.46 [PH] (ref 7.3–7.4)
PH BLDV: 7.49 [PH] (ref 7.3–7.4)
PHOSPHATE SERPL-MCNC: 2 MG/DL (ref 2.3–4.1)
PLATELET # BLD AUTO: 87 THOUSANDS/UL (ref 149–390)
PMV BLD AUTO: 10.7 FL (ref 8.9–12.7)
PO2 BLDA: 57.8 MM HG (ref 75–129)
PO2 BLDV: 52.9 MM HG (ref 35–45)
PO2 BLDV: 63.8 MM HG (ref 35–45)
POTASSIUM SERPL-SCNC: 4.2 MMOL/L (ref 3.5–5.3)
POTASSIUM SERPL-SCNC: 4.2 MMOL/L (ref 3.5–5.3)
PROTHROMBIN TIME: 15 SECONDS (ref 11.6–14.5)
RBC # BLD AUTO: 3.77 MILLION/UL (ref 3.88–5.62)
SODIUM SERPL-SCNC: 133 MMOL/L (ref 136–145)
SODIUM SERPL-SCNC: 134 MMOL/L (ref 136–145)
SPECIMEN SOURCE: ABNORMAL
WBC # BLD AUTO: 3.61 THOUSAND/UL (ref 4.31–10.16)

## 2020-07-15 PROCEDURE — 85610 PROTHROMBIN TIME: CPT | Performed by: PHYSICIAN ASSISTANT

## 2020-07-15 PROCEDURE — 82805 BLOOD GASES W/O2 SATURATION: CPT | Performed by: PHYSICIAN ASSISTANT

## 2020-07-15 PROCEDURE — 80048 BASIC METABOLIC PNL TOTAL CA: CPT | Performed by: PHYSICIAN ASSISTANT

## 2020-07-15 PROCEDURE — 94760 N-INVAS EAR/PLS OXIMETRY 1: CPT

## 2020-07-15 PROCEDURE — 94640 AIRWAY INHALATION TREATMENT: CPT

## 2020-07-15 PROCEDURE — 36600 WITHDRAWAL OF ARTERIAL BLOOD: CPT

## 2020-07-15 PROCEDURE — 71045 X-RAY EXAM CHEST 1 VIEW: CPT

## 2020-07-15 PROCEDURE — 99232 SBSQ HOSP IP/OBS MODERATE 35: CPT | Performed by: HOSPITALIST

## 2020-07-15 PROCEDURE — 85027 COMPLETE CBC AUTOMATED: CPT | Performed by: PHYSICIAN ASSISTANT

## 2020-07-15 PROCEDURE — 82330 ASSAY OF CALCIUM: CPT | Performed by: PHYSICIAN ASSISTANT

## 2020-07-15 PROCEDURE — 84100 ASSAY OF PHOSPHORUS: CPT | Performed by: PHYSICIAN ASSISTANT

## 2020-07-15 PROCEDURE — 99232 SBSQ HOSP IP/OBS MODERATE 35: CPT | Performed by: NURSE PRACTITIONER

## 2020-07-15 PROCEDURE — 99232 SBSQ HOSP IP/OBS MODERATE 35: CPT | Performed by: INTERNAL MEDICINE

## 2020-07-15 PROCEDURE — 99233 SBSQ HOSP IP/OBS HIGH 50: CPT | Performed by: INTERNAL MEDICINE

## 2020-07-15 PROCEDURE — 83735 ASSAY OF MAGNESIUM: CPT | Performed by: PHYSICIAN ASSISTANT

## 2020-07-15 RX ORDER — POTASSIUM CHLORIDE 20 MEQ/1
20 TABLET, EXTENDED RELEASE ORAL EVERY 12 HOURS
Status: DISPENSED | OUTPATIENT
Start: 2020-07-15 | End: 2020-07-16

## 2020-07-15 RX ORDER — ACETAZOLAMIDE 500 MG/5ML
500 INJECTION, POWDER, LYOPHILIZED, FOR SOLUTION INTRAVENOUS EVERY 12 HOURS SCHEDULED
Status: DISPENSED | OUTPATIENT
Start: 2020-07-15 | End: 2020-07-16

## 2020-07-15 RX ADMIN — ALBUMIN (HUMAN) 12.5 G: 12.5 INJECTION, SOLUTION INTRAVENOUS at 00:00

## 2020-07-15 RX ADMIN — LEVOTHYROXINE SODIUM 50 MCG: 50 TABLET ORAL at 05:58

## 2020-07-15 RX ADMIN — TIMOLOL MALEATE 1 DROP: 5 SOLUTION/ DROPS OPHTHALMIC at 08:32

## 2020-07-15 RX ADMIN — TERAZOSIN HYDROCHLORIDE 5 MG: 5 CAPSULE ORAL at 21:00

## 2020-07-15 RX ADMIN — ACETAZOLAMIDE 500 MG: 500 INJECTION, POWDER, LYOPHILIZED, FOR SOLUTION INTRAVENOUS at 10:04

## 2020-07-15 RX ADMIN — PRAVASTATIN SODIUM 40 MG: 40 TABLET ORAL at 16:52

## 2020-07-15 RX ADMIN — LEVALBUTEROL HYDROCHLORIDE 0.63 MG: 0.63 SOLUTION RESPIRATORY (INHALATION) at 07:04

## 2020-07-15 RX ADMIN — GUAIFENESIN 600 MG: 600 TABLET, EXTENDED RELEASE ORAL at 21:00

## 2020-07-15 RX ADMIN — ALBUMIN (HUMAN) 12.5 G: 12.5 INJECTION, SOLUTION INTRAVENOUS at 07:14

## 2020-07-15 RX ADMIN — MONTELUKAST SODIUM 10 MG: 10 TABLET, FILM COATED ORAL at 21:00

## 2020-07-15 RX ADMIN — TIMOLOL MALEATE 1 DROP: 5 SOLUTION/ DROPS OPHTHALMIC at 17:40

## 2020-07-15 RX ADMIN — POTASSIUM CHLORIDE 20 MEQ: 1500 TABLET, EXTENDED RELEASE ORAL at 10:04

## 2020-07-15 RX ADMIN — GUAIFENESIN 600 MG: 600 TABLET, EXTENDED RELEASE ORAL at 08:32

## 2020-07-15 RX ADMIN — PANTOPRAZOLE SODIUM 40 MG: 40 TABLET, DELAYED RELEASE ORAL at 08:32

## 2020-07-15 RX ADMIN — ENOXAPARIN SODIUM 40 MG: 40 INJECTION SUBCUTANEOUS at 08:32

## 2020-07-15 RX ADMIN — LEVALBUTEROL HYDROCHLORIDE 0.63 MG: 0.63 SOLUTION RESPIRATORY (INHALATION) at 20:38

## 2020-07-15 RX ADMIN — LEVALBUTEROL HYDROCHLORIDE 0.63 MG: 0.63 SOLUTION RESPIRATORY (INHALATION) at 13:38

## 2020-07-15 NOTE — ASSESSMENT & PLAN NOTE
· Patients current  Co2 with significant improvement , today CO2 of 44, but still  Elevated  · Nephrology team has been consulted, they consider to give Diamox 500mg bid  · Diuretics currently on hold     Plan:  -  Appreciate nephrology recommendations  -  Initiate Diamox as suggested by nephrology    -  Continue to monitor BMP and VBG levels daily

## 2020-07-15 NOTE — ASSESSMENT & PLAN NOTE
· Patient currently on nasal canula, today patient pleasant , can hold a conversation without acute distress    Plan    - Monitor SpO2 levels aiming SpO2 >90  - Continue oxygen 6L

## 2020-07-15 NOTE — PROGRESS NOTES
Progress Note Rina Hollins 3/24/1929, 80 y o  male MRN: [id number]    Unit/Bed#: ICU 09 Encounter: [id number]    Primary Care Provider: Kelly Conner MD   Date and time admitted to hospital: 7/12/2020  2:58 PM        * 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 acidosis  Assessment & Plan  · Patients current  Co2 with significant improvement , today CO2 of 44, but still  Elevated  · Nephrology team has been consulted, they consider to give Diamox 500mg bid  · Diuretics currently on hold     Plan:  -  Appreciate nephrology recommendations  -  Initiate Diamox as suggested by nephrology  -  Continue to monitor BMP and VBG levels daily      Hypokalemia  Assessment & Plan  · Patient was initiated on potassium as a correction with a goal of >4 0  · Today patient with potassium level of 4 2    Plan  - Continue to monitor potassium levels daily  - Consider goal of potassium level greater than >4 0 if not replace  - Monitor for signs and symptoms of hypokalemia daily      Acute on chronic diastolic heart failure [facility])  Assessment & Plan  Wt Readings from Last 3 Encounters:   07/15/20 64 2 kg (141 lb 8 6 oz)   02/14/20 74 9 kg (165 lb 1 6 oz)   12/17/19 77 3 kg (170 lb 6 4 oz)   I/O last 3 completed shifts: In: 493 [P O :363; I V :30; IV Piggyback:100]  Out: 2047 [Urine:600; Chest [id number]:[id number]]  No intake/output data recorded  · Patient's last echo completed in October of 2019 showed ejection fraction of 50%  Upon assessment patient appears to be fluid overloaded given lower extremity edema and pleural effusions seen on chest x-ray and slightly elevated BNP of 556  Patient is scheduled to have a repeat echo on 07/31/2020  · Patient regularly follows Dr Lesley Rothman  Echocardiogram on 7/13/2020 shows large pleural effusions and slightly enlarged pericardium  · Cardiology consulted suggested to hold diuretics  Plan:    · Cardiology on board  · Hold patient's p o   Lasix as suggested for cardiology          Acute on chronic respiratory failure with hypoxia and hypercapnia (HCC)  Assessment & Plan  · Patient currently requires increased supplemental oxygen as compared to baseline (3 L nasal cannula at home)  Currently on 10 L high-flow nasal cannula  Condition likely secondary to acute exacerbation of CHF and pleural effusions noted on chest x-ray  Patient regularly follows Dr Shavon Bernard from pulmonology  Low suspicion for infection given patient's negative COVID-19 testing, no elevated white count, and no fever but a viral etiology is still a possibility  Given patient's continued acute hypoxemia, cannot completely rule out PE  Per pulmonology, effusions on chest x-ray does not completely explain patient's hypoxia  Patient appears to be severely ill  · Patients CT showed Bilateral Hydropneumothorax/ pleural effusions  · Acccording with above, Interventional radiology was consulted considering the placement of bilateral chest tube  · Bilateral chest tube was successfully place 7 13 2020    Plan:    · Maintain SpO2 between 89% to 94% given patient's history of COPD  · Repeat BMP to monitor hypercapnia daily  ·  Continue Consulations with nephrology given patient's mixed metabolic abnormalities  ·  Repeat VBG daily    Chronic respiratory failure with hypoxia ([address] )  Assessment & Plan  · Patient currently on nasal canula, today patient pleasant , can hold a conversation without acute distress    Plan    - Monitor SpO2 levels aiming SpO2 >90  - Continue oxygen 6L    Bilateral hydropneumothorax  Assessment & Plan  · Unclear of effusions on chest x-ray are loculated  Patient also noted to have wheezing on exam as well as increased supplemental oxygen needs  Patient responded well to last thoracocentesis on October of 2019 with resolution of symptoms noted    · Patients CT showed bilateral hydropneumothorax with pleural effusions, interventional radiology was consulted which consider the patient a candidate for bilateral chest tube  · Bilateral chest tube was placed successfully 2020  · Pleural fluid level : yellow, clear wbc 1 156  Pleural fluid culture with no growth        LDH body  169 serum 188 Total protein fluid 3 3 serum 5 8 likely exudative effusion  · Today Ct chest show improving bilateral hydropneumothorax, improving atelectasias, CXR on the other hand showed bilateral pleural drainage with no change in left pneumothorax and left base atelectasias    Plan:    · Pulmonary on board  · Consider to Continue recheck of hydropneumothorax with CT  · Consider incentive spirometry for atelectasia prevention  · Continue bilateral chest tube  · Continue to monitor SpO2 levels daily      VTE Pharmacologic Prophylaxis:   Pharmacologic: Enoxaparin (Lovenox)  Mechanical VTE Prophylaxis in Place: Yes    Discussions with Specialists or Other Care Team Provider: yes, patients case has been consulted with cardiology, pulmonology, nephrology, interventional radiology  Education and Discussions with Family / Patient: yes, patient has been fully educated about his current diagnosis and treatment plan as well as family  I will call family today to update about patients hospital course    Current Length of Stay: 3 day(s)    Current Patient Status: Inpatient     Discharge Plan / Estimated Discharge Date: Not yet Established    Code Status: Level 3 - DNAR and DNI      Subjective:   Patient states that he feels ok, when interrogated denies chest pain, shortness of breath, dizziness, palpitations, nausea,vomit , diarrea    Objective:     Vitals:   Temp (24hrs), Av 9 °F (36 6 °C), Min:97 6 °F (36 4 °C), Max:98 1 °F (36 7 °C)    Temp:  [97 6 °F (36 4 °C)-98 1 °F (36 7 °C)] 98 1 °F (36 7 °C)  HR:  [58-70] 70  Resp:  [18-22] 18  BP: ()/(49-58) 111/58  SpO2:  [92 %-99 %] 93 %  Body mass index is 22 17 kg/m²  Input and Output Summary (last 24 hours):        Intake/Output Summary (Last 24 hours) at 7/15/2020 1022  Last data filed at 7/14/2020 1801  Gross per 24 hour   Intake 240 ml   Output 650 ml   Net -410 ml       Physical Exam:     Physical Exam   Constitutional: He is oriented to person, place, and time  He appears well-developed  No distress  HENT:   Head: Normocephalic and atraumatic  Nose: Nose normal    Eyes: Pupils are equal, round, and reactive to light  Conjunctivae and EOM are normal  Right eye exhibits no discharge  Left eye exhibits no discharge  No scleral icterus  Neck: Normal range of motion  Neck supple  Cardiovascular: Normal rate, regular rhythm and normal heart sounds  Exam reveals no friction rub  No murmur heard  Pulmonary/Chest:   Decreased breath sounds bilaterally, poor effort   Abdominal: Soft  Bowel sounds are normal  He exhibits no distension  There is no tenderness  Musculoskeletal: Normal range of motion  He exhibits edema (+1 pitting lower extremities edema)  Lymphadenopathy:     He has no cervical adenopathy  Neurological: He is alert and oriented to person, place, and time  Skin: Capillary refill takes 2 to 3 seconds  He is not diaphoretic  Psychiatric: He has a normal mood and affect  His behavior is normal    Nursing note and vitals reviewed  Additional Data:     Labs:    Results from last 7 days   Lab Units 07/15/20  0515  07/12/20  1526   WBC Thousand/uL 3 61*   < > 5 56   HEMOGLOBIN g/dL 12 2   < > 14 7   I STAT HEMOGLOBIN   --    < >  --    HEMATOCRIT % 37 3   < > 46 2   HEMATOCRIT, ISTAT   --    < >  --    PLATELETS Thousands/uL 87*   < > 115*   NEUTROS PCT %  --   --  80*   LYMPHS PCT %  --   --  6*   MONOS PCT %  --   --  10   EOS PCT %  --   --  3    < > = values in this interval not displayed       Results from last 7 days   Lab Units 07/15/20  0515  07/14/20  1156 07/14/20  0533   POTASSIUM mmol/L 4 2   < > 3 6  --    CHLORIDE mmol/L 93*   < > 96*  --    CO2 mmol/L 44*   < > 43*  --    CO2, I-STAT mmol/L  --   --   --  >45*   BUN mg/dL 17   < > 15 --    CREATININE mg/dL 0 53*   < > 0 61  --    CALCIUM mg/dL 7 8*   < > 7 3*  --    ALK PHOS U/L  --   --  44*  --    ALT U/L  --   --  7*  --    AST U/L  --   --  21  --    GLUCOSE, ISTAT mg/dl  --   --   --  101    < > = values in this interval not displayed  Results from last 7 days   Lab Units 07/15/20  0515   INR  1 24*       * I Have Reviewed All Lab Data Listed Above  * Additional Pertinent Lab Tests Reviewed: [address] Admission Reviewed    Imaging:    Imaging Reports Reviewed Today Include: CT chest: improving bilateral hydropneumothorax and atelectasias  CXR: bilateral pleural drainage catheter with no changes in small left pneumothorax and left lower base atelectasias  Imaging Personally Reviewed by Myself Includes: CT chest and CXR    Recent Cultures (last 7 days):     Results from last 7 days   Lab Units 07/13/20  1652   GRAM STAIN RESULT  No Polys or Bacteria seen  No Polys or Bacteria seen   BODY FLUID CULTURE, STERILE  No growth  No growth       Last 24 Hours Medication List:     Current Facility-Administered Medications:  acetaZOLAMIDE 500 mg Intravenous Q12H [address] [facility], GARY   albuterol 2 puff Inhalation Q6H PRN Aaliyah Olga Lidia GARY De Luna   enoxaparin 40 mg Subcutaneous Daily Aaliyah Olga Lidia GARY De Luna   guaiFENesin 600 mg Oral Q12H [address] Aaliyah Olga Lidia GARY De Luna   levalbuterol 0 63 mg Nebulization TID Tony Gray PA-C   levothyroxine 50 mcg Oral Daily Katie R GARY De Luna   montelukast 10 mg Oral HS Katie R RasGARY johansen   pantoprazole 40 mg Oral Early Morning Katie R GARY De Luna   potassium chloride 20 mEq Oral [address] [facility], GARY   pravastatin 40 mg Oral Daily [facility] [place] [employer]   terazosin 5 mg Oral HS Katie R GARY DeL una   timolol 1 drop Both Eyes BID Tony Gray PA-C        Today, Patient Was Seen By: Mich Farrell MD    ** Please Note: This note has been constructed using a voice recognition system  **

## 2020-07-15 NOTE — ASSESSMENT & PLAN NOTE
· Patient was initiated on potassium as a correction with a goal of >4 0  · Today patient with potassium level of 4 2    Plan  - Continue to monitor potassium levels daily  - Consider goal of potassium level greater than >4 0 if not replace  - Monitor for signs and symptoms of hypokalemia daily

## 2020-07-15 NOTE — SOCIAL WORK
LOS: 3  RISK OF UNPLANNED READMISSION SCORE: 20  30 DAY READMISSION: NO  BUNDLE: NO    CM met with patient at bedside  Patient is alert and oriented  CM name and role reviewed  Patient reports living by himself in a 2 story home with 3 SHAMAR through the garage with 2 handrails  Once inside patient has 1st floor living set up  Patient has a RW that he uses to get around  Patient is independent with his ADL's  He doesn't use the shower rather sponge bathes in the bathroom  Patient has no HX of VNA  He did go to [facility] about 8 months ago  Patient has RX coverage and uses [facility] in [facility] and the one in [place] when staying with his daughter  Patient has no HX of mental health or substance abuse issues  Patient's daughter Kashif Hanna is his POA  Patient does have a AD/LW and daughter has access to this  Patient's PCP is with  and he sees Dr Olimpia Casillas  Patient is retired and collects [employer]  Patient drives short distances  Patient's YOVANI is retired so he assists with transportation if needed  CM followed up with patient's daughter who verified all the above  CM explained that once patient is more medically stable that we would get recommendations from the entire team (including PT and OT) and make discharge plans at this point  Family expressed understanding of this  Daughter Kashif Hanna stated that even if therapy recommends home with VNA at DC patient will be staying with her for a couple weeks at her home which is on a single level  Her address is [address] [place] [address], Ol[place] Emanuel, [address] [place] [address]  CM reviewed discharge planning process including the following: identifying caregivers at home, preference for d/c planning needs, [facility] Meds to Bed program, availability of treatment team to discuss questions or concerns patient and/or family may have regarding diagnosis, plan of care, old or new medications and discharge planning    CM will continue to follow for care coordination and update assessment as necessary

## 2020-07-15 NOTE — PROGRESS NOTES
Progress Note - Pulmonary   Hardin Bone 80 y o  male MRN: [id number]  Unit/Bed#: ICU 09 Encounter: [id number]      Assessment & Plan:  1  Acute on chronic hypoxic/hypercarbic respiratory failure  · Improving, baseline 3L/min NC, now tolerating RA  · IS, OOB-Chair    2  Bilateral hydropneumothoraces with pleural thickening and trapped lung on left  · CT Chest with some pleural thickening though not classic for mesothelioma  · Questionable thoracic communication "buffalo lung" though not clearly apparent on CT imaging  · Continue CTs to water seal, follow output  · Follow up further pleural fluid analysis - especially cytology  · Based upon output and clinical course will determine further CT management strategy, however may need thoracic surgery evaluation    3  COPD without acute exacerbation  · Continue xopenex and singulair    4  Acute/chronic diastolic CHF - diuresis per cardiology    5  Thrombocytopenia - stable    6  Compensatory metabolic alkalosis - diamox per nephrology and primary service    Subjective:   80year old man who has HTN, HLP, diastolic CHF, COPD on chronic 3L/min NC, former smoker, presented for 7/12 for dyspnea and chest pain  Found to have effusions and pulmonary edema, diuresis attempted, worsened O2 needs, had CT angio 7/13 revealing bilateral hydropneumothoracies  Post IR pigtail catheters placed  Post procedure needing 100% HFNC and NRB and hypoxic        24hr events - downgraded to [facility] 7/14  Had CT chest with results listed below  Reports feeling well, denied chest pain, slight cough, no sig sputum production, no pleurisy    Objective:     Vitals: Blood pressure 111/58, pulse 70, temperature 98 1 °F (36 7 °C), temperature source Oral, resp  rate 18, height 5' 7" (1 702 m), weight 64 2 kg (141 lb 8 6 oz), SpO2 93 %  , 95% RA on my exam, Body mass index is 22 17 kg/m²        Intake/Output Summary (Last 24 hours) at 7/15/2020 1045  Last data filed at 7/14/2020 1801  Gross per 24 hour Intake 240 ml   Output 650 ml   Net -410 ml         Physical Exam  Gen: Older man in chair, awake, alert, oriented x 3, no acute distress  HEENT: Mucous membranes moist, no oral lesions, no thrush  NECK: No accessory muscle use, JVP not elevated  Cardiac: Regular, single S1, single S2, no murmurs, no rubs, no gallops  Lungs: diminished at bases, no wheeze, no rales  Right CT - water seal, 440cc output serous, no air leak  Left CT - water seal, 250cc output serous, small air leak  Abdomen: normoactive bowel sounds, soft nontender, nondistended, no rebound or rigidity, no guarding  Extremities: no cyanosis, no clubbing, no edema, warm    Labs: I have personally reviewed pertinent lab results    Laboratory and Diagnostics  Results from last 7 days   Lab Units 07/15/20  0515 07/14/20  0533 07/14/20  0429 07/13/20  0440 07/12/20  1526   WBC Thousand/uL 3 61*  --  5 30 3 51* 5 56   HEMOGLOBIN g/dL 12 2  --  13 2 13 0 14 7   I STAT HEMOGLOBIN g/dl  --  13 3  --   --   --    HEMATOCRIT % 37 3  --  41 4 40 9 46 2   HEMATOCRIT, ISTAT %  --  39  --   --   --    PLATELETS Thousands/uL 87*  --  94* 90* 115*   NEUTROS PCT %  --   --   --   --  80*   MONOS PCT %  --   --   --   --  10     Results from last 7 days   Lab Units 07/15/20  0515 07/15/20  0011 07/14/20  1808 07/14/20  1156 07/14/20  0533 07/14/20  0429 07/13/20  1748 07/13/20  0440 07/12/20  1526   SODIUM mmol/L 134* 133* 132* 138  --  137 137 140 140   POTASSIUM mmol/L 4 2 4 2 4 2 3 6  --  4 0 4 3 3 9 4 2   CHLORIDE mmol/L 93* 93* 91* 96*  --  94* 94* 95* 95*   CO2 mmol/L 44* 43* 45* 43*  --  >45* 44* >45* 44*   CO2, I-STAT mmol/L  --   --   --   --  >45*  --   --   --   --    ANION GAP mmol/L -3* -3* -4* -1*  --   --  -1*  --  1*   BUN mg/dL 17 18 19 15  --  16 15 14 17   CREATININE mg/dL 0 53* 0 60 0 71 0 61  --  0 70 0 67 0 54* 0 64   CALCIUM mg/dL 7 8* 7 7* 7 9* 7 3*  --  8 3 8 4 8 4 8 7   GLUCOSE RANDOM mg/dL 84 86 161* 84  --  108 110 80 111   ALT U/L  --   -- --  7*  --   --   --   --  14   AST U/L  --   --   --  21  --   --   --   --  26   ALK PHOS U/L  --   --   --  44*  --   --   --   --  66   ALBUMIN g/dL  --   --   --  2 9*  --   --   --   --  3 5   TOTAL BILIRUBIN mg/dL  --   --   --  1 03*  --   --   --   --  0 98     Results from last 7 days   Lab Units 07/15/20  0515 07/14/20  0429 07/13/20  0440 07/12/20  1526   MAGNESIUM mg/dL 2 1 2 7* 1 7 1 9   PHOSPHORUS mg/dL 2 0* 3 5  --   --       Results from last 7 days   Lab Units 07/15/20  0515 07/12/20  1526   INR  1 24* 1 14   PTT seconds  --  28      Results from last 7 days   Lab Units 07/12/20  1526   TROPONIN I ng/mL 0 02     Results from last 7 days   Lab Units 07/13/20  1741   LACTIC ACID mmol/L 1 0         Results from last 7 days   Lab Units 07/13/20  1749   LD U/L 188             Results from last 7 days   Lab Units 07/13/20  1741   PROCALCITONIN ng/ml <0 05       ABG:   Results from last 7 days   Lab Units 07/15/20  0541   PH ART  7 450   PCO2 ART mm Hg 64 7*   PO2 ART mm Hg 57 8*   HCO3 ART mmol/L 44 0*   BASE EXC ART mmol/L 16 8   ABG SOURCE  Radial, Left     7 31/97/31--->7 40/78/97-->7 45/65/58     MICRO  7/13 - Right Pleural Effusion - NGTD,  (0 89), TP 3 3 (0 57), cytology pending, amylase pending, WBC 1156 50% N, 30% L  7/13 - Left Pleural Effusion - NGTD -  5-% N, 40% L, cytology pending  COVID-19 Neg - 7/12     Cytology - bilateral pleural effusions 10/2019 - neg cytology - indices c/w diuresed transudate (elevated TP, normal LDH, Alb gradient 0)     RADIOGRAPHS - images personally reviewed  CXR 7/15 - b/l CT in place, residual ex-vacuo left basilar PTX, resolved right PTX    CT Chest 7/14 - significantly improved right hydropneumothorax right basilar pleural thickening without complete re-expansion, RLL pleural scar or nodule, left hydropneumothorax with some improvement but lack of complete expansion, lingula and LLL pleural thickening, no clear endobronchial lesion    CXR 7/14 - b/l basilar pigtail chest tubes in place, left basilar loculated PTX w/o lung re-expansion, right sided CT in place with improved expansion, possible small right apical PTX vs rotation artifact, azygous lobe/fissure noted      CT Angio 7/13 - no PE, large bilateral hydropneumothoraces, thickened pleural both on right and left, emphysematous background     CT chest Dec 2019 - small left effusion, mild pleural thickening RML and LLL, no sig mass lesion  or mediastinal adenoapthy     TTE EF 60%, normal RV size and function, PASP 50mmHg      Bryan Vargas DO, [facility]

## 2020-07-15 NOTE — ASSESSMENT & PLAN NOTE
Wt Readings from Last 3 Encounters:   07/15/20 64 2 kg (141 lb 8 6 oz)   20 74 9 kg (165 lb 1 6 oz)   19 77 3 kg (170 lb 6 4 oz)   I/O last 3 completed shifts: In: 493 [P O :363; I V :30; IV Piggyback:100]  Out:  [Urine:600; Chest UHE]  No intake/output data recorded  · Patient's last echo completed in 2019 showed ejection fraction of 50%  Upon assessment patient appears to be fluid overloaded given lower extremity edema and pleural effusions seen on chest x-ray and slightly elevated BNP of 556  Patient is scheduled to have a repeat echo on 2020  · Patient regularly follows Dr Sara Olivas  Echocardiogram on 2020 shows large pleural effusions and slightly enlarged pericardium  · Cardiology consulted suggested to hold diuretics  Plan:    · Cardiology on board  · Hold patient's p o   Lasix as suggested for cardiology

## 2020-07-15 NOTE — PROGRESS NOTES
NEPHROLOGY PROGRESS NOTE   Maria Del Rosario Webb 80 y o  male MRN: [id number]  Unit/Bed#: ICU 09 Encounter: [id number]  Reason for Consult: metabolic alkalosis    ASSESSMENT/PLAN:  1  Mixed acid base disorder with metabolic alkalosis and respiratory acidosis  - CO2 improved to 44 but still elevated  - give diamox 500mg q12hr x2 doses  - keep K >4  2  Hypokalemia- give kdur 20meq q12hr x2 doses  3  Acute on Chronic CHF- cardiology on board and managing  - IV diuretics on hold  4  Respiratory failure- on nasal cannula currently  5  Bilateral Hydropneumothorax / Pleural effusions- s/p bilateral chest tube    Disposition:  Give diamox 500mg x2 doses + kdur 20meq x2 doses  Discussed with nurse  SUBJECTIVE:  Patient feeling well  Eating breakfast   Denies N/V/D, SOB      OBJECTIVE:  Current Weight: Weight - Scale: 64 2 kg (141 lb 8 6 oz)  Vitals:    07/14/20 2314 07/15/20 0236 07/15/20 0700 07/15/20 0704   BP: 99/51  111/58    BP Location: Right arm      Pulse: 59  70    Resp: 18  18    Temp: 97 9 °F (36 6 °C)  98 1 °F (36 7 °C)    TempSrc: Oral  Oral    SpO2: 92%  97% 93%   Weight:  64 2 kg (141 lb 8 6 oz)     Height:           Intake/Output Summary (Last 24 hours) at 7/15/2020 7771  Last data filed at 7/14/2020 1801  Gross per 24 hour   Intake 243 ml   Output 1065 ml   Net -822 ml     General: NAD  Skin: no rash  Eyes: anicteric  ENMT: mm moist  Neck: no masses  Respiratory: decreased breath sounds, chest tube  Cardiac: RRR  Extremities: no LE edema  GI: soft nt nd  Neuro: alert awake  Psych: mood and affect appropriate    Medications:    Current Facility-Administered Medications:     acetaZOLAMIDE (DIAMOX) injection 500 mg, 500 mg, Intravenous, Q12H [facility], Ksenia Garcia PA-C    albuterol (PROVENTIL HFA,VENTOLIN HFA) inhaler 2 puff, 2 puff, Inhalation, Q6H PRN, Kari De Luna PA-C    enoxaparin (LOVENOX) subcutaneous injection 40 mg, 40 mg, Subcutaneous, Daily, Kari De Luna PA-C, 40 mg at 07/15/20 6232   guaiFENesin (MUCINEX) 12 hr tablet 600 mg, 600 mg, Oral, Q12H [facility], Katie GOMEZ AMISH De Luna-C, 600 mg at 07/15/20 7720    levalbuterol (XOPENEX) inhalation solution 0 63 mg, 0 63 mg, Nebulization, TID, Tim Kelly De Luna PA-C, 0 63 mg at 07/15/20 0704    levothyroxine tablet 50 mcg, 50 mcg, Oral, Daily, Tim Kelly De Luna PA-C, 50 mcg at 07/15/20 0558    montelukast (SINGULAIR) tablet 10 mg, 10 mg, Oral, HS, Tim Kelly De Luna PA-C, 10 mg at 07/14/20 2135    pantoprazole (PROTONIX) EC tablet 40 mg, 40 mg, Oral, Early Morning, AMISH Eli-C, 40 mg at 07/15/20 1656    potassium chloride (K-DUR,KLOR-CON) CR tablet 20 mEq, 20 mEq, Oral, Q12H, Laurent Reyna, PA-C    pravastatin (PRAVACHOL) tablet 40 mg, 40 mg, Oral, Daily With Bryn Bergeron PA-C, 40 mg at 07/14/20 1759    terazosin (HYTRIN) capsule 5 mg, 5 mg, Oral, HS, Katie De Luna PA-C, 5 mg at 07/14/20 2135    timolol (TIMOPTIC) 0 5 % ophthalmic solution 1 drop, 1 drop, Both Eyes, BID, Kaitlin Curiel PA-C, 1 drop at 07/15/20 6981    Laboratory Results:  Results from last 7 days   Lab Units 07/15/20  0515 07/15/20  0011 07/14/20  1808 07/14/20  1156 07/14/20  0533 07/14/20  0429 07/13/20  1748 07/13/20  0440 07/12/20  1526   WBC Thousand/uL 3 61*  --   --   --   --  5 30  --  3 51* 5 56   HEMOGLOBIN g/dL 12 2  --   --   --   --  13 2  --  13 0 14 7   I STAT HEMOGLOBIN g/dl  --   --   --   --  13 3  --   --   --   --    HEMATOCRIT % 37 3  --   --   --   --  41 4  --  40 9 46 2   HEMATOCRIT, ISTAT %  --   --   --   --  39  --   --   --   --    PLATELETS Thousands/uL 87*  --   --   --   --  94*  --  90* 115*   POTASSIUM mmol/L 4 2 4 2 4 2 3 6  --  4 0 4 3 3 9 4 2   CHLORIDE mmol/L 93* 93* 91* 96*  --  94* 94* 95* 95*   CO2 mmol/L 44* 43* 45* 43*  --  >45* 44* >45* 44*   CO2, I-STAT mmol/L  --   --   --   --  >45*  --   --   --   --    BUN mg/dL 17 18 19 15  --  16 15 14 17   CREATININE mg/dL 0 53* 0 60 0 71 0 61  --  0 70 0  67 0 54* 0 64   CALCIUM mg/dL 7 8* 7 7* 7 9* 7 3*  --  8 3 8 4 8 4 8 7   MAGNESIUM mg/dL 2 1  --   --   --   --  2 7*  --  1 7 1 9   PHOSPHORUS mg/dL 2 0*  --   --   --   --  3 5  --   --   --    GLUCOSE, ISTAT mg/dl  --   --   --   --  101  --   --   --   --

## 2020-07-15 NOTE — PROGRESS NOTES
General Cardiology   Progress Note -  [facility] One   Chase Petersen 80 y o  male MRN: [id number]    Unit/Bed#: ICU 09 Encounter: [id number]    Assessment  1  Acute on chronic diastolic HF  -On exam volume status appears to be stable; has chronic + 1 pitting pedal edema, on RA sating 91-93%  -Symptomatically denies SOB, , or orthopnea  -ProBNP 556 (previously 866--2019)  -CTA PE study : B/L hydropneumothorax w/large pleural effusions and moderate pneumothoraces, no acute PE  -2D echocardiogram  2020: LVEF 60%, no regional wall motion abnormality, RV normal size and systolic function, trace MR, mild AI, trace TR; small to moderate pericardial effusion, no evidence of hemodynamic compromise  -Previously on furosemide 40 mg daily (on hold), atenolol-chlorthalidone 50-25 mg daily (on hold), and spironolactone 25 mg daily  -IV diuretics on hold  -24 hour I&O balance: -1 1 L ( mL;  ml,  ml); overall -6 5 L  Weights:  Office weight 165 lb--2020  --:  131 lb--bed scale  --:  131 lb--bed scale  --:  137 lb--bed scale  --7/15:  141 lb--bed scale     2  Acute on chronic hypercapnic/hypoxic respiratory failure   3  Bilateral hydropneumothorax/large bilateral pleural effusions   -Pulmonary following  -Currently on RA sating 91-93% NC (baseline 2-3 L nasal cannula)  -CTA PE study : B/L hydropneumothorax w/large pleural effusions and moderate pneumothoraces, no acute PE  -CT chest w/o contrast: Improving bilateral hydropneumothorax, and improving bilateral atelectasis  -S/p IR guided B/L chest tube placement     5  Compensated metabolic alkalosis  -ABG 2 25/52/84/23/69 8    4   Hypertension  -Avg /55, last recorded at 111/58, HR 70  -Previously on Amlodipine 10 mg daily (on hold), and spironolactone 25 mg daily (on hold)     5  Hyperlipidemia  -Lipid profile 2019:  Cholesterol 142, triglycerides 37, HDL 80, LDL 55  -Previously on simvastatin 20 mg daily (non formulary) switched to pravastatin 40 mg daily this admission     6  Hypothyroidism  -TSH 4 5/free T4 1 1  -On levothyroxine 50 mcg daily     Plan:  -BP improved w/albumin dosing --would continue for today  -Hold furosemide/aldactone today; agree w/nephro to give Diamox 500 mg BID x 2 doses today; will re-evaluate diuretic need in the a m  -Continue to hold amlodipine; MAP 72-82, BP borderline  -B/L chest tubes to water seal  -F/u complete pleural fluid analysis  -Strict I&Os, daily weights, 2 g NA +diet 1800 mL FR  -Monitor renal function and electrolytes closely  -Continue to monitor on telemetry    Subjective  Review of Systems   Constitution: Negative for chills, fever and malaise/fatigue  HENT: Negative for congestion  Eyes: Negative for visual disturbance  Cardiovascular: Positive for leg swelling  Negative for chest pain, cyanosis, dyspnea on exertion, irregular heartbeat, near-syncope, orthopnea, palpitations and syncope  Respiratory: Negative for cough and shortness of breath  Musculoskeletal: Negative for arthritis and myalgias  Gastrointestinal: Negative for abdominal pain  Genitourinary: Negative for dysuria  Neurological: Negative for dizziness, focal weakness, headaches, light-headedness and weakness  All other systems reviewed and are negative  Objective:   Physical Exam   Constitutional: He is oriented to person, place, and time  He appears well-developed and well-nourished  No distress  HENT:   Head: Normocephalic and atraumatic  Mouth/Throat: Oropharynx is clear and moist    Eyes: No scleral icterus  Neck: No JVD present  Cardiovascular: Normal rate, regular rhythm, normal heart sounds and intact distal pulses  Exam reveals no friction rub  No murmur heard  +1 pitting B/L pedal edema   Pulmonary/Chest: Effort normal  He has no wheezes  He has no rales  Diminished lung fields at the B/L bases  B/L pleural CTs draining pleural fluid   Abdominal: Soft   Bowel sounds are normal    Musculoskeletal: He exhibits edema  Neurological: He is alert and oriented to person, place, and time  Skin: Skin is warm and dry  Capillary refill takes less than 2 seconds  He is not diaphoretic  No pallor  Psychiatric: He has a normal mood and affect  Nursing note and vitals reviewed  Vitals: Blood pressure 111/58, pulse 70, temperature 98 1 °F (36 7 °C), temperature source Oral, resp  rate 18, height 5' 7" (1 702 m), weight 64 2 kg (141 lb 8 6 oz), SpO2 93 %  ,     Body mass index is 22 17 kg/m²  ,   Systolic (63CQR), TUI:664 , Min:99 , FGM:147     Diastolic (30QGJ), TAX:84, Min:51, Max:58      Intake/Output Summary (Last 24 hours) at 7/15/2020 1106  Last data filed at 7/14/2020 1801  Gross per 24 hour   Intake    Output 650 ml   Net -650 ml     Weight (last 2 days)     Date/Time   Weight    07/15/20 0236   64 2 (141 54)    07/14/20 0600   62 2 (137 13)    07/13/20 1731   65 4 (144 18)    07/13/20 0600   59 7 (131 61) bedrest    Weight: bedrest at 07/13/20 0600    07/13/20 0444   59 7 (131 61)              LABORATORY RESULTS  Results from last 7 days   Lab Units 07/12/20  1526   TROPONIN I ng/mL 0 02     CBC with diff:   Results from last 7 days   Lab Units 07/15/20  0515 07/14/20  0533 07/14/20  0429 07/13/20  0440 07/12/20  1526   WBC Thousand/uL 3 61*  --  5 30 3 51* 5 56   HEMOGLOBIN g/dL 12 2  --  13 2 13 0 14 7   I STAT HEMOGLOBIN g/dl  --  13 3  --   --   --    HEMATOCRIT % 37 3  --  41 4 40 9 46 2   HEMATOCRIT, ISTAT %  --  39  --   --   --    MCV fL 99*  --  101* 100* 101*   PLATELETS Thousands/uL 87*  --  94* 90* 115*   MCH pg 32 4  --  32 2 31 6 32 0   MCHC g/dL 32 7  --  31 9 31 8 31 8   RDW % 13 6  --  13 7 13 6 13 6   MPV fL 10 7  --  10 3 10 0 10 6   NRBC AUTO /100 WBCs  --   --   --   --  0       CMP:  Results from last 7 days   Lab Units 07/15/20  0515 07/15/20  0011 07/14/20  1808 07/14/20  1156 07/14/20  0533 07/14/20  0429 07/13/20  1748 07/13/20  0440 07/12/20  1526   POTASSIUM mmol/L 4 2 4 2 4 2 3 6  --  4 0 4 3 3 9 4 2   CHLORIDE mmol/L 93* 93* 91* 96*  --  94* 94* 95* 95*   CO2 mmol/L 44* 43* 45* 43*  --  >45* 44* >45* 44*   CO2, I-STAT mmol/L  --   --   --   --  >45*  --   --   --   --    BUN mg/dL 17 18 19 15  --  16 15 14 17   CREATININE mg/dL 0 53* 0 60 0 71 0 61  --  0 70 0 67 0 54* 0 64   GLUCOSE, ISTAT mg/dl  --   --   --   --  101  --   --   --   --    CALCIUM mg/dL 7 8* 7 7* 7 9* 7 3*  --  8 3 8 4 8 4 8 7   AST U/L  --   --   --  21  --   --   --   --  26   ALT U/L  --   --   --  7*  --   --   --   --  14   ALK PHOS U/L  --   --   --  44*  --   --   --   --  66   EGFR ml/min/1 73sq m 92 88 82 87  --  83 84 92 86       BMP:  Results from last 7 days   Lab Units 07/15/20  0515 07/15/20  0011 07/14/20  1808 07/14/20  1156 07/14/20  0533 07/14/20  0429 07/13/20  1748 07/13/20  0440   POTASSIUM mmol/L 4 2 4 2 4 2 3 6  --  4 0 4 3 3 9   CHLORIDE mmol/L 93* 93* 91* 96*  --  94* 94* 95*   CO2 mmol/L 44* 43* 45* 43*  --  >45* 44* >45*   CO2, I-STAT mmol/L  --   --   --   --  >45*  --   --   --    BUN mg/dL 17 18 19 15  --  16 15 14   CREATININE mg/dL 0 53* 0 60 0 71 0 61  --  0 70 0 67 0 54*   GLUCOSE, ISTAT mg/dl  --   --   --   --  101  --   --   --    CALCIUM mg/dL 7 8* 7 7* 7 9* 7 3*  --  8 3 8 4 8 4       Lab Results   Component Value Date    NTBNP 556 (H) 07/12/2020    NTBNP 866 (H) 09/26/2019    NTBNP 320 09/25/2019        Results from last 7 days   Lab Units 07/15/20  0515 07/14/20  0429 07/13/20  0440 07/12/20  1526   MAGNESIUM mg/dL 2 1 2 7* 1 7 1 9             Results from last 7 days   Lab Units 07/12/20  1526   TSH 3RD GENERATON uIU/mL 4 540*   FREE T4 ng/dL 1 12       Results from last 7 days   Lab Units 07/15/20  0515 07/12/20  1526   INR  1 24* 1 14       Lipid Profile:   No results found for: CHOL  Lab Results   Component Value Date    HDL 80 (H) 09/25/2019    HDL 67 (H) 08/14/2018    HDL 78 (H) 09/18/2017     Lab Results   Component Value Date    LDLCALC 55 2019    LDLCALC 73 2018    LDLCALC 60 2017     Lab Results   Component Value Date    TRIG 37 2019    TRIG 54 2018    TRIG 61 2017       Cardiac testing:   Results for orders placed during the hospital encounter of 20   Echo complete with contrast if indicated    Narrative [address] [facility]  [phone]    Transthoracic Echocardiogram  2D, M-mode, Doppler, and Color Doppler    Study date:  2020    Patient: Trisha Bal  MR number: [id number]  Account number: [de-identified]  : 24-Mar-1929  Age: 80 years  Gender: Male  Status: Inpatient  Location: Bedside  Height: 67 in  Weight: 131 lb  BP: 131/ 61 mmHg    Indications: Heart Failure  Diagnoses: I50 9 - Heart failure, unspecified    Sonographer:  Carnella Halsted, RCS  Primary Physician:  Luli Jara MD  Referring Physician:  Darryl Badillo MD  Group:  [facility]  Interpreting Physician:  Debra Marrero MD    SUMMARY    LEFT VENTRICLE:  Systolic function was normal  Ejection fraction was estimated to be 60 %  There were no regional wall motion abnormalities  Wall thickness was mildly increased  RIGHT VENTRICLE:  The size was normal   Systolic function was normal     MITRAL VALVE:  There was trace regurgitation  AORTIC VALVE:  There was mild regurgitation  TRICUSPID VALVE:  There was trace regurgitation  Estimated peak PA pressure was 50 mmHg  PERICARDIUM:  A small to moderate, free-flowing pericardial effusion was identified circumferential to the heart  There was no evidence of hemodynamic compromise  There was a large right pleural effusion  There was a large left pleural effusion  HISTORY: PRIOR HISTORY: COPD, pericardial effusion, CKD II, Respitory failure, Hyperlipidemia, and HTN  Congestive heart failure  Risk factors: hypertension  PROCEDURE: The procedure was performed at the bedside   This was a routine study  The transthoracic approach was used  The study included complete 2D imaging, M-mode, complete spectral Doppler, and color Doppler  The heart rate was 82 bpm,  at the start of the study  Images were obtained from the parasternal, apical, subcostal, and suprasternal notch acoustic windows  Image quality was adequate  LEFT VENTRICLE: Size was normal  Systolic function was normal  Ejection fraction was estimated to be 60 %  There were no regional wall motion abnormalities  Wall thickness was mildly increased  DOPPLER: The study was not technically  sufficient to allow evaluation of LV diastolic function  RIGHT VENTRICLE: The size was normal  Systolic function was normal  Wall thickness was normal     LEFT ATRIUM: Size was normal     RIGHT ATRIUM: Size was normal     MITRAL VALVE: Valve structure was normal  There was normal leaflet separation  DOPPLER: The transmitral velocity was within the normal range  There was no evidence for stenosis  There was trace regurgitation  AORTIC VALVE: The valve was trileaflet  Leaflets exhibited normal thickness and normal cuspal separation  DOPPLER: Transaortic velocity was within the normal range  There was no evidence for stenosis  There was mild regurgitation  TRICUSPID VALVE: The valve structure was normal  There was normal leaflet separation  DOPPLER: The transtricuspid velocity was within the normal range  There was no evidence for stenosis  There was trace regurgitation  Estimated peak PA  pressure was 50 mmHg  PULMONIC VALVE: Leaflets exhibited normal thickness, no calcification, and normal cuspal separation  DOPPLER: The transpulmonic velocity was within the normal range  There was no significant regurgitation  PERICARDIUM: A small to moderate, free-flowing pericardial effusion was identified circumferential to the heart  There was no evidence of hemodynamic compromise  There was a large right pleural effusion   There was a large left pleural  effusion  AORTA: The root exhibited normal size  SYSTEMIC VEINS: IVC: The inferior vena cava was not well visualized  The inferior vena cava was grossly normal in size  SYSTEM MEASUREMENT TABLES    2D  %FS: 37 92 %  Ao Diam: 3 53 cm  EDV(Teich): 53 54 ml  EF(Teich): 69 06 %  ESV(Teich): 16 57 ml  IVSd: 1 18 cm  LA Area: 16 16 cm2  LA Diam: 3 48 cm  LVEDV MOD A4C: 64 17 ml  LVEF MOD A4C: 75 23 %  LVESV MOD A4C: 15 89 ml  LVIDd: 3 58 cm  LVIDs: 2 22 cm  LVLd A4C: 6 94 cm  LVLs A4C: 5 64 cm  LVPWd: 1 1 cm  RA Area: 12 12 cm2  RVIDd: 3 95 cm  SV MOD A4C: 48 27 ml  SV(Teich): 36 98 ml    CW  AV MaxP 4 mmHg  AV Vmax: 1 36 m/s  MV PHT: 80 87 ms  MVA By PHT: 2 72 cm2  TR MaxP 85 mmHg  TR Vmax: 3 53 m/s    PW  LVOT Vmax: 1 24 m/s  LVOT maxP 12 mmHg  Lateral E': 0 04 m/s  MV A Nickolas: 1 11 m/s  MV Dec [place]: 2 58 m/s2  MV DecT: 321 49 ms  MV E Nickolas: 0 83 m/s  MV E/A Ratio: 0 75    [employer] Accredited Echocardiography Laboratory    Prepared and electronically signed by    Miri Will MD  Signed 2020 17:47:19       No results found for this or any previous visit  No results found for this or any previous visit  No procedure found  No results found for this or any previous visit      Meds/Allergies   all current active meds have been reviewed, current meds:   Current Facility-Administered Medications   Medication Dose Route Frequency    acetaZOLAMIDE (DIAMOX) injection 500 mg  500 mg Intravenous Q12H [address]    albuterol (PROVENTIL HFA,VENTOLIN HFA) inhaler 2 puff  2 puff Inhalation Q6H PRN    enoxaparin (LOVENOX) subcutaneous injection 40 mg  40 mg Subcutaneous Daily    guaiFENesin (MUCINEX) 12 hr tablet 600 mg  600 mg Oral Q12H MARCIANO    levalbuterol (XOPENEX) inhalation solution 0 63 mg  0 63 mg Nebulization TID    levothyroxine tablet 50 mcg  50 mcg Oral Daily    montelukast (SINGULAIR) tablet 10 mg  10 mg Oral HS    pantoprazole (PROTONIX) EC tablet 40 mg  40 mg Oral Early Morning    potassium chloride (K-DUR,KLOR-CON) CR tablet 20 mEq  20 mEq Oral Q12H    pravastatin (PRAVACHOL) tablet 40 mg  40 mg Oral Daily With Dinner    terazosin (HYTRIN) capsule 5 mg  5 mg Oral HS    timolol (TIMOPTIC) 0 5 % ophthalmic solution 1 drop  1 drop Both Eyes BID    and PTA meds:   Prior to Admission Medications   Prescriptions Last Dose Informant Patient Reported? Taking?    Blood Pressure Monitoring (B-D ASSURE BPM/AUTO ARM CUFF) MISC  Self No No   Sig: by Does not apply route daily   RABEprazole (ACIPHEX) 20 MG tablet  Self Yes Yes   Sig: daily    albuterol (PROVENTIL HFA,VENTOLIN HFA) 90 mcg/act inhaler  Self Yes No   Sig: Inhale 2 puffs every 6 (six) hours as needed for wheezing   amLODIPine (NORVASC) 10 mg tablet   No No   Sig: Take 1 tablet (10 mg total) by mouth daily   atenolol-chlorthalidone (TENORETIC) 50-25 mg per tablet  Self Yes Yes   Sig: Take 1 tablet by mouth daily   brimonidine-timolol (COMBIGAN) 0 2-0 5 %  Self Yes Yes   Sig: Apply 1 drop to eye 2 (two) times a day   furosemide (LASIX) 40 mg tablet  Self No Yes   Sig: Take 1 tablet (40 mg total) by mouth daily   latanoprost (XALATAN) 0 005 % ophthalmic solution  Self Yes Yes   levalbuterol (XOPENEX) 0 63 mg/3 mL nebulizer solution  Self No No   Sig: Take 1 vial (0 63 mg total) by nebulization 3 (three) times a day   Patient not taking: Reported on 2/14/2020   levothyroxine 50 mcg tablet  Self Yes Yes   Sig: Take 1 tablet by mouth daily   montelukast (SINGULAIR) 10 mg tablet  Self Yes Yes   Sig: daily    potassium chloride (K-DUR,KLOR-CON) 20 mEq tablet   No Yes   Sig: Take 1 tablet (20 mEq total) by mouth 2 (two) times a day   simvastatin (ZOCOR) 20 mg tablet  Self Yes Yes   Sig: daily    spironolactone (ALDACTONE) 25 mg tablet   No Yes   Sig: Take 1 tablet (25 mg total) by mouth daily   terazosin (HYTRIN) 5 mg capsule  Self Yes Yes   Sig: Take 1 capsule by mouth      Facility-Administered Medications: None              EKG personally reviewed by LOUISE Ricks  Assessment:  Principal Problem:    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 acidosis  Active Problems:    Chronic respiratory failure with hypoxia (HCC)    Essential hypertension    Hyperlipidemia    Hypokalemia    Thrombocytopenia (HCC)    Generalized weakness    Pericardial effusion    Severe protein-calorie malnutrition (HCC)    Acute on chronic respiratory failure with hypoxia and hypercapnia (HCC)    COPD (chronic obstructive pulmonary disease) (HCC)    Bilateral hydropneumothorax    Hypothyroidism    Acute on chronic diastolic heart failure (HCC)    GERD (gastroesophageal reflux disease)    Counseling / Coordination of Care  Total floor / unit time spent today 20 minutes  Greater than 50% of total time was spent with the patient and / or family counseling and / or coordination of care  ** Please Note: Dragon 360 Dictation voice to text software may have been used in the creation of this document   **

## 2020-07-15 NOTE — ASSESSMENT & PLAN NOTE
· Unclear of effusions on chest x-ray are loculated  Patient also noted to have wheezing on exam as well as increased supplemental oxygen needs  Patient responded well to last thoracocentesis on October of 2019 with resolution of symptoms noted  · Patients CT showed bilateral hydropneumothorax with pleural effusions, interventional radiology was consulted which consider the patient a candidate for bilateral chest tube    · Bilateral chest tube was placed successfully 7 13 2020  · Pleural fluid level : yellow, clear wbc 1 156  Pleural fluid culture with no growth        LDH body  169 serum 188 Total protein fluid 3 3 serum 5 8 likely exudative effusion  · Today Ct chest show improving bilateral hydropneumothorax, improving atelectasias, CXR on the other hand showed bilateral pleural drainage with no change in left pneumothorax and left base atelectasias    Plan:    · Pulmonary on board  · Consider to Continue recheck of hydropneumothorax with CT  · Consider incentive spirometry for atelectasia prevention  · Continue bilateral chest tube  · Continue to monitor SpO2 levels daily

## 2020-07-16 LAB
ANION GAP SERPL CALCULATED.3IONS-SCNC: -5 MMOL/L (ref 4–13)
BACTERIA SPEC BFLD CULT: NO GROWTH
BACTERIA SPEC BFLD CULT: NO GROWTH
BASE EX.OXY STD BLDV CALC-SCNC: 61.4 % (ref 60–80)
BASE EXCESS BLDV CALC-SCNC: 15.5 MMOL/L
BASOPHILS # BLD AUTO: 0.02 THOUSANDS/ΜL (ref 0–0.1)
BASOPHILS NFR BLD AUTO: 1 % (ref 0–1)
BUN SERPL-MCNC: 21 MG/DL (ref 5–25)
CALCIUM SERPL-MCNC: 7.9 MG/DL (ref 8.3–10.1)
CHLORIDE SERPL-SCNC: 94 MMOL/L (ref 100–108)
CO2 SERPL-SCNC: 44 MMOL/L (ref 21–32)
CREAT SERPL-MCNC: 0.65 MG/DL (ref 0.6–1.3)
EOSINOPHIL # BLD AUTO: 0.25 THOUSAND/ΜL (ref 0–0.61)
EOSINOPHIL NFR BLD AUTO: 6 % (ref 0–6)
ERYTHROCYTE [DISTWIDTH] IN BLOOD BY AUTOMATED COUNT: 14 % (ref 11.6–15.1)
GFR SERPL CREATININE-BSD FRML MDRD: 85 ML/MIN/1.73SQ M
GLUCOSE SERPL-MCNC: 85 MG/DL (ref 65–140)
GRAM STN SPEC: NORMAL
GRAM STN SPEC: NORMAL
HCO3 BLDV-SCNC: 43.8 MMOL/L (ref 24–30)
HCT VFR BLD AUTO: 39.1 % (ref 36.5–49.3)
HGB BLD-MCNC: 12.7 G/DL (ref 12–17)
IMM GRANULOCYTES # BLD AUTO: 0.01 THOUSAND/UL (ref 0–0.2)
IMM GRANULOCYTES NFR BLD AUTO: 0 % (ref 0–2)
LYMPHOCYTES # BLD AUTO: 0.46 THOUSANDS/ΜL (ref 0.6–4.47)
LYMPHOCYTES NFR BLD AUTO: 11 % (ref 14–44)
MCH RBC QN AUTO: 31.8 PG (ref 26.8–34.3)
MCHC RBC AUTO-ENTMCNC: 32.5 G/DL (ref 31.4–37.4)
MCV RBC AUTO: 98 FL (ref 82–98)
MONOCYTES # BLD AUTO: 0.4 THOUSAND/ΜL (ref 0.17–1.22)
MONOCYTES NFR BLD AUTO: 10 % (ref 4–12)
NEUTROPHILS # BLD AUTO: 2.93 THOUSANDS/ΜL (ref 1.85–7.62)
NEUTS SEG NFR BLD AUTO: 72 % (ref 43–75)
NRBC BLD AUTO-RTO: 0 /100 WBCS
O2 CT BLDV-SCNC: 11.5 ML/DL
PCO2 BLDV: 71.6 MM HG (ref 42–50)
PH BLDV: 7.4 [PH] (ref 7.3–7.4)
PLATELET # BLD AUTO: 102 THOUSANDS/UL (ref 149–390)
PMV BLD AUTO: 10.2 FL (ref 8.9–12.7)
PO2 BLDV: 30 MM HG (ref 35–45)
POTASSIUM SERPL-SCNC: 4.2 MMOL/L (ref 3.5–5.3)
RBC # BLD AUTO: 3.99 MILLION/UL (ref 3.88–5.62)
SODIUM SERPL-SCNC: 133 MMOL/L (ref 136–145)
WBC # BLD AUTO: 4.07 THOUSAND/UL (ref 4.31–10.16)

## 2020-07-16 PROCEDURE — 94640 AIRWAY INHALATION TREATMENT: CPT

## 2020-07-16 PROCEDURE — 82805 BLOOD GASES W/O2 SATURATION: CPT | Performed by: INTERNAL MEDICINE

## 2020-07-16 PROCEDURE — 99233 SBSQ HOSP IP/OBS HIGH 50: CPT | Performed by: INTERNAL MEDICINE

## 2020-07-16 PROCEDURE — 94760 N-INVAS EAR/PLS OXIMETRY 1: CPT

## 2020-07-16 PROCEDURE — 99232 SBSQ HOSP IP/OBS MODERATE 35: CPT | Performed by: INTERNAL MEDICINE

## 2020-07-16 PROCEDURE — 85025 COMPLETE CBC W/AUTO DIFF WBC: CPT | Performed by: INTERNAL MEDICINE

## 2020-07-16 PROCEDURE — 80048 BASIC METABOLIC PNL TOTAL CA: CPT | Performed by: INTERNAL MEDICINE

## 2020-07-16 PROCEDURE — 99232 SBSQ HOSP IP/OBS MODERATE 35: CPT | Performed by: HOSPITALIST

## 2020-07-16 RX ORDER — ACETAZOLAMIDE 500 MG/5ML
500 INJECTION, POWDER, LYOPHILIZED, FOR SOLUTION INTRAVENOUS EVERY 8 HOURS
Status: COMPLETED | OUTPATIENT
Start: 2020-07-16 | End: 2020-07-17

## 2020-07-16 RX ORDER — POTASSIUM CHLORIDE 20 MEQ/1
20 TABLET, EXTENDED RELEASE ORAL EVERY 8 HOURS
Status: COMPLETED | OUTPATIENT
Start: 2020-07-16 | End: 2020-07-17

## 2020-07-16 RX ORDER — POTASSIUM CHLORIDE 20 MEQ/1
40 TABLET, EXTENDED RELEASE ORAL ONCE
Status: COMPLETED | OUTPATIENT
Start: 2020-07-16 | End: 2020-07-16

## 2020-07-16 RX ADMIN — ACETAZOLAMIDE SODIUM 500 MG: 500 INJECTION, POWDER, LYOPHILIZED, FOR SOLUTION INTRAVENOUS at 17:30

## 2020-07-16 RX ADMIN — GUAIFENESIN 600 MG: 600 TABLET, EXTENDED RELEASE ORAL at 08:54

## 2020-07-16 RX ADMIN — LEVALBUTEROL HYDROCHLORIDE 0.63 MG: 0.63 SOLUTION RESPIRATORY (INHALATION) at 19:12

## 2020-07-16 RX ADMIN — TIMOLOL MALEATE 1 DROP: 5 SOLUTION/ DROPS OPHTHALMIC at 08:55

## 2020-07-16 RX ADMIN — PRAVASTATIN SODIUM 40 MG: 40 TABLET ORAL at 17:30

## 2020-07-16 RX ADMIN — POTASSIUM CHLORIDE 20 MEQ: 1500 TABLET, EXTENDED RELEASE ORAL at 08:54

## 2020-07-16 RX ADMIN — GUAIFENESIN 600 MG: 600 TABLET, EXTENDED RELEASE ORAL at 20:00

## 2020-07-16 RX ADMIN — ACETAZOLAMIDE SODIUM 500 MG: 500 INJECTION, POWDER, LYOPHILIZED, FOR SOLUTION INTRAVENOUS at 08:54

## 2020-07-16 RX ADMIN — MONTELUKAST SODIUM 10 MG: 10 TABLET, FILM COATED ORAL at 21:18

## 2020-07-16 RX ADMIN — WATER 10 ML: 1 INJECTION INTRAMUSCULAR; INTRAVENOUS; SUBCUTANEOUS at 08:54

## 2020-07-16 RX ADMIN — LEVOTHYROXINE SODIUM 50 MCG: 50 TABLET ORAL at 05:24

## 2020-07-16 RX ADMIN — POTASSIUM CHLORIDE 40 MEQ: 1500 TABLET, EXTENDED RELEASE ORAL at 15:45

## 2020-07-16 RX ADMIN — PANTOPRAZOLE SODIUM 40 MG: 40 TABLET, DELAYED RELEASE ORAL at 06:05

## 2020-07-16 RX ADMIN — LEVALBUTEROL HYDROCHLORIDE 0.63 MG: 0.63 SOLUTION RESPIRATORY (INHALATION) at 13:49

## 2020-07-16 RX ADMIN — TIMOLOL MALEATE 1 DROP: 5 SOLUTION/ DROPS OPHTHALMIC at 18:21

## 2020-07-16 RX ADMIN — LEVALBUTEROL HYDROCHLORIDE 0.63 MG: 0.63 SOLUTION RESPIRATORY (INHALATION) at 07:32

## 2020-07-16 RX ADMIN — POTASSIUM CHLORIDE 20 MEQ: 1500 TABLET, EXTENDED RELEASE ORAL at 17:30

## 2020-07-16 RX ADMIN — ENOXAPARIN SODIUM 40 MG: 40 INJECTION SUBCUTANEOUS at 08:54

## 2020-07-16 NOTE — PROGRESS NOTES
NEPHROLOGY PROGRESS NOTE   Roque Hernadnez 80 y o  male MRN: [id number]  Unit/Bed#: S -01 Encounter: [id number]  Reason for Consult: metabolic alkalosis    ASSESSMENT/PLAN:  1  Mixed acid base disorder with metabolic alkalosis and respiratory acidosis  - CO2 stable from yesterday at 40  - patient ordered for diamox 500mg q12hr x2 doses yesterday but only received 1 dose unfortunately  - will order diamox 500mg IV q8h x3 doses today  - keep K >4  2  Hypokalemia- give kdur 20meq q8hr x3 doses  3  Acute on Chronic CHF- cardiology on board and managing  - IV diuretics on hold  4  Respiratory failure- on nasal cannula currently  5  Bilateral Hydropneumothorax / Pleural effusions- s/p bilateral chest tube    Disposition:  Discussed with nurse    SUBJECTIVE:  Patient feeling well overall  Denies pain  Denies sob        OBJECTIVE:  Current Weight: Weight - Scale: 65 6 kg (144 lb 10 oz)  Vitals:    07/15/20 2159 07/16/20 0600 07/16/20 0700 07/16/20 0732   BP: 111/55  105/55    BP Location: Left arm  Left arm    Pulse: 67  71    Resp: 16  16    Temp: 97 5 °F (36 4 °C)  97 6 °F (36 4 °C)    TempSrc: Oral  Oral    SpO2: 100%  97% 96%   Weight:  65 6 kg (144 lb 10 oz)     Height:           Intake/Output Summary (Last 24 hours) at 7/16/2020 1135  Last data filed at 7/15/2020 1501  Gross per 24 hour   Intake    Output 1830 ml   Net -1830 ml     General: NAD  Skin: no rash  Eyes: anicteric  ENMT: mm moist  Neck: no masses  Respiratory: decreased breath sounds, chest tube  Cardiac: RRR  Extremities: no LE edema  GI: soft nt nd  Neuro: alert awake  Psych: mood and affect appropriate    Medications:    Current Facility-Administered Medications:     acetaZOLAMIDE (DIAMOX) injection 500 mg, 500 mg, Intravenous, Q8H, Ksenia Garcia PA-C, 500 mg at 07/16/20 0854    albuterol (PROVENTIL HFA,VENTOLIN HFA) inhaler 2 puff, 2 puff, Inhalation, Q6H PRN, Swapnil Bergeron MD    enoxaparin (LOVENOX) subcutaneous injection 40 mg, 40 mg, Subcutaneous, Daily, Alfredo Torrez MD, 40 mg at 07/16/20 0854    guaiFENesin (MUCINEX) 12 hr tablet 600 mg, 600 mg, Oral, Q12H [address], Alfredo Torrez MD, 600 mg at 07/16/20 0854    levalbuterol [facility]) inhalation solution 0 63 mg, 0 63 mg, Nebulization, TID, Alfredo Torrez MD, 0 63 mg at 07/16/20 0732    levothyroxine tablet 50 mcg, 50 mcg, Oral, Daily, Alfredo Torrez MD, 50 mcg at 07/16/20 0524    montelukast (SINGULAIR) tablet 10 mg, 10 mg, Oral, HS, Alfredo Torrez MD, 10 mg at 07/15/20 2100    pantoprazole (PROTONIX) EC tablet 40 mg, 40 mg, Oral, Early Morning, Alfredo Torrez MD, 40 mg at 07/16/20 0605    potassium chloride (K-DUR,KLOR-CON) CR tablet 20 mEq, 20 mEq, Oral, Q8H, Laurent Barnes [facility], PA-C, 20 mEq at 07/16/20 0854    pravastatin (PRAVACHOL) tablet 40 mg, 40 mg, Oral, Daily With Kingston Claude, MD, 40 mg at 07/15/20 1652    terazosin (HYTRIN) capsule 5 mg, 5 mg, Oral, HS, Alfredo Torrez MD, 5 mg at 07/15/20 2100    timolol (TIMOPTIC) 0 5 % ophthalmic solution 1 drop, 1 drop, Both Eyes, BID, Alfredo Torrez MD, 1 drop at 07/16/20 0855    Laboratory Results:  Results from last 7 days   Lab Units 07/16/20  0528 07/15/20  0515 07/15/20  0011 07/14/20  1808 07/14/20  1156 07/14/20  0533 07/14/20  0429 07/13/20  1748 07/13/20  0440 07/12/20  1526   WBC Thousand/uL 4 07* 3 61*  --   --   --   --  5 30  --  3 51* 5 56   HEMOGLOBIN g/dL 12 7 12 2  --   --   --   --  13 2  --  13 0 14 7   I STAT HEMOGLOBIN g/dl  --   --   --   --   --  13 3  --   --   --   --    HEMATOCRIT % 39 1 37 3  --   --   --   --  41 4  --  40 9 46 2   HEMATOCRIT, ISTAT %  --   --   --   --   --  39  --   --   --   --    PLATELETS Thousands/uL 102* 87*  --   --   --   --  94*  --  90* 115*   POTASSIUM mmol/L 4 2 4 2 4 2 4 2 3 6  --  4 0 4 3 3 9 4 2   CHLORIDE mmol/L 94* 93* 93* 91* 96*  --  94* 94* 95* 95*   CO2 mmol/L 44* 44* 43* 45* 43*  --  >45* 44* >45* 44*   CO2, I-STAT mmol/L  --   --   --   --   --  >45*  --   --   --   -- BUN mg/dL 21 17 18 19 15  --  16 15 14 17   CREATININE mg/dL 0 65 0 53* 0 60 0 71 0 61  --  0 70 0 67 0 54* 0 64   CALCIUM mg/dL 7 9* 7 8* 7 7* 7 9* 7 3*  --  8 3 8 4 8 4 8 7   MAGNESIUM mg/dL  --  2 1  --   --   --   --  2 7*  --  1 7 1 9   PHOSPHORUS mg/dL  --  2 0*  --   --   --   --  3 5  --   --   --    GLUCOSE, ISTAT mg/dl  --   --   --   --   --  101  --   --   --   --

## 2020-07-16 NOTE — ASSESSMENT & PLAN NOTE
· Unclear of effusions on chest x-ray are loculated  Patient also noted to have wheezing on exam as well as increased supplemental oxygen needs  Patient responded well to last thoracocentesis on October of 2019 with resolution of symptoms noted  · Patients CT showed bilateral hydropneumothorax with pleural effusions, interventional radiology was consulted which consider the patient a candidate for bilateral chest tube  · Bilateral chest tube was placed successfully 7 13 2020  · Pleural fluid level : yellow, clear wbc 1 156  Pleural fluid culture with no growth        LDH body  169 serum 188 Total protein fluid 3 3 serum 5 8 likely exudative effusion  ·  Ct chest show improving bilateral hydropneumothorax, improving atelectasias, CXR on the other hand showed bilateral pleural drainage with no change in left pneumothorax and left base atelectasias  · Today patient with bilateral tubes draining adequately, output 350, no noted respiratory distress CXR with persistence left side pneumothorax and mild bibasilar atelectasias  Pulmonology evaluated the patient they noted possible difficulty in thoracic communication  For which the patient may need thoracic surgery evaluation  Considering the above, patients case was discussed with Thoracic surgery   Dr Ben Shafer which 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 which       Is a normal variant and he doesn't seem to have [place] lung however he also stated that there is options like Right-sided pleurodesis but it             needs to be considered that Left side may not be an option  as the lung may not expand ,second option could be an Indwelling pleural catheter        And third will Decortication But  risk vs benefits should be evaluated  Plan:  · Discuss with pulmonology the recommendations of thoracic surgery, in the event that the patient needs to be evaluated by thoracic surgery, the patient will have to be transferred to [place]    · Assess the patient and family thoughts regarding goals of care  · Continue to follow results for second cytology  · Consider incentive spirometry for atelectasia prevention  · Continue bilateral chest tube, monitor daily  For outputs in order to determine when to remove the tubes    · Continue to monitor SpO2 levels daily

## 2020-07-16 NOTE — ASSESSMENT & PLAN NOTE
Wt Readings from Last 3 Encounters:   07/16/20 65 6 kg (144 lb 10 oz)   02/14/20 74 9 kg (165 lb 1 6 oz)   12/17/19 77 3 kg (170 lb 6 4 oz)   I/O last 3 completed shifts:  In: -   Out: 3057 [Urine:750; Chest Tube:1080]  No intake/output data recorded  · Patient's last echo completed in October of 2019 showed ejection fraction of 50%  Upon assessment patient appears to be fluid overloaded given lower extremity edema and pleural effusions seen on chest x-ray and slightly elevated BNP of 556  Patient is scheduled to have a repeat echo on 07/31/2020  · Patient regularly follows Dr Phill Ontiveros  Echocardiogram on 7/13/2020 shows large pleural effusions and slightly enlarged pericardium  · Cardiology consulted suggested to hold diuretics as well as blood pressure medications  · Patient today with bp 105/55,  Small Improvement in lower extremities edema , currently with oxygen with nasal canula, no evidence of acute distress  Plan:    · Cardiology on board  · Hold patient's p o  Lasix and blood pressure medication  as suggested for cardiology  · Consider initiation of albumin in case of hypotension  · Continue Oxygen on nasal canula  · Monitor oxygen saturations levels daily  · Monitor signs and symptoms of volume overload daily

## 2020-07-16 NOTE — ASSESSMENT & PLAN NOTE
· Patients current  Co2 with significant improvement , today CO2 of 44, same level as yesterday , however a small improvement in vbg levels: Today noted less alkalotic with a ph 7 40 pcO2 71 6 base excess 43 8  · Nephrology team started  Diamox 500mg bid yesterday patient only received one dose, presented good tolerance to medication, nephrology consider to start diamox 500 mg TID for 3 doses  · Diuretics currently on hold   · Currently patient with adequate respiratory pattern      Plan:    -  Continue follow up with nephrology   - Continue  Diamox TID as suggested for nephrology  -  Continue to monitor BMP and VBG levels daily

## 2020-07-16 NOTE — PLAN OF CARE
Problem: Potential for Falls  Goal: Patient will remain free of falls  Description  INTERVENTIONS:  - Assess patient frequently for physical needs  -  Identify cognitive and physical deficits and behaviors that affect risk of falls  -  [place] fall precautions as indicated by assessment   - Educate patient/family on patient safety including physical limitations  - Instruct patient to call for assistance with activity based on assessment  - Modify environment to reduce risk of injury  - Consider OT/PT consult to assist with strengthening/mobility  Outcome: Progressing     Problem: Prexisting or High Potential for Compromised Skin Integrity  Goal: Skin integrity is maintained or improved  Description  INTERVENTIONS:  - Identify patients at risk for skin breakdown  - Assess and monitor skin integrity  - Assess and monitor nutrition and hydration status  - Monitor labs   - Assess for incontinence   - Turn and reposition patient  - Assist with mobility/ambulation  - Relieve pressure over bony prominences  - Avoid friction and shearing  - Provide appropriate hygiene as needed including keeping skin clean and dry  - Evaluate need for skin moisturizer/barrier cream  - Collaborate with interdisciplinary team   - Patient/family teaching  - Consider wound care consult   Outcome: Progressing     Problem: Nutrition/Hydration-ADULT  Goal: Nutrient/Hydration intake appropriate for improving, restoring or maintaining nutritional needs  Description  Monitor and assess patient's nutrition/hydration status for malnutrition  Collaborate with interdisciplinary team and initiate plan and interventions as ordered  Monitor patient's weight and dietary intake as ordered or per policy  Utilize nutrition screening tool and intervene as necessary  Determine patient's food preferences and provide high-protein, high-caloric foods as appropriate       INTERVENTIONS:  - Monitor oral intake, urinary output, labs, and treatment plans  - Assess nutrition and hydration status and recommend course of action  - Evaluate amount of meals eaten  - Assist patient with eating if necessary   - Allow adequate time for meals  - Recommend/ encourage appropriate diets, oral nutritional supplements, and vitamin/mineral supplements  - Order, calculate, and assess calorie counts as needed  - Recommend, monitor, and adjust tube feedings and TPN/PPN based on assessed needs  - Assess need for intravenous fluids  - Provide specific nutrition/hydration education as appropriate  - Include patient/family/caregiver in decisions related to nutrition  Outcome: Progressing     Problem: PAIN - ADULT  Goal: Verbalizes/displays adequate comfort level or baseline comfort level  Description  Interventions:  - Encourage patient to monitor pain and request assistance  - Assess pain using appropriate pain scale  - Administer analgesics based on type and severity of pain and evaluate response  - Implement non-pharmacological measures as appropriate and evaluate response  - Consider cultural and social influences on pain and pain management  - Notify physician/advanced practitioner if interventions unsuccessful or patient reports new pain  Outcome: Progressing     Problem: INFECTION - ADULT  Goal: Absence or prevention of progression during hospitalization  Description  INTERVENTIONS:  - Assess and monitor for signs and symptoms of infection  - Monitor lab/diagnostic results  - Monitor all insertion sites, i e  indwelling lines, tubes, and drains  - Monitor endotracheal if appropriate and nasal secretions for changes in amount and color  - [place] appropriate cooling/warming therapies per order  - Administer medications as ordered  - Instruct and encourage patient and family to use good hand hygiene technique  - Identify and instruct in appropriate isolation precautions for identified infection/condition  Outcome: Progressing  Goal: Absence of fever/infection during neutropenic period  Description  INTERVENTIONS:  - Monitor WBC    Outcome: Progressing     Problem: SAFETY ADULT  Goal: Maintain or return to baseline ADL function  Description  INTERVENTIONS:  -  Assess patient's ability to carry out ADLs; assess patient's baseline for ADL function and identify physical deficits which impact ability to perform ADLs (bathing, care of mouth/teeth, toileting, grooming, dressing, etc )  - Assess/evaluate cause of self-care deficits   - Assess range of motion  - Assess patient's mobility; develop plan if impaired  - Assess patient's need for assistive devices and provide as appropriate  - Encourage maximum independence but intervene and supervise when necessary  - Involve family in performance of ADLs  - Assess for home care needs following discharge   - Consider OT consult to assist with ADL evaluation and planning for discharge  - Provide patient education as appropriate  Outcome: Progressing  Goal: Maintain or return mobility status to optimal level  Description  INTERVENTIONS:  - Assess patient's baseline mobility status (ambulation, transfers, stairs, etc )    - Identify cognitive and physical deficits and behaviors that affect mobility  - Identify mobility aids required to assist with transfers and/or ambulation (gait belt, sit-to-stand, lift, walker, cane, etc )  - [place] fall precautions as indicated by assessment  - Record patient progress and toleration of activity level on Mobility SBAR; progress patient to next Phase/Stage  - Instruct patient to call for assistance with activity based on assessment  - Consider rehabilitation consult to assist with strengthening/weightbearing, etc   Outcome: Progressing     Problem: DISCHARGE PLANNING  Goal: Discharge to home or other facility with appropriate resources  Description  INTERVENTIONS:  - Identify barriers to discharge w/patient and caregiver  - Arrange for needed discharge resources and transportation as appropriate  - Identify discharge learning needs (meds, wound care, etc )  - Arrange for interpretive services to assist at discharge as needed  - Refer to Case Management Department for coordinating discharge planning if the patient needs post-hospital services based on physician/advanced practitioner order or complex needs related to functional status, cognitive ability, or social support system  Outcome: Progressing     Problem: Knowledge Deficit  Goal: Patient/family/caregiver demonstrates understanding of disease process, treatment plan, medications, and discharge instructions  Description  Complete learning assessment and assess knowledge base    Interventions:  - Provide teaching at level of understanding  - Provide teaching via preferred learning methods  Outcome: Progressing     Problem: RESPIRATORY - ADULT  Goal: Achieves optimal ventilation and oxygenation  Description  INTERVENTIONS:  - Assess for changes in respiratory status  - Assess for changes in mentation and behavior  - Position to facilitate oxygenation and minimize respiratory effort  - Oxygen administered by appropriate delivery if ordered  - Initiate smoking cessation education as indicated  - Encourage broncho-pulmonary hygiene including cough, deep breathe, Incentive Spirometry  - Assess the need for suctioning and aspirate as needed  - Assess and instruct to report SOB or any respiratory difficulty  - Respiratory Therapy support as indicated  Outcome: Progressing     Problem: GENITOURINARY - ADULT  Goal: Maintains or returns to baseline urinary function  Description  INTERVENTIONS:  - Assess urinary function  - Encourage oral fluids to ensure adequate hydration if ordered  - Administer IV fluids as ordered to ensure adequate hydration  - Administer ordered medications as needed  - Offer frequent toileting  - Follow urinary retention protocol if ordered  Outcome: Progressing  Goal: Absence of urinary retention  Description  INTERVENTIONS:  - Assess patients ability to void and empty bladder  - Monitor I/O  - Bladder scan as needed  - Discuss with physician/AP medications to alleviate retention as needed  - Discuss catheterization for long term situations as appropriate  Outcome: Progressing  Goal: Urinary catheter remains patent  Description  INTERVENTIONS:  - Assess patency of urinary catheter  - If patient has a chronic can, consider changing catheter if non-functioning  - Follow guidelines for intermittent irrigation of non-functioning urinary catheter  Outcome: Progressing     Problem: METABOLIC, FLUID AND ELECTROLYTES - ADULT  Goal: Electrolytes maintained within normal limits  Description  INTERVENTIONS:  - Monitor labs and assess patient for signs and symptoms of electrolyte imbalances  - Administer electrolyte replacement as ordered  - Monitor response to electrolyte replacements, including repeat lab results as appropriate  - Instruct patient on fluid and nutrition as appropriate  Outcome: Progressing  Goal: Fluid balance maintained  Description  INTERVENTIONS:  - Monitor labs   - Monitor I/O and WT  - Instruct patient on fluid and nutrition as appropriate  - Assess for signs & symptoms of volume excess or deficit  Outcome: Progressing  Goal: Glucose maintained within target range  Description  INTERVENTIONS:  - Monitor Blood Glucose as ordered  - Assess for signs and symptoms of hyperglycemia and hypoglycemia  - Administer ordered medications to maintain glucose within target range  - Assess nutritional intake and initiate nutrition service referral as needed  Outcome: Progressing     Problem: SKIN/TISSUE INTEGRITY - ADULT  Goal: Skin integrity remains intact  Description  INTERVENTIONS  - Identify patients at risk for skin breakdown  - Assess and monitor skin integrity  - Assess and monitor nutrition and hydration status  - Monitor labs (i e  albumin)  - Assess for incontinence   - Turn and reposition patient  - Assist with mobility/ambulation  - Relieve pressure over bony prominences  - Avoid friction and shearing  - Provide appropriate hygiene as needed including keeping skin clean and dry  - Evaluate need for skin moisturizer/barrier cream  - Collaborate with interdisciplinary team (i e  Nutrition, Rehabilitation, etc )   - Patient/family teaching  Outcome: Progressing  Goal: Incision(s), wounds(s) or drain site(s) healing without S/S of infection  Description  INTERVENTIONS  - Assess and document risk factors for skin impairment   - Assess and document dressing, incision, wound bed, drain sites and surrounding tissue  - Consider nutrition services referral as needed  - Oral mucous membranes remain intact  - Provide patient/ family education  Outcome: Progressing  Goal: Oral mucous membranes remain intact  Description  INTERVENTIONS  - Assess oral mucosa and hygiene practices  - Implement preventative oral hygiene regimen  - Implement oral medicated treatments as ordered  - Initiate Nutrition services referral as needed  Outcome: Progressing     Problem: HEMATOLOGIC - ADULT  Goal: Maintains hematologic stability  Description  INTERVENTIONS  - Assess for signs and symptoms of bleeding or hemorrhage  - Monitor labs  - Administer supportive blood products/factors as ordered and appropriate  Outcome: Progressing     Problem: MUSCULOSKELETAL - ADULT  Goal: Maintain or return mobility to safest level of function  Description  INTERVENTIONS:  - Assess patient's ability to carry out ADLs; assess patient's baseline for ADL function and identify physical deficits which impact ability to perform ADLs (bathing, care of mouth/teeth, toileting, grooming, dressing, etc )  - Assess/evaluate cause of self-care deficits   - Assess range of motion  - Assess patient's mobility  - Assess patient's need for assistive devices and provide as appropriate  - Encourage maximum independence but intervene and supervise when necessary  - Involve family in performance of ADLs  - Assess for home care needs following discharge   - Consider OT consult to assist with ADL evaluation and planning for discharge  - Provide patient education as appropriate  Outcome: Progressing  Goal: Maintain proper alignment of affected body part  Description  INTERVENTIONS:  - Support, maintain and protect limb and body alignment  - Provide patient/ family with appropriate education  Outcome: Progressing

## 2020-07-16 NOTE — PROGRESS NOTES
Progress Note - Ariesrafal Segovia 3/24/1929, 80 y o  male MRN: [id number]    Unit/Bed#: S -01 Encounter: [id number]    Primary Care Provider: Madeline Farris MD   Date and time admitted to hospital: 7/12/2020  2:58 PM        * 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 acidosis  Assessment & Plan  · Patients current  Co2 with significant improvement , today CO2 of 44, same level as yesterday , however a small improvement in vbg levels: Today noted less alkalotic with a ph 7 40 pcO2 71 6 base excess 43 8  · Nephrology team started  Diamox 500mg bid yesterday patient only received one dose, presented good tolerance to medication, nephrology consider to start diamox 500 mg TID for 3 doses  · Diuretics currently on hold   · Currently patient with adequate respiratory pattern  Plan:    -  Continue follow up with nephrology   - Continue  Diamox TID as suggested for nephrology  -  Continue to monitor BMP and VBG levels daily  - assess for fatigue      Acute on chronic diastolic heart failure (HCC)  Assessment & Plan  Wt Readings from Last 3 Encounters:   07/16/20 65 6 kg (144 lb 10 oz)   02/14/20 74 9 kg (165 lb 1 6 oz)   12/17/19 77 3 kg (170 lb 6 4 oz)   I/O last 3 completed shifts:  In: -   Out: 4589 [Urine:750; Chest Tube:1080]  No intake/output data recorded  · Patient's last echo completed in October of 2019 showed ejection fraction of 50%  Upon assessment patient appears to be fluid overloaded given lower extremity edema and pleural effusions seen on chest x-ray and slightly elevated BNP of 556  Patient is scheduled to have a repeat echo on 07/31/2020  · Patient regularly follows Dr Dwain Watson  Echocardiogram on 7/13/2020 shows large pleural effusions and slightly enlarged pericardium  · Cardiology consulted suggested to hold diuretics as well as blood pressure medications    · Patient today with bp 105/55,  Small Improvement in lower extremities edema , currently with oxygen with nasal canula, no evidence of acute distress  Plan:    · Cardiology on board  · Hold patient's p o  Lasix and blood pressure medication  as suggested for cardiology  · Consider initiation of albumin in case of hypotension  · Continue Oxygen on nasal canula  · Monitor oxygen saturations levels daily  · Monitor signs and symptoms of volume overload daily  Bilateral hydropneumothorax  Assessment & Plan  · Unclear of effusions on chest x-ray are loculated  Patient also noted to have wheezing on exam as well as increased supplemental oxygen needs  Patient responded well to last thoracocentesis on October of 2019 with resolution of symptoms noted  · Patients CT showed bilateral hydropneumothorax with pleural effusions, interventional radiology was consulted which consider the patient a candidate for bilateral chest tube  · Bilateral chest tube was placed successfully 7 13 2020  · Pleural fluid level : yellow, clear wbc 1 156  Pleural fluid culture with no growth        LDH body  169 serum 188 Total protein fluid 3 3 serum 5 8 likely exudative effusion  ·  Ct chest show improving bilateral hydropneumothorax, improving atelectasias, CXR on the other hand showed bilateral pleural drainage with no change in left pneumothorax and left base atelectasias  · Today patient with bilateral tubes draining adequately, no noted respiratory distress,  We got the results of cytology which showed negative for malignancy  Also pulmonology evaluated the patient they noted possible difficulty in thoracic communication  For which the patient may need thoracic surgery evaluation  Plan:    · Pulmonary on board  · Continue to follow results for second cytology  · Consider to Continuos recheck of hydropneumothorax with CT  · Consider incentive spirometry for atelectasia prevention  · Continue bilateral chest tube, monitor daily  For outputs in order to determine when to remove the tubes    · Continue to monitor SpO2 levels daily      VTE Pharmacologic Prophylaxis:   Pharmacologic: Enoxaparin (Lovenox)  Mechanical VTE Prophylaxis in Place: Yes    Discussions with Specialists or Other Care Team Provider: Patients case as well as current treatment plan has been discussed with nephrology, pulmonology, cardiology    Education and Discussions with Family / Patient: Yes, patient has been fully educated about his diagnosis as well as treatment plan, patients family has been updated daily about patients hospital curse, they will be update again today as usual addressing all questions and concerns  Current Length of Stay: 4 day(s)    Current Patient Status: Inpatient     Discharge Plan / Estimated Discharge Date: Not yet established    Code Status: Level 3 - DNAR and DNI      Subjective:   Patient says he is feeling good, considers that his breathing pattern has improved, says he is sleeping and eating adequately, when interrogated patient denies any chest pain, shortness of breath, palpitations, nausea, vomit, diarrhea  Objective:     Vitals:   Temp (24hrs), Av 7 °F (36 5 °C), Min:97 5 °F (36 4 °C), Max:98 °F (36 7 °C)    Temp:  [97 5 °F (36 4 °C)-98 °F (36 7 °C)] 97 6 °F (36 4 °C)  HR:  [63-71] 71  Resp:  [16] 16  BP: ()/(53-55) 105/55  SpO2:  [96 %-100 %] 96 %  Body mass index is 22 65 kg/m²  Input and Output Summary (last 24 hours): Intake/Output Summary (Last 24 hours) at 2020 1404  Last data filed at 7/15/2020 1501  Gross per 24 hour   Intake    Output 250 ml   Net -250 ml       Physical Exam:     Physical Exam   Constitutional: He is oriented to person, place, and time  He appears well-developed  No distress  HENT:   Head: Normocephalic and atraumatic  Eyes: Pupils are equal, round, and reactive to light  EOM are normal    Neck: Normal range of motion  Neck supple  Cardiovascular: Normal rate, regular rhythm and normal heart sounds  Pulmonary/Chest: No respiratory distress     Small improvement in respiratory effort,  Breath sounds decreased bilaterally   Abdominal: Soft  Bowel sounds are normal  He exhibits no distension  There is no tenderness  Musculoskeletal: Normal range of motion  He exhibits edema (+1 pitting lower extremities edema , more pronounced in left knee)  Neurological: He is alert and oriented to person, place, and time  Skin: Capillary refill takes 2 to 3 seconds  He is not diaphoretic  Psychiatric: He has a normal mood and affect  His behavior is normal    Nursing note and vitals reviewed  Additional Data:     Labs:    Results from last 7 days   Lab Units 07/16/20  0528   WBC Thousand/uL 4 07*   HEMOGLOBIN g/dL 12 7   HEMATOCRIT % 39 1   PLATELETS Thousands/uL 102*   NEUTROS PCT % 72   LYMPHS PCT % 11*   MONOS PCT % 10   EOS PCT % 6     Results from last 7 days   Lab Units 07/16/20  0528  07/14/20  1156 07/14/20  0533   POTASSIUM mmol/L 4 2   < > 3 6  --    CHLORIDE mmol/L 94*   < > 96*  --    CO2 mmol/L 44*   < > 43*  --    CO2, I-STAT mmol/L  --   --   --  >45*   BUN mg/dL 21   < > 15  --    CREATININE mg/dL 0 65   < > 0 61  --    CALCIUM mg/dL 7 9*   < > 7 3*  --    ALK PHOS U/L  --   --  44*  --    ALT U/L  --   --  7*  --    AST U/L  --   --  21  --    GLUCOSE, ISTAT mg/dl  --   --   --  101    < > = values in this interval not displayed  Results from last 7 days   Lab Units 07/15/20  0515   INR  1 24*       * I Have Reviewed All Lab Data Listed Above  * Additional Pertinent Lab Tests Reviewed:  Cinthya 66 Admission Reviewed    Imaging:    Imaging Reports Reviewed Today Include: none  Imaging Personally Reviewed by Myself Includes: none    Recent Cultures (last 7 days):     Results from last 7 days   Lab Units 07/13/20  1652   GRAM STAIN RESULT  No Polys or Bacteria seen  No Polys or Bacteria seen   BODY FLUID CULTURE, STERILE  No growth  No growth       Last 24 Hours Medication List:     Current Facility-Administered Medications:  acetaZOLAMIDE 500 mg Intravenous Q8H Ksenia Garcia PA-C   albuterol 2 puff Inhalation Q6H PRN Alek Genao MD   enoxaparin 40 mg Subcutaneous Daily Alek Genao MD   guaiFENesin 600 mg Oral Q12H [facility] Alek Genao, MD   levalbuterol 0 63 mg Nebulization TID Alek Genao, MD   levothyroxine 50 mcg Oral Daily Alek Genao MD   montelukast 10 mg Oral HS Alek Genao MD   pantoprazole 40 mg Oral Early Morning Alek Genao MD   potassium chloride 20 mEq [facility] 8 [facility]   potassium chloride 40 mEq Oral Once Kavon Caceres MD   pravastatin 40 mg Oral Daily With Avis Moralez MD   terazosin 5 mg Oral HS Alek Genao MD   timolol 1 drop Both Eyes BID Alek Genao MD        Today, Patient Was Seen By: Alek Genao MD    ** Please Note: This note has been constructed using a voice recognition system   **

## 2020-07-16 NOTE — ASSESSMENT & PLAN NOTE
· Patient currently requires increased supplemental oxygen as compared to baseline (3 L nasal cannula at home)  Currently on 10 L high-flow nasal cannula  Condition likely secondary to acute exacerbation of CHF and pleural effusions noted on chest x-ray  Patient regularly follows Dr Jorge Casey from pulmonology  Low suspicion for infection given patient's negative COVID-19 testing, no elevated white count, and no fever but a viral etiology is still a possibility  Given patient's continued acute hypoxemia, cannot completely rule out PE  Per pulmonology, effusions on chest x-ray does not completely explain patient's hypoxia  Patient appears to be severely ill  · Patients CT showed Bilateral Hydropneumothorax/ pleural effusions  · Acccording with above, Interventional radiology was consulted considering the placement of bilateral chest tube  · Bilateral chest tube was successfully place 7 13 2020, currently draining well, patient with adequate oxygen saturation, today with SpO2 98% on 2L nasal canula , CO2 levels still the same from yesterday ( CO2 44) patient currently can hold a conversation without exhibiting signs of distress    ·   Plan:    · Maintain SpO2 between 89% to 94% given patient's history of COPD  · Repeat BMP to monitor hypercapnia daily  ·  Continue Consulations with nephrology given patient's mixed metabolic abnormalities  ·  Repeat VBG daily

## 2020-07-16 NOTE — PROGRESS NOTES
Progress Note - Pulmonary   Mar Barragan 80 y o  male MRN: [id number]  Unit/Bed#: S -01 Encounter: [id number]      Interval History:   Hypoxia improved since requiring icu admission  Now with bl chest tubes  No f/c  Resting comfortably on 2L via NC  Pt appeas to have had significant output b/L since yesterday (1L per side) of what appears to be serous>sangunious    Review of Systems:  Full 12 point review of systems negative other than previously mentioned    Objective:   Vitals: Blood pressure 105/55, pulse 71, temperature 97 6 °F (36 4 °C), temperature source Oral, resp  rate 16, height 5' 7" (1 702 m), weight 65 6 kg (144 lb 10 oz), SpO2 96 %  , Body mass index is 22 65 kg/m²  Physical Exam  Gen: Awake, alert, oriented x 3, no acute distress  HEENT: Mucous membranes moist, no oral lesions, no thrush  NECK: No accessory muscle use, JVP not elevated  Cardiac: Regular, single S1, single S2, no murmurs, no rubs, no gallops  Lungs: diminished, but otherwise clear  Abdomen: normoactive bowel sounds, soft nontender, nondistended, no rebound or rigidity, no guarding  Extremities: no cyanosis, no clubbing, no edema    Labs: I have personally reviewed pertinent lab results  Results Reviewed     Procedure Component Value Units Date/Time    T4, free [[id number]]  (Normal) Collected:  07/12/20 1526    Lab Status:  Final result Specimen:  Blood from Arm, Right Updated:  07/12/20 2350     Free T4 1 12 ng/dL     Novel Coronavirus (Covid-19),PCR [facility] [[id number]]  (Normal) Collected:  07/12/20 1526    Lab Status:  Final result Specimen:  Nares from Nose Updated:  07/12/20 1630     SARS-CoV-2 Negative    Narrative: The specimen collection materials, transport medium, and/or testing methodology utilized in the production of these test results have been proven to be reliable in a limited validation with an abbreviated program under the Emergency Utilization Authorization provided by the FDA    Testing reported as "Presumptive positive" will be confirmed with secondary testing with a reference laboratory to ensure result accuracy  Clinical caution and judgement should be used with the interpretation of these results with consideration of the clinical impression and other laboratory testing  Testing reported as "Positive" or "Negative" has been proven to be accurate according to standard laboratory validation requirements  All testing is performed with control materials showing appropriate reactivity at standard intervals        Comprehensive metabolic panel [[id number]]  (Abnormal) Collected:  07/12/20 1526    Lab Status:  Final result Specimen:  Blood from Arm, Right Updated:  07/12/20 1557     Sodium 140 mmol/L      Potassium 4 2 mmol/L      Chloride 95 mmol/L      CO2 44 mmol/L      ANION GAP 1 mmol/L      BUN 17 mg/dL      Creatinine 0 64 mg/dL      Glucose 111 mg/dL      Calcium 8 7 mg/dL      AST 26 U/L      ALT 14 U/L      Alkaline Phosphatase 66 U/L      Total Protein 6 5 g/dL      Albumin 3 5 g/dL      Total Bilirubin 0 98 mg/dL      eGFR 86 ml/min/1 73sq m     Narrative:       [place] guidelines for Chronic Kidney Disease (CKD):     Stage 1 with normal or high GFR (GFR > 90 mL/min/1 73 square meters)    Stage 2 Mild CKD (GFR = 60-89 mL/min/1 73 square meters)    Stage 3A Moderate CKD (GFR = 45-59 mL/min/1 73 square meters)    Stage 3B Moderate CKD (GFR = 30-44 mL/min/1 73 square meters)    Stage 4 Severe CKD (GFR = 15-29 mL/min/1 73 square meters)    Stage 5 End Stage CKD (GFR <15 mL/min/1 73 square meters)  Note: GFR calculation is accurate only with a steady state creatinine    Protime-INR [[id number]]  (Normal) Collected:  07/12/20 1526    Lab Status:  Final result Specimen:  Blood from Arm, Right Updated:  07/12/20 1557     Protime 14 0 seconds      INR 1 14    APTT [[id number]]  (Normal) Collected:  07/12/20 1526    Lab Status:  Final result Specimen:  Blood from Arm, Right Updated:  07/12/20 1557     PTT 28 seconds     Magnesium [[id number]]  (Normal) Collected:  07/12/20 1526    Lab Status:  Final result Specimen:  Blood from Arm, Right Updated:  07/12/20 1554     Magnesium 1 9 mg/dL     NT-BNP PRO [[id number]]  (Abnormal) Collected:  07/12/20 1526    Lab Status:  Final result Specimen:  Blood from Arm, Right Updated:  07/12/20 1554     NT-proBNP 556 pg/mL     TSH [[id number]]  (Abnormal) Collected:  07/12/20 1526    Lab Status:  Final result Specimen:  Blood from Arm, Right Updated:  07/12/20 1554     TSH 3RD GENERATON 4 540 uIU/mL     Narrative:       Patients undergoing fluorescein dye angiography may retain small amounts of fluorescein in the body for 48-72 hours post procedure  Samples containing fluorescein can produce falsely depressed TSH values  If the patient had this procedure,a specimen should be resubmitted post fluorescein clearance        Troponin I [[id number]]  (Normal) Collected:  07/12/20 1526    Lab Status:  Final result Specimen:  Blood from Arm, Right Updated:  07/12/20 1547     Troponin I 0 02 ng/mL     CBC and differential [[id number]]  (Abnormal) Collected:  07/12/20 1526    Lab Status:  Final result Specimen:  Blood from Arm, Right Updated:  07/12/20 1536     WBC 5 56 Thousand/uL      RBC 4 59 Million/uL      Hemoglobin 14 7 g/dL      Hematocrit 46 2 %       fL      MCH 32 0 pg      MCHC 31 8 g/dL      RDW 13 6 %      MPV 10 6 fL      Platelets 713 Thousands/uL      nRBC 0 /100 WBCs      Neutrophils Relative 80 %      Immat GRANS % 1 %      Lymphocytes Relative 6 %      Monocytes Relative 10 %      Eosinophils Relative 3 %      Basophils Relative 0 %      Neutrophils Absolute 4 47 Thousands/µL      Immature Grans Absolute 0 03 Thousand/uL      Lymphocytes Absolute 0 35 Thousands/µL      Monocytes Absolute 0 54 Thousand/µL      Eosinophils Absolute 0 15 Thousand/µL      Basophils Absolute 0 02 Thousands/µL            Current Medications:    Current Facility-Administered Medications:     acetaZOLAMIDE (DIAMOX) injection 500 mg, 500 mg, Intravenous, Q8H, Ksenia Garcia PA-C, 500 mg at 07/16/20 0854    albuterol (PROVENTIL HFA,VENTOLIN HFA) inhaler 2 puff, 2 puff, Inhalation, Q6H PRN, Jalen Vanegas MD    enoxaparin (LOVENOX) subcutaneous injection 40 mg, 40 mg, Subcutaneous, Daily, Jalen Vanegas MD, 40 mg at 07/16/20 0854    guaiFENesin (MUCINEX) 12 hr tablet 600 mg, 600 mg, Oral, Q12H [address], Jalen Vanegas MD, 600 mg at 07/16/20 0854    levalbuterol (XOPENEX) inhalation solution 0 63 mg, 0 63 mg, Nebulization, TID, Jlaen Vanegas MD, 0 63 mg at 07/16/20 0732    levothyroxine tablet 50 mcg, 50 mcg, Oral, Daily, Jalen Vanegas MD, 50 mcg at 07/16/20 0524    montelukast (SINGULAIR) tablet 10 mg, 10 mg, Oral, HS, Jalen Vanegas MD, 10 mg at 07/15/20 2100    pantoprazole (PROTONIX) EC tablet 40 mg, 40 mg, Oral, Early Morning, Jalen Vanegas MD, 40 mg at 07/16/20 0605    potassium chloride (K-DUR,KLOR-CON) CR tablet 20 mEq, 20 mEq, Oral, Q8H, [facility], 20 mEq at 07/16/20 0854    potassium chloride (K-DUR,KLOR-CON) CR tablet 40 mEq, 40 mEq, Oral, Once, Mannie Phalen, MD    pravastatin (PRAVACHOL) tablet 40 mg, 40 mg, Oral, Daily With Scar Sanders MD, 40 mg at 07/15/20 1652    terazosin (HYTRIN) capsule 5 mg, 5 mg, Oral, HS, Jalen Vanegas MD, 5 mg at 07/15/20 2100    timolol (TIMOPTIC) 0 5 % ophthalmic solution 1 drop, 1 drop, Both Eyes, BID, Jalen Vanegas MD, 1 drop at 07/16/20 0156     Microbiology:  No new micro    Imaging and other studies:   I personally viewed and interpreted the following imaging studies:  CXR 7/15/2020 shows improvement in R sided aeration  + ptx basilar left (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

## 2020-07-16 NOTE — ASSESSMENT & PLAN NOTE
Wt Readings from Last 3 Encounters:   07/16/20 65 6 kg (144 lb 10 oz)   02/14/20 74 9 kg (165 lb 1 6 oz)   12/17/19 77 3 kg (170 lb 6 4 oz)   I/O last 3 completed shifts:  In: -   Out: 4343 [Urine:750; Chest Tube:1080]  No intake/output data recorded  · Patient's last echo completed in October of 2019 showed ejection fraction of 50%  Upon assessment patient appears to be fluid overloaded given lower extremity edema and pleural effusions seen on chest x-ray and slightly elevated BNP of 556  Patient is scheduled to have a repeat echo on 07/31/2020  · Patient regularly follows Dr Yanelis Amin  Echocardiogram on 7/13/2020 shows large pleural effusions and slightly enlarged pericardium  · Patient today with bp 102/55,  Chronic +1 pitting extremities edema , currently with oxygen with nasal canula, no evidence of acute distress, sodium levels of 132 likely due to his congestive heart failure  Plan:    · Cardiology on board  · Continue to hold Lasix and blood pressure medication as patient still with low blood pressure levels  · Consider initiation of albumin in case of hypotension  · Continue Oxygen on nasal canula  · Monitor oxygen saturations levels daily  · Monitor signs and symptoms of volume overload daily

## 2020-07-16 NOTE — PROGRESS NOTES
General Cardiology   Progress Note -  [facility]   Marrian Kehr 80 y o  male MRN: [id number]    Unit/Bed#: S -01 Encounter: [id number]    Assessment  1  Acute on chronic diastolic HF  -On exam volume status appears to be stable; has chronic + 1 pitting pedal edema, on  2 L NC satting mid 90s  -Symptomatically denies SOB, , or orthopnea  -ProBNP 556 (previously 866--2019)  -CTA PE study : B/L hydropneumothorax w/large pleural effusions and moderate pneumothoraces, no acute PE  -2D echocardiogram  2020: LVEF 60%, no regional wall motion abnormality, RV normal size and systolic function, trace MR, mild AI, trace TR; small to moderate pericardial effusion, no evidence of hemodynamic compromise  -Previously on furosemide 40 mg daily (on hold), atenolol-chlorthalidone 50-25 mg daily (on hold), and spironolactone 25 mg daily  -Received x2 doses IV Diamox 500 mg yesterday  -24 hour I&O balance: -1 8 L ( mL;  ml,  ml); overall -8 4 L  Weights:  Office weight 165 lb--2020  --:  131 lb--bed scale  --:  131 lb--bed scale  --:  137 lb--bed scale  --7/15:  141 lb--bed scale  --:  144 lb--bed scale     2  Acute on chronic hypercapnic/hypoxic respiratory failure   3  Bilateral hydropneumothorax/large bilateral pleural effusions   -Pulmonary following  -Currently on RA sating 91-93% NC (baseline 2-3 L nasal cannula)  -CTA PE study : B/L hydropneumothorax w/large pleural effusions and moderate pneumothoraces, no acute PE  -CT chest w/o contrast: Improving bilateral hydropneumothorax, and improving bilateral atelectasis  -S/p IR guided B/L chest tube placement     5  Compensated metabolic alkalosis  -ABG 3 35/52/36/34/25 3 (7/15)     4   Hypertension  -Avg BP  106/55, last recorded 105/55, HR 71  -Previously on Amlodipine 10 mg daily (on hold), and spironolactone 25 mg daily (on hold)     5  Hyperlipidemia  -Lipid profile 2019:  Cholesterol 142, triglycerides 37, HDL 80, LDL 55  -Previously on simvastatin 20 mg daily (non formulary) switched to pravastatin 40 mg daily this admission     6  Hypothyroidism  -TSH 4 5/free T4 1 1  -On levothyroxine 50 mcg daily     Plan:  -Hold loop diuretics again today, will defer to nephro for additional diamox dosing today  -Continue to hold amlodipine, atenolol, and aldactone  -Apply Gallo stockings to the bilateral lower extremities  -B/L chest tubes to water seal--still draining high amounts of pleural fluid  -F/u complete pleural fluid analysis  -Strict I&Os, daily weights, 2 g NA +diet 1800 mL FR  -Monitor renal function and electrolytes closely  -Can DC telem monitoring    Subjective  Review of Systems   Constitution: Negative for chills, fever and malaise/fatigue  HENT: Negative for congestion  Eyes: Negative for visual disturbance  Cardiovascular: Positive for leg swelling  Negative for chest pain, dyspnea on exertion, irregular heartbeat, near-syncope, orthopnea, palpitations and syncope  Respiratory: Negative for cough and shortness of breath  Musculoskeletal: Negative for arthritis and myalgias  Gastrointestinal: Negative for abdominal pain  Genitourinary: Negative for dysuria  Neurological: Negative for dizziness, focal weakness, headaches, light-headedness and weakness  Objective:   Physical Exam   Constitutional: He is oriented to person, place, and time  He appears well-developed and well-nourished  No distress  HENT:   Head: Normocephalic and atraumatic  Mouth/Throat: Oropharynx is clear and moist    Eyes: No scleral icterus  Neck: No JVD present  Cardiovascular: Normal rate, regular rhythm, normal heart sounds and intact distal pulses  No murmur heard  Pulmonary/Chest: Effort normal and breath sounds normal  He has no wheezes  He has no rales  Bilateral upper lung fields clear, clear diminished at the bases  Bilateral pleural chest tubes in place draining serous fluid   Abdominal: Soft   Bowel sounds are normal  There is no tenderness  Musculoskeletal: Normal range of motion  He exhibits edema  Neurological: He is alert and oriented to person, place, and time  Skin: Skin is warm and dry  Capillary refill takes less than 2 seconds  He is not diaphoretic  Psychiatric: He has a normal mood and affect  Nursing note and vitals reviewed  Vitals: Blood pressure 105/55, pulse 71, temperature 97 6 °F (36 4 °C), temperature source Oral, resp  rate 16, height 5' 7" (1 702 m), weight 65 6 kg (144 lb 10 oz), SpO2 96 %  ,     Body mass index is 22 65 kg/m²  ,   Systolic (82OZJ), YKI:004 , Min:92 , AGH:526     Diastolic (78XZC), THR:07, Min:53, Max:58      Intake/Output Summary (Last 24 hours) at 7/16/2020 0934  Last data filed at 7/15/2020 1501  Gross per 24 hour   Intake    Output 1830 ml   Net -1830 ml     Weight (last 2 days)     Date/Time   Weight    07/16/20 0600   65 6 (144 62)    07/15/20 0236   64 2 (141 54)    07/14/20 0600   62 2 (137 13)              LABORATORY RESULTS  Results from last 7 days   Lab Units 07/12/20  1526   TROPONIN I ng/mL 0 02     CBC with diff:   Results from last 7 days   Lab Units 07/16/20  0528 07/15/20  0515 07/14/20  0533 07/14/20  0429 07/13/20  0440 07/12/20  1526   WBC Thousand/uL 4 07* 3 61*  --  5 30 3 51* 5 56   HEMOGLOBIN g/dL 12 7 12 2  --  13 2 13 0 14 7   I STAT HEMOGLOBIN g/dl  --   --  13 3  --   --   --    HEMATOCRIT % 39 1 37 3  --  41 4 40 9 46 2   HEMATOCRIT, ISTAT %  --   --  39  --   --   --    MCV fL 98 99*  --  101* 100* 101*   PLATELETS Thousands/uL 102* 87*  --  94* 90* 115*   MCH pg 31 8 32 4  --  32 2 31 6 32 0   MCHC g/dL 32 5 32 7  --  31 9 31 8 31 8   RDW % 14 0 13 6  --  13 7 13 6 13 6   MPV fL 10 2 10 7  --  10 3 10 0 10 6   NRBC AUTO /100 WBCs 0  --   --   --   --  0       CMP:  Results from last 7 days   Lab Units 07/16/20  0528 07/15/20  0515 07/15/20  0011 07/14/20  1808 07/14/20  1156 07/14/20  0533 07/14/20  0429 07/13/20  1748 07/12/20  1526   POTASSIUM mmol/L 4 2 4 2 4 2 4 2 3 6  --  4 0 4 3   < > 4 2   CHLORIDE mmol/L 94* 93* 93* 91* 96*  --  94* 94*   < > 95*   CO2 mmol/L 44* 44* 43* 45* 43*  --  >45* 44*   < > 44*   CO2, I-STAT mmol/L  --   --   --   --   --  >45*  --   --   --   --    BUN mg/dL 21 17 18 19 15  --  16 15   < > 17   CREATININE mg/dL 0 65 0 53* 0 60 0 71 0 61  --  0 70 0 67   < > 0 64   GLUCOSE, ISTAT mg/dl  --   --   --   --   --  101  --   --   --   --    CALCIUM mg/dL 7 9* 7 8* 7 7* 7 9* 7 3*  --  8 3 8 4   < > 8 7   AST U/L  --   --   --   --  21  --   --   --   --  26   ALT U/L  --   --   --   --  7*  --   --   --   --  14   ALK PHOS U/L  --   --   --   --  44*  --   --   --   --  66   EGFR ml/min/1 73sq m 85 92 88 82 87  --  83 84   < > 86    < > = values in this interval not displayed         BMP:  Results from last 7 days   Lab Units 07/16/20  0528 07/15/20  0515 07/15/20  0011 07/14/20  1808 07/14/20  1156 07/14/20  0533 07/14/20  0429 07/13/20  1748   POTASSIUM mmol/L 4 2 4 2 4 2 4 2 3 6  --  4 0 4 3   CHLORIDE mmol/L 94* 93* 93* 91* 96*  --  94* 94*   CO2 mmol/L 44* 44* 43* 45* 43*  --  >45* 44*   CO2, I-STAT mmol/L  --   --   --   --   --  >45*  --   --    BUN mg/dL 21 17 18 19 15  --  16 15   CREATININE mg/dL 0 65 0 53* 0 60 0 71 0 61  --  0 70 0 67   GLUCOSE, ISTAT mg/dl  --   --   --   --   --  101  --   --    CALCIUM mg/dL 7 9* 7 8* 7 7* 7 9* 7 3*  --  8 3 8 4       Lab Results   Component Value Date    NTBNP 556 (H) 07/12/2020    NTBNP 866 (H) 09/26/2019    NTBNP 320 09/25/2019        Results from last 7 days   Lab Units 07/15/20  0515 07/14/20  0429 07/13/20  0440 07/12/20  1526   MAGNESIUM mg/dL 2 1 2 7* 1 7 1 9             Results from last 7 days   Lab Units 07/12/20  1526   TSH 3RD GENERATON uIU/mL 4 540*   FREE T4 ng/dL 1 12       Results from last 7 days   Lab Units 07/15/20  0515 07/12/20  1526   INR  1 24* 1 14       Lipid Profile:   No results found for: CHOL  Lab Results   Component Value Date HDL 80 (H) 2019    HDL 67 (H) 2018    HDL 78 (H) 2017     Lab Results   Component Value Date    LDLCALC 55 2019    LDLCALC 73 2018    LDLCALC 60 2017     Lab Results   Component Value Date    TRIG 37 2019    TRIG 54 2018    TRIG 61 2017       Cardiac testing:   Results for orders placed during the hospital encounter of 20   Echo complete with contrast if indicated    Narrative [place] [address], [address] [facility]  [phone]    Transthoracic Echocardiogram  2D, M-mode, Doppler, and Color Doppler    Study date:  2020    Patient: Dee Santana  MR number: [id number]  Account number: [de-identified]  : 24-Mar-1929  Age: 80 years  Gender: Male  Status: Inpatient  Location: Bedside  Height: 67 in  Weight: 131 lb  BP: 131/ 61 mmHg    Indications: Heart Failure  Diagnoses: I50 9 - Heart failure, unspecified    Sonographer:  IMANI Caruso  Primary Physician:  Fortino Rehman MD  Referring Physician:  Geetha Woodall MD  Group:  [facility]  Interpreting Physician:  Emilie Patterson MD    SUMMARY    LEFT VENTRICLE:  Systolic function was normal  Ejection fraction was estimated to be 60 %  There were no regional wall motion abnormalities  Wall thickness was mildly increased  RIGHT VENTRICLE:  The size was normal   Systolic function was normal     MITRAL VALVE:  There was trace regurgitation  AORTIC VALVE:  There was mild regurgitation  TRICUSPID VALVE:  There was trace regurgitation  Estimated peak PA pressure was 50 mmHg  PERICARDIUM:  A small to moderate, free-flowing pericardial effusion was identified circumferential to the heart  There was no evidence of hemodynamic compromise  There was a large right pleural effusion  There was a large left pleural effusion  HISTORY: PRIOR HISTORY: COPD, pericardial effusion, CKD II, Respitory failure, Hyperlipidemia, and HTN   Congestive heart failure  Risk factors: hypertension  PROCEDURE: The procedure was performed at the bedside  This was a routine study  The transthoracic approach was used  The study included complete 2D imaging, M-mode, complete spectral Doppler, and color Doppler  The heart rate was 82 bpm,  at the start of the study  Images were obtained from the parasternal, apical, subcostal, and suprasternal notch acoustic windows  Image quality was adequate  LEFT VENTRICLE: Size was normal  Systolic function was normal  Ejection fraction was estimated to be 60 %  There were no regional wall motion abnormalities  Wall thickness was mildly increased  DOPPLER: The study was not technically  sufficient to allow evaluation of LV diastolic function  RIGHT VENTRICLE: The size was normal  Systolic function was normal  Wall thickness was normal     LEFT ATRIUM: Size was normal     RIGHT ATRIUM: Size was normal     MITRAL VALVE: Valve structure was normal  There was normal leaflet separation  DOPPLER: The transmitral velocity was within the normal range  There was no evidence for stenosis  There was trace regurgitation  AORTIC VALVE: The valve was trileaflet  Leaflets exhibited normal thickness and normal cuspal separation  DOPPLER: Transaortic velocity was within the normal range  There was no evidence for stenosis  There was mild regurgitation  TRICUSPID VALVE: The valve structure was normal  There was normal leaflet separation  DOPPLER: The transtricuspid velocity was within the normal range  There was no evidence for stenosis  There was trace regurgitation  Estimated peak PA  pressure was 50 mmHg  PULMONIC VALVE: Leaflets exhibited normal thickness, no calcification, and normal cuspal separation  DOPPLER: The transpulmonic velocity was within the normal range  There was no significant regurgitation  PERICARDIUM: A small to moderate, free-flowing pericardial effusion was identified circumferential to the heart   There was no evidence of hemodynamic compromise  There was a large right pleural effusion  There was a large left pleural  effusion  AORTA: The root exhibited normal size  SYSTEMIC VEINS: IVC: The inferior vena cava was not well visualized  The inferior vena cava was grossly normal in size  SYSTEM MEASUREMENT TABLES    2D  %FS: 37 92 %  Ao Diam: 3 53 cm  EDV(Teich): 53 54 ml  EF(Teich): 69 06 %  ESV(Teich): 16 57 ml  IVSd: 1 18 cm  LA Area: 16 16 cm2  LA Diam: 3 48 cm  LVEDV MOD A4C: 64 17 ml  LVEF MOD A4C: 75 23 %  LVESV MOD A4C: 15 89 ml  LVIDd: 3 58 cm  LVIDs: 2 22 cm  LVLd A4C: 6 94 cm  LVLs A4C: 5 64 cm  LVPWd: 1 1 cm  RA Area: 12 12 cm2  RVIDd: 3 95 cm  SV MOD A4C: 48 27 ml  SV(Teich): 36 98 ml    CW  AV MaxP 4 mmHg  AV Vmax: 1 36 m/s  MV PHT: 80 87 ms  MVA By PHT: 2 72 cm2  TR MaxP 85 mmHg  TR Vmax: 3 53 m/s    PW  LVOT Vmax: 1 24 m/s  LVOT maxP 12 mmHg  Lateral E': 0 04 m/s  MV A Nickolas: 1 11 m/s  MV Dec [place]: 2 58 m/s2  MV DecT: 321 49 ms  MV E Nickolas: 0 83 m/s  MV E/A Ratio: 0 75    [facility] Accredited Echocardiography Laboratory    Prepared and electronically signed by    Krzysztof Chirinos MD  Signed 2020 17:47:19       No results found for this or any previous visit  No results found for this or any previous visit  No procedure found  No results found for this or any previous visit      Meds/Allergies   all current active meds have been reviewed, current meds:   Current Facility-Administered Medications   Medication Dose Route Frequency    acetaZOLAMIDE (DIAMOX) injection 500 mg  500 mg Intravenous Q8H    albuterol (PROVENTIL HFA,VENTOLIN HFA) inhaler 2 puff  2 puff Inhalation Q6H PRN    enoxaparin (LOVENOX) subcutaneous injection 40 mg  40 mg Subcutaneous Daily    guaiFENesin (MUCINEX) 12 hr tablet 600 mg  600 mg Oral Q12H MARCIANO    levalbuterol (XOPENEX) inhalation solution 0 63 mg  0 63 mg Nebulization TID    levothyroxine tablet 50 mcg  50 mcg Oral Daily    montelukast (SINGULAIR) tablet 10 mg  10 mg Oral HS    pantoprazole (PROTONIX) EC tablet 40 mg  40 mg Oral Early Morning    potassium chloride (K-DUR,KLOR-CON) CR tablet 20 mEq  20 mEq Oral Q8H    pravastatin (PRAVACHOL) tablet 40 mg  40 mg Oral Daily With Dinner    terazosin (HYTRIN) capsule 5 mg  5 mg Oral HS    timolol (TIMOPTIC) 0 5 % ophthalmic solution 1 drop  1 drop Both Eyes BID    and PTA meds:   Prior to Admission Medications   Prescriptions Last Dose Informant Patient Reported? Taking?    Blood Pressure Monitoring (B-D ASSURE BPM/AUTO ARM CUFF) MISC  Self No No   Sig: by Does not apply route daily   RABEprazole (ACIPHEX) 20 MG tablet  Self Yes Yes   Sig: daily    albuterol (PROVENTIL HFA,VENTOLIN HFA) 90 mcg/act inhaler  Self Yes No   Sig: Inhale 2 puffs every 6 (six) hours as needed for wheezing   amLODIPine (NORVASC) 10 mg tablet   No No   Sig: Take 1 tablet (10 mg total) by mouth daily   atenolol-chlorthalidone (TENORETIC) 50-25 mg per tablet  Self Yes Yes   Sig: Take 1 tablet by mouth daily   brimonidine-timolol (COMBIGAN) 0 2-0 5 %  Self Yes Yes   Sig: Apply 1 drop to eye 2 (two) times a day   furosemide (LASIX) 40 mg tablet  Self No Yes   Sig: Take 1 tablet (40 mg total) by mouth daily   latanoprost (XALATAN) 0 005 % ophthalmic solution  Self Yes Yes   levalbuterol (XOPENEX) 0 63 mg/3 mL nebulizer solution  Self No No   Sig: Take 1 vial (0 63 mg total) by nebulization 3 (three) times a day   Patient not taking: Reported on 2/14/2020   levothyroxine 50 mcg tablet  Self Yes Yes   Sig: Take 1 tablet by mouth daily   montelukast (SINGULAIR) 10 mg tablet  Self Yes Yes   Sig: daily    potassium chloride (K-DUR,KLOR-CON) 20 mEq tablet   No Yes   Sig: Take 1 tablet (20 mEq total) by mouth 2 (two) times a day   simvastatin (ZOCOR) 20 mg tablet  Self Yes Yes   Sig: daily    spironolactone (ALDACTONE) 25 mg tablet   No Yes   Sig: Take 1 tablet (25 mg total) by mouth daily   terazosin (HYTRIN) 5 mg capsule  Self Yes Yes   Sig: Take 1 capsule by mouth      Facility-Administered Medications: None        Telemetry Review: No significant arrhythmias seen on telemetry review  EKG personally reviewed by LOUISE To  Assessment:  Principal Problem:    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 acidosis  Active Problems:    Chronic respiratory failure with hypoxia (HCC)    Essential hypertension    Hyperlipidemia    Hypokalemia    Thrombocytopenia (HCC)    Generalized weakness    Pericardial effusion    Severe protein-calorie malnutrition (HCC)    Acute on chronic respiratory failure with hypoxia and hypercapnia (HCC)    COPD (chronic obstructive pulmonary disease) (HCC)    Bilateral hydropneumothorax    Hypothyroidism    Acute on chronic diastolic heart failure (HCC)    GERD (gastroesophageal reflux disease)    Counseling / Coordination of Care  Total floor / unit time spent today 20 minutes  Greater than 50% of total time was spent with the patient and / or family counseling and / or coordination of care  ** Please Note: Dragon 360 Dictation voice to text software may have been used in the creation of this document   **

## 2020-07-17 ENCOUNTER — APPOINTMENT (INPATIENT)
Dept: RADIOLOGY | Facility: HOSPITAL | Age: 85
DRG: 291 | End: 2020-07-17
Payer: COMMERCIAL

## 2020-07-17 LAB
ANION GAP SERPL CALCULATED.3IONS-SCNC: 2 MMOL/L (ref 4–13)
BASE EX.OXY STD BLDV CALC-SCNC: 89.7 % (ref 60–80)
BASE EXCESS BLDV CALC-SCNC: 5 MMOL/L
BASOPHILS # BLD AUTO: 0.04 THOUSANDS/ΜL (ref 0–0.1)
BASOPHILS NFR BLD AUTO: 1 % (ref 0–1)
BUN SERPL-MCNC: 20 MG/DL (ref 5–25)
CALCIUM SERPL-MCNC: 7.8 MG/DL (ref 8.3–10.1)
CHLORIDE SERPL-SCNC: 96 MMOL/L (ref 100–108)
CO2 SERPL-SCNC: 34 MMOL/L (ref 21–32)
CREAT SERPL-MCNC: 0.69 MG/DL (ref 0.6–1.3)
EOSINOPHIL # BLD AUTO: 0.34 THOUSAND/ΜL (ref 0–0.61)
EOSINOPHIL NFR BLD AUTO: 7 % (ref 0–6)
ERYTHROCYTE [DISTWIDTH] IN BLOOD BY AUTOMATED COUNT: 13.8 % (ref 11.6–15.1)
GFR SERPL CREATININE-BSD FRML MDRD: 83 ML/MIN/1.73SQ M
GLUCOSE SERPL-MCNC: 94 MG/DL (ref 65–140)
HCO3 BLDV-SCNC: 33.2 MMOL/L (ref 24–30)
HCT VFR BLD AUTO: 40.8 % (ref 36.5–49.3)
HGB BLD-MCNC: 13.1 G/DL (ref 12–17)
IMM GRANULOCYTES # BLD AUTO: 0.02 THOUSAND/UL (ref 0–0.2)
IMM GRANULOCYTES NFR BLD AUTO: 0 % (ref 0–2)
LYMPHOCYTES # BLD AUTO: 0.57 THOUSANDS/ΜL (ref 0.6–4.47)
LYMPHOCYTES NFR BLD AUTO: 12 % (ref 14–44)
MCH RBC QN AUTO: 31.8 PG (ref 26.8–34.3)
MCHC RBC AUTO-ENTMCNC: 32.1 G/DL (ref 31.4–37.4)
MCV RBC AUTO: 99 FL (ref 82–98)
MONOCYTES # BLD AUTO: 0.61 THOUSAND/ΜL (ref 0.17–1.22)
MONOCYTES NFR BLD AUTO: 12 % (ref 4–12)
NEUTROPHILS # BLD AUTO: 3.38 THOUSANDS/ΜL (ref 1.85–7.62)
NEUTS SEG NFR BLD AUTO: 68 % (ref 43–75)
NRBC BLD AUTO-RTO: 0 /100 WBCS
O2 CT BLDV-SCNC: 17.3 ML/DL
PCO2 BLDV: 66.4 MM HG (ref 42–50)
PH BLDV: 7.32 [PH] (ref 7.3–7.4)
PLATELET # BLD AUTO: 124 THOUSANDS/UL (ref 149–390)
PMV BLD AUTO: 10.6 FL (ref 8.9–12.7)
PO2 BLDV: 63.6 MM HG (ref 35–45)
POTASSIUM SERPL-SCNC: 3.6 MMOL/L (ref 3.5–5.3)
RBC # BLD AUTO: 4.12 MILLION/UL (ref 3.88–5.62)
SODIUM SERPL-SCNC: 132 MMOL/L (ref 136–145)
WBC # BLD AUTO: 4.96 THOUSAND/UL (ref 4.31–10.16)

## 2020-07-17 PROCEDURE — 85025 COMPLETE CBC W/AUTO DIFF WBC: CPT | Performed by: INTERNAL MEDICINE

## 2020-07-17 PROCEDURE — 94640 AIRWAY INHALATION TREATMENT: CPT

## 2020-07-17 PROCEDURE — 71045 X-RAY EXAM CHEST 1 VIEW: CPT

## 2020-07-17 PROCEDURE — 99232 SBSQ HOSP IP/OBS MODERATE 35: CPT | Performed by: INTERNAL MEDICINE

## 2020-07-17 PROCEDURE — 94760 N-INVAS EAR/PLS OXIMETRY 1: CPT

## 2020-07-17 PROCEDURE — 97116 GAIT TRAINING THERAPY: CPT

## 2020-07-17 PROCEDURE — 80048 BASIC METABOLIC PNL TOTAL CA: CPT | Performed by: INTERNAL MEDICINE

## 2020-07-17 PROCEDURE — 97110 THERAPEUTIC EXERCISES: CPT

## 2020-07-17 PROCEDURE — 99233 SBSQ HOSP IP/OBS HIGH 50: CPT | Performed by: INTERNAL MEDICINE

## 2020-07-17 PROCEDURE — 99232 SBSQ HOSP IP/OBS MODERATE 35: CPT | Performed by: HOSPITALIST

## 2020-07-17 PROCEDURE — 82805 BLOOD GASES W/O2 SATURATION: CPT | Performed by: INTERNAL MEDICINE

## 2020-07-17 PROCEDURE — 94668 MNPJ CHEST WALL SBSQ: CPT

## 2020-07-17 RX ORDER — POTASSIUM CHLORIDE 20 MEQ/1
40 TABLET, EXTENDED RELEASE ORAL EVERY 6 HOURS
Status: COMPLETED | OUTPATIENT
Start: 2020-07-17 | End: 2020-07-17

## 2020-07-17 RX ADMIN — LEVALBUTEROL HYDROCHLORIDE 0.63 MG: 0.63 SOLUTION RESPIRATORY (INHALATION) at 19:27

## 2020-07-17 RX ADMIN — POTASSIUM CHLORIDE 40 MEQ: 1500 TABLET, EXTENDED RELEASE ORAL at 17:10

## 2020-07-17 RX ADMIN — ACETAZOLAMIDE SODIUM 500 MG: 500 INJECTION, POWDER, LYOPHILIZED, FOR SOLUTION INTRAVENOUS at 00:31

## 2020-07-17 RX ADMIN — POTASSIUM CHLORIDE 40 MEQ: 1500 TABLET, EXTENDED RELEASE ORAL at 14:00

## 2020-07-17 RX ADMIN — MONTELUKAST SODIUM 10 MG: 10 TABLET, FILM COATED ORAL at 21:34

## 2020-07-17 RX ADMIN — POTASSIUM CHLORIDE 20 MEQ: 1500 TABLET, EXTENDED RELEASE ORAL at 00:32

## 2020-07-17 RX ADMIN — LEVALBUTEROL HYDROCHLORIDE 0.63 MG: 0.63 SOLUTION RESPIRATORY (INHALATION) at 14:22

## 2020-07-17 RX ADMIN — PANTOPRAZOLE SODIUM 40 MG: 40 TABLET, DELAYED RELEASE ORAL at 06:05

## 2020-07-17 RX ADMIN — LEVALBUTEROL HYDROCHLORIDE 0.63 MG: 0.63 SOLUTION RESPIRATORY (INHALATION) at 07:26

## 2020-07-17 RX ADMIN — GUAIFENESIN 600 MG: 600 TABLET, EXTENDED RELEASE ORAL at 09:08

## 2020-07-17 RX ADMIN — ENOXAPARIN SODIUM 40 MG: 40 INJECTION SUBCUTANEOUS at 09:08

## 2020-07-17 RX ADMIN — TIMOLOL MALEATE 1 DROP: 5 SOLUTION/ DROPS OPHTHALMIC at 09:08

## 2020-07-17 RX ADMIN — TIMOLOL MALEATE 1 DROP: 5 SOLUTION/ DROPS OPHTHALMIC at 17:10

## 2020-07-17 RX ADMIN — GUAIFENESIN 600 MG: 600 TABLET, EXTENDED RELEASE ORAL at 21:34

## 2020-07-17 RX ADMIN — PRAVASTATIN SODIUM 40 MG: 40 TABLET ORAL at 17:10

## 2020-07-17 RX ADMIN — LEVOTHYROXINE SODIUM 50 MCG: 50 TABLET ORAL at 06:05

## 2020-07-17 NOTE — PROGRESS NOTES
Progress Note - Pulmonary   Cesar Godinez 80 y o  male MRN: [id number]  Unit/Bed#: S -01 Encounter: [id number]      Interval History:   Improving Hypoxia  SOB also improving  No f/c  No cough  On 2LNC  Still has BL chest tubes with copious serosang output    R: 500 over past 24 hrs  L 100 over pas 3 hours (changed pleuravac)    Review of Systems:  Full 12 point review of systems negative other than previously mentioned    Objective:   Vitals: Blood pressure 102/56, pulse 65, temperature 97 7 °F (36 5 °C), temperature source Oral, resp  rate 16, height 5' 7" (1 702 m), weight 65 6 kg (144 lb 10 oz), SpO2 95 %  , Body mass index is 22 65 kg/m²  Physical Exam  Gen: Awake, alert, oriented x 3, no acute distress  HEENT: Mucous membranes moist, no oral lesions, no thrush  NECK: No accessory muscle use, JVP not elevated  Cardiac: Regular, single S1, single S2, no murmurs, no rubs, no gallops  Lungs: diminished but otherwise clear  Abdomen: normoactive bowel sounds, soft nontender, nondistended, no rebound or rigidity, no guarding  Extremities: no cyanosis, no clubbing, no edema    Labs: I have personally reviewed pertinent lab results  Results Reviewed     Procedure Component Value Units Date/Time    T4, free [[id number]]  (Normal) Collected:  07/12/20 1526    Lab Status:  Final result Specimen:  Blood from Arm, Right Updated:  07/12/20 2350     Free T4 1 12 ng/dL     [facility]Covid-19[facility] [[id number]]  (Normal) Collected:  07/12/20 1526    Lab Status:  Final result Specimen:  Nares from Nose Updated:  07/12/20 1630     SARS-CoV-2 Negative    Narrative: The specimen collection materials, transport medium, and/or testing methodology utilized in the production of these test results have been proven to be reliable in a limited validation with an abbreviated program under the Emergency Utilization Authorization provided by the FDA    Testing reported as "Presumptive positive" will be confirmed with secondary testing with a reference laboratory to ensure result accuracy  Clinical caution and judgement should be used with the interpretation of these results with consideration of the clinical impression and other laboratory testing  Testing reported as "Positive" or "Negative" has been proven to be accurate according to standard laboratory validation requirements  All testing is performed with control materials showing appropriate reactivity at standard intervals        Comprehensive metabolic panel [[id number]]  (Abnormal) Collected:  07/12/20 1526    Lab Status:  Final result Specimen:  Blood from Arm, Right Updated:  07/12/20 1557     Sodium 140 mmol/L      Potassium 4 2 mmol/L      Chloride 95 mmol/L      CO2 44 mmol/L      ANION GAP 1 mmol/L      BUN 17 mg/dL      Creatinine 0 64 mg/dL      Glucose 111 mg/dL      Calcium 8 7 mg/dL      AST 26 U/L      ALT 14 U/L      Alkaline Phosphatase 66 U/L      Total Protein 6 5 g/dL      Albumin 3 5 g/dL      Total Bilirubin 0 98 mg/dL      eGFR 86 ml/min/1 73sq m     Narrative:       [place] guidelines for Chronic Kidney Disease (CKD):     Stage 1 with normal or high GFR (GFR > 90 mL/min/1 73 square meters)    Stage 2 Mild CKD (GFR = 60-89 mL/min/1 73 square meters)    Stage 3A Moderate CKD (GFR = 45-59 mL/min/1 73 square meters)    Stage 3B Moderate CKD (GFR = 30-44 mL/min/1 73 square meters)    Stage 4 Severe CKD (GFR = 15-29 mL/min/1 73 square meters)    Stage 5 End Stage CKD (GFR <15 mL/min/1 73 square meters)  Note: GFR calculation is accurate only with a steady state creatinine    Protime-INR [[id number]]  (Normal) Collected:  07/12/20 1526    Lab Status:  Final result Specimen:  Blood from Arm, Right Updated:  07/12/20 1557     Protime 14 0 seconds      INR 1 14    APTT [[id number]]  (Normal) Collected:  07/12/20 1526    Lab Status:  Final result Specimen:  Blood from Arm, Right Updated:  07/12/20 1557     PTT 28 seconds Magnesium [[id number]]  (Normal) Collected:  07/12/20 1526    Lab Status:  Final result Specimen:  Blood from Arm, Right Updated:  07/12/20 1554     Magnesium 1 9 mg/dL     NT-BNP PRO [[id number]]  (Abnormal) Collected:  07/12/20 1526    Lab Status:  Final result Specimen:  Blood from Arm, Right Updated:  07/12/20 1554     NT-proBNP 556 pg/mL     TSH [[id number]]  (Abnormal) Collected:  07/12/20 1526    Lab Status:  Final result Specimen:  Blood from Arm, Right Updated:  07/12/20 1554     TSH 3RD GENERATON 4 540 uIU/mL     Narrative:       Patients undergoing fluorescein dye angiography may retain small amounts of fluorescein in the body for 48-72 hours post procedure  Samples containing fluorescein can produce falsely depressed TSH values  If the patient had this procedure,a specimen should be resubmitted post fluorescein clearance        Troponin I [[id number]]  (Normal) Collected:  07/12/20 1526    Lab Status:  Final result Specimen:  Blood from Arm, Right Updated:  07/12/20 1547     Troponin I 0 02 ng/mL     CBC and differential [[id number]]  (Abnormal) Collected:  07/12/20 1526    Lab Status:  Final result Specimen:  Blood from Arm, Right Updated:  07/12/20 1536     WBC 5 56 Thousand/uL      RBC 4 59 Million/uL      Hemoglobin 14 7 g/dL      Hematocrit 46 2 %       fL      MCH 32 0 pg      MCHC 31 8 g/dL      RDW 13 6 %      MPV 10 6 fL      Platelets 561 Thousands/uL      nRBC 0 /100 WBCs      Neutrophils Relative 80 %      Immat GRANS % 1 %      Lymphocytes Relative 6 %      Monocytes Relative 10 %      Eosinophils Relative 3 %      Basophils Relative 0 %      Neutrophils Absolute 4 47 Thousands/µL      Immature Grans Absolute 0 03 Thousand/uL      Lymphocytes Absolute 0 35 Thousands/µL      Monocytes Absolute 0 54 Thousand/µL      Eosinophils Absolute 0 15 Thousand/µL      Basophils Absolute 0 02 Thousands/µL            Current Medications:    Current Facility-Administered Medications:     albuterol (PROVENTIL HFA,VENTOLIN HFA) inhaler 2 puff, 2 puff, Inhalation, Q6H PRN, Shayna Newman MD    enoxaparin (LOVENOX) subcutaneous injection 40 mg, 40 mg, Subcutaneous, Daily, Shayna Newman MD, 40 mg at 07/17/20 0908    guaiFENesin (MUCINEX) 12 hr tablet 600 mg, 600 mg, Oral, Q12H [address], Shayna Newman MD, 600 mg at 07/17/20 0908    levalbuterol (XOPENEX) inhalation solution 0 63 mg, 0 63 mg, Nebulization, TID, Shayna Newman MD, 0 63 mg at 07/17/20 0726    levothyroxine tablet 50 mcg, 50 mcg, Oral, Daily, Shayna Newman MD, 50 mcg at 07/17/20 0605    montelukast (SINGULAIR) tablet 10 mg, 10 mg, Oral, HS, Shayna Newman MD, 10 mg at 07/16/20 2118    pantoprazole (PROTONIX) EC tablet 40 mg, 40 mg, Oral, Early Morning, Shayna Newman MD, 40 mg at 07/17/20 0605    pravastatin (PRAVACHOL) tablet 40 mg, 40 mg, Oral, Daily With Edilberto Darby MD, 40 mg at 07/16/20 1730    terazosin (HYTRIN) capsule 5 mg, 5 mg, Oral, HS, Shayna Newman MD, 5 mg at 07/15/20 2100    timolol (TIMOPTIC) 0 5 % ophthalmic solution 1 drop, 1 drop, Both Eyes, BID, Shayna Newman MD, 1 drop at 07/17/20 0908     Microbiology:  No new micro    Imaging and other studies:   I personally viewed and interpreted the following imaging studies:  cxr 7/17/2020 shows 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

## 2020-07-17 NOTE — ASSESSMENT & PLAN NOTE
· Patient currently on nasal canula, today patient pleasant , can hold a conversation without acute distress, Today patient with SpO2 96%    Plan    - Monitor SpO2 levels aiming SpO2 >90  - Continue oxygen 2L

## 2020-07-17 NOTE — PHYSICAL THERAPY NOTE
Physical Therapy Progress Note     20 [id number]   Pain Assessment   Pain Assessment Tool Pain Assessment not indicated - pt denies pain   Pain Score No Pain   Restrictions/Precautions   Weight Bearing Precautions Per Order No   Other Precautions Bed Alarm;O2;Multiple lines;Telemetry; Fall Risk;Hard of hearing  (B/L chest tube)   General   Family/Caregiver Present No   Cognition   Overall Cognitive Status Impaired   Arousal/Participation Cooperative   Attention Attends with cues to redirect   Orientation Level Oriented X4   Memory Decreased short term memory   Following Commands Follows one step commands with increased time or repetition   Comments Pt was identified by name and , agreeable to treatment  Bed Mobility   Supine to Sit 4  Minimal assistance   Additional items Assist x 1;HOB elevated; Increased time required;Verbal cues; Bedrails   Sit to Supine 4  Minimal assistance   Additional items Assist x 1; Increased time required;Verbal cues;LE management; Bedrails   Transfers   Sit to Stand 4  Minimal assistance   Additional items Assist x 1; Increased time required;Verbal cues   Stand to Sit 4  Minimal assistance   Additional items Assist x 1; Increased time required;Verbal cues   Ambulation/Elevation   Gait pattern Decreased foot clearance; Excessively slow; Short stride; Improper Weight shift   Gait Assistance 4  Minimal assist   Additional items Assist x 1;Verbal cues   Assistive Device Rolling walker   Distance 30'x2   Balance   Static Sitting Fair   Static Standing Fair -   Dynamic Standing Poor +   Ambulatory Poor +   Endurance Deficit   Endurance Deficit Yes   Endurance Deficit Description weakness fatigue   Activity Tolerance   Activity Tolerance Patient limited by fatigue   Nurse Made Aware yes, ok to see   Exercises   Knee AROM Long Arc Quad Sitting;20 reps;AROM; Bilateral   Ankle Pumps Sitting;20 reps;AROM; Bilateral   Marching Sitting;20 reps;AROM; Bilateral   Assessment   Prognosis Fair   Problem List Decreased strength;Decreased endurance; Impaired balance;Decreased mobility; Decreased cognition; Impaired judgement;Decreased safety awareness   Assessment Pt was found supine in bed to begin session  He was able to perform all bed mobility and xfers with min Ax1 today  Pt denied any increased symptoms with transfers  He ambulated increased distances this session compared to the last one using a RW demonstrating no LOB  Pt did need cuing to widen his BILLY and to take longer steps  he was fatigued post ambulation but happy with his progress  He was then instructed on and performed seated TE with no complaints demonstrating decreased flexibility and decreased strength  Pt had all needs met and was repositioned supine in bed to end session  He would benefit from continued PT in order to promote safe and functional mobility  Barriers to Discharge Decreased caregiver support   Goals   Patient Goals to go for a walk   STG Expiration Date 07/24/20   Short Term Goal #1 In 7-10 days pt will amb with RW 500ft mod I, pt will transfer and perform bed mobility mod I   Plan   Treatment/Interventions LE strengthening/ROM; Functional transfer training; Therapeutic exercise; Endurance training;Cognitive reorientation;Patient/family training;Equipment eval/education; Bed mobility;Gait training;Spoke to nursing;Spoke to case management   PT Frequency Other (Comment)  (3-5x/wk)   Recommendation   PT Discharge Recommendation Post-Acute Rehabilitation Services   Equipment Recommended Walker  (RW)     Ester Apodaca, PTA

## 2020-07-17 NOTE — QUICK NOTE
Discussed with patients daughter, who is the POA about the current condition of the patient  Informed her about the options provided by thoracic surgery  She does not want to go ahead with major surgery/decortication  She is however amenable to pleurodesis or catheter placement   Informed pulmonology and Dr Sharma Generous

## 2020-07-17 NOTE — PROGRESS NOTES
General Cardiology   Progress Note -  [facility] One   Jyoti Abreu 80 y o  male MRN: [id number]    Unit/Bed#: S -01 Encounter: [id number]    Assessment  1  Acute on chronic diastolic HF  -On exam volume status appears to be stable; has chronic + 1 pitting pedal edema R>L, on  2 L NC satting mid 95%  -Symptomatically denies SOB, , or orthopnea  -ProBNP 556 (previously 866--2019)  -CTA PE study : B/L hydropneumothorax w/large pleural effusions and moderate pneumothoraces, no acute PE  -2D echocardiogram  2020: LVEF 60%, no regional wall motion abnormality, RV normal size and systolic function, trace MR, mild AI, trace TR; small to moderate pericardial effusion, no evidence of hemodynamic compromise  -Previously on furosemide 40 mg daily (on hold), atenolol-chlorthalidone 50-25 mg daily (on hold), and spironolactone 25 mg daily (on hold)  -Received IV Diamox 500 mg TID yesterday  -24 hour I&O balance: -760 mL (UO 1250 mL; RCT 90 ml,  ml); overall -9 3 L  Weights:  Office weight 165 lb--2020  --:  131 lb--bed scale  --:  131 lb--bed scale  --:  137 lb--bed scale  --7/15:  141 lb--bed scale  --:  144 lb--bed scale  --:  144 lb--bed scale     2  Acute on chronic hypercapnic/hypoxic respiratory failure   3  Bilateral hydropneumothorax/large bilateral pleural effusions   -Pulmonary following  -Currently on 2L NC sating 95% NC (baseline 2-3 L nasal cannula)  -CTA PE study : B/L hydropneumothorax w/large pleural effusions and moderate pneumothoraces, no acute PE  -S/p IR guided B/L chest tube placement   -CT chest w/o contrast : Improving bilateral hydropneumothorax, and improving bilateral atelectasis  -CXR : Persistent small left pneumothorax     5  Compensated metabolic alkalosis  -Improving; VBG this am; HCO3 33 2 (43 8 yesterday)  -Received x 3 doses of IV Diamox past 24 hrs     4   Hypertension  -Avg BP 110/55, last recorded 102/56, HR 65  -Previously on atenolol-chlorthalidone 50 mg -25 mg daily, amlodipine 10 mg daily (on hold), and spironolactone 25 mg daily (on hold)     5  Hyperlipidemia  -Lipid profile 9/25/2019:  Cholesterol 142, triglycerides 37, HDL 80, LDL 55  -Previously on simvastatin 20 mg daily (non formulary) switched to pravastatin 40 mg daily this admission     6  Hypothyroidism  -TSH 4 5/free T4 1 1  -On levothyroxine 50 mcg daily     Plan:  -Hold diuretics and oral antihypertensives given low BP; gradually resume therapies as BP allows   -Continue B/L OLIVER stockings  -CT management per pulmonary  -F/u cytology results  -Strict I&Os, daily weights, 2 g NA +diet 1800 mL FR  -Monitor renal function and electrolytes closely  -No indication for telemetry monitoring  -Cardiology will see on a PRN basis    Subjective  Review of Systems   Constitution: Negative for chills, fever and malaise/fatigue  HENT: Negative for congestion  Eyes: Negative for visual disturbance  Cardiovascular: Positive for leg swelling  Negative for chest pain, dyspnea on exertion, irregular heartbeat, near-syncope, orthopnea and palpitations  Respiratory: Negative for cough and shortness of breath  Musculoskeletal: Negative for arthritis and myalgias  Gastrointestinal: Negative for abdominal pain  Genitourinary: Negative for dysuria  Neurological: Negative for dizziness, focal weakness, headaches, light-headedness and weakness  All other systems reviewed and are negative  Objective:   Physical Exam   Constitutional: He is oriented to person, place, and time  He appears well-developed and well-nourished  No distress  HENT:   Head: Normocephalic and atraumatic  Mouth/Throat: Oropharynx is clear and moist    Eyes: No scleral icterus  Neck: No JVD present  Cardiovascular: Normal rate, regular rhythm, normal heart sounds and intact distal pulses  No murmur heard  Pulmonary/Chest: Effort normal  He has no wheezes  He has no rales     Bilateral upper lung fields clear, clear diminished at the bases  Bilateral pleural chest tubes in place; right side draining serosanguineous fluid, left side draining serous fluid   Abdominal: Soft  Bowel sounds are normal    Musculoskeletal: Normal range of motion  He exhibits edema  +1 pitting bilateral lower extremity (pedal) edema   Neurological: He is alert and oriented to person, place, and time  Skin: Skin is warm and dry  Capillary refill takes less than 2 seconds  He is not diaphoretic  Nursing note and vitals reviewed  Vitals: Blood pressure 102/56, pulse 65, temperature 97 7 °F (36 5 °C), temperature source Oral, resp  rate 16, height 5' 7" (1 702 m), weight 65 6 kg (144 lb 10 oz), SpO2 95 %  ,     Body mass index is 22 65 kg/m²  ,   Systolic (02HWM), OOT:351 , Min:102 , JQI:297     Diastolic (33NXP), WMF:84, Min:51, Max:60      Intake/Output Summary (Last 24 hours) at 7/17/2020 1114  Last data filed at 7/17/2020 1022  Gross per 24 hour   Intake 1100 ml   Output 2000 ml   Net -900 ml     Weight (last 2 days)     Date/Time   Weight    07/17/20 0600   65 6 (144 62)    07/16/20 0600   65 6 (144 62)    07/15/20 0236   64 2 (141 54)              LABORATORY RESULTS  Results from last 7 days   Lab Units 07/12/20  1526   TROPONIN I ng/mL 0 02     CBC with diff:   Results from last 7 days   Lab Units 07/17/20  0633 07/16/20  0528 07/15/20  0515 07/14/20  0533 07/14/20  0429 07/13/20  0440 07/12/20  1526   WBC Thousand/uL 4 96 4 07* 3 61*  --  5 30 3 51* 5 56   HEMOGLOBIN g/dL 13 1 12 7 12 2  --  13 2 13 0 14 7   I STAT HEMOGLOBIN g/dl  --   --   --  13 3  --   --   --    HEMATOCRIT % 40 8 39 1 37 3  --  41 4 40 9 46 2   HEMATOCRIT, ISTAT %  --   --   --  39  --   --   --    MCV fL 99* 98 99*  --  101* 100* 101*   PLATELETS Thousands/uL 124* 102* 87*  --  94* 90* 115*   MCH pg 31 8 31 8 32 4  --  32 2 31 6 32 0   MCHC g/dL 32 1 32 5 32 7  --  31 9 31 8 31 8   RDW % 13 8 14 0 13 6  --  13 7 13 6 13 6   MPV fL 10 6 10 2 10  7  --  10 3 10 0 10 6   NRBC AUTO /100 WBCs 0 0  --   --   --   --  0       CMP:  Results from last 7 days   Lab Units 07/17/20  0633 07/16/20  0528 07/15/20  0515 07/15/20  0011 07/14/20  1808 07/14/20  1156 07/14/20  0533 07/14/20  0429  07/12/20  1526   POTASSIUM mmol/L 3 6 4 2 4 2 4 2 4 2 3 6  --  4 0   < > 4 2   CHLORIDE mmol/L 96* 94* 93* 93* 91* 96*  --  94*   < > 95*   CO2 mmol/L 34* 44* 44* 43* 45* 43*  --  >45*   < > 44*   CO2, I-STAT mmol/L  --   --   --   --   --   --  >45*  --   --   --    BUN mg/dL 20 21 17 18 19 15  --  16   < > 17   CREATININE mg/dL 0 69 0 65 0 53* 0 60 0 71 0 61  --  0 70   < > 0 64   GLUCOSE, ISTAT mg/dl  --   --   --   --   --   --  101  --   --   --    CALCIUM mg/dL 7 8* 7 9* 7 8* 7 7* 7 9* 7 3*  --  8 3   < > 8 7   AST U/L  --   --   --   --   --  21  --   --   --  26   ALT U/L  --   --   --   --   --  7*  --   --   --  14   ALK PHOS U/L  --   --   --   --   --  44*  --   --   --  66   EGFR ml/min/1 73sq m 83 85 92 88 82 87  --  83   < > 86    < > = values in this interval not displayed         BMP:  Results from last 7 days   Lab Units 07/17/20 0633 07/16/20  0528 07/15/20  0515 07/15/20  0011 07/14/20  1808 07/14/20  1156 07/14/20  0533 07/14/20  0429   POTASSIUM mmol/L 3 6 4 2 4 2 4 2 4 2 3 6  --  4 0   CHLORIDE mmol/L 96* 94* 93* 93* 91* 96*  --  94*   CO2 mmol/L 34* 44* 44* 43* 45* 43*  --  >45*   CO2, I-STAT mmol/L  --   --   --   --   --   --  >45*  --    BUN mg/dL 20 21 17 18 19 15  --  16   CREATININE mg/dL 0 69 0 65 0 53* 0 60 0 71 0 61  --  0 70   GLUCOSE, ISTAT mg/dl  --   --   --   --   --   --  101  --    CALCIUM mg/dL 7 8* 7 9* 7 8* 7 7* 7 9* 7 3*  --  8 3       Lab Results   Component Value Date    NTBN 556 (H) 07/12/2020    NTBN 866 (H) 09/26/2019    NTBN 320 09/25/2019        Results from last 7 days   Lab Units 07/15/20  0515 07/14/20  0429 07/13/20  0440 07/12/20  1526   MAGNESIUM mg/dL 2 1 2 7* 1 7 1 9             Results from last 7 days   Lab Units 20  1526   TSH 3RD GENERATON uIU/mL 4 540*   FREE T4 ng/dL 1 12       Results from last 7 days   Lab Units 07/15/20  0515 20  1526   INR  1 24* 1 14       Lipid Profile:   No results found for: CHOL  Lab Results   Component Value Date    HDL 80 (H) 2019    HDL 67 (H) 2018    HDL 78 (H) 2017     Lab Results   Component Value Date    LDLCALC 55 2019    LDLCALC 73 2018    LDLCALC 60 2017     Lab Results   Component Value Date    TRIG 37 2019    TRIG 54 2018    TRIG 61 2017       Cardiac testing:   Results for orders placed during the hospital encounter of 20   Echo complete with contrast if indicated    Narrative [place] [address] [address] [place] [address]  [phone]    Transthoracic Echocardiogram  2D, M-mode, Doppler, and Color Doppler    Study date:  2020    Patient: Riley Maldonado  MR number: [id number]  Account number: [de-identified]  : 24-Mar-1929  Age: 80 years  Gender: Male  Status: Inpatient  Location: Bedside  Height: 67 in  Weight: 131 lb  BP: 131/ 61 mmHg    Indications: Heart Failure  Diagnoses: I50 9 - Heart failure, unspecified    Sonographer:  IMANI Meraz  Primary Physician:  Janett Bravo MD  Referring Physician:  Rae Galo MD  Group:  [facility]  Interpreting Physician:  Tamera Menjivar MD    SUMMARY    LEFT VENTRICLE:  Systolic function was normal  Ejection fraction was estimated to be 60 %  There were no regional wall motion abnormalities  Wall thickness was mildly increased  RIGHT VENTRICLE:  The size was normal   Systolic function was normal     MITRAL VALVE:  There was trace regurgitation  AORTIC VALVE:  There was mild regurgitation  TRICUSPID VALVE:  There was trace regurgitation  Estimated peak PA pressure was 50 mmHg  PERICARDIUM:  A small to moderate, free-flowing pericardial effusion was identified circumferential to the heart   There was no evidence of hemodynamic compromise  There was a large right pleural effusion  There was a large left pleural effusion  HISTORY: PRIOR HISTORY: COPD, pericardial effusion, CKD II, Respitory failure, Hyperlipidemia, and HTN  Congestive heart failure  Risk factors: hypertension  PROCEDURE: The procedure was performed at the bedside  This was a routine study  The transthoracic approach was used  The study included complete 2D imaging, M-mode, complete spectral Doppler, and color Doppler  The heart rate was 82 bpm,  at the start of the study  Images were obtained from the parasternal, apical, subcostal, and suprasternal notch acoustic windows  Image quality was adequate  LEFT VENTRICLE: Size was normal  Systolic function was normal  Ejection fraction was estimated to be 60 %  There were no regional wall motion abnormalities  Wall thickness was mildly increased  DOPPLER: The study was not technically  sufficient to allow evaluation of LV diastolic function  RIGHT VENTRICLE: The size was normal  Systolic function was normal  Wall thickness was normal     LEFT ATRIUM: Size was normal     RIGHT ATRIUM: Size was normal     MITRAL VALVE: Valve structure was normal  There was normal leaflet separation  DOPPLER: The transmitral velocity was within the normal range  There was no evidence for stenosis  There was trace regurgitation  AORTIC VALVE: The valve was trileaflet  Leaflets exhibited normal thickness and normal cuspal separation  DOPPLER: Transaortic velocity was within the normal range  There was no evidence for stenosis  There was mild regurgitation  TRICUSPID VALVE: The valve structure was normal  There was normal leaflet separation  DOPPLER: The transtricuspid velocity was within the normal range  There was no evidence for stenosis  There was trace regurgitation  Estimated peak PA  pressure was 50 mmHg      PULMONIC VALVE: Leaflets exhibited normal thickness, no calcification, and normal cuspal separation  DOPPLER: The transpulmonic velocity was within the normal range  There was no significant regurgitation  PERICARDIUM: A small to moderate, free-flowing pericardial effusion was identified circumferential to the heart  There was no evidence of hemodynamic compromise  There was a large right pleural effusion  There was a large left pleural  effusion  AORTA: The root exhibited normal size  SYSTEMIC VEINS: IVC: The inferior vena cava was not well visualized  The inferior vena cava was grossly normal in size  SYSTEM MEASUREMENT TABLES    2D  %FS: 37 92 %  Ao Diam: 3 53 cm  EDV(Teich): 53 54 ml  EF(Teich): 69 06 %  ESV(Teich): 16 57 ml  IVSd: 1 18 cm  LA Area: 16 16 cm2  LA Diam: 3 48 cm  LVEDV MOD A4C: 64 17 ml  LVEF MOD A4C: 75 23 %  LVESV MOD A4C: 15 89 ml  LVIDd: 3 58 cm  LVIDs: 2 22 cm  LVLd A4C: 6 94 cm  LVLs A4C: 5 64 cm  LVPWd: 1 1 cm  RA Area: 12 12 cm2  RVIDd: 3 95 cm  SV MOD A4C: 48 27 ml  SV(Teich): 36 98 ml    CW  AV MaxP 4 mmHg  AV Vmax: 1 36 m/s  MV PHT: 80 87 ms  MVA By PHT: 2 72 cm2  TR MaxP 85 mmHg  TR Vmax: 3 53 m/s    PW  LVOT Vmax: 1 24 m/s  LVOT maxP 12 mmHg  Lateral E': 0 04 m/s  MV A Nickolas: 1 11 m/s  MV Dec [place]: 2 58 m/s2  MV DecT: 321 49 ms  MV E Nickolas: 0 83 m/s  MV E/A Ratio: 0 75    [employer] Accredited Echocardiography Laboratory    Prepared and electronically signed by    Alexx Gutierrez MD  Signed 2020 17:47:19       No results found for this or any previous visit  No results found for this or any previous visit  No procedure found  No results found for this or any previous visit      Meds/Allergies   all current active meds have been reviewed, current meds:   Current Facility-Administered Medications   Medication Dose Route Frequency    albuterol (PROVENTIL HFA,VENTOLIN HFA) inhaler 2 puff  2 puff Inhalation Q6H PRN    enoxaparin (LOVENOX) subcutaneous injection 40 mg  40 mg Subcutaneous Daily    guaiFENesin (MUCINEX) 12 hr tablet 600 mg  600 mg Oral Q12H [address]    levalbuterol (XOPENEX) inhalation solution 0 63 mg  0 63 mg Nebulization TID    levothyroxine tablet 50 mcg  50 mcg Oral Daily    montelukast (SINGULAIR) tablet 10 mg  10 mg Oral HS    pantoprazole (PROTONIX) EC tablet 40 mg  40 mg Oral Early Morning    pravastatin (PRAVACHOL) tablet 40 mg  40 mg Oral Daily With Dinner    terazosin (HYTRIN) capsule 5 mg  5 mg Oral HS    timolol (TIMOPTIC) 0 5 % ophthalmic solution 1 drop  1 drop Both Eyes BID    and PTA meds:   Prior to Admission Medications   Prescriptions Last Dose Informant Patient Reported? Taking?    Blood Pressure Monitoring (B-D ASSURE BPM/AUTO ARM CUFF) MISC  Self No No   Sig: by Does not apply route daily   RABEprazole (ACIPHEX) 20 MG tablet  Self Yes Yes   Sig: daily    albuterol (PROVENTIL HFA,VENTOLIN HFA) 90 mcg/act inhaler  Self Yes No   Sig: Inhale 2 puffs every 6 (six) hours as needed for wheezing   amLODIPine (NORVASC) 10 mg tablet   No No   Sig: Take 1 tablet (10 mg total) by mouth daily   atenolol-chlorthalidone (TENORETIC) 50-25 mg per tablet  Self Yes Yes   Sig: Take 1 tablet by mouth daily   brimonidine-timolol (COMBIGAN) 0 2-0 5 %  Self Yes Yes   Sig: Apply 1 drop to eye 2 (two) times a day   furosemide (LASIX) 40 mg tablet  Self No Yes   Sig: Take 1 tablet (40 mg total) by mouth daily   latanoprost (XALATAN) 0 005 % ophthalmic solution  Self Yes Yes   levalbuterol (XOPENEX) 0 63 mg/3 mL nebulizer solution  Self No No   Sig: Take 1 vial (0 63 mg total) by nebulization 3 (three) times a day   Patient not taking: Reported on 2/14/2020   levothyroxine 50 mcg tablet  Self Yes Yes   Sig: Take 1 tablet by mouth daily   montelukast (SINGULAIR) 10 mg tablet  Self Yes Yes   Sig: daily    potassium chloride (K-DUR,KLOR-CON) 20 mEq tablet   No Yes   Sig: Take 1 tablet (20 mEq total) by mouth 2 (two) times a day   simvastatin (ZOCOR) 20 mg tablet  Self Yes Yes   Sig: daily    spironolactone (ALDACTONE) 25 mg tablet No Yes   Sig: Take 1 tablet (25 mg total) by mouth daily   terazosin (HYTRIN) 5 mg capsule  Self Yes Yes   Sig: Take 1 capsule by mouth      Facility-Administered Medications: None              EKG personally reviewed by LOUISE Capellan  Assessment:  Principal Problem:    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 acidosis  Active Problems:    Chronic respiratory failure with hypoxia (HCC)    Essential hypertension    Hyperlipidemia    Hypokalemia    Thrombocytopenia (HCC)    Generalized weakness    Pericardial effusion    Severe protein-calorie malnutrition (HCC)    Acute on chronic respiratory failure with hypoxia and hypercapnia (HCC)    COPD (chronic obstructive pulmonary disease) (HCC)    Bilateral hydropneumothorax    Hypothyroidism    Acute on chronic diastolic heart failure (HCC)    GERD (gastroesophageal reflux disease)      Counseling / Coordination of Care  Total floor / unit time spent today 20 minutes  Greater than 50% of total time was spent with the patient and / or family counseling and / or coordination of care  ** Please Note: Dragon 360 Dictation voice to text software may have been used in the creation of this document   **

## 2020-07-17 NOTE — ASSESSMENT & PLAN NOTE
· Patient currently requires increased supplemental oxygen as compared to baseline (3 L nasal cannula at home)  Currently on 10 L high-flow nasal cannula  Condition likely secondary to acute exacerbation of CHF and pleural effusions noted on chest x-ray  Patient regularly follows Dr Barbara Freeman from pulmonology  Low suspicion for infection given patient's negative COVID-19 testing, no elevated white count, and no fever but a viral etiology is still a possibility  Given patient's continued acute hypoxemia, cannot completely rule out PE  Per pulmonology, effusions on chest x-ray does not completely explain patient's hypoxia  Patient appears to be severely ill  · Patients CT showed Bilateral Hydropneumothorax/ pleural effusions  · Acccording with above, Interventional radiology was consulted considering the placement of bilateral chest tube  · Bilateral chest tube was successfully place 7 13 2020    · Today patients tubes draining adequately (350) , patient with SpO2 96% on 2L nasal canula, considerably improvement in patients hypercapnia as well ( today 34) , patient can hold a full conversation without exhibiting signs of respiratory distress, noticeable adequate breathing pattern  Plan:    · Maintain SpO2 between 89% to 94% given patient's history of COPD  · Repeat BMP to monitor hypercapnia daily  ·  Repeat VBG daily

## 2020-07-17 NOTE — PROGRESS NOTES
Progress Note Ziggy Ford 3/24/1929, 80 y o  male MRN: [id number]    Unit/Bed#: S -01 Encounter: [id number]    Primary Care Provider: Anum Pelaez MD   Date and time admitted to hospital: 7/12/2020  2:58 PM        Bilateral hydropneumothorax  Assessment & Plan  · Unclear of effusions on chest x-ray are loculated  Patient also noted to have wheezing on exam as well as increased supplemental oxygen needs  Patient responded well to last thoracocentesis on October of 2019 with resolution of symptoms noted  · Patients CT showed bilateral hydropneumothorax with pleural effusions, interventional radiology was consulted which consider the patient a candidate for bilateral chest tube  · Bilateral chest tube was placed successfully 7 13 2020  · Pleural fluid level : yellow, clear wbc 1 156  Pleural fluid culture with no growth        LDH body  169 serum 188 Total protein fluid 3 3 serum 5 8 likely exudative effusion  ·  Ct chest show improving bilateral hydropneumothorax, improving atelectasias, CXR on the other hand showed bilateral pleural drainage with no change in left pneumothorax and left base atelectasias  · Today patient with bilateral tubes draining adequately, output 350, no noted respiratory distress CXR with persistence left side pneumothorax and mild bibasilar atelectasias  Pulmonology evaluated the patient they noted possible difficulty in thoracic communication  For which the patient may need thoracic surgery evaluation          Considering the above, patients case was discussed with Thoracic surgery   Dr Noemi Bang which 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 which       Is a normal variant and he doesn't seem to have buffalo lung however he also stated that there is options like Right-sided pleurodesis but it             needs to be considered that Left side may not be an option  as the lung may not expand ,second option could be an Indwelling pleural catheter        And third will Decortication But  risk vs benefits should be evaluated  Plan:  · Discuss with pulmonology the recommendations of thoracic surgery, in the event that the patient needs to be evaluated by thoracic surgery, the patient will have to be transferred to [place]  · Assess the patient and family thoughts regarding goals of care  · Continue to follow results for second cytology  · Consider incentive spirometry for atelectasia prevention  · Continue bilateral chest tube, monitor daily  For outputs in order to determine when to remove the tubes  · Continue to monitor SpO2 levels daily    Acute on chronic diastolic heart failure (HCC)  Assessment & Plan  Wt Readings from Last 3 Encounters:   07/16/20 65 6 kg (144 lb 10 oz)   02/14/20 74 9 kg (165 lb 1 6 oz)   12/17/19 77 3 kg (170 lb 6 4 oz)   I/O last 3 completed shifts:  In: -   Out: 7651 [Urine:750; Chest Tube:1080]  No intake/output data recorded  · Patient's last echo completed in October of 2019 showed ejection fraction of 50%  Upon assessment patient appears to be fluid overloaded given lower extremity edema and pleural effusions seen on chest x-ray and slightly elevated BNP of 556  Patient is scheduled to have a repeat echo on 07/31/2020  · Patient regularly follows Dr Madina Enriquez  Echocardiogram on 7/13/2020 shows large pleural effusions and slightly enlarged pericardium  · Patient today with bp 102/55,  Chronic +1 pitting extremities edema , currently with oxygen with nasal canula, no evidence of acute distress, sodium levels of 132 likely due to his congestive heart failure  Plan:    · Cardiology on board  · Continue to hold Lasix and blood pressure medication as patient still with low blood pressure levels  · Consider initiation of albumin in case of hypotension  · Continue Oxygen on nasal canula  · Monitor oxygen saturations levels daily  · Monitor signs and symptoms of volume overload daily            * 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 acidosis  Assessment & Plan  · Today patient with evidence of resolution of his metabolic alkalosis , presenting a ph 7 31 pcO2 66 9 base excess 5 0  with diamox therapy, improvement in hypercapnia as well today with CO2 levels of 34  Patient was evaluated for Nephrology which considers resolution of the imbalance  patient with adequate respiratory pattern  No visible distress    Plan:    -  Appreciate  nephrology recommendations  -  Continue to monitor BMP and VBG levels daily        Acute on chronic respiratory failure with hypoxia and hypercapnia (HCC)  Assessment & Plan  · Patient currently requires increased supplemental oxygen as compared to baseline (3 L nasal cannula at home)  Currently on 10 L high-flow nasal cannula  Condition likely secondary to acute exacerbation of CHF and pleural effusions noted on chest x-ray  Patient regularly follows Dr Minh Ayala from pulmonology  Low suspicion for infection given patient's negative COVID-19 testing, no elevated white count, and no fever but a viral etiology is still a possibility  Given patient's continued acute hypoxemia, cannot completely rule out PE  Per pulmonology, effusions on chest x-ray does not completely explain patient's hypoxia  Patient appears to be severely ill  · Patients CT showed Bilateral Hydropneumothorax/ pleural effusions  · Acccording with above, Interventional radiology was consulted considering the placement of bilateral chest tube  · Bilateral chest tube was successfully place 7 13 2020    · Today patients tubes draining adequately (350) , patient with SpO2 96% on 2L nasal canula, considerably improvement in patients hypercapnia as well ( today 34) , patient can hold a full conversation without exhibiting signs of respiratory distress, noticeable adequate breathing pattern  Plan:    · Maintain SpO2 between 89% to 94% given patient's history of COPD  · Repeat BMP to monitor hypercapnia daily  ·  Repeat VBG daily    Hypokalemia  Assessment & Plan  · Patient was initiated on potassium as a correction with a goal of >4 0  · Today patient with potassium level of 3 6    Plan  - Patient currently on Kdur , complete the replacement and continue monitoring levels  - Consider goal of potassium level greater than >4 0 if not replace  - Monitor for signs and symptoms of hypokalemia daily      Hyponatremia  Assessment & Plan  Today Patient with Sodium levels 132, currently asymptomatic, Likely due to his underlying diagnosis of congestive heart failure    Plan    -Continue to monitor sodium levels daily  - Assess for signs and symptoms of hyponatremia    Chronic respiratory failure with hypoxia ([facility] [address] )  Assessment & Plan  · Patient currently on nasal canula, today patient pleasant , can hold a conversation without acute distress, Today patient with SpO2 96%    Plan    - Monitor SpO2 levels aiming SpO2 >90  - Continue oxygen 2L      VTE Pharmacologic Prophylaxis:   Pharmacologic: Enoxaparin (Lovenox)  Mechanical VTE Prophylaxis in Place: Yes    Discussions with Specialists or Other Care Team Provider: Patient case has been discussed with a multispecialty team, Today nephrology considers that patients current metabolic imbalance is corrected, cardiology still considering appropriate to hold diuretics and blood pressure medications since the patient continue to have lower blood pressure levels, pulmonology on the other hand is following up the cytology results and considers an evaluation with thoracic surgery  Considering the above, patient case was  Discussed with thoracic surgery, Dr Gavino Colunga   He went through CT findings with senior resident stating the azygous lobe is a normal variant and also that the  patient doesn't exhibit buffalo lung   he also offered multiple options like:  Right-sided pleurodesis even that the left side may not be an option as the lung may not expand due to trapped lung, Indwelling pleural catheter, Decortication  But will need to weigh risk vs benefits considering patients age  Education and Discussions with Family / Patient: Patients current treatment options provided by thoracic surgery were  Discussed with patients daughter today, she is his POA which emphasize that she does not want to go ahead with major surgery/decortication  She is however amenable to pleurodesis or catheter placement  Current Length of Stay: 5 day(s)    Current Patient Status: Inpatient     Discharge Plan / Estimated Discharge Date: Not yet Established  Code Status: Level 3 - DNAR and DNI      Subjective:   Patient said that he feels ok, states adequate breathing, sleep and appetite  When interrogated patient denies any chest pain, shortness of breath, palpitations, nausea, vomit ,diarreah  Objective:     Vitals:   Temp (24hrs), Av 7 °F (36 5 °C), Min:97 7 °F (36 5 °C), Max:97 7 °F (36 5 °C)    Temp:  [97 7 °F (36 5 °C)] 97 7 °F (36 5 °C)  HR:  [65-74] 65  Resp:  [16-18] 16  BP: (102-127)/(51-60) 102/56  SpO2:  [95 %-98 %] 95 %  Body mass index is 22 65 kg/m²  Input and Output Summary (last 24 hours): Intake/Output Summary (Last 24 hours) at 2020 1356  Last data filed at 2020 1022  Gross per 24 hour   Intake 1100 ml   Output 2000 ml   Net -900 ml       Physical Exam:     Physical Exam   Constitutional: He is oriented to person, place, and time  He appears well-developed  No distress  HENT:   Head: Normocephalic and atraumatic  Nose: Nose normal    Eyes: Pupils are equal, round, and reactive to light  Conjunctivae and EOM are normal    Neck: Normal range of motion  Cardiovascular: Normal rate, regular rhythm and normal heart sounds  No murmur heard  Pulmonary/Chest: He exhibits no tenderness  Breath sounds are decreased bilaterally, both chest tubes draining adequately, I dont appreciate any signs of infection   Abdominal: Soft  Bowel sounds are normal  He exhibits no distension  There is no tenderness  Musculoskeletal: He exhibits edema (+1 pitting lower extremities edema)  Neurological: He is alert and oriented to person, place, and time  Skin: Capillary refill takes 2 to 3 seconds  He is not diaphoretic  Psychiatric: He has a normal mood and affect  His behavior is normal    Nursing note and vitals reviewed  Additional Data:     Labs:    Results from last 7 days   Lab Units 07/17/20  0633   WBC Thousand/uL 4 96   HEMOGLOBIN g/dL 13 1   HEMATOCRIT % 40 8   PLATELETS Thousands/uL 124*   NEUTROS PCT % 68   LYMPHS PCT % 12*   MONOS PCT % 12   EOS PCT % 7*     Results from last 7 days   Lab Units 07/17/20  0633  07/14/20  1156 07/14/20  0533   POTASSIUM mmol/L 3 6   < > 3 6  --    CHLORIDE mmol/L 96*   < > 96*  --    CO2 mmol/L 34*   < > 43*  --    CO2, I-STAT mmol/L  --   --   --  >45*   BUN mg/dL 20   < > 15  --    CREATININE mg/dL 0 69   < > 0 61  --    CALCIUM mg/dL 7 8*   < > 7 3*  --    ALK PHOS U/L  --   --  44*  --    ALT U/L  --   --  7*  --    AST U/L  --   --  21  --    GLUCOSE, ISTAT mg/dl  --   --   --  101    < > = values in this interval not displayed  Results from last 7 days   Lab Units 07/15/20  0515   INR  1 24*       * I Have Reviewed All Lab Data Listed Above  * Additional Pertinent Lab Tests Reviewed:  [facility] Admission Reviewed    Imaging:    Imaging Reports Reviewed Today Include: CXR which showed persistent small left pneumothorax, mild bibasilar atelectasias  Imaging Personally Reviewed by Myself Includes: CXR    Recent Cultures (last 7 days):     Results from last 7 days   Lab Units 07/13/20  1652   GRAM STAIN RESULT  No Polys or Bacteria seen  No Polys or Bacteria seen   BODY FLUID CULTURE, STERILE  No growth  No growth       Last 24 Hours Medication List:     Current Facility-Administered Medications:  albuterol 2 puff Inhalation Q6H PRN Boone Fuller MD   enoxaparin 40 mg Subcutaneous Daily Boone Fuller MD guaiFENesin 600 mg Oral Q12H [facility] Alek Genao, MD   levalbuterol 0 63 mg Nebulization TID Alek Genao, MD   levothyroxine 50 mcg Oral Daily Alek Genao, MD   montelukast 10 mg Oral HS Alek Genao, MD   pantoprazole 40 mg Oral Early Morning Alek Genao MD   potassium chloride 40 mEq Oral Q6H Kavon Caceres, MD   pravastatin 40 mg Oral Daily With Avis Moralez MD   terazosin 5 mg Oral HS Alek Genao, MD   timolol 1 drop Both Eyes BID Alek Genao, MD        Today, Patient Was Seen By: Alek Genao MD    ** Please Note: This note has been constructed using a voice recognition system   **

## 2020-07-17 NOTE — QUICK NOTE
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

## 2020-07-17 NOTE — UTILIZATION REVIEW
Continued Stay Review    Date: 7/17/2020                     Current Patient Class: inpatient Current Level of Care: Med/Surg    HPI:89 y o  male initially admitted on 7/12 with Acute hypoxic RF, acute hypercarbic RF  B/L hydropneumothorax  Assessment/Plan:  Sob & hypoxia improving  Continues on O2 @ 2L nc  Continues with B/L chest tubes with copious serosang output  R tube 500 over past 24 hrs  L 100 > past 3 hrs  Pleuravac changed  F/u on 2nd cyto, if neg, recommend CT surgery eval for pleuroscopy  Etiology unclear, potential malignancy infiltrating visceral pleural  Cont  Xopenex and singulair  Cardiology following for CHF management  Continues with 1+ pitting edema R>L  Received IV Diamox 500 mg TID yesterday  24 hour I&O balance: -760 mL (UO 1250 mL; RCT 90 ml,  ml); overall -9 3 L   Pt daughter who is POA expresses she does not want pt to have major surgery/decortication  Is amendable to pleurodesis or catheter placement  Pertinent Labs/Diagnostic Results:   Results from last 7 days   Lab Units 07/12/20  1526   SARS-COV-2  Negative     Results from last 7 days   Lab Units 07/17/20  0633 07/16/20  0528 07/15/20  0515 07/14/20  0533 07/14/20  0429  07/12/20  1526   WBC Thousand/uL 4 96 4 07* 3 61*  --  5 30   < > 5 56   HEMOGLOBIN g/dL 13 1 12 7 12 2  --  13 2   < > 14 7   I STAT HEMOGLOBIN g/dl  --   --   --  13 3  --   --   --    HEMATOCRIT % 40 8 39 1 37 3  --  41 4   < > 46 2   HEMATOCRIT, ISTAT %  --   --   --  39  --   --   --    PLATELETS Thousands/uL 124* 102* 87*  --  94*   < > 115*   NEUTROS ABS Thousands/µL 3 38 2 93  --   --   --   --  4 47    < > = values in this interval not displayed       Results from last 7 days   Lab Units 07/17/20  0633 07/16/20  0528 07/15/20  0515 07/15/20  0011 07/14/20  1808  07/14/20  0533 07/14/20  0429  07/13/20  0440 07/12/20  1526   SODIUM mmol/L 132* 133* 134* 133* 132*   < >  --  137   < > 140 140   POTASSIUM mmol/L 3 6 4 2 4 2 4 2 4 2   < >  --  4 0   < > 3 9 4 2   CHLORIDE mmol/L 96* 94* 93* 93* 91*   < >  --  94*   < > 95* 95*   CO2 mmol/L 34* 44* 44* 43* 45*   < >  --  >45*   < > >45* 44*   CO2, I-STAT mmol/L  --   --   --   --   --   --  >45*  --   --   --   --    ANION GAP mmol/L 2* -5* -3* -3* -4*   < >  --   --    < >  --  1*   BUN mg/dL 20 21 17 18 19   < >  --  16   < > 14 17   CREATININE mg/dL 0 69 0 65 0 53* 0 60 0 71   < >  --  0 70   < > 0 54* 0 64   EGFR ml/min/1 73sq m 83 85 92 88 82   < >  --  83   < > 92 86   CALCIUM mg/dL 7 8* 7 9* 7 8* 7 7* 7 9*   < >  --  8 3   < > 8 4 8 7   CALCIUM, IONIZED mmol/L  --   --  1 03*  --   --   --   --   --   --   --   --    CALCIUM, IONIZED, ISTAT mmol/L  --   --   --   --   --   --  1 16  --   --   --   --    MAGNESIUM mg/dL  --   --  2 1  --   --   --   --  2 7*  --  1 7 1 9   PHOSPHORUS mg/dL  --   --  2 0*  --   --   --   --  3 5  --   --   --     < > = values in this interval not displayed       Results from last 7 days   Lab Units 07/14/20  1156 07/13/20  1749 07/12/20  1526   AST U/L 21  --  26   ALT U/L 7*  --  14   ALK PHOS U/L 44*  --  66   TOTAL PROTEIN g/dL 5 0* 5 8* 6 5   ALBUMIN g/dL 2 9*  --  3 5   TOTAL BILIRUBIN mg/dL 1 03*  --  0 98   BILIRUBIN DIRECT mg/dL 0 32*  --   --      Results from last 7 days   Lab Units 07/17/20  0633 07/16/20  0528 07/15/20  0515 07/15/20  0011 07/14/20  1808 07/14/20  1156 07/14/20  0429 07/13/20  1748 07/13/20  0440 07/12/20  1526   GLUCOSE RANDOM mg/dL 94 85 84 86 161* 84 108 110 80 111     Results from last 7 days   Lab Units 07/15/20  0541   PH ART  7 450   PCO2 ART mm Hg 64 7*   PO2 ART mm Hg 57 8*   HCO3 ART mmol/L 44 0*   BASE EXC ART mmol/L 16 8   O2 CONTENT ART mL/dL 16 5   O2 HGB, ARTERIAL % 91 1*   ABG SOURCE  Radial, Left     Results from last 7 days   Lab Units 07/17/20  0633 07/16/20  0528 07/15/20  0516   PH KANCHAN  7 317 7 404* 7 464*   PCO2 KANCHAN mm Hg 66 4* 71 6* 59 6*   PO2 KANCHAN mm Hg 63 6* 30 0* 52 9*   HCO3 KANCHAN mmol/L 33 2* 43 8* 41 8* BASE EXC KANCHAN mmol/L 5 0 15 5 15 3   O2 CONTENT KANCHAN ml/dL 17 3 11 5 16 1   O2 HGB, VENOUS % 89 7* 61 4 88 7*     Results from last 7 days   Lab Units 07/14/20  0533   I STAT BASE EXC mmol/L 20*   I STAT O2 SAT % 97*   ISTAT PH ART  7 405   I STAT ART PCO2 mm HG 78 3*   I STAT ART PO2 mm HG 97 0   I STAT ART HCO3 mmol/L 49 0*     Results from last 7 days   Lab Units 07/12/20  1526   TROPONIN I ng/mL 0 02     Results from last 7 days   Lab Units 07/15/20  0515 07/12/20  1526   PROTIME seconds 15 0* 14 0   INR  1 24* 1 14   PTT seconds  --  28     Results from last 7 days   Lab Units 07/12/20  1526   TSH 3RD GENERATON uIU/mL 4 540*     Results from last 7 days   Lab Units 07/13/20  1741   PROCALCITONIN ng/ml <0 05     Results from last 7 days   Lab Units 07/13/20  1741   LACTIC ACID mmol/L 1 0     Results from last 7 days   Lab Units 07/12/20  1526   NT-PRO BNP pg/mL 556*     Results from last 7 days   Lab Units 07/13/20  1652   GRAM STAIN RESULT  No Polys or Bacteria seen  No Polys or Bacteria seen   BODY FLUID CULTURE, STERILE  No growth  No growth     Results from last 7 days   Lab Units 07/13/20  1652   TOTAL COUNTED  100  100   WBC FLUID /ul 1,156  543     Vital Signs:   Time  Temp  Pulse  Resp  BP  MAP (mmHg)  SpO2  FiO2 (%)  Calculated FIO2 (%) - Nasal Cannula  Nasal Cannula O2 Flow Rate (L/min)  O2 Device   07/17/20 1422            96 %    28  2 L/min  Nasal cannula   07/17/20 0726            95 %    28  2 L/min  Nasal cannula   07/17/20 0702  97 7 °F (36 5 °C)  65  16  102/56    96 %        Nasal cannula   07/16/20 2124  97 7 °F (36 5 °C)  68  18  127/60    98 %           07/16/20 1500  97 7 °F (36 5 °C)  73  16  108/54  77  95 %        Nasal cannula   07/16/20 0800              40         07/16/20 0700  97 6 °F (36 4 °C)  71  16  105/55  77  97 %        Nasal cannula   07/15/20 2000              40         07/15/20 1746  98 °F (36 7 °C)  63  16  92/53  70  98 %        Nasal cannula   07/15/20 1730              40         07/15/20 1100  97 8 °F (36 6 °C)  63  16  116/58  83  100 %           07/15/20 0704            93 %    32  3 L/min  Nasal cannula   07/15/20 0700  98 1 °F (36 7 °C)  70  18  111/58  82  97 %                 Medications:   Scheduled Medications:  Medications:  enoxaparin 40 mg Subcutaneous Daily   guaiFENesin 600 mg Oral Q12H MARCIANO   levalbuterol 0 63 mg Nebulization TID   levothyroxine 50 mcg Oral Daily   montelukast 10 mg Oral HS   pantoprazole 40 mg Oral Early Morning   potassium chloride 40 mEq Oral Q6H   pravastatin 40 mg Oral Daily With Dinner   terazosin 5 mg Oral HS   timolol 1 drop Both Eyes BID        PRN Meds:  albuterol 2 puff Inhalation Q6H PRN       Discharge Plan: TBD    Network Utilization Review Department  [email or web address]  ATTENTION: Please call with any questions or concerns to [phone] and carefully listen to the prompts so that you are directed to the right person  All voicemails are confidential   Jason Dempsey all requests for admission clinical reviews, approved or denied determinations and any other requests to dedicated fax number below belonging to the campus where the patient is receiving treatment   List of dedicated fax numbers for the Facilities:  [address] [place] [address] DENIALS (Administrative/Medical Necessity) [phone]   [address] (Maternity/NICU/Pediatrics) [phone]   [facility] [phone]   [facility] [phone]   Jose Luis Perea [phone]   [address] [place] [address]  [phone]   Carolyn Samuels [address] [facility] [phone]   [facility] [phone]   [address] [facility] [address]  [address] [facility] [address] [facility] [phone]

## 2020-07-17 NOTE — PLAN OF CARE
Problem: PHYSICAL THERAPY ADULT  Goal: Performs mobility at highest level of function for planned discharge setting  See evaluation for individualized goals  Description  Treatment/Interventions: LE strengthening/ROM, Functional transfer training, Therapeutic exercise, Endurance training, Cognitive reorientation, Patient/family training, Equipment eval/education, Bed mobility, Gait training, Spoke to nursing, Spoke to case management, OT          See flowsheet documentation for full assessment, interventions and recommendations  Outcome: Progressing  Note:   Prognosis: Fair  Problem List: Decreased strength, Decreased endurance, Impaired balance, Decreased mobility, Decreased cognition, Impaired judgement, Decreased safety awareness  Assessment: Pt was found supine in bed to begin session  He was able to perform all bed mobility and xfers with min Ax1 today  Pt denied any increased symptoms with transfers  He ambulated increased distances this session compared to the last one using a RW demonstrating no LOB  Pt did need cuing to widen his BILLY and to take longer steps  he was fatigued post ambulation but happy with his progress  He was then instructed on and performed seated TE with no complaints demonstrating decreased flexibility and decreased strength  Pt had all needs met and was repositioned supine in bed to end session  He would benefit from continued PT in order to promote safe and functional mobility  Barriers to Discharge: Decreased caregiver support     PT Discharge Recommendation: Post-Acute Rehabilitation Services          See flowsheet documentation for full assessment

## 2020-07-17 NOTE — ASSESSMENT & PLAN NOTE
· Patient was initiated on potassium as a correction with a goal of >4 0  · Today patient with potassium level of 3 6    Plan  - Patient currently on [address] , complete the replacement and continue monitoring levels  - Consider goal of potassium level greater than >4 0 if not replace  - Monitor for signs and symptoms of hypokalemia daily

## 2020-07-17 NOTE — PLAN OF CARE
Problem: Potential for Falls  Goal: Patient will remain free of falls  Description  INTERVENTIONS:  - Assess patient frequently for physical needs  -  Identify cognitive and physical deficits and behaviors that affect risk of falls  -  [place] fall precautions as indicated by assessment   - Educate patient/family on patient safety including physical limitations  - Instruct patient to call for assistance with activity based on assessment  - Modify environment to reduce risk of injury  - Consider OT/PT consult to assist with strengthening/mobility  Outcome: Progressing     Problem: Prexisting or High Potential for Compromised Skin Integrity  Goal: Skin integrity is maintained or improved  Description  INTERVENTIONS:  - Identify patients at risk for skin breakdown  - Assess and monitor skin integrity  - Assess and monitor nutrition and hydration status  - Monitor labs   - Assess for incontinence   - Turn and reposition patient  - Assist with mobility/ambulation  - Relieve pressure over bony prominences  - Avoid friction and shearing  - Provide appropriate hygiene as needed including keeping skin clean and dry  - Evaluate need for skin moisturizer/barrier cream  - Collaborate with interdisciplinary team   - Patient/family teaching  - Consider wound care consult   Outcome: Progressing     Problem: Nutrition/Hydration-ADULT  Goal: Nutrient/Hydration intake appropriate for improving, restoring or maintaining nutritional needs  Description  Monitor and assess patient's nutrition/hydration status for malnutrition  Collaborate with interdisciplinary team and initiate plan and interventions as ordered  Monitor patient's weight and dietary intake as ordered or per policy  Utilize nutrition screening tool and intervene as necessary  Determine patient's food preferences and provide high-protein, high-caloric foods as appropriate       INTERVENTIONS:  - Monitor oral intake, urinary output, labs, and treatment plans  - Assess nutrition and hydration status and recommend course of action  - Evaluate amount of meals eaten  - Assist patient with eating if necessary   - Allow adequate time for meals  - Recommend/ encourage appropriate diets, oral nutritional supplements, and vitamin/mineral supplements  - Order, calculate, and assess calorie counts as needed  - Recommend, monitor, and adjust tube feedings and TPN/PPN based on assessed needs  - Assess need for intravenous fluids  - Provide specific nutrition/hydration education as appropriate  - Include patient/family/caregiver in decisions related to nutrition  Outcome: Progressing     Problem: PAIN - ADULT  Goal: Verbalizes/displays adequate comfort level or baseline comfort level  Description  Interventions:  - Encourage patient to monitor pain and request assistance  - Assess pain using appropriate pain scale  - Administer analgesics based on type and severity of pain and evaluate response  - Implement non-pharmacological measures as appropriate and evaluate response  - Consider cultural and social influences on pain and pain management  - Notify physician/advanced practitioner if interventions unsuccessful or patient reports new pain  Outcome: Progressing     Problem: INFECTION - ADULT  Goal: Absence or prevention of progression during hospitalization  Description  INTERVENTIONS:  - Assess and monitor for signs and symptoms of infection  - Monitor lab/diagnostic results  - Monitor all insertion sites, i e  indwelling lines, tubes, and drains  - Monitor endotracheal if appropriate and nasal secretions for changes in amount and color  - [place] appropriate cooling/warming therapies per order  - Administer medications as ordered  - Instruct and encourage patient and family to use good hand hygiene technique  - Identify and instruct in appropriate isolation precautions for identified infection/condition  Outcome: Progressing  Goal: Absence of fever/infection during neutropenic period  Description  INTERVENTIONS:  - Monitor WBC    Outcome: Progressing     Problem: SAFETY ADULT  Goal: Maintain or return to baseline ADL function  Description  INTERVENTIONS:  -  Assess patient's ability to carry out ADLs; assess patient's baseline for ADL function and identify physical deficits which impact ability to perform ADLs (bathing, care of mouth/teeth, toileting, grooming, dressing, etc )  - Assess/evaluate cause of self-care deficits   - Assess range of motion  - Assess patient's mobility; develop plan if impaired  - Assess patient's need for assistive devices and provide as appropriate  - Encourage maximum independence but intervene and supervise when necessary  - Involve family in performance of ADLs  - Assess for home care needs following discharge   - Consider OT consult to assist with ADL evaluation and planning for discharge  - Provide patient education as appropriate  Outcome: Progressing  Goal: Maintain or return mobility status to optimal level  Description  INTERVENTIONS:  - Assess patient's baseline mobility status (ambulation, transfers, stairs, etc )    - Identify cognitive and physical deficits and behaviors that affect mobility  - Identify mobility aids required to assist with transfers and/or ambulation (gait belt, sit-to-stand, lift, walker, cane, etc )  - [place] fall precautions as indicated by assessment  - Record patient progress and toleration of activity level on Mobility SBAR; progress patient to next Phase/Stage  - Instruct patient to call for assistance with activity based on assessment  - Consider rehabilitation consult to assist with strengthening/weightbearing, etc   Outcome: Progressing     Problem: DISCHARGE PLANNING  Goal: Discharge to home or other facility with appropriate resources  Description  INTERVENTIONS:  - Identify barriers to discharge w/patient and caregiver  - Arrange for needed discharge resources and transportation as appropriate  - Identify discharge learning needs (meds, wound care, etc )  - Arrange for interpretive services to assist at discharge as needed  - Refer to Case Management Department for coordinating discharge planning if the patient needs post-hospital services based on physician/advanced practitioner order or complex needs related to functional status, cognitive ability, or social support system  Outcome: Progressing     Problem: Knowledge Deficit  Goal: Patient/family/caregiver demonstrates understanding of disease process, treatment plan, medications, and discharge instructions  Description  Complete learning assessment and assess knowledge base    Interventions:  - Provide teaching at level of understanding  - Provide teaching via preferred learning methods  Outcome: Progressing     Problem: RESPIRATORY - ADULT  Goal: Achieves optimal ventilation and oxygenation  Description  INTERVENTIONS:  - Assess for changes in respiratory status  - Assess for changes in mentation and behavior  - Position to facilitate oxygenation and minimize respiratory effort  - Oxygen administered by appropriate delivery if ordered  - Initiate smoking cessation education as indicated  - Encourage broncho-pulmonary hygiene including cough, deep breathe, Incentive Spirometry  - Assess the need for suctioning and aspirate as needed  - Assess and instruct to report SOB or any respiratory difficulty  - Respiratory Therapy support as indicated  Outcome: Progressing     Problem: GENITOURINARY - ADULT  Goal: Maintains or returns to baseline urinary function  Description  INTERVENTIONS:  - Assess urinary function  - Encourage oral fluids to ensure adequate hydration if ordered  - Administer IV fluids as ordered to ensure adequate hydration  - Administer ordered medications as needed  - Offer frequent toileting  - Follow urinary retention protocol if ordered  Outcome: Progressing  Goal: Absence of urinary retention  Description  INTERVENTIONS:  - Assess patients ability to void and empty bladder  - Monitor I/O  - Bladder scan as needed  - Discuss with physician/AP medications to alleviate retention as needed  - Discuss catheterization for long term situations as appropriate  Outcome: Progressing  Goal: Urinary catheter remains patent  Description  INTERVENTIONS:  - Assess patency of urinary catheter  - If patient has a chronic can, consider changing catheter if non-functioning  - Follow guidelines for intermittent irrigation of non-functioning urinary catheter  Outcome: Progressing     Problem: METABOLIC, FLUID AND ELECTROLYTES - ADULT  Goal: Electrolytes maintained within normal limits  Description  INTERVENTIONS:  - Monitor labs and assess patient for signs and symptoms of electrolyte imbalances  - Administer electrolyte replacement as ordered  - Monitor response to electrolyte replacements, including repeat lab results as appropriate  - Instruct patient on fluid and nutrition as appropriate  Outcome: Progressing  Goal: Fluid balance maintained  Description  INTERVENTIONS:  - Monitor labs   - Monitor I/O and WT  - Instruct patient on fluid and nutrition as appropriate  - Assess for signs & symptoms of volume excess or deficit  Outcome: Progressing  Goal: Glucose maintained within target range  Description  INTERVENTIONS:  - Monitor Blood Glucose as ordered  - Assess for signs and symptoms of hyperglycemia and hypoglycemia  - Administer ordered medications to maintain glucose within target range  - Assess nutritional intake and initiate nutrition service referral as needed  Outcome: Progressing     Problem: SKIN/TISSUE INTEGRITY - ADULT  Goal: Skin integrity remains intact  Description  INTERVENTIONS  - Identify patients at risk for skin breakdown  - Assess and monitor skin integrity  - Assess and monitor nutrition and hydration status  - Monitor labs (i e  albumin)  - Assess for incontinence   - Turn and reposition patient  - Assist with mobility/ambulation  - Relieve pressure over bony prominences  - Avoid friction and shearing  - Provide appropriate hygiene as needed including keeping skin clean and dry  - Evaluate need for skin moisturizer/barrier cream  - Collaborate with interdisciplinary team (i e  Nutrition, Rehabilitation, etc )   - Patient/family teaching  Outcome: Progressing  Goal: Incision(s), wounds(s) or drain site(s) healing without S/S of infection  Description  INTERVENTIONS  - Assess and document risk factors for skin impairment   - Assess and document dressing, incision, wound bed, drain sites and surrounding tissue  - Consider nutrition services referral as needed  - Oral mucous membranes remain intact  - Provide patient/ family education  Outcome: Progressing  Goal: Oral mucous membranes remain intact  Description  INTERVENTIONS  - Assess oral mucosa and hygiene practices  - Implement preventative oral hygiene regimen  - Implement oral medicated treatments as ordered  - Initiate Nutrition services referral as needed  Outcome: Progressing     Problem: HEMATOLOGIC - ADULT  Goal: Maintains hematologic stability  Description  INTERVENTIONS  - Assess for signs and symptoms of bleeding or hemorrhage  - Monitor labs  - Administer supportive blood products/factors as ordered and appropriate  Outcome: Progressing     Problem: MUSCULOSKELETAL - ADULT  Goal: Maintain or return mobility to safest level of function  Description  INTERVENTIONS:  - Assess patient's ability to carry out ADLs; assess patient's baseline for ADL function and identify physical deficits which impact ability to perform ADLs (bathing, care of mouth/teeth, toileting, grooming, dressing, etc )  - Assess/evaluate cause of self-care deficits   - Assess range of motion  - Assess patient's mobility  - Assess patient's need for assistive devices and provide as appropriate  - Encourage maximum independence but intervene and supervise when necessary  - Involve family in performance of ADLs  - Assess for home care needs following discharge   - Consider OT consult to assist with ADL evaluation and planning for discharge  - Provide patient education as appropriate  Outcome: Progressing  Goal: Maintain proper alignment of affected body part  Description  INTERVENTIONS:  - Support, maintain and protect limb and body alignment  - Provide patient/ family with appropriate education  Outcome: Progressing

## 2020-07-17 NOTE — PROGRESS NOTES
[address] [facility] NOTE   Aries Luca 80 y o  male MRN: [id number]  Unit/Bed#: S -01 Encounter: [id number]  Reason for Consult: Metabolic alkalosis    ASSESSMENT and PLAN:  1  Metabolic alkalosis: Resolved after Diamox  2  Hypokalemia: Kdur 40 meq PO x 2 doses today  3  Hyponatremia: Possibly related to CHF vs ongoing pulmonary process  4  Respiratory acidosis: CO2 on VBG is stable  5  CHF: per cardiology  6  Hypoxic and hypercarbic resp failure  7  Bilateral hydropneumothorax s/p chest tube placement  DISPOSITION:  · Metabolic alkalosis resolved  · Kdur 40 meq PO BID x 2 doses today  Nothing further to add from a renal standpoint  Will sign off  Feel free to call us back for any questions or concerns  Thank you! SUBJECTIVE / INTERVAL HISTORY:  Feels okay  No worsening SOB  OBJECTIVE:  Current Weight: Weight - Scale: 65 6 kg (144 lb 10 oz)  Vitals:    07/17/20 0028 07/17/20 0600 07/17/20 0702 07/17/20 0726   BP: 109/55  102/56    BP Location:   Right arm    Pulse: 74  65    Resp:   16    Temp:   97 7 °F (36 5 °C)    TempSrc:   Oral    SpO2:   96% 95%   Weight:  65 6 kg (144 lb 10 oz)     Height:           Intake/Output Summary (Last 24 hours) at 7/17/2020 1228  Last data filed at 7/17/2020 1022  Gross per 24 hour   Intake 1100 ml   Output 2000 ml   Net -900 ml     General: conscious, cooperative, no distress  Skin: dry  Eyes: pink conjunctivae  ENT: moist mucous membranes  Chest/Lungs: equal chest expansion, decreased in bases, bilateral CT in place  CVS: distinct heart sounds, normal rate, regular rhythm, no rub  Abdomen: soft, non tender, non distended, normal bowel sounds  Extremities: (+) LE edema  : no can catheter  Neuro: awake, alert     Psych: appropriate affect    Medications:    Current Facility-Administered Medications:     albuterol (PROVENTIL HFA,VENTOLIN HFA) inhaler 2 puff, 2 puff, Inhalation, Q6H PRN, Talon Cheung MD    enoxaparin (LOVENOX) subcutaneous injection 40 mg, 40 mg, Subcutaneous, Daily, Anita Hill MD, 40 mg at 07/17/20 0908    guaiFENesin (MUCINEX) 12 hr tablet 600 mg, 600 mg, Oral, Q12H [facility], Anita Hill MD, 600 mg at 07/17/20 0908    levalbuterol (Cletis Clack) inhalation solution 0 63 mg, 0 63 mg, Nebulization, TID, Anita Hill MD, 0 63 mg at 07/17/20 0726    levothyroxine tablet 50 mcg, 50 mcg, Oral, Daily, Anita Hill MD, 50 mcg at 07/17/20 0605    montelukast (SINGULAIR) tablet 10 mg, 10 mg, Oral, HS, Anita Hill MD, 10 mg at 07/16/20 2118    pantoprazole (PROTONIX) EC tablet 40 mg, 40 mg, Oral, Early Morning, Anita Hill MD, 40 mg at 07/17/20 0605    pravastatin (PRAVACHOL) tablet 40 mg, 40 mg, Oral, Daily With Lu Tineo MD, 40 mg at 07/16/20 1730    terazosin (HYTRIN) capsule 5 mg, 5 mg, Oral, HS, Anita Hill MD, 5 mg at 07/15/20 2100    timolol (TIMOPTIC) 0 5 % ophthalmic solution 1 drop, 1 drop, Both Eyes, BID, Anita Hill MD, 1 drop at 07/17/20 0908    Laboratory Results:  Results from last 7 days   Lab Units 07/17/20  0633 07/16/20  0528 07/15/20  0515 07/15/20  0011 07/14/20  1808 07/14/20  1156 07/14/20  0533 07/14/20  0429  07/13/20  0440 07/12/20  1526   WBC Thousand/uL 4 96 4 07* 3 61*  --   --   --   --  5 30  --  3 51* 5 56   HEMOGLOBIN g/dL 13 1 12 7 12 2  --   --   --   --  13 2  --  13 0 14 7   I STAT HEMOGLOBIN g/dl  --   --   --   --   --   --  13 3  --   --   --   --    HEMATOCRIT % 40 8 39 1 37 3  --   --   --   --  41 4  --  40 9 46 2   HEMATOCRIT, ISTAT %  --   --   --   --   --   --  39  --   --   --   --    PLATELETS Thousands/uL 124* 102* 87*  --   --   --   --  94*  --  90* 115*   POTASSIUM mmol/L 3 6 4 2 4 2 4 2 4 2 3 6  --  4 0   < > 3 9 4 2   CHLORIDE mmol/L 96* 94* 93* 93* 91* 96*  --  94*   < > 95* 95*   CO2 mmol/L 34* 44* 44* 43* 45* 43*  --  >45*   < > >45* 44*   CO2, I-STAT mmol/L  --   --   --   --   --   --  >45*  --   --   --   --    BUN mg/dL 20 21 17 18 19 15  --  16   < > 14 17   CREATININE mg/dL 0 69 0 65 0 53* 0 60 0 71 0 61  --  0 70   < > 0 54* 0 64   CALCIUM mg/dL 7 8* 7 9* 7 8* 7 7* 7 9* 7 3*  --  8 3   < > 8 4 8 7   MAGNESIUM mg/dL  --   --  2 1  --   --   --   --  2 7*  --  1 7 1 9   PHOSPHORUS mg/dL  --   --  2 0*  --   --   --   --  3 5  --   --   --    GLUCOSE, ISTAT mg/dl  --   --   --   --   --   --  101  --   --   --   --     < > = values in this interval not displayed

## 2020-07-17 NOTE — ASSESSMENT & PLAN NOTE
Today Patient with Sodium levels 132, currently asymptomatic, Likely due to his underlying diagnosis of congestive heart failure    Plan    -Continue to monitor sodium levels daily  - Assess for signs and symptoms of hyponatremia

## 2020-07-18 LAB
ANION GAP SERPL CALCULATED.3IONS-SCNC: 0 MMOL/L (ref 4–13)
BASE EX.OXY STD BLDV CALC-SCNC: 95.9 % (ref 60–80)
BASE EXCESS BLDV CALC-SCNC: 6.4 MMOL/L
BASOPHILS # BLD AUTO: 0.03 THOUSANDS/ΜL (ref 0–0.1)
BASOPHILS NFR BLD AUTO: 1 % (ref 0–1)
BUN SERPL-MCNC: 22 MG/DL (ref 5–25)
CALCIUM SERPL-MCNC: 7.9 MG/DL (ref 8.3–10.1)
CHLORIDE SERPL-SCNC: 97 MMOL/L (ref 100–108)
CO2 SERPL-SCNC: 36 MMOL/L (ref 21–32)
CREAT SERPL-MCNC: 0.58 MG/DL (ref 0.6–1.3)
EOSINOPHIL # BLD AUTO: 0.28 THOUSAND/ΜL (ref 0–0.61)
EOSINOPHIL NFR BLD AUTO: 6 % (ref 0–6)
ERYTHROCYTE [DISTWIDTH] IN BLOOD BY AUTOMATED COUNT: 13.6 % (ref 11.6–15.1)
GFR SERPL CREATININE-BSD FRML MDRD: 89 ML/MIN/1.73SQ M
GLUCOSE SERPL-MCNC: 99 MG/DL (ref 65–140)
HCO3 BLDV-SCNC: 35.2 MMOL/L (ref 24–30)
HCT VFR BLD AUTO: 41.6 % (ref 36.5–49.3)
HGB BLD-MCNC: 13.4 G/DL (ref 12–17)
IMM GRANULOCYTES # BLD AUTO: 0.02 THOUSAND/UL (ref 0–0.2)
IMM GRANULOCYTES NFR BLD AUTO: 0 % (ref 0–2)
LYMPHOCYTES # BLD AUTO: 0.49 THOUSANDS/ΜL (ref 0.6–4.47)
LYMPHOCYTES NFR BLD AUTO: 10 % (ref 14–44)
MCH RBC QN AUTO: 31.9 PG (ref 26.8–34.3)
MCHC RBC AUTO-ENTMCNC: 32.2 G/DL (ref 31.4–37.4)
MCV RBC AUTO: 99 FL (ref 82–98)
MONOCYTES # BLD AUTO: 0.59 THOUSAND/ΜL (ref 0.17–1.22)
MONOCYTES NFR BLD AUTO: 12 % (ref 4–12)
NEUTROPHILS # BLD AUTO: 3.33 THOUSANDS/ΜL (ref 1.85–7.62)
NEUTS SEG NFR BLD AUTO: 71 % (ref 43–75)
NRBC BLD AUTO-RTO: 0 /100 WBCS
O2 CT BLDV-SCNC: 19 ML/DL
PCO2 BLDV: 71.5 MM HG (ref 42–50)
PH BLDV: 7.31 [PH] (ref 7.3–7.4)
PLATELET # BLD AUTO: 140 THOUSANDS/UL (ref 149–390)
PMV BLD AUTO: 10.1 FL (ref 8.9–12.7)
PO2 BLDV: 199.2 MM HG (ref 35–45)
POTASSIUM SERPL-SCNC: 4.2 MMOL/L (ref 3.5–5.3)
RBC # BLD AUTO: 4.2 MILLION/UL (ref 3.88–5.62)
SODIUM SERPL-SCNC: 133 MMOL/L (ref 136–145)
WBC # BLD AUTO: 4.74 THOUSAND/UL (ref 4.31–10.16)

## 2020-07-18 PROCEDURE — 94760 N-INVAS EAR/PLS OXIMETRY 1: CPT

## 2020-07-18 PROCEDURE — 94668 MNPJ CHEST WALL SBSQ: CPT

## 2020-07-18 PROCEDURE — 99232 SBSQ HOSP IP/OBS MODERATE 35: CPT | Performed by: INTERNAL MEDICINE

## 2020-07-18 PROCEDURE — 80048 BASIC METABOLIC PNL TOTAL CA: CPT | Performed by: INTERNAL MEDICINE

## 2020-07-18 PROCEDURE — 94640 AIRWAY INHALATION TREATMENT: CPT

## 2020-07-18 PROCEDURE — 85025 COMPLETE CBC W/AUTO DIFF WBC: CPT | Performed by: INTERNAL MEDICINE

## 2020-07-18 PROCEDURE — 82805 BLOOD GASES W/O2 SATURATION: CPT | Performed by: INTERNAL MEDICINE

## 2020-07-18 PROCEDURE — 99232 SBSQ HOSP IP/OBS MODERATE 35: CPT | Performed by: HOSPITALIST

## 2020-07-18 RX ADMIN — TIMOLOL MALEATE 1 DROP: 5 SOLUTION/ DROPS OPHTHALMIC at 08:28

## 2020-07-18 RX ADMIN — LEVALBUTEROL HYDROCHLORIDE 0.63 MG: 0.63 SOLUTION RESPIRATORY (INHALATION) at 19:42

## 2020-07-18 RX ADMIN — MONTELUKAST SODIUM 10 MG: 10 TABLET, FILM COATED ORAL at 21:06

## 2020-07-18 RX ADMIN — GUAIFENESIN 600 MG: 600 TABLET, EXTENDED RELEASE ORAL at 08:28

## 2020-07-18 RX ADMIN — PANTOPRAZOLE SODIUM 40 MG: 40 TABLET, DELAYED RELEASE ORAL at 06:21

## 2020-07-18 RX ADMIN — ENOXAPARIN SODIUM 40 MG: 40 INJECTION SUBCUTANEOUS at 08:28

## 2020-07-18 RX ADMIN — TIMOLOL MALEATE 1 DROP: 5 SOLUTION/ DROPS OPHTHALMIC at 17:41

## 2020-07-18 RX ADMIN — LEVOTHYROXINE SODIUM 50 MCG: 50 TABLET ORAL at 06:21

## 2020-07-18 RX ADMIN — GUAIFENESIN 600 MG: 600 TABLET, EXTENDED RELEASE ORAL at 21:06

## 2020-07-18 RX ADMIN — TERAZOSIN HYDROCHLORIDE 5 MG: 5 CAPSULE ORAL at 21:06

## 2020-07-18 RX ADMIN — PRAVASTATIN SODIUM 40 MG: 40 TABLET ORAL at 17:41

## 2020-07-18 RX ADMIN — LEVALBUTEROL HYDROCHLORIDE 0.63 MG: 0.63 SOLUTION RESPIRATORY (INHALATION) at 07:06

## 2020-07-18 RX ADMIN — LEVALBUTEROL HYDROCHLORIDE 0.63 MG: 0.63 SOLUTION RESPIRATORY (INHALATION) at 13:24

## 2020-07-18 NOTE — ASSESSMENT & PLAN NOTE
Wt Readings from Last 3 Encounters:   07/18/20 63 3 kg (139 lb 8 8 oz)   02/14/20 74 9 kg (165 lb 1 6 oz)   12/17/19 77 3 kg (170 lb 6 4 oz)   I/O last 3 completed shifts: In: 2588 [P O :1580]  Out: 8617 [Urine:1775; Chest Tube:1695]  I/O this shift:  In: 180 [P O :180]  Out: 175 [Urine:175]    · Patient's last echo completed in October of 2019 showed ejection fraction of 50%  Upon assessment patient appears to be fluid overloaded given lower extremity edema and pleural effusions seen on chest x-ray and slightly elevated BNP of 556  Patient is scheduled to have a repeat echo on 07/31/2020  · Patient regularly follows Dr Myriam Brewer  Echocardiogram on 7/13/2020 shows large pleural effusions and slightly enlarged pericardium  · Chronic +1 pitting extremities edema , currently with oxygen with nasal canula, no evidence of acute distress, sodium levels of 132 likely due to his congestive heart failure  Plan:    · Cardiology on board will follow-up on 7/20  · Continue to hold Lasix and blood pressure medication as patient still with low blood pressure levels  · Consider initiation of albumin in case of hypotension  · Continue Oxygen on nasal canula  · Monitor oxygen saturations levels daily  · Monitor signs and symptoms of volume overload daily

## 2020-07-18 NOTE — PROGRESS NOTES
Progress Note Briana Deras 3/24/1929, 80 y o  male MRN: [id number]    Unit/Bed#: S -01 Encounter: [id number]    Primary Care Provider: Nikhil Hdez MD   Date and time admitted to hospital: 7/12/2020  2:58 PM        Bilateral hydropneumothorax  Assessment & Plan  · Unclear of effusions on chest x-ray are loculated  Patient also noted to have wheezing on exam as well as increased supplemental oxygen needs  Patient responded well to last thoracocentesis on October of 2019 with resolution of symptoms noted  · Patients CT showed bilateral hydropneumothorax with pleural effusions, interventional radiology was consulted which consider the patient a candidate for bilateral chest tube  · Bilateral chest tube was placed successfully 7 13 2020  · Pleural fluid level : yellow, clear wbc 1 156  Pleural fluid culture with no growth        LDH body  169 serum 188 Total protein fluid 3 3 serum 5 8 likely exudative effusion  ·  Ct chest show improving bilateral hydropneumothorax, improving atelectasias, CXR on the other hand showed bilateral pleural drainage with no change in left pneumothorax and left base atelectasias  · 7/17 Chest Xray: Persistent small left pneumothorax  Mild bibasilar atelectasis  Plan:  · Pulmonology recommendation: 2nd cytology is negative will have CT surgery evaluate patient for possible fluoroscopy  Etiology of pleural effusions are unclear at this time  Possibly malignancy infiltrating the visceral pleura  Will need continued workup and monitoring    · None gynecologic cytology an amylase still in progress from 07/13 study, however previous study was negative  · Patient requires by our chest tube still will monitor output  · Patient may need to be transferred to [facility] for thoracic surgery evaluation  · Consider incentive spirometry for atelectasia prevention  · Continue to monitor SpO2 levels daily    Acute on chronic respiratory failure with hypoxia and hypercapnia [facility])  Assessment & Plan  · Patient currently requires increased supplemental oxygen as compared to baseline (3 L nasal cannula at home)  Currently on 10 L high-flow nasal cannula  Condition likely secondary to acute exacerbation of CHF and pleural effusions noted on chest x-ray  Patient regularly follows Dr Dontae Stewart from pulmonology  Low suspicion for infection given patient's negative COVID-19 testing, no elevated white count, and no fever but a viral etiology is still a possibility  Given patient's continued acute hypoxemia, cannot completely rule out PE  Per pulmonology, effusions on chest x-ray does not completely explain patient's hypoxia  Patient appears to be severely ill  · Patients CT showed Bilateral Hydropneumothorax/ pleural effusions  · Acccording with above, Interventional radiology was consulted considering the placement of bilateral chest tube  · Bilateral chest tube was successfully place 7 13 2020  Today:  On assessment patient has no subjective complain, denies any shortness of breath or difficulty breathing  Plan:    · Maintain SpO2 between 89% to 94% given patient's history of COPD  · Repeat BMP to monitor hypercapnia daily  ·  Repeat VBG daily    Acute on chronic diastolic heart failure [facility])  Assessment & Plan  Wt Readings from Last 3 Encounters:   07/18/20 63 3 kg (139 lb 8 8 oz)   02/14/20 74 9 kg (165 lb 1 6 oz)   12/17/19 77 3 kg (170 lb 6 4 oz)   I/O last 3 completed shifts: In: 0348 [P O :1580]  Out: 5640 [Urine:1775; Chest Tube:1695]  I/O this shift:  In: 180 [P O :180]  Out: 175 [Urine:175]    · Patient's last echo completed in October of 2019 showed ejection fraction of 50%  Upon assessment patient appears to be fluid overloaded given lower extremity edema and pleural effusions seen on chest x-ray and slightly elevated BNP of 556  Patient is scheduled to have a repeat echo on 07/31/2020  · Patient regularly follows Dr Toño Guadalupe    Echocardiogram on 7/13/2020 shows large pleural effusions and slightly enlarged pericardium  · Chronic +1 pitting extremities edema , currently with oxygen with nasal canula, no evidence of acute distress, sodium levels of 132 likely due to his congestive heart failure  Plan:    · Cardiology on board will follow-up on 7/20  · Continue to hold Lasix and blood pressure medication as patient still with low blood pressure levels  · Consider initiation of albumin in case of hypotension  · Continue Oxygen on nasal canula  · Monitor oxygen saturations levels daily  · Monitor signs and symptoms of volume overload daily  * 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 acidosis  Assessment & Plan  · Today patient with evidence of resolution of his metabolic alkalosis , presenting a ph 7 31 pcO2 66 9 base excess 5 0  with diamox therapy, improvement in hypercapnia as well today with CO2 levels of 34  Patient was evaluated for Nephrology which considers resolution of the imbalance  patient with adequate respiratory pattern  No visible distress    Plan:    -  Appreciate  nephrology recommendations:  Nephrology has signed out, Metabolic alkalosis   -  Continue to monitor BMP am         Hyperlipidemia  Assessment & Plan  Patient is currently on simvastatin 20 mg    Plan:  · Convert patient to formulary pravastatin 40 mg p o  Daily    Essential hypertension  Assessment & Plan  Patient currently takes amlodipine, furosemide p o , and terazosin    Plan:  · Continue amlodipine and terazosin  · Hold p o   Furosemide    Hyponatremia  Assessment & Plan  Today Patient with Sodium levels 133, currently asymptomatic, Likely due to his underlying diagnosis of congestive heart failure    Plan    -Continue to monitor sodium levels daily  - Assess for signs and symptoms of hyponatremia    Chronic respiratory failure with hypoxia (HCC)  Assessment & Plan  · Patient currently on nasal canula, today patient pleasant , can hold a conversation without acute distress, Today patient with SpO2 96%    Plan    - Monitor SpO2 levels aiming SpO2 >90  - Continue oxygen 2L  - Pulmonology is following the case        VTE Pharmacologic Prophylaxis:   Pharmacologic: Enoxaparin (Lovenox)  Mechanical VTE Prophylaxis in Place: No    Discussions with Specialists or Other Care Team Provider:  Discussed with my attending current plan, pulmonology has seen the patient    Education and Discussions with Family / Patient:  Patient was updated with current plan at bedside, , called patient daughter who is the POA, and updated regarding current plans, waiting for further recommendation for possible transfer to [place] for evaluation of thoracic surgery, patient daughter would like to be called when he gets transferred  Current Length of Stay: 6 day(s)    Current Patient Status: Inpatient     Discharge Plan / Estimated Discharge Date: Will likely be transferred to [place] on Monday    Code Status: Level 3 - DNAR and DNI      Subjective:   Patient has no subjective complain, would like to get wash today and will sit beside the chair, patient reported he has been walking using a walker with physical therapy yesterday    Objective:     Vitals:   Temp (24hrs), Av 7 °F (36 5 °C), Min:97 6 °F (36 4 °C), Max:97 9 °F (36 6 °C)    Temp:  [97 6 °F (36 4 °C)-97 9 °F (36 6 °C)] 97 6 °F (36 4 °C)  HR:  [66-69] 67  Resp:  [16-18] 16  BP: (104-112)/(51-56) 112/56  SpO2:  [95 %-98 %] 98 %  Body mass index is 21 86 kg/m²  Input and Output Summary (last 24 hours): Intake/Output Summary (Last 24 hours) at 2020 1252  Last data filed at 2020 0836  Gross per 24 hour   Intake 660 ml   Output 1945 ml   Net -1285 ml       Physical Exam:     Physical Exam   Constitutional: He is oriented to person, place, and time  He appears well-developed and well-nourished  HENT:   Head: Normocephalic and atraumatic  Eyes: Pupils are equal, round, and reactive to light  EOM are normal    Neck: Normal range of motion  Neck supple  No JVD present  Cardiovascular: Normal rate, regular rhythm, normal heart sounds and intact distal pulses  Pulmonary/Chest: Effort normal    Clear upper lung sounds with mild rales in the base   Abdominal: Soft  Bowel sounds are normal    Musculoskeletal: Normal range of motion  He exhibits edema (Plus one edema on the ankle)  Neurological: He is alert and oriented to person, place, and time  Skin: Skin is warm and dry  Capillary refill takes less than 2 seconds  Psychiatric: He has a normal mood and affect  Additional Data:     Labs:    Results from last 7 days   Lab Units 07/18/20  0504   WBC Thousand/uL 4 74   HEMOGLOBIN g/dL 13 4   HEMATOCRIT % 41 6   PLATELETS Thousands/uL 140*   NEUTROS PCT % 71   LYMPHS PCT % 10*   MONOS PCT % 12   EOS PCT % 6     Results from last 7 days   Lab Units 07/18/20  0504  07/14/20  1156 07/14/20  0533   POTASSIUM mmol/L 4 2   < > 3 6  --    CHLORIDE mmol/L 97*   < > 96*  --    CO2 mmol/L 36*   < > 43*  --    CO2, I-STAT mmol/L  --   --   --  >45*   BUN mg/dL 22   < > 15  --    CREATININE mg/dL 0 58*   < > 0 61  --    CALCIUM mg/dL 7 9*   < > 7 3*  --    ALK PHOS U/L  --   --  44*  --    ALT U/L  --   --  7*  --    AST U/L  --   --  21  --    GLUCOSE, ISTAT mg/dl  --   --   --  101    < > = values in this interval not displayed  Results from last 7 days   Lab Units 07/15/20  0515   INR  1 24*       * I Have Reviewed All Lab Data Listed Above  * Additional Pertinent Lab Tests Reviewed:  [facility] Reviewed        Recent Cultures (last 7 days):     Results from last 7 days   Lab Units 07/13/20  1652   GRAM STAIN RESULT  No Polys or Bacteria seen  No Polys or Bacteria seen   BODY FLUID CULTURE, STERILE  No growth  No growth       Last 24 Hours Medication List:     Current Facility-Administered Medications:  albuterol 2 puff Inhalation Q6H PRN Marti Griffin MD   enoxaparin 40 mg Subcutaneous Daily Emmie Pal, MD   guaiFENesin 600 mg Oral Q12H [facility] Emmie Pal, MD   levalbuterol 0 63 mg Nebulization TID Emmie Pal MD   levothyroxine 50 mcg Oral Daily Emmie Pal MD   montelukast 10 mg Oral HS Emmie Pal, MD   pantoprazole 40 mg Oral Early Morning Emmie Pal MD   pravastatin 40 mg Oral Daily With Lorena Jack MD   terazosin 5 mg Oral HS Emmie Pal MD   timolol 1 drop Both Eyes BID Emmie Pal MD        Today, Patient Was Seen By: Jacquelin Palafox MD    ** Please Note: This note has been constructed using a voice recognition system   **

## 2020-07-18 NOTE — ASSESSMENT & PLAN NOTE
Today Patient with Sodium levels 133, currently asymptomatic, Likely due to his underlying diagnosis of congestive heart failure    Plan    -Continue to monitor sodium levels daily  - Assess for signs and symptoms of hyponatremia

## 2020-07-18 NOTE — ASSESSMENT & PLAN NOTE
· Today patient with evidence of resolution of his metabolic alkalosis , presenting a ph 7 31 pcO2 66 9 base excess 5 0  with diamox therapy, improvement in hypercapnia as well today with CO2 levels of 34  Patient was evaluated for Nephrology which considers resolution of the imbalance  patient with adequate respiratory pattern   No visible distress    Plan:    -  Appreciate  nephrology recommendations:  Nephrology has signed out, Metabolic alkalosis   -  Continue to monitor BMP am

## 2020-07-18 NOTE — ASSESSMENT & PLAN NOTE
· Unclear of effusions on chest x-ray are loculated  Patient also noted to have wheezing on exam as well as increased supplemental oxygen needs  Patient responded well to last thoracocentesis on October of 2019 with resolution of symptoms noted  · Patients CT showed bilateral hydropneumothorax with pleural effusions, interventional radiology was consulted which consider the patient a candidate for bilateral chest tube  · Bilateral chest tube was placed successfully 7 13 2020  · Pleural fluid level : yellow, clear wbc 1 156  Pleural fluid culture with no growth        LDH body  169 serum 188 Total protein fluid 3 3 serum 5 8 likely exudative effusion  ·  Ct chest show improving bilateral hydropneumothorax, improving atelectasias, CXR on the other hand showed bilateral pleural drainage with no change in left pneumothorax and left base atelectasias  · 7/17 Chest Xray: Persistent small left pneumothorax  Mild bibasilar atelectasis  Plan:  · Pulmonology recommendation: 2nd cytology is negative will have CT surgery evaluate patient for possible fluoroscopy  Etiology of pleural effusions are unclear at this time  Possibly malignancy infiltrating the visceral pleura  Will need continued workup and monitoring    · None gynecologic cytology an amylase still in progress from 07/13 study, however previous study was negative  · Patient requires by our chest tube still will monitor output  · Patient may need to be transferred to [place] for thoracic surgery evaluation  · Consider incentive spirometry for atelectasia prevention  · Continue to monitor SpO2 levels daily

## 2020-07-18 NOTE — ASSESSMENT & PLAN NOTE
· Patient currently requires increased supplemental oxygen as compared to baseline (3 L nasal cannula at home)  Currently on 10 L high-flow nasal cannula  Condition likely secondary to acute exacerbation of CHF and pleural effusions noted on chest x-ray  Patient regularly follows Dr Thao Ventura from pulmonology  Low suspicion for infection given patient's negative COVID-19 testing, no elevated white count, and no fever but a viral etiology is still a possibility  Given patient's continued acute hypoxemia, cannot completely rule out PE  Per pulmonology, effusions on chest x-ray does not completely explain patient's hypoxia  Patient appears to be severely ill  · Patients CT showed Bilateral Hydropneumothorax/ pleural effusions  · Acccording with above, Interventional radiology was consulted considering the placement of bilateral chest tube  · Bilateral chest tube was successfully place 7 13 2020        Today:  On assessment patient has no subjective complain, denies any shortness of breath or difficulty breathing  Plan:    · Maintain SpO2 between 89% to 94% given patient's history of COPD  · Repeat BMP to monitor hypercapnia daily  ·  Repeat VBG daily

## 2020-07-18 NOTE — ASSESSMENT & PLAN NOTE
· Patient currently on nasal canula, today patient pleasant , can hold a conversation without acute distress, Today patient with SpO2 96%    Plan    - Monitor SpO2 levels aiming SpO2 >90  - Continue oxygen 2L  - Pulmonology is following the case

## 2020-07-18 NOTE — PROGRESS NOTES
Progress Note - Pulmonary   Jesus Dave 80 y o  male MRN: [id number]  Unit/Bed#: S -01 Encounter: [id number]      Assessment / Plan:  1  Acute hypoxic and hypercarbic respiratory failure  · Continue to wean oxygen as tolerated  · Continue maintain SpO2 greater than or equal to 88 percent  Continue with cough and deep breathing exercises, out of bed as tolerated, increase activity as able    2  Bilateral hydropneumothorax status post bilateral chest tubes  · Cytology from 07/13/2020 is in process and pending at this time  Follow-up final asked results  · Previous cytology is negative for malignancy  · Continue bilateral chest tubes  · Continue to monitor output  · Patient put out approximately:  1 4 L through chest tubes in the past 24 hours  I personally drained about 400 cc from the right and 500 cc from the left this afternoon  Continue chest tubes to water seal   · If 2nd cytology is negative will have CT surgery evaluate patient for possible fluoroscopy  Etiology of pleural effusions are unclear at this time  Possibly malignancy infiltrating the visceral pleura  Will need continued workup and monitoring  3  COPD without acute exacerbation  · Continue Xopenex  · Continue Singulair    4  Acute congestive heart failure exacerbation  · Cardiology is consulted  Appreciate their input  · Patient underwent echocardiogram on 07/13/2020:  Ejection fraction 60 percent with no regional wall motion abnormalities  · Patient is status post 3 times doses of IV Diamox yesterday, continue to hold diuretics on oral antihypertensives given patient's low BP  · Continue with 2 grams sodium diet 100 1000 milliliter fluid restriction, daily weights, and I&O    5  Mixed acid-base disorder  · Patient has received 3 doses of IV Diamox in the past 24 hours with improving bicarb levels on VBG  Subjective:   Mr Dewey Ozuna is lying in bed upon my assessment today  He offers no complaints    He denies any pain at the chest tube insertion sites  He denies any significant events overnight  He feels he is able to take deep breath  He reports that he was able to ambulate yesterday with the assistance of the nursing staff at a walker  Objective:  Vitals: Blood pressure 112/56, pulse 67, temperature 97 6 °F (36 4 °C), temperature source Oral, resp  rate 16, height 5' 7" (1 702 m), weight 63 3 kg (139 lb 8 8 oz), SpO2 98 %  , 2 liters/minute nasal cannula, Body mass index is 21 86 kg/m²  Intake/Output Summary (Last 24 hours) at 7/18/2020 1009  Last data filed at 7/18/2020 0836  Gross per 24 hour   Intake 660 ml   Output 2095 ml   Net -1435 ml         Physical Exam  Gen: Awake, alert, oriented x 3, no acute distress  HEENT: Mucous membranes moist, no oral lesions, no thrush  NECK: No accessory muscle use, JVP not elevated  Cardiac: Regular, single S1, single S2, no murmurs, no rubs, no gallops  Lungs:  Breath sounds are decreased bilaterally throughout all lung fields without any wheezes, rales, rhonchi  Abdomen: normoactive bowel sounds, soft nontender, nondistended, no rebound or rigidity, no guarding  Extremities: no cyanosis, no clubbing, no edema    Labs: I have personally reviewed pertinent lab results  , ABG: No results found for: PHART, VNO9NNX, PO2ART, IAO8LPI, Y1IBUJFK, BEART, SOURCE, BNP: No results found for: BNP, CBC:   Lab Results   Component Value Date    WBC 4 74 07/18/2020    HGB 13 4 07/18/2020    HCT 41 6 07/18/2020    MCV 99 (H) 07/18/2020     (L) 07/18/2020    MCH 31 9 07/18/2020    MCHC 32 2 07/18/2020    RDW 13 6 07/18/2020    MPV 10 1 07/18/2020    NRBC 0 07/18/2020   , CMP:   Lab Results   Component Value Date    SODIUM 133 (L) 07/18/2020    K 4 2 07/18/2020    CL 97 (L) 07/18/2020    CO2 36 (H) 07/18/2020    BUN 22 07/18/2020    CREATININE 0 58 (L) 07/18/2020    CALCIUM 7 9 (L) 07/18/2020    EGFR 89 07/18/2020   , PT/INR: No results found for: PT, INR, Troponin: No results found for: TROPONINI     Imaging and other studies: I have personally reviewed pertinent reports  CXR 7/17/2020:  FINDINGS:     Cardiomediastinal silhouette appears unremarkable      Bibasilar pleural drainage catheters  Persistent small left pneumothorax with minimal subcutaneous emphysema in the left chest wall  Mild bibasilar atelectasis    Minimal subcutaneous emphysema in the left chest wall      Osseous structures appear within normal limits for patient age      IMPRESSION:     Persistent small left pneumothorax      Mild bibasilar atelectasis        Yuliet Marlow PA-C

## 2020-07-19 ENCOUNTER — APPOINTMENT (INPATIENT)
Dept: RADIOLOGY | Facility: HOSPITAL | Age: 85
DRG: 291 | End: 2020-07-19
Payer: COMMERCIAL

## 2020-07-19 LAB
ANION GAP SERPL CALCULATED.3IONS-SCNC: 1 MMOL/L (ref 4–13)
BASE EX.OXY STD BLDV CALC-SCNC: 90.9 % (ref 60–80)
BASE EXCESS BLDV CALC-SCNC: 2.5 MMOL/L
BUN SERPL-MCNC: 25 MG/DL (ref 5–25)
CALCIUM SERPL-MCNC: 7.9 MG/DL (ref 8.3–10.1)
CHLORIDE SERPL-SCNC: 98 MMOL/L (ref 100–108)
CO2 SERPL-SCNC: 35 MMOL/L (ref 21–32)
CREAT SERPL-MCNC: 0.6 MG/DL (ref 0.6–1.3)
ERYTHROCYTE [DISTWIDTH] IN BLOOD BY AUTOMATED COUNT: 13.6 % (ref 11.6–15.1)
GFR SERPL CREATININE-BSD FRML MDRD: 88 ML/MIN/1.73SQ M
GLUCOSE SERPL-MCNC: 85 MG/DL (ref 65–140)
HCO3 BLDV-SCNC: 31 MMOL/L (ref 24–30)
HCT VFR BLD AUTO: 41.4 % (ref 36.5–49.3)
HGB BLD-MCNC: 13 G/DL (ref 12–17)
MCH RBC QN AUTO: 31 PG (ref 26.8–34.3)
MCHC RBC AUTO-ENTMCNC: 31.4 G/DL (ref 31.4–37.4)
MCV RBC AUTO: 99 FL (ref 82–98)
O2 CT BLDV-SCNC: 18.1 ML/DL
PCO2 BLDV: 65.6 MM HG (ref 42–50)
PH BLDV: 7.29 [PH] (ref 7.3–7.4)
PLATELET # BLD AUTO: 134 THOUSANDS/UL (ref 149–390)
PMV BLD AUTO: 10 FL (ref 8.9–12.7)
PO2 BLDV: 60.7 MM HG (ref 35–45)
POTASSIUM SERPL-SCNC: 4 MMOL/L (ref 3.5–5.3)
RBC # BLD AUTO: 4.19 MILLION/UL (ref 3.88–5.62)
SODIUM SERPL-SCNC: 134 MMOL/L (ref 136–145)
WBC # BLD AUTO: 3.64 THOUSAND/UL (ref 4.31–10.16)

## 2020-07-19 PROCEDURE — 99232 SBSQ HOSP IP/OBS MODERATE 35: CPT | Performed by: HOSPITALIST

## 2020-07-19 PROCEDURE — 85027 COMPLETE CBC AUTOMATED: CPT | Performed by: INTERNAL MEDICINE

## 2020-07-19 PROCEDURE — 99232 SBSQ HOSP IP/OBS MODERATE 35: CPT | Performed by: INTERNAL MEDICINE

## 2020-07-19 PROCEDURE — 94760 N-INVAS EAR/PLS OXIMETRY 1: CPT

## 2020-07-19 PROCEDURE — 71045 X-RAY EXAM CHEST 1 VIEW: CPT

## 2020-07-19 PROCEDURE — 80048 BASIC METABOLIC PNL TOTAL CA: CPT | Performed by: INTERNAL MEDICINE

## 2020-07-19 PROCEDURE — 94640 AIRWAY INHALATION TREATMENT: CPT

## 2020-07-19 PROCEDURE — 82805 BLOOD GASES W/O2 SATURATION: CPT | Performed by: INTERNAL MEDICINE

## 2020-07-19 RX ADMIN — ENOXAPARIN SODIUM 40 MG: 40 INJECTION SUBCUTANEOUS at 08:36

## 2020-07-19 RX ADMIN — LEVALBUTEROL HYDROCHLORIDE 0.63 MG: 0.63 SOLUTION RESPIRATORY (INHALATION) at 13:13

## 2020-07-19 RX ADMIN — GUAIFENESIN 600 MG: 600 TABLET, EXTENDED RELEASE ORAL at 22:24

## 2020-07-19 RX ADMIN — PRAVASTATIN SODIUM 40 MG: 40 TABLET ORAL at 17:53

## 2020-07-19 RX ADMIN — GUAIFENESIN 600 MG: 600 TABLET, EXTENDED RELEASE ORAL at 08:35

## 2020-07-19 RX ADMIN — LEVALBUTEROL HYDROCHLORIDE 0.63 MG: 0.63 SOLUTION RESPIRATORY (INHALATION) at 19:26

## 2020-07-19 RX ADMIN — TIMOLOL MALEATE 1 DROP: 5 SOLUTION/ DROPS OPHTHALMIC at 08:36

## 2020-07-19 RX ADMIN — TERAZOSIN HYDROCHLORIDE 5 MG: 5 CAPSULE ORAL at 22:22

## 2020-07-19 RX ADMIN — LEVALBUTEROL HYDROCHLORIDE 0.63 MG: 0.63 SOLUTION RESPIRATORY (INHALATION) at 07:18

## 2020-07-19 RX ADMIN — MONTELUKAST SODIUM 10 MG: 10 TABLET, FILM COATED ORAL at 22:24

## 2020-07-19 RX ADMIN — TIMOLOL MALEATE 1 DROP: 5 SOLUTION/ DROPS OPHTHALMIC at 17:53

## 2020-07-19 RX ADMIN — LEVOTHYROXINE SODIUM 50 MCG: 50 TABLET ORAL at 05:13

## 2020-07-19 RX ADMIN — PANTOPRAZOLE SODIUM 40 MG: 40 TABLET, DELAYED RELEASE ORAL at 05:13

## 2020-07-19 NOTE — PROGRESS NOTES
Progress Note Diomedes Franks 3/24/1929, 80 y o  male MRN: [id number]    Unit/Bed#: S -01 Encounter: [id number]    Primary Care Provider: Moris Rios MD   Date and time admitted to hospital: 7/12/2020  2:58 PM        Bilateral hydropneumothorax  Assessment & Plan  · Unclear of effusions on chest x-ray are loculated  Patient also noted to have wheezing on exam as well as increased supplemental oxygen needs  Patient responded well to last thoracocentesis on October of 2019 with resolution of symptoms noted  · Patients CT showed bilateral hydropneumothorax with pleural effusions, interventional radiology was consulted which consider the patient a candidate for bilateral chest tube  · Bilateral chest tube was placed successfully 7 13 2020  · Pleural fluid level : yellow, clear wbc 1 156  Pleural fluid culture with no growth        LDH body  169 serum 188 Total protein fluid 3 3 serum 5 8 likely exudative effusion  ·  Ct chest show improving bilateral hydropneumothorax, improving atelectasias, CXR on the other hand showed bilateral pleural drainage with no change in left pneumothorax and left base atelectasias  ·  Chest Xray: Persistent small left pneumothorax  Mild bibasilar atelectasis  7/19 Patient with right side tube slightly moved, states that last night  He pulled the tube a little bit when he got  out of bed , said the tube was tangled; a CXR was ordered to reevaluate the position of the tube   Pulmonology evaluated the patient, they consider that the tube is in right position  And they did not appreciate any signs of subcutaneus emphysema  when interrogated patient denies active pain in the area of insertion as well as any shortness of breath or distress, tubes currently draining adequately   Plan:  · Continue to follow up results of  2nd cytology, consider CT surgery evaluation  for possible fluoroscopy if negative result  Patient may need to be transferred to [facility] for thoracic surgery evaluation  · Continue to monitor chest tubes daily total output  · Consider incentive spirometry for atelectasia prevention  · Continue to monitor SpO2 levels daily    Acute on chronic diastolic heart failure [facility])  Assessment & Plan  Wt Readings from Last 3 Encounters:   07/19/20 63 3 kg (139 lb 8 8 oz)   02/14/20 74 9 kg (165 lb 1 6 oz)   12/17/19 77 3 kg (170 lb 6 4 oz)   I/O last 3 completed shifts: In: 600 [P O :600]  Out: 1855 [Urine:650; Chest Tube:1205]  I/O this shift:  In: 180 [P O :180]  Out: -     · Patient's last echo completed in October of 2019 showed ejection fraction of 50%  Upon assessment patient appears to be fluid overloaded given lower extremity edema and pleural effusions seen on chest x-ray and slightly elevated BNP of 556  Patient is scheduled to have a repeat echo on 07/31/2020  · Patient regularly follows Dr Yanelis Amin  Echocardiogram on 7/13/2020 shows large pleural effusions and slightly enlarged pericardium  · 7/19 Chronic +1 pitting lower extremities edema , currently with oxygen with nasal canula, no evidence of acute distress  ·     Plan:    · Cardiology on board   · Continue to hold Lasix and blood pressure medication as patient still with low blood pressure levels  · Consider initiation of albumin in case of hypotension  · Continue Oxygen on nasal canula  · Monitor oxygen saturations levels daily  · Monitor signs and symptoms of volume overload daily  * 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 acidosis  Assessment & Plan  · 9/66 Metabolic alkalosis resolved, patient with persistence of hypercapnia, with adequate respiratory pattern  No shortness of breath or visible distress      Plan:    -  Continue to monitor  CO2 levels daily          Acute on chronic respiratory failure with hypoxia and hypercapnia (HCC)  Assessment & Plan  · Patient currently requires increased supplemental oxygen as compared to baseline (3 L nasal cannula at home)  Currently on 10 L high-flow nasal cannula  Condition likely secondary to acute exacerbation of CHF and pleural effusions noted on chest x-ray  Patient regularly follows Dr Justina Gudino from pulmonology  Low suspicion for infection given patient's negative COVID-19 testing, no elevated white count, and no fever but a viral etiology is still a possibility  Given patient's continued acute hypoxemia, cannot completely rule out PE  Per pulmonology, effusions on chest x-ray does not completely explain patient's hypoxia  Patient appears to be severely ill  · Patients CT showed Bilateral Hydropneumothorax/ pleural effusions  · Acccording with above, Interventional radiology was consulted considering the placement of bilateral chest tube  · Bilateral chest tube was successfully place 7 13 2020  · 7/19 persistence of hypercapnia ,  likely due to hypoventilation ( consider patients pmh of copd ) however when interrogated patient asymptomatic  Currently with adequate oxygen saturation levels  Plan:    · Maintain SpO2 between 89% to 94%  · Repeat BMP to monitor hypercapnia daily  · Repeat VBG daily    Hypokalemia  Assessment & Plan  · Patient was initiated on potassium as a correction with a goal of >4 0  · 7/19 patient with potassium level of 4 0 successfully corrected      Plan  - continue monitoring potassium levels daily   - Consider goal of potassium level greater than >4 0 if not replace  - Monitor for signs and symptoms of hypokalemia daily      Hyponatremia  Assessment & Plan  7/19 Patient with Sodium levels 134, currently asymptomatic, Likely due to his underlying diagnosis of congestive heart failure    Plan    -Continue to monitor sodium levels daily  - Assess for signs and symptoms of hyponatremia    Chronic respiratory failure with hypoxia ([facility] [address] )  Assessment & Plan  · 7/19 Patient currently on nasal canula,  pleasant , can hold a conversation without acute distress, with adequate oxygen saturation levels  Plan    - Monitor SpO2 levels aiming SpO2 >90  - Continue oxygen 2L  - Pulmonology is following the case      VTE Pharmacologic Prophylaxis:   Pharmacologic: Enoxaparin (Lovenox)  Mechanical VTE Prophylaxis in Place: Yes    Discussions with Specialists or Other Care Team Provider: Patient case has been discussed with my attending physician as well as with cardiology, pulmonology and thoracic surgery  Education and Discussions with Family / Patient: Patients case, current treatment plan has been discussed with him as well as with the family, treatment options have been explained, family is inclined to indwelling pleural catheter or pleurodesis  Family has been updated  Current Length of Stay: 7 day(s)    Current Patient Status: Inpatient     Discharge Plan / Estimated Discharge Date: Considering the above, patient will have to be transfer to [facility] Status: Level 3 - DNAR and DNI      Subjective:   Patient says he feels overall ok , he mentioned that last night he tried to get up and he pulled the right tube a little because this one was tangled  Objective:     Vitals:   Temp (24hrs), Av 7 °F (36 5 °C), Min:97 5 °F (36 4 °C), Max:98 °F (36 7 °C)    Temp:  [97 5 °F (36 4 °C)-98 °F (36 7 °C)] 98 °F (36 7 °C)  HR:  [62-69] 69  Resp:  [16-18] 18  BP: (102-120)/(55-57) 108/55  SpO2:  [91 %-99 %] 99 %  Body mass index is 21 86 kg/m²  Input and Output Summary (last 24 hours): Intake/Output Summary (Last 24 hours) at 2020 1221  Last data filed at 2020 0801  Gross per 24 hour   Intake 600 ml   Output 1000 ml   Net -400 ml       Physical Exam:     Physical Exam   Constitutional: He is oriented to person, place, and time  He appears well-developed  No distress  HENT:   Head: Normocephalic and atraumatic  Eyes: Pupils are equal, round, and reactive to light  EOM are normal    Neck: Normal range of motion     Cardiovascular: Normal rate, regular rhythm and normal heart sounds  Exam reveals no friction rub  No murmur heard  Pulmonary/Chest: Effort normal  No respiratory distress  He exhibits no tenderness  Breath sounds decreased bilaterally   Abdominal: Soft  Bowel sounds are normal  He exhibits no distension  There is no tenderness  Musculoskeletal: Normal range of motion  He exhibits edema (+1 pitting lower extremities edema)  Neurological: He is alert and oriented to person, place, and time  Skin: Capillary refill takes 2 to 3 seconds  He is not diaphoretic  Psychiatric: He has a normal mood and affect  His behavior is normal    Nursing note and vitals reviewed  Additional Data:     Labs:    Results from last 7 days   Lab Units 07/19/20  0558 07/18/20  0504   WBC Thousand/uL 3 64* 4 74   HEMOGLOBIN g/dL 13 0 13 4   HEMATOCRIT % 41 4 41 6   PLATELETS Thousands/uL 134* 140*   NEUTROS PCT %  --  71   LYMPHS PCT %  --  10*   MONOS PCT %  --  12   EOS PCT %  --  6     Results from last 7 days   Lab Units 07/19/20  0558  07/14/20  1156 07/14/20  0533   POTASSIUM mmol/L 4 0   < > 3 6  --    CHLORIDE mmol/L 98*   < > 96*  --    CO2 mmol/L 35*   < > 43*  --    CO2, I-STAT mmol/L  --   --   --  >45*   BUN mg/dL 25   < > 15  --    CREATININE mg/dL 0 60   < > 0 61  --    CALCIUM mg/dL 7 9*   < > 7 3*  --    ALK PHOS U/L  --   --  44*  --    ALT U/L  --   --  7*  --    AST U/L  --   --  21  --    GLUCOSE, ISTAT mg/dl  --   --   --  101    < > = values in this interval not displayed  Results from last 7 days   Lab Units 07/15/20  0515   INR  1 24*       * I Have Reviewed All Lab Data Listed Above  * Additional Pertinent Lab Tests Reviewed:  Cinthya 66 Admission Reviewed    Imaging:    Imaging Reports Reviewed Today Include: CXR  Imaging Personally Reviewed by Myself Includes: CXR    Recent Cultures (last 7 days):     Results from last 7 days   Lab Units 07/13/20  1652   GRAM STAIN RESULT  No Polys or Bacteria seen  No Polys or Bacteria seen   BODY FLUID CULTURE, STERILE  No growth  No growth       Last 24 Hours Medication List:     Current Facility-Administered Medications:  albuterol 2 puff Inhalation Q6H PRN Eugene Gutiérrez MD   enoxaparin 40 mg Subcutaneous Daily Eugene Gutiérrez MD   guaiFENesin 600 mg Oral Q12H [address] Eugene Gutiérrez MD   levalbuterol 0 63 mg Nebulization TID Eugene Gutiérrez MD   levothyroxine 50 mcg Oral Daily Eugene Gutiérrez MD   montelukast 10 mg Oral HS Eugene Gutiérrez MD   pantoprazole 40 mg Oral Early Morning Eugene Gutiérrez MD   pravastatin 40 mg Oral Daily With Santos Edge MD   terazosin 5 mg Oral HS Eugene Gutiérrez MD   timolol 1 drop Both Eyes BID Eugene Gutiérrez MD        Today, Patient Was Seen By: Eugene Gutiérrez MD    ** Please Note: This note has been constructed using a voice recognition system   **

## 2020-07-19 NOTE — ASSESSMENT & PLAN NOTE
· 6/78 Metabolic alkalosis resolved, patient with persistence of hypercapnia, with adequate respiratory pattern  No shortness of breath or visible distress      Plan:    -  Continue to monitor  CO2 levels daily

## 2020-07-19 NOTE — ASSESSMENT & PLAN NOTE
7/19 Patient with Sodium levels 134, currently asymptomatic, Likely due to his underlying diagnosis of congestive heart failure    Plan    -Continue to monitor sodium levels daily  - Assess for signs and symptoms of hyponatremia

## 2020-07-19 NOTE — ASSESSMENT & PLAN NOTE
· 7/19 Patient currently on nasal canula,  pleasant , can hold a conversation without acute distress, with adequate oxygen saturation levels      Plan    - Monitor SpO2 levels aiming SpO2 >90  - Continue oxygen 2L  - Pulmonology is following the case

## 2020-07-19 NOTE — ASSESSMENT & PLAN NOTE
Recent Labs     07/18/20  0504 07/19/20  0558 07/20/20  0552   K 4 2 4 0 4 3     · Resolved    Plan  - continue monitoring potassium levels daily   - Consider goal of potassium level greater than >4 0 if not replace  - Monitor for signs and symptoms of hypokalemia daily

## 2020-07-19 NOTE — ASSESSMENT & PLAN NOTE
· Patient currently requires increased supplemental oxygen as compared to baseline (3 L nasal cannula at home)  Currently on 10 L high-flow nasal cannula  Condition likely secondary to acute exacerbation of CHF and pleural effusions noted on chest x-ray  Patient regularly follows Dr Hermelinda Brink from pulmonology  Low suspicion for infection given patient's negative COVID-19 testing, no elevated white count, and no fever but a viral etiology is still a possibility  Given patient's continued acute hypoxemia, cannot completely rule out PE  Per pulmonology, effusions on chest x-ray does not completely explain patient's hypoxia  Patient appears to be severely ill  · Patients CT showed Bilateral Hydropneumothorax/ pleural effusions  · Acccording with above, Interventional radiology was consulted considering the placement of bilateral chest tube  · Bilateral chest tube was successfully place 7 13 2020  · 7/19 persistence of hypercapnia ,  likely due to hypoventilation ( consider patients pmh of copd ) however when interrogated patient asymptomatic  Currently with adequate oxygen saturation levels      Plan:    · Maintain SpO2 between 89% to 94%  · Repeat BMP to monitor hypercapnia daily  · Repeat VBG daily

## 2020-07-19 NOTE — ASSESSMENT & PLAN NOTE
Patient on 2L nasal canula, in no respiratory distress, with adequate oxygen saturation levels (97% today)  VBG pH shows worsening acidosis, 7 25 today with pCO2 of 83 3 and pHO3 of 36 3       Plan  - Monitor SpO2 levels aiming SpO2 >90  - Continue oxygen 2L  - Pulmonology- recommended to start Bipap and repeat VBG tomorrow

## 2020-07-19 NOTE — QUICK NOTE
In the right eye    Encouraged to see Ophthalmology Patient with accidental removal of portion of chest tube on R side  Tubing was tangle and patient attempted to get out of bed and pulled chest tube out approx 2 inches  He denies sob or pain at the site  No crepitus noted  will order cxr to confirm placement  The tube is still draining

## 2020-07-19 NOTE — ASSESSMENT & PLAN NOTE
Recent Labs     07/18/20  0504 07/19/20  0558 07/20/20  0552   SODIUM 133* 134* 134*     Patient with Sodium of 134 today, currently asymptomatic, Likely due to his underlying diagnosis of congestive heart failure    Plan  -Continue to monitor sodium levels daily  - Assess for signs and symptoms of hyponatremia

## 2020-07-19 NOTE — ASSESSMENT & PLAN NOTE
· Patient presented with weakness and confusion, found to have persistent small left pneumothorax on CXR  · Patient responded well to last thoracocentesis on October of 2019 with resolution of symptoms noted  · CT showed bilateral hydropneumothorax with pleural effusions  IR was consulted and patient deemed a candidate for bilateral chest tube placement  · Bilateral chest tube was placed successfully 7/13/2020  · 1st cytology 7/13 was negative for malignancy right and left pleural fluid  · Pleural fluid level : yellow, clear wbc 1 156  Pleural fluid culture with no growth        LDH body 169, serum 188 ,Total protein fluid 3 3, serum 5 8- likely exudative effusion  · Repeat CT chest showed improving bilateral hydropneumothorax, improving atelectasias, CXR on the other hand showed bilateral pleural drainage with no change in left pneumothorax and left base atelectasias  · Yesterday, patient with right side tube slightly moved but CXR shows tube still within pleural space  No signs of subcutaneus emphysema  Tubes draining adequately, had 900 ml this AM    Plan:  · Continue to follow up results of 2nd cytology- pathology lab called for results of 7/13 cytology however they are not certain of why results are not back yet, said they will look into it but may take up to a day to get back to us  · Consider CT surgery evaluation  for possible fluoroscopy if negative result  Patient may need to be transferred to [place] for thoracic surgery evaluation     · Monitor chest tubes daily total output  · Consider incentive spirometry for atelectas prevention  · Continue to monitor SpO2 levels daily

## 2020-07-19 NOTE — ASSESSMENT & PLAN NOTE
Wt Readings from Last 3 Encounters:   07/19/20 63 3 kg (139 lb 8 8 oz)   02/14/20 74 9 kg (165 lb 1 6 oz)   12/17/19 77 3 kg (170 lb 6 4 oz)   I/O last 3 completed shifts: In: 600 [P O :600]  Out: 1855 [Urine:650; Chest Tube:1205]  I/O this shift:  In: 180 [P O :180]  Out: -     · Patient's last echo completed in October of 2019 showed ejection fraction of 50%  Upon assessment patient appears to be fluid overloaded given lower extremity edema and pleural effusions seen on chest x-ray and slightly elevated BNP of 556  Patient is scheduled to have a repeat echo on 07/31/2020  · Patient regularly follows Dr Mark Obregon  Echocardiogram on 7/13/2020 shows large pleural effusions and slightly enlarged pericardium  · 7/19 Chronic +1 pitting lower extremities edema , currently with oxygen with nasal canula, no evidence of acute distress  ·     Plan:    · Cardiology on board   · Continue to hold Lasix and blood pressure medication as patient still with low blood pressure levels  · Consider initiation of albumin in case of hypotension  · Continue Oxygen on nasal canula  · Monitor oxygen saturations levels daily  · Monitor signs and symptoms of volume overload daily

## 2020-07-19 NOTE — PROGRESS NOTES
Progress Note - Pulmonary   Chantal Agent 80 y o  male MRN: [id number]  Unit/Bed#: S -01 Encounter: [id number]      Assessment / Plan:  1  Acute hypoxic and hypercarbic respiratory failure  · Continue to wean oxygen as tolerated  · Continue maintain SpO2 greater than or equal to 88 percent  Continue with cough and deep breathing exercises, out of bed as tolerated, increase activity as able    2  Bilateral hydropneumothorax status post bilateral chest tubes  · Patient's chest tube was slightly withdrawn chest last night  Repeat chest x-ray shows tube is still in place  No subcutaneous emphysema noted on physical exam   · Cytology from Right pleural fluid from 07/13/2020 is in process and pending at this time  Follow-up final results  · Previous cytology is negative for malignancy  · Continue bilateral chest tubes  · Continue to monitor output  · Continue chest tubes to water seal   · If 2nd cytology is negative will have CT surgery evaluate patient for possible fluoroscopy  Etiology of pleural effusions are unclear at this time  Possibly malignancy infiltrating the visceral pleura  Will need continued workup and monitoring  3  COPD without acute exacerbation  · Continue Xopenex  · Continue Singulair    4  Acute congestive heart failure exacerbation  · Cardiology is consulted  Appreciate their input  · Patient underwent echocardiogram on 07/13/2020:  Ejection fraction 60 percent with no regional wall motion abnormalities  · Patient is status post 3 times doses of IV Diamox yesterday, continue to hold diuretics on oral antihypertensives given patient's low BP     5  Mixed acid-base disorder  · Patient has received 3 doses of IV Diamox with improving bicarb levels on VBG  Subjective:   Mr Jenny Begum is out of bed in the chair upon my assessment today  He offers no complaints  He does report that last night he had an incident where he moved part of his chest tube    He reports that last night he did have part of his chest tube dislodged however on chest x-ray and is in good position  He otherwise offers no complaints his breathing is stable    Objective:  Vitals: Blood pressure 108/55, pulse 69, temperature 98 °F (36 7 °C), temperature source Oral, resp  rate 18, height 5' 7" (1 702 m), weight 63 3 kg (139 lb 8 8 oz), SpO2 99 %  , 2 liters/minute nasal cannula, Body mass index is 21 86 kg/m²  Intake/Output Summary (Last 24 hours) at 7/19/2020 0929  Last data filed at 7/19/2020 0801  Gross per 24 hour   Intake 600 ml   Output 1000 ml   Net -400 ml         Physical Exam:     Gen: Awake, alert, oriented x 3, no acute distress  HEENT: Mucous membranes moist, no oral lesions, no thrush  NECK: No accessory muscle use, JVP not elevated  Cardiac: Regular, single S1, single S2, no murmurs, no rubs, no gallops  Lungs:  Breath sounds are decreased bilaterally throughout all lung fields without any wheezes, rales, rhonchi  Abdomen: normoactive bowel sounds, soft nontender, nondistended, no rebound or rigidity, no guarding  Extremities: no cyanosis, no clubbing, no edema    Labs: I have personally reviewed pertinent lab results  , ABG: No results found for: PHART, KNM2EEQ, PO2ART, RML9DQL, W1GEAERV, BEART, SOURCE, BNP: No results found for: BNP, CBC:   Lab Results   Component Value Date    WBC 3 64 (L) 07/19/2020    HGB 13 0 07/19/2020    HCT 41 4 07/19/2020    MCV 99 (H) 07/19/2020     (L) 07/19/2020    MCH 31 0 07/19/2020    MCHC 31 4 07/19/2020    RDW 13 6 07/19/2020    MPV 10 0 07/19/2020   , CMP:   Lab Results   Component Value Date    SODIUM 134 (L) 07/19/2020    K 4 0 07/19/2020    CL 98 (L) 07/19/2020    CO2 35 (H) 07/19/2020    BUN 25 07/19/2020    CREATININE 0 60 07/19/2020    CALCIUM 7 9 (L) 07/19/2020    EGFR 88 07/19/2020   , PT/INR: No results found for: PT, INR, Troponin: No results found for: TROPONINI     Imaging and other studies: I have personally reviewed pertinent reports      CXR 7/17/2020:  FINDINGS:     Cardiomediastinal silhouette appears unremarkable      Bibasilar pleural drainage catheters  Persistent small left pneumothorax with minimal subcutaneous emphysema in the left chest wall  Mild bibasilar atelectasis    Minimal subcutaneous emphysema in the left chest wall      Osseous structures appear within normal limits for patient age      IMPRESSION:     Persistent small left pneumothorax      Mild bibasilar atelectasis        Yuliet Sigala PA-C

## 2020-07-19 NOTE — ASSESSMENT & PLAN NOTE
· Metabolic alkalosis resolved, but patient with persistence of hypercapnia and worsening acidosis seen on VBG  No shortness of breath or visible distress      Plan:  -Bipap, VBG tomorrow  - Continue to monitor CO2 levels daily

## 2020-07-19 NOTE — ASSESSMENT & PLAN NOTE
· Patient was initiated on potassium as a correction with a goal of >4 0  · 7/19 patient with potassium level of 4 0 successfully corrected      Plan  - continue monitoring potassium levels daily   - Consider goal of potassium level greater than >4 0 if not replace  - Monitor for signs and symptoms of hypokalemia daily

## 2020-07-19 NOTE — ASSESSMENT & PLAN NOTE
· Patient currently on 2L nasal cannula  Condition likely secondary to acute exacerbation of CHF and pleural effusions noted on chest x-ray  Patient regularly follows Dr Jorge Casey from pulmonology  Low suspicion for infection given patient's negative COVID-19 testing, no elevated white count, and no fever but a viral etiology is still a possibility  Given patient's continued acute hypoxemia, cannot completely rule out PE  Per pulmonology, effusions on chest x-ray does not completely explain patient's hypoxia  · Patients CT showed Bilateral Hydropneumothorax/ pleural effusions so IR placed bilateral chest tubes on 7/13/20  · Persistence of hypercapnia, likely due to hypoventilation (copd) however patient asymptomatic     · Pulm recommended Bipap for worsening acidosis and hypercapnia    Plan:    · Maintain SpO2 between 89% to 94%  · Bipap  · Repeat BMP to monitor hypercapnia  · Repeat VBG daily

## 2020-07-19 NOTE — DISCHARGE SUMMARY
Discharge- Aries Luca 3/24/1929, 80 y o  male MRN: [id number]    Unit/Bed#: S -01 Encounter: [id number]    Primary Care Provider: Madeline Farris MD   Date and time admitted to hospital: 7/12/2020  2:58 PM        * Bilateral hydropneumothorax  Assessment & Plan  · Patient presented with weakness and confusion, found to have persistent small left pneumothorax on CXR  · CT showed bilateral hydropneumothorax with pleural effusions and IR placed bilateral chest tube on 7/13/2020  · 1st and 2nd cytology from 7/13 were negative for malignancy in right and left pleural fluid  · Pleural fluid culture yellow, wbc 1 156 , with no growth  · Pleural LDH: 169, Pleural protein: 3 3, serum LDH: 188, serum protein: 5 8, based on Light's criteria, likely exudative effusion  · Repeat CT chest showed improving bilateral hydropneumothorax, improving atelectasias  · Possible etiology of fluid is related to CHF/ third spacing  Pulmonologist talked with daughter about possible transfer to [place] for thoracic surgery evaluation but daughter refused any surgery  · Rt sided chest tube removed 7/23 and Lt sided chest tube removed 7/24  · Repeat CXR this AM did not show any pleural effusions    Plan:  · Clear for discharge to SNF  · He will have chest x-ray early next week and if the fluid continues to accumulate despite diuretic dosing, proceed with ASEPT placement  · Discharged on Torsemide 20mg daily as per cardio  · Monitor for any signs of respiratory distress  · Continue to monitor SpO2 levels daily    Chronic respiratory failure with hypoxia [facility])  Assessment & Plan  Patient on 2L nasal canula, in no respiratory distress, with adequate oxygen saturation levels on nasal cannula      Plan  -As per pulm, to be discharged on Trelegy Ellipta once daily and Xopenex prn   -Monitor SpO2 levels aiming SpO2 >90  - Continue oxygen 2L  -will follow up with pulmonology as outpatient    Acute on chronic respiratory failure with hypoxia and hypercapnia (HCC)  Assessment & Plan  · Patient currently on 2L nasal cannula  Condition likely secondary to acute exacerbation of CHF and pleural effusions noted on chest x-ray  Patient regularly follows Dr Minh Ayala from pulmonology  Low suspicion for infection given patient's negative COVID-19 testing, no elevated white count, and no fever but a viral etiology is still a possibility  · Patients CT showed Bilateral Hydropneumothorax/ pleural effusions so IR placed bilateral chest tubes on 7/13/20  · Persistence of hypercapnia, likely due to hypoventilation (copd) however patient asymptomatic  Plan:  · Maintain SpO2 between 89% to 94%    Acute on chronic diastolic heart failure [facility])  Assessment & Plan  Wt Readings from Last 3 Encounters:   07/25/20 58 3 kg (128 lb 8 5 oz)   02/14/20 74 9 kg (165 lb 1 6 oz)   12/17/19 77 3 kg (170 lb 6 4 oz)     · Patient's last echo completed in October of 2019 showed ejection fraction of 50%  Upon assessment on admission, patient appeared to be fluid overloaded given lower extremity edema and pleural effusions seen on chest x-ray and slightly elevated BNP of 556  · Patient regularly follows Dr Mckinnon Poster  Echocardiogram on 7/13/2020 shows large pleural effusions and slightly enlarged pericardium  Plan:  · On oral Torsemide  · Continue Oxygen on nasal canula  · Monitor oxygen saturations levels daily  · Monitor signs and symptoms of volume overload daily  · Will follow up with outpatient cardiology on 8/7/20          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 acidosis  Assessment & Plan  · Metabolic alkalosis resolved  No shortness of breath or visible distress  Plan:  - Continue to monitor CO2 levels daily          GERD (gastroesophageal reflux disease)  Assessment & Plan  -Continue home med of Rabeprazole    Hypothyroidism  Assessment & Plan  TSH on admission was slightly elevated at 4 54    Free T4 was within normal limits    Plan:  · Continue patient's home levothyroxine 50 mcg daily    COPD (chronic obstructive pulmonary disease) ([facility] [address] )  Assessment & Plan  PFT performed on December of 2019 showed severe obstructive pulmonary disease  Plan:  · As per Pulm, he is to be discharged with Trelegy Ellipta once daily and Xopenex as needed  · Not currently in any acute respiratory distress    Severe protein-calorie malnutrition ([facility] [address] )  Assessment & Plan  Malnutrition Findings:   Malnutrition type: Chronic illness  Degree of Malnutrition: Other severe protein calorie malnutrition(related to inadequate intake/medical condition/advanced age as evidenced by significant depletion of fat/muscle (clavicles, shoulders, triceps, temples), 26% wt loss x 5 months (165 lb 2/14/20), +4 b/l LE edema  Treatment includes supplementation ) patient is noted to be thin with minimal muscle and adipose tissue  Unclear on patient's current nutritional status  BMI Findings: Body mass index is 20 13 kg/m²  Plan:  · Patient currently on regular diet with high-calorie and high-protein    Generalized weakness  Assessment & Plan  Likely secondary to advanced age, multiple medical conditions, hypothyroidism, and malnutrition  Concern for recent fall history  Plan:  · Discharge to SNF for rehab  · See malnutrition plan     Thrombocytopenia [facility])  Assessment & Plan  Recent Labs     07/23/20  0510 07/24/20  0456 07/25/20  0451   * 131* 144*     Patient is currently asymptomatic  This has been chronic based on previous CBCs      Plan:  · Monitor with daily CBC  · Can continue with pharmacologic VTE prophylaxis and consider discontinuation if platelet count worsens    Hypokalemia  Assessment & Plan  Recent Labs     07/23/20  0510 07/24/20  0456 07/25/20  0451   K 4 0 3 8 3 4*     · Repleted with 20 Meq KCl  · To be discharged on his home med of Spironolactone, which is a potassium-sparing med    Plan  - continue monitoring potassium levels  - Consider goal of potassium level greater than >4 0 if not replace  - Monitor for signs and symptoms of hypokalemia daily      Hyperlipidemia  Assessment & Plan  · To be discharged on home med of Simvastatin    Essential hypertension  Assessment & Plan  · Continue terazosin and home med of Amlodipine  · As per Cardio, continue on Torsemide 20mg once a day  · Monitor BP    Hyponatremia  Assessment & Plan  Recent Labs     07/23/20  0510 07/24/20  0456 07/25/20  0451   SODIUM 135* 134* 136     Normal today, likely due to his underlying diagnosis of congestive heart failure    Plan  -Continue to monitor sodium levels  - Assess for signs and symptoms of hyponatremia    Discharging Resident Physician: Chrissy Warner MD  Attending: Ruthie Devlin MD  PCP: Betito Worrell MD  Admission Date: 7/12/2020  Discharge Date: 07/25/20    Disposition:  [address] [facility]      Reason for Admission: weakness and confussion    Consultations During Hospital Stay:  · Cardiology, Pulmonology, Nephrology, Interventional radiology    Procedures Performed:     · Bilateral Chest tube placement on 7/13 and removal on 7/23 and 7/24    Significant Findings / Test Results:     · Metabolic alkalosis and respiratory acidosis  · B/l hydropneumothorax on CT     Incidental Findings:   · As above    Test Results Pending at Discharge (will require follow up):   · none     Outpatient Tests Requested:  · Repeat CXR next week     Complications:  As below    Hospital Course:     Jesus Adkins is a 80 y o  male patient who originally presented to the hospital on 7/12/2020 due to generalized weakness and increased confusion over the past week  Per the daughter, patient was noted to be increasingly confused  Patient lives by himself and is usually self- sufficient, but the daughter states that the patient needed more frequent reminders about medications and doctors visits   In the ED, CT showed bilateral hydropneumothorax with pleural effusions and IR placed bilateral chest tube on 7/13/2020  1st and 2nd cytology from 7/13 were negative for malignancy in right and left pleural fluid  Based on Light's criteria, pleural fluid was most likely exudative effusion  Repeat CT chest showed improving bilateral hydropneumothorax and improving atelectasias  We discussed with patient's daughter about possible transfer to [facility] for thoracic surgery evaluation and VATs but daughter refused any surgery  Decision was made to take out the chest tubes one by one and once clear d/c to SNF for rehab  Rt sided chest tube was removed 7/23 and Lt sided chest tube was removed 7/24  Previous CXR's done after pleural tubes clampings showed tiny stable residual biapical pneumothoraces and no pleural effusions  Repeat CXR this morning after bilateral tubes were taken out did not show any pleural effusions  Condition at Discharge: stable     Discharge Day Visit / Exam: 07/25/2020    Subjective:    Seen this morning in no acute distress, appears comfortable  He denies shortness of breath, chest pain, nausea, vomiting, fever or chills  Yesterday left-sided chest tube was removed so now bilateral chest tubes are out  Repeat chest x-ray this morning shows no recurrent effusion  Patient clinically looks stable without signs of fluid overload  He is cleared for discharge to SNF  Vitals: Blood Pressure: 100/55 (07/25/20 0700)  Pulse: 85 (07/25/20 0700)  Temperature: 98 °F (36 7 °C) (07/25/20 0700)  Temp Source: Oral (07/25/20 0700)  Respirations: 18 (07/25/20 0700)  Height: 5' 7" (170 2 cm) (07/13/20 1731)  Weight - Scale: 58 3 kg (128 lb 8 5 oz) (07/25/20 0553)  SpO2: 99 % (07/25/20 0731)  Exam:   Physical Exam   Constitutional: He is oriented to person, place, and time  No distress  thin   HENT:   Head: Normocephalic and atraumatic  Eyes: Pupils are equal, round, and reactive to light   EOM are normal    Cardiovascular: Normal rate, regular rhythm, normal heart sounds and intact distal pulses  Pulmonary/Chest: Effort normal and breath sounds normal  No respiratory distress  He has no wheezes  He exhibits no tenderness  B/l chest tubes are out- sites w/o signs of infection   Abdominal: Soft  Bowel sounds are normal  He exhibits no distension  There is no tenderness  Musculoskeletal: He exhibits no edema or tenderness  Neurological: He is alert and oriented to person, place, and time  Skin: Skin is warm and dry  No rash noted  Psychiatric: He has a normal mood and affect  His behavior is normal  Judgment and thought content normal    Nursing note and vitals reviewed  Discussion with Family: discussed with patient and daughter, they are in agreement with plan    Discharge instructions/Information to patient and family:   See after visit summary for information provided to patient and family  Provisions for Follow-Up Care:  See after visit summary for information related to follow-up care and any pertinent home health orders  Planned Readmission: none     Discharge Medications:  See after visit summary for reconciled discharge medications provided to patient and family

## 2020-07-19 NOTE — ASSESSMENT & PLAN NOTE
· Unclear of effusions on chest x-ray are loculated  Patient also noted to have wheezing on exam as well as increased supplemental oxygen needs  Patient responded well to last thoracocentesis on October of 2019 with resolution of symptoms noted  · Patients CT showed bilateral hydropneumothorax with pleural effusions, interventional radiology was consulted which consider the patient a candidate for bilateral chest tube  · Bilateral chest tube was placed successfully 7 13 2020  · Pleural fluid level : yellow, clear wbc 1 156  Pleural fluid culture with no growth        LDH body  169 serum 188 Total protein fluid 3 3 serum 5 8 likely exudative effusion  ·  Ct chest show improving bilateral hydropneumothorax, improving atelectasias, CXR on the other hand showed bilateral pleural drainage with no change in left pneumothorax and left base atelectasias  ·  Chest Xray: Persistent small left pneumothorax  Mild bibasilar atelectasis  7/19 Patient with right side tube slightly moved, states that last night  He pulled the tube a little bit when he got  out of bed , said the tube was tangled; a CXR was ordered to reevaluate the position of the tube   Pulmonology evaluated the patient, they consider that the tube is in right position  And they did not appreciate any signs of subcutaneus emphysema  when interrogated patient denies active pain in the area of insertion as well as any shortness of breath or distress, tubes currently draining adequately   Plan:  · Continue to follow up results of  2nd cytology, consider CT surgery evaluation  for possible fluoroscopy if negative result  Patient may need to be transferred to [place] for thoracic surgery evaluation     · Continue to monitor chest tubes daily total output  · Consider incentive spirometry for atelectasia prevention  · Continue to monitor SpO2 levels daily

## 2020-07-19 NOTE — ASSESSMENT & PLAN NOTE
Wt Readings from Last 3 Encounters:   07/19/20 63 3 kg (139 lb 8 8 oz)   02/14/20 74 9 kg (165 lb 1 6 oz)   12/17/19 77 3 kg (170 lb 6 4 oz)     · Patient's last echo completed in October of 2019 showed ejection fraction of 50%  Upon assessment on admission, patient appeared to be fluid overloaded given lower extremity edema and pleural effusions seen on chest x-ray and slightly elevated BNP of 556  · Patient regularly follows Dr Valery Maxwell  Echocardiogram on 7/13/2020 shows large pleural effusions and slightly enlarged pericardium  · Chronic +1 pitting lower extremities edema, currently with oxygen with nasal canula, no evidence of acute distress  Plan:  · Cardiology on board   · Continue to hold Lasix and blood pressure medication as patient still with low blood pressure levels  · Consider initiation of albumin in case of hypotension  · Continue Oxygen on nasal canula  · Monitor oxygen saturations levels daily  · Monitor signs and symptoms of volume overload daily

## 2020-07-20 LAB
ANION GAP SERPL CALCULATED.3IONS-SCNC: 0 MMOL/L (ref 4–13)
BASE EX.OXY STD BLDV CALC-SCNC: 81.2 % (ref 60–80)
BASE EXCESS BLDV CALC-SCNC: 5.9 MMOL/L
BASOPHILS # BLD AUTO: 0.03 THOUSANDS/ΜL (ref 0–0.1)
BASOPHILS NFR BLD AUTO: 1 % (ref 0–1)
BUN SERPL-MCNC: 23 MG/DL (ref 5–25)
CALCIUM SERPL-MCNC: 8.5 MG/DL (ref 8.3–10.1)
CHLORIDE SERPL-SCNC: 98 MMOL/L (ref 100–108)
CO2 SERPL-SCNC: 36 MMOL/L (ref 21–32)
CREAT SERPL-MCNC: 0.74 MG/DL (ref 0.6–1.3)
EOSINOPHIL # BLD AUTO: 0.21 THOUSAND/ΜL (ref 0–0.61)
EOSINOPHIL NFR BLD AUTO: 6 % (ref 0–6)
ERYTHROCYTE [DISTWIDTH] IN BLOOD BY AUTOMATED COUNT: 13.4 % (ref 11.6–15.1)
GFR SERPL CREATININE-BSD FRML MDRD: 81 ML/MIN/1.73SQ M
GLUCOSE SERPL-MCNC: 84 MG/DL (ref 65–140)
HCO3 BLDV-SCNC: 36.3 MMOL/L (ref 24–30)
HCT VFR BLD AUTO: 42.6 % (ref 36.5–49.3)
HGB BLD-MCNC: 13.7 G/DL (ref 12–17)
IMM GRANULOCYTES # BLD AUTO: 0.03 THOUSAND/UL (ref 0–0.2)
IMM GRANULOCYTES NFR BLD AUTO: 1 % (ref 0–2)
LYMPHOCYTES # BLD AUTO: 0.44 THOUSANDS/ΜL (ref 0.6–4.47)
LYMPHOCYTES NFR BLD AUTO: 12 % (ref 14–44)
MCH RBC QN AUTO: 31.8 PG (ref 26.8–34.3)
MCHC RBC AUTO-ENTMCNC: 32.2 G/DL (ref 31.4–37.4)
MCV RBC AUTO: 99 FL (ref 82–98)
MONOCYTES # BLD AUTO: 0.41 THOUSAND/ΜL (ref 0.17–1.22)
MONOCYTES NFR BLD AUTO: 11 % (ref 4–12)
NEUTROPHILS # BLD AUTO: 2.7 THOUSANDS/ΜL (ref 1.85–7.62)
NEUTS SEG NFR BLD AUTO: 69 % (ref 43–75)
NRBC BLD AUTO-RTO: 0 /100 WBCS
O2 CT BLDV-SCNC: 16.6 ML/DL
PCO2 BLDV: 83.3 MM HG (ref 42–50)
PH BLDV: 7.26 [PH] (ref 7.3–7.4)
PLATELET # BLD AUTO: 148 THOUSANDS/UL (ref 149–390)
PMV BLD AUTO: 9.8 FL (ref 8.9–12.7)
PO2 BLDV: 45.2 MM HG (ref 35–45)
POTASSIUM SERPL-SCNC: 4.3 MMOL/L (ref 3.5–5.3)
RBC # BLD AUTO: 4.31 MILLION/UL (ref 3.88–5.62)
SODIUM SERPL-SCNC: 134 MMOL/L (ref 136–145)
WBC # BLD AUTO: 3.82 THOUSAND/UL (ref 4.31–10.16)

## 2020-07-20 PROCEDURE — 85025 COMPLETE CBC W/AUTO DIFF WBC: CPT | Performed by: INTERNAL MEDICINE

## 2020-07-20 PROCEDURE — 94640 AIRWAY INHALATION TREATMENT: CPT

## 2020-07-20 PROCEDURE — 99232 SBSQ HOSP IP/OBS MODERATE 35: CPT | Performed by: HOSPITALIST

## 2020-07-20 PROCEDURE — 94668 MNPJ CHEST WALL SBSQ: CPT

## 2020-07-20 PROCEDURE — 99232 SBSQ HOSP IP/OBS MODERATE 35: CPT | Performed by: NURSE PRACTITIONER

## 2020-07-20 PROCEDURE — 94762 N-INVAS EAR/PLS OXIMTRY CONT: CPT

## 2020-07-20 PROCEDURE — 82805 BLOOD GASES W/O2 SATURATION: CPT | Performed by: INTERNAL MEDICINE

## 2020-07-20 PROCEDURE — 99233 SBSQ HOSP IP/OBS HIGH 50: CPT | Performed by: INTERNAL MEDICINE

## 2020-07-20 PROCEDURE — 80048 BASIC METABOLIC PNL TOTAL CA: CPT | Performed by: INTERNAL MEDICINE

## 2020-07-20 PROCEDURE — 94760 N-INVAS EAR/PLS OXIMETRY 1: CPT

## 2020-07-20 RX ADMIN — LEVALBUTEROL HYDROCHLORIDE 0.63 MG: 0.63 SOLUTION RESPIRATORY (INHALATION) at 07:19

## 2020-07-20 RX ADMIN — TIMOLOL MALEATE 1 DROP: 5 SOLUTION/ DROPS OPHTHALMIC at 08:50

## 2020-07-20 RX ADMIN — LEVALBUTEROL HYDROCHLORIDE 0.63 MG: 0.63 SOLUTION RESPIRATORY (INHALATION) at 13:14

## 2020-07-20 RX ADMIN — TIMOLOL MALEATE 1 DROP: 5 SOLUTION/ DROPS OPHTHALMIC at 17:54

## 2020-07-20 RX ADMIN — LEVALBUTEROL HYDROCHLORIDE 0.63 MG: 0.63 SOLUTION RESPIRATORY (INHALATION) at 19:58

## 2020-07-20 RX ADMIN — PRAVASTATIN SODIUM 40 MG: 40 TABLET ORAL at 17:51

## 2020-07-20 RX ADMIN — TERAZOSIN HYDROCHLORIDE 5 MG: 5 CAPSULE ORAL at 21:12

## 2020-07-20 RX ADMIN — ENOXAPARIN SODIUM 40 MG: 40 INJECTION SUBCUTANEOUS at 08:50

## 2020-07-20 RX ADMIN — GUAIFENESIN 600 MG: 600 TABLET, EXTENDED RELEASE ORAL at 21:12

## 2020-07-20 RX ADMIN — PANTOPRAZOLE SODIUM 40 MG: 40 TABLET, DELAYED RELEASE ORAL at 05:47

## 2020-07-20 RX ADMIN — MONTELUKAST SODIUM 10 MG: 10 TABLET, FILM COATED ORAL at 21:17

## 2020-07-20 RX ADMIN — GUAIFENESIN 600 MG: 600 TABLET, EXTENDED RELEASE ORAL at 08:50

## 2020-07-20 RX ADMIN — LEVOTHYROXINE SODIUM 50 MCG: 50 TABLET ORAL at 05:46

## 2020-07-20 NOTE — PROGRESS NOTES
Progress Note - Pulmonary   Delmont Seal 80 y o  male MRN: [id number]  Unit/Bed#: S -01 Encounter: [id number]  Code Status: Level 3 - DNAR and DNI    Vesrene Dense is a 80 y o   Past Medical History:   Diagnosis Date    Allergic rhinitis     Hypercholesteremia     Hypertension     Pneumonia        Assessment/Plan:   Acute on chronic diastolic heart failure ([facility] )  Assessment & Plan  Wt Readings from Last 3 Encounters:   07/20/20 58 6 kg (129 lb 3 2 oz)   02/14/20 74 9 kg (165 lb 1 6 oz)   12/17/19 77 3 kg (170 lb 6 4 oz)   Most of fluid volume losses from chest tubes  Not currently on diuretics  -1 6 L last 24 hr  -12 8 L this admission            Bilateral hydropneumothorax  Assessment & Plan  Uncertain of underlying etiology  Overall Exudative Effusion  1/3 Lights: LD ratio 0 89  Pending pleural fluid cytology from 07/13  Possible plan for future Pleuroscopy w/ VATS if cytology non revealing  Overall will need family discussion re: expected further workup and details                COPD (chronic obstructive pulmonary disease) ([facility] )  Assessment & Plan  Hx Severe COPD by PFT w/ reduced DLCO  Appears to be on home Xopenex  Not acutely exacerbated  Will consider increasing inhaler therapy in outpatient setting      Acute on chronic respiratory failure with hypoxia and hypercapnia ([facility] )  Assessment & Plan  Chronically on 3 L NC  Currently on 1 5 L NC  Worsening respiratory acidosis  Plan to initiate BIPAP 12/6/30% w/ repeat VBG thereafter    ABG:  Results from last 7 days   Lab Units 07/15/20  0541   PH ART  7 450   PCO2 ART mm Hg 64 7*   PO2 ART mm Hg 57 8*   HCO3 ART mmol/L 44 0*   BASE EXC ART mmol/L 16 8   ABG SOURCE  Radial, Left     VBG:  Results from last 7 days   Lab Units 07/20/20  0552  07/15/20  0541   PH KANCHAN  7 257*   < >  --    PCO2 KANCHAN mm Hg 83 3*   < >  --    PO2 KANCHAN mm Hg 45 2*   < >  --    HCO3 KANCHAN mmol/L 36 3*   < >  --    BASE EXC KANCHAN mmol/L 5 9   < >  --    ABG SOURCE   --   --  Radial, Left    < > = values in this interval not displayed  Severe protein-calorie malnutrition (HCC)  Assessment & Plan  Malnutrition Findings:   Malnutrition type: Chronic illness  Degree of Malnutrition: Other severe protein calorie malnutrition(related to inadequate intake/medical condition/advanced age as evidenced by significant depletion of fat/muscle (clavicles, shoulders, triceps, temples), 26% wt loss x 5 months (165 lb 20), +4 b/l LE edema  Treatment includes supplementation )    BMI Findings: Body mass index is 20 24 kg/m²  Very likely contributory to ongoing volume losses via B/L chest tubes  Nutrition following and recommendations      D/C and Follow up Needs:  ______________________________________________________________________    Subjective: Pt seen and examined at bedside    Chief Complaint: No complaints    Tele Events:     Vitals:   Temp:  [97 8 °F (36 6 °C)-98 °F (36 7 °C)] 97 8 °F (36 6 °C)  HR:  [63-66] 66  Resp:  [18] 18  BP: (102-128)/(54-60) 102/54  Weight (last 2 days)     Date/Time   Weight    20 0551   58 6 (129 2)    20 0600   63 3 (139 55)    20 0600   63 3 (139 55) bedrest    Weight: bedrest at 20 0600    20 0500   63 3 (139 55)            Vitals:    20 2222 20 0551 20 0720 20 0731   BP: 113/57   102/54   BP Location:    Right arm   Pulse:    66   Resp:    18   Temp:    97 8 °F (36 6 °C)   TempSrc:    Oral   SpO2:   98% 98%   Weight:  58 6 kg (129 lb 3 2 oz)     Height:         Temp (24hrs), Av 9 °F (36 6 °C), Min:97 8 °F (36 6 °C), Max:98 °F (36 7 °C)  Current: Temperature: 97 8 °F (36 6 °C)        SpO2: SpO2: 98 %, SpO2 Activity: SpO2 Activity: At Rest, SpO2 Device: O2 Device: Nasal cannula  O2 Flow Rate (L/min): 40 L/min    IV Infusions:       Nutrition:        Diet Orders   (From admission, onward)             Start     Ordered    20  Diet Cardiovascular; Sodium 2 GM;  Fluid Restriction 1800 ML  Diet effective now     Question Answer Comment   Diet Type Cardiovascular    Cardiac Sodium 2 GM    Other Restriction(s): Fluid Restriction 1800 ML    RD to adjust diet per protocol? Yes        07/13/20 1924    07/13/20 1140  Dietary nutrition supplements  Once     Question Answer Comment   Select Supplement: Ensure Clear [employer], Lunch, Dinner        07/13/20 1139    07/12/20 1953  Room Service  Once     Question:  Type of Service  Answer:  Room Service - Appropriate with Assistance    07/12/20 1952                Ins/Outs:   I/O       07/18 0701 - 07/19 0700 07/19 0701 - 07/20 0700 07/20 0701 - 07/21 0700    P  O  600 540 480    Total Intake(mL/kg) 600 (9 5) 540 (9 2) 480 (8 2)    Urine (mL/kg/hr) 350 (0 2) 550 (0 4)     Stool 0 0     Chest Tube 825 1630     Total Output 1175 2180     Net -575 -1640 +480           Unmeasured Urine Occurrence 1 x 1 x     Unmeasured Stool Occurrence 1 x 1 x           Lines/Drains:  Invasive Devices     Peripheral Intravenous Line            Peripheral IV 07/19/20 Right Wrist less than 1 day          Drain            Chest Tube 1 Left Pleural 10 2 Fr  6 days    Chest Tube 2 Right Pleural 10 2 Fr  6 days                 Active medications:  Scheduled Meds:  Current Facility-Administered Medications:  albuterol 2 puff Inhalation Q6H PRN Kell James MD   enoxaparin 40 mg Subcutaneous Daily Kell James MD   guaiFENesin 600 mg Oral Q12H [facility] Corazon Tejada MD   levalbuterol 0 63 mg Nebulization TID Kell James MD   levothyroxine 50 mcg Oral Daily Kell James MD   montelukast 10 mg Oral HS Kell James MD   pantoprazole 40 mg Oral Early Morning Kell James MD   pravastatin 40 mg Oral Daily With Verona Bal MD   terazosin 5 mg Oral HS Kell James MD   timolol 1 drop Both Eyes BID Kell James MD     PRN Meds:  albuterol 2 puff Q6H PRN     ____________________________________________________________________    Physical Exam Constitutional: He is oriented to person, place, and time  He appears well-developed  Elderly / frail   HENT:   Head: Normocephalic and atraumatic  Eyes: Pupils are equal, round, and reactive to light  Conjunctivae are normal    Neck: Normal range of motion  Neck supple  Cardiovascular: Normal rate, regular rhythm and normal heart sounds  Exam reveals no gallop and no friction rub  No murmur heard  Pulmonary/Chest: Effort normal  No accessory muscle usage  No tachypnea  No respiratory distress  He has decreased breath sounds in the right upper field, the right middle field, the right lower field, the left upper field, the left middle field and the left lower field  He has no wheezes  He has no rhonchi  He has no rales  He exhibits no tenderness  Decreased breath sounds accentuated in bases    W/ bibasilar crackles improved w/ inspiration    Currently on 1 5 L NC     B/L chest tubes s air leak / on water seal    Entirely serous drainage   Abdominal: Soft  Bowel sounds are normal    Musculoskeletal: Normal range of motion  He exhibits no edema  Right lower leg: He exhibits no edema  Left lower leg: He exhibits no edema  Neurological: He is alert and oriented to person, place, and time  Skin: Skin is warm and dry     Psychiatric: He has a normal mood and affect      ____________________________________________________________________    Invasive/non-invasive ventilation settings   Respiratory    Lab Data (Last 4 hours)    None         O2/Vent Data (Last 4 hours)    None                Laboratory and Diagnostics:  Results from last 7 days   Lab Units 07/20/20  0552 07/19/20  0558 07/18/20  0504 07/17/20  4789 07/16/20  0528 07/15/20  0515 07/14/20  0533 07/14/20  0429   WBC Thousand/uL 3 82* 3 64* 4 74 4 96 4 07* 3 61*  --  5 30   HEMOGLOBIN g/dL 13 7 13 0 13 4 13 1 12 7 12 2  --  13 2   I STAT HEMOGLOBIN g/dl  --   --   --   --   --   --  13 3  --    HEMATOCRIT % 42 6 41 4 41 6 40 8 39 1 37  3  --  41 4   HEMATOCRIT, ISTAT %  --   --   --   --   --   --  39  --    PLATELETS Thousands/uL 148* 134* 140* 124* 102* 87*  --  94*   NEUTROS PCT % 69  --  71 68 72  --   --   --    MONOS PCT % 11  --  12 12 10  --   --   --      Results from last 7 days   Lab Units 07/20/20  0552 07/19/20  0558 07/18/20  0504 07/17/20  4342 07/16/20  0528 07/15/20  0515 07/15/20  0011  07/14/20  1156   SODIUM mmol/L 134* 134* 133* 132* 133* 134* 133*   < > 138   POTASSIUM mmol/L 4 3 4 0 4 2 3 6 4 2 4 2 4 2   < > 3 6   CHLORIDE mmol/L 98* 98* 97* 96* 94* 93* 93*   < > 96*   CO2 mmol/L 36* 35* 36* 34* 44* 44* 43*   < > 43*   ANION GAP mmol/L 0* 1* 0* 2* -5* -3* -3*   < > -1*   BUN mg/dL 23 25 22 20 21 17 18   < > 15   CREATININE mg/dL 0 74 0 60 0 58* 0 69 0 65 0 53* 0 60   < > 0 61   CALCIUM mg/dL 8 5 7 9* 7 9* 7 8* 7 9* 7 8* 7 7*   < > 7 3*   GLUCOSE RANDOM mg/dL 84 85 99 94 85 84 86   < > 84   ALT U/L  --   --   --   --   --   --   --   --  7*   AST U/L  --   --   --   --   --   --   --   --  21   ALK PHOS U/L  --   --   --   --   --   --   --   --  44*   ALBUMIN g/dL  --   --   --   --   --   --   --   --  2 9*   TOTAL BILIRUBIN mg/dL  --   --   --   --   --   --   --   --  1 03*    < > = values in this interval not displayed       Results from last 7 days   Lab Units 07/15/20  0515 07/14/20  0429   MAGNESIUM mg/dL 2 1 2 7*   PHOSPHORUS mg/dL 2 0* 3 5      Results from last 7 days   Lab Units 07/15/20  0515   INR  1 24*          Results from last 7 days   Lab Units 07/13/20  1741   LACTIC ACID mmol/L 1 0     ABG:  Results from last 7 days   Lab Units 07/15/20  0541   PH ART  7 450   PCO2 ART mm Hg 64 7*   PO2 ART mm Hg 57 8*   HCO3 ART mmol/L 44 0*   BASE EXC ART mmol/L 16 8   ABG SOURCE  Radial, Left     VBG:  Results from last 7 days   Lab Units 07/20/20  0552  07/15/20  0541   PH KANCHAN  7 257*   < >  --    PCO2 KANCHAN mm Hg 83 3*   < >  --    PO2 KANCHAN mm Hg 45 2*   < >  --    HCO3 KANCHAN mmol/L 36 3*   < >  --    BASE EXC KANCHAN mmol/L 5 9   < >  --    ABG SOURCE   --   --  Radial, Left    < > = values in this interval not displayed  Results from last 7 days   Lab Units 20  1741   PROCALCITONIN ng/ml <0 05       Micro  Results from last 7 days   Lab Units 20  1652   GRAM STAIN RESULT  No Polys or Bacteria seen  No Polys or Bacteria seen   BODY FLUID CULTURE, STERILE  No growth  No growth     Pulmonary Function Test Report  Francis Pina 80 y o  male MRN: [id number]     Date of Testin2019     Date of Interpretation: 2019     Requesting Physician: Dr Juan Farley     Reason for Testing: Chronic heart failure     Reference set for interpretation: [id number]     Procedure: The patient was taken to pulmonary function testing laboratory  The patient demonstrated good effort and cooperation  The results of this test meet ATS standards for acceptability and repeatability  VC for DLCO measurement was <85% of SVC  Suboptimal effort due to short exhalation  DLCO measurement corrected for patient's hemoglobin  Data set appears appropriate for interpretation      Post bronchodilator testing performed after the administration of 2 5mg albuterol in 3cc normal saline administered via nebulizer per bronchodilator protocol      Results:  FEV1/FVC Ratio: 58 %  Forced Vital Capacity: 1 46 L    47 % predicted  FEV1: 0 85 L     40 % predicted     Lung volumes by body plethysmography:   Total Lung Capacity 71 % predicted   Residual volume 115 % predicted     DLCO corrected for patients hemoglobin level: 8 %     Interpretation:     · Severe obstructive airflow defect      · No significant response to the administration to bronchodilator per ATS Standards     · Mild restrictive lung disease as indicated by decreased TLC     · Severely reduced diffusion capacity  Results unreliable given difficulty with testing      · Flow volume loop is consistent with obstruction and restriction      GEORGI Freeman    [facility]      Imaging:   XR chest portable   Final Result by Estuardo Mojica MD (07/19 1713)      Small bilateral pneumothoraces  Workstation performed: [id number]         XR chest portable   Final Result by Estuardo Mojica MD (07/17 0209)      Persistent small left pneumothorax  Mild bibasilar atelectasis  Workstation performed: [id number]         XR chest portable   Final Result by Estuardo Mojica MD (07/15 0827)      Bilateral pleural drainage catheters with no change in small left pneumothorax and left base atelectasis            Workstation performed: [id number]         CT chest wo contrast   Final Result by Karyle Car, MD (07/15 1734)         1  Improving bilateral hydropneumothoraces  2   Improving bilateral atelectasis  Workstation performed: [id number]         XR chest portable   Final Result by Olivia Babcock MD (07/14 1146)      Stable left basilar pneumothorax with chest tube  Previous right basilar pneumothorax not well visualized and possibly resolved  Workstation performed: [id number]         XR chest portable   Final Result by Olivia Babcock MD (07/14 1144)      Improved bibasilar pneumothoraces with chest tubes in place  Workstation performed: [id number]         XR chest portable   Final Result by Dominique Hayward MD (07/14 8621)      Bilateral pneumothoraces status post bilateral chest tube placement with resolution of previously seen pleural effusions  Workstation performed: [id number]         IR chest tube   Final Result by Fidencio Munoz MD (07/13 1728)   Impression:    Successful ultrasound-guided bilateral chest tube placement  Workstation performed: [id number]         CTA chest pe study   Final Result by Olivia Babcock MD (07/13 1626)      1  Bilateral hydropneumothoraces with large pleural effusions and moderate pneumothoraces  2   No acute pulmonary embolism  I personally discussed this study with   DANIA on 7/13/2020 at 3:42 PM                         Workstation performed: [id number]         XR chest 1 view portable   ED Interpretation by Anand Elise MD (07/12 8676)   Bilateral pleural effusions      Final Result by Jeff Jeffers MD (07/13 5591)      Moderate size bilateral pleural effusions with bibasilar atelectasis  Workstation performed: [id number]             Micro: Lab Results   Component Value Date    BLOODCX No Growth After 5 Days  03/16/2019    BLOODCX No Growth After 5 Days   03/16/2019            Invalid input(s): Jhonatan Matthew

## 2020-07-20 NOTE — ASSESSMENT & PLAN NOTE
Chronically on 3 L NC  Currently on 1 5 L NC  Self improved respiratory acidosis   Did not tolerate BiPAP overnight    ABG:      VBG:  Results from last 7 days   Lab Units 07/21/20  1004   PH KANCHAN  7 442*   PCO2 KANCHAN mm Hg 49 8   PO2 KANCHAN mm Hg 198 2*   HCO3 KANCHAN mmol/L 33 2*   BASE EXC KANCHAN mmol/L 7 8

## 2020-07-20 NOTE — ASSESSMENT & PLAN NOTE
Hx Severe COPD by PFT w/ reduced DLCO  Appears to be on home Xopenex  Not acutely exacerbated  Plan to start ICS/LAMA/LABA in outpatient setting / Started Mago David / Ced[facility]unt [address] here in inpatient setting  Transition to Trelegy in the outpatient setting

## 2020-07-20 NOTE — PLAN OF CARE
Problem: Potential for Falls  Goal: Patient will remain free of falls  Description  INTERVENTIONS:  - Assess patient frequently for physical needs  -  Identify cognitive and physical deficits and behaviors that affect risk of falls  -  [place] fall precautions as indicated by assessment   - Educate patient/family on patient safety including physical limitations  - Instruct patient to call for assistance with activity based on assessment  - Modify environment to reduce risk of injury  - Consider OT/PT consult to assist with strengthening/mobility  7/20/2020 1609 by Nora Christian  Outcome: Progressing  7/20/2020 1609 by Nora Christian  Outcome: Progressing     Problem: Prexisting or High Potential for Compromised Skin Integrity  Goal: Skin integrity is maintained or improved  Description  INTERVENTIONS:  - Identify patients at risk for skin breakdown  - Assess and monitor skin integrity  - Assess and monitor nutrition and hydration status  - Monitor labs   - Assess for incontinence   - Turn and reposition patient  - Assist with mobility/ambulation  - Relieve pressure over bony prominences  - Avoid friction and shearing  - Provide appropriate hygiene as needed including keeping skin clean and dry  - Evaluate need for skin moisturizer/barrier cream  - Collaborate with interdisciplinary team   - Patient/family teaching  - Consider wound care consult   7/20/2020 1609 by Nora Christian  Outcome: Progressing  7/20/2020 1609 by Nora Christian  Outcome: Progressing     Problem: Nutrition/Hydration-ADULT  Goal: Nutrient/Hydration intake appropriate for improving, restoring or maintaining nutritional needs  Description  Monitor and assess patient's nutrition/hydration status for malnutrition  Collaborate with interdisciplinary team and initiate plan and interventions as ordered  Monitor patient's weight and dietary intake as ordered or per policy  Utilize nutrition screening tool and intervene as necessary   Determine patient's food preferences and provide high-protein, high-caloric foods as appropriate       INTERVENTIONS:  - Monitor oral intake, urinary output, labs, and treatment plans  - Assess nutrition and hydration status and recommend course of action  - Evaluate amount of meals eaten  - Assist patient with eating if necessary   - Allow adequate time for meals  - Recommend/ encourage appropriate diets, oral nutritional supplements, and vitamin/mineral supplements  - Order, calculate, and assess calorie counts as needed  - Recommend, monitor, and adjust tube feedings and TPN/PPN based on assessed needs  - Assess need for intravenous fluids  - Provide specific nutrition/hydration education as appropriate  - Include patient/family/caregiver in decisions related to nutrition  7/20/2020 1609 by Chung Moseley  Outcome: Progressing  7/20/2020 1609 by Chung Moseley  Outcome: Progressing     Problem: PAIN - ADULT  Goal: Verbalizes/displays adequate comfort level or baseline comfort level  Description  Interventions:  - Encourage patient to monitor pain and request assistance  - Assess pain using appropriate pain scale  - Administer analgesics based on type and severity of pain and evaluate response  - Implement non-pharmacological measures as appropriate and evaluate response  - Consider cultural and social influences on pain and pain management  - Notify physician/advanced practitioner if interventions unsuccessful or patient reports new pain  7/20/2020 1609 by Chung Moseley  Outcome: Progressing  7/20/2020 1609 by Chung Moseley  Outcome: Progressing     Problem: INFECTION - ADULT  Goal: Absence or prevention of progression during hospitalization  Description  INTERVENTIONS:  - Assess and monitor for signs and symptoms of infection  - Monitor lab/diagnostic results  - Monitor all insertion sites, i e  indwelling lines, tubes, and drains  - Monitor endotracheal if appropriate and nasal secretions for changes in amount and color  - [place] appropriate cooling/warming therapies per order  - Administer medications as ordered  - Instruct and encourage patient and family to use good hand hygiene technique  - Identify and instruct in appropriate isolation precautions for identified infection/condition  7/20/2020 1609 by Danyelle Hemphill  Outcome: Progressing  7/20/2020 1609 by Danyelle Hemphill  Outcome: Progressing  Goal: Absence of fever/infection during neutropenic period  Description  INTERVENTIONS:  - Monitor WBC    7/20/2020 1609 by Danyelle Hemphill  Outcome: Progressing  7/20/2020 1609 by Danyelle Hemphill  Outcome: Progressing     Problem: SAFETY ADULT  Goal: Maintain or return to baseline ADL function  Description  INTERVENTIONS:  -  Assess patient's ability to carry out ADLs; assess patient's baseline for ADL function and identify physical deficits which impact ability to perform ADLs (bathing, care of mouth/teeth, toileting, grooming, dressing, etc )  - Assess/evaluate cause of self-care deficits   - Assess range of motion  - Assess patient's mobility; develop plan if impaired  - Assess patient's need for assistive devices and provide as appropriate  - Encourage maximum independence but intervene and supervise when necessary  - Involve family in performance of ADLs  - Assess for home care needs following discharge   - Consider OT consult to assist with ADL evaluation and planning for discharge  - Provide patient education as appropriate  7/20/2020 1609 by Danyelle Hemphill  Outcome: Progressing  7/20/2020 1609 by Danyelle Hemphill  Outcome: Progressing  Goal: Maintain or return mobility status to optimal level  Description  INTERVENTIONS:  - Assess patient's baseline mobility status (ambulation, transfers, stairs, etc )    - Identify cognitive and physical deficits and behaviors that affect mobility  - Identify mobility aids required to assist with transfers and/or ambulation (gait belt, sit-to-stand, lift, walker, cane, etc )  - [place] fall precautions as indicated by assessment  - Record patient progress and toleration of activity level on Mobility SBAR; progress patient to next Phase/Stage  - Instruct patient to call for assistance with activity based on assessment  - Consider rehabilitation consult to assist with strengthening/weightbearing, etc   7/20/2020 1609 by Keenan Samayoa  Outcome: Progressing  7/20/2020 1609 by Keenan Samayoa  Outcome: Progressing     Problem: DISCHARGE PLANNING  Goal: Discharge to home or other facility with appropriate resources  Description  INTERVENTIONS:  - Identify barriers to discharge w/patient and caregiver  - Arrange for needed discharge resources and transportation as appropriate  - Identify discharge learning needs (meds, wound care, etc )  - Arrange for interpretive services to assist at discharge as needed  - Refer to Case Management Department for coordinating discharge planning if the patient needs post-hospital services based on physician/advanced practitioner order or complex needs related to functional status, cognitive ability, or social support system  7/20/2020 1609 by Keenan Samayoa  Outcome: Progressing  7/20/2020 1609 by Keenan Samayoa  Outcome: Progressing     Problem: Knowledge Deficit  Goal: Patient/family/caregiver demonstrates understanding of disease process, treatment plan, medications, and discharge instructions  Description  Complete learning assessment and assess knowledge base    Interventions:  - Provide teaching at level of understanding  - Provide teaching via preferred learning methods  7/20/2020 1609 by Keenan Samayoa  Outcome: Progressing  7/20/2020 1609 by Keenan Samayoa  Outcome: Progressing     Problem: RESPIRATORY - ADULT  Goal: Achieves optimal ventilation and oxygenation  Description  INTERVENTIONS:  - Assess for changes in respiratory status  - Assess for changes in mentation and behavior  - Position to facilitate oxygenation and minimize respiratory effort  - Oxygen administered by appropriate delivery if ordered  - Initiate smoking cessation education as indicated  - Encourage broncho-pulmonary hygiene including cough, deep breathe, Incentive Spirometry  - Assess the need for suctioning and aspirate as needed  - Assess and instruct to report SOB or any respiratory difficulty  - Respiratory Therapy support as indicated  7/20/2020 1609 by Chris Samuel  Outcome: Progressing  7/20/2020 1609 by Chris Samuel  Outcome: Progressing     Problem: GENITOURINARY - ADULT  Goal: Maintains or returns to baseline urinary function  Description  INTERVENTIONS:  - Assess urinary function  - Encourage oral fluids to ensure adequate hydration if ordered  - Administer IV fluids as ordered to ensure adequate hydration  - Administer ordered medications as needed  - Offer frequent toileting  - Follow urinary retention protocol if ordered  7/20/2020 1609 by Chris Samuel  Outcome: Progressing  7/20/2020 1609 by Chris Samuel  Outcome: Progressing  Goal: Absence of urinary retention  Description  INTERVENTIONS:  - Assess patients ability to void and empty bladder  - Monitor I/O  - Bladder scan as needed  - Discuss with physician/AP medications to alleviate retention as needed  - Discuss catheterization for long term situations as appropriate  7/20/2020 1609 by Chris Samuel  Outcome: Progressing  7/20/2020 1609 by Chris Samuel  Outcome: Progressing  Goal: Urinary catheter remains patent  Description  INTERVENTIONS:  - Assess patency of urinary catheter  - If patient has a chronic can, consider changing catheter if non-functioning  - Follow guidelines for intermittent irrigation of non-functioning urinary catheter  7/20/2020 1609 by Chris Samuel  Outcome: Progressing  7/20/2020 1609 by Chris Samuel  Outcome: Progressing     Problem: METABOLIC, FLUID AND ELECTROLYTES - ADULT  Goal: Electrolytes maintained within normal limits  Description  INTERVENTIONS:  - Monitor labs and assess patient for signs and symptoms of electrolyte imbalances  - Administer electrolyte replacement as ordered  - Monitor response to electrolyte replacements, including repeat lab results as appropriate  - Instruct patient on fluid and nutrition as appropriate  7/20/2020 1609 by Joel Golden  Outcome: Progressing  7/20/2020 1609 by Joel Golden  Outcome: Progressing  Goal: Fluid balance maintained  Description  INTERVENTIONS:  - Monitor labs   - Monitor I/O and WT  - Instruct patient on fluid and nutrition as appropriate  - Assess for signs & symptoms of volume excess or deficit  7/20/2020 1609 by Joel Golden  Outcome: Progressing  7/20/2020 1609 by Joel Golden  Outcome: Progressing  Goal: Glucose maintained within target range  Description  INTERVENTIONS:  - Monitor Blood Glucose as ordered  - Assess for signs and symptoms of hyperglycemia and hypoglycemia  - Administer ordered medications to maintain glucose within target range  - Assess nutritional intake and initiate nutrition service referral as needed  7/20/2020 1609 by Joel Golden  Outcome: Progressing  7/20/2020 1609 by Joel Golden  Outcome: Progressing     Problem: SKIN/TISSUE INTEGRITY - ADULT  Goal: Skin integrity remains intact  Description  INTERVENTIONS  - Identify patients at risk for skin breakdown  - Assess and monitor skin integrity  - Assess and monitor nutrition and hydration status  - Monitor labs (i e  albumin)  - Assess for incontinence   - Turn and reposition patient  - Assist with mobility/ambulation  - Relieve pressure over bony prominences  - Avoid friction and shearing  - Provide appropriate hygiene as needed including keeping skin clean and dry  - Evaluate need for skin moisturizer/barrier cream  - Collaborate with interdisciplinary team (i e  Nutrition, Rehabilitation, etc )   - Patient/family teaching  7/20/2020 1609 by Joel Golden  Outcome: Progressing  7/20/2020 1609 by Joel Golden  Outcome: Progressing  Goal: Incision(s), wounds(s) or drain site(s) healing without S/S of infection  Description  INTERVENTIONS  - Assess and document risk factors for skin impairment   - Assess and document dressing, incision, wound bed, drain sites and surrounding tissue  - Consider nutrition services referral as needed  - Oral mucous membranes remain intact  - Provide patient/ family education  7/20/2020 1609 by Sugar Stanford  Outcome: Progressing  7/20/2020 1609 by Sugar Stanford  Outcome: Progressing  Goal: Oral mucous membranes remain intact  Description  INTERVENTIONS  - Assess oral mucosa and hygiene practices  - Implement preventative oral hygiene regimen  - Implement oral medicated treatments as ordered  - Initiate Nutrition services referral as needed  7/20/2020 1609 by Sugar Stanford  Outcome: Progressing  7/20/2020 1609 by Sugar Stanford  Outcome: Progressing     Problem: HEMATOLOGIC - ADULT  Goal: Maintains hematologic stability  Description  INTERVENTIONS  - Assess for signs and symptoms of bleeding or hemorrhage  - Monitor labs  - Administer supportive blood products/factors as ordered and appropriate  7/20/2020 1609 by Sugar Stanford  Outcome: Progressing  7/20/2020 1609 by Sugar Stanford  Outcome: Progressing     Problem: MUSCULOSKELETAL - ADULT  Goal: Maintain or return mobility to safest level of function  Description  INTERVENTIONS:  - Assess patient's ability to carry out ADLs; assess patient's baseline for ADL function and identify physical deficits which impact ability to perform ADLs (bathing, care of mouth/teeth, toileting, grooming, dressing, etc )  - Assess/evaluate cause of self-care deficits   - Assess range of motion  - Assess patient's mobility  - Assess patient's need for assistive devices and provide as appropriate  - Encourage maximum independence but intervene and supervise when necessary  - Involve family in performance of ADLs  - Assess for home care needs following discharge   - Consider OT consult to assist with ADL evaluation and planning for discharge  - Provide patient education as appropriate  7/20/2020 1609 by Shoaib Torres  Outcome: Progressing  7/20/2020 1609 by Shoaib Torres  Outcome: Progressing  Goal: Maintain proper alignment of affected body part  Description  INTERVENTIONS:  - Support, maintain and protect limb and body alignment  - Provide patient/ family with appropriate education  7/20/2020 1609 by Shoaib Torres  Outcome: Progressing  7/20/2020 1609 by Shoaib Torres  Outcome: Progressing

## 2020-07-20 NOTE — ASSESSMENT & PLAN NOTE
Wt Readings from Last 3 Encounters:   07/20/20 58 6 kg (129 lb 3 2 oz)   02/14/20 74 9 kg (165 lb 1 6 oz)   12/17/19 77 3 kg (170 lb 6 4 oz)   Most of fluid volume losses from chest tubes this admission  900 U/O last 24 hrs  Back on Torsemide 20 mg BID  Cardiology follows  -900 last 24 hr  -15 2 L this admission

## 2020-07-20 NOTE — PLAN OF CARE
Problem: Potential for Falls  Goal: Patient will remain free of falls  Description  INTERVENTIONS:  - Assess patient frequently for physical needs  -  Identify cognitive and physical deficits and behaviors that affect risk of falls    -  [place] fall precautions as indicated by assessment   - Educate patient/family on patient safety including physical limitations  - Instruct patient to call for assistance with activity based on assessment  - Modify environment to reduce risk of injury  - Consider OT/PT consult to assist with strengthening/mobility  Outcome: Progressing

## 2020-07-20 NOTE — ASSESSMENT & PLAN NOTE
Patient currently takes terazosin  BP today is 102/54      Plan:  · Continue terazosin  · Continue to hold Furosemide given patient's low BP

## 2020-07-20 NOTE — ASSESSMENT & PLAN NOTE
66-year-old male with bilateral hydropneumothoraces, Severe COPD w/ FEV1/FVC 58% / FEV1 40% w/ DLCO 8% corrected,HTN, CDCHF, and chronic oxygen dependence of 3 l, former smoker, admitted 7/12 w/ dyspnea / CP w/ B/L effusions and worsening hypoxemia despite attempted diuresis and had CT angio 7/13 revealing bilateral hydropneumothoracies   Post IR pigtail catheters placed   Post procedure needing 100% HFNC and NRB and hypoxic  ICU stay w/ downgrade on 7/14  Now requiring only 1 5 L NC w/ persistent B/L but markedly improved effusions and chest tube drainage      CT drainage significantly slowed again now for over the last 72 hours    Cytology negative  Overall Exudative Effusion  1/3 + Lights: LD ratio 0 89  Patient and daughter okay with bilateral ASEPT catheters if needed / NO SURGERY  Overall will attempt to maintain minimal effusions w/ diuresis   Small intermittent left-sided air leak  Plan for morning radiographic re-evaluation and probably chest tube removal per IR if stable and non significantly worsened effusions  If worsened symptomatic effusions then consideration for ASEPTS    Discussed with Dr Naa Mckeon of IR with plan to remove 1 chest tube per day  Plan for right-sided chest tube removal today  And left tube removal tomorrow if Left lung remains stable with no increase pneumothorax

## 2020-07-20 NOTE — PROGRESS NOTES
Patient resting in bed, offers no complaints at this time  Bed low and locked and call bell within reach, bed alarm activated  Will continue to monitor

## 2020-07-20 NOTE — ASSESSMENT & PLAN NOTE
PFT performed on December of 2019 showed severe obstructive pulmonary disease      Plan:  · Continue patient's home albuterol, levalbuterol, and montelukast   · Not currently in any acute respiratory distress

## 2020-07-20 NOTE — PROGRESS NOTES
General Cardiology   Progress Note -  [facility]   Chase Petersen 80 y o  male MRN: [id number]    Unit/Bed#: S -01 Encounter: [id number]    Assessment/ Plan:    1  Acute on chronic diastolic CHF: s/p IV diuretics; these have been held since 7/13 for hypotension  He appears well compensated on exam today without any SOB, LE edema or orthopnea  Continue to hold  - overall negative 12L  PTA diuretics: lasix 40mg daily, chlorthalidone 25mg daily and aldactone 25mg daily  2  Acute on chronic hypoxic and hypercapnic resp failure: mulit-factorial given #1 and #3 and underlying COPD  3  B/l hydropneumothorax: s/p b/l CT insertions; continue to have moderate drainage; pulmonary managing; awaiting cytology on fluids and may require thoracic surgery eval/intervention; will defer to pulmonary and IM who have been managing  4  HTN: intermittent hypotension; holding meds  5  HLD: continue statin   6  Hypothyroidism: TSH 4 5        Subjective: pt seen for follow up; Pt reports feeling fine today; he does not appreciate any SOB, chest pain, palpitations or LE edema  D/w RN; no events overnight  He still has b/l chest tubes for b/l hydropneumothorax; pulmonary managing; awaiting fluid cytology and he may ultimately require transfer to \[facility]\"" for thoracic surgery evaluation  His diuretics have been on hold since 7/13  Review of Systems   Constitution: Positive for malaise/fatigue  Negative for decreased appetite and fever  Cardiovascular: Negative for chest pain, dyspnea on exertion, leg swelling, orthopnea, palpitations and syncope  Respiratory: Negative for cough and shortness of breath  Gastrointestinal: Negative for abdominal pain, nausea and vomiting  Genitourinary: Negative for dysuria  Neurological: Negative for dizziness and light-headedness  Psychiatric/Behavioral: Negative for altered mental status       Objective:   Vitals: Blood pressure 102/54, pulse 66, temperature 97 8 °F (36 6 °C), temperature source Oral, resp  rate 18, height 5' 7" (1 702 m), weight 58 6 kg (129 lb 3 2 oz), SpO2 98 %  ,     Body mass index is 20 24 kg/m²  ,     Systolic (60PWR), RQW:361 , Min:102 , AAF:338     Diastolic (27ZIX), NNP:41, Min:54, Max:60      Intake/Output Summary (Last 24 hours) at 7/20/2020 1033  Last data filed at 7/20/2020 0900  Gross per 24 hour   Intake 840 ml   Output 2180 ml   Net -1340 ml     Weight (last 2 days)     Date/Time   Weight    07/20/20 0551   58 6 (129 2)    07/19/20 0600   63 3 (139 55)    07/18/20 0600   63 3 (139 55) bedrest    Weight: bedrest at 07/18/20 0600    07/18/20 0500   63 3 (139 55)            Telemetry Review: No telemetry    Physical Exam   Constitutional: He is oriented to person, place, and time  No distress  HENT:   Head: Normocephalic and atraumatic  Neck: No JVD present  Cardiovascular: Normal rate, regular rhythm, S1 normal and S2 normal    No murmur heard  No LE edema   Pulmonary/Chest: Effort normal  He has no wheezes  He has no rales  BS decreased; b/l CT noted with clear yellow drainage   Abdominal: Soft  flat   Musculoskeletal: He exhibits no edema or deformity  Neurological: He is alert and oriented to person, place, and time  Skin: Skin is warm and dry  He is not diaphoretic  Psychiatric: He has a normal mood and affect  His behavior is normal    Nursing note and vitals reviewed      LABORATORY RESULTS      CBC with diff:   Results from last 7 days   Lab Units 07/20/20  0552 07/19/20  0558 07/18/20  0504 07/17/20  8091 07/16/20  0528 07/15/20  0515 07/14/20  0533 07/14/20  0429   WBC Thousand/uL 3 82* 3 64* 4 74 4 96 4 07* 3 61*  --  5 30   HEMOGLOBIN g/dL 13 7 13 0 13 4 13 1 12 7 12 2  --  13 2   I STAT HEMOGLOBIN g/dl  --   --   --   --   --   --  13 3  --    HEMATOCRIT % 42 6 41 4 41 6 40 8 39 1 37 3  --  41 4   HEMATOCRIT, ISTAT %  --   --   --   --   --   --  39  --    MCV fL 99* 99* 99* 99* 98 99*  --  101*   PLATELETS Thousands/uL 148* 134* 140* 124* 102* 87*  --  94*   MCH pg 31 8 31 0 31 9 31 8 31 8 32 4  --  32 2   MCHC g/dL 32 2 31 4 32 2 32 1 32 5 32 7  --  31 9   RDW % 13 4 13 6 13 6 13 8 14 0 13 6  --  13 7   MPV fL 9 8 10 0 10 1 10 6 10 2 10 7  --  10 3   NRBC AUTO /100 WBCs 0  --  0 0 0  --   --   --      CMP:  Results from last 7 days   Lab Units 07/20/20  0552 07/19/20  0558 07/18/20  0504 07/17/20  8418 07/16/20  0528 07/15/20  0515 07/15/20  0011  07/14/20  1156 07/14/20  0533   POTASSIUM mmol/L 4 3 4 0 4 2 3 6 4 2 4 2 4 2   < > 3 6  --    CHLORIDE mmol/L 98* 98* 97* 96* 94* 93* 93*   < > 96*  --    CO2 mmol/L 36* 35* 36* 34* 44* 44* 43*   < > 43*  --    CO2, I-STAT mmol/L  --   --   --   --   --   --   --   --   --  >45*   BUN mg/dL 23 25 22 20 21 17 18   < > 15  --    CREATININE mg/dL 0 74 0 60 0 58* 0 69 0 65 0 53* 0 60   < > 0 61  --    GLUCOSE, ISTAT mg/dl  --   --   --   --   --   --   --   --   --  101   CALCIUM mg/dL 8 5 7 9* 7 9* 7 8* 7 9* 7 8* 7 7*   < > 7 3*  --    AST U/L  --   --   --   --   --   --   --   --  21  --    ALT U/L  --   --   --   --   --   --   --   --  7*  --    ALK PHOS U/L  --   --   --   --   --   --   --   --  44*  --    EGFR ml/min/1 73sq m 81 88 89 83 85 92 88   < > 87  --     < > = values in this interval not displayed  BMP:  Results from last 7 days   Lab Units 07/20/20  0552 07/19/20  0558 07/18/20  0504 07/17/20  6024 07/16/20  0528 07/15/20  0515 07/15/20  0011  07/14/20  0533   POTASSIUM mmol/L 4 3 4 0 4 2 3 6 4 2 4 2 4 2   < >  --    CHLORIDE mmol/L 98* 98* 97* 96* 94* 93* 93*   < >  --    CO2 mmol/L 36* 35* 36* 34* 44* 44* 43*   < >  --    CO2, I-STAT mmol/L  --   --   --   --   --   --   --   --  >45*   BUN mg/dL 23 25 22 20 21 17 18   < >  --    CREATININE mg/dL 0 74 0 60 0 58* 0 69 0 65 0 53* 0 60   < >  --    GLUCOSE, ISTAT mg/dl  --   --   --   --   --   --   --   --  101   CALCIUM mg/dL 8 5 7 9* 7 9* 7 8* 7 9* 7 8* 7 7*   < >  --     < > = values in this interval not displayed       Lab Results Component Value Date    NTBNP 556 (H) 2020    NTBNP 866 (H) 2019    NTBNP 320 2019     Results from last 7 days   Lab Units 07/15/20  0515 20  0429   MAGNESIUM mg/dL 2 1 2 7*     Results from last 7 days   Lab Units 07/15/20  0515   INR  1 24*     Lipid Profile:   No results found for: CHOL  Lab Results   Component Value Date    HDL 80 (H) 2019    HDL 67 (H) 2018    HDL 78 (H) 2017     Lab Results   Component Value Date    LDLCALC 55 2019    LDLCALC 73 2018    LDLCALC 60 2017     Lab Results   Component Value Date    TRIG 37 2019    TRIG 54 2018    TRIG 61 2017     Cardiac testing:   Results for orders placed during the hospital encounter of 20   Echo complete with contrast if indicated    Narrative [place] [address] [address] [place] [address]  [phone]    Transthoracic Echocardiogram  2D, M-mode, Doppler, and Color Doppler    Study date:  2020    Patient: Aung Sutton  MR number: [id number]  Account number: [de-identified]  : 24-Mar-1929  Age: 80 years  Gender: Male  Status: Inpatient  Location: Bedside  Height: 67 in  Weight: 131 lb  BP: 131/ 61 mmHg    Indications: Heart Failure  Diagnoses: I50 9 - Heart failure, unspecified    Sonographer:  IMANI Peng  Primary Physician:  Leandro Menezes MD  Referring Physician:  Kaden Hanna MD  Group:  [facility]  Interpreting Physician:  Tristan Rivera MD    SUMMARY    LEFT VENTRICLE:  Systolic function was normal  Ejection fraction was estimated to be 60 %  There were no regional wall motion abnormalities  Wall thickness was mildly increased  RIGHT VENTRICLE:  The size was normal   Systolic function was normal     MITRAL VALVE:  There was trace regurgitation  AORTIC VALVE:  There was mild regurgitation  TRICUSPID VALVE:  There was trace regurgitation  Estimated peak PA pressure was 50 mmHg      PERICARDIUM:  A small to moderate, free-flowing pericardial effusion was identified circumferential to the heart  There was no evidence of hemodynamic compromise  There was a large right pleural effusion  There was a large left pleural effusion  HISTORY: PRIOR HISTORY: COPD, pericardial effusion, CKD II, Respitory failure, Hyperlipidemia, and HTN  Congestive heart failure  Risk factors: hypertension  PROCEDURE: The procedure was performed at the bedside  This was a routine study  The transthoracic approach was used  The study included complete 2D imaging, M-mode, complete spectral Doppler, and color Doppler  The heart rate was 82 bpm,  at the start of the study  Images were obtained from the parasternal, apical, subcostal, and suprasternal notch acoustic windows  Image quality was adequate  LEFT VENTRICLE: Size was normal  Systolic function was normal  Ejection fraction was estimated to be 60 %  There were no regional wall motion abnormalities  Wall thickness was mildly increased  DOPPLER: The study was not technically  sufficient to allow evaluation of LV diastolic function  RIGHT VENTRICLE: The size was normal  Systolic function was normal  Wall thickness was normal     LEFT ATRIUM: Size was normal     RIGHT ATRIUM: Size was normal     MITRAL VALVE: Valve structure was normal  There was normal leaflet separation  DOPPLER: The transmitral velocity was within the normal range  There was no evidence for stenosis  There was trace regurgitation  AORTIC VALVE: The valve was trileaflet  Leaflets exhibited normal thickness and normal cuspal separation  DOPPLER: Transaortic velocity was within the normal range  There was no evidence for stenosis  There was mild regurgitation  TRICUSPID VALVE: The valve structure was normal  There was normal leaflet separation  DOPPLER: The transtricuspid velocity was within the normal range  There was no evidence for stenosis  There was trace regurgitation   Estimated peak PA  pressure was 50 mmHg     PULMONIC VALVE: Leaflets exhibited normal thickness, no calcification, and normal cuspal separation  DOPPLER: The transpulmonic velocity was within the normal range  There was no significant regurgitation  PERICARDIUM: A small to moderate, free-flowing pericardial effusion was identified circumferential to the heart  There was no evidence of hemodynamic compromise  There was a large right pleural effusion  There was a large left pleural  effusion  AORTA: The root exhibited normal size  SYSTEMIC VEINS: IVC: The inferior vena cava was not well visualized  The inferior vena cava was grossly normal in size      SYSTEM MEASUREMENT TABLES    2D  %FS: 37 92 %  Ao Diam: 3 53 cm  EDV(Teich): 53 54 ml  EF(Teich): 69 06 %  ESV(Teich): 16 57 ml  IVSd: 1 18 cm  LA Area: 16 16 cm2  LA Diam: 3 48 cm  LVEDV MOD A4C: 64 17 ml  LVEF MOD A4C: 75 23 %  LVESV MOD A4C: 15 89 ml  LVIDd: 3 58 cm  LVIDs: 2 22 cm  LVLd A4C: 6 94 cm  LVLs A4C: 5 64 cm  LVPWd: 1 1 cm  RA Area: 12 12 cm2  RVIDd: 3 95 cm  SV MOD A4C: 48 27 ml  SV(Teich): 36 98 ml    CW  AV MaxP 4 mmHg  AV Vmax: 1 36 m/s  MV PHT: 80 87 ms  MVA By PHT: 2 72 cm2  TR MaxP 85 mmHg  TR Vmax: 3 53 m/s    PW  LVOT Vmax: 1 24 m/s  LVOT maxP 12 mmHg  Lateral E': 0 04 m/s  MV A Nickolas: 1 11 m/s  MV Dec McClain: 2 58 m/s2  MV DecT: 321 49 ms  MV E Nickolas: 0 83 m/s  MV E/A Ratio: 0 75    [facility] Accredited [facility]    Prepared and electronically signed by    Shala Carballo MD  Signed 2020 17:47:19       Meds/Allergies   all current active meds have been reviewed  Medications Prior to Admission   Medication    atenolol-chlorthalidone (TENORETIC) 50-25 mg per tablet    brimonidine-timolol (COMBIGAN) 0 2-0 5 %    furosemide (LASIX) 40 mg tablet    latanoprost (XALATAN) 0 005 % ophthalmic solution    levothyroxine 50 mcg tablet    montelukast (SINGULAIR) 10 mg tablet    potassium chloride (K-DUR,KLOR-CON) 20 mEq tablet    RABEprazole (ACIPHEX) 20 MG tablet    simvastatin (ZOCOR) 20 mg tablet    spironolactone (ALDACTONE) 25 mg tablet    terazosin (HYTRIN) 5 mg capsule    albuterol (PROVENTIL HFA,VENTOLIN HFA) 90 mcg/act inhaler    Blood Pressure Monitoring (B-D ASSURE BPM/AUTO ARM CUFF) MISC    levalbuterol (XOPENEX) 0 63 mg/3 mL nebulizer solution     Assessment:  Principal Problem:    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 acidosis  Active Problems:    Chronic respiratory failure with hypoxia (HCC)    Hyponatremia    Essential hypertension    Hyperlipidemia    Hypokalemia    Thrombocytopenia (HCC)    Generalized weakness    Pericardial effusion    Severe protein-calorie malnutrition (HCC)    Acute on chronic respiratory failure with hypoxia and hypercapnia (HCC)    COPD (chronic obstructive pulmonary disease) (HCC)    Bilateral hydropneumothorax    Hypothyroidism    Acute on chronic diastolic heart failure (HCC)    GERD (gastroesophageal reflux disease)    Counseling / Coordination of Care  Total floor / unit time spent today 20 minutes  Greater than 50% of total time was spent with the patient and / or family counseling and / or coordination of care  ** Please Note: Dragon 360 Dictation voice to text software may have been used in the creation of this document   **

## 2020-07-20 NOTE — ASSESSMENT & PLAN NOTE
Likely secondary to advanced age, multiple medical conditions, hypothyroidism, and malnutrition  Concern for recent fall history      Plan:  · PT/OT to determine disposition  · See malnutrition plan

## 2020-07-20 NOTE — PROGRESS NOTES
Progress Note - Unruly Gutierrez 3/24/1929, 80 y o  male MRN: [id number]    Unit/Bed#: S -01 Encounter: [id number]    Primary Care Provider: Anum Pelaez MD   Date and time admitted to hospital: 7/12/2020  2:58 PM        * Bilateral hydropneumothorax  Assessment & Plan  · Patient presented with weakness and confusion, found to have persistent small left pneumothorax on CXR  · Patient responded well to last thoracocentesis on October of 2019 with resolution of symptoms noted  · CT showed bilateral hydropneumothorax with pleural effusions  IR was consulted and patient deemed a candidate for bilateral chest tube placement  · Bilateral chest tube was placed successfully 7/13/2020  · 1st cytology 7/13 was negative for malignancy right and left pleural fluid  · Pleural fluid level : yellow, clear wbc 1 156  Pleural fluid culture with no growth        LDH body 169, serum 188 ,Total protein fluid 3 3, serum 5 8- likely exudative effusion  · Repeat CT chest showed improving bilateral hydropneumothorax, improving atelectasias, CXR on the other hand showed bilateral pleural drainage with no change in left pneumothorax and left base atelectasias  · Yesterday, patient with right side tube slightly moved but CXR shows tube still within pleural space  No signs of subcutaneus emphysema  Tubes draining adequately, had 900 ml this AM    Plan:  · Continue to follow up results of 2nd cytology- pathology lab called for results of 7/13 cytology however they are not certain of why results are not back yet, said they will look into it but may take up to a day to get back to us  · Consider CT surgery evaluation  for possible fluoroscopy if negative result  Patient may need to be transferred to [place] for thoracic surgery evaluation     · Monitor chest tubes daily total output  · Consider incentive spirometry for atelectas prevention  · Continue to monitor SpO2 levels daily    Chronic respiratory failure with hypoxia [facility])  Assessment & Plan  Patient on 2L nasal canula, in no respiratory distress, with adequate oxygen saturation levels (97% today)  VBG pH shows worsening acidosis, 7 25 today with pCO2 of 83 3 and pHO3 of 36 3  Plan  - Monitor SpO2 levels aiming SpO2 >90  - Continue oxygen 2L  - Pulmonology- recommended to start Bipap and repeat VBG tomorrow     Acute on chronic respiratory failure with hypoxia and hypercapnia (HCC)  Assessment & Plan  · Patient currently on 2L nasal cannula  Condition likely secondary to acute exacerbation of CHF and pleural effusions noted on chest x-ray  Patient regularly follows Dr Justina Gudino from pulmonology  Low suspicion for infection given patient's negative COVID-19 testing, no elevated white count, and no fever but a viral etiology is still a possibility  Given patient's continued acute hypoxemia, cannot completely rule out PE  Per pulmonology, effusions on chest x-ray does not completely explain patient's hypoxia  · Patients CT showed Bilateral Hydropneumothorax/ pleural effusions so IR placed bilateral chest tubes on 7/13/20  · Persistence of hypercapnia, likely due to hypoventilation (copd) however patient asymptomatic  · Pulm recommended Bipap for worsening acidosis and hypercapnia    Plan:    · Maintain SpO2 between 89% to 94%  · Bipap  · Repeat BMP to monitor hypercapnia  · Repeat VBG daily    Acute on chronic diastolic heart failure [facility])  Assessment & Plan  Wt Readings from Last 3 Encounters:   07/19/20 63 3 kg (139 lb 8 8 oz)   02/14/20 74 9 kg (165 lb 1 6 oz)   12/17/19 77 3 kg (170 lb 6 4 oz)     · Patient's last echo completed in October of 2019 showed ejection fraction of 50%  Upon assessment on admission, patient appeared to be fluid overloaded given lower extremity edema and pleural effusions seen on chest x-ray and slightly elevated BNP of 556  · Patient regularly follows Dr Blanca Werner    Echocardiogram on 7/13/2020 shows large pleural effusions and slightly enlarged pericardium  · Chronic +1 pitting lower extremities edema, currently with oxygen with nasal canula, no evidence of acute distress  Plan:  · Cardiology on board   · Continue to hold Lasix and blood pressure medication as patient still with low blood pressure levels  · Consider initiation of albumin in case of hypotension  · Continue Oxygen on nasal canula  · Monitor oxygen saturations levels daily  · Monitor signs and symptoms of volume overload daily  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 acidosis  Assessment & Plan  · Metabolic alkalosis resolved, but patient with persistence of hypercapnia and worsening acidosis seen on VBG  No shortness of breath or visible distress  Plan:  -Bipap, VBG tomorrow  - Continue to monitor CO2 levels daily          GERD (gastroesophageal reflux disease)  Assessment & Plan  Continue on Protonix 40mg daily    Hypothyroidism  Assessment & Plan  TSH on admission was slightly elevated at 4 54  Free T4 was within normal limits    Plan:  · Continue patient's home levothyroxine 50 mcg daily    COPD (chronic obstructive pulmonary disease) ([facility] [address] )  Assessment & Plan  PFT performed on December of 2019 showed severe obstructive pulmonary disease  Plan:  · Continue patient's home albuterol, levalbuterol, and montelukast   · Not currently in any acute respiratory distress    Severe protein-calorie malnutrition (HCC)  Assessment & Plan  Malnutrition Findings:   Malnutrition type: Chronic illness  Degree of Malnutrition: Other severe protein calorie malnutrition(related to inadequate intake/medical condition/advanced age as evidenced by significant depletion of fat/muscle (clavicles, shoulders, triceps, temples), 26% wt loss x 5 months (165 lb 2/14/20), +4 b/l LE edema  Treatment includes supplementation ) patient is noted to be thin with minimal muscle and adipose tissue  Unclear on patient's current nutritional status      BMI Findings: Body mass index is 20 24 kg/m²  Plan:  · Consult nutrition to determine appropriate diet  · Patient currently on regular diet with high-calorie and high-protein    Generalized weakness  Assessment & Plan  Likely secondary to advanced age, multiple medical conditions, hypothyroidism, and malnutrition  Concern for recent fall history  Plan:  · PT/OT to determine disposition  · See malnutrition plan     Thrombocytopenia [facility])  Assessment & Plan  Recent Labs     07/18/20  0504 07/19/20  0558 07/20/20  0552   * 134* 148*     Patient is currently asymptomatic  This has been chronic based on previous CBCs  Plan:  · Monitor with daily CBC  · Will continue with pharmacologic VTE prophylaxis; may consider discontinuation if platelet count worsens    Hypokalemia  Assessment & Plan  Recent Labs     07/18/20  0504 07/19/20  0558 07/20/20  0552   K 4 2 4 0 4 3     · Resolved    Plan  - continue monitoring potassium levels daily   - Consider goal of potassium level greater than >4 0 if not replace  - Monitor for signs and symptoms of hypokalemia daily      Hyperlipidemia  Assessment & Plan  · Patient is currently on pravastatin 40 mg p o  Daily    Essential hypertension  Assessment & Plan  Patient currently takes terazosin  BP today is 102/54      Plan:  · Continue terazosin  · Continue to hold Furosemide given patient's low BP    Hyponatremia  Assessment & Plan  Recent Labs     07/18/20  0504 07/19/20  0558 07/20/20  0552   SODIUM 133* 134* 134*     Patient with Sodium of 134 today, currently asymptomatic, Likely due to his underlying diagnosis of congestive heart failure    Plan  -Continue to monitor sodium levels daily  - Assess for signs and symptoms of hyponatremia      VTE Pharmacologic Prophylaxis:   Pharmacologic: Enoxaparin (Lovenox)  Mechanical VTE Prophylaxis in Place: Yes    Discussions with Specialists or Other Care Team Provider: nursing, , pulmonology team, and pathology lab    Education and Discussions with Family / Patient: discussed with patient and called his daughter and updated her, they are in agreement with plan    Current Length of Stay: 8 day(s)    Current Patient Status: Inpatient     Discharge Plan / Estimated Discharge Date: 24-48 hours    Code Status: Level 3 - DNAR and DNI      Subjective:   Patient seen this AM in NAD  He was pleasant and had no complaints  He denied SOB, chest pain, palpitations, abdominal pain, n/v, fever, and chills  No events overnight  Yesterday, right chest tube came out slightly but CXR confirmed that the tube was still within pleural space and still draining  Awaiting fluid cytology  Called pathology lab but they are not aware of the results, said they will get back to me but possibly not until tomorrow  He is for possible transfer to [facility] for thoracic surgery evaluation  Objective:     Vitals:   Temp (24hrs), Av °F (36 7 °C), Min:97 8 °F (36 6 °C), Max:98 1 °F (36 7 °C)    Temp:  [97 8 °F (36 6 °C)-98 1 °F (36 7 °C)] 98 1 °F (36 7 °C)  HR:  [65-68] 68  Resp:  [18] 18  BP: (102-113)/(54-58) 111/58  SpO2:  [97 %-100 %] 97 %  Body mass index is 20 24 kg/m²  Input and Output Summary (last 24 hours): Intake/Output Summary (Last 24 hours) at 2020 1543  Last data filed at 2020 1500  Gross per 24 hour   Intake 1020 ml   Output 2580 ml   Net -1560 ml       Physical Exam:     Physical Exam   Constitutional: No distress  HENT:   Head: Normocephalic and atraumatic  Eyes: Pupils are equal, round, and reactive to light  EOM are normal    Neck: Neck supple  Cardiovascular: Normal rate, regular rhythm, normal heart sounds and intact distal pulses  Pulmonary/Chest: Effort normal  No respiratory distress  Breath sounds decreased bilaterally, slight wheezing, +chest tubes in place   Abdominal: Soft  Bowel sounds are normal  He exhibits no distension  There is no tenderness     Musculoskeletal: He exhibits no tenderness  +1 edema, had compression stockings and SCDs on   Neurological: He is alert  Skin: Skin is warm and dry  No rash noted  He is not diaphoretic  Psychiatric: He has a normal mood and affect  His behavior is normal  Judgment and thought content normal    Nursing note and vitals reviewed  Additional Data:     Labs:    Results from last 7 days   Lab Units 07/20/20  0552   WBC Thousand/uL 3 82*   HEMOGLOBIN g/dL 13 7   HEMATOCRIT % 42 6   PLATELETS Thousands/uL 148*   NEUTROS PCT % 69   LYMPHS PCT % 12*   MONOS PCT % 11   EOS PCT % 6     Results from last 7 days   Lab Units 07/20/20  0552  07/14/20  1156 07/14/20  0533   POTASSIUM mmol/L 4 3   < > 3 6  --    CHLORIDE mmol/L 98*   < > 96*  --    CO2 mmol/L 36*   < > 43*  --    CO2, I-STAT mmol/L  --   --   --  >45*   BUN mg/dL 23   < > 15  --    CREATININE mg/dL 0 74   < > 0 61  --    CALCIUM mg/dL 8 5   < > 7 3*  --    ALK PHOS U/L  --   --  44*  --    ALT U/L  --   --  7*  --    AST U/L  --   --  21  --    GLUCOSE, ISTAT mg/dl  --   --   --  101    < > = values in this interval not displayed  Results from last 7 days   Lab Units 07/15/20  0515   INR  1 24*       * I Have Reviewed All Lab Data Listed Above  * Additional Pertinent Lab Tests Reviewed:  [facility] 66 Admission Reviewed    Imaging:    Imaging Reports Reviewed Today Include: none  Imaging Personally Reviewed by Myself Includes:  none    Recent Cultures (last 7 days):     Results from last 7 days   Lab Units 07/13/20  1652   GRAM STAIN RESULT  No Polys or Bacteria seen  No Polys or Bacteria seen   BODY FLUID CULTURE, STERILE  No growth  No growth       Last 24 Hours Medication List:     Current Facility-Administered Medications:  albuterol 2 puff Inhalation Q6H PRN Kell James MD   enoxaparin 40 mg Subcutaneous Daily Kell James MD   guaiFENesin 600 mg Oral Q12H [address] Kell James MD   levalbuterol 0 63 mg Nebulization TID Kell James MD levothyroxine 50 mcg Oral Daily Avery Lopez MD   montelukast 10 mg Oral HS Avery Lopez MD   pantoprazole 40 mg Oral Early Morning Avery Lopez MD   pravastatin 40 mg Oral Daily With Shanta Scott MD   terazosin 5 mg Oral HS Avery Lopez MD   timolol 1 drop Both Eyes BID Avery Lopez MD        Today, Patient Was Seen By: Maggie Warner MD

## 2020-07-20 NOTE — ASSESSMENT & PLAN NOTE
Malnutrition Findings:   Malnutrition type: Chronic illness  Degree of Malnutrition: Other severe protein calorie malnutrition(related to inadequate intake/medical condition/advanced age as evidenced by significant depletion of fat/muscle (clavicles, shoulders, triceps, temples), 26% wt loss x 5 months (165 lb 2/14/20), +4 b/l LE edema  Treatment includes supplementation ) patient is noted to be thin with minimal muscle and adipose tissue  Unclear on patient's current nutritional status  BMI Findings: Body mass index is 20 24 kg/m²       Plan:  · Consult nutrition to determine appropriate diet  · Patient currently on regular diet with high-calorie and high-protein

## 2020-07-20 NOTE — PLAN OF CARE
Problem: Potential for Falls  Goal: Patient will remain free of falls  Description  INTERVENTIONS:  - Assess patient frequently for physical needs  -  Identify cognitive and physical deficits and behaviors that affect risk of falls  -  [place] fall precautions as indicated by assessment   - Educate patient/family on patient safety including physical limitations  - Instruct patient to call for assistance with activity based on assessment  - Modify environment to reduce risk of injury  - Consider OT/PT consult to assist with strengthening/mobility  Outcome: Progressing     Problem: Prexisting or High Potential for Compromised Skin Integrity  Goal: Skin integrity is maintained or improved  Description  INTERVENTIONS:  - Identify patients at risk for skin breakdown  - Assess and monitor skin integrity  - Assess and monitor nutrition and hydration status  - Monitor labs   - Assess for incontinence   - Turn and reposition patient  - Assist with mobility/ambulation  - Relieve pressure over bony prominences  - Avoid friction and shearing  - Provide appropriate hygiene as needed including keeping skin clean and dry  - Evaluate need for skin moisturizer/barrier cream  - Collaborate with interdisciplinary team   - Patient/family teaching  - Consider wound care consult   Outcome: Progressing     Problem: Nutrition/Hydration-ADULT  Goal: Nutrient/Hydration intake appropriate for improving, restoring or maintaining nutritional needs  Description  Monitor and assess patient's nutrition/hydration status for malnutrition  Collaborate with interdisciplinary team and initiate plan and interventions as ordered  Monitor patient's weight and dietary intake as ordered or per policy  Utilize nutrition screening tool and intervene as necessary  Determine patient's food preferences and provide high-protein, high-caloric foods as appropriate       INTERVENTIONS:  - Monitor oral intake, urinary output, labs, and treatment plans  - Assess nutrition and hydration status and recommend course of action  - Evaluate amount of meals eaten  - Assist patient with eating if necessary   - Allow adequate time for meals  - Recommend/ encourage appropriate diets, oral nutritional supplements, and vitamin/mineral supplements  - Order, calculate, and assess calorie counts as needed  - Recommend, monitor, and adjust tube feedings and TPN/PPN based on assessed needs  - Assess need for intravenous fluids  - Provide specific nutrition/hydration education as appropriate  - Include patient/family/caregiver in decisions related to nutrition  Outcome: Progressing     Problem: PAIN - ADULT  Goal: Verbalizes/displays adequate comfort level or baseline comfort level  Description  Interventions:  - Encourage patient to monitor pain and request assistance  - Assess pain using appropriate pain scale  - Administer analgesics based on type and severity of pain and evaluate response  - Implement non-pharmacological measures as appropriate and evaluate response  - Consider cultural and social influences on pain and pain management  - Notify physician/advanced practitioner if interventions unsuccessful or patient reports new pain  Outcome: Progressing     Problem: INFECTION - ADULT  Goal: Absence or prevention of progression during hospitalization  Description  INTERVENTIONS:  - Assess and monitor for signs and symptoms of infection  - Monitor lab/diagnostic results  - Monitor all insertion sites, i e  indwelling lines, tubes, and drains  - Monitor endotracheal if appropriate and nasal secretions for changes in amount and color  - Tina appropriate cooling/warming therapies per order  - Administer medications as ordered  - Instruct and encourage patient and family to use good hand hygiene technique  - Identify and instruct in appropriate isolation precautions for identified infection/condition  Outcome: Progressing  Goal: Absence of fever/infection during neutropenic period  Description  INTERVENTIONS:  - Monitor WBC    Outcome: Progressing     Problem: SAFETY ADULT  Goal: Maintain or return to baseline ADL function  Description  INTERVENTIONS:  -  Assess patient's ability to carry out ADLs; assess patient's baseline for ADL function and identify physical deficits which impact ability to perform ADLs (bathing, care of mouth/teeth, toileting, grooming, dressing, etc )  - Assess/evaluate cause of self-care deficits   - Assess range of motion  - Assess patient's mobility; develop plan if impaired  - Assess patient's need for assistive devices and provide as appropriate  - Encourage maximum independence but intervene and supervise when necessary  - Involve family in performance of ADLs  - Assess for home care needs following discharge   - Consider OT consult to assist with ADL evaluation and planning for discharge  - Provide patient education as appropriate  Outcome: Progressing  Goal: Maintain or return mobility status to optimal level  Description  INTERVENTIONS:  - Assess patient's baseline mobility status (ambulation, transfers, stairs, etc )    - Identify cognitive and physical deficits and behaviors that affect mobility  - Identify mobility aids required to assist with transfers and/or ambulation (gait belt, sit-to-stand, lift, walker, cane, etc )  - [place] fall precautions as indicated by assessment  - Record patient progress and toleration of activity level on Mobility SBAR; progress patient to next Phase/Stage  - Instruct patient to call for assistance with activity based on assessment  - Consider rehabilitation consult to assist with strengthening/weightbearing, etc   Outcome: Progressing     Problem: DISCHARGE PLANNING  Goal: Discharge to home or other facility with appropriate resources  Description  INTERVENTIONS:  - Identify barriers to discharge w/patient and caregiver  - Arrange for needed discharge resources and transportation as appropriate  - Identify discharge learning needs (meds, wound care, etc )  - Arrange for interpretive services to assist at discharge as needed  - Refer to Case Management Department for coordinating discharge planning if the patient needs post-hospital services based on physician/advanced practitioner order or complex needs related to functional status, cognitive ability, or social support system  Outcome: Progressing     Problem: Knowledge Deficit  Goal: Patient/family/caregiver demonstrates understanding of disease process, treatment plan, medications, and discharge instructions  Description  Complete learning assessment and assess knowledge base    Interventions:  - Provide teaching at level of understanding  - Provide teaching via preferred learning methods  Outcome: Progressing     Problem: RESPIRATORY - ADULT  Goal: Achieves optimal ventilation and oxygenation  Description  INTERVENTIONS:  - Assess for changes in respiratory status  - Assess for changes in mentation and behavior  - Position to facilitate oxygenation and minimize respiratory effort  - Oxygen administered by appropriate delivery if ordered  - Initiate smoking cessation education as indicated  - Encourage broncho-pulmonary hygiene including cough, deep breathe, Incentive Spirometry  - Assess the need for suctioning and aspirate as needed  - Assess and instruct to report SOB or any respiratory difficulty  - Respiratory Therapy support as indicated  Outcome: Progressing     Problem: GENITOURINARY - ADULT  Goal: Maintains or returns to baseline urinary function  Description  INTERVENTIONS:  - Assess urinary function  - Encourage oral fluids to ensure adequate hydration if ordered  - Administer IV fluids as ordered to ensure adequate hydration  - Administer ordered medications as needed  - Offer frequent toileting  - Follow urinary retention protocol if ordered  Outcome: Progressing  Goal: Absence of urinary retention  Description  INTERVENTIONS:  - Assess patients ability to void and empty bladder  - Monitor I/O  - Bladder scan as needed  - Discuss with physician/AP medications to alleviate retention as needed  - Discuss catheterization for long term situations as appropriate  Outcome: Progressing  Goal: Urinary catheter remains patent  Description  INTERVENTIONS:  - Assess patency of urinary catheter  - If patient has a chronic can, consider changing catheter if non-functioning  - Follow guidelines for intermittent irrigation of non-functioning urinary catheter  Outcome: Progressing     Problem: METABOLIC, FLUID AND ELECTROLYTES - ADULT  Goal: Electrolytes maintained within normal limits  Description  INTERVENTIONS:  - Monitor labs and assess patient for signs and symptoms of electrolyte imbalances  - Administer electrolyte replacement as ordered  - Monitor response to electrolyte replacements, including repeat lab results as appropriate  - Instruct patient on fluid and nutrition as appropriate  Outcome: Progressing  Goal: Fluid balance maintained  Description  INTERVENTIONS:  - Monitor labs   - Monitor I/O and WT  - Instruct patient on fluid and nutrition as appropriate  - Assess for signs & symptoms of volume excess or deficit  Outcome: Progressing  Goal: Glucose maintained within target range  Description  INTERVENTIONS:  - Monitor Blood Glucose as ordered  - Assess for signs and symptoms of hyperglycemia and hypoglycemia  - Administer ordered medications to maintain glucose within target range  - Assess nutritional intake and initiate nutrition service referral as needed  Outcome: Progressing     Problem: SKIN/TISSUE INTEGRITY - ADULT  Goal: Skin integrity remains intact  Description  INTERVENTIONS  - Identify patients at risk for skin breakdown  - Assess and monitor skin integrity  - Assess and monitor nutrition and hydration status  - Monitor labs (i e  albumin)  - Assess for incontinence   - Turn and reposition patient  - Assist with mobility/ambulation  - Relieve pressure over bony prominences  - Avoid friction and shearing  - Provide appropriate hygiene as needed including keeping skin clean and dry  - Evaluate need for skin moisturizer/barrier cream  - Collaborate with interdisciplinary team (i e  Nutrition, Rehabilitation, etc )   - Patient/family teaching  Outcome: Progressing  Goal: Incision(s), wounds(s) or drain site(s) healing without S/S of infection  Description  INTERVENTIONS  - Assess and document risk factors for skin impairment   - Assess and document dressing, incision, wound bed, drain sites and surrounding tissue  - Consider nutrition services referral as needed  - Oral mucous membranes remain intact  - Provide patient/ family education  Outcome: Progressing  Goal: Oral mucous membranes remain intact  Description  INTERVENTIONS  - Assess oral mucosa and hygiene practices  - Implement preventative oral hygiene regimen  - Implement oral medicated treatments as ordered  - Initiate Nutrition services referral as needed  Outcome: Progressing     Problem: HEMATOLOGIC - ADULT  Goal: Maintains hematologic stability  Description  INTERVENTIONS  - Assess for signs and symptoms of bleeding or hemorrhage  - Monitor labs  - Administer supportive blood products/factors as ordered and appropriate  Outcome: Progressing     Problem: MUSCULOSKELETAL - ADULT  Goal: Maintain or return mobility to safest level of function  Description  INTERVENTIONS:  - Assess patient's ability to carry out ADLs; assess patient's baseline for ADL function and identify physical deficits which impact ability to perform ADLs (bathing, care of mouth/teeth, toileting, grooming, dressing, etc )  - Assess/evaluate cause of self-care deficits   - Assess range of motion  - Assess patient's mobility  - Assess patient's need for assistive devices and provide as appropriate  - Encourage maximum independence but intervene and supervise when necessary  - Involve family in performance of ADLs  - Assess for home care needs following discharge   - Consider OT consult to assist with ADL evaluation and planning for discharge  - Provide patient education as appropriate  Outcome: Progressing  Goal: Maintain proper alignment of affected body part  Description  INTERVENTIONS:  - Support, maintain and protect limb and body alignment  - Provide patient/ family with appropriate education  Outcome: Progressing

## 2020-07-20 NOTE — ASSESSMENT & PLAN NOTE
Recent Labs     07/18/20  0504 07/19/20  0558 07/20/20  0552   * 134* 148*     Patient is currently asymptomatic  This has been chronic based on previous CBCs    Plan:  · Monitor with daily CBC  · Will continue with pharmacologic VTE prophylaxis; may consider discontinuation if platelet count worsens

## 2020-07-20 NOTE — ASSESSMENT & PLAN NOTE
Malnutrition Findings:   Malnutrition type: Chronic illness  Degree of Malnutrition: Other severe protein calorie malnutrition(related to inadequate intake/medical condition/advanced age as evidenced by significant depletion of fat/muscle (clavicles, shoulders, triceps, temples), 26% wt loss x 5 months (165 lb 2/14/20), +4 b/l LE edema  Treatment includes supplementation )    BMI Findings: Body mass index is 20 24 kg/m²       Very likely contributory to ongoing volume losses via B/L chest tubes  Nutrition following and recommendations

## 2020-07-21 LAB
ANION GAP SERPL CALCULATED.3IONS-SCNC: -1 MMOL/L (ref 4–13)
BASE EX.OXY STD BLDV CALC-SCNC: 95.7 % (ref 60–80)
BASE EXCESS BLDV CALC-SCNC: 7.8 MMOL/L
BUN SERPL-MCNC: 27 MG/DL (ref 5–25)
CALCIUM SERPL-MCNC: 7.9 MG/DL (ref 8.3–10.1)
CHLORIDE SERPL-SCNC: 98 MMOL/L (ref 100–108)
CO2 SERPL-SCNC: 35 MMOL/L (ref 21–32)
CREAT SERPL-MCNC: 0.63 MG/DL (ref 0.6–1.3)
ERYTHROCYTE [DISTWIDTH] IN BLOOD BY AUTOMATED COUNT: 13.2 % (ref 11.6–15.1)
GFR SERPL CREATININE-BSD FRML MDRD: 86 ML/MIN/1.73SQ M
GLUCOSE SERPL-MCNC: 92 MG/DL (ref 65–140)
HCO3 BLDV-SCNC: 33.2 MMOL/L (ref 24–30)
HCT VFR BLD AUTO: 39.4 % (ref 36.5–49.3)
HGB BLD-MCNC: 12.5 G/DL (ref 12–17)
MCH RBC QN AUTO: 31.2 PG (ref 26.8–34.3)
MCHC RBC AUTO-ENTMCNC: 31.7 G/DL (ref 31.4–37.4)
MCV RBC AUTO: 98 FL (ref 82–98)
O2 CT BLDV-SCNC: 18.3 ML/DL
PCO2 BLDV: 49.8 MM HG (ref 42–50)
PH BLDV: 7.44 [PH] (ref 7.3–7.4)
PLATELET # BLD AUTO: 137 THOUSANDS/UL (ref 149–390)
PMV BLD AUTO: 9.8 FL (ref 8.9–12.7)
PO2 BLDV: 198.2 MM HG (ref 35–45)
POTASSIUM SERPL-SCNC: 4.4 MMOL/L (ref 3.5–5.3)
RBC # BLD AUTO: 4.01 MILLION/UL (ref 3.88–5.62)
SODIUM SERPL-SCNC: 132 MMOL/L (ref 136–145)
WBC # BLD AUTO: 3.48 THOUSAND/UL (ref 4.31–10.16)

## 2020-07-21 PROCEDURE — 99232 SBSQ HOSP IP/OBS MODERATE 35: CPT | Performed by: NURSE PRACTITIONER

## 2020-07-21 PROCEDURE — 99232 SBSQ HOSP IP/OBS MODERATE 35: CPT | Performed by: INTERNAL MEDICINE

## 2020-07-21 PROCEDURE — 94660 CPAP INITIATION&MGMT: CPT

## 2020-07-21 PROCEDURE — 82805 BLOOD GASES W/O2 SATURATION: CPT | Performed by: INTERNAL MEDICINE

## 2020-07-21 PROCEDURE — 94760 N-INVAS EAR/PLS OXIMETRY 1: CPT

## 2020-07-21 PROCEDURE — 99233 SBSQ HOSP IP/OBS HIGH 50: CPT | Performed by: INTERNAL MEDICINE

## 2020-07-21 PROCEDURE — 94664 DEMO&/EVAL PT USE INHALER: CPT

## 2020-07-21 PROCEDURE — 80048 BASIC METABOLIC PNL TOTAL CA: CPT | Performed by: INTERNAL MEDICINE

## 2020-07-21 PROCEDURE — 85027 COMPLETE CBC AUTOMATED: CPT | Performed by: INTERNAL MEDICINE

## 2020-07-21 PROCEDURE — 94640 AIRWAY INHALATION TREATMENT: CPT

## 2020-07-21 PROCEDURE — 94668 MNPJ CHEST WALL SBSQ: CPT

## 2020-07-21 RX ORDER — FUROSEMIDE 10 MG/ML
40 INJECTION INTRAMUSCULAR; INTRAVENOUS DAILY
Status: DISCONTINUED | OUTPATIENT
Start: 2020-07-21 | End: 2020-07-23

## 2020-07-21 RX ADMIN — LEVOTHYROXINE SODIUM 50 MCG: 50 TABLET ORAL at 06:09

## 2020-07-21 RX ADMIN — LEVALBUTEROL HYDROCHLORIDE 0.63 MG: 0.63 SOLUTION RESPIRATORY (INHALATION) at 08:00

## 2020-07-21 RX ADMIN — TIMOLOL MALEATE 1 DROP: 5 SOLUTION/ DROPS OPHTHALMIC at 08:50

## 2020-07-21 RX ADMIN — TERAZOSIN HYDROCHLORIDE 5 MG: 5 CAPSULE ORAL at 21:06

## 2020-07-21 RX ADMIN — ENOXAPARIN SODIUM 40 MG: 40 INJECTION SUBCUTANEOUS at 08:45

## 2020-07-21 RX ADMIN — PANTOPRAZOLE SODIUM 40 MG: 40 TABLET, DELAYED RELEASE ORAL at 06:09

## 2020-07-21 RX ADMIN — GUAIFENESIN 600 MG: 600 TABLET, EXTENDED RELEASE ORAL at 21:06

## 2020-07-21 RX ADMIN — FUROSEMIDE 40 MG: 10 INJECTION, SOLUTION INTRAMUSCULAR; INTRAVENOUS at 14:32

## 2020-07-21 RX ADMIN — TIMOLOL MALEATE 1 DROP: 5 SOLUTION/ DROPS OPHTHALMIC at 17:22

## 2020-07-21 RX ADMIN — LEVALBUTEROL HYDROCHLORIDE 0.63 MG: 0.63 SOLUTION RESPIRATORY (INHALATION) at 13:35

## 2020-07-21 RX ADMIN — MONTELUKAST SODIUM 10 MG: 10 TABLET, FILM COATED ORAL at 21:06

## 2020-07-21 RX ADMIN — LEVALBUTEROL HYDROCHLORIDE 0.63 MG: 0.63 SOLUTION RESPIRATORY (INHALATION) at 20:01

## 2020-07-21 RX ADMIN — PRAVASTATIN SODIUM 40 MG: 40 TABLET ORAL at 17:22

## 2020-07-21 RX ADMIN — GUAIFENESIN 600 MG: 600 TABLET, EXTENDED RELEASE ORAL at 08:45

## 2020-07-21 NOTE — ASSESSMENT & PLAN NOTE
Recent Labs     07/19/20  0558 07/20/20  0552 07/21/20  0558   * 148* 137*     Patient is currently asymptomatic  This has been chronic based on previous CBCs      Plan:  · Monitor with daily CBC  · Will continue with pharmacologic VTE prophylaxis; may consider discontinuation if platelet count worsens

## 2020-07-21 NOTE — PROGRESS NOTES
Progress Note - Pulmonary   Traci Carter 80 y o  male MRN: [id number]  Unit/Bed#: S -01 Encounter: [id number]  Code Status: Level 3 - DNAR and DNI    Servando Contreras is a 80 y o  Past Medical History:   Diagnosis Date    Allergic rhinitis     Hypercholesteremia     Hypertension     Pneumonia        Assessment/Plan:   * Bilateral hydropneumothorax  Assessment & Plan  25-year-old male with bilateral hydropneumothoraces, Severe COPD w/ FEV1/FVC 58% / FEV1 40% w/ DLCO 8% corrected,HTN, CDCHF, and chronic oxygen dependence of 3 l, former smoker, admitted 7/12 w/ dyspnea / CP w/ B/L effusions and worsening hypoxemia despite attempted diuresis and had CT angio 7/13 revealing bilateral hydropneumothoracies   Post IR pigtail catheters placed   Post procedure needing 100% HFNC and NRB and hypoxic  ICU stay w/ downgrade on 7/14  Now requiring only 1 5 L NC w/ persistent B/L effusions and chest tube drainage      Cytology negative  Overall Exudative Effusion  1/3 Lights: LD ratio 0 89  Had family discuss w/ Daughther today  Neither she nor the patient want any type of surgical intervention, however, would be accepting of even bilateral Pleurx catheters in setting of persistent symptomatic effusions  Overall will attempt to reduce effusions w/ diuresis   Today will clamp R side chest tube / with plan to clamp L side tomorrow if tolerates well  Repeat AM xray                    Acute on chronic diastolic heart failure (HCC)  Assessment & Plan  Wt Readings from Last 3 Encounters:   07/20/20 58 6 kg (129 lb 3 2 oz)   02/14/20 74 9 kg (165 lb 1 6 oz)   12/17/19 77 3 kg (170 lb 6 4 oz)   Most of fluid volume losses from chest tubes  Not currently on diuretics  Plan to reinitiate lasix if agreeable w/ cardiology  +400L last 24 hr  -12 4 L this admission            COPD (chronic obstructive pulmonary disease) ([facility] [address] )  Assessment & Plan  Hx Severe COPD by PFT w/ reduced DLCO  Appears to be on home Xopenex  Not acutely exacerbated  Will consider increasing inhaler therapy in outpatient setting      Acute on chronic respiratory failure with hypoxia and hypercapnia (HCC)  Assessment & Plan  Chronically on 3 L NC  Currently on 1 5 L NC  Self improved respiratory acidosis   Patient was not initiated on BIPAP overnight / self corrected respiratory acidosis   BIPAP tonight    ABG:  Results from last 7 days   Lab Units 07/15/20  0541   PH ART  7 450   PCO2 ART mm Hg 64 7*   PO2 ART mm Hg 57 8*   HCO3 ART mmol/L 44 0*   BASE EXC ART mmol/L 16 8   ABG SOURCE  Radial, Left     VBG:  Results from last 7 days   Lab Units 07/21/20  1004  07/15/20  0541   PH KANCHAN  7 442*   < >  --    PCO2 KANCHAN mm Hg 49 8   < >  --    PO2 KANCHAN mm Hg 198 2*   < >  --    HCO3 KANCHAN mmol/L 33 2*   < >  --    BASE EXC KANCHAN mmol/L 7 8   < >  --    ABG SOURCE   --   --  Radial, Left    < > = values in this interval not displayed  Severe protein-calorie malnutrition (HCC)  Assessment & Plan  Malnutrition Findings:   Malnutrition type: Chronic illness  Degree of Malnutrition: Other severe protein calorie malnutrition(related to inadequate intake/medical condition/advanced age as evidenced by significant depletion of fat/muscle (clavicles, shoulders, triceps, temples), 26% wt loss x 5 months (165 lb 2/14/20), +4 b/l LE edema  Treatment includes supplementation )    BMI Findings: Body mass index is 20 24 kg/m²  Very likely contributory to ongoing volume losses via B/L chest tubes  Nutrition following and recommendations      D/C and Follow up Needs: Follow up in the outpatient setting, ideally in next 1-2 weeks for follow up  Later Scheduling may be required due to scheduling restraints  Items to follow up :  B/L Pleural Effusions  Follow up management and probable need for Asept catheters    ______________________________________________________________________    Subjective: Pt seen and examined at bedside      Chief Complaint: "I'm feeling OK "    Tele Events:     Vitals:   Temp:  [97 5 °F (36 4 °C)-98 1 °F (36 7 °C)] 97 8 °F (36 6 °C)  HR:  [62-70] 70  Resp:  [18] 18  BP: (111-121)/(58-60) 121/58  Weight (last 2 days)     Date/Time   Weight    20 0600   58 (127 87)    20 0551   58 6 (129 2)    20 0600   63 3 (139 55)            Vitals:    20 0600 20 0700 20 0801 20 1335   BP:  121/58     BP Location:  Left arm     Pulse:  70     Resp:  18     Temp:  97 8 °F (36 6 °C)     TempSrc:  Oral     SpO2:  96% 96% 96%   Weight: 58 kg (127 lb 13 9 oz)      Height:         Temp (24hrs), Av 8 °F (36 6 °C), Min:97 5 °F (36 4 °C), Max:98 1 °F (36 7 °C)  Current: Temperature: 97 8 °F (36 6 °C)        SpO2: SpO2: 96 %  O2 Flow Rate (L/min): 40 L/min    IV Infusions:       Nutrition:        Diet Orders   (From admission, onward)             Start     Ordered    20  Diet Cardiovascular; Sodium 2 GM; Fluid Restriction 1800 ML  Diet effective now     Question Answer Comment   Diet Type Cardiovascular    Cardiac Sodium 2 GM    Other Restriction(s): Fluid Restriction 1800 ML    RD to adjust diet per protocol? Yes        20 1140  Dietary nutrition supplements  Once     Question Answer Comment   Select Supplement: Ensure Clear [employer], Lunch, Dinner        20 1139    20  Room Service  Once     Question:  Type of Service  Answer:  Room Service - Appropriate with Assistance    20                Ins/Outs:   I/O       701 -  -  07 -  07    P  O  540 1200     Total Intake(mL/kg) 540 (9 2) 1200 (20 7)     Urine (mL/kg/hr) 550 (0 4) 400 (0 3)     Stool 0      Chest Tube 1630 400     Total Output 2180 800     Net -1640 +400            Unmeasured Urine Occurrence 1 x 1 x     Unmeasured Stool Occurrence 1 x            Lines/Drains:  Invasive Devices     Peripheral Intravenous Line            Peripheral IV 20 Right Wrist 1 day          Drain            Chest Tube 1 Left Pleural 10 2 Fr  7 days    Chest Tube 2 Right Pleural 10 2 Fr  7 days                 Active medications:  Scheduled Meds:  Current Facility-Administered Medications:  albuterol 2 puff Inhalation Q6H PRN Amarilis Pierce MD   enoxaparin 40 mg Subcutaneous Daily Amarilis Pierce MD   furosemide 40 mg Intravenous Daily Lis Condon DO   guaiFENesin 600 mg Oral Q12H [address] Amarilis Pierce MD   levalbuterol 0 63 mg Nebulization TID Amarilis Pierce MD   levothyroxine 50 mcg Oral Daily Amarilis Pierce MD   montelukast 10 mg Oral HS Amarilis Pierce MD   pantoprazole 40 mg Oral Early Morning Amarilis Pierce MD   pravastatin 40 mg Oral Daily With Celine Sewell MD   terazosin 5 mg Oral HS Amarilis Pierce MD   timolol 1 drop Both Eyes BID Amarilis Pierce MD     PRN Meds:  albuterol 2 puff Q6H PRN     ____________________________________________________________________    Physical Exam   Constitutional: He is oriented to person, place, and time  He appears well-developed and well-nourished  No distress  Elderly / frail /   HENT:   Head: Normocephalic and atraumatic  Mouth/Throat: No oropharyngeal exudate  Eyes: Pupils are equal, round, and reactive to light  Neck: Normal range of motion  Neck supple  No JVD present  No tracheal deviation present  No thyromegaly present  Cardiovascular: Exam reveals no gallop and no friction rub  No murmur heard  Pulmonary/Chest: Effort normal  No stridor  No respiratory distress  He has no wheezes  He has no rhonchi  He has rales in the right lower field and the left lower field  He exhibits no tenderness  Bibasilar rales    Currently remains on 1 5 L NC     B/L chest tubes w/ serous drainage   Abdominal: He exhibits no distension  There is no tenderness  There is no guarding  Musculoskeletal: Normal range of motion  He exhibits no edema  Right lower leg: He exhibits no edema          Left lower leg: He exhibits no edema  Neurological: He is alert and oriented to person, place, and time  Skin: Skin is warm and dry  No rash noted  No erythema     Psychiatric: He has a normal mood and affect      ____________________________________________________________________    Invasive/non-invasive ventilation settings   Respiratory    Lab Data (Last 4 hours)    None         O2/Vent Data (Last 4 hours)    None                Laboratory and Diagnostics:  Results from last 7 days   Lab Units 07/21/20  0558 07/20/20  0552 07/19/20  0558 07/18/20  0504 07/17/20  0633 07/16/20  0528 07/15/20  0515   WBC Thousand/uL 3 48* 3 82* 3 64* 4 74 4 96 4 07* 3 61*   HEMOGLOBIN g/dL 12 5 13 7 13 0 13 4 13 1 12 7 12 2   HEMATOCRIT % 39 4 42 6 41 4 41 6 40 8 39 1 37 3   PLATELETS Thousands/uL 137* 148* 134* 140* 124* 102* 87*   NEUTROS PCT %  --  69  --  71 68 72  --    MONOS PCT %  --  11  --  12 12 10  --      Results from last 7 days   Lab Units 07/21/20  0558 07/20/20  0552 07/19/20  0558 07/18/20  0504 07/17/20  0633 07/16/20  0528 07/15/20  0515   SODIUM mmol/L 132* 134* 134* 133* 132* 133* 134*   POTASSIUM mmol/L 4 4 4 3 4 0 4 2 3 6 4 2 4 2   CHLORIDE mmol/L 98* 98* 98* 97* 96* 94* 93*   CO2 mmol/L 35* 36* 35* 36* 34* 44* 44*   ANION GAP mmol/L -1* 0* 1* 0* 2* -5* -3*   BUN mg/dL 27* 23 25 22 20 21 17   CREATININE mg/dL 0 63 0 74 0 60 0 58* 0 69 0 65 0 53*   CALCIUM mg/dL 7 9* 8 5 7 9* 7 9* 7 8* 7 9* 7 8*   GLUCOSE RANDOM mg/dL 92 84 85 99 94 85 84     Results from last 7 days   Lab Units 07/15/20  0515   MAGNESIUM mg/dL 2 1   PHOSPHORUS mg/dL 2 0*      Results from last 7 days   Lab Units 07/15/20  0515   INR  1 24*              ABG:  Results from last 7 days   Lab Units 07/15/20  0541   PH ART  7 450   PCO2 ART mm Hg 64 7*   PO2 ART mm Hg 57 8*   HCO3 ART mmol/L 44 0*   BASE EXC ART mmol/L 16 8   ABG SOURCE  Radial, Left     VBG:  Results from last 7 days   Lab Units 07/21/20  1004  07/15/20  0541   PH KANCHAN  7 442*   < >  --    PCO2 KANCHAN mm Hg 49 8   < >  --    PO2 KANCHAN mm Hg 198 2*   < >  --    HCO3 KANCHAN mmol/L 33 2*   < >  --    BASE EXC KANCHAN mmol/L 7 8   < >  --    ABG SOURCE   --   --  Radial, Left    < > = values in this interval not displayed  Micro        Imaging:   XR chest portable   Final Result by Sera Lopez MD (07/19 1713)      Small bilateral pneumothoraces  Workstation performed: [id number]         XR chest portable   Final Result by Sera Lopez MD (07/17 0315)      Persistent small left pneumothorax  Mild bibasilar atelectasis  Workstation performed: [id number]         XR chest portable   Final Result by Sera Lopez MD (07/15 0827)      Bilateral pleural drainage catheters with no change in small left pneumothorax and left base atelectasis            Workstation performed: [id number]         CT chest wo contrast   Final Result by Luiz Pinto MD (07/15 5651)         1  Improving bilateral hydropneumothoraces  2   Improving bilateral atelectasis  Workstation performed: [id number]         XR chest portable   Final Result by Anusha Hernandez MD (07/14 1146)      Stable left basilar pneumothorax with chest tube  Previous right basilar pneumothorax not well visualized and possibly resolved  Workstation performed: [id number]         XR chest portable   Final Result by Anusha Hernandez MD (07/14 1144)      Improved bibasilar pneumothoraces with chest tubes in place  Workstation performed: [id number]         XR chest portable   Final Result by Maegan Luther MD (07/14 9638)      Bilateral pneumothoraces status post bilateral chest tube placement with resolution of previously seen pleural effusions  Workstation performed: [id number]         IR chest tube   Final Result by Eri Sharpe MD (07/13 1728)   Impression:    Successful ultrasound-guided bilateral chest tube placement        Workstation performed: [id number]         CTA chest pe study   Final Result by Candida Vasquez MD (07/13 1626)      1  Bilateral hydropneumothoraces with large pleural effusions and moderate pneumothoraces  2   No acute pulmonary embolism  I personally discussed this study with DR NAJERA on 7/13/2020 at 3:42 PM                         Workstation performed: [id number]         XR chest 1 view portable   ED Interpretation by Elsa Mena MD (07/12 7003)   Bilateral pleural effusions      Final Result by Edil Connor MD (07/13 5907)      Moderate size bilateral pleural effusions with bibasilar atelectasis  Workstation performed: [id number]         XR chest portable    (Results Pending)       Micro: Lab Results   Component Value Date    BLOODCX No Growth After 5 Days  03/16/2019    BLOODCX No Growth After 5 Days   03/16/2019            Invalid input(s): Nabila Agrawal

## 2020-07-21 NOTE — ASSESSMENT & PLAN NOTE
Likely secondary to advanced age, multiple medical conditions, hypothyroidism, and malnutrition  Concern for recent fall history      Plan:  · PT recommended IP rehab pending progress in order to facilitate return to prior level of function  · See malnutrition plan

## 2020-07-21 NOTE — PROGRESS NOTES
General Cardiology   Progress Note -  Team One   Fara Kenney 80 y o  male MRN: [id number]    Unit/Bed#: S -01 Encounter: [id number]    Assessment  1  Acute on chronic diastolic HF  -On exam volume status is stable, has chronic +1 pitting bilateral  pedal edema, on 1 L supplemental oxygen  -Symptomatically denies SOB at rest or significant RODRIGUEZ  -ProBNP 556 (previously 866--9/26/2019)  -2D echocardiogram  7/13/2020: LVEF 60%, no regional wall motion abnormality, RV normal size and systolic function, trace MR, mild AI, trace TR; small to moderate pericardial effusion, no evidence of hemodynamic compromise  -Previously on furosemide 40 mg daily (on hold), atenolol-chlorthalidone 50-25 mg daily (on hold), and spironolactone 25 mg daily (on hold)  -24 hour I&O balance: +400 mL (UO 400 mL; RCT 150 ml,  ml); overall -12 4 L    2  Acute on chronic hypercapnic/hypoxic respiratory failure   3  Bilateral hydropneumothorax/large bilateral pleural effusions   -Pulmonary following  -Currently on 1L NC sating 96% NC (baseline 3 L NC)  -CTA PE study 7/13: B/L hydropneumothorax w/large pleural effusions and moderate pneumothoraces, no acute PE  -S/p IR guided B/L chest tube placement 7/13  -Awaiting cytology results    4  Hypertension  -Avg BP 118/59, last recorded 121/58, HR 70  -Previously on atenolol-chlorthalidone 50 mg -25 mg daily, amlodipine 10 mg daily (on hold), and spironolactone 25 mg daily (on hold)     5  Hyperlipidemia  -Lipid profile 9/25/2019:  Cholesterol 142, triglycerides 37, HDL 80, LDL 55  -Previously on simvastatin 20 mg daily (non formulary) switched to pravastatin 40 mg daily this admission     6   Hypothyroidism  -TSH 4 5/free T4 1 1  -On levothyroxine 50 mcg daily     Plan:  -Hold diuretics and oral antihypertensives given low BP; gradually resume therapies as BP allows   -Continue B/L OLIVER stockings  -CT management per pulmonary  -F/u cytology results  -Strict I&Os, daily weights, 2 g NA +diet 1800 mL FR  -Monitor renal function and electrolytes closely  -No indication for telemetry monitoring  -Cardiology will see on a PRN basis    Subjective  Review of Systems   Constitution: Negative for chills, fever and malaise/fatigue  HENT: Negative for congestion  Eyes: Negative for visual disturbance  Cardiovascular: Positive for leg swelling  Negative for chest pain, cyanosis, dyspnea on exertion, irregular heartbeat, near-syncope, orthopnea and palpitations  Respiratory: Negative for cough and shortness of breath  Musculoskeletal: Negative for arthritis and myalgias  Gastrointestinal: Negative for abdominal pain  Genitourinary: Negative for dysuria  Neurological: Negative for dizziness, focal weakness, headaches, light-headedness and weakness  All other systems reviewed and are negative  Objective:   Physical Exam   Constitutional: He appears well-developed and well-nourished  No distress  HENT:   Head: Normocephalic and atraumatic  Mouth/Throat: Oropharynx is clear and moist    Eyes: Pupils are equal, round, and reactive to light  No scleral icterus  Neck: No JVD present  Cardiovascular: Normal rate, regular rhythm, normal heart sounds and intact distal pulses  No murmur heard  Pulmonary/Chest: Effort normal and breath sounds normal  He has no wheezes  He has no rales  Bilateral upper lung fields clear, very diminished at the bases  Bilateral pleural chest tubes in place draining serous fluid   Abdominal: Soft  Bowel sounds are normal  There is no tenderness  Musculoskeletal: He exhibits edema  Chronic +1 pitting bilateral pedal edema   Skin: Skin is warm and dry  Capillary refill takes less than 2 seconds  He is not diaphoretic  Psychiatric: He has a normal mood and affect  Nursing note and vitals reviewed  Vitals: Blood pressure 121/58, pulse 70, temperature 97 8 °F (36 6 °C), temperature source Oral, resp   rate 18, height 5' 7" (1 702 m), weight 58 kg (127 lb 13 9 oz), SpO2 96 %  ,     Body mass index is 20 03 kg/m²  ,   Systolic (35AUH), WOLF:583 , Min:111 , ZZK:837     Diastolic (09EWR), LKL:47, Min:58, Max:60      Intake/Output Summary (Last 24 hours) at 7/21/2020 1120  Last data filed at 7/21/2020 0557  Gross per 24 hour   Intake 720 ml   Output 800 ml   Net -80 ml     Weight (last 2 days)     Date/Time   Weight    07/21/20 0600   58 (127 87)    07/20/20 0551   58 6 (129 2)    07/19/20 0600   63 3 (139 55)              LABORATORY RESULTS      CBC with diff:   Results from last 7 days   Lab Units 07/21/20  0558 07/20/20  0552 07/19/20  0558 07/18/20  0504 07/17/20  0633 07/16/20  0528 07/15/20  0515   WBC Thousand/uL 3 48* 3 82* 3 64* 4 74 4 96 4 07* 3 61*   HEMOGLOBIN g/dL 12 5 13 7 13 0 13 4 13 1 12 7 12 2   HEMATOCRIT % 39 4 42 6 41 4 41 6 40 8 39 1 37 3   MCV fL 98 99* 99* 99* 99* 98 99*   PLATELETS Thousands/uL 137* 148* 134* 140* 124* 102* 87*   MCH pg 31 2 31 8 31 0 31 9 31 8 31 8 32 4   MCHC g/dL 31 7 32 2 31 4 32 2 32 1 32 5 32 7   RDW % 13 2 13 4 13 6 13 6 13 8 14 0 13 6   MPV fL 9 8 9 8 10 0 10 1 10 6 10 2 10 7   NRBC AUTO /100 WBCs  --  0  --  0 0 0  --        CMP:  Results from last 7 days   Lab Units 07/21/20  0558 07/20/20  0552 07/19/20  0558 07/18/20  0504 07/17/20  0633 07/16/20  0528 07/15/20  0515  07/14/20  1156   POTASSIUM mmol/L 4 4 4 3 4 0 4 2 3 6 4 2 4 2   < > 3 6   CHLORIDE mmol/L 98* 98* 98* 97* 96* 94* 93*   < > 96*   CO2 mmol/L 35* 36* 35* 36* 34* 44* 44*   < > 43*   BUN mg/dL 27* 23 25 22 20 21 17   < > 15   CREATININE mg/dL 0 63 0 74 0 60 0 58* 0 69 0 65 0 53*   < > 0 61   CALCIUM mg/dL 7 9* 8 5 7 9* 7 9* 7 8* 7 9* 7 8*   < > 7 3*   AST U/L  --   --   --   --   --   --   --   --  21   ALT U/L  --   --   --   --   --   --   --   --  7*   ALK PHOS U/L  --   --   --   --   --   --   --   --  44*   EGFR ml/min/1 73sq m 86 81 88 89 83 85 92   < > 87    < > = values in this interval not displayed         BMP:  Results from last 7 days   Lab Units 20  0558 20  0552 20  0558 20  0504 20  1089 20  0528 07/15/20  0515   POTASSIUM mmol/L 4 4 4 3 4 0 4 2 3 6 4 2 4 2   CHLORIDE mmol/L 98* 98* 98* 97* 96* 94* 93*   CO2 mmol/L 35* 36* 35* 36* 34* 44* 44*   BUN mg/dL 27* 23 25 22 20 21 17   CREATININE mg/dL 0 63 0 74 0 60 0 58* 0 69 0 65 0 53*   CALCIUM mg/dL 7 9* 8 5 7 9* 7 9* 7 8* 7 9* 7 8*       Lab Results   Component Value Date    NTBNP 556 (H) 2020    NTBNP 866 (H) 2019    NTBNP 320 2019        Results from last 7 days   Lab Units 07/15/20  0515   MAGNESIUM mg/dL 2 1                   Results from last 7 days   Lab Units 07/15/20  0515   INR  1 24*       Lipid Profile:   No results found for: CHOL  Lab Results   Component Value Date    HDL 80 (H) 2019    HDL 67 (H) 2018    HDL 78 (H) 2017     Lab Results   Component Value Date    LDLCALC 55 2019    LDLCALC 73 2018    LDLCALC 60 2017     Lab Results   Component Value Date    TRIG 37 2019    TRIG 54 2018    TRIG 61 2017       Cardiac testing:   Results for orders placed during the hospital encounter of 20   Echo complete with contrast if indicated    Narrative [place] [id number], [address] [place] [address]  [phone]    Transthoracic Echocardiogram  2D, M-mode, Doppler, and Color Doppler    Study date:  2020    Patient: Trisha Bal  MR number: [id number]  Account number: [de-identified]  : 24-Mar-1929  Age: 80 years  Gender: Male  Status: Inpatient  Location: Bedside  Height: 67 in  Weight: 131 lb  BP: 131/ 61 mmHg    Indications: Heart Failure      Diagnoses: I50 9 - Heart failure, unspecified    Sonographer:  Carnella Halsted, RCS  Primary Physician:  Luli Jara MD  Referring Physician:  Darryl Badillo MD  Group:  [facility]  Interpreting Physician:  Debra Marrero MD    SUMMARY    LEFT VENTRICLE:  Systolic function was normal  Ejection fraction was estimated to be 60 %  There were no regional wall motion abnormalities  Wall thickness was mildly increased  RIGHT VENTRICLE:  The size was normal   Systolic function was normal     MITRAL VALVE:  There was trace regurgitation  AORTIC VALVE:  There was mild regurgitation  TRICUSPID VALVE:  There was trace regurgitation  Estimated peak PA pressure was 50 mmHg  PERICARDIUM:  A small to moderate, free-flowing pericardial effusion was identified circumferential to the heart  There was no evidence of hemodynamic compromise  There was a large right pleural effusion  There was a large left pleural effusion  HISTORY: PRIOR HISTORY: COPD, pericardial effusion, CKD II, Respitory failure, Hyperlipidemia, and HTN  Congestive heart failure  Risk factors: hypertension  PROCEDURE: The procedure was performed at the bedside  This was a routine study  The transthoracic approach was used  The study included complete 2D imaging, M-mode, complete spectral Doppler, and color Doppler  The heart rate was 82 bpm,  at the start of the study  Images were obtained from the parasternal, apical, subcostal, and suprasternal notch acoustic windows  Image quality was adequate  LEFT VENTRICLE: Size was normal  Systolic function was normal  Ejection fraction was estimated to be 60 %  There were no regional wall motion abnormalities  Wall thickness was mildly increased  DOPPLER: The study was not technically  sufficient to allow evaluation of LV diastolic function  RIGHT VENTRICLE: The size was normal  Systolic function was normal  Wall thickness was normal     LEFT ATRIUM: Size was normal     RIGHT ATRIUM: Size was normal     MITRAL VALVE: Valve structure was normal  There was normal leaflet separation  DOPPLER: The transmitral velocity was within the normal range  There was no evidence for stenosis  There was trace regurgitation  AORTIC VALVE: The valve was trileaflet   Leaflets exhibited normal thickness and normal cuspal separation  DOPPLER: Transaortic velocity was within the normal range  There was no evidence for stenosis  There was mild regurgitation  TRICUSPID VALVE: The valve structure was normal  There was normal leaflet separation  DOPPLER: The transtricuspid velocity was within the normal range  There was no evidence for stenosis  There was trace regurgitation  Estimated peak PA  pressure was 50 mmHg  PULMONIC VALVE: Leaflets exhibited normal thickness, no calcification, and normal cuspal separation  DOPPLER: The transpulmonic velocity was within the normal range  There was no significant regurgitation  PERICARDIUM: A small to moderate, free-flowing pericardial effusion was identified circumferential to the heart  There was no evidence of hemodynamic compromise  There was a large right pleural effusion  There was a large left pleural  effusion  AORTA: The root exhibited normal size  SYSTEMIC VEINS: IVC: The inferior vena cava was not well visualized  The inferior vena cava was grossly normal in size      SYSTEM MEASUREMENT TABLES    2D  %FS: 37 92 %  Ao Diam: 3 53 cm  EDV(Teich): 53 54 ml  EF(Teich): 69 06 %  ESV(Teich): 16 57 ml  IVSd: 1 18 cm  LA Area: 16 16 cm2  LA Diam: 3 48 cm  LVEDV MOD A4C: 64 17 ml  LVEF MOD A4C: 75 23 %  LVESV MOD A4C: 15 89 ml  LVIDd: 3 58 cm  LVIDs: 2 22 cm  LVLd A4C: 6 94 cm  LVLs A4C: 5 64 cm  LVPWd: 1 1 cm  RA Area: 12 12 cm2  RVIDd: 3 95 cm  SV MOD A4C: 48 27 ml  SV(Teich): 36 98 ml    CW  AV MaxP 4 mmHg  AV Vmax: 1 36 m/s  MV PHT: 80 87 ms  MVA By PHT: 2 72 cm2  TR MaxP 85 mmHg  TR Vmax: 3 53 m/s    PW  LVOT Vmax: 1 24 m/s  LVOT maxP 12 mmHg  Lateral E': 0 04 m/s  MV A Nickolas: 1 11 m/s  MV Dec [place]: 2 58 m/s2  MV DecT: 321 49 ms  MV E Nickolas: 0 83 m/s  MV E/A Ratio: 0 75    [employer] Accredited Echocardiography Laboratory    Prepared and electronically signed by    Nataly Thomas MD  Signed 2020 17:47:19       No results found for this or any previous visit  No results found for this or any previous visit  No procedure found  No results found for this or any previous visit  Meds/Allergies   all current active meds have been reviewed, current meds:   Current Facility-Administered Medications   Medication Dose Route Frequency    albuterol (PROVENTIL HFA,VENTOLIN HFA) inhaler 2 puff  2 puff Inhalation Q6H PRN    enoxaparin (LOVENOX) subcutaneous injection 40 mg  40 mg Subcutaneous Daily    guaiFENesin (MUCINEX) 12 hr tablet 600 mg  600 mg Oral Q12H MARCIANO    levalbuterol (XOPENEX) inhalation solution 0 63 mg  0 63 mg Nebulization TID    levothyroxine tablet 50 mcg  50 mcg Oral Daily    montelukast (SINGULAIR) tablet 10 mg  10 mg Oral HS    pantoprazole (PROTONIX) EC tablet 40 mg  40 mg Oral Early Morning    pravastatin (PRAVACHOL) tablet 40 mg  40 mg Oral Daily With Dinner    terazosin (HYTRIN) capsule 5 mg  5 mg Oral HS    timolol (TIMOPTIC) 0 5 % ophthalmic solution 1 drop  1 drop Both Eyes BID    and PTA meds:   Prior to Admission Medications   Prescriptions Last Dose Informant Patient Reported? Taking?    Blood Pressure Monitoring (B-D ASSURE BPM/AUTO ARM CUFF) MISC  Self No No   Sig: by Does not apply route daily   RABEprazole (ACIPHEX) 20 MG tablet  Self Yes Yes   Sig: daily    albuterol (PROVENTIL HFA,VENTOLIN HFA) 90 mcg/act inhaler  Self Yes No   Sig: Inhale 2 puffs every 6 (six) hours as needed for wheezing   amLODIPine (NORVASC) 10 mg tablet   No No   Sig: Take 1 tablet (10 mg total) by mouth daily   atenolol-chlorthalidone (TENORETIC) 50-25 mg per tablet  Self Yes Yes   Sig: Take 1 tablet by mouth daily   brimonidine-timolol (COMBIGAN) 0 2-0 5 %  Self Yes Yes   Sig: Apply 1 drop to eye 2 (two) times a day   furosemide (LASIX) 40 mg tablet  Self No Yes   Sig: Take 1 tablet (40 mg total) by mouth daily   latanoprost (XALATAN) 0 005 % ophthalmic solution  Self Yes Yes   levalbuterol (XOPENEX) 0 63 mg/3 mL nebulizer solution Self No No   Sig: Take 1 vial (0 63 mg total) by nebulization 3 (three) times a day   Patient not taking: Reported on 2/14/2020   levothyroxine 50 mcg tablet  Self Yes Yes   Sig: Take 1 tablet by mouth daily   montelukast (SINGULAIR) 10 mg tablet  Self Yes Yes   Sig: daily    potassium chloride (K-DUR,KLOR-CON) 20 mEq tablet   No Yes   Sig: Take 1 tablet (20 mEq total) by mouth 2 (two) times a day   simvastatin (ZOCOR) 20 mg tablet  Self Yes Yes   Sig: daily    spironolactone (ALDACTONE) 25 mg tablet   No Yes   Sig: Take 1 tablet (25 mg total) by mouth daily   terazosin (HYTRIN) 5 mg capsule  Self Yes Yes   Sig: Take 1 capsule by mouth      Facility-Administered Medications: None            EKG personally reviewed by LOUISE Gilbert  Assessment:  Principal Problem:    Bilateral hydropneumothorax  Active Problems:    Chronic respiratory failure with hypoxia (HCC)    Hyponatremia    Essential hypertension    Hyperlipidemia    Hypokalemia    Thrombocytopenia (HCC)    Generalized weakness    Pericardial effusion    Severe protein-calorie malnutrition (HCC)    Acute on chronic respiratory failure with hypoxia and hypercapnia (HCC)    COPD (chronic obstructive pulmonary disease) (HCC)    Hypothyroidism    Acute on chronic diastolic heart failure (HCC)    GERD (gastroesophageal reflux disease)    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 acidosis      Counseling / Coordination of Care  Total floor / unit time spent today 20 minutes  Greater than 50% of total time was spent with the patient and / or family counseling and / or coordination of care  ** Please Note: Dragon 360 Dictation voice to text software may have been used in the creation of this document   **

## 2020-07-21 NOTE — SOCIAL WORK
CM informed by [facility] that patient may be stable for discharge within 24-48 hrs  Patient's daughter doesnt want any surgeries for patient at this time  One of his two chest tubes was clamped and CXR ordered for tomorrow to see if or how fluid accumulates  CM called and LMOVM for patient's daughter Eugenia Hernández  CM requested return call to discuss discharge plan and therapy recommendations  CM will await return call

## 2020-07-21 NOTE — ASSESSMENT & PLAN NOTE
Malnutrition Findings:   Malnutrition type: Chronic illness  Degree of Malnutrition: Other severe protein calorie malnutrition(related to inadequate intake/medical condition/advanced age as evidenced by significant depletion of fat/muscle (clavicles, shoulders, triceps, temples), 26% wt loss x 5 months (165 lb 2/14/20), +4 b/l LE edema  Treatment includes supplementation ) patient is noted to be thin with minimal muscle and adipose tissue  Unclear on patient's current nutritional status  BMI Findings: Body mass index is 20 03 kg/m²       Plan:  · Patient currently on regular diet with high-calorie and high-protein

## 2020-07-21 NOTE — ASSESSMENT & PLAN NOTE
· Patient currently on 2L nasal cannula  Condition likely secondary to acute exacerbation of CHF and pleural effusions noted on chest x-ray  Patient regularly follows Dr Jorge Casey from pulmonology  Low suspicion for infection given patient's negative COVID-19 testing, no elevated white count, and no fever but a viral etiology is still a possibility  Given patient's continued acute hypoxemia, cannot completely rule out PE  Per pulmonology, effusions on chest x-ray does not completely explain patient's hypoxia  · Patients CT showed Bilateral Hydropneumothorax/ pleural effusions so IR placed bilateral chest tubes on 7/13/20  · Persistence of hypercapnia, likely due to hypoventilation (copd) however patient asymptomatic      Plan:    · Maintain SpO2 between 89% to 94%  · Bipap  · Repeat BMP to monitor hypercapnia

## 2020-07-21 NOTE — ASSESSMENT & PLAN NOTE
Patient on 2L nasal canula, in no respiratory distress, with adequate oxygen saturation levels (96% today)  VBG pH from this AM showed improvement: 7 44 today with pCO2 of 49 8 and pHO3 of 33 2       Plan  - Monitor SpO2 levels aiming SpO2 >90  - Continue oxygen 2L  - Bipap  - As per pulm, no need to repeat VBG tomorrow AM

## 2020-07-21 NOTE — ASSESSMENT & PLAN NOTE
Patient currently takes terazosin  BP today is 121/58      Plan:  · Continue terazosin  · Restarted on Lasix as per pulm, to help with pleural effusion drainage as one chest tube was clamped today

## 2020-07-21 NOTE — ASSESSMENT & PLAN NOTE
Wt Readings from Last 3 Encounters:   07/21/20 58 kg (127 lb 13 9 oz)   02/14/20 74 9 kg (165 lb 1 6 oz)   12/17/19 77 3 kg (170 lb 6 4 oz)     · Patient's last echo completed in October of 2019 showed ejection fraction of 50%  Upon assessment on admission, patient appeared to be fluid overloaded given lower extremity edema and pleural effusions seen on chest x-ray and slightly elevated BNP of 556  · Patient regularly follows Dr Bagley Night  Echocardiogram on 7/13/2020 shows large pleural effusions and slightly enlarged pericardium  · Chronic +1 pitting lower extremities edema, currently with oxygen with nasal canula, no evidence of acute distress  Plan:  · Cardiology on board   · Started on IV Lasix  · Consider initiation of albumin in case of hypotension  · Continue Oxygen on nasal canula  · Monitor oxygen saturations levels daily  · Monitor signs and symptoms of volume overload daily

## 2020-07-21 NOTE — UTILIZATION REVIEW
Continued Stay Review    Date: 7/20                   Current Patient Class: Inpatient  Current Level of Care: Med Surg    HPI:89 y o  male initially admitted on 7/12    Assessment/Plan: Bilateral Hydropneumothorax,  Acute on chronic respiratory failure with hypoxia and hypercapnia, Acute on chronic diastolic heart failure, 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 acidosis  Continue to follow up results of 2nd cytology- pathology lab called for results of 7/13 cytology  Consider CT surgery evaluation  for possible fluoroscopy if negative result  Monitor chest tubes daily total outpt  Daily SpO2 levels  Currently on O2 2L N/C to maintain SpO2 >90%  VBG pH shows worsening acidosis, 7 25 today with pCO2 of 83 3 and pHO3 of 36 3  Pulmonology- recommended to start Bipap and repeat VBG tomorrow  Continue to hold Lasix and blood pressure medication as patient still with low blood pressure levels  Monitor for volume overload daily      Pertinent Labs/Diagnostic Results:       Results from last 7 days   Lab Units 07/21/20  0558 07/20/20  0552 07/19/20  0558 07/18/20  0504 07/17/20  0633   WBC Thousand/uL 3 48* 3 82* 3 64* 4 74 4 96   HEMOGLOBIN g/dL 12 5 13 7 13 0 13 4 13 1   HEMATOCRIT % 39 4 42 6 41 4 41 6 40 8   PLATELETS Thousands/uL 137* 148* 134* 140* 124*   NEUTROS ABS Thousands/µL  --  2 70  --  3 33 3 38         Results from last 7 days   Lab Units 07/21/20  0558 07/20/20  0552 07/19/20  0558 07/18/20  0504 07/17/20  0633  07/15/20  0515   SODIUM mmol/L 132* 134* 134* 133* 132*   < > 134*   POTASSIUM mmol/L 4 4 4 3 4 0 4 2 3 6   < > 4 2   CHLORIDE mmol/L 98* 98* 98* 97* 96*   < > 93*   CO2 mmol/L 35* 36* 35* 36* 34*   < > 44*   ANION GAP mmol/L -1* 0* 1* 0* 2*   < > -3*   BUN mg/dL 27* 23 25 22 20   < > 17   CREATININE mg/dL 0 63 0 74 0 60 0 58* 0 69   < > 0 53*   EGFR ml/min/1 73sq m 86 81 88 89 83   < > 92   CALCIUM mg/dL 7 9* 8 5 7 9* 7 9* 7 8*   < > 7 8*   CALCIUM, IONIZED mmol/L  -- --   --   --   --   --  1 03*   MAGNESIUM mg/dL  --   --   --   --   --   --  2 1   PHOSPHORUS mg/dL  --   --   --   --   --   --  2 0*    < > = values in this interval not displayed  Results from last 7 days   Lab Units 07/21/20  0558 07/20/20  0552 07/19/20  0558 07/18/20  0504 07/17/20  5826 07/16/20  0528 07/15/20  0515 07/15/20  0011 07/14/20  1808   GLUCOSE RANDOM mg/dL 92 84 85 99 94 85 84 86 161*       Results from last 7 days   Lab Units 07/15/20  0541   PH ART  7 450   PCO2 ART mm Hg 64 7*   PO2 ART mm Hg 57 8*   HCO3 ART mmol/L 44 0*   BASE EXC ART mmol/L 16 8   O2 CONTENT ART mL/dL 16 5   O2 HGB, ARTERIAL % 91 1*   ABG SOURCE  Radial, Left     Results from last 7 days   Lab Units 07/20/20  0552 07/19/20  0820   PH KANCHAN  7 257* 7 292*   PCO2 KANCHAN mm Hg 83 3* 65 6*   PO2 KANCHAN mm Hg 45 2* 60 7*   HCO3 KANCHAN mmol/L 36 3* 31 0*   BASE EXC KANCHAN mmol/L 5 9 2 5   O2 CONTENT KANCHAN ml/dL 16 6 18 1   O2 HGB, VENOUS % 81 2* 90 9*     Results from last 7 days   Lab Units 07/15/20  0515   PROTIME seconds 15 0*   INR  1 24*       Vital Signs:   07/20/20 0731  97 8 °F (36 6 °C)  66  18  102/54  74  98 %      Nasal cannula  Lying   07/20/20 0720            98 %  28  2 L/min  Nasal cannula     07/19/20 2300              28  2 L/min  Nasal cannula         Medications:   Scheduled Medications:  Medications:  enoxaparin 40 mg Subcutaneous Daily   furosemide 40 mg Intravenous Daily   guaiFENesin 600 mg Oral Q12H MARCIANO   levalbuterol 0 63 mg Nebulization TID   levothyroxine 50 mcg Oral Daily   montelukast 10 mg Oral HS   pantoprazole 40 mg Oral Early Morning   pravastatin 40 mg Oral Daily With Dinner   terazosin 5 mg Oral HS   timolol 1 drop Both Eyes BID     Continuous IV Infusions: None     PRN Meds:  albuterol 2 puff Inhalation Q6H PRN     Discharge Plan: TBD    Network Utilization Review Department  [email or web address]  ATTENTION: Please call with any questions or concerns to [phone] and carefully listen to the prompts so that you are directed to the right person  All voicemails are confidential   Daljit Lemus all requests for admission clinical reviews, approved or denied determinations and any other requests to dedicated fax number below belonging to the campus where the patient is receiving treatment   List of dedicated fax numbers for the Facilities:  [address] DENIALS (Administrative/Medical Necessity) [phone]   [address] (Maternity/NICU/Pediatrics) [phone]   Shu Escobedo [phone]   [facility] Negrete [phone]   [place]  [phone]   [facility] [phone]   [address] [facility] [address] [facility] [phone]   [facility]  [phone]   [address] [facility]  [address] [facility] [address] [facility] [phone]

## 2020-07-21 NOTE — ASSESSMENT & PLAN NOTE
· Metabolic alkalosis resolved, but patient with persistence of hypercapnia and worsening acidosis seen on VBG done yesterday  VBG today shows compensation, pH of 7 44, pCO2 of 49 8, HCO3 of 33 2  No shortness of breath or visible distress      Plan:  -Bipap  - Continue to monitor CO2 levels daily

## 2020-07-21 NOTE — ASSESSMENT & PLAN NOTE
Recent Labs     07/19/20  0558 07/20/20  0552 07/21/20  0558   K 4 0 4 3 4 4     · Resolved    Plan  - continue monitoring potassium levels daily   - Consider goal of potassium level greater than >4 0 if not replace  - Monitor for signs and symptoms of hypokalemia daily

## 2020-07-21 NOTE — ASSESSMENT & PLAN NOTE
· Patient presented with weakness and confusion, found to have persistent small left pneumothorax on CXR  · CT showed bilateral hydropneumothorax with pleural effusions and IR placed bilateral chest tube on 7/13/2020  · 1st and 2nd cytology from 7/13 were negative for malignancy in right and left pleural fluid  · Pleural fluid culture yellow, wbc 1 156 , with no growth  · Pleural LDH: 169, Pleural protein: 3 3, serum LDH: 188, serum protein: 5 8, based on Light's criteria, likely exudative effusion  · Repeat CT chest showed improving bilateral hydropneumothorax, improving atelectasias  · Tubes draining adequately, still with output but diminishing  · Pulm saw patient, possible etiology of fluid is related to CHF/ third spacing  Pulmonologist talked with daughter Uma Jimenez) about possible transfer to [facility] for thoracic surgery evaluation and VATs but daughter refused any surgery  Per their discussion with daughter, pulm recommended: clamping right sided chest tube, CXR in AM to check for fluid re-accumulation, and resuming diuretics to maintain euvolemia  He may continue to have small pleural effusions but if fluid continues to accumulate with symptoms, can consider IPC for management       Plan:  · Rt chest tube clamped  · Started on Lasix 40 mg daily  · CXR tomorrow AM  · Monitor chest tube daily total output  · Consider incentive spirometry for atelectas prevention  · Continue to monitor SpO2 levels daily

## 2020-07-21 NOTE — PLAN OF CARE
Problem: Potential for Falls  Goal: Patient will remain free of falls  Description  INTERVENTIONS:  - Assess patient frequently for physical needs  -  Identify cognitive and physical deficits and behaviors that affect risk of falls  -  [place] fall precautions as indicated by assessment   - Educate patient/family on patient safety including physical limitations  - Instruct patient to call for assistance with activity based on assessment  - Modify environment to reduce risk of injury  - Consider OT/PT consult to assist with strengthening/mobility  Outcome: Progressing     Problem: Prexisting or High Potential for Compromised Skin Integrity  Goal: Skin integrity is maintained or improved  Description  INTERVENTIONS:  - Identify patients at risk for skin breakdown  - Assess and monitor skin integrity  - Assess and monitor nutrition and hydration status  - Monitor labs   - Assess for incontinence   - Turn and reposition patient  - Assist with mobility/ambulation  - Relieve pressure over bony prominences  - Avoid friction and shearing  - Provide appropriate hygiene as needed including keeping skin clean and dry  - Evaluate need for skin moisturizer/barrier cream  - Collaborate with interdisciplinary team   - Patient/family teaching  - Consider wound care consult   Outcome: Progressing     Problem: Nutrition/Hydration-ADULT  Goal: Nutrient/Hydration intake appropriate for improving, restoring or maintaining nutritional needs  Description  Monitor and assess patient's nutrition/hydration status for malnutrition  Collaborate with interdisciplinary team and initiate plan and interventions as ordered  Monitor patient's weight and dietary intake as ordered or per policy  Utilize nutrition screening tool and intervene as necessary  Determine patient's food preferences and provide high-protein, high-caloric foods as appropriate       INTERVENTIONS:  - Monitor oral intake, urinary output, labs, and treatment plans  - Assess nutrition and hydration status and recommend course of action  - Evaluate amount of meals eaten  - Assist patient with eating if necessary   - Allow adequate time for meals  - Recommend/ encourage appropriate diets, oral nutritional supplements, and vitamin/mineral supplements  - Order, calculate, and assess calorie counts as needed  - Recommend, monitor, and adjust tube feedings and TPN/PPN based on assessed needs  - Assess need for intravenous fluids  - Provide specific nutrition/hydration education as appropriate  - Include patient/family/caregiver in decisions related to nutrition  Outcome: Progressing     Problem: PAIN - ADULT  Goal: Verbalizes/displays adequate comfort level or baseline comfort level  Description  Interventions:  - Encourage patient to monitor pain and request assistance  - Assess pain using appropriate pain scale  - Administer analgesics based on type and severity of pain and evaluate response  - Implement non-pharmacological measures as appropriate and evaluate response  - Consider cultural and social influences on pain and pain management  - Notify physician/advanced practitioner if interventions unsuccessful or patient reports new pain  Outcome: Progressing     Problem: INFECTION - ADULT  Goal: Absence or prevention of progression during hospitalization  Description  INTERVENTIONS:  - Assess and monitor for signs and symptoms of infection  - Monitor lab/diagnostic results  - Monitor all insertion sites, i e  indwelling lines, tubes, and drains  - Monitor endotracheal if appropriate and nasal secretions for changes in amount and color  - [place] appropriate cooling/warming therapies per order  - Administer medications as ordered  - Instruct and encourage patient and family to use good hand hygiene technique  - Identify and instruct in appropriate isolation precautions for identified infection/condition  Outcome: Progressing  Goal: Absence of fever/infection during neutropenic period  Description  INTERVENTIONS:  - Monitor WBC    Outcome: Progressing     Problem: SAFETY ADULT  Goal: Maintain or return to baseline ADL function  Description  INTERVENTIONS:  -  Assess patient's ability to carry out ADLs; assess patient's baseline for ADL function and identify physical deficits which impact ability to perform ADLs (bathing, care of mouth/teeth, toileting, grooming, dressing, etc )  - Assess/evaluate cause of self-care deficits   - Assess range of motion  - Assess patient's mobility; develop plan if impaired  - Assess patient's need for assistive devices and provide as appropriate  - Encourage maximum independence but intervene and supervise when necessary  - Involve family in performance of ADLs  - Assess for home care needs following discharge   - Consider OT consult to assist with ADL evaluation and planning for discharge  - Provide patient education as appropriate  Outcome: Progressing  Goal: Maintain or return mobility status to optimal level  Description  INTERVENTIONS:  - Assess patient's baseline mobility status (ambulation, transfers, stairs, etc )    - Identify cognitive and physical deficits and behaviors that affect mobility  - Identify mobility aids required to assist with transfers and/or ambulation (gait belt, sit-to-stand, lift, walker, cane, etc )  - [place] fall precautions as indicated by assessment  - Record patient progress and toleration of activity level on Mobility SBAR; progress patient to next Phase/Stage  - Instruct patient to call for assistance with activity based on assessment  - Consider rehabilitation consult to assist with strengthening/weightbearing, etc   Outcome: Progressing     Problem: DISCHARGE PLANNING  Goal: Discharge to home or other facility with appropriate resources  Description  INTERVENTIONS:  - Identify barriers to discharge w/patient and caregiver  - Arrange for needed discharge resources and transportation as appropriate  - Identify discharge learning needs (meds, wound care, etc )  - Arrange for interpretive services to assist at discharge as needed  - Refer to Case Management Department for coordinating discharge planning if the patient needs post-hospital services based on physician/advanced practitioner order or complex needs related to functional status, cognitive ability, or social support system  Outcome: Progressing     Problem: Knowledge Deficit  Goal: Patient/family/caregiver demonstrates understanding of disease process, treatment plan, medications, and discharge instructions  Description  Complete learning assessment and assess knowledge base    Interventions:  - Provide teaching at level of understanding  - Provide teaching via preferred learning methods  Outcome: Progressing     Problem: RESPIRATORY - ADULT  Goal: Achieves optimal ventilation and oxygenation  Description  INTERVENTIONS:  - Assess for changes in respiratory status  - Assess for changes in mentation and behavior  - Position to facilitate oxygenation and minimize respiratory effort  - Oxygen administered by appropriate delivery if ordered  - Initiate smoking cessation education as indicated  - Encourage broncho-pulmonary hygiene including cough, deep breathe, Incentive Spirometry  - Assess the need for suctioning and aspirate as needed  - Assess and instruct to report SOB or any respiratory difficulty  - Respiratory Therapy support as indicated  Outcome: Progressing     Problem: GENITOURINARY - ADULT  Goal: Maintains or returns to baseline urinary function  Description  INTERVENTIONS:  - Assess urinary function  - Encourage oral fluids to ensure adequate hydration if ordered  - Administer IV fluids as ordered to ensure adequate hydration  - Administer ordered medications as needed  - Offer frequent toileting  - Follow urinary retention protocol if ordered  Outcome: Progressing  Goal: Absence of urinary retention  Description  INTERVENTIONS:  - Assess patients ability to void and empty bladder  - Monitor I/O  - Bladder scan as needed  - Discuss with physician/AP medications to alleviate retention as needed  - Discuss catheterization for long term situations as appropriate  Outcome: Progressing  Goal: Urinary catheter remains patent  Description  INTERVENTIONS:  - Assess patency of urinary catheter  - If patient has a chronic can, consider changing catheter if non-functioning  - Follow guidelines for intermittent irrigation of non-functioning urinary catheter  Outcome: Progressing     Problem: METABOLIC, FLUID AND ELECTROLYTES - ADULT  Goal: Electrolytes maintained within normal limits  Description  INTERVENTIONS:  - Monitor labs and assess patient for signs and symptoms of electrolyte imbalances  - Administer electrolyte replacement as ordered  - Monitor response to electrolyte replacements, including repeat lab results as appropriate  - Instruct patient on fluid and nutrition as appropriate  Outcome: Progressing  Goal: Fluid balance maintained  Description  INTERVENTIONS:  - Monitor labs   - Monitor I/O and WT  - Instruct patient on fluid and nutrition as appropriate  - Assess for signs & symptoms of volume excess or deficit  Outcome: Progressing  Goal: Glucose maintained within target range  Description  INTERVENTIONS:  - Monitor Blood Glucose as ordered  - Assess for signs and symptoms of hyperglycemia and hypoglycemia  - Administer ordered medications to maintain glucose within target range  - Assess nutritional intake and initiate nutrition service referral as needed  Outcome: Progressing     Problem: SKIN/TISSUE INTEGRITY - ADULT  Goal: Skin integrity remains intact  Description  INTERVENTIONS  - Identify patients at risk for skin breakdown  - Assess and monitor skin integrity  - Assess and monitor nutrition and hydration status  - Monitor labs (i e  albumin)  - Assess for incontinence   - Turn and reposition patient  - Assist with mobility/ambulation  - Relieve pressure over bony prominences  - Avoid friction and shearing  - Provide appropriate hygiene as needed including keeping skin clean and dry  - Evaluate need for skin moisturizer/barrier cream  - Collaborate with interdisciplinary team (i e  Nutrition, Rehabilitation, etc )   - Patient/family teaching  Outcome: Progressing  Goal: Incision(s), wounds(s) or drain site(s) healing without S/S of infection  Description  INTERVENTIONS  - Assess and document risk factors for skin impairment   - Assess and document dressing, incision, wound bed, drain sites and surrounding tissue  - Consider nutrition services referral as needed  - Oral mucous membranes remain intact  - Provide patient/ family education  Outcome: Progressing  Goal: Oral mucous membranes remain intact  Description  INTERVENTIONS  - Assess oral mucosa and hygiene practices  - Implement preventative oral hygiene regimen  - Implement oral medicated treatments as ordered  - Initiate Nutrition services referral as needed  Outcome: Progressing     Problem: HEMATOLOGIC - ADULT  Goal: Maintains hematologic stability  Description  INTERVENTIONS  - Assess for signs and symptoms of bleeding or hemorrhage  - Monitor labs  - Administer supportive blood products/factors as ordered and appropriate  Outcome: Progressing     Problem: MUSCULOSKELETAL - ADULT  Goal: Maintain or return mobility to safest level of function  Description  INTERVENTIONS:  - Assess patient's ability to carry out ADLs; assess patient's baseline for ADL function and identify physical deficits which impact ability to perform ADLs (bathing, care of mouth/teeth, toileting, grooming, dressing, etc )  - Assess/evaluate cause of self-care deficits   - Assess range of motion  - Assess patient's mobility  - Assess patient's need for assistive devices and provide as appropriate  - Encourage maximum independence but intervene and supervise when necessary  - Involve family in performance of ADLs  - Assess for home care needs following discharge   - Consider OT consult to assist with ADL evaluation and planning for discharge  - Provide patient education as appropriate  Outcome: Progressing  Goal: Maintain proper alignment of affected body part  Description  INTERVENTIONS:  - Support, maintain and protect limb and body alignment  - Provide patient/ family with appropriate education  Outcome: Progressing

## 2020-07-21 NOTE — ASSESSMENT & PLAN NOTE
Recent Labs     07/19/20  0558 07/20/20  0552 07/21/20  0558   SODIUM 134* 134* 132*     Patient with Sodium of 132 today, currently asymptomatic, Likely due to his underlying diagnosis of congestive heart failure    Plan  -Continue to monitor sodium levels daily  - Assess for signs and symptoms of hyponatremia

## 2020-07-21 NOTE — SOCIAL WORK
CM received call back from patient's daughter, Jerome Horowitz  She understands patient is not yet stable for discharge but is happy to discuss his plan moving forward  CM reviewed PT/OT evaluations and recommendation for SNF  Jerome Lor stated that patient benefited form SNF in the past and she feels this would be the most appropriate discharge option at this time  Jerome Horowitz stated that once discharged from SNF, she'll plan to have patient stay with her at her home for a while  Jerome Horowitz asked that CM send referrals to the following facilities: [facility], [facility], [facility] and [facility]  [facility] choice [facility] as [facility] closest to [facility], she does not have preference between the other facilities  CM sent referrals as requested via [employer] and will follow up with Jerome Horowitz re: their responses  CM will continue to follow patient  SLIM aware of SNF referrals

## 2020-07-21 NOTE — PROGRESS NOTES
Progress Note - Mulu Mckenna 3/24/1929, 80 y o  male MRN: [id number]    Unit/Bed#: S -01 Encounter: [id number]    Primary Care Provider: Isa Marcus MD   Date and time admitted to hospital: 7/12/2020  2:58 PM        * Bilateral hydropneumothorax  Assessment & Plan  · Patient presented with weakness and confusion, found to have persistent small left pneumothorax on CXR  · CT showed bilateral hydropneumothorax with pleural effusions and IR placed bilateral chest tube on 7/13/2020  · 1st and 2nd cytology from 7/13 were negative for malignancy in right and left pleural fluid  · Pleural fluid culture yellow, wbc 1 156 , with no growth  · Pleural LDH: 169, Pleural protein: 3 3, serum LDH: 188, serum protein: 5 8, based on Light's criteria, likely exudative effusion  · Repeat CT chest showed improving bilateral hydropneumothorax, improving atelectasias  · Tubes draining adequately, still with output but diminishing  · Pulm saw patient, possible etiology of fluid is related to CHF/ third spacing  Pulmonologist talked with daughter Nas Rodriges) about possible transfer to [place] for thoracic surgery evaluation and VATs but daughter refused any surgery  Per their discussion with daughter, pulm recommended: clamping right sided chest tube, CXR in AM to check for fluid re-accumulation, and resuming diuretics to maintain euvolemia  He may continue to have small pleural effusions but if fluid continues to accumulate with symptoms, can consider IPC for management  Plan:  · Rt chest tube clamped  · Started on Lasix 40 mg daily  · CXR tomorrow AM  · Monitor chest tube daily total output  · Consider incentive spirometry for atelectas prevention  · Continue to monitor SpO2 levels daily    Chronic respiratory failure with hypoxia [facility])  Assessment & Plan  Patient on 2L nasal canula, in no respiratory distress, with adequate oxygen saturation levels (96% today)    VBG pH from this AM showed improvement: 7 44 today with pCO2 of 49 8 and pHO3 of 33 2  Plan  - Monitor SpO2 levels aiming SpO2 >90  - Continue oxygen 2L  - Bipap  - As per pulm, no need to repeat VBG tomorrow AM    Acute on chronic respiratory failure with hypoxia and hypercapnia (HCC)  Assessment & Plan  · Patient currently on 2L nasal cannula  Condition likely secondary to acute exacerbation of CHF and pleural effusions noted on chest x-ray  Patient regularly follows Dr Aung Santos from pulmonology  Low suspicion for infection given patient's negative COVID-19 testing, no elevated white count, and no fever but a viral etiology is still a possibility  Given patient's continued acute hypoxemia, cannot completely rule out PE  Per pulmonology, effusions on chest x-ray does not completely explain patient's hypoxia  · Patients CT showed Bilateral Hydropneumothorax/ pleural effusions so IR placed bilateral chest tubes on 7/13/20  · Persistence of hypercapnia, likely due to hypoventilation (copd) however patient asymptomatic  Plan:    · Maintain SpO2 between 89% to 94%  · Bipap  · Repeat BMP to monitor hypercapnia    Acute on chronic diastolic heart failure [facility])  Assessment & Plan  Wt Readings from Last 3 Encounters:   07/21/20 58 kg (127 lb 13 9 oz)   02/14/20 74 9 kg (165 lb 1 6 oz)   12/17/19 77 3 kg (170 lb 6 4 oz)     · Patient's last echo completed in October of 2019 showed ejection fraction of 50%  Upon assessment on admission, patient appeared to be fluid overloaded given lower extremity edema and pleural effusions seen on chest x-ray and slightly elevated BNP of 556  · Patient regularly follows Dr Romana Carmel  Echocardiogram on 7/13/2020 shows large pleural effusions and slightly enlarged pericardium  · Chronic +1 pitting lower extremities edema, currently with oxygen with nasal canula, no evidence of acute distress  Plan:  · Cardiology on board   · Started on IV Lasix  · Consider initiation of albumin in case of hypotension    · Continue Oxygen on nasal canula  · Monitor oxygen saturations levels daily  · Monitor signs and symptoms of volume overload daily  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 acidosis  Assessment & Plan  · Metabolic alkalosis resolved, but patient with persistence of hypercapnia and worsening acidosis seen on VBG done yesterday  VBG today shows compensation, pH of 7 44, pCO2 of 49 8, HCO3 of 33 2  No shortness of breath or visible distress  Plan:  -Bipap  - Continue to monitor CO2 levels daily          GERD (gastroesophageal reflux disease)  Assessment & Plan  Continue on Protonix 40mg daily    Hypothyroidism  Assessment & Plan  TSH on admission was slightly elevated at 4 54  Free T4 was within normal limits    Plan:  · Continue patient's home levothyroxine 50 mcg daily    COPD (chronic obstructive pulmonary disease) ([facility] )  Assessment & Plan  PFT performed on December of 2019 showed severe obstructive pulmonary disease  Plan:  · Continue patient's home albuterol, levalbuterol, and montelukast   · Not currently in any acute respiratory distress    Severe protein-calorie malnutrition (HCC)  Assessment & Plan  Malnutrition Findings:   Malnutrition type: Chronic illness  Degree of Malnutrition: Other severe protein calorie malnutrition(related to inadequate intake/medical condition/advanced age as evidenced by significant depletion of fat/muscle (clavicles, shoulders, triceps, temples), 26% wt loss x 5 months (165 lb 2/14/20), +4 b/l LE edema  Treatment includes supplementation ) patient is noted to be thin with minimal muscle and adipose tissue  Unclear on patient's current nutritional status  BMI Findings: Body mass index is 20 03 kg/m²  Plan:  · Patient currently on regular diet with high-calorie and high-protein    Generalized weakness  Assessment & Plan  Likely secondary to advanced age, multiple medical conditions, hypothyroidism, and malnutrition    Concern for recent fall history  Plan:  · PT recommended IP rehab pending progress in order to facilitate return to prior level of function  · See malnutrition plan     Thrombocytopenia [facility])  Assessment & Plan  Recent Labs     07/19/20  0558 07/20/20  0552 07/21/20  0558   * 148* 137*     Patient is currently asymptomatic  This has been chronic based on previous CBCs  Plan:  · Monitor with daily CBC  · Will continue with pharmacologic VTE prophylaxis; may consider discontinuation if platelet count worsens    Hypokalemia  Assessment & Plan  Recent Labs     07/19/20  0558 07/20/20  0552 07/21/20  0558   K 4 0 4 3 4 4     · Resolved    Plan  - continue monitoring potassium levels daily   - Consider goal of potassium level greater than >4 0 if not replace  - Monitor for signs and symptoms of hypokalemia daily      Hyperlipidemia  Assessment & Plan  · Patient is currently on pravastatin 40 mg p o  Daily    Essential hypertension  Assessment & Plan  Patient currently takes terazosin  BP today is 121/58      Plan:  · Continue terazosin  · Restarted on Lasix as per pulm, to help with pleural effusion drainage as one chest tube was clamped today    Hyponatremia  Assessment & Plan  Recent Labs     07/19/20  0558 07/20/20  0552 07/21/20  0558   SODIUM 134* 134* 132*     Patient with Sodium of 132 today, currently asymptomatic, Likely due to his underlying diagnosis of congestive heart failure    Plan  -Continue to monitor sodium levels daily  - Assess for signs and symptoms of hyponatremia    VTE Pharmacologic Prophylaxis:   Pharmacologic: Enoxaparin (Lovenox)  Mechanical VTE Prophylaxis in Place: Yes    Discussions with Specialists or Other Care Team Provider: nursing, , pulmonology team    Education and Discussions with Family / Patient: discussed with patient, called and updated daughter    Current Length of Stay: 9 day(s)    Current Patient Status: Inpatient     Discharge Plan / Estimated Discharge Date: possibly 24-48 hours    Code Status: Level 3 - DNAR and DNI      Subjective:   Patient seen this morning in no acute distress  He had no complaints and denies SOB, chest pain, abdominal pain, n/v, fever and chills  Chest tubes are still in place and draining although output is diminishing  Seen by pulmonology today- right tube clamped and diuretics restarted  Chest x-ray to be checked in the morning to look for fluid accumulation  Daughter called and updated; she is in agreement with plan  Objective:     Vitals:   Temp (24hrs), Av 8 °F (36 6 °C), Min:97 5 °F (36 4 °C), Max:98 1 °F (36 7 °C)    Temp:  [97 5 °F (36 4 °C)-98 1 °F (36 7 °C)] 98 1 °F (36 7 °C)  HR:  [62-75] 75  Resp:  [18] 18  BP: (112-121)/(56-60) 112/56  SpO2:  [96 %-100 %] 96 %  Body mass index is 20 03 kg/m²  Input and Output Summary (last 24 hours): Intake/Output Summary (Last 24 hours) at 2020 1623  Last data filed at 2020 1544  Gross per 24 hour   Intake 360 ml   Output 1210 ml   Net -850 ml       Physical Exam:     Physical Exam   Constitutional: He is oriented to person, place, and time  No distress  Very thin   HENT:   Head: Normocephalic and atraumatic  Eyes: Pupils are equal, round, and reactive to light  EOM are normal    Cardiovascular: Normal rate, regular rhythm, normal heart sounds and intact distal pulses  Pulmonary/Chest: Effort normal  No respiratory distress  He exhibits no tenderness  Breath sounds decreased bilaterally, slight wheezing, + chest tubes in place   Abdominal: Soft  Bowel sounds are normal  He exhibits no distension  There is no tenderness  Musculoskeletal: He exhibits no tenderness  +1 edema, compression stockings and SCDs on   Neurological: He is alert and oriented to person, place, and time  Skin: Skin is warm and dry  No rash noted  He is not diaphoretic  Psychiatric: He has a normal mood and affect   His behavior is normal  Judgment and thought content normal    Nursing note and vitals reviewed  Additional Data:     Labs:    Results from last 7 days   Lab Units 07/21/20  0558 07/20/20  0552   WBC Thousand/uL 3 48* 3 82*   HEMOGLOBIN g/dL 12 5 13 7   HEMATOCRIT % 39 4 42 6   PLATELETS Thousands/uL 137* 148*   NEUTROS PCT %  --  69   LYMPHS PCT %  --  12*   MONOS PCT %  --  11   EOS PCT %  --  6     Results from last 7 days   Lab Units 07/21/20  0558   POTASSIUM mmol/L 4 4   CHLORIDE mmol/L 98*   CO2 mmol/L 35*   BUN mg/dL 27*   CREATININE mg/dL 0 63   CALCIUM mg/dL 7 9*     Results from last 7 days   Lab Units 07/15/20  0515   INR  1 24*       * I Have Reviewed All Lab Data Listed Above  * Additional Pertinent Lab Tests Reviewed:  C[facility] 66 Admission Reviewed    Imaging:    Imaging Reports Reviewed Today Include: none  Imaging Personally Reviewed by Myself Includes:  none    Recent Cultures (last 7 days):           Last 24 Hours Medication List:     Current Facility-Administered Medications:  albuterol 2 puff Inhalation Q6H PRN Avery Lopez MD   enoxaparin 40 mg Subcutaneous Daily Avery Lopez MD   furosemide 40 mg Intravenous Daily Lis Condon DO   guaiFENesin 600 mg Oral Q12H [address] Avery Lopez MD   levalbuterol 0 63 mg Nebulization TID Avery Lopez MD   levothyroxine 50 mcg Oral Daily Avery Lopez MD   montelukast 10 mg Oral HS Avery Lopez MD   pantoprazole 40 mg Oral Early Morning Avery Lopez MD   pravastatin 40 mg Oral Daily With Shanta Scott MD   terazosin 5 mg Oral HS Avery Lopez MD   timolol 1 drop Both Eyes BID Avery Lopez MD        Today, Patient Was Seen By: Maggie Warner MD

## 2020-07-22 ENCOUNTER — APPOINTMENT (INPATIENT)
Dept: RADIOLOGY | Facility: HOSPITAL | Age: 85
DRG: 291 | End: 2020-07-22
Payer: COMMERCIAL

## 2020-07-22 LAB
ANION GAP SERPL CALCULATED.3IONS-SCNC: 2 MMOL/L (ref 4–13)
BUN SERPL-MCNC: 27 MG/DL (ref 5–25)
CALCIUM SERPL-MCNC: 7.8 MG/DL (ref 8.3–10.1)
CHLORIDE SERPL-SCNC: 96 MMOL/L (ref 100–108)
CO2 SERPL-SCNC: 35 MMOL/L (ref 21–32)
CREAT SERPL-MCNC: 0.55 MG/DL (ref 0.6–1.3)
ERYTHROCYTE [DISTWIDTH] IN BLOOD BY AUTOMATED COUNT: 13.4 % (ref 11.6–15.1)
GFR SERPL CREATININE-BSD FRML MDRD: 91 ML/MIN/1.73SQ M
GLUCOSE SERPL-MCNC: 85 MG/DL (ref 65–140)
HCT VFR BLD AUTO: 37.5 % (ref 36.5–49.3)
HGB BLD-MCNC: 12.5 G/DL (ref 12–17)
MCH RBC QN AUTO: 32.4 PG (ref 26.8–34.3)
MCHC RBC AUTO-ENTMCNC: 33.3 G/DL (ref 31.4–37.4)
MCV RBC AUTO: 97 FL (ref 82–98)
PMV BLD AUTO: 11.1 FL (ref 8.9–12.7)
POTASSIUM SERPL-SCNC: 4.3 MMOL/L (ref 3.5–5.3)
RBC # BLD AUTO: 3.86 MILLION/UL (ref 3.88–5.62)
SODIUM SERPL-SCNC: 133 MMOL/L (ref 136–145)
WBC # BLD AUTO: 3.29 THOUSAND/UL (ref 4.31–10.16)

## 2020-07-22 PROCEDURE — 99232 SBSQ HOSP IP/OBS MODERATE 35: CPT | Performed by: INTERNAL MEDICINE

## 2020-07-22 PROCEDURE — 94668 MNPJ CHEST WALL SBSQ: CPT

## 2020-07-22 PROCEDURE — 94760 N-INVAS EAR/PLS OXIMETRY 1: CPT

## 2020-07-22 PROCEDURE — 97116 GAIT TRAINING THERAPY: CPT

## 2020-07-22 PROCEDURE — 80048 BASIC METABOLIC PNL TOTAL CA: CPT | Performed by: INTERNAL MEDICINE

## 2020-07-22 PROCEDURE — 94640 AIRWAY INHALATION TREATMENT: CPT

## 2020-07-22 PROCEDURE — 99233 SBSQ HOSP IP/OBS HIGH 50: CPT | Performed by: INTERNAL MEDICINE

## 2020-07-22 PROCEDURE — 97110 THERAPEUTIC EXERCISES: CPT

## 2020-07-22 PROCEDURE — 85027 COMPLETE CBC AUTOMATED: CPT | Performed by: INTERNAL MEDICINE

## 2020-07-22 PROCEDURE — 71045 X-RAY EXAM CHEST 1 VIEW: CPT

## 2020-07-22 RX ORDER — FLUTICASONE FUROATE AND VILANTEROL 100; 25 UG/1; UG/1
1 POWDER RESPIRATORY (INHALATION) DAILY
Status: DISCONTINUED | OUTPATIENT
Start: 2020-07-22 | End: 2020-07-25 | Stop reason: HOSPADM

## 2020-07-22 RX ADMIN — FLUTICASONE FUROATE AND VILANTEROL TRIFENATATE 1 PUFF: 100; 25 POWDER RESPIRATORY (INHALATION) at 16:52

## 2020-07-22 RX ADMIN — FUROSEMIDE 40 MG: 10 INJECTION, SOLUTION INTRAMUSCULAR; INTRAVENOUS at 09:26

## 2020-07-22 RX ADMIN — LEVALBUTEROL HYDROCHLORIDE 0.63 MG: 0.63 SOLUTION RESPIRATORY (INHALATION) at 19:45

## 2020-07-22 RX ADMIN — TIOTROPIUM BROMIDE 18 MCG: 18 CAPSULE ORAL; RESPIRATORY (INHALATION) at 16:52

## 2020-07-22 RX ADMIN — PRAVASTATIN SODIUM 40 MG: 40 TABLET ORAL at 16:52

## 2020-07-22 RX ADMIN — TERAZOSIN HYDROCHLORIDE 5 MG: 5 CAPSULE ORAL at 22:30

## 2020-07-22 RX ADMIN — LEVALBUTEROL HYDROCHLORIDE 0.63 MG: 0.63 SOLUTION RESPIRATORY (INHALATION) at 13:52

## 2020-07-22 RX ADMIN — GUAIFENESIN 600 MG: 600 TABLET, EXTENDED RELEASE ORAL at 22:30

## 2020-07-22 RX ADMIN — LEVOTHYROXINE SODIUM 50 MCG: 50 TABLET ORAL at 05:05

## 2020-07-22 RX ADMIN — ENOXAPARIN SODIUM 40 MG: 40 INJECTION SUBCUTANEOUS at 09:25

## 2020-07-22 RX ADMIN — LEVALBUTEROL HYDROCHLORIDE 0.63 MG: 0.63 SOLUTION RESPIRATORY (INHALATION) at 07:03

## 2020-07-22 RX ADMIN — TIMOLOL MALEATE 1 DROP: 5 SOLUTION/ DROPS OPHTHALMIC at 09:26

## 2020-07-22 RX ADMIN — GUAIFENESIN 600 MG: 600 TABLET, EXTENDED RELEASE ORAL at 09:25

## 2020-07-22 RX ADMIN — PANTOPRAZOLE SODIUM 40 MG: 40 TABLET, DELAYED RELEASE ORAL at 05:05

## 2020-07-22 RX ADMIN — TIMOLOL MALEATE 1 DROP: 5 SOLUTION/ DROPS OPHTHALMIC at 16:53

## 2020-07-22 RX ADMIN — MONTELUKAST SODIUM 10 MG: 10 TABLET, FILM COATED ORAL at 22:30

## 2020-07-22 NOTE — ASSESSMENT & PLAN NOTE
· Metabolic alkalosis resolved, but patient with persistence of hypercapnia and worsening acidosis  VBG done recently showed compensation, with pH of 7 44  No shortness of breath or visible distress      Plan:  -Bipap  - Continue to monitor CO2 levels daily

## 2020-07-22 NOTE — PROGRESS NOTES
Progress Note - Pulmonary   Jasper Schneider 80 y o  male MRN: [id number]  Unit/Bed#: S -01 Encounter: [id number]  Code Status: Level 3 - DNAR and DNI    Taylor Allen is a 80 y o  Past Medical History:   Diagnosis Date    Allergic rhinitis     Hypercholesteremia     Hypertension     Pneumonia        Assessment/Plan:   * Bilateral hydropneumothorax  Assessment & Plan  59-year-old male with bilateral hydropneumothoraces, Severe COPD w/ FEV1/FVC 58% / FEV1 40% w/ DLCO 8% corrected,HTN, CDCHF, and chronic oxygen dependence of 3 l, former smoker, admitted 7/12 w/ dyspnea / CP w/ B/L effusions and worsening hypoxemia despite attempted diuresis and had CT angio 7/13 revealing bilateral hydropneumothoracies   Post IR pigtail catheters placed   Post procedure needing 100% HFNC and NRB and hypoxic  ICU stay w/ downgrade on 7/14  Now requiring only 1 5 L NC w/ persistent B/L effusions and chest tube drainage      CT drainage slowing 110 on R / 24 h / 250 L / 24 hr    Cytology negative  Overall Exudative Effusion  1/3 + Lights: LD ratio 0 89  Patient and daughter okay with bilateral ASEPT catheters if needed / NO SURGERY  Overall will attempt to maintain minimal effusions w/ diuresis   Today clamped both side chest tubes 11:00AM   Plan for morning radiographic re-evaluation and probably chest tube removal per IR if stable and non significantly worsened effusions  If worsened symptomatic effusions then consideration for ASEPTS                Acute on chronic diastolic heart failure (HCC)  Assessment & Plan  Wt Readings from Last 3 Encounters:   07/20/20 58 6 kg (129 lb 3 2 oz)   02/14/20 74 9 kg (165 lb 1 6 oz)   12/17/19 77 3 kg (170 lb 6 4 oz)   Most of fluid volume losses from chest tubes  Is on Lasix 40 mg daily  Cardiology 5  -1600 last 24 hr  -14 L this admission            COPD (chronic obstructive pulmonary disease) ([facility] )  Assessment & Plan  Hx Severe COPD by PFT w/ reduced DLCO  Appears to be on home Xopenex  Not acutely exacerbated  Plan to start ICS/LAMA/LABA in outpatient setting / Started Paige Barthel / Aniyah Brazil here in inpatient setting    Acute on chronic respiratory failure with hypoxia and hypercapnia ([address] )  Assessment & Plan  Chronically on 3 L NC  Currently on 1 5 L NC  Self improved respiratory acidosis   Did not tolerate BiPAP overnight    ABG:  Results from last 7 days   Lab Units 07/15/20  0541   PH ART  7 450   PCO2 ART mm Hg 64 7*   PO2 ART mm Hg 57 8*   HCO3 ART mmol/L 44 0*   BASE EXC ART mmol/L 16 8   ABG SOURCE  Radial, Left     VBG:  Results from last 7 days   Lab Units 07/21/20  1004   PH KANCHAN  7 442*   PCO2 KANCHAN mm Hg 49 8   PO2 KANCHAN mm Hg 198 2*   HCO3 KANCHAN mmol/L 33 2*   BASE EXC KANCHAN mmol/L 7 8         Severe protein-calorie malnutrition (HCC)  Assessment & Plan  Malnutrition Findings:   Malnutrition type: Chronic illness  Degree of Malnutrition: Other severe protein calorie malnutrition(related to inadequate intake/medical condition/advanced age as evidenced by significant depletion of fat/muscle (clavicles, shoulders, triceps, temples), 26% wt loss x 5 months (165 lb 2/14/20), +4 b/l LE edema  Treatment includes supplementation )    BMI Findings: Body mass index is 20 24 kg/m²  Very likely contributory to ongoing volume losses via B/L chest tubes  Nutrition following and recommendations      D/C and Follow up Needs: Follow up in the outpatient setting, ideally in next 1-2 weeks for follow up  Later Scheduling may be required due to scheduling restraints  Items to follow up :  COPD Symptoms management   Inhaler therapy adherence  Steroid Taper management     ______________________________________________________________________    Subjective: Pt seen and examined at bedside  No acute events overnight  Poorly tolerated BiPAP and did not wear for very long  Tolerated right-sided chest tube clamping overnight  Overall, reduced chest tube output  Chief Complaint:  "Well, I guess I'm doing Mansoord Wilver  Just waiting to hear what you guys have to say "    Tele Events:     Vitals:   Temp:  [97 7 °F (36 5 °C)-98 1 °F (36 7 °C)] 97 7 °F (36 5 °C)  HR:  [67-75] 67  Resp:  [18] 18  BP: (102-127)/(54-61) 127/61  Weight (last 2 days)     Date/Time   Weight    20 0600   59 6 (131 39)    20 0600   58 (127 87)    20 0551   58 6 (129 2)            Vitals:    20 2206 20 0600 20 0703 20 0714   BP: 102/55   127/61   BP Location: Left arm   Left arm   Pulse: 75   67   Resp: 18   18   Temp: 97 9 °F (36 6 °C)   97 7 °F (36 5 °C)   TempSrc: Oral   Oral   SpO2: 99%  100% 100%   Weight:  59 6 kg (131 lb 6 3 oz)     Height:         Temp (24hrs), Av 9 °F (36 6 °C), Min:97 7 °F (36 5 °C), Max:98 1 °F (36 7 °C)  Current: Temperature: 97 7 °F (36 5 °C)        SpO2: SpO2: 100 %, SpO2 Activity: SpO2 Activity: At Rest, SpO2 Device: O2 Device: Nasal cannula    IV Infusions:       Nutrition:        Diet Orders   (From admission, onward)             Start     Ordered    20  Diet Cardiovascular; Sodium 2 GM; Fluid Restriction 1800 ML  Diet effective now     Question Answer Comment   Diet Type Cardiovascular    Cardiac Sodium 2 GM    Other Restriction(s): Fluid Restriction 1800 ML    RD to adjust diet per protocol? Yes        20 1140  Dietary nutrition supplements  Once     Question Answer Comment   Select Supplement: Ensure [employer], Lunch, Dinner        20 1139    20  Room Service  Once     Question:  Type of Service  Answer:  Room Service - Appropriate with Assistance    20                Ins/Outs:   I/O       701 -  0700  07 -  07 -  0700    P  O  1200 360     Total Intake(mL/kg) 1200 (20 7) 360 (6)     Urine (mL/kg/hr) 400 (0 3) 1600 (1 1) 350 (1 4)    Stool       Chest Tube 400 360     Total Output 800 1960 350    Net +400 -1600 -350           Unmeasured Urine Occurrence 1 x            Lines/Drains:  Invasive Devices     Peripheral Intravenous Line            Peripheral IV 07/19/20 Right Wrist 2 days          Drain            Chest Tube 1 Left Pleural 10 2 Fr  8 days    Chest Tube 2 Right Pleural 10 2 Fr  8 days                 Active medications:  Scheduled Meds:  Current Facility-Administered Medications:  albuterol 2 puff Inhalation Q6H PRN Dolores Rachel MD   enoxaparin 40 mg Subcutaneous Daily Dolores Rachel MD   furosemide 40 mg Intravenous Daily Lis Condon DO   guaiFENesin 600 mg Oral Q12H [address] Dolores Rachel MD   levalbuterol 0 63 mg Nebulization TID Dolores Rachel MD   levothyroxine 50 mcg Oral Daily Dolores Rachel MD   montelukast 10 mg Oral HS Dolores Rachel MD   pantoprazole 40 mg Oral Early Morning Dolores Rachel MD   pravastatin 40 mg Oral Daily With Ishan Stiles MD   terazosin 5 mg Oral HS Dolores Rachel MD   timolol 1 drop Both Eyes BID Dolores Rachel MD     PRN Meds:  albuterol 2 puff Q6H PRN     ____________________________________________________________________    Physical Exam   Constitutional: He is oriented to person, place, and time  He appears well-developed  He appears cachectic  Elderly, frail   HENT:   Head: Normocephalic and atraumatic  Eyes: Pupils are equal, round, and reactive to light  Conjunctivae are normal    Neck: Normal range of motion  Neck supple  Cardiovascular: Normal rate, regular rhythm and normal heart sounds  Exam reveals no gallop and no friction rub  No murmur heard  Pulmonary/Chest: Effort normal  No accessory muscle usage  No tachypnea  No respiratory distress  He has decreased breath sounds in the right upper field, the right middle field, the right lower field, the left upper field, the left middle field and the left lower field  He has no wheezes  He has no rhonchi  He has no rales  He exhibits no tenderness  Mildly decreased breath sounds    1 5 L nasal cannula   Abdominal: Soft   Bowel sounds are normal    Musculoskeletal: Normal range of motion  He exhibits no edema  Right lower leg: He exhibits no edema  Left lower leg: He exhibits no edema  Neurological: He is alert and oriented to person, place, and time  Skin: Skin is warm and dry  Psychiatric: He has a normal mood and affect      ____________________________________________________________________    Invasive/non-invasive ventilation settings   Respiratory    Lab Data (Last 4 hours)    None         O2/Vent Data (Last 4 hours)    None                Laboratory and Diagnostics:  Results from last 7 days   Lab Units 07/22/20  0614 07/21/20  0558 07/20/20  0552 07/19/20  0558 07/18/20  0504 07/17/20  0633 07/16/20  0528   WBC Thousand/uL 3 29* 3 48* 3 82* 3 64* 4 74 4 96 4 07*   HEMOGLOBIN g/dL 12 5 12 5 13 7 13 0 13 4 13 1 12 7   HEMATOCRIT % 37 5 39 4 42 6 41 4 41 6 40 8 39 1   PLATELETS Thousands/uL  --  137* 148* 134* 140* 124* 102*   NEUTROS PCT %  --   --  69  --  71 68 72   MONOS PCT %  --   --  11  --  12 12 10     Results from last 7 days   Lab Units 07/22/20  0614 07/21/20  0558 07/20/20  0552 07/19/20  0558 07/18/20  0504 07/17/20  0633 07/16/20  0528   SODIUM mmol/L 133* 132* 134* 134* 133* 132* 133*   POTASSIUM mmol/L 4 3 4 4 4 3 4 0 4 2 3 6 4 2   CHLORIDE mmol/L 96* 98* 98* 98* 97* 96* 94*   CO2 mmol/L 35* 35* 36* 35* 36* 34* 44*   ANION GAP mmol/L 2* -1* 0* 1* 0* 2* -5*   BUN mg/dL 27* 27* 23 25 22 20 21   CREATININE mg/dL 0 55* 0 63 0 74 0 60 0 58* 0 69 0 65   CALCIUM mg/dL 7 8* 7 9* 8 5 7 9* 7 9* 7 8* 7 9*   GLUCOSE RANDOM mg/dL 85 92 84 85 99 94 85                       ABG:    VBG:  Results from last 7 days   Lab Units 07/21/20  1004   PH KANCHAN  7 442*   PCO2 KANCHAN mm Hg 49 8   PO2 KANCHAN mm Hg 198 2*   HCO3 KANCHAN mmol/L 33 2*   BASE EXC KANCHAN mmol/L 7 8           Micro        Imaging:   XR chest portable   Final Result by Ravi Hernandez MD (07/22 0719)         1    Bilateral thoracostomy tubes again noted with tiny stable residual biapical pneumothoraces  Workstation performed: [id number]         XR chest portable   Final Result by Ivan Brown MD (07/19 1713)      Small bilateral pneumothoraces  Workstation performed: [id number]         XR chest portable   Final Result by Ivan Brown MD (07/17 4695)      Persistent small left pneumothorax  Mild bibasilar atelectasis  Workstation performed: [id number]         XR chest portable   Final Result by Ivan Brown MD (07/15 0827)      Bilateral pleural drainage catheters with no change in small left pneumothorax and left base atelectasis            Workstation performed: [id number]         CT chest wo contrast   Final Result by Ania Raymond MD (07/15 1874)         1  Improving bilateral hydropneumothoraces  2   Improving bilateral atelectasis  Workstation performed: [id number]         XR chest portable   Final Result by Diomedes Wilkinson MD (07/14 1146)      Stable left basilar pneumothorax with chest tube  Previous right basilar pneumothorax not well visualized and possibly resolved  Workstation performed: [id number]         XR chest portable   Final Result by Diomedes Wilkinson MD (07/14 1144)      Improved bibasilar pneumothoraces with chest tubes in place  Workstation performed: [id number]         XR chest portable   Final Result by Pavan Franks MD (07/14 4947)      Bilateral pneumothoraces status post bilateral chest tube placement with resolution of previously seen pleural effusions  Workstation performed: [id number]         IR chest tube   Final Result by Raymon Ovalles MD (07/13 1728)   Impression:    Successful ultrasound-guided bilateral chest tube placement  Workstation performed: [id number]         CTA chest pe study   Final Result by Diomedes Wilkinson MD (07/13 1626)      1  Bilateral hydropneumothoraces with large pleural effusions and moderate pneumothoraces        2   No acute pulmonary embolism  I personally discussed this study with DR NAJERA on 7/13/2020 at 3:42 PM                         Workstation performed: [id number]         XR chest 1 view portable   ED Interpretation by Anusha Chang MD (07/12 8902)   Bilateral pleural effusions      Final Result by Geetha Rojas MD (07/13 5198)      Moderate size bilateral pleural effusions with bibasilar atelectasis  Workstation performed: [id number]             Micro: Lab Results   Component Value Date    BLOODCX No Growth After 5 Days  03/16/2019    BLOODCX No Growth After 5 Days   03/16/2019            Invalid input(s): Silvia Moore

## 2020-07-22 NOTE — ASSESSMENT & PLAN NOTE
Recent Labs     07/20/20  0552 07/21/20  0558   * 137*     Patient is currently asymptomatic  This has been chronic based on previous CBCs      Plan:  · Monitor with daily CBC  · Will continue with pharmacologic VTE prophylaxis; may consider discontinuation if platelet count worsens

## 2020-07-22 NOTE — PLAN OF CARE
Problem: Potential for Falls  Goal: Patient will remain free of falls  Description  INTERVENTIONS:  - Assess patient frequently for physical needs  -  Identify cognitive and physical deficits and behaviors that affect risk of falls  -  Husser fall precautions as indicated by assessment   - Educate patient/family on patient safety including physical limitations  - Instruct patient to call for assistance with activity based on assessment  - Modify environment to reduce risk of injury  - Consider OT/PT consult to assist with strengthening/mobility  Outcome: Progressing     Problem: Prexisting or High Potential for Compromised Skin Integrity  Goal: Skin integrity is maintained or improved  Description  INTERVENTIONS:  - Identify patients at risk for skin breakdown  - Assess and monitor skin integrity  - Assess and monitor nutrition and hydration status  - Monitor labs   - Assess for incontinence   - Turn and reposition patient  - Assist with mobility/ambulation  - Relieve pressure over bony prominences  - Avoid friction and shearing  - Provide appropriate hygiene as needed including keeping skin clean and dry  - Evaluate need for skin moisturizer/barrier cream  - Collaborate with interdisciplinary team   - Patient/family teaching  - Consider wound care consult   Outcome: Progressing     Problem: Nutrition/Hydration-ADULT  Goal: Nutrient/Hydration intake appropriate for improving, restoring or maintaining nutritional needs  Description  Monitor and assess patient's nutrition/hydration status for malnutrition  Collaborate with interdisciplinary team and initiate plan and interventions as ordered  Monitor patient's weight and dietary intake as ordered or per policy  Utilize nutrition screening tool and intervene as necessary  Determine patient's food preferences and provide high-protein, high-caloric foods as appropriate       INTERVENTIONS:  - Monitor oral intake, urinary output, labs, and treatment plans  - Assess nutrition and hydration status and recommend course of action  - Evaluate amount of meals eaten  - Assist patient with eating if necessary   - Allow adequate time for meals  - Recommend/ encourage appropriate diets, oral nutritional supplements, and vitamin/mineral supplements  - Order, calculate, and assess calorie counts as needed  - Recommend, monitor, and adjust tube feedings and TPN/PPN based on assessed needs  - Assess need for intravenous fluids  - Provide specific nutrition/hydration education as appropriate  - Include patient/family/caregiver in decisions related to nutrition  Outcome: Progressing     Problem: PAIN - ADULT  Goal: Verbalizes/displays adequate comfort level or baseline comfort level  Description  Interventions:  - Encourage patient to monitor pain and request assistance  - Assess pain using appropriate pain scale  - Administer analgesics based on type and severity of pain and evaluate response  - Implement non-pharmacological measures as appropriate and evaluate response  - Consider cultural and social influences on pain and pain management  - Notify physician/advanced practitioner if interventions unsuccessful or patient reports new pain  Outcome: Progressing     Problem: INFECTION - ADULT  Goal: Absence or prevention of progression during hospitalization  Description  INTERVENTIONS:  - Assess and monitor for signs and symptoms of infection  - Monitor lab/diagnostic results  - Monitor all insertion sites, i e  indwelling lines, tubes, and drains  - Monitor endotracheal if appropriate and nasal secretions for changes in amount and color  - [place] appropriate cooling/warming therapies per order  - Administer medications as ordered  - Instruct and encourage patient and family to use good hand hygiene technique  - Identify and instruct in appropriate isolation precautions for identified infection/condition  Outcome: Progressing  Goal: Absence of fever/infection during neutropenic period  Description  INTERVENTIONS:  - Monitor WBC    Outcome: Progressing     Problem: SAFETY ADULT  Goal: Maintain or return to baseline ADL function  Description  INTERVENTIONS:  -  Assess patient's ability to carry out ADLs; assess patient's baseline for ADL function and identify physical deficits which impact ability to perform ADLs (bathing, care of mouth/teeth, toileting, grooming, dressing, etc )  - Assess/evaluate cause of self-care deficits   - Assess range of motion  - Assess patient's mobility; develop plan if impaired  - Assess patient's need for assistive devices and provide as appropriate  - Encourage maximum independence but intervene and supervise when necessary  - Involve family in performance of ADLs  - Assess for home care needs following discharge   - Consider OT consult to assist with ADL evaluation and planning for discharge  - Provide patient education as appropriate  Outcome: Progressing  Goal: Maintain or return mobility status to optimal level  Description  INTERVENTIONS:  - Assess patient's baseline mobility status (ambulation, transfers, stairs, etc )    - Identify cognitive and physical deficits and behaviors that affect mobility  - Identify mobility aids required to assist with transfers and/or ambulation (gait belt, sit-to-stand, lift, walker, cane, etc )  - [place]ehner fall precautions as indicated by assessment  - Record patient progress and toleration of activity level on Mobility SBAR; progress patient to next Phase/Stage  - Instruct patient to call for assistance with activity based on assessment  - Consider rehabilitation consult to assist with strengthening/weightbearing, etc   Outcome: Progressing     Problem: DISCHARGE PLANNING  Goal: Discharge to home or other facility with appropriate resources  Description  INTERVENTIONS:  - Identify barriers to discharge w/patient and caregiver  - Arrange for needed discharge resources and transportation as appropriate  - Identify discharge learning needs (meds, wound care, etc )  - Arrange for interpretive services to assist at discharge as needed  - Refer to Case Management Department for coordinating discharge planning if the patient needs post-hospital services based on physician/advanced practitioner order or complex needs related to functional status, cognitive ability, or social support system  Outcome: Progressing     Problem: Knowledge Deficit  Goal: Patient/family/caregiver demonstrates understanding of disease process, treatment plan, medications, and discharge instructions  Description  Complete learning assessment and assess knowledge base    Interventions:  - Provide teaching at level of understanding  - Provide teaching via preferred learning methods  Outcome: Progressing     Problem: RESPIRATORY - ADULT  Goal: Achieves optimal ventilation and oxygenation  Description  INTERVENTIONS:  - Assess for changes in respiratory status  - Assess for changes in mentation and behavior  - Position to facilitate oxygenation and minimize respiratory effort  - Oxygen administered by appropriate delivery if ordered  - Initiate smoking cessation education as indicated  - Encourage broncho-pulmonary hygiene including cough, deep breathe, Incentive Spirometry  - Assess the need for suctioning and aspirate as needed  - Assess and instruct to report SOB or any respiratory difficulty  - Respiratory Therapy support as indicated  Outcome: Progressing     Problem: GENITOURINARY - ADULT  Goal: Maintains or returns to baseline urinary function  Description  INTERVENTIONS:  - Assess urinary function  - Encourage oral fluids to ensure adequate hydration if ordered  - Administer IV fluids as ordered to ensure adequate hydration  - Administer ordered medications as needed  - Offer frequent toileting  - Follow urinary retention protocol if ordered  Outcome: Progressing  Goal: Absence of urinary retention  Description  INTERVENTIONS:  - Assess patients ability to void and empty bladder  - Monitor I/O  - Bladder scan as needed  - Discuss with physician/AP medications to alleviate retention as needed  - Discuss catheterization for long term situations as appropriate  Outcome: Progressing  Goal: Urinary catheter remains patent  Description  INTERVENTIONS:  - Assess patency of urinary catheter  - If patient has a chronic can, consider changing catheter if non-functioning  - Follow guidelines for intermittent irrigation of non-functioning urinary catheter  Outcome: Progressing     Problem: METABOLIC, FLUID AND ELECTROLYTES - ADULT  Goal: Electrolytes maintained within normal limits  Description  INTERVENTIONS:  - Monitor labs and assess patient for signs and symptoms of electrolyte imbalances  - Administer electrolyte replacement as ordered  - Monitor response to electrolyte replacements, including repeat lab results as appropriate  - Instruct patient on fluid and nutrition as appropriate  Outcome: Progressing  Goal: Fluid balance maintained  Description  INTERVENTIONS:  - Monitor labs   - Monitor I/O and WT  - Instruct patient on fluid and nutrition as appropriate  - Assess for signs & symptoms of volume excess or deficit  Outcome: Progressing  Goal: Glucose maintained within target range  Description  INTERVENTIONS:  - Monitor Blood Glucose as ordered  - Assess for signs and symptoms of hyperglycemia and hypoglycemia  - Administer ordered medications to maintain glucose within target range  - Assess nutritional intake and initiate nutrition service referral as needed  Outcome: Progressing     Problem: SKIN/TISSUE INTEGRITY - ADULT  Goal: Skin integrity remains intact  Description  INTERVENTIONS  - Identify patients at risk for skin breakdown  - Assess and monitor skin integrity  - Assess and monitor nutrition and hydration status  - Monitor labs (i e  albumin)  - Assess for incontinence   - Turn and reposition patient  - Assist with mobility/ambulation  - Relieve pressure over bony prominences  - Avoid friction and shearing  - Provide appropriate hygiene as needed including keeping skin clean and dry  - Evaluate need for skin moisturizer/barrier cream  - Collaborate with interdisciplinary team (i e  Nutrition, Rehabilitation, etc )   - Patient/family teaching  Outcome: Progressing  Goal: Incision(s), wounds(s) or drain site(s) healing without S/S of infection  Description  INTERVENTIONS  - Assess and document risk factors for skin impairment   - Assess and document dressing, incision, wound bed, drain sites and surrounding tissue  - Consider nutrition services referral as needed  - Oral mucous membranes remain intact  - Provide patient/ family education  Outcome: Progressing  Goal: Oral mucous membranes remain intact  Description  INTERVENTIONS  - Assess oral mucosa and hygiene practices  - Implement preventative oral hygiene regimen  - Implement oral medicated treatments as ordered  - Initiate Nutrition services referral as needed  Outcome: Progressing     Problem: HEMATOLOGIC - ADULT  Goal: Maintains hematologic stability  Description  INTERVENTIONS  - Assess for signs and symptoms of bleeding or hemorrhage  - Monitor labs  - Administer supportive blood products/factors as ordered and appropriate  Outcome: Progressing     Problem: MUSCULOSKELETAL - ADULT  Goal: Maintain or return mobility to safest level of function  Description  INTERVENTIONS:  - Assess patient's ability to carry out ADLs; assess patient's baseline for ADL function and identify physical deficits which impact ability to perform ADLs (bathing, care of mouth/teeth, toileting, grooming, dressing, etc )  - Assess/evaluate cause of self-care deficits   - Assess range of motion  - Assess patient's mobility  - Assess patient's need for assistive devices and provide as appropriate  - Encourage maximum independence but intervene and supervise when necessary  - Involve family in performance of ADLs  - Assess for home care needs following discharge   - Consider OT consult to assist with ADL evaluation and planning for discharge  - Provide patient education as appropriate  Outcome: Progressing  Goal: Maintain proper alignment of affected body part  Description  INTERVENTIONS:  - Support, maintain and protect limb and body alignment  - Provide patient/ family with appropriate education  Outcome: Progressing

## 2020-07-22 NOTE — ASSESSMENT & PLAN NOTE
Recent Labs     07/20/20  0552 07/21/20  0558 07/22/20  0614   K 4 3 4 4 4 3     · Resolved    Plan  - continue monitoring potassium levels daily   - Consider goal of potassium level greater than >4 0 if not replace  - Monitor for signs and symptoms of hypokalemia daily

## 2020-07-22 NOTE — PROGRESS NOTES
General Cardiology   Progress Note -  [facility]   Unruly Gutierrez 80 y o  male MRN: [id number]    Unit/Bed#: S -01 Encounter: [id number]    Assessment/ Plan    1  Acute on chronic diastolic CHF: Had been holding diuretics; takes lasix 40mg PO daily at OP; this was restarted yesterday at Lasix 40mg IV daily by pulmonary; he still has b/l CT with exudative fluid thought to be related to CHF/third spacing; output slowing and tubes clamped  Agree with lasix 40mg IV daily as we are not looking at removing or changing to asept cath; he put out 1 6L yesterday  Renal function stable, check BMP; will follow up tomorrow to determine timing of transition to oral diuretics again, will likely need lasix 40mg PO BID  2  B/l Pneumothorax: currently has b/l CT with exudative drainage; family ok with asept cath if needed; no surgical intervention  Tubes clamped; pulm managing; restarted diuretics as above  3  HTN: BP adequately controlled  Subjective: Pt seen for follow up; no events overnight; he reports feeling better today; breathing is improving  No chest pain, palpitations or LE edema  Review of Systems   Constitution: Negative for decreased appetite and fever  Cardiovascular: Positive for dyspnea on exertion  Negative for chest pain, leg swelling, orthopnea, palpitations and syncope  Respiratory: Negative for cough and shortness of breath  Gastrointestinal: Negative for abdominal pain, nausea and vomiting  Genitourinary: Negative for dysuria  Neurological: Negative for dizziness and light-headedness  Psychiatric/Behavioral: Negative for altered mental status  Objective:   Vitals: Blood pressure 127/61, pulse 67, temperature 97 7 °F (36 5 °C), temperature source Oral, resp  rate 18, height 5' 7" (1 702 m), weight 59 6 kg (131 lb 6 3 oz), SpO2 99 %  ,     Body mass index is 20 58 kg/m²  ,     Systolic (71HJV), DZX:321 , Min:102 , AOR:784     Diastolic (89TYI), NX, Min:54, Max:61      Intake/Output Summary (Last 24 hours) at 7/22/2020 1456  Last data filed at 7/22/2020 1258  Gross per 24 hour   Intake 717 ml   Output 2660 ml   Net -1943 ml     Weight (last 2 days)     Date/Time   Weight    07/22/20 0600   59 6 (131 39)    07/21/20 0600   58 (127 87)    07/20/20 0551   58 6 (129 2)            Telemetry Review:   No telemetry    Physical Exam   Constitutional: He is oriented to person, place, and time  No distress  HENT:   Head: Normocephalic and atraumatic  Cardiovascular: Normal rate, regular rhythm, S1 normal and S2 normal    No murmur heard  No LE edema   Pulmonary/Chest: Effort normal    BS decreased; b/l pleural CT with serous drainage    Abdominal: Soft  flat   Musculoskeletal: He exhibits no edema  Neurological: He is alert and oriented to person, place, and time  Skin: Skin is warm and dry  He is not diaphoretic  Psychiatric: He has a normal mood and affect  His behavior is normal    Nursing note and vitals reviewed      LABORATORY RESULTS      CBC with diff:   Results from last 7 days   Lab Units 07/22/20  0614 07/21/20  0558 07/20/20  0552 07/19/20  0558 07/18/20  0504 07/17/20  0633 07/16/20  0528   WBC Thousand/uL 3 29* 3 48* 3 82* 3 64* 4 74 4 96 4 07*   HEMOGLOBIN g/dL 12 5 12 5 13 7 13 0 13 4 13 1 12 7   HEMATOCRIT % 37 5 39 4 42 6 41 4 41 6 40 8 39 1   MCV fL 97 98 99* 99* 99* 99* 98   PLATELETS Thousands/uL  --  137* 148* 134* 140* 124* 102*   MCH pg 32 4 31 2 31 8 31 0 31 9 31 8 31 8   MCHC g/dL 33 3 31 7 32 2 31 4 32 2 32 1 32 5   RDW % 13 4 13 2 13 4 13 6 13 6 13 8 14 0   MPV fL 11 1 9 8 9 8 10 0 10 1 10 6 10 2   NRBC AUTO /100 WBCs  --   --  0  --  0 0 0     CMP:  Results from last 7 days   Lab Units 07/22/20  0614 07/21/20  0558 07/20/20  0552 07/19/20  0558 07/18/20  0504 07/17/20  0633 07/16/20  0528   POTASSIUM mmol/L 4 3 4 4 4 3 4 0 4 2 3 6 4 2   CHLORIDE mmol/L 96* 98* 98* 98* 97* 96* 94*   CO2 mmol/L 35* 35* 36* 35* 36* 34* 44*   BUN mg/dL 27* 27* 23 25 22 20 21   CREATININE mg/dL 0 55* 0 63 0 74 0 60 0 58* 0 69 0 65   CALCIUM mg/dL 7 8* 7 9* 8 5 7 9* 7 9* 7 8* 7 9*   EGFR ml/min/1 73sq m 91 86 81 88 89 83 85     BMP:  Results from last 7 days   Lab Units 20  0614 20  0558 20  0552 20  0558 20  0504 20  0633 20  0528   POTASSIUM mmol/L 4 3 4 4 4 3 4 0 4 2 3 6 4 2   CHLORIDE mmol/L 96* 98* 98* 98* 97* 96* 94*   CO2 mmol/L 35* 35* 36* 35* 36* 34* 44*   BUN mg/dL 27* 27* 23 25 22 20 21   CREATININE mg/dL 0 55* 0 63 0 74 0 60 0 58* 0 69 0 65   CALCIUM mg/dL 7 8* 7 9* 8 5 7 9* 7 9* 7 8* 7 9*     Lab Results   Component Value Date    NTBNP 556 (H) 2020    NTBNP 866 (H) 2019    NTBNP 320 2019      Lipid Profile:   No results found for: CHOL  Lab Results   Component Value Date    HDL 80 (H) 2019    HDL 67 (H) 2018    HDL 78 (H) 2017     Lab Results   Component Value Date    LDLCALC 55 2019    LDLCALC 73 2018    LDLCALC 60 2017     Lab Results   Component Value Date    TRIG 37 2019    TRIG 54 2018    TRIG 61 2017     Cardiac testing:   Results for orders placed during the hospital encounter of 20   Echo complete with contrast if indicated    Narrative [facility] [facility]  [phone]    Transthoracic Echocardiogram  2D, M-mode, Doppler, and Color Doppler    Study date:  2020    Patient: Lidya Hicks  MR number: [id number]  Account number: [de-identified]  : 24-Mar-1929  Age: 80 years  Gender: Male  Status: Inpatient  Location: Bedside  Height: 67 in  Weight: 131 lb  BP: 131/ 61 mmHg    Indications: Heart Failure      Diagnoses: I50 9 - Heart failure, unspecified    Sonographer:  IMANI Wolf  Primary Physician:  Zelalem Hernandez MD  Referring Physician:  Beti Weaver MD  Group:  [facility]  Interpreting Physician:  Mita Sepulveda MD    SUMMARY    LEFT VENTRICLE:  Systolic function was normal  Ejection fraction was estimated to be 60 %  There were no regional wall motion abnormalities  Wall thickness was mildly increased  RIGHT VENTRICLE:  The size was normal   Systolic function was normal     MITRAL VALVE:  There was trace regurgitation  AORTIC VALVE:  There was mild regurgitation  TRICUSPID VALVE:  There was trace regurgitation  Estimated peak PA pressure was 50 mmHg  PERICARDIUM:  A small to moderate, free-flowing pericardial effusion was identified circumferential to the heart  There was no evidence of hemodynamic compromise  There was a large right pleural effusion  There was a large left pleural effusion  HISTORY: PRIOR HISTORY: COPD, pericardial effusion, CKD II, Respitory failure, Hyperlipidemia, and HTN  Congestive heart failure  Risk factors: hypertension  PROCEDURE: The procedure was performed at the bedside  This was a routine study  The transthoracic approach was used  The study included complete 2D imaging, M-mode, complete spectral Doppler, and color Doppler  The heart rate was 82 bpm,  at the start of the study  Images were obtained from the parasternal, apical, subcostal, and suprasternal notch acoustic windows  Image quality was adequate  LEFT VENTRICLE: Size was normal  Systolic function was normal  Ejection fraction was estimated to be 60 %  There were no regional wall motion abnormalities  Wall thickness was mildly increased  DOPPLER: The study was not technically  sufficient to allow evaluation of LV diastolic function  RIGHT VENTRICLE: The size was normal  Systolic function was normal  Wall thickness was normal     LEFT ATRIUM: Size was normal     RIGHT ATRIUM: Size was normal     MITRAL VALVE: Valve structure was normal  There was normal leaflet separation  DOPPLER: The transmitral velocity was within the normal range  There was no evidence for stenosis  There was trace regurgitation      AORTIC VALVE: The valve was trileaflet  Leaflets exhibited normal thickness and normal cuspal separation  DOPPLER: Transaortic velocity was within the normal range  There was no evidence for stenosis  There was mild regurgitation  TRICUSPID VALVE: The valve structure was normal  There was normal leaflet separation  DOPPLER: The transtricuspid velocity was within the normal range  There was no evidence for stenosis  There was trace regurgitation  Estimated peak PA  pressure was 50 mmHg  PULMONIC VALVE: Leaflets exhibited normal thickness, no calcification, and normal cuspal separation  DOPPLER: The transpulmonic velocity was within the normal range  There was no significant regurgitation  PERICARDIUM: A small to moderate, free-flowing pericardial effusion was identified circumferential to the heart  There was no evidence of hemodynamic compromise  There was a large right pleural effusion  There was a large left pleural  effusion  AORTA: The root exhibited normal size  SYSTEMIC VEINS: IVC: The inferior vena cava was not well visualized  The inferior vena cava was grossly normal in size      SYSTEM MEASUREMENT TABLES    2D  %FS: 37 92 %  Ao Diam: 3 53 cm  EDV(Teich): 53 54 ml  EF(Teich): 69 06 %  ESV(Teich): 16 57 ml  IVSd: 1 18 cm  LA Area: 16 16 cm2  LA Diam: 3 48 cm  LVEDV MOD A4C: 64 17 ml  LVEF MOD A4C: 75 23 %  LVESV MOD A4C: 15 89 ml  LVIDd: 3 58 cm  LVIDs: 2 22 cm  LVLd A4C: 6 94 cm  LVLs A4C: 5 64 cm  LVPWd: 1 1 cm  RA Area: 12 12 cm2  RVIDd: 3 95 cm  SV MOD A4C: 48 27 ml  SV(Teich): 36 98 ml    CW  AV MaxP 4 mmHg  AV Vmax: 1 36 m/s  MV PHT: 80 87 ms  MVA By PHT: 2 72 cm2  TR MaxP 85 mmHg  TR Vmax: 3 53 m/s    PW  LVOT Vmax: 1 24 m/s  LVOT maxP 12 mmHg  Lateral E': 0 04 m/s  MV A Nickolas: 1 11 m/s  MV Dec Elmore: 2 58 m/s2  MV DecT: 321 49 ms  MV E Nickolas: 0 83 m/s  MV E/A Ratio: 0 75    [employer] Accredited Echocardiography Laboratory    Prepared and electronically signed by    Ruthann Lara MD  Signed 13-Jul-2020 17:47:19       Meds/Allergies   all current active meds have been reviewed  Medications Prior to Admission   Medication    atenolol-chlorthalidone (TENORETIC) 50-25 mg per tablet    brimonidine-timolol (COMBIGAN) 0 2-0 5 %    furosemide (LASIX) 40 mg tablet    latanoprost (XALATAN) 0 005 % ophthalmic solution    levothyroxine 50 mcg tablet    montelukast (SINGULAIR) 10 mg tablet    potassium chloride (K-DUR,KLOR-CON) 20 mEq tablet    RABEprazole (ACIPHEX) 20 MG tablet    simvastatin (ZOCOR) 20 mg tablet    spironolactone (ALDACTONE) 25 mg tablet    terazosin (HYTRIN) 5 mg capsule    albuterol (PROVENTIL HFA,VENTOLIN HFA) 90 mcg/act inhaler    Blood Pressure Monitoring (B-D ASSURE BPM/AUTO ARM CUFF) MISC    levalbuterol (XOPENEX) 0 63 mg/3 mL nebulizer solution     Assessment:  Principal Problem:    Bilateral hydropneumothorax  Active Problems:    Chronic respiratory failure with hypoxia (HCC)    Hyponatremia    Essential hypertension    Hyperlipidemia    Hypokalemia    Thrombocytopenia (HCC)    Generalized weakness    Pericardial effusion    Severe protein-calorie malnutrition (HCC)    Acute on chronic respiratory failure with hypoxia and hypercapnia (HCC)    COPD (chronic obstructive pulmonary disease) (HCC)    Hypothyroidism    Acute on chronic diastolic heart failure (HCC)    GERD (gastroesophageal reflux disease)    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 acidosis    Counseling / Coordination of Care  Total floor / unit time spent today 20 minutes  Greater than 50% of total time was spent with the patient and / or family counseling and / or coordination of care  ** Please Note: Dragon 360 Dictation voice to text software may have been used in the creation of this document   **

## 2020-07-22 NOTE — ASSESSMENT & PLAN NOTE
Patient currently on terazosin and Lasix  Plan:  · Continue terazosin  · Restarted on Lasix as per pulm, to help with pleural effusion drainage as one chest tube was clamped yesterday    · Pt had good urine output today  · Monitor BP

## 2020-07-22 NOTE — ASSESSMENT & PLAN NOTE
· Patient presented with weakness and confusion, found to have persistent small left pneumothorax on CXR  · CT showed bilateral hydropneumothorax with pleural effusions and IR placed bilateral chest tube on 7/13/2020  · 1st and 2nd cytology from 7/13 were negative for malignancy in right and left pleural fluid  · Pleural fluid culture yellow, wbc 1 156 , with no growth  · Pleural LDH: 169, Pleural protein: 3 3, serum LDH: 188, serum protein: 5 8, based on Light's criteria, likely exudative effusion  · Repeat CT chest showed improving bilateral hydropneumothorax, improving atelectasias  · Tubes draining adequately, still with output but diminishing  · Possible etiology of fluid is related to CHF/ third spacing  Pulmonologist talked with daughter Clementine Thrasher) about possible transfer to [place] for thoracic surgery evaluation and VATs but daughter refused any surgery  · Yesterday, pulm clamped right sided chest tube and resumed diuretics to maintain euvolemia  · CXR in AM showed tiny stable residual biapical pneumothoraces  · Discussed with pulm team- Pt had good urine output and not much pleural drainage out of the left tube  Both chest tubes will be clamped today  If he does well and does not exhibit any signs of distress or re-accumulation of fluid, possible removal of chest tubes and clear for discharge to SNF  · If he can not tolerate bilateral tubes clamped, can consider indwelling pleural catheters for management  Plan:  · Both chest tubes clamped  · Repeat CXR tomorrow AM  · Continue on Lasix 40 mg daily  · As per pulmonary, if no significant reaccumulation of fluid and pneumothorax remains stable, will plan to remove the tubes  Repeat chest x-ray early next week or sooner if he develops symptoms  If he develops recurrent symptomatic effusions, he will need indwelling pleural catheter  · Will re-eval tomorrow to see how he does, monitor for any signs of respiratory distress    · Continue to monitor SpO2 levels daily

## 2020-07-22 NOTE — PROGRESS NOTES
Progress Note - Ladena Payment 3/24/1929, 80 y o  male MRN: [id number]    Unit/Bed#: S -01 Encounter: [id number]    Primary Care Provider: Zeb Wray MD   Date and time admitted to hospital: 7/12/2020  2:58 PM        * Bilateral hydropneumothorax  Assessment & Plan  · Patient presented with weakness and confusion, found to have persistent small left pneumothorax on CXR  · CT showed bilateral hydropneumothorax with pleural effusions and IR placed bilateral chest tube on 7/13/2020  · 1st and 2nd cytology from 7/13 were negative for malignancy in right and left pleural fluid  · Pleural fluid culture yellow, wbc 1 156 , with no growth  · Pleural LDH: 169, Pleural protein: 3 3, serum LDH: 188, serum protein: 5 8, based on Light's criteria, likely exudative effusion  · Repeat CT chest showed improving bilateral hydropneumothorax, improving atelectasias  · Tubes draining adequately, still with output but diminishing  · Possible etiology of fluid is related to CHF/ third spacing  Pulmonologist talked with daughter Nya Orozco) about possible transfer to [facility] [place] thoracic surgery evaluation and VATs but daughter refused any surgery  · Yesterday, pulm clamped right sided chest tube and resumed diuretics to maintain euvolemia  · CXR in AM showed tiny stable residual biapical pneumothoraces  · Discussed with pulm team- Pt had good urine output and not much pleural drainage out of the left tube  Both chest tubes will be clamped today  If he does well and does not exhibit any signs of distress or re-accumulation of fluid, possible removal of chest tubes and clear for discharge to SNF  · If he can not tolerate bilateral tubes clamped, can consider indwelling pleural catheters for management  Plan:  · Both chest tubes clamped  · Repeat CXR tomorrow AM  · Continue on Lasix 40 mg daily  · As per pulmonary, if no significant reaccumulation of fluid and pneumothorax remains stable, will plan to remove the tubes    Repeat chest x-ray early next week or sooner if he develops symptoms  If he develops recurrent symptomatic effusions, he will need indwelling pleural catheter  · Will re-eval tomorrow to see how he does, monitor for any signs of respiratory distress  · Continue to monitor SpO2 levels daily    Chronic respiratory failure with hypoxia [facility])  Assessment & Plan  Patient on 2L nasal canula, in no respiratory distress, with adequate oxygen saturation levels on nasal cannula  Plan  - Monitor SpO2 levels aiming SpO2 >90  - Continue oxygen 2L  - Bipap    Acute on chronic respiratory failure with hypoxia and hypercapnia (HCC)  Assessment & Plan  · Patient currently on 2L nasal cannula  Condition likely secondary to acute exacerbation of CHF and pleural effusions noted on chest x-ray  Patient regularly follows Dr Layla Lobato from pulmonology  Low suspicion for infection given patient's negative COVID-19 testing, no elevated white count, and no fever but a viral etiology is still a possibility  Given patient's continued acute hypoxemia, cannot completely rule out PE  Per pulmonology, effusions on chest x-ray does not completely explain patient's hypoxia  · Patients CT showed Bilateral Hydropneumothorax/ pleural effusions so IR placed bilateral chest tubes on 7/13/20  · Persistence of hypercapnia, likely due to hypoventilation (copd) however patient asymptomatic  Plan:  · Maintain SpO2 between 89% to 94%  · Bipap  · Repeat BMP to monitor hypercapnia    Acute on chronic diastolic heart failure [facility])  Assessment & Plan  Wt Readings from Last 3 Encounters:   07/22/20 59 6 kg (131 lb 6 3 oz)   02/14/20 74 9 kg (165 lb 1 6 oz)   12/17/19 77 3 kg (170 lb 6 4 oz)     · Patient's last echo completed in October of 2019 showed ejection fraction of 50%  Upon assessment on admission, patient appeared to be fluid overloaded given lower extremity edema and pleural effusions seen on chest x-ray and slightly elevated BNP of 556  · Patient regularly follows Dr Silvia Arevalo  Echocardiogram on 7/13/2020 shows large pleural effusions and slightly enlarged pericardium  · Chronic +1 pitting lower extremities edema, currently with oxygen with nasal canula, no evidence of acute distress  Plan:  · Cardiology on board   · On IV Lasix  · Consider initiation of albumin in case of hypotension  · Continue Oxygen on nasal canula  · Monitor oxygen saturations levels daily  · Monitor signs and symptoms of volume overload daily  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 acidosis  Assessment & Plan  · Metabolic alkalosis resolved, but patient with persistence of hypercapnia and worsening acidosis  VBG done recently showed compensation, with pH of 7 44  No shortness of breath or visible distress  Plan:  -Bipap  - Continue to monitor CO2 levels daily          GERD (gastroesophageal reflux disease)  Assessment & Plan  -Continue on Protonix 40mg daily    Hypothyroidism  Assessment & Plan  TSH on admission was slightly elevated at 4 54  Free T4 was within normal limits    Plan:  · Continue patient's home levothyroxine 50 mcg daily    COPD (chronic obstructive pulmonary disease) ([facility] [address] )  Assessment & Plan  PFT performed on December of 2019 showed severe obstructive pulmonary disease  Plan:  · Continue patient's home albuterol, levalbuterol, and montelukast   · Not currently in any acute respiratory distress    Severe protein-calorie malnutrition (HCC)  Assessment & Plan  Malnutrition Findings:   Malnutrition type: Chronic illness  Degree of Malnutrition: Other severe protein calorie malnutrition(related to inadequate intake/medical condition/advanced age as evidenced by significant depletion of fat/muscle (clavicles, shoulders, triceps, temples), 26% wt loss x 5 months (165 lb 2/14/20), +4 b/l LE edema  Treatment includes supplementation ) patient is noted to be thin with minimal muscle and adipose tissue    Unclear on patient's current nutritional status  BMI Findings: Body mass index is 20 58 kg/m²  Plan:  · Patient currently on regular diet with high-calorie and high-protein    Generalized weakness  Assessment & Plan  Likely secondary to advanced age, multiple medical conditions, hypothyroidism, and malnutrition  Concern for recent fall history  Plan:  · Plan to discharge to SNF once stable and cleared  · See malnutrition plan     Thrombocytopenia [facility])  Assessment & Plan  Recent Labs     07/20/20  0552 07/21/20  0558   * 137*     Patient is currently asymptomatic  This has been chronic based on previous CBCs  Plan:  · Monitor with daily CBC  · Will continue with pharmacologic VTE prophylaxis; may consider discontinuation if platelet count worsens    Hypokalemia  Assessment & Plan  Recent Labs     07/20/20  0552 07/21/20  0558 07/22/20  0614   K 4 3 4 4 4 3     · Resolved    Plan  - continue monitoring potassium levels daily   - Consider goal of potassium level greater than >4 0 if not replace  - Monitor for signs and symptoms of hypokalemia daily      Hyperlipidemia  Assessment & Plan  · Patient is currently on pravastatin 40 mg p o  Daily    Essential hypertension  Assessment & Plan  Patient currently on terazosin and Lasix  Plan:  · Continue terazosin  · Restarted on Lasix as per pulm, to help with pleural effusion drainage as one chest tube was clamped yesterday    · Pt had good urine output today  · Monitor BP    Hyponatremia  Assessment & Plan  Recent Labs     07/20/20  0552 07/21/20  0558 07/22/20  0614   SODIUM 134* 132* 133*     Patient with Sodium of 133 today, currently asymptomatic, Likely due to his underlying diagnosis of congestive heart failure    Plan  -Continue to monitor sodium levels daily  - Assess for signs and symptoms of hyponatremia      VTE Pharmacologic Prophylaxis:   Pharmacologic: Enoxaparin (Lovenox)  Mechanical VTE Prophylaxis in Place: Yes    Discussions with Specialists or Other Care Team Provider: nursing, , pulmonary team    Education and Discussions with Family / Patient: discussed with patient; tried to call daughter but did not  so left a message    Current Length of Stay: 10 day(s)    Current Patient Status: Inpatient     Discharge Plan / Estimated Discharge Date: possibly 24-48 hours    Code Status: Level 3 - DNAR and DNI      Subjective:   Patient seen today in NAD  Rt chest tube was clamped yesterday and today he looks comfortable and in no respiratory distress  He denies chest pain, SOB, n/v, fever or chills  He states that he was not able to wear his Bipap for long last night but is not having any trouble breathing  Objective:     Vitals:   Temp (24hrs), Av 9 °F (36 6 °C), Min:97 7 °F (36 5 °C), Max:98 °F (36 7 °C)    Temp:  [97 7 °F (36 5 °C)-98 °F (36 7 °C)] 98 °F (36 7 °C)  HR:  [67-75] 75  Resp:  [18] 18  BP: (100-127)/(52-61) 100/52  SpO2:  [98 %-100 %] 98 %  Body mass index is 20 58 kg/m²  Input and Output Summary (last 24 hours): Intake/Output Summary (Last 24 hours) at 2020 1800  Last data filed at 2020 1701  Gross per 24 hour   Intake 357 ml   Output 2200 ml   Net -1843 ml       Physical Exam:     Physical Exam   Constitutional: He is oriented to person, place, and time  No distress  thin   HENT:   Head: Normocephalic and atraumatic  Eyes: Pupils are equal, round, and reactive to light  EOM are normal    Cardiovascular: Normal rate, regular rhythm, normal heart sounds and intact distal pulses  Pulmonary/Chest: Effort normal  No respiratory distress  He exhibits no tenderness  Breath sounds slightly decreased bilaterally, chest tubes in place but clamped   Abdominal: Soft  Bowel sounds are normal  He exhibits no distension  There is no tenderness  Musculoskeletal: Normal range of motion  He exhibits no edema or tenderness  Neurological: He is alert and oriented to person, place, and time  Skin: Skin is warm and dry  No rash noted  Psychiatric: He has a normal mood and affect  His behavior is normal  Judgment and thought content normal    Nursing note and vitals reviewed  Additional Data:     Labs:    Results from last 7 days   Lab Units 07/22/20  0614 07/21/20  0558 07/20/20  0552   WBC Thousand/uL 3 29* 3 48* 3 82*   HEMOGLOBIN g/dL 12 5 12 5 13 7   HEMATOCRIT % 37 5 39 4 42 6   PLATELETS Thousands/uL  --  137* 148*   NEUTROS PCT %  --   --  69   LYMPHS PCT %  --   --  12*   MONOS PCT %  --   --  11   EOS PCT %  --   --  6     Results from last 7 days   Lab Units 07/22/20  0614   POTASSIUM mmol/L 4 3   CHLORIDE mmol/L 96*   CO2 mmol/L 35*   BUN mg/dL 27*   CREATININE mg/dL 0 55*   CALCIUM mg/dL 7 8*           * I Have Reviewed All Lab Data Listed Above  * Additional Pertinent Lab Tests Reviewed:  All Labs Within Last 24 Hours Reviewed    Imaging:    Imaging Reports Reviewed Today Include: CXR  Imaging Personally Reviewed by Myself Includes:  CXR    Recent Cultures (last 7 days):           Last 24 Hours Medication List:     Current Facility-Administered Medications:  albuterol 2 puff Inhalation Q6H PRN Alina Gonzalez MD   enoxaparin 40 mg Subcutaneous Daily Alina Gonzalez MD   fluticasone-vilanterol 1 puff Inhalation Daily Mónica Mendoza PA-C   furosemide 40 mg Intravenous Daily Gurmeet Loo DO   guaiFENesin 600 mg Oral Q12H [address] Alina Gonzalez MD   levalbuterol 0 63 mg Nebulization TID Alina Gonzalez MD   levothyroxine 50 mcg Oral Daily Alina Gonzalez MD   montelukast 10 mg Oral HS Alina Gonzalez MD   pantoprazole 40 mg Oral Early Morning Alina Gonzalez MD   pravastatin 40 mg Oral Daily With Edmund Lynn MD   terazosin 5 mg Oral HS Alina Gonzalez MD   timolol 1 drop Both Eyes BID Alina Gonzalez MD   tiotropium 18 mcg Inhalation Daily Mónica Mendoza PA-C        Today, Patient Was Seen By: Gwen Mix MD

## 2020-07-22 NOTE — ASSESSMENT & PLAN NOTE
Patient on 2L nasal canula, in no respiratory distress, with adequate oxygen saturation levels on nasal cannula      Plan  - Monitor SpO2 levels aiming SpO2 >90  - Continue oxygen 2L  - Bipap

## 2020-07-22 NOTE — ASSESSMENT & PLAN NOTE
Malnutrition Findings:   Malnutrition type: Chronic illness  Degree of Malnutrition: Other severe protein calorie malnutrition(related to inadequate intake/medical condition/advanced age as evidenced by significant depletion of fat/muscle (clavicles, shoulders, triceps, temples), 26% wt loss x 5 months (165 lb 2/14/20), +4 b/l LE edema  Treatment includes supplementation ) patient is noted to be thin with minimal muscle and adipose tissue  Unclear on patient's current nutritional status  BMI Findings: Body mass index is 20 58 kg/m²       Plan:  · Patient currently on regular diet with high-calorie and high-protein

## 2020-07-22 NOTE — ASSESSMENT & PLAN NOTE
Likely secondary to advanced age, multiple medical conditions, hypothyroidism, and malnutrition  Concern for recent fall history      Plan:  · Plan to discharge to SNF once stable and cleared  · See malnutrition plan

## 2020-07-22 NOTE — PLAN OF CARE
Problem: PHYSICAL THERAPY ADULT  Goal: Performs mobility at highest level of function for planned discharge setting  See evaluation for individualized goals  Description  Treatment/Interventions: LE strengthening/ROM, Functional transfer training, Therapeutic exercise, Endurance training, Cognitive reorientation, Patient/family training, Equipment eval/education, Bed mobility, Gait training, Spoke to nursing, Spoke to case management, OT          See flowsheet documentation for full assessment, interventions and recommendations  Outcome: Progressing  Note:   Prognosis: Fair  Problem List: Decreased strength, Decreased endurance, Impaired balance, Decreased mobility, Decreased cognition, Impaired judgement, Decreased safety awareness  Assessment: Pt was found seated in bedside chair to begin session  He was able to perform all xfers and mobility with good tolerance today  He was able to ambulate increased distances compared to LV using a RW with the chest tubes on either side  He reported minimal fatigue post ambulation and was happy with his progress  Pt was able to ambulate back to the bedside chair and perform all of his HEp with little to no extra cuing needed  Good functional form was demonstrated with minimal flexibility noted  He had all needs met and call bell in reach to end session  Pt would benefit from continued PT in order to promote safe and functional mobility  Barriers to Discharge: Decreased caregiver support     PT Discharge Recommendation: Post-Acute Rehabilitation Services          See flowsheet documentation for full assessment

## 2020-07-22 NOTE — ASSESSMENT & PLAN NOTE
Wt Readings from Last 3 Encounters:   07/22/20 59 6 kg (131 lb 6 3 oz)   02/14/20 74 9 kg (165 lb 1 6 oz)   12/17/19 77 3 kg (170 lb 6 4 oz)     · Patient's last echo completed in October of 2019 showed ejection fraction of 50%  Upon assessment on admission, patient appeared to be fluid overloaded given lower extremity edema and pleural effusions seen on chest x-ray and slightly elevated BNP of 556  · Patient regularly follows Dr Romana Carmel  Echocardiogram on 7/13/2020 shows large pleural effusions and slightly enlarged pericardium  · Chronic +1 pitting lower extremities edema, currently with oxygen with nasal canula, no evidence of acute distress  Plan:  · Cardiology on board   · On IV Lasix  · Consider initiation of albumin in case of hypotension  · Continue Oxygen on nasal canula  · Monitor oxygen saturations levels daily  · Monitor signs and symptoms of volume overload daily

## 2020-07-22 NOTE — ASSESSMENT & PLAN NOTE
Recent Labs     07/20/20  0552 07/21/20  0558 07/22/20  0614   SODIUM 134* 132* 133*     Patient with Sodium of 133 today, currently asymptomatic, Likely due to his underlying diagnosis of congestive heart failure    Plan  -Continue to monitor sodium levels daily  - Assess for signs and symptoms of hyponatremia

## 2020-07-22 NOTE — PHYSICAL THERAPY NOTE
Physical Therapy Progress Note     20 6531   Pain Assessment   Pain Assessment Tool 0-10   Pain Score No Pain   Restrictions/Precautions   Weight Bearing Precautions Per Order No   Other Precautions Chair Alarm;O2;Telemetry;Multiple lines; Fall Risk  (B/L chest tube)   General   Response to Previous Treatment Patient with no complaints from previous session  Family/Caregiver Present No   Cognition   Overall Cognitive Status Impaired   Arousal/Participation Cooperative   Attention Attends with cues to redirect   Orientation Level Oriented X4   Memory Decreased short term memory   Following Commands Follows one step commands with increased time or repetition   Comments Pt was identified by name and , agreeable to treatment  Transfers   Sit to Stand 4  Minimal assistance   Additional items Assist x 1; Armrests; Increased time required;Verbal cues   Stand to Sit 4  Minimal assistance   Additional items Assist x 1; Increased time required;Verbal cues;Armrests   Ambulation/Elevation   Gait pattern Decreased foot clearance; Short stride; Excessively slow; Improper Weight shift   Gait Assistance 4  Minimal assist   Additional items Assist x 1;Verbal cues   Assistive Device Rolling walker   Distance 50'x2   Balance   Static Sitting Fair   Static Standing Fair -   Dynamic Standing Poor +   Ambulatory Poor +   Endurance Deficit   Endurance Deficit Yes   Endurance Deficit Description weakness fatigue   Activity Tolerance   Activity Tolerance Patient limited by fatigue   Nurse Made Aware yes, ok to see   Exercises   Knee AROM Long Arc Quad Sitting;20 reps;AROM; Bilateral   Ankle Pumps Sitting;20 reps;AROM; Bilateral   Marching Sitting;20 reps;AROM; Bilateral   Assessment   Prognosis Fair   Problem List Decreased strength;Decreased endurance; Impaired balance;Decreased mobility; Decreased cognition; Impaired judgement;Decreased safety awareness   Assessment Pt was found seated in bedside chair to begin session   He was able to perform all xfers and mobility with good tolerance today  He was able to ambulate increased distances compared to LV using a RW with the chest tubes on either side  He reported minimal fatigue post ambulation and was happy with his progress  Pt was able to ambulate back to the bedside chair and perform all of his HEp with little to no extra cuing needed  Good functional form was demonstrated with minimal flexibility noted  He had all needs met and call bell in reach to end session  Pt would benefit from continued PT in order to promote safe and functional mobility  Barriers to Discharge Decreased caregiver support   Goals   Patient Goals to go for a walk   STG Expiration Date 07/24/20   Short Term Goal #1 In 7-10 days pt will amb with RW 500ft mod I, pt will transfer and perform bed mobility mod I   Plan   Treatment/Interventions LE strengthening/ROM; Functional transfer training; Therapeutic exercise; Endurance training;Cognitive reorientation;Patient/family training;Equipment eval/education; Bed mobility;Gait training;Spoke to nursing;Spoke to case management   PT Frequency Other (Comment)  (3-5x/wk)   Recommendation   PT Discharge Recommendation Post-Acute Rehabilitation Services   Equipment Recommended Walker  (RW)     Aracelis Console, PTA

## 2020-07-22 NOTE — ASSESSMENT & PLAN NOTE
· Patient currently on 2L nasal cannula  Condition likely secondary to acute exacerbation of CHF and pleural effusions noted on chest x-ray  Patient regularly follows Dr Tony Kimble from pulmonology  Low suspicion for infection given patient's negative COVID-19 testing, no elevated white count, and no fever but a viral etiology is still a possibility  Given patient's continued acute hypoxemia, cannot completely rule out PE  Per pulmonology, effusions on chest x-ray does not completely explain patient's hypoxia  · Patients CT showed Bilateral Hydropneumothorax/ pleural effusions so IR placed bilateral chest tubes on 7/13/20  · Persistence of hypercapnia, likely due to hypoventilation (copd) however patient asymptomatic      Plan:  · Maintain SpO2 between 89% to 94%  · Bipap  · Repeat BMP to monitor hypercapnia

## 2020-07-22 NOTE — PLAN OF CARE
Problem: Potential for Falls  Goal: Patient will remain free of falls  Description  INTERVENTIONS:  - Assess patient frequently for physical needs  -  Identify cognitive and physical deficits and behaviors that affect risk of falls  -  [place] fall precautions as indicated by assessment   - Educate patient/family on patient safety including physical limitations  - Instruct patient to call for assistance with activity based on assessment  - Modify environment to reduce risk of injury  - Consider OT/PT consult to assist with strengthening/mobility  Outcome: Progressing     Problem: Prexisting or High Potential for Compromised Skin Integrity  Goal: Skin integrity is maintained or improved  Description  INTERVENTIONS:  - Identify patients at risk for skin breakdown  - Assess and monitor skin integrity  - Assess and monitor nutrition and hydration status  - Monitor labs   - Assess for incontinence   - Turn and reposition patient  - Assist with mobility/ambulation  - Relieve pressure over bony prominences  - Avoid friction and shearing  - Provide appropriate hygiene as needed including keeping skin clean and dry  - Evaluate need for skin moisturizer/barrier cream  - Collaborate with interdisciplinary team   - Patient/family teaching  - Consider wound care consult   Outcome: Progressing     Problem: Nutrition/Hydration-ADULT  Goal: Nutrient/Hydration intake appropriate for improving, restoring or maintaining nutritional needs  Description  Monitor and assess patient's nutrition/hydration status for malnutrition  Collaborate with interdisciplinary team and initiate plan and interventions as ordered  Monitor patient's weight and dietary intake as ordered or per policy  Utilize nutrition screening tool and intervene as necessary  Determine patient's food preferences and provide high-protein, high-caloric foods as appropriate       INTERVENTIONS:  - Monitor oral intake, urinary output, labs, and treatment plans  - Assess nutrition and hydration status and recommend course of action  - Evaluate amount of meals eaten  - Assist patient with eating if necessary   - Allow adequate time for meals  - Recommend/ encourage appropriate diets, oral nutritional supplements, and vitamin/mineral supplements  - Order, calculate, and assess calorie counts as needed  - Recommend, monitor, and adjust tube feedings and TPN/PPN based on assessed needs  - Assess need for intravenous fluids  - Provide specific nutrition/hydration education as appropriate  - Include patient/family/caregiver in decisions related to nutrition  Outcome: Progressing     Problem: PAIN - ADULT  Goal: Verbalizes/displays adequate comfort level or baseline comfort level  Description  Interventions:  - Encourage patient to monitor pain and request assistance  - Assess pain using appropriate pain scale  - Administer analgesics based on type and severity of pain and evaluate response  - Implement non-pharmacological measures as appropriate and evaluate response  - Consider cultural and social influences on pain and pain management  - Notify physician/advanced practitioner if interventions unsuccessful or patient reports new pain  Outcome: Progressing     Problem: INFECTION - ADULT  Goal: Absence or prevention of progression during hospitalization  Description  INTERVENTIONS:  - Assess and monitor for signs and symptoms of infection  - Monitor lab/diagnostic results  - Monitor all insertion sites, i e  indwelling lines, tubes, and drains  - Monitor endotracheal if appropriate and nasal secretions for changes in amount and color  - [place] appropriate cooling/warming therapies per order  - Administer medications as ordered  - Instruct and encourage patient and family to use good hand hygiene technique  - Identify and instruct in appropriate isolation precautions for identified infection/condition  Outcome: Progressing  Goal: Absence of fever/infection during neutropenic period  Description  INTERVENTIONS:  - Monitor WBC    Outcome: Progressing     Problem: SAFETY ADULT  Goal: Maintain or return to baseline ADL function  Description  INTERVENTIONS:  -  Assess patient's ability to carry out ADLs; assess patient's baseline for ADL function and identify physical deficits which impact ability to perform ADLs (bathing, care of mouth/teeth, toileting, grooming, dressing, etc )  - Assess/evaluate cause of self-care deficits   - Assess range of motion  - Assess patient's mobility; develop plan if impaired  - Assess patient's need for assistive devices and provide as appropriate  - Encourage maximum independence but intervene and supervise when necessary  - Involve family in performance of ADLs  - Assess for home care needs following discharge   - Consider OT consult to assist with ADL evaluation and planning for discharge  - Provide patient education as appropriate  Outcome: Progressing  Goal: Maintain or return mobility status to optimal level  Description  INTERVENTIONS:  - Assess patient's baseline mobility status (ambulation, transfers, stairs, etc )    - Identify cognitive and physical deficits and behaviors that affect mobility  - Identify mobility aids required to assist with transfers and/or ambulation (gait belt, sit-to-stand, lift, walker, cane, etc )  - [place] fall precautions as indicated by assessment  - Record patient progress and toleration of activity level on Mobility SBAR; progress patient to next Phase/Stage  - Instruct patient to call for assistance with activity based on assessment  - Consider rehabilitation consult to assist with strengthening/weightbearing, etc   Outcome: Progressing     Problem: DISCHARGE PLANNING  Goal: Discharge to home or other facility with appropriate resources  Description  INTERVENTIONS:  - Identify barriers to discharge w/patient and caregiver  - Arrange for needed discharge resources and transportation as appropriate  - Identify discharge learning needs (meds, wound care, etc )  - Arrange for interpretive services to assist at discharge as needed  - Refer to Case Management Department for coordinating discharge planning if the patient needs post-hospital services based on physician/advanced practitioner order or complex needs related to functional status, cognitive ability, or social support system  Outcome: Progressing     Problem: Knowledge Deficit  Goal: Patient/family/caregiver demonstrates understanding of disease process, treatment plan, medications, and discharge instructions  Description  Complete learning assessment and assess knowledge base    Interventions:  - Provide teaching at level of understanding  - Provide teaching via preferred learning methods  Outcome: Progressing     Problem: RESPIRATORY - ADULT  Goal: Achieves optimal ventilation and oxygenation  Description  INTERVENTIONS:  - Assess for changes in respiratory status  - Assess for changes in mentation and behavior  - Position to facilitate oxygenation and minimize respiratory effort  - Oxygen administered by appropriate delivery if ordered  - Initiate smoking cessation education as indicated  - Encourage broncho-pulmonary hygiene including cough, deep breathe, Incentive Spirometry  - Assess the need for suctioning and aspirate as needed  - Assess and instruct to report SOB or any respiratory difficulty  - Respiratory Therapy support as indicated  Outcome: Progressing     Problem: GENITOURINARY - ADULT  Goal: Maintains or returns to baseline urinary function  Description  INTERVENTIONS:  - Assess urinary function  - Encourage oral fluids to ensure adequate hydration if ordered  - Administer IV fluids as ordered to ensure adequate hydration  - Administer ordered medications as needed  - Offer frequent toileting  - Follow urinary retention protocol if ordered  Outcome: Progressing  Goal: Absence of urinary retention  Description  INTERVENTIONS:  - Assess patients ability to void and empty bladder  - Monitor I/O  - Bladder scan as needed  - Discuss with physician/AP medications to alleviate retention as needed  - Discuss catheterization for long term situations as appropriate  Outcome: Progressing  Goal: Urinary catheter remains patent  Description  INTERVENTIONS:  - Assess patency of urinary catheter  - If patient has a chronic can, consider changing catheter if non-functioning  - Follow guidelines for intermittent irrigation of non-functioning urinary catheter  Outcome: Progressing     Problem: METABOLIC, FLUID AND ELECTROLYTES - ADULT  Goal: Electrolytes maintained within normal limits  Description  INTERVENTIONS:  - Monitor labs and assess patient for signs and symptoms of electrolyte imbalances  - Administer electrolyte replacement as ordered  - Monitor response to electrolyte replacements, including repeat lab results as appropriate  - Instruct patient on fluid and nutrition as appropriate  Outcome: Progressing  Goal: Fluid balance maintained  Description  INTERVENTIONS:  - Monitor labs   - Monitor I/O and WT  - Instruct patient on fluid and nutrition as appropriate  - Assess for signs & symptoms of volume excess or deficit  Outcome: Progressing  Goal: Glucose maintained within target range  Description  INTERVENTIONS:  - Monitor Blood Glucose as ordered  - Assess for signs and symptoms of hyperglycemia and hypoglycemia  - Administer ordered medications to maintain glucose within target range  - Assess nutritional intake and initiate nutrition service referral as needed  Outcome: Progressing     Problem: SKIN/TISSUE INTEGRITY - ADULT  Goal: Skin integrity remains intact  Description  INTERVENTIONS  - Identify patients at risk for skin breakdown  - Assess and monitor skin integrity  - Assess and monitor nutrition and hydration status  - Monitor labs (i e  albumin)  - Assess for incontinence   - Turn and reposition patient  - Assist with mobility/ambulation  - Relieve pressure over bony prominences  - Avoid friction and shearing  - Provide appropriate hygiene as needed including keeping skin clean and dry  - Evaluate need for skin moisturizer/barrier cream  - Collaborate with interdisciplinary team (i e  Nutrition, Rehabilitation, etc )   - Patient/family teaching  Outcome: Progressing  Goal: Incision(s), wounds(s) or drain site(s) healing without S/S of infection  Description  INTERVENTIONS  - Assess and document risk factors for skin impairment   - Assess and document dressing, incision, wound bed, drain sites and surrounding tissue  - Consider nutrition services referral as needed  - Oral mucous membranes remain intact  - Provide patient/ family education  Outcome: Progressing  Goal: Oral mucous membranes remain intact  Description  INTERVENTIONS  - Assess oral mucosa and hygiene practices  - Implement preventative oral hygiene regimen  - Implement oral medicated treatments as ordered  - Initiate Nutrition services referral as needed  Outcome: Progressing     Problem: HEMATOLOGIC - ADULT  Goal: Maintains hematologic stability  Description  INTERVENTIONS  - Assess for signs and symptoms of bleeding or hemorrhage  - Monitor labs  - Administer supportive blood products/factors as ordered and appropriate  Outcome: Progressing     Problem: MUSCULOSKELETAL - ADULT  Goal: Maintain or return mobility to safest level of function  Description  INTERVENTIONS:  - Assess patient's ability to carry out ADLs; assess patient's baseline for ADL function and identify physical deficits which impact ability to perform ADLs (bathing, care of mouth/teeth, toileting, grooming, dressing, etc )  - Assess/evaluate cause of self-care deficits   - Assess range of motion  - Assess patient's mobility  - Assess patient's need for assistive devices and provide as appropriate  - Encourage maximum independence but intervene and supervise when necessary  - Involve family in performance of ADLs  - Assess for home care needs following discharge   - Consider OT consult to assist with ADL evaluation and planning for discharge  - Provide patient education as appropriate  Outcome: Progressing  Goal: Maintain proper alignment of affected body part  Description  INTERVENTIONS:  - Support, maintain and protect limb and body alignment  - Provide patient/ family with appropriate education  Outcome: Progressing

## 2020-07-23 ENCOUNTER — APPOINTMENT (INPATIENT)
Dept: RADIOLOGY | Facility: HOSPITAL | Age: 85
DRG: 291 | End: 2020-07-23
Payer: COMMERCIAL

## 2020-07-23 LAB
ANION GAP SERPL CALCULATED.3IONS-SCNC: -1 MMOL/L (ref 4–13)
BUN SERPL-MCNC: 24 MG/DL (ref 5–25)
CALCIUM SERPL-MCNC: 7.8 MG/DL (ref 8.3–10.1)
CHLORIDE SERPL-SCNC: 96 MMOL/L (ref 100–108)
CO2 SERPL-SCNC: 40 MMOL/L (ref 21–32)
CREAT SERPL-MCNC: 0.5 MG/DL (ref 0.6–1.3)
ERYTHROCYTE [DISTWIDTH] IN BLOOD BY AUTOMATED COUNT: 13.2 % (ref 11.6–15.1)
GFR SERPL CREATININE-BSD FRML MDRD: 95 ML/MIN/1.73SQ M
GLUCOSE SERPL-MCNC: 82 MG/DL (ref 65–140)
HCT VFR BLD AUTO: 36.1 % (ref 36.5–49.3)
HGB BLD-MCNC: 11.8 G/DL (ref 12–17)
MCH RBC QN AUTO: 31.8 PG (ref 26.8–34.3)
MCHC RBC AUTO-ENTMCNC: 32.7 G/DL (ref 31.4–37.4)
MCV RBC AUTO: 97 FL (ref 82–98)
PLATELET # BLD AUTO: 133 THOUSANDS/UL (ref 149–390)
PMV BLD AUTO: 9.9 FL (ref 8.9–12.7)
POTASSIUM SERPL-SCNC: 4 MMOL/L (ref 3.5–5.3)
RBC # BLD AUTO: 3.71 MILLION/UL (ref 3.88–5.62)
SODIUM SERPL-SCNC: 135 MMOL/L (ref 136–145)
WBC # BLD AUTO: 3.57 THOUSAND/UL (ref 4.31–10.16)

## 2020-07-23 PROCEDURE — 99233 SBSQ HOSP IP/OBS HIGH 50: CPT | Performed by: INTERNAL MEDICINE

## 2020-07-23 PROCEDURE — 80048 BASIC METABOLIC PNL TOTAL CA: CPT | Performed by: INTERNAL MEDICINE

## 2020-07-23 PROCEDURE — 99232 SBSQ HOSP IP/OBS MODERATE 35: CPT | Performed by: INTERNAL MEDICINE

## 2020-07-23 PROCEDURE — 71045 X-RAY EXAM CHEST 1 VIEW: CPT

## 2020-07-23 PROCEDURE — 94668 MNPJ CHEST WALL SBSQ: CPT

## 2020-07-23 PROCEDURE — 94760 N-INVAS EAR/PLS OXIMETRY 1: CPT

## 2020-07-23 PROCEDURE — 85027 COMPLETE CBC AUTOMATED: CPT | Performed by: INTERNAL MEDICINE

## 2020-07-23 PROCEDURE — 94640 AIRWAY INHALATION TREATMENT: CPT

## 2020-07-23 RX ORDER — TORSEMIDE 20 MG/1
20 TABLET ORAL
Status: DISCONTINUED | OUTPATIENT
Start: 2020-07-23 | End: 2020-07-25 | Stop reason: HOSPADM

## 2020-07-23 RX ADMIN — ENOXAPARIN SODIUM 40 MG: 40 INJECTION SUBCUTANEOUS at 08:23

## 2020-07-23 RX ADMIN — GUAIFENESIN 600 MG: 600 TABLET, EXTENDED RELEASE ORAL at 08:23

## 2020-07-23 RX ADMIN — FLUTICASONE FUROATE AND VILANTEROL TRIFENATATE 1 PUFF: 100; 25 POWDER RESPIRATORY (INHALATION) at 08:23

## 2020-07-23 RX ADMIN — MONTELUKAST SODIUM 10 MG: 10 TABLET, FILM COATED ORAL at 21:23

## 2020-07-23 RX ADMIN — LEVALBUTEROL HYDROCHLORIDE 0.63 MG: 0.63 SOLUTION RESPIRATORY (INHALATION) at 13:17

## 2020-07-23 RX ADMIN — TIMOLOL MALEATE 1 DROP: 5 SOLUTION/ DROPS OPHTHALMIC at 16:29

## 2020-07-23 RX ADMIN — PANTOPRAZOLE SODIUM 40 MG: 40 TABLET, DELAYED RELEASE ORAL at 05:11

## 2020-07-23 RX ADMIN — LEVALBUTEROL HYDROCHLORIDE 0.63 MG: 0.63 SOLUTION RESPIRATORY (INHALATION) at 07:20

## 2020-07-23 RX ADMIN — TERAZOSIN HYDROCHLORIDE 5 MG: 5 CAPSULE ORAL at 21:23

## 2020-07-23 RX ADMIN — LEVOTHYROXINE SODIUM 50 MCG: 50 TABLET ORAL at 05:10

## 2020-07-23 RX ADMIN — TORSEMIDE 20 MG: 20 TABLET ORAL at 12:03

## 2020-07-23 RX ADMIN — LEVALBUTEROL HYDROCHLORIDE 0.63 MG: 0.63 SOLUTION RESPIRATORY (INHALATION) at 20:21

## 2020-07-23 RX ADMIN — PRAVASTATIN SODIUM 40 MG: 40 TABLET ORAL at 16:28

## 2020-07-23 RX ADMIN — TIMOLOL MALEATE 1 DROP: 5 SOLUTION/ DROPS OPHTHALMIC at 08:28

## 2020-07-23 RX ADMIN — TIOTROPIUM BROMIDE 18 MCG: 18 CAPSULE ORAL; RESPIRATORY (INHALATION) at 08:28

## 2020-07-23 RX ADMIN — GUAIFENESIN 600 MG: 600 TABLET, EXTENDED RELEASE ORAL at 21:23

## 2020-07-23 NOTE — ASSESSMENT & PLAN NOTE
Recent Labs     07/21/20  0558 07/22/20  0614 07/23/20  0510   SODIUM 132* 133* 135*     Patient with Sodium of 135 today, currently asymptomatic, Likely due to his underlying diagnosis of congestive heart failure    Plan  -Continue to monitor sodium levels daily  - Assess for signs and symptoms of hyponatremia

## 2020-07-23 NOTE — PLAN OF CARE
Problem: Potential for Falls  Goal: Patient will remain free of falls  Description  INTERVENTIONS:  - Assess patient frequently for physical needs  -  Identify cognitive and physical deficits and behaviors that affect risk of falls  -  [place] fall precautions as indicated by assessment   - Educate patient/family on patient safety including physical limitations  - Instruct patient to call for assistance with activity based on assessment  - Modify environment to reduce risk of injury  - Consider OT/PT consult to assist with strengthening/mobility  Outcome: Progressing     Problem: Prexisting or High Potential for Compromised Skin Integrity  Goal: Skin integrity is maintained or improved  Description  INTERVENTIONS:  - Identify patients at risk for skin breakdown  - Assess and monitor skin integrity  - Assess and monitor nutrition and hydration status  - Monitor labs   - Assess for incontinence   - Turn and reposition patient  - Assist with mobility/ambulation  - Relieve pressure over bony prominences  - Avoid friction and shearing  - Provide appropriate hygiene as needed including keeping skin clean and dry  - Evaluate need for skin moisturizer/barrier cream  - Collaborate with interdisciplinary team   - Patient/family teaching  - Consider wound care consult   Outcome: Progressing     Problem: Nutrition/Hydration-ADULT  Goal: Nutrient/Hydration intake appropriate for improving, restoring or maintaining nutritional needs  Description  Monitor and assess patient's nutrition/hydration status for malnutrition  Collaborate with interdisciplinary team and initiate plan and interventions as ordered  Monitor patient's weight and dietary intake as ordered or per policy  Utilize nutrition screening tool and intervene as necessary  Determine patient's food preferences and provide high-protein, high-caloric foods as appropriate       INTERVENTIONS:  - Monitor oral intake, urinary output, labs, and treatment plans  - Assess nutrition and hydration status and recommend course of action  - Evaluate amount of meals eaten  - Assist patient with eating if necessary   - Allow adequate time for meals  - Recommend/ encourage appropriate diets, oral nutritional supplements, and vitamin/mineral supplements  - Order, calculate, and assess calorie counts as needed  - Recommend, monitor, and adjust tube feedings and TPN/PPN based on assessed needs  - Assess need for intravenous fluids  - Provide specific nutrition/hydration education as appropriate  - Include patient/family/caregiver in decisions related to nutrition  Outcome: Progressing     Problem: PAIN - ADULT  Goal: Verbalizes/displays adequate comfort level or baseline comfort level  Description  Interventions:  - Encourage patient to monitor pain and request assistance  - Assess pain using appropriate pain scale  - Administer analgesics based on type and severity of pain and evaluate response  - Implement non-pharmacological measures as appropriate and evaluate response  - Consider cultural and social influences on pain and pain management  - Notify physician/advanced practitioner if interventions unsuccessful or patient reports new pain  Outcome: Progressing     Problem: SAFETY ADULT  Goal: Maintain or return to baseline ADL function  Description  INTERVENTIONS:  -  Assess patient's ability to carry out ADLs; assess patient's baseline for ADL function and identify physical deficits which impact ability to perform ADLs (bathing, care of mouth/teeth, toileting, grooming, dressing, etc )  - Assess/evaluate cause of self-care deficits   - Assess range of motion  - Assess patient's mobility; develop plan if impaired  - Assess patient's need for assistive devices and provide as appropriate  - Encourage maximum independence but intervene and supervise when necessary  - Involve family in performance of ADLs  - Assess for home care needs following discharge   - Consider OT consult to assist with ADL evaluation and planning for discharge  - Provide patient education as appropriate  Outcome: Progressing  Goal: Maintain or return mobility status to optimal level  Description  INTERVENTIONS:  - Assess patient's baseline mobility status (ambulation, transfers, stairs, etc )    - Identify cognitive and physical deficits and behaviors that affect mobility  - Identify mobility aids required to assist with transfers and/or ambulation (gait belt, sit-to-stand, lift, walker, cane, etc )  - [place] fall precautions as indicated by assessment  - Record patient progress and toleration of activity level on Mobility SBAR; progress patient to next Phase/Stage  - Instruct patient to call for assistance with activity based on assessment  - Consider rehabilitation consult to assist with strengthening/weightbearing, etc   Outcome: Progressing     Problem: DISCHARGE PLANNING  Goal: Discharge to home or other facility with appropriate resources  Description  INTERVENTIONS:  - Identify barriers to discharge w/patient and caregiver  - Arrange for needed discharge resources and transportation as appropriate  - Identify discharge learning needs (meds, wound care, etc )  - Arrange for interpretive services to assist at discharge as needed  - Refer to Case Management Department for coordinating discharge planning if the patient needs post-hospital services based on physician/advanced practitioner order or complex needs related to functional status, cognitive ability, or social support system  Outcome: Progressing     Problem: Knowledge Deficit  Goal: Patient/family/caregiver demonstrates understanding of disease process, treatment plan, medications, and discharge instructions  Description  Complete learning assessment and assess knowledge base    Interventions:  - Provide teaching at level of understanding  - Provide teaching via preferred learning methods  Outcome: Progressing     Problem: RESPIRATORY - ADULT  Goal: Achieves optimal ventilation and oxygenation  Description  INTERVENTIONS:  - Assess for changes in respiratory status  - Assess for changes in mentation and behavior  - Position to facilitate oxygenation and minimize respiratory effort  - Oxygen administered by appropriate delivery if ordered  - Initiate smoking cessation education as indicated  - Encourage broncho-pulmonary hygiene including cough, deep breathe, Incentive Spirometry  - Assess the need for suctioning and aspirate as needed  - Assess and instruct to report SOB or any respiratory difficulty  - Respiratory Therapy support as indicated  Outcome: Progressing     Problem: GENITOURINARY - ADULT  Goal: Maintains or returns to baseline urinary function  Description  INTERVENTIONS:  - Assess urinary function  - Encourage oral fluids to ensure adequate hydration if ordered  - Administer IV fluids as ordered to ensure adequate hydration  - Administer ordered medications as needed  - Offer frequent toileting  - Follow urinary retention protocol if ordered  Outcome: Progressing  Goal: Absence of urinary retention  Description  INTERVENTIONS:  - Assess patients ability to void and empty bladder  - Monitor I/O  - Bladder scan as needed  - Discuss with physician/AP medications to alleviate retention as needed  - Discuss catheterization for long term situations as appropriate  Outcome: Progressing  Goal: Urinary catheter remains patent  Description  INTERVENTIONS:  - Assess patency of urinary catheter  - If patient has a chronic can, consider changing catheter if non-functioning  - Follow guidelines for intermittent irrigation of non-functioning urinary catheter  Outcome: Progressing     Problem: METABOLIC, FLUID AND ELECTROLYTES - ADULT  Goal: Electrolytes maintained within normal limits  Description  INTERVENTIONS:  - Monitor labs and assess patient for signs and symptoms of electrolyte imbalances  - Administer electrolyte replacement as ordered  - Monitor response to electrolyte replacements, including repeat lab results as appropriate  - Instruct patient on fluid and nutrition as appropriate  Outcome: Progressing  Goal: Fluid balance maintained  Description  INTERVENTIONS:  - Monitor labs   - Monitor I/O and WT  - Instruct patient on fluid and nutrition as appropriate  - Assess for signs & symptoms of volume excess or deficit  Outcome: Progressing  Goal: Glucose maintained within target range  Description  INTERVENTIONS:  - Monitor Blood Glucose as ordered  - Assess for signs and symptoms of hyperglycemia and hypoglycemia  - Administer ordered medications to maintain glucose within target range  - Assess nutritional intake and initiate nutrition service referral as needed  Outcome: Progressing     Problem: SKIN/TISSUE INTEGRITY - ADULT  Goal: Skin integrity remains intact  Description  INTERVENTIONS  - Identify patients at risk for skin breakdown  - Assess and monitor skin integrity  - Assess and monitor nutrition and hydration status  - Monitor labs (i e  albumin)  - Assess for incontinence   - Turn and reposition patient  - Assist with mobility/ambulation  - Relieve pressure over bony prominences  - Avoid friction and shearing  - Provide appropriate hygiene as needed including keeping skin clean and dry  - Evaluate need for skin moisturizer/barrier cream  - Collaborate with interdisciplinary team (i e  Nutrition, Rehabilitation, etc )   - Patient/family teaching  Outcome: Progressing  Goal: Incision(s), wounds(s) or drain site(s) healing without S/S of infection  Description  INTERVENTIONS  - Assess and document risk factors for skin impairment   - Assess and document dressing, incision, wound bed, drain sites and surrounding tissue  - Consider nutrition services referral as needed  - Oral mucous membranes remain intact  - Provide patient/ family education  Outcome: Progressing  Goal: Oral mucous membranes remain intact  Description  INTERVENTIONS  - Assess oral mucosa and hygiene practices  - Implement preventative oral hygiene regimen  - Implement oral medicated treatments as ordered  - Initiate Nutrition services referral as needed  Outcome: Progressing     Problem: HEMATOLOGIC - ADULT  Goal: Maintains hematologic stability  Description  INTERVENTIONS  - Assess for signs and symptoms of bleeding or hemorrhage  - Monitor labs  - Administer supportive blood products/factors as ordered and appropriate  Outcome: Progressing     Problem: MUSCULOSKELETAL - ADULT  Goal: Maintain or return mobility to safest level of function  Description  INTERVENTIONS:  - Assess patient's ability to carry out ADLs; assess patient's baseline for ADL function and identify physical deficits which impact ability to perform ADLs (bathing, care of mouth/teeth, toileting, grooming, dressing, etc )  - Assess/evaluate cause of self-care deficits   - Assess range of motion  - Assess patient's mobility  - Assess patient's need for assistive devices and provide as appropriate  - Encourage maximum independence but intervene and supervise when necessary  - Involve family in performance of ADLs  - Assess for home care needs following discharge   - Consider OT consult to assist with ADL evaluation and planning for discharge  - Provide patient education as appropriate  Outcome: Progressing  Goal: Maintain proper alignment of affected body part  Description  INTERVENTIONS:  - Support, maintain and protect limb and body alignment  - Provide patient/ family with appropriate education  Outcome: Progressing     Problem: INFECTION - ADULT  Goal: Absence or prevention of progression during hospitalization  Description  INTERVENTIONS:  - Assess and monitor for signs and symptoms of infection  - Monitor lab/diagnostic results  - Monitor all insertion sites, i e  indwelling lines, tubes, and drains  - Monitor endotracheal if appropriate and nasal secretions for changes in amount and color  - [place] appropriate cooling/warming therapies per order  - Administer medications as ordered  - Instruct and encourage patient and family to use good hand hygiene technique  - Identify and instruct in appropriate isolation precautions for identified infection/condition  Outcome: Progressing

## 2020-07-23 NOTE — ASSESSMENT & PLAN NOTE
Recent Labs     07/21/20  0558 07/22/20  0614 07/23/20  0510   K 4 4 4 3 4 0     · Resolved    Plan  - continue monitoring potassium levels daily   - Consider goal of potassium level greater than >4 0 if not replace  - Monitor for signs and symptoms of hypokalemia daily

## 2020-07-23 NOTE — ASSESSMENT & PLAN NOTE
· Patient presented with weakness and confusion, found to have persistent small left pneumothorax on CXR  · CT showed bilateral hydropneumothorax with pleural effusions and IR placed bilateral chest tube on 7/13/2020  · 1st and 2nd cytology from 7/13 were negative for malignancy in right and left pleural fluid  · Pleural fluid culture yellow, wbc 1 156 , with no growth  · Pleural LDH: 169, Pleural protein: 3 3, serum LDH: 188, serum protein: 5 8, based on Light's criteria, likely exudative effusion  · Repeat CT chest showed improving bilateral hydropneumothorax, improving atelectasias  · Tubes draining adequately, still with output but diminishing  · Possible etiology of fluid is related to CHF/ third spacing  Pulmonologist talked with daughter Clementine Thrasher) about possible transfer to [facility] for thoracic surgery evaluation and VATs but daughter refused any surgery  · Yesterday, pulm clamped both chest tubes and continued diuretics to maintain euvolemia  · Repeat CXR in AM showed tiny stable residual biapical pneumothoraces again, no pleural effusions    Plan:  · As per pulm, plan is for IR to remove the right-sided chest tube today  After the chest tube is removed, plan to clamp the left and repeat chest x-ray in the morning  At that time, if everything looks stable, the left tube can be removed  Then patient will be clear for discharge to SNF  · Repeat CXR tomorrow AM  · Started on Torsemide 20mg bid as per cardio  · Monitor for any signs of respiratory distress    · Continue to monitor SpO2 levels daily

## 2020-07-23 NOTE — ASSESSMENT & PLAN NOTE
Wt Readings from Last 3 Encounters:   07/23/20 60 1 kg (132 lb 7 9 oz)   02/14/20 74 9 kg (165 lb 1 6 oz)   12/17/19 77 3 kg (170 lb 6 4 oz)     · Patient's last echo completed in October of 2019 showed ejection fraction of 50%  Upon assessment on admission, patient appeared to be fluid overloaded given lower extremity edema and pleural effusions seen on chest x-ray and slightly elevated BNP of 556  · Patient regularly follows Dr Kaleb Noble  Echocardiogram on 7/13/2020 shows large pleural effusions and slightly enlarged pericardium  Plan:  · Cardiology on board   · On oral Torsemide  · Consider initiation of albumin in case of hypotension  · Continue Oxygen on nasal canula  · Monitor oxygen saturations levels daily  · Monitor signs and symptoms of volume overload daily

## 2020-07-23 NOTE — ASSESSMENT & PLAN NOTE
Malnutrition Findings:   Malnutrition type: Chronic illness  Degree of Malnutrition: Other severe protein calorie malnutrition(related to inadequate intake/medical condition/advanced age as evidenced by significant depletion of fat/muscle (clavicles, shoulders, triceps, temples), 26% wt loss x 5 months (165 lb 2/14/20), +4 b/l LE edema  Treatment includes supplementation ) patient is noted to be thin with minimal muscle and adipose tissue  Unclear on patient's current nutritional status  BMI Findings: Body mass index is 20 75 kg/m²       Plan:  · Patient currently on regular diet with high-calorie and high-protein

## 2020-07-23 NOTE — ASSESSMENT & PLAN NOTE
Recent Labs     07/21/20  0558 07/23/20  0510   * 133*     Patient is currently asymptomatic  This has been chronic based on previous CBCs      Plan:  · Monitor with daily CBC  · Will continue with pharmacologic VTE prophylaxis; may consider discontinuation if platelet count worsens

## 2020-07-23 NOTE — ASSESSMENT & PLAN NOTE
· Patient currently on 2L nasal cannula  Condition likely secondary to acute exacerbation of CHF and pleural effusions noted on chest x-ray  Patient regularly follows Dr Thao Ventura from pulmonology  Low suspicion for infection given patient's negative COVID-19 testing, no elevated white count, and no fever but a viral etiology is still a possibility  Given patient's continued acute hypoxemia, cannot completely rule out PE  Per pulmonology, effusions on chest x-ray does not completely explain patient's hypoxia  · Patients CT showed Bilateral Hydropneumothorax/ pleural effusions so IR placed bilateral chest tubes on 7/13/20  · Persistence of hypercapnia, likely due to hypoventilation (copd) however patient asymptomatic      Plan:  · Maintain SpO2 between 89% to 94%  · Repeat BMP to monitor hypercapnia

## 2020-07-23 NOTE — PROGRESS NOTES
Progress Note - Nithin Santos 3/24/1929, 80 y o  male MRN: [id number]    Unit/Bed#: S -01 Encounter: [id number]    Primary Care Provider: Lorelei Cantu MD   Date and time admitted to hospital: 7/12/2020  2:58 PM        * Bilateral hydropneumothorax  Assessment & Plan  · Patient presented with weakness and confusion, found to have persistent small left pneumothorax on CXR  · CT showed bilateral hydropneumothorax with pleural effusions and IR placed bilateral chest tube on 7/13/2020  · 1st and 2nd cytology from 7/13 were negative for malignancy in right and left pleural fluid  · Pleural fluid culture yellow, wbc 1 156 , with no growth  · Pleural LDH: 169, Pleural protein: 3 3, serum LDH: 188, serum protein: 5 8, based on Light's criteria, likely exudative effusion  · Repeat CT chest showed improving bilateral hydropneumothorax, improving atelectasias  · Tubes draining adequately, still with output but diminishing  · Possible etiology of fluid is related to CHF/ third [facility]  Pulmonologist talked with daughter [facility]) about possible transfer to [place] for thoracic surgery evaluation and VATs but daughter refused any surgery  · Yesterday, pulm clamped both chest tubes and continued diuretics to maintain euvolemia  · Repeat CXR in AM showed tiny stable residual biapical pneumothoraces again, no pleural effusions    Plan:  · As per pulm, plan is for IR to remove the right-sided chest tube today  After the chest tube is removed, plan to clamp the left and repeat chest x-ray in the morning  At that time, if everything looks stable, the left tube can be removed  Then patient will be clear for discharge to SNF  · Repeat CXR tomorrow AM  · Started on Torsemide 20mg bid as per cardio  · Monitor for any signs of respiratory distress    · Continue to monitor SpO2 levels daily    Chronic respiratory failure with hypoxia [facility])  Assessment & Plan  Patient on 2L nasal canula, in no respiratory distress, with adequate oxygen saturation levels on nasal cannula  Plan  - Monitor SpO2 levels aiming SpO2 >90  - Continue oxygen 2L  -Patient refuses to wear Bipap, no need to be discharged to SNF with it    Acute on chronic respiratory failure with hypoxia and hypercapnia ([facility] [address] )  Assessment & Plan  · Patient currently on 2L nasal cannula  Condition likely secondary to acute exacerbation of CHF and pleural effusions noted on chest x-ray  Patient regularly follows Dr Steffany Mina from pulmonology  Low suspicion for infection given patient's negative COVID-19 testing, no elevated white count, and no fever but a viral etiology is still a possibility  Given patient's continued acute hypoxemia, cannot completely rule out PE  Per pulmonology, effusions on chest x-ray does not completely explain patient's hypoxia  · Patients CT showed Bilateral Hydropneumothorax/ pleural effusions so IR placed bilateral chest tubes on 7/13/20  · Persistence of hypercapnia, likely due to hypoventilation (copd) however patient asymptomatic  Plan:  · Maintain SpO2 between 89% to 94%  · Repeat BMP to monitor hypercapnia    Acute on chronic diastolic heart failure [facility])  Assessment & Plan  Wt Readings from Last 3 Encounters:   07/23/20 60 1 kg (132 lb 7 9 oz)   02/14/20 74 9 kg (165 lb 1 6 oz)   12/17/19 77 3 kg (170 lb 6 4 oz)     · Patient's last echo completed in October of 2019 showed ejection fraction of 50%  Upon assessment on admission, patient appeared to be fluid overloaded given lower extremity edema and pleural effusions seen on chest x-ray and slightly elevated BNP of 556  · Patient regularly follows Dr Yazmin Deluca  Echocardiogram on 7/13/2020 shows large pleural effusions and slightly enlarged pericardium  Plan:  · Cardiology on board   · On oral Torsemide  · Consider initiation of albumin in case of hypotension  · Continue Oxygen on nasal canula  · Monitor oxygen saturations levels daily    · Monitor signs and symptoms of volume overload daily           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 acidosis  Assessment & Plan  · Metabolic alkalosis resolved  No shortness of breath or visible distress  Plan:  - Continue to monitor CO2 levels daily          GERD (gastroesophageal reflux disease)  Assessment & Plan  -Continue on Protonix 40mg daily    Hypothyroidism  Assessment & Plan  TSH on admission was slightly elevated at 4 54  Free T4 was within normal limits    Plan:  · Continue patient's home levothyroxine 50 mcg daily    COPD (chronic obstructive pulmonary disease) ([address] )  Assessment & Plan  PFT performed on December of 2019 showed severe obstructive pulmonary disease  Plan:  · Continue patient's home albuterol, levalbuterol, and montelukast   · Not currently in any acute respiratory distress    Severe protein-calorie malnutrition (HCC)  Assessment & Plan  Malnutrition Findings:   Malnutrition type: Chronic illness  Degree of Malnutrition: Other severe protein calorie malnutrition(related to inadequate intake/medical condition/advanced age as evidenced by significant depletion of fat/muscle (clavicles, shoulders, triceps, temples), 26% wt loss x 5 months (165 lb 2/14/20), +4 b/l LE edema  Treatment includes supplementation ) patient is noted to be thin with minimal muscle and adipose tissue  Unclear on patient's current nutritional status  BMI Findings: Body mass index is 20 75 kg/m²  Plan:  · Patient currently on regular diet with high-calorie and high-protein    Generalized weakness  Assessment & Plan  Likely secondary to advanced age, multiple medical conditions, hypothyroidism, and malnutrition  Concern for recent fall history  Plan:  · Plan to discharge to SNF once stable and cleared  · See malnutrition plan     Thrombocytopenia [facility])  Assessment & Plan  Recent Labs     07/21/20  0558 07/23/20  0510   * 133*     Patient is currently asymptomatic    This has been chronic based on previous CBCs  Plan:  · Monitor with daily CBC  · Will continue with pharmacologic VTE prophylaxis; may consider discontinuation if platelet count worsens    Hypokalemia  Assessment & Plan  Recent Labs     07/21/20  0558 07/22/20  0614 07/23/20  0510   K 4 4 4 3 4 0     · Resolved    Plan  - continue monitoring potassium levels daily   - Consider goal of potassium level greater than >4 0 if not replace  - Monitor for signs and symptoms of hypokalemia daily      Hyperlipidemia  Assessment & Plan  · Patient is currently on pravastatin 40 mg p o  Daily    Essential hypertension  Assessment & Plan  Patient currently on terazosin and Lasix  Plan:  · Continue terazosin  · Was on Lasix as per pulm, to help with pleural effusion drainage as b/l chest tubes were clamped  · Cardio recommended to switch to Torsemide 20mg bid today  · Monitor BP    Hyponatremia  Assessment & Plan  Recent Labs     07/21/20  0558 07/22/20  0614 07/23/20  0510   SODIUM 132* 133* 135*     Patient with Sodium of 135 today, currently asymptomatic, Likely due to his underlying diagnosis of congestive heart failure    Plan  -Continue to monitor sodium levels daily  - Assess for signs and symptoms of hyponatremia        VTE Pharmacologic Prophylaxis:   Pharmacologic: Enoxaparin (Lovenox)  Mechanical VTE Prophylaxis in Place: Yes    Discussions with Specialists or Other Care Team Provider:  Nursing, , pulmonary team    Education and Discussions with Family / Patient:  Discussed with patient, called daughter and updated her of plan, she is in agreement    Current Length of Stay: 11 day(s)    Current Patient Status: Inpatient     Discharge Plan / Estimated Discharge Date: 24-48 hours    Code Status: Level 3 - DNAR and DNI      Subjective:   Patient seen this morning in no acute distress  He denies shortness of breath, chest pain, nausea, vomiting, diarrhea, fever or chills  He looked comfortable and in no respiratory distress    Both chest tubes were clamped last night and he tolerated it well  IR will remove right-sided chest tube today and repeat chest x-ray tomorrow  Depending on how he tolerates it, IR will then remove left-sided chest tube  Objective:     Vitals:   Temp (24hrs), Av 9 °F (36 6 °C), Min:97 6 °F (36 4 °C), Max:98 °F (36 7 °C)    Temp:  [97 6 °F (36 4 °C)-98 °F (36 7 °C)] 98 °F (36 7 °C)  HR:  [69-77] 77  Resp:  [18-19] 18  BP: ()/(44-56) 97/55  SpO2:  [97 %-100 %] 100 %  Body mass index is 20 75 kg/m²  Input and Output Summary (last 24 hours): Intake/Output Summary (Last 24 hours) at 2020 1704  Last data filed at 2020 1630  Gross per 24 hour   Intake 1170 ml   Output 1945 ml   Net -775 ml       Physical Exam:     Physical Exam   Constitutional: He is oriented to person, place, and time  No distress  thin   HENT:   Head: Normocephalic and atraumatic  Eyes: Pupils are equal, round, and reactive to light  EOM are normal    Cardiovascular: Normal rate, regular rhythm, normal heart sounds and intact distal pulses  Pulmonary/Chest: Effort normal  No respiratory distress  He has no wheezes  He exhibits no tenderness  Slightly decreased breath sounds bilaterally   Abdominal: Soft  Bowel sounds are normal  He exhibits no distension  There is no tenderness  Musculoskeletal: Normal range of motion  He exhibits no edema or tenderness  Neurological: He is alert and oriented to person, place, and time  Skin: Skin is warm and dry  No rash noted  Psychiatric: He has a normal mood and affect  His behavior is normal  Judgment and thought content normal    Nursing note and vitals reviewed      Additional Data:     Labs:    Results from last 7 days   Lab Units 20  0510  20  0552   WBC Thousand/uL 3 57*   < > 3 82*   HEMOGLOBIN g/dL 11 8*   < > 13 7   HEMATOCRIT % 36 1*   < > 42 6   PLATELETS Thousands/uL 133*   < > 148*   NEUTROS PCT %  --   --  69   LYMPHS PCT %  --   --  12*   MONOS PCT %  --   --  11   EOS PCT %  --   --  6    < > = values in this interval not displayed  Results from last 7 days   Lab Units 07/23/20  0510   POTASSIUM mmol/L 4 0   CHLORIDE mmol/L 96*   CO2 mmol/L 40*   BUN mg/dL 24   CREATININE mg/dL 0 50*   CALCIUM mg/dL 7 8*           * I Have Reviewed All Lab Data Listed Above  * Additional Pertinent Lab Tests Reviewed:  [address] Admission Reviewed    Imaging:    Imaging Reports Reviewed Today Include: CXR  Imaging Personally Reviewed by Myself Includes:  CXR    Recent Cultures (last 7 days):           Last 24 Hours Medication List:     Current Facility-Administered Medications:  albuterol 2 puff Inhalation Q6H PRN Anita Hill MD   enoxaparin 40 mg Subcutaneous Daily Anita Hill MD   fluticasone-vilanterol 1 puff Inhalation Daily Leigh Estrada PA-C   guaiFENesin 600 mg Oral Q12H [address] Anita Hill MD   levalbuterol 0 63 mg Nebulization TID Anita Hill MD   levothyroxine 50 mcg Oral Daily Anita Hill MD   montelukast 10 mg Oral HS Anita Hill MD   pantoprazole 40 mg Oral Early Morning Anita Hill MD   pravastatin 40 mg Oral Daily With Lu Tineo MD   terazosin 5 mg Oral HS Anita Hill MD   timolol 1 drop Both Eyes BID Anita Hill MD   tiotropium 18 mcg Inhalation Daily Leigh Estrada PA-C   torsemide 20 mg Oral BID (diuretic) LOUISE Palma        Today, Patient Was Seen By: Miranda Payne MD

## 2020-07-23 NOTE — ASSESSMENT & PLAN NOTE
Patient on 2L nasal canula, in no respiratory distress, with adequate oxygen saturation levels on nasal cannula      Plan  - Monitor SpO2 levels aiming SpO2 >90  - Continue oxygen 2L  -Patient refuses to wear Bipap, no need to be discharged to SNF with it

## 2020-07-23 NOTE — ASSESSMENT & PLAN NOTE
· Metabolic alkalosis resolved  No shortness of breath or visible distress      Plan:  - Continue to monitor CO2 levels daily

## 2020-07-23 NOTE — ASSESSMENT & PLAN NOTE
Patient currently on terazosin and Lasix  Plan:  · Continue terazosin  · Was on Lasix as per pulm, to help with pleural effusion drainage as b/l chest tubes were clamped    · Cardio recommended to switch to Torsemide 20mg bid today  · Monitor BP

## 2020-07-23 NOTE — PROGRESS NOTES
Progress Note - Pulmonary   Jasper Schneider 80 y o  male MRN: [id number]  Unit/Bed#: S -01 Encounter: [id number]  Code Status: Level 3 - DNAR and DNI    Yasmeen is a 80 y o  Past Medical History:   Diagnosis Date    Allergic rhinitis     Hypercholesteremia     Hypertension     Pneumonia        Assessment/Plan:   * Bilateral hydropneumothorax  Assessment & Plan  24-year-old male with bilateral hydropneumothoraces, Severe COPD w/ FEV1/FVC 58% / FEV1 40% w/ DLCO 8% corrected,HTN, CDCHF, and chronic oxygen dependence of 3 l, former smoker, admitted 7/12 w/ dyspnea / CP w/ B/L effusions and worsening hypoxemia despite attempted diuresis and had CT angio 7/13 revealing bilateral hydropneumothoracies   Post IR pigtail catheters placed   Post procedure needing 100% HFNC and NRB and hypoxic  ICU stay w/ downgrade on 7/14  Now requiring only 1 5 L NC w/ persistent B/L but markedly improved effusions and chest tube drainage      CT drainage significantly slowed again now for over the last 72 hours    Cytology negative  Overall Exudative Effusion  1/3 + Lights: LD ratio 0 89  Patient and daughter okay with bilateral ASEPT catheters if needed / NO SURGERY  Overall will attempt to maintain minimal effusions w/ diuresis   Small intermittent left-sided air leak  Plan for morning radiographic re-evaluation and probably chest tube removal per IR if stable and non significantly worsened effusions  If worsened symptomatic effusions then consideration for ASEPTS    Discussed with Dr Radha Wade of IR with plan to remove 1 chest tube per day  Plan for right-sided chest tube removal today  And left tube removal tomorrow if Left lung remains stable with no increase pneumothorax                Acute on chronic diastolic heart failure [facility])  Assessment & Plan  Wt Readings from Last 3 Encounters:   07/20/20 58 6 kg (129 lb 3 2 oz)   02/14/20 74 9 kg (165 lb 1 6 oz)   12/17/19 77 3 kg (170 lb 6 4 oz)   Most of fluid volume losses from chest tubes this admission  900 U/O last 24 hrs  Back on Torsemide 20 mg BID  Cardiology follows  -900 last 24 hr  -15 2 L this admission            COPD (chronic obstructive pulmonary disease) ([facility] [address] )  Assessment & Plan  Hx Severe COPD by PFT w/ reduced DLCO  Appears to be on home Xopenex  Not acutely exacerbated  Plan to start ICS/LAMA/LABA in outpatient setting / Leslee Barragan / Prince Kumari here in inpatient setting  Transition to [facility] in the outpatient setting    Acute on chronic respiratory failure with hypoxia and hypercapnia (HCC)  Assessment & Plan  Chronically on 3 L NC  Currently on 1 5 L NC  Self improved respiratory acidosis   Did not tolerate BiPAP overnight    ABG:      VBG:  Results from last 7 days   Lab Units 07/21/20  1004   PH KANCHAN  7 442*   PCO2 KANCHAN mm Hg 49 8   PO2 KANCHAN mm Hg 198 2*   HCO3 KANCHAN mmol/L 33 2*   BASE EXC KANCHAN mmol/L 7 8         Severe protein-calorie malnutrition (HCC)  Assessment & Plan  Malnutrition Findings:   Malnutrition type: Chronic illness  Degree of Malnutrition: Other severe protein calorie malnutrition(related to inadequate intake/medical condition/advanced age as evidenced by significant depletion of fat/muscle (clavicles, shoulders, triceps, temples), 26% wt loss x 5 months (165 lb 2/14/20), +4 b/l LE edema  Treatment includes supplementation )    BMI Findings: Body mass index is 20 24 kg/m²  Very likely contributory to ongoing volume losses via B/L chest tubes  Nutrition following and recommendations      D/C and Follow up Needs: Follow up in the outpatient setting, ideally in next 1-2 weeks for follow up  Later Scheduling may be required due to scheduling restraints  Items to follow up :  COPD Symptoms management   Inhaler therapy adherence on Trelegy  1-2 week follow up    ______________________________________________________________________    Subjective: Pt seen and examined at bedside  Tolerated chest tube clamping overnight    Small residual apical pneumothoraces  Again did not utilized BiPAP overnight  Marginal low blood pressure this morning  Chief Complaint: Im feeling fine  Tele Events:     Vitals:   Temp:  [97 6 °F (36 4 °C)-98 °F (36 7 °C)] 97 6 °F (36 4 °C)  HR:  [69-75] 69  Resp:  [18-19] 19  BP: ()/(44-56) 98/44  Weight (last 2 days)     Date/Time   Weight    20 0600   60 1 (132 5) bedrest     Weight: bedrest  at 20 0600    20 0600   59 6 (131 39)    20 0600   58 (127 87)            Vitals:    20 2230 20 0600 20 0720 20 0946   BP: 116/56  (!) 108/44 (!) 98/44   BP Location:   Left arm Right arm   Pulse:   69    Resp:   19    Temp:   97 6 °F (36 4 °C)    TempSrc:   Oral    SpO2:   99%    Weight:  60 1 kg (132 lb 7 9 oz)     Height:         Temp (24hrs), Av 9 °F (36 6 °C), Min:97 6 °F (36 4 °C), Max:98 °F (36 7 °C)  Current: Temperature: 97 6 °F (36 4 °C)        SpO2: SpO2: 99 %, SpO2 Activity: SpO2 Activity: At Rest, SpO2 Device: O2 Device: Nasal cannula    IV Infusions:       Nutrition:        Diet Orders   (From admission, onward)             Start     Ordered    20  Diet Cardiovascular; Sodium 2 GM; Fluid Restriction 1800 ML  Diet effective now     Question Answer Comment   Diet Type Cardiovascular    Cardiac Sodium 2 GM    Other Restriction(s): Fluid Restriction 1800 ML    RD to adjust diet per protocol? Yes        20 1140  Dietary nutrition supplements  Once     Question Answer Comment   Select Supplement: Ensure Clear [employer], Lunch, Dinner        20 1139    20  Room Service  Once     Question:  Type of Service  Answer:  Room Service - Appropriate with Assistance    20                Ins/Outs:   I/O       701 -  07 -  07 -  0700    P  O  360 357 480    Total Intake(mL/kg) 360 (6) 357 (5 9) 480 (8)    Urine (mL/kg/hr) 1600 (1 1) 1250 (0 9) 475 (2 2) Chest Tube 360 100 280    Total Output [facility] 1350 755    Net -1600 -993 -275                 Lines/Drains:  Invasive Devices     Peripheral Intravenous Line            Peripheral IV 07/23/20 Right;Ventral (anterior) Forearm less than 1 day          Drain            Chest Tube 1 Left Pleural 10 2 Fr  9 days    Chest Tube 2 Right Pleural 10 2 Fr  9 days                 Active medications:  Scheduled Meds:  Current Facility-Administered Medications:  albuterol 2 puff Inhalation Q6H PRN Cristi Alvarez MD   enoxaparin 40 mg Subcutaneous Daily Cristi ShuttMD dontrell   fluticasone-vilanterol 1 puff Inhalation Daily Jessica Parker PA-C   furosemide 40 mg Intravenous Daily Lis Condon DO   guaiFENesin 600 mg Oral Q12H [facility] Cristi Alvarez MD   levalbuterol 0 63 mg Nebulization TID Cristi Shutters, MD   levothyroxine 50 mcg Oral Daily Cristi Shutters, MD   montelukast 10 mg Oral HS Cristi Shutters, MD   pantoprazole 40 mg Oral Early Morning Cristi Shutters, MD   pravastatin 40 mg Oral Daily With Marjan Corley MD   terazosin 5 mg Oral HS Cristi Shutters, MD   timolol 1 drop Both Eyes BID Cristi Shuttdontrell, MD   tiotropium 18 mcg Inhalation Daily Jessica Parker PA-C     PRN Meds:  albuterol 2 puff Q6H PRN     ____________________________________________________________________    Physical Exam   Constitutional: He is oriented to person, place, and time  He appears well-developed  No distress  HENT:   Head: Normocephalic and atraumatic  Eyes: Pupils are equal, round, and reactive to light  No scleral icterus  Neck: Normal range of motion  No tracheal deviation present  Cardiovascular: Normal rate, regular rhythm, normal heart sounds and intact distal pulses  Exam reveals no gallop and no friction rub  No murmur heard  Pulmonary/Chest: No accessory muscle usage or stridor  No tachypnea  No respiratory distress   He has decreased breath sounds in the right upper field, the right middle field, the right lower field, the left upper field, the left middle field and the left lower field  He has no wheezes  He has no rhonchi  He has no rales  He exhibits no tenderness  Mildly decreased breath sounds    Remains on 1 5 L cannula    Minimal serous drainage    Small intermittent air leak on left-sided chest   Abdominal: Soft  He exhibits no distension  There is no tenderness  There is no rebound and no guarding  Musculoskeletal: Normal range of motion  He exhibits no edema  Neurological: He is alert and oriented to person, place, and time  Skin: Skin is warm and dry     Psychiatric: He has a normal mood and affect      ____________________________________________________________________    Invasive/non-invasive ventilation settings   Respiratory    Lab Data (Last 4 hours)    None         O2/Vent Data (Last 4 hours)    None                Laboratory and Diagnostics:  Results from last 7 days   Lab Units 07/23/20  0510 07/22/20  1110 07/21/20  0558 07/20/20  0552 07/19/20  0558 07/18/20  0504 07/17/20  0633   WBC Thousand/uL 3 57* 3 29* 3 48* 3 82* 3 64* 4 74 4 96   HEMOGLOBIN g/dL 11 8* 12 5 12 5 13 7 13 0 13 4 13 1   HEMATOCRIT % 36 1* 37 5 39 4 42 6 41 4 41 6 40 8   PLATELETS Thousands/uL 133*  --  137* 148* 134* 140* 124*   NEUTROS PCT %  --   --   --  69  --  71 68   MONOS PCT %  --   --   --  11  --  12 12     Results from last 7 days   Lab Units 07/23/20  0510 07/22/20  4936 07/21/20  0558 07/20/20  0552 07/19/20  0558 07/18/20  0504 07/17/20  0633   SODIUM mmol/L 135* 133* 132* 134* 134* 133* 132*   POTASSIUM mmol/L 4 0 4 3 4 4 4 3 4 0 4 2 3 6   CHLORIDE mmol/L 96* 96* 98* 98* 98* 97* 96*   CO2 mmol/L 40* 35* 35* 36* 35* 36* 34*   ANION GAP mmol/L -1* 2* -1* 0* 1* 0* 2*   BUN mg/dL 24 27* 27* 23 25 22 20   CREATININE mg/dL 0 50* 0 55* 0 63 0 74 0 60 0 58* 0 69   CALCIUM mg/dL 7 8* 7 8* 7 9* 8 5 7 9* 7 9* 7 8*   GLUCOSE RANDOM mg/dL 82 85 92 84 85 99 94                       ABG:    VBG:  Results from last 7 days Lab Units 07/21/20  1004   PH KANCHAN  7 442*   PCO2 KANCHAN mm Hg 49 8   PO2 KANCHAN mm Hg 198 2*   HCO3 KANCHAN mmol/L 33 2*   BASE EXC KANCHAN mmol/L 7 8           Micro        Imaging:   XR chest portable   Final Result by Jamaica Carballo MD (07/23 0715)         1  Stable exam with small bore bilateral thoracostomy tubes again noted with stable tiny biapical pneumothoraces  Workstation performed: [id number]         XR chest portable   Final Result by Jamaica Carballo MD (07/22 0719)         1  Bilateral thoracostomy tubes again noted with tiny stable residual biapical pneumothoraces  Workstation performed: [id number]         XR chest portable   Final Result by Estuardo Mojica MD (07/19 1713)      Small bilateral pneumothoraces  Workstation performed: [id number]         XR chest portable   Final Result by Estuardo Mojica MD (07/17 0287)      Persistent small left pneumothorax  Mild bibasilar atelectasis  Workstation performed: [id number]         XR chest portable   Final Result by Estuardo Mojica MD (07/15 0827)      Bilateral pleural drainage catheters with no change in small left pneumothorax and left base atelectasis            Workstation performed: [id number]         CT chest wo contrast   Final Result by Karyle Car, MD (07/15 0259)         1  Improving bilateral hydropneumothoraces  2   Improving bilateral atelectasis  Workstation performed: [id number]         XR chest portable   Final Result by Olivia Babcock MD (07/14 1146)      Stable left basilar pneumothorax with chest tube  Previous right basilar pneumothorax not well visualized and possibly resolved  Workstation performed: [id number]         XR chest portable   Final Result by Olivia Babcock MD (07/14 1144)      Improved bibasilar pneumothoraces with chest tubes in place              Workstation performed: [id number]         XR chest portable   Final Result by Dominique Hayward MD (19/46 1810) Bilateral pneumothoraces status post bilateral chest tube placement with resolution of previously seen pleural effusions  Workstation performed: [id number]         IR chest tube   Final Result by Fidencio Munoz MD (07/13 1728)   Impression:    Successful ultrasound-guided bilateral chest tube placement  Workstation performed: [id number]         CTA chest pe study   Final Result by Olivia Babcock MD (07/13 1626)      1  Bilateral hydropneumothoraces with large pleural effusions and moderate pneumothoraces  2   No acute pulmonary embolism  I personally discussed this study with DR NAJERA on 7/13/2020 at 3:42 PM                         Workstation performed: [id number]         XR chest 1 view portable   ED Interpretation by Maribel Mckeon MD (07/12 6737)   Bilateral pleural effusions      Final Result by Ana Martinez MD (07/13 7047)      Moderate size bilateral pleural effusions with bibasilar atelectasis  Workstation performed: [id number]             Micro: Lab Results   Component Value Date    BLOODCX No Growth After 5 Days  03/16/2019    BLOODCX No Growth After 5 Days   03/16/2019            Invalid input(s): Billie Ryan

## 2020-07-23 NOTE — PLAN OF CARE
Problem: Potential for Falls  Goal: Patient will remain free of falls  Description  INTERVENTIONS:  - Assess patient frequently for physical needs  -  Identify cognitive and physical deficits and behaviors that affect risk of falls  -  [place] fall precautions as indicated by assessment   - Educate patient/family on patient safety including physical limitations  - Instruct patient to call for assistance with activity based on assessment  - Modify environment to reduce risk of injury  - Consider OT/PT consult to assist with strengthening/mobility  Outcome: Progressing     Problem: Prexisting or High Potential for Compromised Skin Integrity  Goal: Skin integrity is maintained or improved  Description  INTERVENTIONS:  - Identify patients at risk for skin breakdown  - Assess and monitor skin integrity  - Assess and monitor nutrition and hydration status  - Monitor labs   - Assess for incontinence   - Turn and reposition patient  - Assist with mobility/ambulation  - Relieve pressure over bony prominences  - Avoid friction and shearing  - Provide appropriate hygiene as needed including keeping skin clean and dry  - Evaluate need for skin moisturizer/barrier cream  - Collaborate with interdisciplinary team   - Patient/family teaching  - Consider wound care consult   Outcome: Progressing     Problem: Nutrition/Hydration-ADULT  Goal: Nutrient/Hydration intake appropriate for improving, restoring or maintaining nutritional needs  Description  Monitor and assess patient's nutrition/hydration status for malnutrition  Collaborate with interdisciplinary team and initiate plan and interventions as ordered  Monitor patient's weight and dietary intake as ordered or per policy  Utilize nutrition screening tool and intervene as necessary  Determine patient's food preferences and provide high-protein, high-caloric foods as appropriate       INTERVENTIONS:  - Monitor oral intake, urinary output, labs, and treatment plans  - Assess nutrition and hydration status and recommend course of action  - Evaluate amount of meals eaten  - Assist patient with eating if necessary   - Allow adequate time for meals  - Recommend/ encourage appropriate diets, oral nutritional supplements, and vitamin/mineral supplements  - Order, calculate, and assess calorie counts as needed  - Recommend, monitor, and adjust tube feedings and TPN/PPN based on assessed needs  - Assess need for intravenous fluids  - Provide specific nutrition/hydration education as appropriate  - Include patient/family/caregiver in decisions related to nutrition  Outcome: Progressing     Problem: PAIN - ADULT  Goal: Verbalizes/displays adequate comfort level or baseline comfort level  Description  Interventions:  - Encourage patient to monitor pain and request assistance  - Assess pain using appropriate pain scale  - Administer analgesics based on type and severity of pain and evaluate response  - Implement non-pharmacological measures as appropriate and evaluate response  - Consider cultural and social influences on pain and pain management  - Notify physician/advanced practitioner if interventions unsuccessful or patient reports new pain  Outcome: Progressing     Problem: INFECTION - ADULT  Goal: Absence or prevention of progression during hospitalization  Description  INTERVENTIONS:  - Assess and monitor for signs and symptoms of infection  - Monitor lab/diagnostic results  - Monitor all insertion sites, i e  indwelling lines, tubes, and drains  - Monitor endotracheal if appropriate and nasal secretions for changes in amount and color  - [place] appropriate cooling/warming therapies per order  - Administer medications as ordered  - Instruct and encourage patient and family to use good hand hygiene technique  - Identify and instruct in appropriate isolation precautions for identified infection/condition  Outcome: Progressing  Goal: Absence of fever/infection during neutropenic period  Description  INTERVENTIONS:  - Monitor WBC    Outcome: Progressing     Problem: SAFETY ADULT  Goal: Maintain or return to baseline ADL function  Description  INTERVENTIONS:  -  Assess patient's ability to carry out ADLs; assess patient's baseline for ADL function and identify physical deficits which impact ability to perform ADLs (bathing, care of mouth/teeth, toileting, grooming, dressing, etc )  - Assess/evaluate cause of self-care deficits   - Assess range of motion  - Assess patient's mobility; develop plan if impaired  - Assess patient's need for assistive devices and provide as appropriate  - Encourage maximum independence but intervene and supervise when necessary  - Involve family in performance of ADLs  - Assess for home care needs following discharge   - Consider OT consult to assist with ADL evaluation and planning for discharge  - Provide patient education as appropriate  Outcome: Progressing  Goal: Maintain or return mobility status to optimal level  Description  INTERVENTIONS:  - Assess patient's baseline mobility status (ambulation, transfers, stairs, etc )    - Identify cognitive and physical deficits and behaviors that affect mobility  - Identify mobility aids required to assist with transfers and/or ambulation (gait belt, sit-to-stand, lift, walker, cane, etc )  - [place] fall precautions as indicated by assessment  - Record patient progress and toleration of activity level on Mobility SBAR; progress patient to next Phase/Stage  - Instruct patient to call for assistance with activity based on assessment  - Consider rehabilitation consult to assist with strengthening/weightbearing, etc   Outcome: Progressing     Problem: DISCHARGE PLANNING  Goal: Discharge to home or other facility with appropriate resources  Description  INTERVENTIONS:  - Identify barriers to discharge w/patient and caregiver  - Arrange for needed discharge resources and transportation as appropriate  - Identify discharge learning needs (meds, wound care, etc )  - Arrange for interpretive services to assist at discharge as needed  - Refer to Case Management Department for coordinating discharge planning if the patient needs post-hospital services based on physician/advanced practitioner order or complex needs related to functional status, cognitive ability, or social support system  Outcome: Progressing     Problem: Knowledge Deficit  Goal: Patient/family/caregiver demonstrates understanding of disease process, treatment plan, medications, and discharge instructions  Description  Complete learning assessment and assess knowledge base    Interventions:  - Provide teaching at level of understanding  - Provide teaching via preferred learning methods  Outcome: Progressing     Problem: RESPIRATORY - ADULT  Goal: Achieves optimal ventilation and oxygenation  Description  INTERVENTIONS:  - Assess for changes in respiratory status  - Assess for changes in mentation and behavior  - Position to facilitate oxygenation and minimize respiratory effort  - Oxygen administered by appropriate delivery if ordered  - Initiate smoking cessation education as indicated  - Encourage broncho-pulmonary hygiene including cough, deep breathe, Incentive Spirometry  - Assess the need for suctioning and aspirate as needed  - Assess and instruct to report SOB or any respiratory difficulty  - Respiratory Therapy support as indicated  Outcome: Progressing     Problem: GENITOURINARY - ADULT  Goal: Maintains or returns to baseline urinary function  Description  INTERVENTIONS:  - Assess urinary function  - Encourage oral fluids to ensure adequate hydration if ordered  - Administer IV fluids as ordered to ensure adequate hydration  - Administer ordered medications as needed  - Offer frequent toileting  - Follow urinary retention protocol if ordered  Outcome: Progressing  Goal: Absence of urinary retention  Description  INTERVENTIONS:  - Assess patients ability to void and empty bladder  - Monitor I/O  - Bladder scan as needed  - Discuss with physician/AP medications to alleviate retention as needed  - Discuss catheterization for long term situations as appropriate  Outcome: Progressing  Goal: Urinary catheter remains patent  Description  INTERVENTIONS:  - Assess patency of urinary catheter  - If patient has a chronic can, consider changing catheter if non-functioning  - Follow guidelines for intermittent irrigation of non-functioning urinary catheter  Outcome: Progressing     Problem: METABOLIC, FLUID AND ELECTROLYTES - ADULT  Goal: Electrolytes maintained within normal limits  Description  INTERVENTIONS:  - Monitor labs and assess patient for signs and symptoms of electrolyte imbalances  - Administer electrolyte replacement as ordered  - Monitor response to electrolyte replacements, including repeat lab results as appropriate  - Instruct patient on fluid and nutrition as appropriate  Outcome: Progressing  Goal: Fluid balance maintained  Description  INTERVENTIONS:  - Monitor labs   - Monitor I/O and WT  - Instruct patient on fluid and nutrition as appropriate  - Assess for signs & symptoms of volume excess or deficit  Outcome: Progressing  Goal: Glucose maintained within target range  Description  INTERVENTIONS:  - Monitor Blood Glucose as ordered  - Assess for signs and symptoms of hyperglycemia and hypoglycemia  - Administer ordered medications to maintain glucose within target range  - Assess nutritional intake and initiate nutrition service referral as needed  Outcome: Progressing     Problem: SKIN/TISSUE INTEGRITY - ADULT  Goal: Skin integrity remains intact  Description  INTERVENTIONS  - Identify patients at risk for skin breakdown  - Assess and monitor skin integrity  - Assess and monitor nutrition and hydration status  - Monitor labs (i e  albumin)  - Assess for incontinence   - Turn and reposition patient  - Assist with mobility/ambulation  - Relieve pressure over bony prominences  - Avoid friction and shearing  - Provide appropriate hygiene as needed including keeping skin clean and dry  - Evaluate need for skin moisturizer/barrier cream  - Collaborate with interdisciplinary team (i e  Nutrition, Rehabilitation, etc )   - Patient/family teaching  Outcome: Progressing  Goal: Incision(s), wounds(s) or drain site(s) healing without S/S of infection  Description  INTERVENTIONS  - Assess and document risk factors for skin impairment   - Assess and document dressing, incision, wound bed, drain sites and surrounding tissue  - Consider nutrition services referral as needed  - Oral mucous membranes remain intact  - Provide patient/ family education  Outcome: Progressing  Goal: Oral mucous membranes remain intact  Description  INTERVENTIONS  - Assess oral mucosa and hygiene practices  - Implement preventative oral hygiene regimen  - Implement oral medicated treatments as ordered  - Initiate Nutrition services referral as needed  Outcome: Progressing     Problem: HEMATOLOGIC - ADULT  Goal: Maintains hematologic stability  Description  INTERVENTIONS  - Assess for signs and symptoms of bleeding or hemorrhage  - Monitor labs  - Administer supportive blood products/factors as ordered and appropriate  Outcome: Progressing     Problem: MUSCULOSKELETAL - ADULT  Goal: Maintain or return mobility to safest level of function  Description  INTERVENTIONS:  - Assess patient's ability to carry out ADLs; assess patient's baseline for ADL function and identify physical deficits which impact ability to perform ADLs (bathing, care of mouth/teeth, toileting, grooming, dressing, etc )  - Assess/evaluate cause of self-care deficits   - Assess range of motion  - Assess patient's mobility  - Assess patient's need for assistive devices and provide as appropriate  - Encourage maximum independence but intervene and supervise when necessary  - Involve family in performance of ADLs  - Assess for home care needs following discharge   - Consider OT consult to assist with ADL evaluation and planning for discharge  - Provide patient education as appropriate  Outcome: Progressing  Goal: Maintain proper alignment of affected body part  Description  INTERVENTIONS:  - Support, maintain and protect limb and body alignment  - Provide patient/ family with appropriate education  Outcome: Progressing

## 2020-07-23 NOTE — SOCIAL WORK
CM spoke with [facility] and Pulmonology, the plan is for patient to go to IR today to have one of his chest tubes removed and the plan is for the second to be removed tomorrow as long as he tolerates today's procedure  Plan is for dc tomorrow vs Saturday  As per Pulmonology, bipap will be discontinued and not needed at SNF  JAMES spoke with Mabel at [facility], patient's family's first choice of facility  She confirmed they are able to accept and will be submitting for insurance authorization for today  OT will be seeing patient for updated notes today  CM called and spoke with patient's daughter, Kenn Akbar  She reports she accepts the bed offer at [facility] as they are first choice and patient is familiar with their program  Patient is aware of and on board with plan for dc to SNF  Kenn Akbar reports she'd like for CM to arrange [facility] with [facility] upon discharge and is aware of and agreeable to [facility] cost  CM will continue to follow patient

## 2020-07-23 NOTE — PROGRESS NOTES
General Cardiology   Progress Note -  [employer]   Caro Judd 80 y o  male MRN: [id number]    Unit/Bed#: S -01 Encounter: [id number]    Assessment/ Plan:    1  Acute on chronic diastolic CHF: with b/l hydropneumothorax requiring b/l CT  Started back on diuretics 7/21; lasix 40mg IV daily  Pulmonary managing CT but they have been clamped and likely to come out today based on cxray  Will attempt to manage volume with diuretics but may ultimately need asept catheter  He was mildly hypotensive this AM and IV lasix held by nursing; will stop IV lasix and start him on Torsemide 20mg PO BID today in anticipation for discharge in next 24/48 hours  BMP in AM      2  B/l Pneumothorax: currently has b/l CT with exudative drainage; family ok with asept cath if needed; no surgical intervention  Tubes clamped; pulm managing; Volume management as above  3  HTN: mildly hypotensive this AM; manual check at time of my exam 94/60; he is asymptomatic; only on diuretic therapy at this point; would not restart home antihypertensives to allow him to get his diuretics  EF preserved  Subjective: pt seen for follow up  He reports feeling well today; no complaints  He denies any dizziness or lightheadedness despite mild hypotension  Was able to ambulate back and forth to BR without any symptoms  Review of Systems   Constitution: Negative for decreased appetite, fever and malaise/fatigue  Cardiovascular: Negative for chest pain, dyspnea on exertion, leg swelling, orthopnea, palpitations and syncope  Respiratory: Negative for cough and shortness of breath  Gastrointestinal: Negative for abdominal pain, nausea and vomiting  Genitourinary: Negative for dysuria  Neurological: Negative for dizziness and light-headedness  Psychiatric/Behavioral: Negative for altered mental status  All other systems reviewed and are negative        Objective:   Vitals: Blood pressure (!) 98/44, pulse 69, temperature 97 6 °F (36 4 °C), temperature source Oral, resp  rate 19, height 5' 7" (1 702 m), weight 60 1 kg (132 lb 7 9 oz), SpO2 99 %  ,       Body mass index is 20 75 kg/m²  ,     Systolic (80JKU), IMX:048 , Min:98 , ZIM:472     Diastolic (22WOM), AUB:23, Min:44, Max:56          Intake/Output Summary (Last 24 hours) at 7/23/2020 1116  Last data filed at 7/23/2020 0946  Gross per 24 hour   Intake 837 ml   Output 1655 ml   Net -818 ml     Weight (last 2 days)     Date/Time   Weight    07/23/20 0600   60 1 (132 5) bedrest     Weight: bedrest  at 07/23/20 0600    07/22/20 0600   59 6 (131 39)    07/21/20 0600   58 (127 87)            Telemetry Review:   No telemetry    Physical Exam   Constitutional: He is oriented to person, place, and time  No distress  Pt frail appearing, sitting up in chair in NAD; alert and cooperative   HENT:   Head: Normocephalic and atraumatic  Cardiovascular: Normal rate, regular rhythm, S1 normal and S2 normal    No murmur heard  No LE edema   Pulmonary/Chest: Effort normal    B/l CT noted; clamped   Abdominal: Soft  flat   Musculoskeletal: He exhibits no edema or deformity  Neurological: He is alert and oriented to person, place, and time  Skin: Skin is warm and dry  He is not diaphoretic  Psychiatric: He has a normal mood and affect  His behavior is normal    Nursing note and vitals reviewed      LABORATORY RESULTS      CBC with diff:   Results from last 7 days   Lab Units 07/23/20  0510 07/22/20  3681 07/21/20  0558 07/20/20  0552 07/19/20  0558 07/18/20  0504 07/17/20  0633   WBC Thousand/uL 3 57* 3 29* 3 48* 3 82* 3 64* 4 74 4 96   HEMOGLOBIN g/dL 11 8* 12 5 12 5 13 7 13 0 13 4 13 1   HEMATOCRIT % 36 1* 37 5 39 4 42 6 41 4 41 6 40 8   MCV fL 97 97 98 99* 99* 99* 99*   PLATELETS Thousands/uL 133*  --  137* 148* 134* 140* 124*   MCH pg 31 8 32 4 31 2 31 8 31 0 31 9 31 8   MCHC g/dL 32 7 33 3 31 7 32 2 31 4 32 2 32 1   RDW % 13 2 13 4 13 2 13 4 13 6 13 6 13 8   MPV fL 9 9 11 1 9 8 9 8 10 0 10 1 10 6 NRBC AUTO /100 WBCs  --   --   --  0  --  0 0     CMP:  Results from last 7 days   Lab Units 20  0510 20  8964 20  0558 20  0552 20  0558 20  0504 20  0633   POTASSIUM mmol/L 4 0 4 3 4 4 4 3 4 0 4 2 3 6   CHLORIDE mmol/L 96* 96* 98* 98* 98* 97* 96*   CO2 mmol/L 40* 35* 35* 36* 35* 36* 34*   BUN mg/dL 24 27* 27* 23 25 22 20   CREATININE mg/dL 0 50* 0 55* 0 63 0 74 0 60 0 58* 0 69   CALCIUM mg/dL 7 8* 7 8* 7 9* 8 5 7 9* 7 9* 7 8*   EGFR ml/min/1 73sq m 95 91 86 81 88 89 83     BMP:  Results from last 7 days   Lab Units 20  0510 20  9880 20  0558 20  0552 20  0558 20  0504 20  0633   POTASSIUM mmol/L 4 0 4 3 4 4 4 3 4 0 4 2 3 6   CHLORIDE mmol/L 96* 96* 98* 98* 98* 97* 96*   CO2 mmol/L 40* 35* 35* 36* 35* 36* 34*   BUN mg/dL 24 27* 27* 23 25 22 20   CREATININE mg/dL 0 50* 0 55* 0 63 0 74 0 60 0 58* 0 69   CALCIUM mg/dL 7 8* 7 8* 7 9* 8 5 7 9* 7 9* 7 8*     Lab Results   Component Value Date    NTBNP 556 (H) 2020    NTBNP 866 (H) 2019    NTBNP 320 2019     Lipid Profile:   No results found for: CHOL  Lab Results   Component Value Date    HDL 80 (H) 2019    HDL 67 (H) 2018    HDL 78 (H) 2017     Lab Results   Component Value Date    LDLCALC 55 2019    LDLCALC 73 2018    LDLCALC 60 2017     Lab Results   Component Value Date    TRIG 37 2019    TRIG 54 2018    TRIG 61 2017     Cardiac testing:   Results for orders placed during the hospital encounter of 20   Echo complete with contrast if indicated    Narrative [place] [id number], [address] [facility]  [phone]    Transthoracic Echocardiogram  2D, M-mode, Doppler, and Color Doppler    Study date:  2020    Patient: Oskar Barajas  MR number: [id number]  Account number: [de-identified]  : 24-Mar-1929  Age: 80 years  Gender: Male  Status: Inpatient  Location: Bedside  Height: 67 in  Weight: 131 lb  BP: 131/ 61 mmHg    Indications: Heart Failure  Diagnoses: I50 9 - Heart failure, unspecified    Sonographer:  IMANI Rondon  Primary Physician:  Princess Slim MD  Referring Physician:  Edmar Tran MD  Group:  [facility]  Interpreting Physician:  Neli Louise MD    SUMMARY    LEFT VENTRICLE:  Systolic function was normal  Ejection fraction was estimated to be 60 %  There were no regional wall motion abnormalities  Wall thickness was mildly increased  RIGHT VENTRICLE:  The size was normal   Systolic function was normal     MITRAL VALVE:  There was trace regurgitation  AORTIC VALVE:  There was mild regurgitation  TRICUSPID VALVE:  There was trace regurgitation  Estimated peak PA pressure was 50 mmHg  PERICARDIUM:  A small to moderate, free-flowing pericardial effusion was identified circumferential to the heart  There was no evidence of hemodynamic compromise  There was a large right pleural effusion  There was a large left pleural effusion  HISTORY: PRIOR HISTORY: COPD, pericardial effusion, CKD II, Respitory failure, Hyperlipidemia, and HTN  Congestive heart failure  Risk factors: hypertension  PROCEDURE: The procedure was performed at the bedside  This was a routine study  The transthoracic approach was used  The study included complete 2D imaging, M-mode, complete spectral Doppler, and color Doppler  The heart rate was 82 bpm,  at the start of the study  Images were obtained from the parasternal, apical, subcostal, and suprasternal notch acoustic windows  Image quality was adequate  LEFT VENTRICLE: Size was normal  Systolic function was normal  Ejection fraction was estimated to be 60 %  There were no regional wall motion abnormalities  Wall thickness was mildly increased  DOPPLER: The study was not technically  sufficient to allow evaluation of LV diastolic function      RIGHT VENTRICLE: The size was normal  Systolic function was normal  Wall thickness was normal     LEFT ATRIUM: Size was normal     RIGHT ATRIUM: Size was normal     MITRAL VALVE: Valve structure was normal  There was normal leaflet separation  DOPPLER: The transmitral velocity was within the normal range  There was no evidence for stenosis  There was trace regurgitation  AORTIC VALVE: The valve was trileaflet  Leaflets exhibited normal thickness and normal cuspal separation  DOPPLER: Transaortic velocity was within the normal range  There was no evidence for stenosis  There was mild regurgitation  TRICUSPID VALVE: The valve structure was normal  There was normal leaflet separation  DOPPLER: The transtricuspid velocity was within the normal range  There was no evidence for stenosis  There was trace regurgitation  Estimated peak PA  pressure was 50 mmHg  PULMONIC VALVE: Leaflets exhibited normal thickness, no calcification, and normal cuspal separation  DOPPLER: The transpulmonic velocity was within the normal range  There was no significant regurgitation  PERICARDIUM: A small to moderate, free-flowing pericardial effusion was identified circumferential to the heart  There was no evidence of hemodynamic compromise  There was a large right pleural effusion  There was a large left pleural  effusion  AORTA: The root exhibited normal size  SYSTEMIC VEINS: IVC: The inferior vena cava was not well visualized  The inferior vena cava was grossly normal in size      SYSTEM MEASUREMENT TABLES    2D  %FS: 37 92 %  Ao Diam: 3 53 cm  EDV(Teich): 53 54 ml  EF(Teich): 69 06 %  ESV(Teich): 16 57 ml  IVSd: 1 18 cm  LA Area: 16 16 cm2  LA Diam: 3 48 cm  LVEDV MOD A4C: 64 17 ml  LVEF MOD A4C: 75 23 %  LVESV MOD A4C: 15 89 ml  LVIDd: 3 58 cm  LVIDs: 2 22 cm  LVLd A4C: 6 94 cm  LVLs A4C: 5 64 cm  LVPWd: 1 1 cm  RA Area: 12 12 cm2  RVIDd: 3 95 cm  SV MOD A4C: 48 27 ml  SV(Teich): 36 98 ml    CW  AV MaxP 4 mmHg  AV Vmax: 1 36 m/s  MV PHT: 80 87 ms  MVA By PHT: 2 72 cm2  TR MaxP 85 mmHg  TR Vmax: 3 53 m/s    PW  LVOT Vmax: 1 24 m/s  LVOT maxP 12 mmHg  Lateral E': 0 04 m/s  MV A Nickolas: 1 11 m/s  MV Dec [place]: 2 58 m/s2  MV DecT: 321 49 ms  MV E Nickolas: 0 83 m/s  MV E/A Ratio: 0 75    [facility]    Prepared and electronically signed by    Shala Carballo MD  Signed 2020 17:47:19       Meds/Allergies   all current active meds have been reviewed  Medications Prior to Admission   Medication    atenolol-chlorthalidone (TENORETIC) 50-25 mg per tablet    brimonidine-timolol (COMBIGAN) 0 2-0 5 %    furosemide (LASIX) 40 mg tablet    latanoprost (XALATAN) 0 005 % ophthalmic solution    levothyroxine 50 mcg tablet    montelukast (SINGULAIR) 10 mg tablet    potassium chloride (K-DUR,KLOR-CON) 20 mEq tablet    RABEprazole (ACIPHEX) 20 MG tablet    simvastatin (ZOCOR) 20 mg tablet    spironolactone (ALDACTONE) 25 mg tablet    terazosin (HYTRIN) 5 mg capsule    albuterol (PROVENTIL HFA,VENTOLIN HFA) 90 mcg/act inhaler    Blood Pressure Monitoring (B-D ASSURE BPM/AUTO ARM CUFF) MISC    levalbuterol (XOPENEX) 0 63 mg/3 mL nebulizer solution     Assessment:  Principal Problem:    Bilateral hydropneumothorax  Active Problems:    Chronic respiratory failure with hypoxia (HCC)    Hyponatremia    Essential hypertension    Hyperlipidemia    Hypokalemia    Thrombocytopenia (HCC)    Generalized weakness    Pericardial effusion    Severe protein-calorie malnutrition (HCC)    Acute on chronic respiratory failure with hypoxia and hypercapnia (HCC)    COPD (chronic obstructive pulmonary disease) (HCC)    Hypothyroidism    Acute on chronic diastolic heart failure (HCC)    GERD (gastroesophageal reflux disease)    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 acidosis    Counseling / Coordination of Care  Total floor / unit time spent today 20 minutes    Greater than 50% of total time was spent with the patient and / or family counseling and / or coordination of care  ** Please Note: Dragon 360 Dictation voice to text software may have been used in the creation of this document   **

## 2020-07-24 ENCOUNTER — APPOINTMENT (INPATIENT)
Dept: RADIOLOGY | Facility: HOSPITAL | Age: 85
DRG: 291 | End: 2020-07-24
Payer: COMMERCIAL

## 2020-07-24 LAB
ANION GAP SERPL CALCULATED.3IONS-SCNC: -1 MMOL/L (ref 4–13)
BUN SERPL-MCNC: 25 MG/DL (ref 5–25)
CALCIUM SERPL-MCNC: 8.1 MG/DL (ref 8.3–10.1)
CHLORIDE SERPL-SCNC: 94 MMOL/L (ref 100–108)
CO2 SERPL-SCNC: 41 MMOL/L (ref 21–32)
CREAT SERPL-MCNC: 0.65 MG/DL (ref 0.6–1.3)
ERYTHROCYTE [DISTWIDTH] IN BLOOD BY AUTOMATED COUNT: 13.2 % (ref 11.6–15.1)
GFR SERPL CREATININE-BSD FRML MDRD: 85 ML/MIN/1.73SQ M
GLUCOSE SERPL-MCNC: 81 MG/DL (ref 65–140)
HCT VFR BLD AUTO: 35.4 % (ref 36.5–49.3)
HGB BLD-MCNC: 11.7 G/DL (ref 12–17)
MCH RBC QN AUTO: 32.3 PG (ref 26.8–34.3)
MCHC RBC AUTO-ENTMCNC: 33.1 G/DL (ref 31.4–37.4)
MCV RBC AUTO: 98 FL (ref 82–98)
PLATELET # BLD AUTO: 131 THOUSANDS/UL (ref 149–390)
PMV BLD AUTO: 10.3 FL (ref 8.9–12.7)
POTASSIUM SERPL-SCNC: 3.8 MMOL/L (ref 3.5–5.3)
RBC # BLD AUTO: 3.62 MILLION/UL (ref 3.88–5.62)
SODIUM SERPL-SCNC: 134 MMOL/L (ref 136–145)
WBC # BLD AUTO: 4.02 THOUSAND/UL (ref 4.31–10.16)

## 2020-07-24 PROCEDURE — 97535 SELF CARE MNGMENT TRAINING: CPT

## 2020-07-24 PROCEDURE — 80048 BASIC METABOLIC PNL TOTAL CA: CPT | Performed by: NURSE PRACTITIONER

## 2020-07-24 PROCEDURE — 99232 SBSQ HOSP IP/OBS MODERATE 35: CPT | Performed by: INTERNAL MEDICINE

## 2020-07-24 PROCEDURE — 97116 GAIT TRAINING THERAPY: CPT

## 2020-07-24 PROCEDURE — 94760 N-INVAS EAR/PLS OXIMETRY 1: CPT

## 2020-07-24 PROCEDURE — 85027 COMPLETE CBC AUTOMATED: CPT | Performed by: INTERNAL MEDICINE

## 2020-07-24 PROCEDURE — 94668 MNPJ CHEST WALL SBSQ: CPT

## 2020-07-24 PROCEDURE — 94640 AIRWAY INHALATION TREATMENT: CPT

## 2020-07-24 PROCEDURE — 97110 THERAPEUTIC EXERCISES: CPT

## 2020-07-24 PROCEDURE — 71045 X-RAY EXAM CHEST 1 VIEW: CPT

## 2020-07-24 RX ADMIN — TIMOLOL MALEATE 1 DROP: 5 SOLUTION/ DROPS OPHTHALMIC at 17:01

## 2020-07-24 RX ADMIN — ENOXAPARIN SODIUM 40 MG: 40 INJECTION SUBCUTANEOUS at 08:20

## 2020-07-24 RX ADMIN — MONTELUKAST SODIUM 10 MG: 10 TABLET, FILM COATED ORAL at 21:41

## 2020-07-24 RX ADMIN — TERAZOSIN HYDROCHLORIDE 5 MG: 5 CAPSULE ORAL at 21:41

## 2020-07-24 RX ADMIN — LEVALBUTEROL HYDROCHLORIDE 0.63 MG: 0.63 SOLUTION RESPIRATORY (INHALATION) at 12:54

## 2020-07-24 RX ADMIN — TORSEMIDE 20 MG: 20 TABLET ORAL at 17:01

## 2020-07-24 RX ADMIN — TIMOLOL MALEATE 1 DROP: 5 SOLUTION/ DROPS OPHTHALMIC at 08:21

## 2020-07-24 RX ADMIN — LEVALBUTEROL HYDROCHLORIDE 0.63 MG: 0.63 SOLUTION RESPIRATORY (INHALATION) at 20:05

## 2020-07-24 RX ADMIN — LEVALBUTEROL HYDROCHLORIDE 0.63 MG: 0.63 SOLUTION RESPIRATORY (INHALATION) at 07:07

## 2020-07-24 RX ADMIN — PANTOPRAZOLE SODIUM 40 MG: 40 TABLET, DELAYED RELEASE ORAL at 05:00

## 2020-07-24 RX ADMIN — LEVOTHYROXINE SODIUM 50 MCG: 50 TABLET ORAL at 05:01

## 2020-07-24 RX ADMIN — PRAVASTATIN SODIUM 40 MG: 40 TABLET ORAL at 17:01

## 2020-07-24 RX ADMIN — TIOTROPIUM BROMIDE 18 MCG: 18 CAPSULE ORAL; RESPIRATORY (INHALATION) at 08:21

## 2020-07-24 RX ADMIN — FLUTICASONE FUROATE AND VILANTEROL TRIFENATATE 1 PUFF: 100; 25 POWDER RESPIRATORY (INHALATION) at 08:21

## 2020-07-24 RX ADMIN — TORSEMIDE 20 MG: 20 TABLET ORAL at 08:20

## 2020-07-24 RX ADMIN — GUAIFENESIN 600 MG: 600 TABLET, EXTENDED RELEASE ORAL at 21:41

## 2020-07-24 RX ADMIN — GUAIFENESIN 600 MG: 600 TABLET, EXTENDED RELEASE ORAL at 08:20

## 2020-07-24 NOTE — ASSESSMENT & PLAN NOTE
Recent Labs     07/23/20  0510 07/24/20  0456   * 131*     Patient is currently asymptomatic  This has been chronic based on previous CBCs      Plan:  · Monitor with daily CBC  · Will continue with pharmacologic VTE prophylaxis; may consider discontinuation if platelet count worsens

## 2020-07-24 NOTE — PROGRESS NOTES
Progress Note - Edger Caller 3/24/1929, 80 y o  male MRN: [id number]    Unit/Bed#: S -01 Encounter: [id number]    Primary Care Provider: Lisa Ma MD   Date and time admitted to hospital: 7/12/2020  2:58 PM        * Bilateral hydropneumothorax  Assessment & Plan  · Patient presented with weakness and confusion, found to have persistent small left pneumothorax on CXR  · CT showed bilateral hydropneumothorax with pleural effusions and IR placed bilateral chest tube on 7/13/2020  · 1st and 2nd cytology from 7/13 were negative for malignancy in right and left pleural fluid  · Pleural fluid culture yellow, wbc 1 156 , with no growth  · Pleural LDH: 169, Pleural protein: 3 3, serum LDH: 188, serum protein: 5 8, based on Light's criteria, likely exudative effusion  · Repeat CT chest showed improving bilateral hydropneumothorax, improving atelectasias  · Possible etiology of fluid is related to CHF/ third spacing  Pulmonologist talked with daughter Zeke Veloz) about possible transfer to [place] for thoracic surgery evaluation and VATs but daughter refused any surgery  · CXR after b/l tube were clamped showed tiny stable residual biapical pneumothoraces again, no pleural effusions  · Right-sided chest tube was removed yesterday  Plan:  · As per pulm, plan is to remove left chest tube today and continue diuretics to prevent fluid reaccumulation  Repeat chest x-ray tomorrow prior to discharge to SNF  · He will have chest x-ray early next week and if the fluid continues to accumulate despite diuretic dosing, proceed with ASEPT placement  · On Torsemide 20mg bid as per cardio  · Monitor for any signs of respiratory distress  · Continue to monitor SpO2 levels daily    Chronic respiratory failure with hypoxia [facility])  Assessment & Plan  Patient on 2L nasal canula, in no respiratory distress, with adequate oxygen saturation levels on nasal cannula      Plan  - Monitor SpO2 levels aiming SpO2 >90  - Continue oxygen 2L  -Patient unable to tolerate Bipap, no need to be discharged to SNF with it    Acute on chronic respiratory failure with hypoxia and hypercapnia ([facility] [address] )  Assessment & Plan  · Patient currently on 2L nasal cannula  Condition likely secondary to acute exacerbation of CHF and pleural effusions noted on chest x-ray  Patient regularly follows Dr Barbara Freeman from pulmonology  Low suspicion for infection given patient's negative COVID-19 testing, no elevated white count, and no fever but a viral etiology is still a possibility  Given patient's continued acute hypoxemia, cannot completely rule out PE  Per pulmonology, effusions on chest x-ray does not completely explain patient's hypoxia  · Patients CT showed Bilateral Hydropneumothorax/ pleural effusions so IR placed bilateral chest tubes on 7/13/20  · Persistence of hypercapnia, likely due to hypoventilation (copd) however patient asymptomatic  Plan:  · Maintain SpO2 between 89% to 94%  · Repeat BMP to monitor hypercapnia    Acute on chronic diastolic heart failure [facility])  Assessment & Plan  Wt Readings from Last 3 Encounters:   07/24/20 60 3 kg (132 lb 15 oz)   02/14/20 74 9 kg (165 lb 1 6 oz)   12/17/19 77 3 kg (170 lb 6 4 oz)     · Patient's last echo completed in October of 2019 showed ejection fraction of 50%  Upon assessment on admission, patient appeared to be fluid overloaded given lower extremity edema and pleural effusions seen on chest x-ray and slightly elevated BNP of 556  · Patient regularly follows Dr Lesley Rothman  Echocardiogram on 7/13/2020 shows large pleural effusions and slightly enlarged pericardium  Plan:  · On oral Torsemide  · Continue Oxygen on nasal canula  · Monitor oxygen saturations levels daily  · Monitor signs and symptoms of volume overload daily    · Will follow up with outpatient cardiology on 8/7/20          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 acidosis  Assessment & Plan  · Metabolic alkalosis resolved  No shortness of breath or visible distress  Plan:  - Continue to monitor CO2 levels daily          GERD (gastroesophageal reflux disease)  Assessment & Plan  -Continue on Protonix 40mg daily    Hypothyroidism  Assessment & Plan  TSH on admission was slightly elevated at 4 54  Free T4 was within normal limits    Plan:  · Continue patient's home levothyroxine 50 mcg daily    COPD (chronic obstructive pulmonary disease) ([facility] )  Assessment & Plan  PFT performed on December of 2019 showed severe obstructive pulmonary disease  Plan:  · As per Pulm, upon discharge, d/c Breo and send him with Trelegy Ellipta once daily and Xopenex as needed  · Not currently in any acute respiratory distress    Severe protein-calorie malnutrition ([facility] )  Assessment & Plan  Malnutrition Findings:   Malnutrition type: Chronic illness  Degree of Malnutrition: Other severe protein calorie malnutrition(related to inadequate intake/medical condition/advanced age as evidenced by significant depletion of fat/muscle (clavicles, shoulders, triceps, temples), 26% wt loss x 5 months (165 lb 2/14/20), +4 b/l LE edema  Treatment includes supplementation ) patient is noted to be thin with minimal muscle and adipose tissue  Unclear on patient's current nutritional status  BMI Findings: Body mass index is 20 82 kg/m²  Plan:  · Patient currently on regular diet with high-calorie and high-protein    Generalized weakness  Assessment & Plan  Likely secondary to advanced age, multiple medical conditions, hypothyroidism, and malnutrition  Concern for recent fall history  Plan:  · Plan to discharge to SNF  · See malnutrition plan     Thrombocytopenia [facility])  Assessment & Plan  Recent Labs     07/23/20  0510 07/24/20  0456   * 131*     Patient is currently asymptomatic  This has been chronic based on previous CBCs      Plan:  · Monitor with daily CBC  · Will continue with pharmacologic VTE prophylaxis; may consider discontinuation if platelet count worsens    Hypokalemia  Assessment & Plan  Recent Labs     20  0614 20  0510 20  0456   K 4 3 4 0 3 8     · Resolved    Plan  - continue monitoring potassium levels daily   - Consider goal of potassium level greater than >4 0 if not replace  - Monitor for signs and symptoms of hypokalemia daily      Hyperlipidemia  Assessment & Plan  · Patient is currently on pravastatin 40 mg p o  Daily    Essential hypertension  Assessment & Plan  · Continue terazosin  · Switched to Torsemide 20mg bid yesterday  · Monitor BP    Hyponatremia  Assessment & Plan  Recent Labs     20  0614 20  0510 20  0456   SODIUM 133* 135* 134*     Patient with Sodium of 134 today, currently asymptomatic, Likely due to his underlying diagnosis of congestive heart failure    Plan  -Continue to monitor sodium levels daily  - Assess for signs and symptoms of hyponatremia        VTE Pharmacologic Prophylaxis:   Pharmacologic: Enoxaparin (Lovenox)  Mechanical VTE Prophylaxis in Place: Yes    Discussions with Specialists or Other Care Team Provider: nursing,     Education and Discussions with Family / Patient: called and updated daughter, she is in agreement with plan    Current Length of Stay: 12 day(s)    Current Patient Status: Inpatient     Discharge Plan / Estimated Discharge Date: tomorrow    Code Status: Level 3 - DNAR and DNI      Subjective:   Patient seen this AM in NAD and looking comfortable  He denies SOB, chest pain, n/v, fever or chills  He is having regular BM and has good appetite  Right chest tube was removed yesterday  Overnight he had no events  Today left chest tube will be removed and he is for likely discharge to SNF tomorrow      Objective:     Vitals:   Temp (24hrs), Av 8 °F (36 6 °C), Min:97 5 °F (36 4 °C), Max:98 °F (36 7 °C)    Temp:  [97 5 °F (36 4 °C)-98 °F (36 7 °C)] 97 5 °F (36 4 °C)  HR:  [64-77] 64  Resp:  [18] 18  BP: ()/(48-55) 100/48  SpO2:  [97 %-100 %] 97 %  Body mass index is 20 82 kg/m²  Input and Output Summary (last 24 hours): Intake/Output Summary (Last 24 hours) at 7/24/2020 1330  Last data filed at 7/24/2020 0900  Gross per 24 hour   Intake 840 ml   Output 2295 ml   Net -1455 ml       Physical Exam:     Physical Exam   Constitutional: He is oriented to person, place, and time  No distress  thin   HENT:   Head: Normocephalic and atraumatic  Eyes: Pupils are equal, round, and reactive to light  EOM are normal    Cardiovascular: Normal rate, regular rhythm, normal heart sounds and intact distal pulses  Pulmonary/Chest: Effort normal  No respiratory distress  He has no wheezes  He exhibits no tenderness  mildly decreased breath sounds bilaterally,  Right chest tube removed yesterday, site with no signs of infection   Abdominal: Soft  Bowel sounds are normal  He exhibits no distension  There is no tenderness  Musculoskeletal: He exhibits no edema or tenderness  Neurological: He is alert and oriented to person, place, and time  Skin: Skin is warm and dry  No rash noted  Psychiatric: He has a normal mood and affect  His behavior is normal  Judgment and thought content normal    Nursing note and vitals reviewed  Additional Data:     Labs:    Results from last 7 days   Lab Units 07/24/20  0456  07/20/20  0552   WBC Thousand/uL 4 02*   < > 3 82*   HEMOGLOBIN g/dL 11 7*   < > 13 7   HEMATOCRIT % 35 4*   < > 42 6   PLATELETS Thousands/uL 131*   < > 148*   NEUTROS PCT %  --   --  69   LYMPHS PCT %  --   --  12*   MONOS PCT %  --   --  11   EOS PCT %  --   --  6    < > = values in this interval not displayed  Results from last 7 days   Lab Units 07/24/20  0456   POTASSIUM mmol/L 3 8   CHLORIDE mmol/L 94*   CO2 mmol/L 41*   BUN mg/dL 25   CREATININE mg/dL 0 65   CALCIUM mg/dL 8 1*           * I Have Reviewed All Lab Data Listed Above  * Additional Pertinent Lab Tests Reviewed:  Cinthya Herrmann Admission Reviewed    Imaging:    Imaging Reports Reviewed Today Include: none  Imaging Personally Reviewed by Myself Includes:  none    Recent Cultures (last 7 days):           Last 24 Hours Medication List:     Current Facility-Administered Medications:  albuterol 2 puff Inhalation Q6H PRN Talon Cheung MD   enoxaparin 40 mg Subcutaneous Daily Talon Cheung MD   fluticasone-vilanterol 1 puff Inhalation Daily Yaritza Vitale PA-C   guaiFENesin 600 mg Oral Q12H [address] Talon Cheung MD   levalbuterol 0 63 mg Nebulization TID Talon Cheung MD   levothyroxine 50 mcg Oral Daily Talon Cheung MD   montelukast 10 mg Oral HS Talon Cheung MD   pantoprazole 40 mg Oral Early Morning Talon Cheung MD   pravastatin 40 mg Oral Daily With Derek Almodovar MD   terazosin 5 mg Oral HS Talon Cheung MD   timolol 1 drop Both Eyes BID Talon Cheung MD   tiotropium 18 mcg Inhalation Daily Yaritza Vitale PA-C   torsemide 20 mg Oral BID (diuretic) Claria Ganser, CRNP        Today, Patient Was Seen By: Romero Coyle MD

## 2020-07-24 NOTE — PLAN OF CARE
Problem: OCCUPATIONAL THERAPY ADULT  Goal: Performs self-care activities at highest level of function for planned discharge setting  See evaluation for individualized goals  Description  Treatment Interventions: ADL retraining, Functional transfer training, UE strengthening/ROM, Endurance training, Cognitive reorientation, Patient/family training, Equipment evaluation/education, Fine motor coordination activities, Compensatory technique education, Continued evaluation, Energy conservation, Activityengagement          See flowsheet documentation for full assessment, interventions and recommendations  Outcome: Progressing  Note:   Limitation: Decreased ADL status, Decreased UE ROM, Decreased UE strength, Decreased Safe judgement during ADL, Decreased cognition, Decreased endurance, Decreased self-care trans, Decreased high-level ADLs, Decreased fine motor control     Assessment: Pt seen for skilled OT tx session focusing on challenging activity tolerance and standing tolerance during ADL performance  Pt agreeable and motivated to participate in sesion  Pt scored 0 on 30 second sit <>stand indicating + fall risk    Pt engaged in LBD w/ S and + time  Pt engaged in functional mobiltiy using RW w/ min A  Pt demonstrate increased activity and standing tolerance during ADL performance  Continue to recommend post acute rehab when medically stable for discharge from acute are to return to baseline level of I   Will continue to follow     OT Discharge Recommendation: Post-Acute Rehabilitation Services  OT - OK to Discharge: (post acute rehab)

## 2020-07-24 NOTE — PROGRESS NOTES
General Cardiology   Progress Note -  [facility]   Francis Pina 80 y o  male MRN: [id number]    Unit/Bed#: S -01 Encounter: [id number]    Assessment/ Plan:    1  Acute on chronic diastolic CHF: with b/l hydropneumothorax requiring b/l CT; now only has left CT; possibly coming out today  Attempting volume management with diuretics; currently getting torsemide 20mg BID  Appears compensated on exam today; remains negative fluid balance  Cr stable;   Continue this dose on discharge; follow up OP cardiology on 2020   2  B/l Pneumothorax: Left chest tube remains; possibly removing right today; managed by pulmonary; family ok with asept cath if needed; no surgical intervention  Volume management as above  3  HTN: no further hypotension; has been able to get his diuretics  Subjective: pt seen for follow up  No events overnight  He reports feeling fine today  No complaints  No chest pain, palpitations or SOB    Review of Systems   Constitution: Negative for decreased appetite and fever  Cardiovascular: Negative for chest pain, dyspnea on exertion, leg swelling, orthopnea, palpitations and syncope  Respiratory: Negative for cough, shortness of breath and wheezing  Gastrointestinal: Negative for nausea and vomiting  Genitourinary: Negative for dysuria  Neurological: Negative for dizziness and light-headedness  Psychiatric/Behavioral: Negative for altered mental status  All other systems reviewed and are negative  Objective:   Vitals: Blood pressure (!) 100/48, pulse 64, temperature 97 5 °F (36 4 °C), temperature source Oral, resp  rate 18, height 5' 7" (1 702 m), weight 60 3 kg (132 lb 15 oz), SpO2 99 %  ,       Body mass index is 20 82 kg/m²  ,     Systolic (98HCL), PY , Min:97 , ALB:707     Diastolic (68FSD), NNS:94, Min:48, Max:55      Intake/Output Summary (Last 24 hours) at 2020 1131  Last data filed at 2020 0900  Gross per 24 hour   Intake 1290 ml   Output 2295 ml Net -1005 ml     Weight (last 2 days)     Date/Time   Weight    07/24/20 0544   60 3 (132 94)    07/23/20 0600   60 1 (132 5) bedrest     Weight: bedrest  at 07/23/20 0600    07/22/20 0600   59 6 (131 39)            Telemetry Review:   No telemetry    Physical Exam   Constitutional: He is oriented to person, place, and time  No distress  Pt frail appearing; sitting up at side of bed in NAD   HENT:   Head: Normocephalic and atraumatic  Cardiovascular: Normal rate, regular rhythm, S1 normal and S2 normal    No murmur heard  No LE edema   Pulmonary/Chest: Effort normal    BS decreased; left CT intact   Abdominal: Soft  flat   Musculoskeletal: He exhibits no edema  Neurological: He is alert and oriented to person, place, and time  Skin: Skin is warm and dry  He is not diaphoretic  Psychiatric: He has a normal mood and affect  Nursing note and vitals reviewed      LABORATORY RESULTS      CBC with diff:   Results from last 7 days   Lab Units 07/24/20  0456 07/23/20  0510 07/22/20  0048 07/21/20  0558 07/20/20  0552 07/19/20  0558 07/18/20  0504   WBC Thousand/uL 4 02* 3 57* 3 29* 3 48* 3 82* 3 64* 4 74   HEMOGLOBIN g/dL 11 7* 11 8* 12 5 12 5 13 7 13 0 13 4   HEMATOCRIT % 35 4* 36 1* 37 5 39 4 42 6 41 4 41 6   MCV fL 98 97 97 98 99* 99* 99*   PLATELETS Thousands/uL 131* 133*  --  137* 148* 134* 140*   MCH pg 32 3 31 8 32 4 31 2 31 8 31 0 31 9   MCHC g/dL 33 1 32 7 33 3 31 7 32 2 31 4 32 2   RDW % 13 2 13 2 13 4 13 2 13 4 13 6 13 6   MPV fL 10 3 9 9 11 1 9 8 9 8 10 0 10 1   NRBC AUTO /100 WBCs  --   --   --   --  0  --  0     CMP:  Results from last 7 days   Lab Units 07/24/20  0456 07/23/20  0510 07/22/20  0614 07/21/20  0558 07/20/20  0552 07/19/20  0558 07/18/20  0504   POTASSIUM mmol/L 3 8 4 0 4 3 4 4 4 3 4 0 4 2   CHLORIDE mmol/L 94* 96* 96* 98* 98* 98* 97*   CO2 mmol/L 41* 40* 35* 35* 36* 35* 36*   BUN mg/dL 25 24 27* 27* 23 25 22   CREATININE mg/dL 0 65 0 50* 0 55* 0 63 0 74 0 60 0 58*   CALCIUM mg/dL 8  1* 7 8* 7 8* 7 9* 8 5 7 9* 7 9*   EGFR ml/min/1 73sq m 85 95 91 86 81 88 89     BMP:  Results from last 7 days   Lab Units 20  0456 20  0510 20  0614 20  0558 20  0552 20  0558 20  0504   POTASSIUM mmol/L 3 8 4 0 4 3 4 4 4 3 4 0 4 2   CHLORIDE mmol/L 94* 96* 96* 98* 98* 98* 97*   CO2 mmol/L 41* 40* 35* 35* 36* 35* 36*   BUN mg/dL 25 24 27* 27* 23 25 22   CREATININE mg/dL 0 65 0 50* 0 55* 0 63 0 74 0 60 0 58*   CALCIUM mg/dL 8 1* 7 8* 7 8* 7 9* 8 5 7 9* 7 9*     Lab Results   Component Value Date    NTBNP 556 (H) 2020    NTBNP 866 (H) 2019    NTBNP 320 2019      Lipid Profile:   No results found for: CHOL  Lab Results   Component Value Date    HDL 80 (H) 2019    HDL 67 (H) 2018    HDL 78 (H) 2017     Lab Results   Component Value Date    LDLCALC 55 2019    LDLCALC 73 2018    LDLCALC 60 2017     Lab Results   Component Value Date    TRIG 37 2019    TRIG 54 2018    TRIG 61 2017     Cardiac testing:   Results for orders placed during the hospital encounter of 20   Echo complete with contrast if indicated    Narrative [facility], [address] [facility]  [phone]    Transthoracic Echocardiogram  2D, M-mode, Doppler, and Color Doppler    Study date:  2020    Patient: Rico Easton  MR number: [id number]  Account number: [de-identified]  : 24-Mar-1929  Age: 80 years  Gender: Male  Status: Inpatient  Location: Bedside  Height: 67 in  Weight: 131 lb  BP: 131/ 61 mmHg    Indications: Heart Failure  Diagnoses: I50 9 - Heart failure, unspecified    Sonographer:  IMANI Salcedo  Primary Physician:  Justine Benoit MD  Referring Physician:  Alber Zarate MD  Group:  [facility]  Interpreting Physician:  Stephon Gilliland MD    SUMMARY    LEFT VENTRICLE:  Systolic function was normal  Ejection fraction was estimated to be 60 %    There were no regional wall motion abnormalities  Wall thickness was mildly increased  RIGHT VENTRICLE:  The size was normal   Systolic function was normal     MITRAL VALVE:  There was trace regurgitation  AORTIC VALVE:  There was mild regurgitation  TRICUSPID VALVE:  There was trace regurgitation  Estimated peak PA pressure was 50 mmHg  PERICARDIUM:  A small to moderate, free-flowing pericardial effusion was identified circumferential to the heart  There was no evidence of hemodynamic compromise  There was a large right pleural effusion  There was a large left pleural effusion  HISTORY: PRIOR HISTORY: COPD, pericardial effusion, CKD II, Respitory failure, Hyperlipidemia, and HTN  Congestive heart failure  Risk factors: hypertension  PROCEDURE: The procedure was performed at the bedside  This was a routine study  The transthoracic approach was used  The study included complete 2D imaging, M-mode, complete spectral Doppler, and color Doppler  The heart rate was 82 bpm,  at the start of the study  Images were obtained from the parasternal, apical, subcostal, and suprasternal notch acoustic windows  Image quality was adequate  LEFT VENTRICLE: Size was normal  Systolic function was normal  Ejection fraction was estimated to be 60 %  There were no regional wall motion abnormalities  Wall thickness was mildly increased  DOPPLER: The study was not technically  sufficient to allow evaluation of LV diastolic function  RIGHT VENTRICLE: The size was normal  Systolic function was normal  Wall thickness was normal     LEFT ATRIUM: Size was normal     RIGHT ATRIUM: Size was normal     MITRAL VALVE: Valve structure was normal  There was normal leaflet separation  DOPPLER: The transmitral velocity was within the normal range  There was no evidence for stenosis  There was trace regurgitation  AORTIC VALVE: The valve was trileaflet  Leaflets exhibited normal thickness and normal cuspal separation   DOPPLER: Transaortic velocity was within the normal range  There was no evidence for stenosis  There was mild regurgitation  TRICUSPID VALVE: The valve structure was normal  There was normal leaflet separation  DOPPLER: The transtricuspid velocity was within the normal range  There was no evidence for stenosis  There was trace regurgitation  Estimated peak PA  pressure was 50 mmHg  PULMONIC VALVE: Leaflets exhibited normal thickness, no calcification, and normal cuspal separation  DOPPLER: The transpulmonic velocity was within the normal range  There was no significant regurgitation  PERICARDIUM: A small to moderate, free-flowing pericardial effusion was identified circumferential to the heart  There was no evidence of hemodynamic compromise  There was a large right pleural effusion  There was a large left pleural  effusion  AORTA: The root exhibited normal size  SYSTEMIC VEINS: IVC: The inferior vena cava was not well visualized  The inferior vena cava was grossly normal in size      SYSTEM MEASUREMENT TABLES    2D  %FS: 37 92 %  Ao Diam: 3 53 cm  EDV(Teich): 53 54 ml  EF(Teich): 69 06 %  ESV(Teich): 16 57 ml  IVSd: 1 18 cm  LA Area: 16 16 cm2  LA Diam: 3 48 cm  LVEDV MOD A4C: 64 17 ml  LVEF MOD A4C: 75 23 %  LVESV MOD A4C: 15 89 ml  LVIDd: 3 58 cm  LVIDs: 2 22 cm  LVLd A4C: 6 94 cm  LVLs A4C: 5 64 cm  LVPWd: 1 1 cm  RA Area: 12 12 cm2  RVIDd: 3 95 cm  SV MOD A4C: 48 27 ml  SV(Teich): 36 98 ml    CW  AV MaxP 4 mmHg  AV Vmax: 1 36 m/s  MV PHT: 80 87 ms  MVA By PHT: 2 72 cm2  TR MaxP 85 mmHg  TR Vmax: 3 53 m/s    PW  LVOT Vmax: 1 24 m/s  LVOT maxP 12 mmHg  Lateral E': 0 04 m/s  MV A Nickolas: 1 11 m/s  MV Dec Gillespie: 2 58 m/s2  MV DecT: 321 49 ms  MV E Nickolas: 0 83 m/s  MV E/A Ratio: 0 75    [employer] Accredited Echocardiography Laboratory    Prepared and electronically signed by    Krzysztof Chirinos MD  Signed 2020 17:47:19       Meds/Allergies   all current active meds have been reviewed  Medications Prior to Admission   Medication    atenolol-chlorthalidone (TENORETIC) 50-25 mg per tablet    brimonidine-timolol (COMBIGAN) 0 2-0 5 %    furosemide (LASIX) 40 mg tablet    latanoprost (XALATAN) 0 005 % ophthalmic solution    levothyroxine 50 mcg tablet    montelukast (SINGULAIR) 10 mg tablet    potassium chloride (K-DUR,KLOR-CON) 20 mEq tablet    RABEprazole (ACIPHEX) 20 MG tablet    simvastatin (ZOCOR) 20 mg tablet    spironolactone (ALDACTONE) 25 mg tablet    terazosin (HYTRIN) 5 mg capsule    albuterol (PROVENTIL HFA,VENTOLIN HFA) 90 mcg/act inhaler    Blood Pressure Monitoring (B-D ASSURE BPM/AUTO ARM CUFF) MISC    levalbuterol (XOPENEX) 0 63 mg/3 mL nebulizer solution      Assessment:  Principal Problem:    Bilateral hydropneumothorax  Active Problems:    Chronic respiratory failure with hypoxia (HCC)    Hyponatremia    Essential hypertension    Hyperlipidemia    Hypokalemia    Thrombocytopenia (HCC)    Generalized weakness    Pericardial effusion    Severe protein-calorie malnutrition (HCC)    Acute on chronic respiratory failure with hypoxia and hypercapnia (HCC)    COPD (chronic obstructive pulmonary disease) (HCC)    Hypothyroidism    Acute on chronic diastolic heart failure (HCC)    GERD (gastroesophageal reflux disease)    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 acidosis    Counseling / Coordination of Care  Total floor / unit time spent today 20 minutes  Greater than 50% of total time was spent with the patient and / or family counseling and / or coordination of care  ** Please Note: Dragon 360 Dictation voice to text software may have been used in the creation of this document   **

## 2020-07-24 NOTE — PHYSICAL THERAPY NOTE
Physical Therapy Progress Note     20 1510   Pain Assessment   Pain Assessment Tool Pain Assessment not indicated - pt denies pain   Pain Score No Pain   Restrictions/Precautions   Weight Bearing Precautions Per Order No   Other Precautions Chair Alarm;Hard of hearing; Fall Risk;O2;Multiple lines  (1 5L O2 NC, chest tube)   General   Response to Previous Treatment Patient with no complaints from previous session  Family/Caregiver Present No   Cognition   Overall Cognitive Status Impaired   Arousal/Participation Alert; Cooperative   Attention Attends with cues to redirect   Orientation Level Oriented to person;Oriented to place;Oriented to situation   Memory Decreased recall of recent events   Following Commands Follows one step commands with increased time or repetition   Comments   (Pt was identified by name and , agreeable to treatment  )   Transfers   Sit to Stand 4  Minimal assistance   Additional items Assist x 1; Armrests; Increased time required;Verbal cues   Stand to Sit 4  Minimal assistance   Additional items Assist x 1; Armrests; Increased time required;Verbal cues   Ambulation/Elevation   Gait pattern Decreased foot clearance; Excessively slow; Short stride; Improper Weight shift   Gait Assistance 4  Minimal assist   Additional items Assist x 1;Verbal cues   Assistive Device Rolling walker   Distance 100',70'   Balance   Static Sitting Fair   Static Standing Fair -   Dynamic Standing Poor +   Ambulatory Poor +   Endurance Deficit   Endurance Deficit Yes   Endurance Deficit Description weakness fatigue   Activity Tolerance   Activity Tolerance Patient limited by fatigue   Nurse Made Aware yes, ok to see   Exercises   Hip Abduction Sitting;20 reps;AROM; Bilateral   Hip Adduction Sitting;20 reps;AROM; Bilateral   Knee AROM Long Arc Quad Sitting;20 reps;AROM; Bilateral   Ankle Pumps Sitting;20 reps;AROM; Bilateral   Marching Sitting;20 reps;AROM; Bilateral   Assessment   Prognosis Fair   Problem List Decreased strength;Decreased endurance; Impaired balance;Decreased mobility; Decreased cognition; Impaired judgement;Decreased safety awareness   Assessment Pt was found seated in bedside chair to begin session  HE was able to perform all xfers todayw tih min Ax1 as well as ambulation  Pt had 1 chest tube today that was attached to the RW with pt going increased distances each visit since admission  He demonstrated no LOB and was fatigued towards the end but noted that if needed, he could have ambulated more  Pt performed all seated TE as charted with little to no VCing needed  He felt good to end session and was happy with his progress thus far  Pt had all needs met and call bell in reach  Pt would benefit from continued PT in order to promote safe and functional mobility  Barriers to Discharge Decreased caregiver support   Goals   Patient Goals to go further then he walked last time  STG Expiration Date 07/24/20   Short Term Goal #1 In 7-10 days pt will amb with RW 500ft mod I, pt will transfer and perform bed mobility mod I   Plan   Treatment/Interventions LE strengthening/ROM; Functional transfer training; Therapeutic exercise; Endurance training;Cognitive reorientation;Patient/family training;Equipment eval/education; Bed mobility;Gait training;Spoke to nursing;Spoke to case management   PT Frequency Other (Comment)  (3-5x/wk)   Recommendation   PT Discharge Recommendation Post-Acute Rehabilitation Services   Equipment Recommended Walker  (RW)     Juanis Lepe, PTA

## 2020-07-24 NOTE — PLAN OF CARE
Problem: Potential for Falls  Goal: Patient will remain free of falls  Description  INTERVENTIONS:  - Assess patient frequently for physical needs  -  Identify cognitive and physical deficits and behaviors that affect risk of falls  -  [place] fall precautions as indicated by assessment   - Educate patient/family on patient safety including physical limitations  - Instruct patient to call for assistance with activity based on assessment  - Modify environment to reduce risk of injury  - Consider OT/PT consult to assist with strengthening/mobility  Outcome: Progressing     Problem: Prexisting or High Potential for Compromised Skin Integrity  Goal: Skin integrity is maintained or improved  Description  INTERVENTIONS:  - Identify patients at risk for skin breakdown  - Assess and monitor skin integrity  - Assess and monitor nutrition and hydration status  - Monitor labs   - Assess for incontinence   - Turn and reposition patient  - Assist with mobility/ambulation  - Relieve pressure over bony prominences  - Avoid friction and shearing  - Provide appropriate hygiene as needed including keeping skin clean and dry  - Evaluate need for skin moisturizer/barrier cream  - Collaborate with interdisciplinary team   - Patient/family teaching  - Consider wound care consult   Outcome: Progressing     Problem: Nutrition/Hydration-ADULT  Goal: Nutrient/Hydration intake appropriate for improving, restoring or maintaining nutritional needs  Description  Monitor and assess patient's nutrition/hydration status for malnutrition  Collaborate with interdisciplinary team and initiate plan and interventions as ordered  Monitor patient's weight and dietary intake as ordered or per policy  Utilize nutrition screening tool and intervene as necessary  Determine patient's food preferences and provide high-protein, high-caloric foods as appropriate       INTERVENTIONS:  - Monitor oral intake, urinary output, labs, and treatment plans  - Assess nutrition and hydration status and recommend course of action  - Evaluate amount of meals eaten  - Assist patient with eating if necessary   - Allow adequate time for meals  - Recommend/ encourage appropriate diets, oral nutritional supplements, and vitamin/mineral supplements  - Order, calculate, and assess calorie counts as needed  - Recommend, monitor, and adjust tube feedings and TPN/PPN based on assessed needs  - Assess need for intravenous fluids  - Provide specific nutrition/hydration education as appropriate  - Include patient/family/caregiver in decisions related to nutrition  Outcome: Progressing     Problem: PAIN - ADULT  Goal: Verbalizes/displays adequate comfort level or baseline comfort level  Description  Interventions:  - Encourage patient to monitor pain and request assistance  - Assess pain using appropriate pain scale  - Administer analgesics based on type and severity of pain and evaluate response  - Implement non-pharmacological measures as appropriate and evaluate response  - Consider cultural and social influences on pain and pain management  - Notify physician/advanced practitioner if interventions unsuccessful or patient reports new pain  Outcome: Progressing     Problem: SAFETY ADULT  Goal: Maintain or return to baseline ADL function  Description  INTERVENTIONS:  -  Assess patient's ability to carry out ADLs; assess patient's baseline for ADL function and identify physical deficits which impact ability to perform ADLs (bathing, care of mouth/teeth, toileting, grooming, dressing, etc )  - Assess/evaluate cause of self-care deficits   - Assess range of motion  - Assess patient's mobility; develop plan if impaired  - Assess patient's need for assistive devices and provide as appropriate  - Encourage maximum independence but intervene and supervise when necessary  - Involve family in performance of ADLs  - Assess for home care needs following discharge   - Consider OT consult to assist with ADL evaluation and planning for discharge  - Provide patient education as appropriate  Outcome: Progressing  Goal: Maintain or return mobility status to optimal level  Description  INTERVENTIONS:  - Assess patient's baseline mobility status (ambulation, transfers, stairs, etc )    - Identify cognitive and physical deficits and behaviors that affect mobility  - Identify mobility aids required to assist with transfers and/or ambulation (gait belt, sit-to-stand, lift, walker, cane, etc )  - [place] fall precautions as indicated by assessment  - Record patient progress and toleration of activity level on Mobility SBAR; progress patient to next Phase/Stage  - Instruct patient to call for assistance with activity based on assessment  - Consider rehabilitation consult to assist with strengthening/weightbearing, etc   Outcome: Progressing     Problem: DISCHARGE PLANNING  Goal: Discharge to home or other facility with appropriate resources  Description  INTERVENTIONS:  - Identify barriers to discharge w/patient and caregiver  - Arrange for needed discharge resources and transportation as appropriate  - Identify discharge learning needs (meds, wound care, etc )  - Arrange for interpretive services to assist at discharge as needed  - Refer to Case Management Department for coordinating discharge planning if the patient needs post-hospital services based on physician/advanced practitioner order or complex needs related to functional status, cognitive ability, or social support system  Outcome: Progressing     Problem: Knowledge Deficit  Goal: Patient/family/caregiver demonstrates understanding of disease process, treatment plan, medications, and discharge instructions  Description  Complete learning assessment and assess knowledge base    Interventions:  - Provide teaching at level of understanding  - Provide teaching via preferred learning methods  Outcome: Progressing     Problem: RESPIRATORY - ADULT  Goal: Achieves optimal ventilation and oxygenation  Description  INTERVENTIONS:  - Assess for changes in respiratory status  - Assess for changes in mentation and behavior  - Position to facilitate oxygenation and minimize respiratory effort  - Oxygen administered by appropriate delivery if ordered  - Initiate smoking cessation education as indicated  - Encourage broncho-pulmonary hygiene including cough, deep breathe, Incentive Spirometry  - Assess the need for suctioning and aspirate as needed  - Assess and instruct to report SOB or any respiratory difficulty  - Respiratory Therapy support as indicated  Outcome: Progressing     Problem: GENITOURINARY - ADULT  Goal: Maintains or returns to baseline urinary function  Description  INTERVENTIONS:  - Assess urinary function  - Encourage oral fluids to ensure adequate hydration if ordered  - Administer IV fluids as ordered to ensure adequate hydration  - Administer ordered medications as needed  - Offer frequent toileting  - Follow urinary retention protocol if ordered  Outcome: Progressing  Goal: Absence of urinary retention  Description  INTERVENTIONS:  - Assess patients ability to void and empty bladder  - Monitor I/O  - Bladder scan as needed  - Discuss with physician/AP medications to alleviate retention as needed  - Discuss catheterization for long term situations as appropriate  Outcome: Progressing  Goal: Urinary catheter remains patent  Description  INTERVENTIONS:  - Assess patency of urinary catheter  - If patient has a chronic can, consider changing catheter if non-functioning  - Follow guidelines for intermittent irrigation of non-functioning urinary catheter  Outcome: Progressing     Problem: METABOLIC, FLUID AND ELECTROLYTES - ADULT  Goal: Electrolytes maintained within normal limits  Description  INTERVENTIONS:  - Monitor labs and assess patient for signs and symptoms of electrolyte imbalances  - Administer electrolyte replacement as ordered  - Monitor response to electrolyte replacements, including repeat lab results as appropriate  - Instruct patient on fluid and nutrition as appropriate  Outcome: Progressing  Goal: Fluid balance maintained  Description  INTERVENTIONS:  - Monitor labs   - Monitor I/O and WT  - Instruct patient on fluid and nutrition as appropriate  - Assess for signs & symptoms of volume excess or deficit  Outcome: Progressing  Goal: Glucose maintained within target range  Description  INTERVENTIONS:  - Monitor Blood Glucose as ordered  - Assess for signs and symptoms of hyperglycemia and hypoglycemia  - Administer ordered medications to maintain glucose within target range  - Assess nutritional intake and initiate nutrition service referral as needed  Outcome: Progressing     Problem: SKIN/TISSUE INTEGRITY - ADULT  Goal: Skin integrity remains intact  Description  INTERVENTIONS  - Identify patients at risk for skin breakdown  - Assess and monitor skin integrity  - Assess and monitor nutrition and hydration status  - Monitor labs (i e  albumin)  - Assess for incontinence   - Turn and reposition patient  - Assist with mobility/ambulation  - Relieve pressure over bony prominences  - Avoid friction and shearing  - Provide appropriate hygiene as needed including keeping skin clean and dry  - Evaluate need for skin moisturizer/barrier cream  - Collaborate with interdisciplinary team (i e  Nutrition, Rehabilitation, etc )   - Patient/family teaching  Outcome: Progressing  Goal: Incision(s), wounds(s) or drain site(s) healing without S/S of infection  Description  INTERVENTIONS  - Assess and document risk factors for skin impairment   - Assess and document dressing, incision, wound bed, drain sites and surrounding tissue  - Consider nutrition services referral as needed  - Oral mucous membranes remain intact  - Provide patient/ family education  Outcome: Progressing  Goal: Oral mucous membranes remain intact  Description  INTERVENTIONS  - Assess oral mucosa and hygiene practices  - Implement preventative oral hygiene regimen  - Implement oral medicated treatments as ordered  - Initiate Nutrition services referral as needed  Outcome: Progressing     Problem: HEMATOLOGIC - ADULT  Goal: Maintains hematologic stability  Description  INTERVENTIONS  - Assess for signs and symptoms of bleeding or hemorrhage  - Monitor labs  - Administer supportive blood products/factors as ordered and appropriate  Outcome: Progressing     Problem: MUSCULOSKELETAL - ADULT  Goal: Maintain or return mobility to safest level of function  Description  INTERVENTIONS:  - Assess patient's ability to carry out ADLs; assess patient's baseline for ADL function and identify physical deficits which impact ability to perform ADLs (bathing, care of mouth/teeth, toileting, grooming, dressing, etc )  - Assess/evaluate cause of self-care deficits   - Assess range of motion  - Assess patient's mobility  - Assess patient's need for assistive devices and provide as appropriate  - Encourage maximum independence but intervene and supervise when necessary  - Involve family in performance of ADLs  - Assess for home care needs following discharge   - Consider OT consult to assist with ADL evaluation and planning for discharge  - Provide patient education as appropriate  Outcome: Progressing  Goal: Maintain proper alignment of affected body part  Description  INTERVENTIONS:  - Support, maintain and protect limb and body alignment  - Provide patient/ family with appropriate education  Outcome: Progressing     Problem: INFECTION - ADULT  Goal: Absence or prevention of progression during hospitalization  Description  INTERVENTIONS:  - Assess and monitor for signs and symptoms of infection  - Monitor lab/diagnostic results  - Monitor all insertion sites, i e  indwelling lines, tubes, and drains  - Monitor endotracheal if appropriate and nasal secretions for changes in amount and color  - [place] appropriate cooling/warming therapies per order  - Administer medications as ordered  - Instruct and encourage patient and family to use good hand hygiene technique  - Identify and instruct in appropriate isolation precautions for identified infection/condition  Outcome: Progressing

## 2020-07-24 NOTE — ASSESSMENT & PLAN NOTE
· Patient currently on 2L nasal cannula  Condition likely secondary to acute exacerbation of CHF and pleural effusions noted on chest x-ray  Patient regularly follows Dr Taniya Sullivan from pulmonology  Low suspicion for infection given patient's negative COVID-19 testing, no elevated white count, and no fever but a viral etiology is still a possibility  Given patient's continued acute hypoxemia, cannot completely rule out PE  Per pulmonology, effusions on chest x-ray does not completely explain patient's hypoxia  · Patients CT showed Bilateral Hydropneumothorax/ pleural effusions so IR placed bilateral chest tubes on 7/13/20  · Persistence of hypercapnia, likely due to hypoventilation (copd) however patient asymptomatic      Plan:  · Maintain SpO2 between 89% to 94%  · Repeat BMP to monitor hypercapnia

## 2020-07-24 NOTE — ASSESSMENT & PLAN NOTE
· Patient presented with weakness and confusion, found to have persistent small left pneumothorax on CXR  · CT showed bilateral hydropneumothorax with pleural effusions and IR placed bilateral chest tube on 7/13/2020  · 1st and 2nd cytology from 7/13 were negative for malignancy in right and left pleural fluid  · Pleural fluid culture yellow, wbc 1 156 , with no growth  · Pleural LDH: 169, Pleural protein: 3 3, serum LDH: 188, serum protein: 5 8, based on Light's criteria, likely exudative effusion  · Repeat CT chest showed improving bilateral hydropneumothorax, improving atelectasias  · Possible etiology of fluid is related to CHF/ third spacing  Pulmonologist talked with daughter Uma Jimenez) about possible transfer to [place] for thoracic surgery evaluation and VATs but daughter refused any surgery  · CXR after b/l tube were clamped showed tiny stable residual biapical pneumothoraces again, no pleural effusions  · Right-sided chest tube was removed yesterday  Plan:  · As per pulm, plan is to remove left chest tube today and continue diuretics to prevent fluid reaccumulation  Repeat chest x-ray tomorrow prior to discharge to SNF  · He will have chest x-ray early next week and if the fluid continues to accumulate despite diuretic dosing, proceed with ASEPT placement  · On Torsemide 20mg bid as per cardio  · Monitor for any signs of respiratory distress    · Continue to monitor SpO2 levels daily

## 2020-07-24 NOTE — PLAN OF CARE
Problem: Potential for Falls  Goal: Patient will remain free of falls  Description  INTERVENTIONS:  - Assess patient frequently for physical needs  -  Identify cognitive and physical deficits and behaviors that affect risk of falls  -  [place] fall precautions as indicated by assessment   - Educate patient/family on patient safety including physical limitations  - Instruct patient to call for assistance with activity based on assessment  - Modify environment to reduce risk of injury  - Consider OT/PT consult to assist with strengthening/mobility  Outcome: Progressing     Problem: Prexisting or High Potential for Compromised Skin Integrity  Goal: Skin integrity is maintained or improved  Description  INTERVENTIONS:  - Identify patients at risk for skin breakdown  - Assess and monitor skin integrity  - Assess and monitor nutrition and hydration status  - Monitor labs   - Assess for incontinence   - Turn and reposition patient  - Assist with mobility/ambulation  - Relieve pressure over bony prominences  - Avoid friction and shearing  - Provide appropriate hygiene as needed including keeping skin clean and dry  - Evaluate need for skin moisturizer/barrier cream  - Collaborate with interdisciplinary team   - Patient/family teaching  - Consider wound care consult   Outcome: Progressing     Problem: Nutrition/Hydration-ADULT  Goal: Nutrient/Hydration intake appropriate for improving, restoring or maintaining nutritional needs  Description  Monitor and assess patient's nutrition/hydration status for malnutrition  Collaborate with interdisciplinary team and initiate plan and interventions as ordered  Monitor patient's weight and dietary intake as ordered or per policy  Utilize nutrition screening tool and intervene as necessary  Determine patient's food preferences and provide high-protein, high-caloric foods as appropriate       INTERVENTIONS:  - Monitor oral intake, urinary output, labs, and treatment plans  - Assess nutrition and hydration status and recommend course of action  - Evaluate amount of meals eaten  - Assist patient with eating if necessary   - Allow adequate time for meals  - Recommend/ encourage appropriate diets, oral nutritional supplements, and vitamin/mineral supplements  - Order, calculate, and assess calorie counts as needed  - Recommend, monitor, and adjust tube feedings and TPN/PPN based on assessed needs  - Assess need for intravenous fluids  - Provide specific nutrition/hydration education as appropriate  - Include patient/family/caregiver in decisions related to nutrition  Outcome: Progressing     Problem: PAIN - ADULT  Goal: Verbalizes/displays adequate comfort level or baseline comfort level  Description  Interventions:  - Encourage patient to monitor pain and request assistance  - Assess pain using appropriate pain scale  - Administer analgesics based on type and severity of pain and evaluate response  - Implement non-pharmacological measures as appropriate and evaluate response  - Consider cultural and social influences on pain and pain management  - Notify physician/advanced practitioner if interventions unsuccessful or patient reports new pain  Outcome: Progressing     Problem: SAFETY ADULT  Goal: Maintain or return to baseline ADL function  Description  INTERVENTIONS:  -  Assess patient's ability to carry out ADLs; assess patient's baseline for ADL function and identify physical deficits which impact ability to perform ADLs (bathing, care of mouth/teeth, toileting, grooming, dressing, etc )  - Assess/evaluate cause of self-care deficits   - Assess range of motion  - Assess patient's mobility; develop plan if impaired  - Assess patient's need for assistive devices and provide as appropriate  - Encourage maximum independence but intervene and supervise when necessary  - Involve family in performance of ADLs  - Assess for home care needs following discharge   - Consider OT consult to assist with ADL evaluation and planning for discharge  - Provide patient education as appropriate  Outcome: Progressing  Goal: Maintain or return mobility status to optimal level  Description  INTERVENTIONS:  - Assess patient's baseline mobility status (ambulation, transfers, stairs, etc )    - Identify cognitive and physical deficits and behaviors that affect mobility  - Identify mobility aids required to assist with transfers and/or ambulation (gait belt, sit-to-stand, lift, walker, cane, etc )  - [place] fall precautions as indicated by assessment  - Record patient progress and toleration of activity level on Mobility SBAR; progress patient to next Phase/Stage  - Instruct patient to call for assistance with activity based on assessment  - Consider rehabilitation consult to assist with strengthening/weightbearing, etc   Outcome: Progressing     Problem: DISCHARGE PLANNING  Goal: Discharge to home or other facility with appropriate resources  Description  INTERVENTIONS:  - Identify barriers to discharge w/patient and caregiver  - Arrange for needed discharge resources and transportation as appropriate  - Identify discharge learning needs (meds, wound care, etc )  - Arrange for interpretive services to assist at discharge as needed  - Refer to Case Management Department for coordinating discharge planning if the patient needs post-hospital services based on physician/advanced practitioner order or complex needs related to functional status, cognitive ability, or social support system  Outcome: Progressing     Problem: Knowledge Deficit  Goal: Patient/family/caregiver demonstrates understanding of disease process, treatment plan, medications, and discharge instructions  Description  Complete learning assessment and assess knowledge base    Interventions:  - Provide teaching at level of understanding  - Provide teaching via preferred learning methods  Outcome: Progressing     Problem: RESPIRATORY - ADULT  Goal: Achieves optimal ventilation and oxygenation  Description  INTERVENTIONS:  - Assess for changes in respiratory status  - Assess for changes in mentation and behavior  - Position to facilitate oxygenation and minimize respiratory effort  - Oxygen administered by appropriate delivery if ordered  - Initiate smoking cessation education as indicated  - Encourage broncho-pulmonary hygiene including cough, deep breathe, Incentive Spirometry  - Assess the need for suctioning and aspirate as needed  - Assess and instruct to report SOB or any respiratory difficulty  - Respiratory Therapy support as indicated  Outcome: Progressing     Problem: GENITOURINARY - ADULT  Goal: Maintains or returns to baseline urinary function  Description  INTERVENTIONS:  - Assess urinary function  - Encourage oral fluids to ensure adequate hydration if ordered  - Administer IV fluids as ordered to ensure adequate hydration  - Administer ordered medications as needed  - Offer frequent toileting  - Follow urinary retention protocol if ordered  Outcome: Progressing  Goal: Absence of urinary retention  Description  INTERVENTIONS:  - Assess patients ability to void and empty bladder  - Monitor I/O  - Bladder scan as needed  - Discuss with physician/AP medications to alleviate retention as needed  - Discuss catheterization for long term situations as appropriate  Outcome: Progressing  Goal: Urinary catheter remains patent  Description  INTERVENTIONS:  - Assess patency of urinary catheter  - If patient has a chronic can, consider changing catheter if non-functioning  - Follow guidelines for intermittent irrigation of non-functioning urinary catheter  Outcome: Progressing     Problem: METABOLIC, FLUID AND ELECTROLYTES - ADULT  Goal: Electrolytes maintained within normal limits  Description  INTERVENTIONS:  - Monitor labs and assess patient for signs and symptoms of electrolyte imbalances  - Administer electrolyte replacement as ordered  - Monitor response to electrolyte replacements, including repeat lab results as appropriate  - Instruct patient on fluid and nutrition as appropriate  Outcome: Progressing  Goal: Fluid balance maintained  Description  INTERVENTIONS:  - Monitor labs   - Monitor I/O and WT  - Instruct patient on fluid and nutrition as appropriate  - Assess for signs & symptoms of volume excess or deficit  Outcome: Progressing  Goal: Glucose maintained within target range  Description  INTERVENTIONS:  - Monitor Blood Glucose as ordered  - Assess for signs and symptoms of hyperglycemia and hypoglycemia  - Administer ordered medications to maintain glucose within target range  - Assess nutritional intake and initiate nutrition service referral as needed  Outcome: Progressing     Problem: SKIN/TISSUE INTEGRITY - ADULT  Goal: Skin integrity remains intact  Description  INTERVENTIONS  - Identify patients at risk for skin breakdown  - Assess and monitor skin integrity  - Assess and monitor nutrition and hydration status  - Monitor labs (i e  albumin)  - Assess for incontinence   - Turn and reposition patient  - Assist with mobility/ambulation  - Relieve pressure over bony prominences  - Avoid friction and shearing  - Provide appropriate hygiene as needed including keeping skin clean and dry  - Evaluate need for skin moisturizer/barrier cream  - Collaborate with interdisciplinary team (i e  Nutrition, Rehabilitation, etc )   - Patient/family teaching  Outcome: Progressing  Goal: Incision(s), wounds(s) or drain site(s) healing without S/S of infection  Description  INTERVENTIONS  - Assess and document risk factors for skin impairment   - Assess and document dressing, incision, wound bed, drain sites and surrounding tissue  - Consider nutrition services referral as needed  - Oral mucous membranes remain intact  - Provide patient/ family education  Outcome: Progressing  Goal: Oral mucous membranes remain intact  Description  INTERVENTIONS  - Assess oral mucosa and hygiene practices  - Implement preventative oral hygiene regimen  - Implement oral medicated treatments as ordered  - Initiate Nutrition services referral as needed  Outcome: Progressing     Problem: HEMATOLOGIC - ADULT  Goal: Maintains hematologic stability  Description  INTERVENTIONS  - Assess for signs and symptoms of bleeding or hemorrhage  - Monitor labs  - Administer supportive blood products/factors as ordered and appropriate  Outcome: Progressing     Problem: MUSCULOSKELETAL - ADULT  Goal: Maintain or return mobility to safest level of function  Description  INTERVENTIONS:  - Assess patient's ability to carry out ADLs; assess patient's baseline for ADL function and identify physical deficits which impact ability to perform ADLs (bathing, care of mouth/teeth, toileting, grooming, dressing, etc )  - Assess/evaluate cause of self-care deficits   - Assess range of motion  - Assess patient's mobility  - Assess patient's need for assistive devices and provide as appropriate  - Encourage maximum independence but intervene and supervise when necessary  - Involve family in performance of ADLs  - Assess for home care needs following discharge   - Consider OT consult to assist with ADL evaluation and planning for discharge  - Provide patient education as appropriate  Outcome: Progressing  Goal: Maintain proper alignment of affected body part  Description  INTERVENTIONS:  - Support, maintain and protect limb and body alignment  - Provide patient/ family with appropriate education  Outcome: Progressing     Problem: INFECTION - ADULT  Goal: Absence or prevention of progression during hospitalization  Description  INTERVENTIONS:  - Assess and monitor for signs and symptoms of infection  - Monitor lab/diagnostic results  - Monitor all insertion sites, i e  indwelling lines, tubes, and drains  - Monitor endotracheal if appropriate and nasal secretions for changes in amount and color  - [place] appropriate cooling/warming therapies per order  - Administer medications as ordered  - Instruct and encourage patient and family to use good hand hygiene technique  - Identify and instruct in appropriate isolation precautions for identified infection/condition  Outcome: Progressing

## 2020-07-24 NOTE — SOCIAL WORK
JAMES spoke with [facility] re: patient readiness for discharge  Patient has been tolerating his right chest tube removal since yesterday  Plan is for the left tube to be removed today and then repeat CXR in the morning  As long as the CXR comes back clear, plan will be for discharge to [address] [place] [address] on Saturday, 7/25  CM confirmed with Mabel from [address] [place] [address] that they have received authorization for admission and are ready for patient whenever he's medically cleared for discharge  CM contacted patient's daughter Marisa Velásquez and reviewed intention for discharge to [address] [place] [address] tomorrow pending toleration of chest tube removal  Isela aware of said plan and supports same  IMM reviewed with patient's caregiver  patient's caregiver agree with discharge determination  Oneil Leach requesting CM Dept arrange WCV with O2 upon discharge and let her know once transportation is arranged  She is aware of and able to afford OOP cost  CM Dept will follow up with OO and daughter re: discharge plans once stable for discharge

## 2020-07-24 NOTE — OCCUPATIONAL THERAPY NOTE
[address] Progress Note     Patient Name: Corina Bhakta  [id number]'V Date: 7/24/2020  Problem List  Principal Problem:    Bilateral hydropneumothorax  Active Problems:    Chronic respiratory failure with hypoxia (HCC)    Hyponatremia    Essential hypertension    Hyperlipidemia    Hypokalemia    Thrombocytopenia (HCC)    Generalized weakness    Pericardial effusion    Severe protein-calorie malnutrition (HCC)    Acute on chronic respiratory failure with hypoxia and hypercapnia (HCC)    COPD (chronic obstructive pulmonary disease) (HCC)    Hypothyroidism    Acute on chronic diastolic heart failure (HCC)    GERD (gastroesophageal reflux disease)    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 acidosis        07/24/20 1132   Restrictions/Precautions   Weight Bearing Precautions Per Order No   Other Precautions Chair Alarm; Bed Alarm;Multiple lines;O2;Fall Risk  (chest tube, O2 via NC)   General   Response to Previous Treatment Patient with no complaints from previous session   Family/Caregiver Present No   Lifestyle   Reciprocal Relationships Pt reports supportive Kirk simon   Service to Others Pt is retired    Intrinsic Gratification Pt reports having to take care of his medications and O2  Pt added that he has a lot of chores to manage around the house   Pain Assessment   Pain Assessment Tool Pain Assessment not indicated - pt denies pain   Pain Score No Pain   ADL   Where Assessed Chair  (vs standing w/ RW)   Eating Assistance 6  Modified independent   Eating Deficit Setup   Grooming Assistance 5  Supervision/Setup   Grooming Deficit Setup;Supervision/safety; Increased time to complete;Standing with assistive device   Grooming Comments standing using RW to complete oral hygiene after set- up   [address] 4  Minimal Assistance   UB Bathing Deficit Setup;Supervision/safety;Verbal cueing; Increased time to complete   LB Dressing Assistance 5  Supervision/Setup LB Dressing Deficit Setup;Verbal cueing;Supervision/safety; Increased time to complete   LB Dressing Comments seated at edge of reclier chair to don /doff socks  Able to ext rot hip, flex hip  knee to cross LE over opposite to complete   Functional Standing Tolerance   Time ~3'   Activity grooming / oral hygiene   Comments tolerated standing ~2 minutes w/ out UE support or LOB and required unilateral UE support last minute   Bed Mobility   Supine to Sit Unable to assess   Sit to Supine Unable to assess   Additional Comments Pt seated OOB in chair upon arrival and at end of session w/ needs met, call bell in reach and chair alarm activated   Transfers   Sit to Stand 4  Minimal assistance   Additional items Assist x 1; Armrests; Bedrails; Increased time required;Verbal cues  (instruction for hand placement)   Stand to Sit 4  Minimal assistance   Additional items Assist x 1;Bedrails;Armrests; Increased time required;Verbal cues  (instruction for hand placement)   Additional Comments Pt scored 0 on 30 second sit <> stand (unable to complete sit to stand w/ B UE support)  Scores < 7 for mend 90-94indicates + fall risk   Functional Mobility   Functional Mobility 4  Minimal assistance   Additional Comments w/ in room using RW on O2   Additional items Rolling walker   Cognition   Overall Cognitive Status Impaired   Arousal/Participation Alert; Cooperative   Attention Attends with cues to redirect   Orientation Level Oriented to person;Oriented to place;Oriented to situation   Memory Decreased recall of recent events   Following Commands Follows one step commands with increased time or repetition   Comments Identified pt by full name and birthdate  Pt orineted to person, place  Able to follow directions during ADL performance w/ min cues   Able to parfticipate in conversation w/ + time for consistent recall   Activity Tolerance   Activity Tolerance Patient limited by fatigue   Medical Staff Made Aware spoke to Anna RAMIREZ and per RN, [place] [facility]uss appropriate to see pt   Assessment   Assessment Pt seen for skilled OT tx session focusing on challenging activity tolerance and standing tolerance during ADL performance  Pt agreeable and motivated to participate in sesion  Pt scored 0 on 30 second sit <>stand indicating + fall risk    Pt engaged in LBD w/ S and + time  Pt engaged in functional mobiltiy using RW w/ min A  Pt demonstrate increased activity and standing tolerance during ADL performance  Continue to recommend post acute rehab when medically stable for discharge from acute are to return to baseline level of I  Will continue to follow   Plan   Treatment Interventions ADL retraining;Functional transfer training; Endurance training;Patient/family training;Equipment evaluation/education;Continued evaluation; Activityengagement; Energy conservation   Goal Expiration Date 07/28/20   OT Treatment Day 1   OT Frequency 3-5x/wk   Recommendation   OT Discharge Recommendation Post-Acute Rehabilitation Services   Equipment Recommended Tub seat with back  (will continue to assess at rehab)   OT - OK to Discharge   (post acute rehab)   Barthel Index   Feeding 10   Bathing 0   Grooming Score 5   Dressing Score 5   Bladder Score 10   Bowels Score 10   Toilet Use Score 5   Transfers (Bed/Chair) Score 10   Mobility (Level Surface) Score 0   Stairs Score 0   B[place]hel Index Score 55   Modified Nguyen Scale   Modified Nguyen Scale 4     Demi Gonzales, OTR/L

## 2020-07-24 NOTE — ASSESSMENT & PLAN NOTE
Malnutrition Findings:   Malnutrition type: Chronic illness  Degree of Malnutrition: Other severe protein calorie malnutrition(related to inadequate intake/medical condition/advanced age as evidenced by significant depletion of fat/muscle (clavicles, shoulders, triceps, temples), 26% wt loss x 5 months (165 lb 2/14/20), +4 b/l LE edema  Treatment includes supplementation ) patient is noted to be thin with minimal muscle and adipose tissue  Unclear on patient's current nutritional status  BMI Findings: Body mass index is 20 82 kg/m²       Plan:  · Patient currently on regular diet with high-calorie and high-protein

## 2020-07-24 NOTE — ASSESSMENT & PLAN NOTE
Wt Readings from Last 3 Encounters:   07/24/20 60 3 kg (132 lb 15 oz)   02/14/20 74 9 kg (165 lb 1 6 oz)   12/17/19 77 3 kg (170 lb 6 4 oz)     · Patient's last echo completed in October of 2019 showed ejection fraction of 50%  Upon assessment on admission, patient appeared to be fluid overloaded given lower extremity edema and pleural effusions seen on chest x-ray and slightly elevated BNP of 556  · Patient regularly follows Dr Dylan Spencer  Echocardiogram on 7/13/2020 shows large pleural effusions and slightly enlarged pericardium  Plan:  · On oral Torsemide  · Continue Oxygen on nasal canula  · Monitor oxygen saturations levels daily  · Monitor signs and symptoms of volume overload daily    · Will follow up with outpatient cardiology on 8/7/20

## 2020-07-24 NOTE — PROGRESS NOTES
Progress Note - Pulmonary   Eulas Serum 80 y o  male MRN: [id number]  Unit/Bed#: S -01 Encounter: [id number]      Assessment/Plan:    · Bilateral hydro pneumothorax - overall improved  Right-sided chest tube was removed yesterday  Planning to remove left chest tube today  Continue diuretics in an effort to maintain euvolemic the and prevent fluid reaccumulation  He will need repeat chest x-ray early next week and if the fluid continues to accumulate despite diuretic dosing, proceed with ASEPT placement  Patient and family do not want to pursue surgical intervention  Repeat chest x-ray today shows very small right pleural effusion and stable apical pneumothoraces  Repeat chest x-ray tomorrow prior to discharge  · Severe COPD without acute exacerbation - plan to discharge with Trelegy Ellipta once daily (D/C Breo) and Xopenex as needed    · Acute on chronic hypoxic and hypercapnic respiratory failure - oxygenation is adequate on 2 liters/minute  Patient does not tolerate BiPAP    Subjective:   Patient denies shortness of breath  Denies pain  Objective:     Vitals: Blood pressure (!) 100/48, pulse 64, temperature 97 5 °F (36 4 °C), temperature source Oral, resp  rate 18, height 5' 7" (1 702 m), weight 60 3 kg (132 lb 15 oz), SpO2 99 %  , 2 L, Body mass index is 20 82 kg/m²  Intake/Output Summary (Last 24 hours) at 7/24/2020 1008  Last data filed at 7/24/2020 0900  Gross per 24 hour   Intake 1290 ml   Output 2045 ml   Net -755 ml       Physical Exam:      General:  Awake, alert, no distress   HEENT: PERRL, EOMI, moist mucosa    Heart:  Regular rate and rhythm, no murmur   Lungs: Decreased at the bases, no wheeze   Abdomen: Soft, nontender, normal bowel sounds   Extremities: No clubbing, cyanosis or edema    Labs: I have personally reviewed pertinent lab results      Results from last 7 days   Lab Units 07/24/20  0456 07/23/20  0510 07/22/20  0614 07/21/20  0558   WBC Thousand/uL 4 02* 3 57* 3 29* 3 48*   HEMOGLOBIN g/dL 11 7* 11 8* 12 5 12 5   HEMATOCRIT % 35 4* 36 1* 37 5 39 4   PLATELETS Thousands/uL 131* 133*  --  137*         Results from last 7 days   Lab Units 07/24/20  0456 07/23/20  0510 07/22/20  0614   POTASSIUM mmol/L 3 8 4 0 4 3   CHLORIDE mmol/L 94* 96* 96*   CO2 mmol/L 41* 40* 35*   BUN mg/dL 25 24 27*   CREATININE mg/dL 0 65 0 50* 0 55*   CALCIUM mg/dL 8 1* 7 8* 7 8*       Imaging and other studies: I have personally reviewed pertinent reports     and I have personally reviewed pertinent films in PACS chest x-ray today shows stable apical pneumothoraces and minimal right pleural effusion

## 2020-07-24 NOTE — ASSESSMENT & PLAN NOTE
Patient on 2L nasal canula, in no respiratory distress, with adequate oxygen saturation levels on nasal cannula      Plan  - Monitor SpO2 levels aiming SpO2 >90  - Continue oxygen 2L  -Patient unable to tolerate Bipap, no need to be discharged to SNF with it

## 2020-07-24 NOTE — ASSESSMENT & PLAN NOTE
Likely secondary to advanced age, multiple medical conditions, hypothyroidism, and malnutrition  Concern for recent fall history      Plan:  · Plan to discharge to SNF  · See malnutrition plan

## 2020-07-24 NOTE — ASSESSMENT & PLAN NOTE
PFT performed on December of 2019 showed severe obstructive pulmonary disease      Plan:  · As per Pulm, upon discharge, d/c Breo and send him with Trelegy Ellipta once daily and Xopenex as needed  · Not currently in any acute respiratory distress

## 2020-07-24 NOTE — ASSESSMENT & PLAN NOTE
Recent Labs     07/22/20  0614 07/23/20  0510 07/24/20  0456   K 4 3 4 0 3 8     · Resolved    Plan  - continue monitoring potassium levels daily   - Consider goal of potassium level greater than >4 0 if not replace  - Monitor for signs and symptoms of hypokalemia daily

## 2020-07-24 NOTE — ASSESSMENT & PLAN NOTE
Recent Labs     07/22/20  0614 07/23/20  0510 07/24/20  0456   SODIUM 133* 135* 134*     Patient with Sodium of 134 today, currently asymptomatic, Likely due to his underlying diagnosis of congestive heart failure    Plan  -Continue to monitor sodium levels daily  - Assess for signs and symptoms of hyponatremia

## 2020-07-24 NOTE — UTILIZATION REVIEW
Continued Stay Review    Date: 7/22/20                        Current Patient Class: INpatient   Current Level of Care: mEd/surg    HPI:89 y o  male initially admitted on 7/12  Assessment/Plan:   STR recommended by PT/OT  Thoracic surgery recommends VATS but daughter has refused  Consider IV albumin if hypotension  7/22 Pulmonary consult:  Chest x-ray done today shows stable a pickle pneumothoraces and no appreciable pleural effusions  Oxygenation is adequate on nasal cannula  Both chest tubes clamped  Monitor and if no significant accumulation, can be removed  Remains on 1 5 LNC  Cytology negative  REpeat CXR in am   Decreased breath sounds  CXR shows: Bilateral thoracostomy tubes again noted with tiny stable residual biapical pneumothoraces  7/22 Cardiology consult: Continue IV diuretics  Renal function stable  REcheck  Pertinent Labs/Diagnostic Results:   7/22 CXR: Bilateral thoracostomy tubes again noted with tiny stable residual biapical pneumothoraces  Results from last 7 days   Lab Units 07/22/20  0614 07/21/20  0558 07/20/20  0552  07/18/20  0504   WBC Thousand/uL 3 29* 3 48* 3 82*   < > 4 74   HEMOGLOBIN g/dL 12 5 12 5 13 7   < > 13 4   HEMATOCRIT % 37 5 39 4 42 6   < > 41 6   PLATELETS Thousands/uL  --  137* 148*   < > 140*   NEUTROS ABS Thousands/µL  --   --  2 70  --  3 33    < > = values in this interval not displayed           Results from last 7 days   Lab Units 07/22/20  0614 07/21/20  0558 07/20/20  0552   SODIUM mmol/L 133* 132* 134*   POTASSIUM mmol/L 4 3 4 4 4 3   CHLORIDE mmol/L 96* 98* 98*   CO2 mmol/L 35* 35* 36*   ANION GAP mmol/L 2* -1* 0*   BUN mg/dL 27* 27* 23   CREATININE mg/dL 0 55* 0 63 0 74   EGFR ml/min/1 73sq m 91 86 81   CALCIUM mg/dL 7 8* 7 9* 8 5             Results from last 7 days   Lab Units 07/22/20  0614 07/21/20  0558 07/20/20  0552 07/19/20  0558 07/18/20  0504   GLUCOSE RANDOM mg/dL 85 92 84 85 99             No results found for: BETA-HYDROXYBUTYRATE Results from last 7 days   Lab Units 07/21/20  1004 07/20/20  0552 07/19/20  0820   PH KANCHAN  7 442* 7 257* 7 292*   PCO2 KANCHAN mm Hg 49 8 83 3* 65 6*   PO2 KANCHAN mm Hg 198 2* 45 2* 60 7*   HCO3 KANCHAN mmol/L 33 2* 36 3* 31 0*   BASE EXC KANCHAN mmol/L 7 8 5 9 2 5   O2 CONTENT KANCHAN ml/dL 18 3 16 6 18 1   O2 HGB, VENOUS % 95 7* 81 2* 90 9*       Vital Signs:   Time  Temp  Pulse  Resp  BP  MAP (mmHg)  SpO2  Calculated FIO2 (%) - Nasal Cannula  Nasal Cannula O2 Flow Rate (L/min)  O2 Device  Patient Position - Orthostatic VS   07/22/20 2230        116/56               07/22/20 2200  98 °F (36 7 °C)  69  18  116/56    99 %      Nasal cannula  Lying   07/22/20 1946            97 %           07/22/20 1500  98 °F (36 7 °C)  75  18  100/52  75  98 %      Nasal cannula  Sitting   07/22/20 1352                           Intake/Output      07/22/20 0700 - 07/23/20 0659   Intake (ml) 357   Output (ml) 1350   Net (ml) -993   Last Weight 60 1 kg (132 lb 7 9 oz)       Medications:   Scheduled Medications:    Medications:  enoxaparin 40 mg Subcutaneous Daily   fluticasone-vilanterol 1 puff Inhalation Daily   guaiFENesin 600 mg Oral Q12H MARCIANO   levalbuterol 0 63 mg Nebulization TID   levothyroxine 50 mcg Oral Daily   montelukast 10 mg Oral HS   pantoprazole 40 mg Oral Early Morning   pravastatin 40 mg Oral Daily With Dinner   terazosin 5 mg Oral HS   timolol 1 drop Both Eyes BID   tiotropium 18 mcg Inhalation Daily   torsemide 20 mg Oral BID (diuretic)     Continuous IV Infusions:     PRN Meds:    albuterol 2 puff Inhalation Q6H PRN       Discharge Plan: TBD  Network Utilization Review Department  [email or web address]  ATTENTION: Please call with any questions or concerns to [phone] and carefully listen to the prompts so that you are directed to the right person   All voicemails are confidential   Guadalupe Hendricks all requests for admission clinical reviews, approved or denied determinations and any other requests to dedicated fax number below belonging to the campus where the patient is receiving treatment   List of dedicated fax numbers for the Facilities:  [address] DENIALS (Administrative/Medical Necessity) [phone]   [address]  (Maternity/NICU/Pediatrics) [phone]   Conda Sas [phone]   Jamas Piggs [phone]   [address] [place] [address] [phone]   Levell Pleasure [phone]   [address] [place] [address] [phone]   [facility]  [phone]   [address] [facility]  [address] [facility] [address] [facility] [phone]

## 2020-07-24 NOTE — PLAN OF CARE
Problem: PHYSICAL THERAPY ADULT  Goal: Performs mobility at highest level of function for planned discharge setting  See evaluation for individualized goals  Description  Treatment/Interventions: LE strengthening/ROM, Functional transfer training, Therapeutic exercise, Endurance training, Cognitive reorientation, Patient/family training, Equipment eval/education, Bed mobility, Gait training, Spoke to nursing, Spoke to case management, OT          See flowsheet documentation for full assessment, interventions and recommendations  Outcome: Progressing  Note:   Prognosis: Fair  Problem List: Decreased strength, Decreased endurance, Impaired balance, Decreased mobility, Decreased cognition, Impaired judgement, Decreased safety awareness  Assessment: Pt was found seated in bedside chair to begin session  HE was able to perform all xfers todayw tih min Ax1 as well as ambulation  Pt had 1 chest tube today that was attached to the RW with pt going increased distances each visit since admission  He demonstrated no LOB and was fatigued towards the end but noted that if needed, he could have ambulated more  Pt performed all seated TE as charted with little to no VCing needed  He felt good to end session and was happy with his progress thus far  Pt had all needs met and call bell in reach  Pt would benefit from continued PT in order to promote safe and functional mobility  Barriers to Discharge: Decreased caregiver support     PT Discharge Recommendation: Post-Acute Rehabilitation Services          See flowsheet documentation for full assessment

## 2020-07-25 ENCOUNTER — TELEPHONE (OUTPATIENT)
Dept: OTHER | Facility: OTHER | Age: 85
End: 2020-07-25

## 2020-07-25 ENCOUNTER — APPOINTMENT (INPATIENT)
Dept: RADIOLOGY | Facility: HOSPITAL | Age: 85
DRG: 291 | End: 2020-07-25
Payer: COMMERCIAL

## 2020-07-25 VITALS
DIASTOLIC BLOOD PRESSURE: 55 MMHG | SYSTOLIC BLOOD PRESSURE: 100 MMHG | RESPIRATION RATE: 18 BRPM | HEART RATE: 85 BPM | TEMPERATURE: 98 F | WEIGHT: 128.53 LBS | BODY MASS INDEX: 20.17 KG/M2 | HEIGHT: 67 IN | OXYGEN SATURATION: 99 %

## 2020-07-25 LAB
ANION GAP SERPL CALCULATED.3IONS-SCNC: 0 MMOL/L (ref 4–13)
BUN SERPL-MCNC: 30 MG/DL (ref 5–25)
CALCIUM SERPL-MCNC: 7.9 MG/DL (ref 8.3–10.1)
CHLORIDE SERPL-SCNC: 94 MMOL/L (ref 100–108)
CO2 SERPL-SCNC: 42 MMOL/L (ref 21–32)
CREAT SERPL-MCNC: 0.63 MG/DL (ref 0.6–1.3)
ERYTHROCYTE [DISTWIDTH] IN BLOOD BY AUTOMATED COUNT: 13.5 % (ref 11.6–15.1)
GFR SERPL CREATININE-BSD FRML MDRD: 86 ML/MIN/1.73SQ M
GLUCOSE SERPL-MCNC: 93 MG/DL (ref 65–140)
HCT VFR BLD AUTO: 36.7 % (ref 36.5–49.3)
HGB BLD-MCNC: 12.1 G/DL (ref 12–17)
MCH RBC QN AUTO: 31.9 PG (ref 26.8–34.3)
MCHC RBC AUTO-ENTMCNC: 33 G/DL (ref 31.4–37.4)
MCV RBC AUTO: 97 FL (ref 82–98)
PLATELET # BLD AUTO: 144 THOUSANDS/UL (ref 149–390)
PMV BLD AUTO: 10.1 FL (ref 8.9–12.7)
POTASSIUM SERPL-SCNC: 3.4 MMOL/L (ref 3.5–5.3)
RBC # BLD AUTO: 3.79 MILLION/UL (ref 3.88–5.62)
SODIUM SERPL-SCNC: 136 MMOL/L (ref 136–145)
WBC # BLD AUTO: 4.31 THOUSAND/UL (ref 4.31–10.16)

## 2020-07-25 PROCEDURE — 94760 N-INVAS EAR/PLS OXIMETRY 1: CPT

## 2020-07-25 PROCEDURE — 99239 HOSP IP/OBS DSCHRG MGMT >30: CPT | Performed by: INTERNAL MEDICINE

## 2020-07-25 PROCEDURE — 99232 SBSQ HOSP IP/OBS MODERATE 35: CPT | Performed by: INTERNAL MEDICINE

## 2020-07-25 PROCEDURE — 85027 COMPLETE CBC AUTOMATED: CPT | Performed by: INTERNAL MEDICINE

## 2020-07-25 PROCEDURE — 94640 AIRWAY INHALATION TREATMENT: CPT

## 2020-07-25 PROCEDURE — 71045 X-RAY EXAM CHEST 1 VIEW: CPT

## 2020-07-25 PROCEDURE — 94668 MNPJ CHEST WALL SBSQ: CPT

## 2020-07-25 PROCEDURE — 80048 BASIC METABOLIC PNL TOTAL CA: CPT | Performed by: INTERNAL MEDICINE

## 2020-07-25 RX ORDER — PANTOPRAZOLE SODIUM 40 MG/1
40 TABLET, DELAYED RELEASE ORAL
Qty: 30 TABLET | Refills: 0 | Status: CANCELLED | OUTPATIENT
Start: 2020-07-26 | End: 2020-08-25

## 2020-07-25 RX ORDER — POTASSIUM CHLORIDE 20 MEQ/1
20 TABLET, EXTENDED RELEASE ORAL DAILY
Qty: 30 TABLET | Refills: 0 | Status: CANCELLED | OUTPATIENT
Start: 2020-07-25 | End: 2020-08-24

## 2020-07-25 RX ORDER — PRAVASTATIN SODIUM 40 MG
40 TABLET ORAL
Qty: 30 TABLET | Refills: 0 | Status: CANCELLED | OUTPATIENT
Start: 2020-07-25 | End: 2020-08-24

## 2020-07-25 RX ORDER — LEVALBUTEROL INHALATION SOLUTION 0.63 MG/3ML
0.63 SOLUTION RESPIRATORY (INHALATION) 3 TIMES DAILY PRN
Qty: 90 VIAL | Refills: 0 | Status: SHIPPED | OUTPATIENT
Start: 2020-07-25

## 2020-07-25 RX ORDER — POTASSIUM CHLORIDE 20 MEQ/1
20 TABLET, EXTENDED RELEASE ORAL ONCE
Status: COMPLETED | OUTPATIENT
Start: 2020-07-25 | End: 2020-07-25

## 2020-07-25 RX ORDER — TORSEMIDE 20 MG/1
20 TABLET ORAL DAILY
Qty: 30 TABLET | Refills: 0 | Status: SHIPPED | OUTPATIENT
Start: 2020-07-25 | End: 2020-08-24

## 2020-07-25 RX ADMIN — LEVALBUTEROL HYDROCHLORIDE 0.63 MG: 0.63 SOLUTION RESPIRATORY (INHALATION) at 07:29

## 2020-07-25 RX ADMIN — TORSEMIDE 20 MG: 20 TABLET ORAL at 08:39

## 2020-07-25 RX ADMIN — TIMOLOL MALEATE 1 DROP: 5 SOLUTION/ DROPS OPHTHALMIC at 08:40

## 2020-07-25 RX ADMIN — PANTOPRAZOLE SODIUM 40 MG: 40 TABLET, DELAYED RELEASE ORAL at 06:22

## 2020-07-25 RX ADMIN — POTASSIUM CHLORIDE 20 MEQ: 1500 TABLET, EXTENDED RELEASE ORAL at 08:38

## 2020-07-25 RX ADMIN — LEVOTHYROXINE SODIUM 50 MCG: 50 TABLET ORAL at 06:22

## 2020-07-25 RX ADMIN — FLUTICASONE FUROATE AND VILANTEROL TRIFENATATE 1 PUFF: 100; 25 POWDER RESPIRATORY (INHALATION) at 08:40

## 2020-07-25 RX ADMIN — ENOXAPARIN SODIUM 40 MG: 40 INJECTION SUBCUTANEOUS at 08:39

## 2020-07-25 RX ADMIN — TIOTROPIUM BROMIDE 18 MCG: 18 CAPSULE ORAL; RESPIRATORY (INHALATION) at 08:40

## 2020-07-25 RX ADMIN — GUAIFENESIN 600 MG: 600 TABLET, EXTENDED RELEASE ORAL at 08:39

## 2020-07-25 NOTE — ASSESSMENT & PLAN NOTE
Recent Labs     07/23/20  0510 07/24/20  0456 07/25/20  0451   K 4 0 3 8 3 4*     · Repleted with 20 Meq KCl  · To be discharged on his home med of Spironolactone, which is a potassium-sparing med    Plan  - continue monitoring potassium levels  - Consider goal of potassium level greater than >4 0 if not replace  - Monitor for signs and symptoms of hypokalemia daily

## 2020-07-25 NOTE — ASSESSMENT & PLAN NOTE
Recent Labs     07/23/20  0510 07/24/20  0456 07/25/20  0451   SODIUM 135* 134* 136     Normal today, likely due to his underlying diagnosis of congestive heart failure    Plan  -Continue to monitor sodium levels  - Assess for signs and symptoms of hyponatremia

## 2020-07-25 NOTE — SOCIAL WORK
CM made aware patient is medically stable for DC to [facility]  CM verified with [facility]O Central Intake patient is approved to come over  CM verified with bedside RN Fer Pate patient is WCV appropriate as O2 is only worn at night  CM spoke to Cm Riggs [place] with [facility] to arrange [address] [facility] transportation with [facility] for noon   Facility contacts completed  CM made patient, bedside RN, SLIM, [facility] Central Intake, and daughter [facility] aware of transport time

## 2020-07-25 NOTE — PROGRESS NOTES
Progress Note - Pulmonary   Vernona Fraction 80 y o  male MRN: [id number]  Unit/Bed#: S -01 Encounter: [id number]    Assessment/Plan:    1  Acute on chronic hypoxic and hypercapnic respiratory failure likely secondary to multifaceted as listed below       -  currently Transition to room air 99%, patient does not wear home O2       -   maintain saturations greater than 88%       -   incentive spirometry, deep breathing cough, OOB as tolerated    2  Severe COPD without acute exacerbation       -  no indication to initiate steroid therapy       -  home regimen to DC on: Trelegy 100/62 5/25 mcg 1 puff daily, Xopenex q 6h p r n     3  Bilateral hydropneumothorax       -  wet read as chest x-ray 7/25/2020-, trace hydropneumothorax        -  will need repeat chest x-ray in 1 week, if fluid is reaccumulated will need Plurex        -  will need pulmonary follow-up within 1 week    -  pulmonary will sign off  -  outpatient follow-up per discharge instructions  -  patient chest x-ray order placed    Chief Complaint:    "I feel really good"    Subjective:    Devin Garcia was comfortably sitting in his bed  He reports he would like to be discharged today  He reports his respiratory status has significantly improved since discharge  No significant overnight events reported  Patient currently denies any fever, chills, hemoptysis, headaches, night sweats, pleuritic chest pain, or palpitations  Objective:    Vitals: Blood pressure 100/55, pulse 85, temperature 98 °F (36 7 °C), temperature source Oral, resp  rate 18, height 5' 7" (1 702 m), weight 58 3 kg (128 lb 8 5 oz), SpO2 99 %  2L,Body mass index is 20 13 kg/m²        Intake/Output Summary (Last 24 hours) at 7/25/2020 1006  Last data filed at 7/25/2020 0451  Gross per 24 hour   Intake 960 ml   Output 2030 ml   Net -1070 ml       Invasive Devices     Peripheral Intravenous Line            Peripheral IV 07/23/20 Right;Ventral (anterior) Forearm 2 days          Drain Chest Tube 1 Left Pleural 10 2 Fr  11 days                Physical Exam:  Physical Exam   Constitutional: He is oriented to person, place, and time  He appears well-developed and well-nourished  No distress  HENT:   Head: Normocephalic and atraumatic  Neck: Normal range of motion  Neck supple  No tracheal deviation present  No thyromegaly present  Cardiovascular: Normal rate, regular rhythm and normal heart sounds  Exam reveals no friction rub  No murmur heard  Pulmonary/Chest: No accessory muscle usage or stridor  No tachypnea and no bradypnea  No respiratory distress  He has decreased breath sounds  He has no wheezes  He has no rhonchi  He has no rales  He exhibits no tenderness  Diminished breath sounds at bases   Abdominal: Soft  Bowel sounds are normal  He exhibits no distension  There is no tenderness  Musculoskeletal: Normal range of motion  He exhibits no edema or deformity  Neurological: He is alert and oriented to person, place, and time  Skin: Skin is warm and dry  No rash noted  He is not diaphoretic  No erythema  Labs:    I have personally reviewed pertinent lab results CBC:   Lab Results   Component Value Date    WBC 4 31 07/25/2020    HGB 12 1 07/25/2020    HCT 36 7 07/25/2020    MCV 97 07/25/2020     (L) 07/25/2020    MCH 31 9 07/25/2020    MCHC 33 0 07/25/2020    RDW 13 5 07/25/2020    MPV 10 1 07/25/2020   , CMP:   Lab Results   Component Value Date    SODIUM 136 07/25/2020    K 3 4 (L) 07/25/2020    CL 94 (L) 07/25/2020    CO2 42 (H) 07/25/2020    BUN 30 (H) 07/25/2020    CREATININE 0 63 07/25/2020    CALCIUM 7 9 (L) 07/25/2020    EGFR 86 07/25/2020       Imaging and other studies: I have personally reviewed pertinent films in PACS     CXR 7/24/2020- portable trace left hydropneumothorax

## 2020-07-25 NOTE — ASSESSMENT & PLAN NOTE
· Patient presented with weakness and confusion, found to have persistent small left pneumothorax on CXR  · CT showed bilateral hydropneumothorax with pleural effusions and IR placed bilateral chest tube on 7/13/2020  · 1st and 2nd cytology from 7/13 were negative for malignancy in right and left pleural fluid  · Pleural fluid culture yellow, wbc 1 156 , with no growth  · Pleural LDH: 169, Pleural protein: 3 3, serum LDH: 188, serum protein: 5 8, based on Light's criteria, likely exudative effusion  · Repeat CT chest showed improving bilateral hydropneumothorax, improving atelectasias  · Possible etiology of fluid is related to CHF/ third spacing  Pulmonologist talked with daughter about possible transfer to [facility] for thoracic surgery evaluation but daughter refused any surgery  · Rt sided chest tube removed 7/23 and Lt sided chest tube removed 7/24  · Repeat CXR this AM did not show any pleural effusions    Plan:  · Clear for discharge to SNF  · He will have chest x-ray early next week and if the fluid continues to accumulate despite diuretic dosing, proceed with ASEPT placement  · Discharged on Torsemide 20mg daily as per cardio  · Monitor for any signs of respiratory distress    · Continue to monitor SpO2 levels daily

## 2020-07-25 NOTE — PLAN OF CARE
Problem: Potential for Falls  Goal: Patient will remain free of falls  Description  INTERVENTIONS:  - Assess patient frequently for physical needs  -  Identify cognitive and physical deficits and behaviors that affect risk of falls  -  [place] fall precautions as indicated by assessment   - Educate patient/family on patient safety including physical limitations  - Instruct patient to call for assistance with activity based on assessment  - Modify environment to reduce risk of injury  - Consider OT/PT consult to assist with strengthening/mobility  Outcome: Progressing     Problem: Prexisting or High Potential for Compromised Skin Integrity  Goal: Skin integrity is maintained or improved  Description  INTERVENTIONS:  - Identify patients at risk for skin breakdown  - Assess and monitor skin integrity  - Assess and monitor nutrition and hydration status  - Monitor labs   - Assess for incontinence   - Turn and reposition patient  - Assist with mobility/ambulation  - Relieve pressure over bony prominences  - Avoid friction and shearing  - Provide appropriate hygiene as needed including keeping skin clean and dry  - Evaluate need for skin moisturizer/barrier cream  - Collaborate with interdisciplinary team   - Patient/family teaching  - Consider wound care consult   Outcome: Progressing     Problem: Nutrition/Hydration-ADULT  Goal: Nutrient/Hydration intake appropriate for improving, restoring or maintaining nutritional needs  Description  Monitor and assess patient's nutrition/hydration status for malnutrition  Collaborate with interdisciplinary team and initiate plan and interventions as ordered  Monitor patient's weight and dietary intake as ordered or per policy  Utilize nutrition screening tool and intervene as necessary  Determine patient's food preferences and provide high-protein, high-caloric foods as appropriate       INTERVENTIONS:  - Monitor oral intake, urinary output, labs, and treatment plans  - Assess nutrition and hydration status and recommend course of action  - Evaluate amount of meals eaten  - Assist patient with eating if necessary   - Allow adequate time for meals  - Recommend/ encourage appropriate diets, oral nutritional supplements, and vitamin/mineral supplements  - Order, calculate, and assess calorie counts as needed  - Recommend, monitor, and adjust tube feedings and TPN/PPN based on assessed needs  - Assess need for intravenous fluids  - Provide specific nutrition/hydration education as appropriate  - Include patient/family/caregiver in decisions related to nutrition  Outcome: Progressing     Problem: PAIN - ADULT  Goal: Verbalizes/displays adequate comfort level or baseline comfort level  Description  Interventions:  - Encourage patient to monitor pain and request assistance  - Assess pain using appropriate pain scale  - Administer analgesics based on type and severity of pain and evaluate response  - Implement non-pharmacological measures as appropriate and evaluate response  - Consider cultural and social influences on pain and pain management  - Notify physician/advanced practitioner if interventions unsuccessful or patient reports new pain  Outcome: Progressing     Problem: SAFETY ADULT  Goal: Maintain or return to baseline ADL function  Description  INTERVENTIONS:  -  Assess patient's ability to carry out ADLs; assess patient's baseline for ADL function and identify physical deficits which impact ability to perform ADLs (bathing, care of mouth/teeth, toileting, grooming, dressing, etc )  - Assess/evaluate cause of self-care deficits   - Assess range of motion  - Assess patient's mobility; develop plan if impaired  - Assess patient's need for assistive devices and provide as appropriate  - Encourage maximum independence but intervene and supervise when necessary  - Involve family in performance of ADLs  - Assess for home care needs following discharge   - Consider OT consult to assist with ADL evaluation and planning for discharge  - Provide patient education as appropriate  Outcome: Progressing  Goal: Maintain or return mobility status to optimal level  Description  INTERVENTIONS:  - Assess patient's baseline mobility status (ambulation, transfers, stairs, etc )    - Identify cognitive and physical deficits and behaviors that affect mobility  - Identify mobility aids required to assist with transfers and/or ambulation (gait belt, sit-to-stand, lift, walker, cane, etc )  - [place] fall precautions as indicated by assessment  - Record patient progress and toleration of activity level on Mobility SBAR; progress patient to next Phase/Stage  - Instruct patient to call for assistance with activity based on assessment  - Consider rehabilitation consult to assist with strengthening/weightbearing, etc   Outcome: Progressing     Problem: DISCHARGE PLANNING  Goal: Discharge to home or other facility with appropriate resources  Description  INTERVENTIONS:  - Identify barriers to discharge w/patient and caregiver  - Arrange for needed discharge resources and transportation as appropriate  - Identify discharge learning needs (meds, wound care, etc )  - Arrange for interpretive services to assist at discharge as needed  - Refer to Case Management Department for coordinating discharge planning if the patient needs post-hospital services based on physician/advanced practitioner order or complex needs related to functional status, cognitive ability, or social support system  Outcome: Progressing     Problem: Knowledge Deficit  Goal: Patient/family/caregiver demonstrates understanding of disease process, treatment plan, medications, and discharge instructions  Description  Complete learning assessment and assess knowledge base    Interventions:  - Provide teaching at level of understanding  - Provide teaching via preferred learning methods  Outcome: Progressing     Problem: RESPIRATORY - ADULT  Goal: Achieves optimal ventilation and oxygenation  Description  INTERVENTIONS:  - Assess for changes in respiratory status  - Assess for changes in mentation and behavior  - Position to facilitate oxygenation and minimize respiratory effort  - Oxygen administered by appropriate delivery if ordered  - Initiate smoking cessation education as indicated  - Encourage broncho-pulmonary hygiene including cough, deep breathe, Incentive Spirometry  - Assess the need for suctioning and aspirate as needed  - Assess and instruct to report SOB or any respiratory difficulty  - Respiratory Therapy support as indicated  Outcome: Progressing     Problem: GENITOURINARY - ADULT  Goal: Maintains or returns to baseline urinary function  Description  INTERVENTIONS:  - Assess urinary function  - Encourage oral fluids to ensure adequate hydration if ordered  - Administer IV fluids as ordered to ensure adequate hydration  - Administer ordered medications as needed  - Offer frequent toileting  - Follow urinary retention protocol if ordered  Outcome: Progressing  Goal: Absence of urinary retention  Description  INTERVENTIONS:  - Assess patients ability to void and empty bladder  - Monitor I/O  - Bladder scan as needed  - Discuss with physician/AP medications to alleviate retention as needed  - Discuss catheterization for long term situations as appropriate  Outcome: Progressing  Goal: Urinary catheter remains patent  Description  INTERVENTIONS:  - Assess patency of urinary catheter  - If patient has a chronic can, consider changing catheter if non-functioning  - Follow guidelines for intermittent irrigation of non-functioning urinary catheter  Outcome: Progressing     Problem: METABOLIC, FLUID AND ELECTROLYTES - ADULT  Goal: Electrolytes maintained within normal limits  Description  INTERVENTIONS:  - Monitor labs and assess patient for signs and symptoms of electrolyte imbalances  - Administer electrolyte replacement as ordered  - Monitor response to electrolyte replacements, including repeat lab results as appropriate  - Instruct patient on fluid and nutrition as appropriate  Outcome: Progressing  Goal: Fluid balance maintained  Description  INTERVENTIONS:  - Monitor labs   - Monitor I/O and WT  - Instruct patient on fluid and nutrition as appropriate  - Assess for signs & symptoms of volume excess or deficit  Outcome: Progressing  Goal: Glucose maintained within target range  Description  INTERVENTIONS:  - Monitor Blood Glucose as ordered  - Assess for signs and symptoms of hyperglycemia and hypoglycemia  - Administer ordered medications to maintain glucose within target range  - Assess nutritional intake and initiate nutrition service referral as needed  Outcome: Progressing     Problem: SKIN/TISSUE INTEGRITY - ADULT  Goal: Skin integrity remains intact  Description  INTERVENTIONS  - Identify patients at risk for skin breakdown  - Assess and monitor skin integrity  - Assess and monitor nutrition and hydration status  - Monitor labs (i e  albumin)  - Assess for incontinence   - Turn and reposition patient  - Assist with mobility/ambulation  - Relieve pressure over bony prominences  - Avoid friction and shearing  - Provide appropriate hygiene as needed including keeping skin clean and dry  - Evaluate need for skin moisturizer/barrier cream  - Collaborate with interdisciplinary team (i e  Nutrition, Rehabilitation, etc )   - Patient/family teaching  Outcome: Progressing  Goal: Incision(s), wounds(s) or drain site(s) healing without S/S of infection  Description  INTERVENTIONS  - Assess and document risk factors for skin impairment   - Assess and document dressing, incision, wound bed, drain sites and surrounding tissue  - Consider nutrition services referral as needed  - Oral mucous membranes remain intact  - Provide patient/ family education  Outcome: Progressing  Goal: Oral mucous membranes remain intact  Description  INTERVENTIONS  - Assess oral mucosa and hygiene practices  - Implement preventative oral hygiene regimen  - Implement oral medicated treatments as ordered  - Initiate Nutrition services referral as needed  Outcome: Progressing     Problem: HEMATOLOGIC - ADULT  Goal: Maintains hematologic stability  Description  INTERVENTIONS  - Assess for signs and symptoms of bleeding or hemorrhage  - Monitor labs  - Administer supportive blood products/factors as ordered and appropriate  Outcome: Progressing     Problem: MUSCULOSKELETAL - ADULT  Goal: Maintain or return mobility to safest level of function  Description  INTERVENTIONS:  - Assess patient's ability to carry out ADLs; assess patient's baseline for ADL function and identify physical deficits which impact ability to perform ADLs (bathing, care of mouth/teeth, toileting, grooming, dressing, etc )  - Assess/evaluate cause of self-care deficits   - Assess range of motion  - Assess patient's mobility  - Assess patient's need for assistive devices and provide as appropriate  - Encourage maximum independence but intervene and supervise when necessary  - Involve family in performance of ADLs  - Assess for home care needs following discharge   - Consider OT consult to assist with ADL evaluation and planning for discharge  - Provide patient education as appropriate  Outcome: Progressing  Goal: Maintain proper alignment of affected body part  Description  INTERVENTIONS:  - Support, maintain and protect limb and body alignment  - Provide patient/ family with appropriate education  Outcome: Progressing     Problem: INFECTION - ADULT  Goal: Absence or prevention of progression during hospitalization  Description  INTERVENTIONS:  - Assess and monitor for signs and symptoms of infection  - Monitor lab/diagnostic results  - Monitor all insertion sites, i e  indwelling lines, tubes, and drains  - Monitor endotracheal if appropriate and nasal secretions for changes in amount and color  - [place] appropriate cooling/warming therapies per order  - Administer medications as ordered  - Instruct and encourage patient and family to use good hand hygiene technique  - Identify and instruct in appropriate isolation precautions for identified infection/condition  Outcome: Progressing

## 2020-07-25 NOTE — ASSESSMENT & PLAN NOTE
Recent Labs     07/23/20  0510 07/24/20  0456 07/25/20  0451   * 131* 144*     Patient is currently asymptomatic  This has been chronic based on previous CBCs      Plan:  · Monitor with daily CBC  · Can continue with pharmacologic VTE prophylaxis and consider discontinuation if platelet count worsens

## 2020-07-25 NOTE — PROGRESS NOTES
Cardiology Service Progress Note - Susie Casas 80 y o  male MRN: [id number]    Unit/Bed#: S -01 Encounter: [id number]      Assessment:    Principal Problem:    Bilateral hydropneumothorax  Active Problems:    Chronic respiratory failure with hypoxia (HCC)    Hyponatremia    Essential hypertension    Hyperlipidemia    Hypokalemia    Thrombocytopenia (HCC)    Generalized weakness    Severe protein-calorie malnutrition (HCC)    Acute on chronic respiratory failure with hypoxia and hypercapnia (HCC)    COPD (chronic obstructive pulmonary disease) (HCC)    Hypothyroidism    Acute on chronic diastolic heart failure (HCC)    GERD (gastroesophageal reflux disease)    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 acidosis      Plan:  Ambulated yesterday, denies any dyspnea, no chest pain  On supplemental oxygen  Chronic hypercapnic respiratory failure with pulmonary hypertension with bilateral hydropneumothorax, status post bilateral chest tube placements, currently stable  Awaiting discharge, will avoid over-diuresis given significant hypercapnia  Negative one 0 8 L overnight  We will decrease the Demadex to once daily      Subjective:   Patient seen and examined  No significant events overnight   negative  Objective:     Vitals: Blood pressure 100/55, pulse 85, temperature 98 °F (36 7 °C), temperature source Oral, resp  rate 18, height 5' 7" (1 702 m), weight 58 3 kg (128 lb 8 5 oz), SpO2 98 %  , Body mass index is 20 13 kg/m² , I/O last 3 completed shifts: In: 1200 [P O :1200]  Out: 2935 [Urine:2725; Chest Tube:210]  No intake/output data recorded    Wt Readings from Last 3 Encounters:   07/25/20 58 3 kg (128 lb 8 5 oz)   02/14/20 74 9 kg (165 lb 1 6 oz)   12/17/19 77 3 kg (170 lb 6 4 oz)       Intake/Output Summary (Last 24 hours) at 7/25/2020 0714  Last data filed at 7/25/2020 0451  Gross per 24 hour   Intake 1200 ml   Output 2685 ml   Net -1485 ml     I/O last 3 completed shifts: In: 1200 [P O :1200]  Out: 2935 [Urine:2725; Chest Tube:210]    No significant arrhythmias seen on telemetry review         Physical Exam:    General appearance: alert and oriented, in no acute distress  Neck: no carotid bruit and no JVD  Lungs: crackles  Heart: regular rate and rhythm, S1, S2 normal, no murmur, click, rub or gallop  Extremities: extremities normal, warm and well-perfused; no cyanosis, clubbing, or edema      Current Facility-Administered Medications:     albuterol (PROVENTIL HFA,VENTOLIN HFA) inhaler 2 puff, 2 puff, Inhalation, Q6H PRN, Vinayak Lr MD    enoxaparin (LOVENOX) subcutaneous injection 40 mg, 40 mg, Subcutaneous, Daily, Vinayak Lr MD, 40 mg at 07/24/20 0820    fluticasone-vilanterol (BREO ELLIPTA) 100-25 mcg/inh inhaler 1 puff, 1 puff, Inhalation, Daily, Telma Gutierrez PA-C, 1 puff at 07/24/20 0821    guaiFENesin (MUCINEX) 12 hr tablet 600 mg, 600 mg, Oral, Q12H [address], Vinayak Lr MD, 600 mg at 07/24/20 2141    levalbuterol (XOPENEX) inhalation solution 0 63 mg, 0 63 mg, Nebulization, TID, Vinayak Lr MD, 0 63 mg at 07/24/20 2005    levothyroxine tablet 50 mcg, 50 mcg, Oral, Daily, Vinayak Lr MD, 50 mcg at 07/25/20 0622    montelukast (SINGULAIR) tablet 10 mg, 10 mg, Oral, HS, Vinayak Lr MD, 10 mg at 07/24/20 2141    pantoprazole (PROTONIX) EC tablet 40 mg, 40 mg, Oral, Early Morning, Vinayak Lr MD, 40 mg at 07/25/20 0622    potassium chloride (K-DUR,KLOR-CON) CR tablet 20 mEq, 20 mEq, Oral, Once, Ramirez Scott MD    pravastatin (PRAVACHOL) tablet 40 mg, 40 mg, Oral, Daily With Steffi Thomas MD, 40 mg at 07/24/20 1701    terazosin (HYTRIN) capsule 5 mg, 5 mg, Oral, HS, Vinayak Lr MD, 5 mg at 07/24/20 2141    timolol (TIMOPTIC) 0 5 % ophthalmic solution 1 drop, 1 drop, Both Eyes, BID, Vinayak Lr MD, 1 drop at 07/24/20 1701    tiotropium (SPIRIVA) capsule for inhaler 18 mcg, 18 mcg, Inhalation, Daily, Francia Vernon Madison Howard PA-C, 18 mcg at 07/24/20 9928    torsemide (DEMADEX) tablet 20 mg, 20 mg, Oral, BID (diuretic), LOUISE Williamson, 20 mg at 07/24/20 1701    Labs & Results:        Results from last 7 days   Lab Units 07/25/20  0451 07/24/20  0456 07/23/20  0510   WBC Thousand/uL 4 31 4 02* 3 57*   HEMOGLOBIN g/dL 12 1 11 7* 11 8*   HEMATOCRIT % 36 7 35 4* 36 1*   PLATELETS Thousands/uL 144* 131* 133*         Results from last 7 days   Lab Units 07/25/20  0451 07/24/20  0456 07/23/20  0510   POTASSIUM mmol/L 3 4* 3 8 4 0   CHLORIDE mmol/L 94* 94* 96*   CO2 mmol/L 42* 41* 40*   BUN mg/dL 30* 25 24   CREATININE mg/dL 0 63 0 65 0 50*   CALCIUM mg/dL 7 9* 8 1* 7 8*         Chest X-Ray is obtained; result - normal chest X-ray, pending review by Radiologist, no pneumothorax appreciable  Minimal right-sided effusion  Echo        EKG personally reviewed by Gretchen Thomas MD      Counseling / Coordination of Care  Total floor / unit time spent today 15 minutes  Thank you for the opportunity to participate in the care of this patient  TRISHA Richardson MD  [address] [facility] Quinten Moya MD   07/25/20   7:14 AM

## 2020-07-25 NOTE — DISCHARGE INSTRUCTIONS
Take your medications as directed, and keep your follow up appointments  Adhere to a heart healthy lifestyle, maintaining a low sodium diet  Daily weight and record  If your weight increases 2-3 lbs in one day, or 5 lbs in 2 days, you are short of breath or have lower extremity swelling, please call the heart failure team at  [facility] at [phone]  Pleural Effusion   WHAT YOU NEED TO KNOW:   What is pleural effusion? Pleural effusion is fluid buildup in the space between the layers of the pleura  The pleura are thin layers of tissue that form a 2-layered lining around the lungs  One layer of the pleura rests directly on the lungs  The other layer rests on the chest wall  There is normally a small amount of fluid called pleural fluid between these layers  This fluid helps your lungs move easily when you breathe  What causes pleural effusion? · Heart failure or other heart and lung problems such as a pulmonary embolism (blockage of a blood vessel in the lungs)    · Lung infections such as pneumonia or tuberculosis (TB)    · Inflammation of the pleura, called pleurisy    · Cancer, injury, or problems with other organs in your chest or abdomen, such as cirrhosis or pancreatitis  What are the signs and symptoms of pleural effusion? You may have no symptoms  A pleural effusion may cause you to cough or feel short of breath  You may breathe faster than usual  You may have mild to severe chest pain that starts or gets worse when you breathe in or cough  Depending on the cause of your pleural effusion, you may have other symptoms, such as a fever  How is pleural effusion diagnosed? Your healthcare provider will examine you and listen to your heart and lungs through a stethoscope  You may need any of the following:  · Blood tests  may show infection, or they may provide information about your overall health  · A chest x-ray  may show fluid around your lungs or signs of infection       · A CT scan , or CAT scan, takes pictures of your lungs  The pictures may show the cause of your pleural effusion  You may be given a dye before the pictures are taken to help healthcare providers see your lungs better  Tell the healthcare provider if you have ever had an allergic reaction to contrast dye  · An ultrasound of the chest  uses sound waves to show pictures of your lungs on a monitor  An ultrasound may help healthcare providers find extra pleural fluid or the cause of it  · A thoracentesis  is a procedure to take fluid out of your chest  You are given numbing medicine, and then a needle is put between your ribs  The extra pleural fluid is removed through the needle  This fluid may be sent to a lab for tests  These tests may help healthcare providers find the cause of your pleural effusion and the best way to treat it  You may need a thoracentesis more than once  How is pleural effusion treated? Treatment depends on the cause of your pleural effusion and how bad your symptoms are  You may need any of the following:  · Diuretics  may help you lose extra fluid caused by heart failure or other problems  · Antibiotics  help prevent or treat an infection caused by bacteria  · Prescription pain medicine  may be given  Ask your healthcare provider how to take this medicine safely  · NSAIDs  help decrease swelling and pain or fever  This medicine is available with or without a doctor's order  NSAIDs can cause stomach bleeding or kidney problems in certain people  If you take blood thinner medicine, always ask your healthcare provider if NSAIDs are safe for you  Always read the medicine label and follow directions  · Steroids  or other types of medicines may be given to decrease swelling  · Drainage  of extra pleural fluid may be done using thoracentesis or a chest tube  A chest tube may stay in your chest for days or weeks  This allows the extra fluid around your lungs to drain over time   You may need medicines put directly into your chest if the fluid does not drain out easily  · Surgery  may be needed if your pleural effusion keeps coming back or if it increases your risk for other problems  When should I contact my healthcare provider? · You have a fever  · Your breathing problems do not go away or get worse  · Your pain does not go away or gets worse  · You cough up yellow, green, gray, or bloody mucus  · You have questions or concerns about your condition or care  When should I seek immediate care or call 911? · You feel faint, or you cannot think clearly  · Your lips or fingernails turn blue  · You find it very hard to breathe  CARE AGREEMENT:   You have the right to help plan your care  Learn about your health condition and how it may be treated  Discuss treatment options with your caregivers to decide what care you want to receive  You always have the right to refuse treatment  The above information is an  only  It is not intended as medical advice for individual conditions or treatments  Talk to your doctor, nurse or pharmacist before following any medical regimen to see if it is safe and effective for you  © 2017 [facility] Information is for End User's use only and may not be sold, redistributed or otherwise used for commercial purposes  All illustrations and images included in CareNotes® are the copyrighted property of A D A M [employer] Inc  or Bob Garcia

## 2020-07-25 NOTE — ASSESSMENT & PLAN NOTE
Wt Readings from Last 3 Encounters:   07/25/20 58 3 kg (128 lb 8 5 oz)   02/14/20 74 9 kg (165 lb 1 6 oz)   12/17/19 77 3 kg (170 lb 6 4 oz)     · Patient's last echo completed in October of 2019 showed ejection fraction of 50%  Upon assessment on admission, patient appeared to be fluid overloaded given lower extremity edema and pleural effusions seen on chest x-ray and slightly elevated BNP of 556  · Patient regularly follows Dr Lesley Rothman  Echocardiogram on 7/13/2020 shows large pleural effusions and slightly enlarged pericardium  Plan:  · On oral Torsemide  · Continue Oxygen on nasal canula  · Monitor oxygen saturations levels daily  · Monitor signs and symptoms of volume overload daily    · Will follow up with outpatient cardiology on 8/7/20

## 2020-07-25 NOTE — ASSESSMENT & PLAN NOTE
Malnutrition Findings:   Malnutrition type: Chronic illness  Degree of Malnutrition: Other severe protein calorie malnutrition(related to inadequate intake/medical condition/advanced age as evidenced by significant depletion of fat/muscle (clavicles, shoulders, triceps, temples), 26% wt loss x 5 months (165 lb 2/14/20), +4 b/l LE edema  Treatment includes supplementation ) patient is noted to be thin with minimal muscle and adipose tissue  Unclear on patient's current nutritional status  BMI Findings: Body mass index is 20 13 kg/m²       Plan:  · Patient currently on regular diet with high-calorie and high-protein

## 2020-07-25 NOTE — DISCHARGE INSTR - AVS FIRST PAGE
Dear Parish Melissa,     It was our pleasure to care for you here at [facility], [facility]  It is our hope that we were always able to exceed the expected standards for your care during your stay  You were hospitalized due to weakness  You were cared for on the [facility] third floor by Reggie Mosqueda MD under the service of Jeffry Dueñas MD with the [facility] who covers for your primary care physician (PCP), Juan Carlos Angel MD, while you were hospitalized  If you have any questions or concerns related to this hospitalization, you may contact us at [phone]  For follow up as well as any medication refills, we recommend that you follow up with your primary care physician  A registered nurse will reach out to you by phone within a few days after your discharge to answer any additional questions that you may have after going home  However, at this time we provide for you here, the most important instructions / recommendations at discharge:     · Notable Medication Adjustments -   · Start taking Trelegy 1 puff daily  · Start taking Torsemide 20mg daily  · Stop taking Lasix and Potassium Chloride tablets  · Continue Spironolactone and Amlodipine, in other to the other indicated home mediations  · Take Xopenex as needed  · Testing Required after Discharge -   · Repeat CXR next week to check for re-accumulation of the pleural fluid  · Important follow up information -   · Please follow up with cardiologist, pulmonologist and PCP  · Other Instructions -   Practice social distancing  Adequate hand washing hygiene  Wear mask in public at all times  · Please review this entire after visit summary as additional general instructions including medication list, appointments, activity, diet, any pertinent wound care, and other additional recommendations from your care team that may be provided for you        Sincerely,     Reggie Mosqueda MD

## 2020-07-25 NOTE — ASSESSMENT & PLAN NOTE
Likely secondary to advanced age, multiple medical conditions, hypothyroidism, and malnutrition  Concern for recent fall history      Plan:  · Discharge to SNF for rehab  · See malnutrition plan

## 2020-07-25 NOTE — ASSESSMENT & PLAN NOTE
· Continue terazosin and home med of Amlodipine  · As per Cardio, continue on Torsemide 20mg once a day  · Monitor BP

## 2020-07-25 NOTE — ASSESSMENT & PLAN NOTE
· Patient currently on 2L nasal cannula  Condition likely secondary to acute exacerbation of CHF and pleural effusions noted on chest x-ray  Patient regularly follows Dr Nicolas Mendoza from pulmonology  Low suspicion for infection given patient's negative COVID-19 testing, no elevated white count, and no fever but a viral etiology is still a possibility  · Patients CT showed Bilateral Hydropneumothorax/ pleural effusions so IR placed bilateral chest tubes on 7/13/20  · Persistence of hypercapnia, likely due to hypoventilation (copd) however patient asymptomatic      Plan:  · Maintain SpO2 between 89% to 94%

## 2020-07-25 NOTE — ASSESSMENT & PLAN NOTE
Patient on 2L nasal canula, in no respiratory distress, with adequate oxygen saturation levels on nasal cannula      Plan  -As per pulm, to be discharged on Trelegy Ellipta once daily and Xopenex prn   -Monitor SpO2 levels aiming SpO2 >90  - Continue oxygen 2L  -will follow up with pulmonology as outpatient

## 2020-07-25 NOTE — ASSESSMENT & PLAN NOTE
PFT performed on December of 2019 showed severe obstructive pulmonary disease      Plan:  · As per Pulm, he is to be discharged with Trelegy Ellipta once daily and Xopenex as needed  · Not currently in any acute respiratory distress

## 2020-07-27 ENCOUNTER — NURSING HOME VISIT (OUTPATIENT)
Dept: GERIATRICS | Facility: OTHER | Age: 85
End: 2020-07-27
Payer: COMMERCIAL

## 2020-07-27 DIAGNOSIS — R53.1 GENERALIZED WEAKNESS: ICD-10-CM

## 2020-07-27 DIAGNOSIS — E03.9 HYPOTHYROIDISM, UNSPECIFIED TYPE: Chronic | ICD-10-CM

## 2020-07-27 DIAGNOSIS — I10 ESSENTIAL HYPERTENSION: Chronic | ICD-10-CM

## 2020-07-27 DIAGNOSIS — H40.10X0 OPEN-ANGLE GLAUCOMA OF BOTH EYES, UNSPECIFIED GLAUCOMA STAGE, UNSPECIFIED OPEN-ANGLE GLAUCOMA TYPE: ICD-10-CM

## 2020-07-27 DIAGNOSIS — I50.32 CHRONIC HEART FAILURE WITH PRESERVED EJECTION FRACTION (HCC): Chronic | ICD-10-CM

## 2020-07-27 DIAGNOSIS — J94.8 HYDROPNEUMOTHORAX: Primary | ICD-10-CM

## 2020-07-27 DIAGNOSIS — J96.21 ACUTE ON CHRONIC RESPIRATORY FAILURE WITH HYPOXIA AND HYPERCAPNIA (HCC): ICD-10-CM

## 2020-07-27 DIAGNOSIS — J96.22 ACUTE ON CHRONIC RESPIRATORY FAILURE WITH HYPOXIA AND HYPERCAPNIA (HCC): ICD-10-CM

## 2020-07-27 PROCEDURE — 99306 1ST NF CARE HIGH MDM 50: CPT | Performed by: FAMILY MEDICINE

## 2020-07-27 NOTE — UTILIZATION REVIEW
Notification of Discharge  This is a Notification of Discharge from our facility [address]  Please be advised that this patient has been discharge from our facility  Below you will find the admission and discharge date and time including the patients disposition  PRESENTATION DATE: 7/12/2020  2:58 PM  OBS ADMISSION DATE:   IP ADMISSION DATE: 7/12/20 1634   DISCHARGE DATE: 7/25/2020 12:47 PM  DISPOSITION: Non [facility] SN[facility]/S[facility] Non [facility] SNF/[facility]/SNU   Admission Orders listed below:  Admission Orders (From admission, onward)     Ordered        07/12/20 1634  Inpatient Admission (expected length of stay for this patient Order details is greater than two midnights)  Once                   Please contact the UR Department if additional information is required to close this patient's authorization/case  [address] [facility] Utilization Review Department  Main: [phone] x carefully listen to the prompts  All voicemails are confidential   [email or web address]  Send all requests for admission clinical reviews, approved or denied determinations and any other requests to dedicated fax number below belonging to the campus where the patient is receiving treatment   List of dedicated fax numbers:  [address] [place] [address] DENIALS (Administrative/Medical Necessity) [phone]   [address]  (Maternity/NICU/Pediatrics) [phone]   Gabriel Irwin [phone]   Jhonatan Briceño [phone]   Dewey Cagle [phone]   Shalom [address] [place] [address] [facility] [phone]   [facility]  [phone]   [address] [facility] [address]  [address] [facility] [address] [facility] [phone]

## 2020-07-27 NOTE — PROGRESS NOTES
[address]  [address] [facility] 27 [facility], 326 [facility] 31  History and Physical    NAME: Chase Petersen  AGE: 80 y o  SEX: male [id number]    DATE OF ENCOUNTER: 7/28/2020    Code status:  No CPR    Assessment and Plan     1  Bilateral hydropneumothorax     - s/p bilateral chest tube placement and removal     - improved     - monitor for signs of respiratory difficulty    2  Acute on chronic respiratory failure with hypoxia and hypercapnia (HCC)/COPD     -  Stable     -  Uses 3 L of oxygen via nasal cannula     - monitor oxygen saturations Q shift     -  Continue Trelegy inhaler as ordered     -  Continue levalbuterol nebulizer as needed     -  Continue Singulair 10 mg p o  daily    3  Essential hypertension     -  Controlled      -  Continue amlodipine 10 mg p o  Daily     -  Continue simvastatin 20 mg p o  daily    4  Generalized weakness/Amb dysfunction     -  PT/OT ordered     -  Fall precautions in place     -  Uses walker at baseline    5  Chronic heart failure with preserved ejection fraction (HCC)     -  Stable     -  Continue spironolactone 25 mg p o  Daily     -  Continue torsemide 20 mg p o  Daily     -  Continue terazosin 5 mg p o  daily    6  Hypothyroidism, unspecified type     -  Stable     -  Continue levothyroxine 50 mcg p o  daily    7  Open-angle glaucoma of both eyes, unspecified glaucoma stage, unspecified open-angle glaucoma type     -  Stable     -   Continue Combigan eyedrops as ordered     -  Continue latanoprost eyedrops as ordered     CBC, BMP ordered  All medications and routine orders were reviewed and updated as needed  Plan discussed with: Patient    Chief Complaint     Seen for admission at [facility] [address]    History of Present Illness     Chase Petersen, a 79 y/o male admitted to the hospital with generalized weakness and worsening confusion  CT chest showed bilateral hydropneumothorax with pleural effusions   IR placed bilateral chest tubes, cytology was negative for malignancy  Repeat x rays showed improving hydropneumothorax, and both chest tubes were removed  He was discharged to [facility] for subacute rehab  He was seen and examined at bedside, stable  He lives at home alone,  independent of ADLs ,daughter helps with IADLs  He worked as  at [employer] for 36 years  He uses walker at home, had few falls recently  He walked well with PT today, once he is strong he wants to go back home, he is not going to drive anymore,  He has glaucoma and macular degeneration, gets shorts to the eye  He uses oxygen 3 L via nasal cannula at home  He has no hearing aids or dentures  His appetite is good and is sleeping okay      HISTORY:  Past Medical History:   Diagnosis Date    Allergic rhinitis     Hypercholesteremia     Hypertension     Pneumonia      Family History   Family history unknown: Yes     Social History     Socioeconomic History    Marital status: /Civil Union     Spouse name: None    Number of children: None    Years of education: None    Highest education level: None   Occupational History    None   Social Needs    Financial resource strain: None    Food insecurity:     Worry: None     Inability: None    Transportation needs:     Medical: None     Non-medical: None   Tobacco Use    Smoking status: Former Smoker     Packs/day: 0 20     Years: 10 00     Pack years: 2 00     Types: Cigarettes    Smokeless tobacco: Never Used    Tobacco comment: Quit 60 years ago    Substance and Sexual Activity    Alcohol use: Not Currently     Frequency: Monthly or less     Comment: Socially    Drug use: Never    Sexual activity: Not Currently   Lifestyle    Physical activity:     Days per week: None     Minutes per session: None    Stress: None   Relationships    Social connections:     Talks on phone: None     Gets together: None     Attends [employer] service: None     Active member of club or organization: None     Attends meetings of clubs or organizations: None     Relationship status: None    Intimate partner violence:     Fear of current or ex partner: None     Emotionally abused: None     Physically abused: None     Forced sexual activity: None   Other Topics Concern    None   Social History Narrative    None       Allergies:  No Known Allergies    Review of Systems     Review of Systems   Constitutional: Positive for activity change and fatigue  HENT: Positive for hearing loss  Negative for trouble swallowing  Eyes: Positive for visual disturbance  Respiratory: Negative  Cardiovascular: Negative  Gastrointestinal: Negative  Genitourinary: Negative  Musculoskeletal: Positive for gait problem  Neurological: Positive for weakness  Psychiatric/Behavioral: Negative  All other systems reviewed and are negative  As in HPI  Medications and orders     All medications reviewed and updated in Nursing Home EMR  Objective     Vitals: T:  98 2, P:  68, R:  16, BP:  107/58, 98% on 3 L,Wt:  130 2 lb    Physical Exam   Constitutional: He is oriented to person, place, and time  No distress  Cachectic elderly male   HENT:   Head: Normocephalic and atraumatic  Nose: Nose normal    Mouth/Throat: Oropharynx is clear and moist  No oropharyngeal exudate  Eyes: Pupils are equal, round, and reactive to light  Conjunctivae and EOM are normal  Right eye exhibits no discharge  Left eye exhibits no discharge  No scleral icterus  Neck: Normal range of motion  Neck supple  Cardiovascular: Normal rate, regular rhythm and normal heart sounds  Exam reveals no gallop and no friction rub  No murmur heard  Pulmonary/Chest: Effort normal  No respiratory distress  He has no wheezes  He exhibits no tenderness  Decreased breath sounds in both lung fields   Abdominal: Soft  Bowel sounds are normal  He exhibits no distension  There is no tenderness  There is no guarding     Musculoskeletal: Normal range of motion  He exhibits no edema, tenderness or deformity  Neurological: He is alert and oriented to person, place, and time  No cranial nerve deficit  He exhibits normal muscle tone  Coordination normal    Skin: Skin is warm and dry  He is not diaphoretic  Psychiatric: He has a normal mood and affect  His behavior is normal    Nursing note and vitals reviewed  Pertinent Laboratory/Diagnostic Studies: The following labs/studies were reviewed please see chart or hospital paperwork for details    Ref Range & Units 7/25/20 0451    Sodium 136 - 145 mmol/L 136    Potassium 3 5 - 5 3 mmol/L 3 4Low     Chloride 100 - 108 mmol/L 94Low     CO2 21 - 32 mmol/L 42High     ANION GAP 4 - 13 mmol/L 0Low     BUN 5 - 25 mg/dL 30High     Creatinine 0 60 - 1 30 mg/dL 0 63    Comment: Standardized to IDMS reference method   Glucose 65 - 140 mg/dL 93       Calcium 8 3 - 10 1 mg/dL 7 9Low     eGFR ml/min/1 73sq m 86      Ref Range & Units 7/25/20 0451    WBC 4 31 - 10 16 Thousand/uL 4 31    RBC 3 88 - 5 62 Million/uL 3 79Low     Hemoglobin 12 0 - 17 0 g/dL 12 1    Hematocrit 36 5 - 49 3 % 36 7    MCV 82 - 98 fL 97    MCH 26 8 - 34 3 pg 31 9    MCHC 31 4 - 37 4 g/dL 33 0    RDW 11 6 - 15 1 % 13 5    Platelets 002 - 808 Thousands/uL 144Low     MPV 8 9 - 12 7 fL 10 1        - Counseling Documentation: patient was counseled regarding: prognosis

## 2020-07-28 PROBLEM — H40.10X0 OPEN-ANGLE GLAUCOMA OF BOTH EYES: Status: ACTIVE | Noted: 2020-07-28

## 2020-07-29 ENCOUNTER — NURSING HOME VISIT (OUTPATIENT)
Dept: GERIATRICS | Facility: OTHER | Age: 85
End: 2020-07-29
Payer: COMMERCIAL

## 2020-07-29 VITALS
OXYGEN SATURATION: 98 % | SYSTOLIC BLOOD PRESSURE: 107 MMHG | DIASTOLIC BLOOD PRESSURE: 50 MMHG | BODY MASS INDEX: 20.13 KG/M2 | HEART RATE: 68 BPM | WEIGHT: 128.5 LBS | RESPIRATION RATE: 20 BRPM | TEMPERATURE: 98.1 F

## 2020-07-29 DIAGNOSIS — I50.32 CHRONIC HEART FAILURE WITH PRESERVED EJECTION FRACTION (HCC): Chronic | ICD-10-CM

## 2020-07-29 DIAGNOSIS — J96.11 CHRONIC RESPIRATORY FAILURE WITH HYPOXIA (HCC): ICD-10-CM

## 2020-07-29 DIAGNOSIS — J94.8 HYDROPNEUMOTHORAX: ICD-10-CM

## 2020-07-29 DIAGNOSIS — E78.5 HYPERLIPIDEMIA, UNSPECIFIED HYPERLIPIDEMIA TYPE: Chronic | ICD-10-CM

## 2020-07-29 DIAGNOSIS — I10 ESSENTIAL HYPERTENSION: Primary | Chronic | ICD-10-CM

## 2020-07-29 DIAGNOSIS — E03.9 HYPOTHYROIDISM, UNSPECIFIED TYPE: Chronic | ICD-10-CM

## 2020-07-29 PROCEDURE — 99309 SBSQ NF CARE MODERATE MDM 30: CPT | Performed by: PHYSICIAN ASSISTANT

## 2020-07-29 NOTE — ASSESSMENT & PLAN NOTE
Wt Readings from Last 3 Encounters:   07/29/20 58 3 kg (128 lb 8 oz)   07/25/20 58 3 kg (128 lb 8 5 oz)   02/14/20 74 9 kg (165 lb 1 6 oz)         - Continue torsemide   - Continue spironolactone   - CBC and BMP on 7/30/2020   - Continue low sodium diet at facility   - Continue to monitor weights at facility

## 2020-07-29 NOTE — PROGRESS NOTES
[facility]  [address]  [phone]  [place]   Code [address]         NAME: Susie Casas  AGE: 80 y o  SEX: male    DATE OF ENCOUNTER: 7/29/2020    Assessment and Plan     Hypothyroidism  - Continue levothyroxine 50 mcg PO QAM    COPD (chronic obstructive pulmonary disease) (HCC)  - Continue Trelegy Elipta QD   - Continue Xopenex prn     Chronic respiratory failure with hypoxia (HCC)  - Continue O2 via NC to maintain saturation >/= 90%   - Follow-up with pulmonology as an outpatient     Bilateral hydropneumothorax  - CXR 2 view on 7/30/2020 ordered   - Continue torsemide 20 mg PO QD   - Follow-up with Cardiology   - Continue to monitor pulse ox daily     Essential hypertension  - Continue torsemide 20 mg PO QD  - Continue terazosin 5 mg PO QD  - Continue amlodipine 10 mg PO QD  - Continue  spironolactone 25 mg PO QD   - Follow-up with cardiology as an outpatient   - Continue to monitor BP at facility     Chronic heart failure with preserved ejection fraction (HCC)  Wt Readings from Last 3 Encounters:   07/29/20 58 3 kg (128 lb 8 oz)   07/25/20 58 3 kg (128 lb 8 5 oz)   02/14/20 74 9 kg (165 lb 1 6 oz)         - Continue torsemide   - Continue spironolactone   - CBC and BMP on 7/30/2020   - Continue low sodium diet at facility   - Continue to monitor weights at facility     Hyperlipidemia  - Continue simvastatin 20 mg PO QHS       All medications and routine orders were reviewed and updated as needed  Chief Complaint     SNF Follow-up     History of Present Illness     PO is a 81 yo CM with multiple medical comorbidities including but not limited to b/l hydropneumothorax s/p b/l chest tube placement and removal, CHF, HTN, and hypothyroidism,  interviewed and examined in collaboration with nursing for SNF follow-up  He denies pain at this time  He notes eating well and staying well hydrated  Per facility records, 75%-100% of each meal completed  He denies GI and urinary issues  He is mostly continent of urine  His last BM was yesterday per facility records  No concerns from nursing at this time  He is participating in PT/OT/ST at facility  The patient's allergies, past medical, surgical, social and family history were reviewed and unchanged  Review of Systems     Review of Systems   Constitutional: Positive for activity change  Negative for chills and fever  HENT: Negative for sore throat  Respiratory: Negative for cough and shortness of breath  Cardiovascular: Negative for chest pain and palpitations  Gastrointestinal: Negative for abdominal distention, abdominal pain, diarrhea, nausea and vomiting  Genitourinary: Negative for difficulty urinating and dysuria  Musculoskeletal: Positive for arthralgias and gait problem  Neurological: Positive for weakness  Negative for dizziness, light-headedness and headaches  Psychiatric/Behavioral: Negative for suicidal ideas  Objective     Vitals:   Vitals:    07/29/20 1055   BP: 107/50   Pulse: 68   Resp: 20   Temp: 98 1 °F (36 7 °C)   SpO2: 98%         Physical Exam   Constitutional: No distress  Thin, Frail, elderly male lying comfortably in bed   HENT:   Nose: Nose normal    Mouth/Throat: Oropharynx is clear and moist  No oropharyngeal exudate  Eyes: Conjunctivae are normal  Right eye exhibits no discharge  Left eye exhibits no discharge  No scleral icterus  Cardiovascular: Normal rate and regular rhythm  Exam reveals no gallop and no friction rub  No murmur heard  Pulmonary/Chest: Effort normal  No stridor  No respiratory distress  He has decreased breath sounds in the right lower field and the left lower field  He has no wheezes  He has no rales  3L O2 via NC in place    Abdominal: Soft  Bowel sounds are normal  He exhibits no distension  There is no tenderness  There is no rebound and no guarding  Neurological: He is alert     Oriented to self, time and place deferred, with confusion, difficulty word finding at times    Skin: He is not diaphoretic  Psychiatric:   Very pleasant and cooperative during exam    Nursing note and vitals reviewed  Pertinent Laboratory/Diagnostic Studies:  Reviewed in facility chart     Current Medications   Medications reviewed and updated see facility [facility] for details        Current Outpatient Medications:     amLODIPine (NORVASC) 10 mg tablet, Take 1 tablet (10 mg total) by mouth daily, Disp: 90 tablet, Rfl: 3    Blood Pressure Monitoring (B-D ASSURE BPM/AUTO ARM CUFF) MISC, by Does not apply route daily, Disp: 1 each, Rfl: 0    brimonidine-timolol (COMBIGAN) 0 2-0 5 %, Apply 1 drop to eye 2 (two) times a day, Disp: , Rfl:     fluticasone-umeclidinium-vilanterol (TRELEGY) 100-62 5-25 MCG/INH inhaler, Inhale 1 puff daily Rinse mouth after use , Disp: 1 Inhaler, Rfl: 0    latanoprost (XALATAN) 0 005 % ophthalmic solution, , Disp: , Rfl:     levalbuterol (XOPENEX) 0 63 mg/3 mL nebulizer solution, Take 1 vial (0 63 mg total) by nebulization 3 (three) times a day as needed for wheezing or shortness of breath, Disp: 90 vial, Rfl: 0    levothyroxine 50 mcg tablet, Take 1 tablet by mouth daily, Disp: , Rfl:     montelukast (SINGULAIR) 10 mg tablet, daily , Disp: , Rfl:     simvastatin (ZOCOR) 20 mg tablet, daily , Disp: , Rfl:     spironolactone (ALDACTONE) 25 mg tablet, Take 1 tablet (25 mg total) by mouth daily, Disp: 90 tablet, Rfl: 3    terazosin (HYTRIN) 5 mg capsule, Take 1 capsule by mouth, Disp: , Rfl:     torsemide (DEMADEX) 20 mg tablet, Take 1 tablet (20 mg total) by mouth daily, Disp: 30 tablet, Rfl: 0      Joseline Campuzano PA-C  7/29/2020 5:32 PM

## 2020-07-29 NOTE — ASSESSMENT & PLAN NOTE
- CXR 2 view on 7/30/2020 ordered   - Continue torsemide 20 mg PO QD   - Follow-up with Cardiology   - Continue to monitor pulse ox daily

## 2020-07-31 ENCOUNTER — NURSING HOME VISIT (OUTPATIENT)
Dept: GERIATRICS | Facility: OTHER | Age: 85
End: 2020-07-31
Payer: COMMERCIAL

## 2020-07-31 VITALS
DIASTOLIC BLOOD PRESSURE: 50 MMHG | RESPIRATION RATE: 20 BRPM | HEART RATE: 68 BPM | SYSTOLIC BLOOD PRESSURE: 107 MMHG | TEMPERATURE: 98 F | BODY MASS INDEX: 20.05 KG/M2 | WEIGHT: 128 LBS | OXYGEN SATURATION: 94 %

## 2020-07-31 DIAGNOSIS — E78.5 HYPERLIPIDEMIA, UNSPECIFIED HYPERLIPIDEMIA TYPE: Chronic | ICD-10-CM

## 2020-07-31 DIAGNOSIS — E43 SEVERE PROTEIN-CALORIE MALNUTRITION (HCC): ICD-10-CM

## 2020-07-31 DIAGNOSIS — H40.10X0 OPEN-ANGLE GLAUCOMA OF BOTH EYES, UNSPECIFIED GLAUCOMA STAGE, UNSPECIFIED OPEN-ANGLE GLAUCOMA TYPE: ICD-10-CM

## 2020-07-31 DIAGNOSIS — J44.9 CHRONIC OBSTRUCTIVE PULMONARY DISEASE, UNSPECIFIED COPD TYPE (HCC): Chronic | ICD-10-CM

## 2020-07-31 DIAGNOSIS — I50.33 ACUTE ON CHRONIC DIASTOLIC HEART FAILURE (HCC): ICD-10-CM

## 2020-07-31 DIAGNOSIS — I10 ESSENTIAL HYPERTENSION: Chronic | ICD-10-CM

## 2020-07-31 DIAGNOSIS — E03.9 HYPOTHYROIDISM, UNSPECIFIED TYPE: Primary | Chronic | ICD-10-CM

## 2020-07-31 DIAGNOSIS — J96.11 CHRONIC RESPIRATORY FAILURE WITH HYPOXIA (HCC): ICD-10-CM

## 2020-07-31 PROCEDURE — 99309 SBSQ NF CARE MODERATE MDM 30: CPT | Performed by: PHYSICIAN ASSISTANT

## 2020-07-31 NOTE — ASSESSMENT & PLAN NOTE
Wt Readings from Last 3 Encounters:   07/29/20 58 3 kg (128 lb 8 oz)   07/25/20 58 3 kg (128 lb 8 5 oz)   02/14/20 74 9 kg (165 lb 1 6 oz)         - Continue torsemide 20 mg PO QD  - No s/sx of volume overload  - Follow-up with Cardiology as an outpatient

## 2020-07-31 NOTE — PROGRESS NOTES
[facility]  [address]  [phone]  [place]   Code 31         NAME: Avril Lunsford  AGE: 80 y o  SEX: male    DATE OF ENCOUNTER: 7/31/2020     CODE STATUS: FULL CODE     Assessment and Plan     Hypothyroidism  - Continue levothyroxine 50mcg PO QAM     COPD (chronic obstructive pulmonary disease) (HCC)  - Continue Trelegy Elipta   - Continue Xopenex prn     Chronic respiratory failure with hypoxia (HCC)  - Continue O2 via NC to maintain O2 sat >/= 90%   - Follow-up with pulmonology as an outpatient     Essential hypertension  - Continue amlodipine 10 mg PO QD   - Continue spironolactone 25 mg PO QD  - Continue torsemide 20 mg PO QD   - Continue terazosin 5 mg PO QD   - Continue simvastatin 20 mg PO QHS     Acute on chronic diastolic heart failure (HCC)  Wt Readings from Last 3 Encounters:   07/29/20 58 3 kg (128 lb 8 oz)   07/25/20 58 3 kg (128 lb 8 5 oz)   02/14/20 74 9 kg (165 lb 1 6 oz)         - Continue torsemide 20 mg PO QD  - No s/sx of volume overload  - Follow-up with Cardiology as an outpatient       Hyperlipidemia  - Continue simvastatin 20 mg PO QHS     Severe protein-calorie malnutrition (HCC)  - Continue Ensure TID   - Continue enhanced diet   - Continue to encourage PO intake       Open-angle glaucoma of both eyes  - Continue Combigan gtt BID   - Continue latanoprost gtt      All medications and routine orders were reviewed and updated as needed  Chief Complaint     SNF Follow-up     History of Present Illness     PO is a 79 yo CM with multiple medical comorbidities including but not limited to CHF, COPD, HLD, and open glaucoma, interviewed and examined in collaboration with nursing for SNF follow-up  Pt denies pain at this time  He notes eating well, 100% of each meal completed on average is documented in facility records  He also enjoys eating Ensure clear drinks that his daughter brought in for him  He notes staying well hydrated   He denies GI and urinary issues  He is continent of urine  His last BM was yesterday  No concerns from nursing at this time  The patient's allergies, past medical, surgical, social and family history were reviewed and unchanged  Review of Systems     Review of Systems   Constitutional: Positive for activity change  Negative for chills and fever  HENT: Negative for sore throat  Respiratory: Negative for cough and shortness of breath  Cardiovascular: Negative for chest pain and palpitations  Gastrointestinal: Negative for abdominal distention, abdominal pain, diarrhea, nausea and vomiting  Genitourinary: Negative for difficulty urinating and dysuria  Musculoskeletal: Positive for arthralgias and gait problem  Neurological: Positive for weakness  Negative for dizziness, light-headedness and headaches  Psychiatric/Behavioral: Negative for suicidal ideas  Objective     Vitals:   Vitals:    07/31/20 1314   BP: 107/50   Pulse: 68   Resp: 20   Temp: 98 °F (36 7 °C)   SpO2: 94%         Physical Exam   Constitutional: No distress  HENT:   Nose: Nose normal    Mouth/Throat: Oropharynx is clear and moist  No oropharyngeal exudate  Cardiovascular: Normal rate and regular rhythm  Exam reveals no gallop and no friction rub  No murmur heard  Pulmonary/Chest: Effort normal  No stridor  No respiratory distress  He has decreased breath sounds in the left lower field  He has no wheezes  He has no rales  O2 via NC in place    Musculoskeletal: He exhibits no edema  Neurological: He is alert  Skin: He is not diaphoretic  Psychiatric:   Pleasant and cooperative during exam    Nursing note and vitals reviewed        Pertinent Laboratory/Diagnostic Studies:  5/31/9417  BASIC METABOLIC PNL  GLUCOSE 76 mg/dL 65-99 Final  BUN 27 mg/dL 7-28 Final  CREATININE 0 46 mg/dL 0 53-1 30 L Final  SODIUM 141 mmol/L 135-145 Final  POTASSIUM 3 6 mmol/L 3 5-5 2 Final  CHLORIDE 96 mmol/L 100-109 L Final  CARBON DIOXIDE 38 mmol/L 23-31 H Final  CALCIUM 8 0 mg/dL 8 5-10 1 L Final  ANION GAP 7 3-11 Final  GFR, CALCULATED 98 >60 Final  mL/min per 1 73 square meters  Normal Function or Mild Renal  Disease (if clinically at risk): >or=60  Moderately Decreased: 30-59  Severely Decreased: 15-29  Renal Failure: <15  -American GFR: multiply reported GFR by 1 16  Please note that the eGFR is based on the CKD-EPI calculation, and is  not intended to be used for drug dosing  Note: Calculated GFR may not be an accurate indicator of renal  function if the patient's renal function is not in a steady state  CBC NO DIFF  HEMOGLOBIN 11 9 g/dL 12 5-17 0 L Final  HEMATOCRIT 35 1 % 37 0-48 0 L Final  WBC 4 2 thou/cmm 4 0-10 5 Final  RBC 3 67 mill/cmm 4 00-5 40 L Final  PLATELET COUNT 211 thou/cmm 140-350 Final    7/30/2020  Results: No comparison studies available  Heart size is borderline enlarged  No evidence for pulmonary edema  There are patchy densities or infiltrates at the left lung base  There is blunting of  the left costophrenic angle consistent with pleural effusion  There may be minimal  right pleural effusion as well  No evidence for a pneumothorax  There are degenerative changes of the thoracic spine and shoulders  Conclusion: Left lower lobe patchy infiltrates and left pleural effusion  Followup  chest x-ray recommended  Current Medications   Medications reviewed and updated see facility [facility] for details        Current Outpatient Medications:     amLODIPine (NORVASC) 10 mg tablet, Take 1 tablet (10 mg total) by mouth daily, Disp: 90 tablet, Rfl: 3    Blood Pressure Monitoring (B-D ASSURE BPM/AUTO ARM CUFF) MISC, by Does not apply route daily, Disp: 1 each, Rfl: 0    brimonidine-timolol (COMBIGAN) 0 2-0 5 %, Apply 1 drop to eye 2 (two) times a day, Disp: , Rfl:     fluticasone-umeclidinium-vilanterol (TRELEGY) 100-62 5-25 MCG/INH inhaler, Inhale 1 puff daily Rinse mouth after use , Disp: 1 Inhaler, Rfl: 0    latanoprost (XALATAN) 0 005 % ophthalmic solution, , Disp: , Rfl:     levalbuterol (XOPENEX) 0 63 mg/3 mL nebulizer solution, Take 1 vial (0 63 mg total) by nebulization 3 (three) times a day as needed for wheezing or shortness of breath, Disp: 90 vial, Rfl: 0    levothyroxine 50 mcg tablet, Take 1 tablet by mouth daily, Disp: , Rfl:     montelukast (SINGULAIR) 10 mg tablet, daily , Disp: , Rfl:     simvastatin (ZOCOR) 20 mg tablet, daily , Disp: , Rfl:     spironolactone (ALDACTONE) 25 mg tablet, Take 1 tablet (25 mg total) by mouth daily, Disp: 90 tablet, Rfl: 3    terazosin (HYTRIN) 5 mg capsule, Take 1 capsule by mouth, Disp: , Rfl:     torsemide (DEMADEX) 20 mg tablet, Take 1 tablet (20 mg total) by mouth daily, Disp: 30 tablet, Rfl: 0      Joseline Campuzano PA-C  7/31/2020 5:09 PM

## 2020-07-31 NOTE — ASSESSMENT & PLAN NOTE
- Continue amlodipine 10 mg PO QD   - Continue spironolactone 25 mg PO QD  - Continue torsemide 20 mg PO QD   - Continue terazosin 5 mg PO QD   - Continue simvastatin 20 mg PO QHS

## 2020-08-03 ENCOUNTER — NURSING HOME VISIT (OUTPATIENT)
Dept: GERIATRICS | Facility: OTHER | Age: 85
End: 2020-08-03
Payer: COMMERCIAL

## 2020-08-03 VITALS
DIASTOLIC BLOOD PRESSURE: 59 MMHG | SYSTOLIC BLOOD PRESSURE: 112 MMHG | HEART RATE: 92 BPM | OXYGEN SATURATION: 98 % | TEMPERATURE: 97.8 F | RESPIRATION RATE: 20 BRPM

## 2020-08-03 DIAGNOSIS — I50.32 CHRONIC HEART FAILURE WITH PRESERVED EJECTION FRACTION (HCC): Chronic | ICD-10-CM

## 2020-08-03 DIAGNOSIS — J44.9 CHRONIC OBSTRUCTIVE PULMONARY DISEASE, UNSPECIFIED COPD TYPE (HCC): Chronic | ICD-10-CM

## 2020-08-03 DIAGNOSIS — J96.11 CHRONIC RESPIRATORY FAILURE WITH HYPOXIA (HCC): ICD-10-CM

## 2020-08-03 DIAGNOSIS — I10 ESSENTIAL HYPERTENSION: Primary | Chronic | ICD-10-CM

## 2020-08-03 DIAGNOSIS — E03.9 HYPOTHYROIDISM, UNSPECIFIED TYPE: Chronic | ICD-10-CM

## 2020-08-03 PROCEDURE — 99309 SBSQ NF CARE MODERATE MDM 30: CPT | Performed by: PHYSICIAN ASSISTANT

## 2020-08-03 RX ORDER — AMLODIPINE BESYLATE 5 MG/1
5 TABLET ORAL DAILY
COMMUNITY

## 2020-08-03 NOTE — ASSESSMENT & PLAN NOTE
- BP log reviewed, SBP has been running low   - Decrease amlodipine to 5 mg PO QD  - Continue spironolactone 25 mg PO QD  - Continue torsemide 20 mg PO QD   - Monitor BP QD x 7 days

## 2020-08-03 NOTE — ASSESSMENT & PLAN NOTE
Wt Readings from Last 3 Encounters:   07/31/20 58 1 kg (128 lb)   07/29/20 58 3 kg (128 lb 8 oz)   07/25/20 58 3 kg (128 lb 8 5 oz)       - Continue torsemide  - Continue spironolactone   - Continue low sodium diet   - Continue to monitor weights

## 2020-08-03 NOTE — PROGRESS NOTES
[facility]  [address]  [phone]  [place]   Code 31         NAME: Maria G Phillips  AGE: 80 y o  SEX: male    DATE OF ENCOUNTER: 8/3/2020     CODE STATUS: FULL CODE     Assessment and Plan     Hypothyroidism  - Continue levothyroxine 50 mcg PO QAM     COPD (chronic obstructive pulmonary disease) (HCC)  - Continue Trelegy Elipta  - Continue Xoponex prn     Chronic respiratory failure with hypoxia (HCC)  - Continue O2 via NC to maintain O2 sat >/=90%   - Follow-up with pulmonology as an outpatient     Essential hypertension  - BP log reviewed, SBP has been running low   - Decrease amlodipine to 5 mg PO QD  - Continue spironolactone 25 mg PO QD  - Continue torsemide 20 mg PO QD   - Monitor BP QD x 7 days     Chronic heart failure with preserved ejection fraction (HCC)  Wt Readings from Last 3 Encounters:   07/31/20 58 1 kg (128 lb)   07/29/20 58 3 kg (128 lb 8 oz)   07/25/20 58 3 kg (128 lb 8 5 oz)       - Continue torsemide  - Continue spironolactone   - Continue low sodium diet   - Continue to monitor weights      All medications and routine orders were reviewed and updated as needed  Chief Complaint     SNF Follow-up     History of Present Illness     PO is a 79 yo CM with multiple medical comorbidities including but not limited to HTN, COPD, glaucoma, and HLD, interviewed and examined in collaboration with nursing for SNF Follow-up  He notes feeling well overall  He denies pain at this time  He notes eating well and staying well hydrated  He enjoys his Ensure clear drinks as nutritional supplements  He completes 75%-100% of each meal per facility records  He denies GI and urinary issues  He is continent of urine  His last BM was yesterday  No concerns from nursing  Upon chart review, BP have been running low  The patient's allergies, past medical, surgical, social and family history were reviewed and unchanged      Review of Systems     Review of Systems Constitutional: Positive for activity change  Negative for chills and fever  HENT: Negative for sore throat  Respiratory: Negative for cough and shortness of breath  Cardiovascular: Negative for chest pain and palpitations  Gastrointestinal: Negative for abdominal distention, abdominal pain, diarrhea, nausea and vomiting  Genitourinary: Negative for difficulty urinating and dysuria  Musculoskeletal: Positive for arthralgias and gait problem  Neurological: Positive for weakness  Negative for dizziness, light-headedness and headaches  Psychiatric/Behavioral: Negative for suicidal ideas  Objective     Vitals:   Vitals:    08/03/20 1505   BP: 112/59   Pulse: 92   Resp: 20   Temp: 97 8 °F (36 6 °C)   SpO2: 98%         Physical Exam   Constitutional:  Non-toxic appearance  Chronically ill appearing elderly male    HENT:   Head: Normocephalic and atraumatic  Mouth/Throat: Mucous membranes are moist  No oropharyngeal exudate or posterior oropharyngeal erythema  Oropharynx is clear  Eyes: Conjunctivae are normal  Right eye exhibits no discharge  Left eye exhibits no discharge  No scleral icterus  Wearing glasses   Cardiovascular: Normal rate and regular rhythm  Exam reveals no gallop and no friction rub  No murmur heard  Pulmonary/Chest: Effort normal  No stridor  Decreased air movement is present  He has decreased breath sounds  He has no wheezes  He has no rhonchi  He has no rales  Abdominal: Bowel sounds are normal  He exhibits no distension  There is no abdominal tenderness  There is no rebound and no guarding  Neurological: He is alert  Psychiatric:   Very pleasant, cooperative during exam    Nursing note and vitals reviewed        Pertinent Laboratory/Diagnostic Studies:  2/17/7123  BASIC METABOLIC PNL   ·       HEMOGLOBIN   ·  11 9 g/dL 12 5-17 0 L Final             HEMATOCRIT   ·  35 1 % 37 0-48 0 L Final             WBC   ·  4 2 thou/cmm 4 0-10 5  Final             RBC ·  3 67 mill/cmm 4 00-5 40 L Final             PLATELET COUNT   ·  233 thou/cmm 140-350  Final             MPV   ·  8 7 fL 7 5-11 3  Final             MCV   ·  96 fL   Final             MCH   ·  32 6 pg 27 0-36 0  Final             MCHC   ·  34 0 g/dL 32 0-37 0  Final             RDW   ·  14 5 % 12 0-16 0  Final         Current Medications   Medications reviewed and updated see facility [facility] for details        Current Outpatient Medications:     amLODIPine (NORVASC) 5 mg tablet, Take 5 mg by mouth daily, Disp: , Rfl:     Blood Pressure Monitoring (B-D ASSURE BPM/AUTO ARM CUFF) MISC, by Does not apply route daily, Disp: 1 each, Rfl: 0    brimonidine-timolol (COMBIGAN) 0 2-0 5 %, Apply 1 drop to eye 2 (two) times a day, Disp: , Rfl:     fluticasone-umeclidinium-vilanterol (TRELEGY) 100-62 5-25 MCG/INH inhaler, Inhale 1 puff daily Rinse mouth after use , Disp: 1 Inhaler, Rfl: 0    latanoprost (XALATAN) 0 005 % ophthalmic solution, , Disp: , Rfl:     levalbuterol (XOPENEX) 0 63 mg/3 mL nebulizer solution, Take 1 vial (0 63 mg total) by nebulization 3 (three) times a day as needed for wheezing or shortness of breath, Disp: 90 vial, Rfl: 0    levothyroxine 50 mcg tablet, Take 1 tablet by mouth daily, Disp: , Rfl:     montelukast (SINGULAIR) 10 mg tablet, daily , Disp: , Rfl:     simvastatin (ZOCOR) 20 mg tablet, daily , Disp: , Rfl:     spironolactone (ALDACTONE) 25 mg tablet, Take 1 tablet (25 mg total) by mouth daily, Disp: 90 tablet, Rfl: 3    terazosin (HYTRIN) 5 mg capsule, Take 1 capsule by mouth, Disp: , Rfl:     torsemide (DEMADEX) 20 mg tablet, Take 1 tablet (20 mg total) by mouth daily, Disp: 30 tablet, Rfl: 0      Joseline Campuzano PA-C  8/3/2020 4:54 PM

## 2020-08-09 ENCOUNTER — NURSING HOME VISIT (OUTPATIENT)
Dept: GERIATRICS | Facility: OTHER | Age: 85
End: 2020-08-09
Payer: COMMERCIAL

## 2020-08-09 DIAGNOSIS — J44.9 CHRONIC OBSTRUCTIVE PULMONARY DISEASE, UNSPECIFIED COPD TYPE (HCC): Chronic | ICD-10-CM

## 2020-08-09 DIAGNOSIS — R60.0 BILATERAL LEG EDEMA: ICD-10-CM

## 2020-08-09 DIAGNOSIS — I10 ESSENTIAL HYPERTENSION: Chronic | ICD-10-CM

## 2020-08-09 DIAGNOSIS — J96.11 CHRONIC RESPIRATORY FAILURE WITH HYPOXIA (HCC): ICD-10-CM

## 2020-08-09 DIAGNOSIS — R53.1 GENERALIZED WEAKNESS: ICD-10-CM

## 2020-08-09 DIAGNOSIS — J94.8 HYDROPNEUMOTHORAX: Primary | ICD-10-CM

## 2020-08-09 PROCEDURE — 99316 NF DSCHRG MGMT 30 MIN+: CPT | Performed by: FAMILY MEDICINE

## 2020-08-10 NOTE — PROGRESS NOTES
[facility]  [address]  [phone]  [facility]: Brianne alfaro  POS: 32 SNF    NAME: Mulu Mckenna  AGE: 80 y o  SEX: male  DATE OF ADMISSION: 7/25/2020 DATE OF DISCHARGE: 8/9/2020 DISCHARGE DISPOSITION: to home    Reason for admission: Patient was admitted from [facility]  for rehabilitation after hospitalization for bilateral hydropneumothorax  Course of stay: Patient was admitted to [facility] for sub acute rehabilitation due to generalized weakness and ambulatory dysfunction  The patient participated in PT/OT, his overall functional status improved, he is on 3 L of oxygen via nasal cannula  He is able to walk with a walker  Discharge Medications: See discharge medication list which was reviewed and signed  Scripts given    Status at time of discharge: Stable    Today's Visit: 8/7/2020    1  B/L hydropneumothorax:      - improved     -  Continue 3 L of oxygen via nasal cannula    2  B/L leg edema:     -  Improved     -  Continue torsemide 20 mg p o  daily    3  Chronic respiratory failure with hypoxia     -  Stable     -  Continue oxygen 3 L via nasal cannula    4  COPD:      -   Stable     -  Continue trelegy as ordered     -  Continue Xopenex as ordered    -  Continue Singulair 10 mg p o  daily    5  HTN:      -  Controlled     -  Continue amlodipine 5 mg p o  daily     -  Continued terazosin 5 mg p o  daily     -  Continue simvastatin 20 mg p o  daily    6  Generalized weakness/ ambulatory dysfunction     -  PT/OT completed     -  Going home with home PT     -   Subjective: He is stable has no complaints at this time, on 3 L of oxygen via nasal cannula, going home with daughter, going to stay at daughter's place for now, is soaking well with walker  Vitals:T:  97 6, P:  P 83, R:  16, BP:  110/61,  92% on 3 L of oxygen    Exam: Physical Exam  Vitals signs and nursing note reviewed  Constitutional:       General: He is not in acute distress  Appearance: Normal appearance  He is well-developed  He is not diaphoretic  HENT:      Head: Normocephalic and atraumatic  Nose: Nose normal       Mouth/Throat:      Mouth: Mucous membranes are moist       Pharynx: Oropharynx is clear  No oropharyngeal exudate  Eyes:      General: No scleral icterus  Right eye: No discharge  Left eye: No discharge  Conjunctiva/sclera: Conjunctivae normal    Neck:      Musculoskeletal: Normal range of motion and neck supple  Cardiovascular:      Rate and Rhythm: Normal rate and regular rhythm  Heart sounds: Murmur present  No friction rub  No gallop  Pulmonary:      Effort: Pulmonary effort is normal  No respiratory distress  Breath sounds: Normal breath sounds  No wheezing  Chest:      Chest wall: No tenderness  Abdominal:      General: Bowel sounds are normal  There is no distension  Palpations: Abdomen is soft  Tenderness: There is no abdominal tenderness  There is no guarding  Musculoskeletal: Normal range of motion  General: No tenderness or deformity  Comments:  Left ankle more swollen than right   Skin:     General: Skin is warm and dry  Neurological:      General: No focal deficit present  Mental Status: He is alert and oriented to person, place, and time  Cranial Nerves: No cranial nerve deficit  Motor: No abnormal muscle tone  Coordination: Coordination normal    Psychiatric:         Mood and Affect: Mood normal          Behavior: Behavior normal          Thought Content:  Thought content normal          Discussion with patient/family and further instructions:  -Fall precautions  -Aspiration precautions  -Bleeding precautions  -Monitor for signs/symptoms of infection  -Medication list was reviewed and signed  -DME form was completed    Follow-up Recommendations: Please follow-up with your primary care physician within 7-10 days of discharge to review medication changes and current status  I have spent more than 30 minutes with patient and staff today in which greater than 50% of this time was spent in counseling/coordination of care regarding Intructions for management      Sammi Mullins MD  4/10/292749:21 AM

## 2020-08-19 ENCOUNTER — TRANSCRIBE ORDERS (OUTPATIENT)
Dept: URGENT CARE | Facility: CLINIC | Age: 85
End: 2020-08-19

## 2020-08-19 ENCOUNTER — APPOINTMENT (OUTPATIENT)
Dept: RADIOLOGY | Facility: CLINIC | Age: 85
End: 2020-08-19
Payer: COMMERCIAL

## 2020-08-19 DIAGNOSIS — J81.0 ACUTE PULMONARY EDEMA (HCC): Primary | ICD-10-CM

## 2020-08-19 DIAGNOSIS — J81.0 ACUTE PULMONARY EDEMA (HCC): ICD-10-CM

## 2020-08-19 PROCEDURE — 71046 X-RAY EXAM CHEST 2 VIEWS: CPT

## 2020-08-24 ENCOUNTER — APPOINTMENT (OUTPATIENT)
Dept: RADIOLOGY | Facility: CLINIC | Age: 85
End: 2020-08-24
Payer: COMMERCIAL

## 2020-08-24 DIAGNOSIS — J90 BILATERAL PLEURAL EFFUSION: ICD-10-CM

## 2020-08-24 PROCEDURE — 71046 X-RAY EXAM CHEST 2 VIEWS: CPT

## 2020-08-31 ENCOUNTER — OFFICE VISIT (OUTPATIENT)
Dept: PULMONOLOGY | Facility: CLINIC | Age: 85
End: 2020-08-31
Payer: COMMERCIAL

## 2020-08-31 VITALS
RESPIRATION RATE: 16 BRPM | BODY MASS INDEX: 22.6 KG/M2 | HEIGHT: 67 IN | HEART RATE: 85 BPM | OXYGEN SATURATION: 98 % | SYSTOLIC BLOOD PRESSURE: 114 MMHG | DIASTOLIC BLOOD PRESSURE: 58 MMHG | TEMPERATURE: 96.8 F | WEIGHT: 144 LBS

## 2020-08-31 DIAGNOSIS — J96.11 CHRONIC RESPIRATORY FAILURE WITH HYPOXIA (HCC): ICD-10-CM

## 2020-08-31 DIAGNOSIS — J44.9 CHRONIC OBSTRUCTIVE PULMONARY DISEASE, UNSPECIFIED COPD TYPE (HCC): Primary | Chronic | ICD-10-CM

## 2020-08-31 DIAGNOSIS — J94.8 HYDROPNEUMOTHORAX: ICD-10-CM

## 2020-08-31 PROCEDURE — 99214 OFFICE O/P EST MOD 30 MIN: CPT | Performed by: INTERNAL MEDICINE

## 2020-08-31 NOTE — ASSESSMENT & PLAN NOTE
He remains on 3 L and usually has saturations greater than 88%, sometimes touching that when he is active  Continue the current regimen  They are preferring to use tanks rather than a portable concentrator for now, as they are having difficulty getting her insurance to cover a portable concentrator

## 2020-08-31 NOTE — PROGRESS NOTES
Pulmonary Follow Up Note   Stephanie Montalvo 80 y o  male MRN: [id number]  8/31/2020      Assessment:    COPD (chronic obstructive pulmonary disease) ([facility] [address] )  Doing well on trilogy, continue as ordered    Chronic respiratory failure with hypoxia ([facility] [address] )  He remains on 3 L and usually has saturations greater than 88%, sometimes touching that when he is active  Continue the current regimen  They are preferring to use tanks rather than a portable concentrator for now, as they are having difficulty getting her insurance to cover a portable concentrator  Bilateral hydropneumothorax  I advised them that we can perform chest x-rays on an as-needed basis  We will be ready to place bilateral PleurX catheters if he needs them and is okay with them  Plan:    Diagnoses and all orders for this visit:    Chronic obstructive pulmonary disease, unspecified COPD type ([address] )    Chronic respiratory failure with hypoxia ([facility] [address] )    Bilateral hydropneumothorax      Return in about 3 months (around 11/30/2020)  History of Present Illness   HPI:  Stephanie Montalvo is a 80 y o  male who returns for follow-up  He was hospitalized in July with bilateral hydro pneumothoraces, thought to be related to chronic congestive heart failure  He had bilateral chest tubes with copious output  They were removed after several days  Reaccumulation has been slow, although the effusions were exudative, the cytology was negative and they were still thought to be consistent with a diuresed transudate  He was discharged to rehabilitation and has since returned home  He was discharged on torsemide rather than Lasix, but this was changed back by his PCP after inadequate diuresis  He was also restarted on oral potassium  He returns now in stable health, no complaints of shortness of breath  His son-in-law, who accompanies him to the appointment, notes that when he checks his pulse ox at home after activity, he reads 88-90% most times    He was 98% in the office today  He has no acute symptoms or complaints  Review of Systems   Constitutional: Negative for appetite change and fever  HENT: Positive for postnasal drip, rhinorrhea and sneezing  Negative for ear pain, sore throat and trouble swallowing  Respiratory: Positive for cough and shortness of breath  Cardiovascular: Negative for chest pain  Musculoskeletal: Negative for myalgias  Neurological: Negative for headaches  All other systems reviewed and are negative      Answers for HPI/ROS submitted by the patient on 8/28/2020   Primary symptoms  Do you experience frequent throat clearing?: Yes  Do you have a hoarse voice?: Yes  Chronicity: chronic  When did you first notice your symptoms?: more than 1 year ago  How often do your symptoms occur?: intermittently  Since you first noticed this problem, how has it changed?: gradually improving  Do you have shortness of breath that occurs with effort or exertion?: Yes  Do you have ear congestion?: No  Do you have heartburn?: No  Do you have fatigue?: Yes  Do you have nasal congestion?: No  Do you have shortness of breath when lying flat?: Yes  Do you have shortness of breath when you wake up?: No  Do you have sweats?: No  Have you experienced weight loss?: No  Which of the following makes your symptoms worse?: change in weather, minimal activity  Which of the following makes your symptoms better?: cold air, diuretics    Historical Information   Past Medical History:   Diagnosis Date    Allergic rhinitis     Hypercholesteremia     Hypertension     Pneumonia      Past Surgical History:   Procedure Laterality Date    IR CHEST TUBE PLACEMENT  7/13/2020    LAPAROSCOPIC COLON RESECTION      TOTAL HIP ARTHROPLASTY       Family History   Family history unknown: Yes         Meds/Allergies     Current Outpatient Medications:     amLODIPine (NORVASC) 5 mg tablet, Take 5 mg by mouth daily, Disp: , Rfl:     Blood Pressure Monitoring (B-D ASSURE BPM/AUTO ARM CUFF) MISC, by Does not apply route daily, Disp: 1 each, Rfl: 0    brimonidine-timolol (COMBIGAN) 0 2-0 5 %, Apply 1 drop to eye 2 (two) times a day, Disp: , Rfl:     fluticasone-umeclidinium-vilanterol (TRELEGY) 100-62 5-25 MCG/INH inhaler, Inhale 1 puff daily Rinse mouth after use , Disp: 1 Inhaler, Rfl: 0    latanoprost (XALATAN) 0 005 % ophthalmic solution, , Disp: , Rfl:     levalbuterol (XOPENEX) 0 63 mg/3 mL nebulizer solution, Take 1 vial (0 63 mg total) by nebulization 3 (three) times a day as needed for wheezing or shortness of breath, Disp: 90 vial, Rfl: 0    levothyroxine 50 mcg tablet, Take 1 tablet by mouth daily, Disp: , Rfl:     montelukast (SINGULAIR) 10 mg tablet, daily , Disp: , Rfl:     simvastatin (ZOCOR) 20 mg tablet, daily , Disp: , Rfl:     spironolactone (ALDACTONE) 25 mg tablet, Take 1 tablet (25 mg total) by mouth daily, Disp: 90 tablet, Rfl: 3    terazosin (HYTRIN) 5 mg capsule, Take 1 capsule by mouth, Disp: , Rfl:     torsemide (DEMADEX) 20 mg tablet, Take 1 tablet (20 mg total) by mouth daily, Disp: 30 tablet, Rfl: 0  No Known Allergies    Vitals: Blood pressure 114/58, pulse 85, temperature (!) 96 8 °F (36 °C), temperature source Tympanic, resp  rate 16, height 5' 7" (1 702 m), weight 65 3 kg (144 lb), SpO2 98 %  Body mass index is 22 55 kg/m²  Oxygen Therapy  SpO2: 98 %  Oxygen Therapy: Supplemental oxygen  O2 Delivery Method: Nasal cannula  O2 Flow Rate (L/min): 3 L/min      Physical Exam  Physical Exam  Vitals signs reviewed  Constitutional:       General: He is not in acute distress  Appearance: He is well-developed  HENT:      Head: Normocephalic and atraumatic  Mouth/Throat:      Pharynx: No oropharyngeal exudate  Eyes:      Conjunctiva/sclera: Conjunctivae normal       Pupils: Pupils are equal, round, and reactive to light  Neck:      Musculoskeletal: Neck supple  Thyroid: No thyromegaly  Vascular: No JVD     Cardiovascular:      Rate and Rhythm: Normal rate and regular rhythm  Heart sounds: Murmur present  No friction rub  No gallop  Comments: Three to 4/6 systolic ejection murmur along the left sternal border  Pulmonary:      Effort: Pulmonary effort is normal  No respiratory distress  Breath sounds: Normal breath sounds  No wheezing or rales  Musculoskeletal:         General: No tenderness  Right lower leg: Edema present  Left lower leg: Edema present  Lymphadenopathy:      Cervical: No cervical adenopathy  Skin:     General: Skin is warm and dry  Findings: No rash  Neurological:      Mental Status: He is alert and oriented to person, place, and time  Labs: I have personally reviewed pertinent lab results  Lab Results   Component Value Date    WBC 4 31 07/25/2020    HGB 12 1 07/25/2020    HCT 36 7 07/25/2020    MCV 97 07/25/2020     (L) 07/25/2020     Lab Results   Component Value Date    GLUCOSE 101 07/14/2020    CALCIUM 7 9 (L) 07/25/2020     08/03/2015    K 3 4 (L) 07/25/2020    CO2 42 (H) 07/25/2020    CL 94 (L) 07/25/2020    BUN 30 (H) 07/25/2020    CREATININE 0 63 07/25/2020     No results found for: IGE  Lab Results   Component Value Date    ALT 7 (L) 07/14/2020    AST 21 07/14/2020    ALKPHOS 44 (L) 07/14/2020    BILITOT 0 93 08/03/2015     Imaging and other studies: I have personally reviewed pertinent films in PACS    EKG, Pathology, and Other Studies: I have personally reviewed pertinent films in PACS    [facility]

## 2020-08-31 NOTE — ASSESSMENT & PLAN NOTE
I advised them that we can perform chest x-rays on an as-needed basis  We will be ready to place bilateral PleurX catheters if he needs them and is okay with them

## 2020-09-08 ENCOUNTER — TELEPHONE (OUTPATIENT)
Dept: PULMONOLOGY | Facility: CLINIC | Age: 85
End: 2020-09-08

## 2020-09-10 NOTE — TELEPHONE ENCOUNTER
He doesn't have a six minute walk that justifies oxygen  His last one was negative for desaturations, including when he was in the hospital   Unless you see one that I can't find?

## 2021-12-18 NOTE — CONSULTS
Pulmonary Consultation   Susie Casas 80 y o  male MRN: [id number]  Unit/Bed#: -01 Encounter: [id number]      Reason for consultation: acute respiratory failure with hypoxia and hypercapnia    Requesting physician: Dr Veronika Vance    Impressions/Recommendations:    1  Acute hypercapnic respiratory failure likely secondary to acute pulmonary edema  1  BiPAP as remain in place since application yesterday afternoon-I have requested BiPAP removed as patient is alert and oriented with no signs of respiratory distress  2  Will trial nasal cannula-use BiPAP as needed for lethargy and suspicions of hypercapnia  3  Recommend to Discontinue continuous pulse oximetry if Sandhu is able to tolerate nasal cannula  4  Titrate oxygen maintain SpO2 greater than equal to 88%  5  Encourage aggressive pulmonary toilet:  Incentive spirometry out of bed with increasing ambulation as tolerated, flutter valve  2  Acute pulmonary edema  1  No significant diuresis with 20 mg of IV Lasix yesterday, while at 40 mg IV Lasix daily  2  Echocardiogram ordered and pending  3  Daily BMP  4  Strict I&O Daily weights  3  Suspected ADAL  1  Recommend outpatient sleep study  4  Abnormal chest CT  1  Interstitial edema with 7 mm ground-glass nodule right lung apex in the right lower lobe  2  Recommend repeat chest CT in 12 months  3  Procalcitonin negative, no additional clinical signs of acute infection, monitor off antibiotics  5  Allergic rhinitis  1  Continue Singulair  2  Add Flonase    History of Present Illness   HPI:  Susie Casas is a 80 y o  male seen in consult for acute respiratory failure with hypoxia and hypercapnia  He has a past medical history significant for:  Hypertension, hypercholesterolemia, chronic allergic rhinitis, hypothyroidism, Monoclonal gammopathy-followed outpatient by hematology without current treatment and distant smoking history quit date 60 years prior with a 2 pack year smoking history    He presented to the hospital Pulmonary and Critical Care Medicine  Consult Note  Encounter Date: 2021 1:18 PM    Ms. Maday Elizalde is a 39 y.o. female  : 1976  Requesting Provider: Stella Baker DO    Reason for request: Cough            HISTORY OF PRESENT ILLNESS:    Patient is 39 y.o. presents with cough and tachycardia, she has been having pneumonialike symptoms for almost 2 weeks, she tested negative for COVID-19 3 times however yesterday she tested positive on PCR test.  She complains of recurrent episodes of cough with clear phlegm, shortness of breath, she reported fever, and chills, along with generalized weakness and fatigue, denies chest pain but she does have muscular pain in the substernal area on the left, she has history of GERD, and reported to have hiatal hernia by [facility] GI physician found during EGD. No lower extremity edema, no nausea no vomiting, no pleuritic chest pain. Patient has been treated with antibiotic and steroid over the last month with no significant relief. She was noted to have sinus tachycardia in ED, bedside echo done by ED staff showed possible pericardial effusion versus fat pad. Past Medical History:        Diagnosis Date    Acute bilateral low back pain without sciatica 2018    Anxiety 3/21/2017    Asthma 10/5/2020    DDD (degenerative disc disease), lumbar 2019    GERD (gastroesophageal reflux disease)     Hernia, hiatal     Hypoglycemia     Left sided sciatica 2018    Mitral valve prolapse     Palpitations 2016       Past Surgical History:        Procedure Laterality Date    CHOLECYSTECTOMY      DILATION AND CURETTAGE OF UTERUS         Social History:     reports that she has never smoked. She has never used smokeless tobacco. She reports current alcohol use. She reports that she does not use drugs.     Family History:       Problem Relation Age of Onset    Hypertension Mother     Cancer Maternal Grandmother     Colon Cancer Maternal 3/16/2019 with increased SOB, confusion and lethargy  He was noted to have respiratory acidosis and treated with BiPAP therapy  Chest CT was completed in noted mild interstitial thickening and ground-glass opacities likely mild interstitial edema dependent atelectasis  Procalcitonin negative and antibiotics were not continued    From a pulmonary standpoint he does not follow up with the pulmonologist notice a formal lung disease diagnosis he is a very distant smoking history with quit date 6 years prior with a 2 pack year smoking history  Prior to this admission he did not require daily inhalers and nebulizers and only has used in the past after developing pneumonia  He does not require oxygen at baseline  The family as well as patient does reports significant snoring and possible apneas but has not completed a sleep study in past   Denies any recent exposures or sick contacts although he is very active and spends significant time out in the community  No recent travel, no pets at home  He does report significant postnasal drip and allergic rhinitis without current treatment, but he does reports sometimes causes choking/ coughing  He has daily sputum production that is clear  At the time of evaluation he BiPAP in place and was alert and oriented  He currently reports shortness of breath that has been progressively worsening over the last month  Denies significant cough or change in sputum production  Denies:  Chest pain, pain inspiration, fevers, chills, night sweats, nausea vomiting diarrhea headache, dizziness, bronchospasm hemoptysis    Review of systems:  12 point review of systems was completed and was otherwise negative except as listed in HPI        Historical Information   Past Medical History:   Diagnosis Date    Allergic rhinitis     Hypercholesteremia     Hypertension     Pneumonia      Past Surgical History:   Procedure Laterality Date    LAPAROSCOPIC COLON RESECTION      TOTAL HIP 6   CREATININE 0.80   GLUCOSE 96       MV Settings: ABGs: No results for input(s): PHART, NRX0THO, PO2ART, YKN2OOE, BEART, V9YBRLKB, SDJ9EQE in the last 72 hours.   O2 Device: None (Room air)  Lab Results   Component Value Date    LACTA 1.0 12/17/2021    LACTA 0.9 11/26/2021       Radiology  I personally reviewed imaging studies and CT chest reviewed with the patient, no infiltrate, no pericardial effusion, no hiatal hernia        Assessment, plan:   Patient is at risk due to    · Cough, possibly cough variant asthma  · Sinus tachycardia likely secondary to #1  · COVID-19 infection, no signs of COVID-19 pneumonia on CT      Recommendation  · Start Symbicort twice daily  · Continue Solu-Medrol  · DuoNeb every 6 hours while awake  · Continue antireflux medication  · Possible home today or tomorrow   · Follow-up with pulmonary in 2           Thank you for consultation    Electronically signed by Melvin Blanc MD, [facility],  on 12/18/2021 at 1:18 PM ARTHROPLASTY       History reviewed  No pertinent family history  Occupational history: retired -denies exposure to asbestos    Tobacco history:  Distant 2 pack year smoking history    Meds/Allergies   Current Facility-Administered Medications   Medication Dose Route Frequency    acetaminophen (TYLENOL) tablet 650 mg  650 mg Oral Q6H PRN    atenolol (TENORMIN) tablet 50 mg  50 mg Oral Daily    enoxaparin (LOVENOX) subcutaneous injection 40 mg  40 mg Subcutaneous Daily    hydrALAZINE (APRESOLINE) injection 5 mg  5 mg Intravenous Q6H PRN    latanoprost (XALATAN) 0 005 % ophthalmic solution 1 drop  1 drop Both Eyes HS    levothyroxine tablet 50 mcg  50 mcg Oral Early Morning    methylPREDNISolone sodium succinate (Solu-MEDROL) injection 40 mg  40 mg Intravenous Q12H MARCIANO    montelukast (SINGULAIR) tablet 10 mg  10 mg Oral Daily    ondansetron (ZOFRAN) injection 4 mg  4 mg Intravenous Q6H PRN    pantoprazole (PROTONIX) EC tablet 20 mg  20 mg Oral Early Morning    pravastatin (PRAVACHOL) tablet 40 mg  40 mg Oral Daily With Dinner    terazosin (HYTRIN) capsule 5 mg  5 mg Oral HS     Medications Prior to Admission   Medication    atenolol (TENORMIN) 50 mg tablet    brimonidine-timolol (COMBIGAN) 0 2-0 5 %    chlorthalidone 25 mg tablet    levothyroxine 50 mcg tablet    montelukast (SINGULAIR) 10 mg tablet    potassium chloride (K-DUR,KLOR-CON) 20 mEq tablet    RABEprazole (ACIPHEX) 20 MG tablet    simvastatin (ZOCOR) 20 mg tablet    terazosin (HYTRIN) 5 mg capsule    latanoprost (XALATAN) 0 005 % ophthalmic solution     No Known Allergies    Vitals: Blood pressure 141/63, pulse 74, temperature 97 8 °F (36 6 °C), temperature source Axillary, resp  rate 18, height 5' 7" (1 702 m), weight 85 8 kg (189 lb 2 5 oz), SpO2 96 %  , BiPAP in place, Body mass index is 29 63 kg/m²        Intake/Output Summary (Last 24 hours) at 3/17/2019 1352  Last data filed at 3/17/2019 0330  Gross per 24 hour Intake 530 ml   Output 1525 ml   Net -995 ml       Physical exam:    General Appearance:    Alert, cooperative, no conversational dyspnea no accessory     muscle use       Head/eyes:    Normocephalic, without obvious abnormality, atraumatic,         PERRL, extraocular muscles intact, no scleral icterus    Nose:   Nares normal, septum midline, mucosa normal, no drainage    or sinus tenderness, BiPAP in place   Throat:   Moist mucous membranes, no thrush   Neck:   Supple, trachea midline, no adenopathy; no carotid    bruit    Lungs:     Faint bibasilar rales, diminished throughout   Chest Wall:    No tenderness or deformity    Heart:    Regular rate and rhythm, S1 and S2 normal, no murmur, rub   or gallop   Abdomen:     Obese, Soft, non-tender, bowel sounds active all four quadrants,     no masses, no organomegaly   Extremities:   Extremities normal, atraumatic, no cyanosis right-sided pedal edema   Skin:   Warm, dry, turgor normal, no rashes or lesions   Lymph nodes:   Cervical and supraclavicular nodes normal   Neurologic:   CNII-XII intact, normal strength, non-focal         Labs: I have personally reviewed pertinent lab results  , ABG: No results found for: PHART, DAI4TET, PO2ART, PLC3ZNT, G6WXDXDA, BEART, SOURCE, BNP: No results found for: BNP, CBC:   Lab Results   Component Value Date    WBC 3 80 (L) 03/17/2019    HGB 13 3 03/17/2019    HCT 40 0 03/17/2019    MCV 95 03/17/2019     (L) 03/17/2019    MCH 31 4 03/17/2019    MCHC 33 3 03/17/2019    RDW 12 8 03/17/2019    MPV 10 2 03/17/2019    NRBC 0 03/17/2019   , CMP:   Lab Results   Component Value Date    SODIUM 134 (L) 03/17/2019    K 3 7 03/17/2019    CL 94 (L) 03/17/2019    CO2 34 (H) 03/17/2019    CO2 38 (H) 03/16/2019    BUN 20 03/17/2019    CREATININE 0 70 03/17/2019    GLUCOSE 120 03/16/2019    CALCIUM 8 4 03/17/2019    EGFR 84 03/17/2019       Imaging and other studies: I have personally reviewed pertinent reports   , I have personally reviewed pertinent films in PACS and Chest CT 03/16/2019  Mild interstitial thickening and ground-glass attenuation in the posterior aspect of the right lower lobe, right upper lobe and left upper lobe likely suspicious for mild interstitial edema    Patchy airspace opacities in dependent aspect of the lower lobes-atelectasis    7 mm ill-defined ground-glass nodule in the right lung apex and the right lower lobe  Pulmonary function testing: none    EKG, Pathology, and Other Studies: Echocardiogram pending      Code Status: Level 3 - DNAR and DNI      Josesito Bernard

## 2022-06-07 NOTE — PLAN OF CARE
1. \"Have you been to the ER, urgent care clinic since your last visit? Hospitalized since your last visit? \" No    2. \"Have you seen or consulted any other health care providers outside of the [address] [place] [address] since your last visit? \" No     3. For patients aged 39-70: Has the patient had a colonoscopy / FIT/ Cologuard? NA - based on age      If the patient is female:    4. For patients aged 41-77: Has the patient had a mammogram within the past 2 years? NA - based on age or sex      11. For patients aged 21-65: Has the patient had a pap smear?  No       Chief Complaint   Patient presents with    Follow-up    Diabetes       Visit Vitals  /72 (BP 1 Location: Right upper arm, BP Patient Position: Sitting, BP Cuff Size: Adult)   Pulse 98   Temp 98.1 °F (36.7 °C) (Oral)   Resp 18   Ht 5' 5\" (1.651 m)   Wt 156 lb 11.2 oz (71.1 kg)   SpO2 97%   BMI 26.08 kg/m² Problem: Potential for Falls  Goal: Patient will remain free of falls  Description  INTERVENTIONS:  - Assess patient frequently for physical needs  -  Identify cognitive and physical deficits and behaviors that affect risk of falls  -  [place] fall precautions as indicated by assessment   - Educate patient/family on patient safety including physical limitations  - Instruct patient to call for assistance with activity based on assessment  - Modify environment to reduce risk of injury  - Consider OT/PT consult to assist with strengthening/mobility  Outcome: Progressing     Problem: Prexisting or High Potential for Compromised Skin Integrity  Goal: Skin integrity is maintained or improved  Description  INTERVENTIONS:  - Identify patients at risk for skin breakdown  - Assess and monitor skin integrity  - Assess and monitor nutrition and hydration status  - Monitor labs   - Assess for incontinence   - Turn and reposition patient  - Assist with mobility/ambulation  - Relieve pressure over bony prominences  - Avoid friction and shearing  - Provide appropriate hygiene as needed including keeping skin clean and dry  - Evaluate need for skin moisturizer/barrier cream  - Collaborate with interdisciplinary team   - Patient/family teaching  - Consider wound care consult   Outcome: Progressing     Problem: Nutrition/Hydration-ADULT  Goal: Nutrient/Hydration intake appropriate for improving, restoring or maintaining nutritional needs  Description  Monitor and assess patient's nutrition/hydration status for malnutrition  Collaborate with interdisciplinary team and initiate plan and interventions as ordered  Monitor patient's weight and dietary intake as ordered or per policy  Utilize nutrition screening tool and intervene as necessary  Determine patient's food preferences and provide high-protein, high-caloric foods as appropriate       INTERVENTIONS:  - Monitor oral intake, urinary output, labs, and treatment plans  - Assess nutrition and hydration status and recommend course of action  - Evaluate amount of meals eaten  - Assist patient with eating if necessary   - Allow adequate time for meals  - Recommend/ encourage appropriate diets, oral nutritional supplements, and vitamin/mineral supplements  - Order, calculate, and assess calorie counts as needed  - Recommend, monitor, and adjust tube feedings and TPN/PPN based on assessed needs  - Assess need for intravenous fluids  - Provide specific nutrition/hydration education as appropriate  - Include patient/family/caregiver in decisions related to nutrition  Outcome: Progressing     Problem: PAIN - ADULT  Goal: Verbalizes/displays adequate comfort level or baseline comfort level  Description  Interventions:  - Encourage patient to monitor pain and request assistance  - Assess pain using appropriate pain scale  - Administer analgesics based on type and severity of pain and evaluate response  - Implement non-pharmacological measures as appropriate and evaluate response  - Consider cultural and social influences on pain and pain management  - Notify physician/advanced practitioner if interventions unsuccessful or patient reports new pain  Outcome: Progressing     Problem: INFECTION - ADULT  Goal: Absence or prevention of progression during hospitalization  Description  INTERVENTIONS:  - Assess and monitor for signs and symptoms of infection  - Monitor lab/diagnostic results  - Monitor all insertion sites, i e  indwelling lines, tubes, and drains  - Monitor endotracheal if appropriate and nasal secretions for changes in amount and color  - [place] appropriate cooling/warming therapies per order  - Administer medications as ordered  - Instruct and encourage patient and family to use good hand hygiene technique  - Identify and instruct in appropriate isolation precautions for identified infection/condition  Outcome: Progressing  Goal: Absence of fever/infection during neutropenic period  Description  INTERVENTIONS:  - Monitor WBC    Outcome: Progressing     Problem: SAFETY ADULT  Goal: Maintain or return to baseline ADL function  Description  INTERVENTIONS:  -  Assess patient's ability to carry out ADLs; assess patient's baseline for ADL function and identify physical deficits which impact ability to perform ADLs (bathing, care of mouth/teeth, toileting, grooming, dressing, etc )  - Assess/evaluate cause of self-care deficits   - Assess range of motion  - Assess patient's mobility; develop plan if impaired  - Assess patient's need for assistive devices and provide as appropriate  - Encourage maximum independence but intervene and supervise when necessary  - Involve family in performance of ADLs  - Assess for home care needs following discharge   - Consider OT consult to assist with ADL evaluation and planning for discharge  - Provide patient education as appropriate  Outcome: Progressing  Goal: Maintain or return mobility status to optimal level  Description  INTERVENTIONS:  - Assess patient's baseline mobility status (ambulation, transfers, stairs, etc )    - Identify cognitive and physical deficits and behaviors that affect mobility  - Identify mobility aids required to assist with transfers and/or ambulation (gait belt, sit-to-stand, lift, walker, cane, etc )  - [place] fall precautions as indicated by assessment  - Record patient progress and toleration of activity level on Mobility SBAR; progress patient to next Phase/Stage  - Instruct patient to call for assistance with activity based on assessment  - Consider rehabilitation consult to assist with strengthening/weightbearing, etc   Outcome: Progressing     Problem: DISCHARGE PLANNING  Goal: Discharge to home or other facility with appropriate resources  Description  INTERVENTIONS:  - Identify barriers to discharge w/patient and caregiver  - Arrange for needed discharge resources and transportation as appropriate  - Identify discharge learning needs (meds, wound care, etc )  - Arrange for interpretive services to assist at discharge as needed  - Refer to Case Management Department for coordinating discharge planning if the patient needs post-hospital services based on physician/advanced practitioner order or complex needs related to functional status, cognitive ability, or social support system  Outcome: Progressing     Problem: Knowledge Deficit  Goal: Patient/family/caregiver demonstrates understanding of disease process, treatment plan, medications, and discharge instructions  Description  Complete learning assessment and assess knowledge base    Interventions:  - Provide teaching at level of understanding  - Provide teaching via preferred learning methods  Outcome: Progressing     Problem: RESPIRATORY - ADULT  Goal: Achieves optimal ventilation and oxygenation  Description  INTERVENTIONS:  - Assess for changes in respiratory status  - Assess for changes in mentation and behavior  - Position to facilitate oxygenation and minimize respiratory effort  - Oxygen administered by appropriate delivery if ordered  - Initiate smoking cessation education as indicated  - Encourage broncho-pulmonary hygiene including cough, deep breathe, Incentive Spirometry  - Assess the need for suctioning and aspirate as needed  - Assess and instruct to report SOB or any respiratory difficulty  - Respiratory Therapy support as indicated  Outcome: Progressing     Problem: GENITOURINARY - ADULT  Goal: Maintains or returns to baseline urinary function  Description  INTERVENTIONS:  - Assess urinary function  - Encourage oral fluids to ensure adequate hydration if ordered  - Administer IV fluids as ordered to ensure adequate hydration  - Administer ordered medications as needed  - Offer frequent toileting  - Follow urinary retention protocol if ordered  Outcome: Progressing  Goal: Absence of urinary retention  Description  INTERVENTIONS:  - Assess patients ability to void and empty bladder  - Monitor I/O  - Bladder scan as needed  - Discuss with physician/AP medications to alleviate retention as needed  - Discuss catheterization for long term situations as appropriate  Outcome: Progressing  Goal: Urinary catheter remains patent  Description  INTERVENTIONS:  - Assess patency of urinary catheter  - If patient has a chronic can, consider changing catheter if non-functioning  - Follow guidelines for intermittent irrigation of non-functioning urinary catheter  Outcome: Progressing     Problem: METABOLIC, FLUID AND ELECTROLYTES - ADULT  Goal: Electrolytes maintained within normal limits  Description  INTERVENTIONS:  - Monitor labs and assess patient for signs and symptoms of electrolyte imbalances  - Administer electrolyte replacement as ordered  - Monitor response to electrolyte replacements, including repeat lab results as appropriate  - Instruct patient on fluid and nutrition as appropriate  Outcome: Progressing  Goal: Fluid balance maintained  Description  INTERVENTIONS:  - Monitor labs   - Monitor I/O and WT  - Instruct patient on fluid and nutrition as appropriate  - Assess for signs & symptoms of volume excess or deficit  Outcome: Progressing  Goal: Glucose maintained within target range  Description  INTERVENTIONS:  - Monitor Blood Glucose as ordered  - Assess for signs and symptoms of hyperglycemia and hypoglycemia  - Administer ordered medications to maintain glucose within target range  - Assess nutritional intake and initiate nutrition service referral as needed  Outcome: Progressing     Problem: SKIN/TISSUE INTEGRITY - ADULT  Goal: Skin integrity remains intact  Description  INTERVENTIONS  - Identify patients at risk for skin breakdown  - Assess and monitor skin integrity  - Assess and monitor nutrition and hydration status  - Monitor labs (i e  albumin)  - Assess for incontinence   - Turn and reposition patient  - Assist with mobility/ambulation  - Relieve pressure over bony prominences  - Avoid friction and shearing  - Provide appropriate hygiene as needed including keeping skin clean and dry  - Evaluate need for skin moisturizer/barrier cream  - Collaborate with interdisciplinary team (i e  Nutrition, Rehabilitation, etc )   - Patient/family teaching  Outcome: Progressing  Goal: Incision(s), wounds(s) or drain site(s) healing without S/S of infection  Description  INTERVENTIONS  - Assess and document risk factors for skin impairment   - Assess and document dressing, incision, wound bed, drain sites and surrounding tissue  - Consider nutrition services referral as needed  - Oral mucous membranes remain intact  - Provide patient/ family education  Outcome: Progressing  Goal: Oral mucous membranes remain intact  Description  INTERVENTIONS  - Assess oral mucosa and hygiene practices  - Implement preventative oral hygiene regimen  - Implement oral medicated treatments as ordered  - Initiate Nutrition services referral as needed  Outcome: Progressing     Problem: HEMATOLOGIC - ADULT  Goal: Maintains hematologic stability  Description  INTERVENTIONS  - Assess for signs and symptoms of bleeding or hemorrhage  - Monitor labs  - Administer supportive blood products/factors as ordered and appropriate  Outcome: Progressing     Problem: MUSCULOSKELETAL - ADULT  Goal: Maintain or return mobility to safest level of function  Description  INTERVENTIONS:  - Assess patient's ability to carry out ADLs; assess patient's baseline for ADL function and identify physical deficits which impact ability to perform ADLs (bathing, care of mouth/teeth, toileting, grooming, dressing, etc )  - Assess/evaluate cause of self-care deficits   - Assess range of motion  - Assess patient's mobility  - Assess patient's need for assistive devices and provide as appropriate  - Encourage maximum independence but intervene and supervise when necessary  - Involve family in performance of ADLs  - Assess for home care needs following discharge   - Consider OT consult to assist with ADL evaluation and planning for discharge  - Provide patient education as appropriate  Outcome: Progressing  Goal: Maintain proper alignment of affected body part  Description  INTERVENTIONS:  - Support, maintain and protect limb and body alignment  - Provide patient/ family with appropriate education  Outcome: Progressing